# Patient Record
Sex: FEMALE | Race: WHITE | NOT HISPANIC OR LATINO | Employment: UNEMPLOYED | URBAN - NONMETROPOLITAN AREA
[De-identification: names, ages, dates, MRNs, and addresses within clinical notes are randomized per-mention and may not be internally consistent; named-entity substitution may affect disease eponyms.]

---

## 2017-04-13 ENCOUNTER — TRANSCRIBE ORDERS (OUTPATIENT)
Dept: ADMINISTRATIVE | Facility: HOSPITAL | Age: 72
End: 2017-04-13

## 2017-04-13 ENCOUNTER — LAB (OUTPATIENT)
Dept: LAB | Facility: HOSPITAL | Age: 72
End: 2017-04-13
Attending: FAMILY MEDICINE

## 2017-04-13 DIAGNOSIS — E87.5 HYPERKALEMIA: Primary | ICD-10-CM

## 2017-04-13 DIAGNOSIS — E87.5 HYPERKALEMIA: ICD-10-CM

## 2017-04-13 LAB
ANION GAP SERPL CALCULATED.3IONS-SCNC: 5.5 MMOL/L (ref 3.6–11.2)
BUN BLD-MCNC: 22 MG/DL (ref 7–21)
BUN/CREAT SERPL: 18.2 (ref 7–25)
CALCIUM SPEC-SCNC: 9.3 MG/DL (ref 7.7–10)
CHLORIDE SERPL-SCNC: 107 MMOL/L (ref 99–112)
CO2 SERPL-SCNC: 25.5 MMOL/L (ref 24.3–31.9)
CREAT BLD-MCNC: 1.21 MG/DL (ref 0.43–1.29)
GFR SERPL CREATININE-BSD FRML MDRD: 44 ML/MIN/1.73
GLUCOSE BLD-MCNC: 129 MG/DL (ref 70–110)
OSMOLALITY SERPL CALC.SUM OF ELEC: 280.7 MOSM/KG (ref 273–305)
POTASSIUM BLD-SCNC: 4.8 MMOL/L (ref 3.5–5.3)
SODIUM BLD-SCNC: 138 MMOL/L (ref 135–153)

## 2017-04-13 PROCEDURE — 80048 BASIC METABOLIC PNL TOTAL CA: CPT

## 2017-04-13 PROCEDURE — 36415 COLL VENOUS BLD VENIPUNCTURE: CPT

## 2017-07-05 ENCOUNTER — TRANSCRIBE ORDERS (OUTPATIENT)
Dept: INFUSION THERAPY | Facility: HOSPITAL | Age: 72
End: 2017-07-05

## 2017-07-05 DIAGNOSIS — D50.9 IRON DEFICIENCY ANEMIA, UNSPECIFIED IRON DEFICIENCY ANEMIA TYPE: Primary | ICD-10-CM

## 2017-07-05 DIAGNOSIS — N18.3 CHRONIC KIDNEY DISEASE (CKD), STAGE 3 (MODERATE): ICD-10-CM

## 2017-07-07 ENCOUNTER — HOSPITAL ENCOUNTER (OUTPATIENT)
Dept: INFUSION THERAPY | Facility: HOSPITAL | Age: 72
Setting detail: INFUSION SERIES
Discharge: HOME | End: 2017-07-07
Attending: INTERNAL MEDICINE

## 2017-07-07 VITALS
TEMPERATURE: 99.1 F | HEART RATE: 65 BPM | HEIGHT: 63 IN | DIASTOLIC BLOOD PRESSURE: 54 MMHG | RESPIRATION RATE: 18 BRPM | SYSTOLIC BLOOD PRESSURE: 140 MMHG | BODY MASS INDEX: 29.23 KG/M2 | WEIGHT: 165 LBS

## 2017-07-07 DIAGNOSIS — D50.9 IRON DEFICIENCY ANEMIA, UNSPECIFIED IRON DEFICIENCY ANEMIA TYPE: ICD-10-CM

## 2017-07-07 PROCEDURE — 25010000002 IRON SUCROSE PER 1 MG: Performed by: INTERNAL MEDICINE

## 2017-07-07 PROCEDURE — 96366 THER/PROPH/DIAG IV INF ADDON: CPT

## 2017-07-07 PROCEDURE — 96365 THER/PROPH/DIAG IV INF INIT: CPT

## 2017-07-07 RX ORDER — CHLORAL HYDRATE 500 MG
1000 CAPSULE ORAL
COMMUNITY
End: 2018-06-04

## 2017-07-07 RX ORDER — LISINOPRIL 40 MG/1
40 TABLET ORAL DAILY
Status: ON HOLD | COMMUNITY
End: 2019-02-14

## 2017-07-07 RX ORDER — LANOLIN ALCOHOL/MO/W.PET/CERES
1000 CREAM (GRAM) TOPICAL
Status: ON HOLD | COMMUNITY
End: 2018-02-03

## 2017-07-07 RX ORDER — FERROUS SULFATE 325(65) MG
325 TABLET ORAL EVERY EVENING
Status: ON HOLD | COMMUNITY
End: 2020-04-24 | Stop reason: SDUPTHER

## 2017-07-07 RX ORDER — DILTIAZEM HYDROCHLORIDE 300 MG/1
360 CAPSULE, COATED, EXTENDED RELEASE ORAL DAILY
Status: ON HOLD | COMMUNITY
End: 2018-02-03

## 2017-07-07 RX ORDER — RANITIDINE 150 MG/1
150 TABLET ORAL 2 TIMES DAILY
COMMUNITY
End: 2020-04-21

## 2017-07-07 RX ORDER — PHENOL 1.4 %
600 AEROSOL, SPRAY (ML) MUCOUS MEMBRANE DAILY
COMMUNITY

## 2017-07-07 RX ORDER — CHLORTHALIDONE 25 MG/1
25 TABLET ORAL EVERY OTHER DAY
Status: ON HOLD | COMMUNITY
End: 2019-02-14

## 2017-07-07 RX ADMIN — IRON SUCROSE 300 MG: 20 INJECTION, SOLUTION INTRAVENOUS at 15:42

## 2017-07-07 NOTE — PATIENT INSTRUCTIONS
Iron Deficiency Anemia, Adult  Anemia is when you have a low number of healthy red blood cells. It is often caused by too little iron. This is called iron deficiency anemia. It may make you tired and short of breath.  HOME CARE   · Take iron as told by your doctor.  · Take vitamins as told by your doctor.  · Eat foods that have iron in them. This includes liver, lean beef, whole-grain bread, eggs, dried fruit, and dark green leafy vegetables.  GET HELP RIGHT AWAY IF:  · You pass out (faint).  · You have chest pain.  · You feel sick to your stomach (nauseous) or throw up (vomit).  · You get very short of breath with activity.  · You are weak.  · You have a fast heartbeat.  · You start to sweat for no reason.  · You become light-headed when getting up from a chair or bed.  MAKE SURE YOU:  · Understand these instructions.  · Will watch your condition.  · Will get help right away if you are not doing well or get worse.     This information is not intended to replace advice given to you by your health care provider. Make sure you discuss any questions you have with your health care provider.     Document Released: 01/20/2012 Document Revised: 01/08/2016 Document Reviewed: 08/25/2014  [employer] Interactive Patient Education ©2017 [employer].  Iron Sucrose injection  What is this medicine?  IRON SUCROSE (EDGARDOSB carlin BROOKS juans) is an iron complex. Iron is used to make healthy red blood cells, which carry oxygen and nutrients throughout the body. This medicine is used to treat iron deficiency anemia in people with chronic kidney disease.  This medicine may be used for other purposes; ask your health care provider or pharmacist if you have questions.  COMMON BRAND NAME(S): Venofer  What should I tell my health care provider before I take this medicine?  They need to know if you have any of these conditions:  -anemia not caused by low iron levels  -heart disease  -high levels of iron in the blood  -kidney disease  -liver  disease  -an unusual or allergic reaction to iron, other medicines, foods, dyes, or preservatives  -pregnant or trying to get pregnant  -breast-feeding  How should I use this medicine?  This medicine is for infusion into a vein. It is given by a health care professional in a hospital or clinic setting.  Talk to your pediatrician regarding the use of this medicine in children. While this drug may be prescribed for children as young as 2 years for selected conditions, precautions do apply.  Overdosage: If you think you have taken too much of this medicine contact a poison control center or emergency room at once.  NOTE: This medicine is only for you. Do not share this medicine with others.  What if I miss a dose?  It is important not to miss your dose. Call your doctor or health care professional if you are unable to keep an appointment.  What may interact with this medicine?  Do not take this medicine with any of the following medications:  -deferoxamine  -dimercaprol  -other iron products  This medicine may also interact with the following medications:  -chloramphenicol  -deferasirox  This list may not describe all possible interactions. Give your health care provider a list of all the medicines, herbs, non-prescription drugs, or dietary supplements you use. Also tell them if you smoke, drink alcohol, or use illegal drugs. Some items may interact with your medicine.  What should I watch for while using this medicine?  Visit your doctor or healthcare professional regularly. Tell your doctor or healthcare professional if your symptoms do not start to get better or if they get worse. You may need blood work done while you are taking this medicine.  You may need to follow a special diet. Talk to your doctor. Foods that contain iron include: whole grains/cereals, dried fruits, beans, or peas, leafy green vegetables, and organ meats (liver, kidney).  What side effects may I notice from receiving this medicine?  Side  effects that you should report to your doctor or health care professional as soon as possible:  -allergic reactions like skin rash, itching or hives, swelling of the face, lips, or tongue  -breathing problems  -changes in blood pressure  -cough  -fast, irregular heartbeat  -feeling faint or lightheaded, falls  -fever or chills  -flushing, sweating, or hot feelings  -joint or muscle aches/pains  -seizures  -swelling of the ankles or feet  -unusually weak or tired  Side effects that usually do not require medical attention (report to your doctor or health care professional if they continue or are bothersome):  -diarrhea  -feeling achy  -headache  -irritation at site where injected  -nausea, vomiting  -stomach upset  -tiredness  This list may not describe all possible side effects. Call your doctor for medical advice about side effects. You may report side effects to FDA at 9-758-FDA-0938.  Where should I keep my medicine?  This drug is given in a hospital or clinic and will not be stored at home.  NOTE: This sheet is a summary. It may not cover all possible information. If you have questions about this medicine, talk to your doctor, pharmacist, or health care provider.     © 2017, Elsevier/[employer]. (2012-09-27 17:14:35)

## 2017-07-14 ENCOUNTER — HOSPITAL ENCOUNTER (OUTPATIENT)
Dept: INFUSION THERAPY | Facility: HOSPITAL | Age: 72
Setting detail: INFUSION SERIES
Discharge: HOME | End: 2017-07-14
Attending: INTERNAL MEDICINE

## 2017-07-14 VITALS
SYSTOLIC BLOOD PRESSURE: 142 MMHG | RESPIRATION RATE: 16 BRPM | HEART RATE: 66 BPM | DIASTOLIC BLOOD PRESSURE: 65 MMHG | TEMPERATURE: 98.8 F

## 2017-07-14 DIAGNOSIS — D50.9 IRON DEFICIENCY ANEMIA, UNSPECIFIED IRON DEFICIENCY ANEMIA TYPE: ICD-10-CM

## 2017-07-14 PROCEDURE — 25010000002 IRON SUCROSE PER 1 MG: Performed by: INTERNAL MEDICINE

## 2017-07-14 PROCEDURE — 96365 THER/PROPH/DIAG IV INF INIT: CPT

## 2017-07-14 PROCEDURE — 96366 THER/PROPH/DIAG IV INF ADDON: CPT

## 2017-07-14 RX ADMIN — IRON SUCROSE 300 MG: 20 INJECTION, SOLUTION INTRAVENOUS at 12:45

## 2017-07-14 NOTE — PATIENT INSTRUCTIONS
Iron Sucrose injection  What is this medicine?  IRON SUCROSE (PEDRO LUIS booth) is an iron complex. Iron is used to make healthy red blood cells, which carry oxygen and nutrients throughout the body. This medicine is used to treat iron deficiency anemia in people with chronic kidney disease.  This medicine may be used for other purposes; ask your health care provider or pharmacist if you have questions.  COMMON BRAND NAME(S): Venofer  What should I tell my health care provider before I take this medicine?  They need to know if you have any of these conditions:  -anemia not caused by low iron levels  -heart disease  -high levels of iron in the blood  -kidney disease  -liver disease  -an unusual or allergic reaction to iron, other medicines, foods, dyes, or preservatives  -pregnant or trying to get pregnant  -breast-feeding  How should I use this medicine?  This medicine is for infusion into a vein. It is given by a health care professional in a hospital or clinic setting.  Talk to your pediatrician regarding the use of this medicine in children. While this drug may be prescribed for children as young as 2 years for selected conditions, precautions do apply.  Overdosage: If you think you have taken too much of this medicine contact a poison control center or emergency room at once.  NOTE: This medicine is only for you. Do not share this medicine with others.  What if I miss a dose?  It is important not to miss your dose. Call your doctor or health care professional if you are unable to keep an appointment.  What may interact with this medicine?  Do not take this medicine with any of the following medications:  -deferoxamine  -dimercaprol  -other iron products  This medicine may also interact with the following medications:  -chloramphenicol  -deferasirox  This list may not describe all possible interactions. Give your health care provider a list of all the medicines, herbs, non-prescription drugs, or dietary  supplements you use. Also tell them if you smoke, drink alcohol, or use illegal drugs. Some items may interact with your medicine.  What should I watch for while using this medicine?  Visit your doctor or healthcare professional regularly. Tell your doctor or healthcare professional if your symptoms do not start to get better or if they get worse. You may need blood work done while you are taking this medicine.  You may need to follow a special diet. Talk to your doctor. Foods that contain iron include: whole grains/cereals, dried fruits, beans, or peas, leafy green vegetables, and organ meats (liver, kidney).  What side effects may I notice from receiving this medicine?  Side effects that you should report to your doctor or health care professional as soon as possible:  -allergic reactions like skin rash, itching or hives, swelling of the face, lips, or tongue  -breathing problems  -changes in blood pressure  -cough  -fast, irregular heartbeat  -feeling faint or lightheaded, falls  -fever or chills  -flushing, sweating, or hot feelings  -joint or muscle aches/pains  -seizures  -swelling of the ankles or feet  -unusually weak or tired  Side effects that usually do not require medical attention (report to your doctor or health care professional if they continue or are bothersome):  -diarrhea  -feeling achy  -headache  -irritation at site where injected  -nausea, vomiting  -stomach upset  -tiredness  This list may not describe all possible side effects. Call your doctor for medical advice about side effects. You may report side effects to FDA at 3-400-FDA-2103.  Where should I keep my medicine?  This drug is given in a hospital or clinic and will not be stored at home.  NOTE: This sheet is a summary. It may not cover all possible information. If you have questions about this medicine, talk to your doctor, pharmacist, or health care provider.     © 2017, Elsevier/[employer] Standard. (2012-09-27 17:14:35)

## 2017-07-17 ENCOUNTER — TRANSCRIBE ORDERS (OUTPATIENT)
Dept: ADMINISTRATIVE | Facility: HOSPITAL | Age: 72
End: 2017-07-17

## 2017-07-17 ENCOUNTER — LAB (OUTPATIENT)
Dept: LAB | Facility: HOSPITAL | Age: 72
End: 2017-07-17
Attending: INTERNAL MEDICINE

## 2017-07-17 DIAGNOSIS — D50.9 IRON DEFICIENCY ANEMIA, UNSPECIFIED: ICD-10-CM

## 2017-07-17 DIAGNOSIS — D50.9 IRON DEFICIENCY ANEMIA, UNSPECIFIED: Primary | ICD-10-CM

## 2017-07-17 LAB
COLLECT DATE SP2 STL: NORMAL
COLLECT DATE SP3 STL: NORMAL
COLLECT DATE STL: NORMAL
HEMOCCULT STL QL: NEGATIVE

## 2017-07-17 PROCEDURE — 82272 OCCULT BLD FECES 1-3 TESTS: CPT | Performed by: INTERNAL MEDICINE

## 2017-07-21 ENCOUNTER — HOSPITAL ENCOUNTER (OUTPATIENT)
Dept: INFUSION THERAPY | Facility: HOSPITAL | Age: 72
Setting detail: INFUSION SERIES
Discharge: HOME | End: 2017-07-21
Attending: INTERNAL MEDICINE

## 2017-07-21 VITALS
SYSTOLIC BLOOD PRESSURE: 164 MMHG | RESPIRATION RATE: 17 BRPM | DIASTOLIC BLOOD PRESSURE: 56 MMHG | TEMPERATURE: 97.8 F | BODY MASS INDEX: 29.23 KG/M2 | WEIGHT: 165 LBS | HEIGHT: 63 IN | HEART RATE: 77 BPM | OXYGEN SATURATION: 95 %

## 2017-07-21 DIAGNOSIS — D50.9 IRON DEFICIENCY ANEMIA, UNSPECIFIED IRON DEFICIENCY ANEMIA TYPE: ICD-10-CM

## 2017-07-21 PROCEDURE — 96365 THER/PROPH/DIAG IV INF INIT: CPT

## 2017-07-21 PROCEDURE — 96366 THER/PROPH/DIAG IV INF ADDON: CPT

## 2017-07-21 PROCEDURE — 25010000002 IRON SUCROSE PER 1 MG: Performed by: INTERNAL MEDICINE

## 2017-07-21 RX ADMIN — IRON SUCROSE 300 MG: 20 INJECTION, SOLUTION INTRAVENOUS at 08:47

## 2017-07-21 NOTE — PATIENT INSTRUCTIONS
Iron Sucrose injection  What is this medicine?  IRON SUCROSE (PEDRO LUIS booth) is an iron complex. Iron is used to make healthy red blood cells, which carry oxygen and nutrients throughout the body. This medicine is used to treat iron deficiency anemia in people with chronic kidney disease.  This medicine may be used for other purposes; ask your health care provider or pharmacist if you have questions.  COMMON BRAND NAME(S): Venofer  What should I tell my health care provider before I take this medicine?  They need to know if you have any of these conditions:  -anemia not caused by low iron levels  -heart disease  -high levels of iron in the blood  -kidney disease  -liver disease  -an unusual or allergic reaction to iron, other medicines, foods, dyes, or preservatives  -pregnant or trying to get pregnant  -breast-feeding  How should I use this medicine?  This medicine is for infusion into a vein. It is given by a health care professional in a hospital or clinic setting.  Talk to your pediatrician regarding the use of this medicine in children. While this drug may be prescribed for children as young as 2 years for selected conditions, precautions do apply.  Overdosage: If you think you have taken too much of this medicine contact a poison control center or emergency room at once.  NOTE: This medicine is only for you. Do not share this medicine with others.  What if I miss a dose?  It is important not to miss your dose. Call your doctor or health care professional if you are unable to keep an appointment.  What may interact with this medicine?  Do not take this medicine with any of the following medications:  -deferoxamine  -dimercaprol  -other iron products  This medicine may also interact with the following medications:  -chloramphenicol  -deferasirox  This list may not describe all possible interactions. Give your health care provider a list of all the medicines, herbs, non-prescription drugs, or dietary  supplements you use. Also tell them if you smoke, drink alcohol, or use illegal drugs. Some items may interact with your medicine.  What should I watch for while using this medicine?  Visit your doctor or healthcare professional regularly. Tell your doctor or healthcare professional if your symptoms do not start to get better or if they get worse. You may need blood work done while you are taking this medicine.  You may need to follow a special diet. Talk to your doctor. Foods that contain iron include: whole grains/cereals, dried fruits, beans, or peas, leafy green vegetables, and organ meats (liver, kidney).  What side effects may I notice from receiving this medicine?  Side effects that you should report to your doctor or health care professional as soon as possible:  -allergic reactions like skin rash, itching or hives, swelling of the face, lips, or tongue  -breathing problems  -changes in blood pressure  -cough  -fast, irregular heartbeat  -feeling faint or lightheaded, falls  -fever or chills  -flushing, sweating, or hot feelings  -joint or muscle aches/pains  -seizures  -swelling of the ankles or feet  -unusually weak or tired  Side effects that usually do not require medical attention (report to your doctor or health care professional if they continue or are bothersome):  -diarrhea  -feeling achy  -headache  -irritation at site where injected  -nausea, vomiting  -stomach upset  -tiredness  This list may not describe all possible side effects. Call your doctor for medical advice about side effects. You may report side effects to FDA at 5-484-FDA-9621.  Where should I keep my medicine?  This drug is given in a hospital or clinic and will not be stored at home.  NOTE: This sheet is a summary. It may not cover all possible information. If you have questions about this medicine, talk to your doctor, pharmacist, or health care provider.     © 2017, Elsevier/[employer] Standard. (2012-09-27 17:14:35)    Peripheral  Intravenous Catheter Placement, Adult, Care After   Refer to this sheet in the next few weeks. These instructions provide you with information about caring for yourself after your procedure. Your health care provider may also give you more specific instructions. Your treatment has been planned according to current medical practices, but problems sometimes occur. Call your health care provider if you have any problems or questions after your procedure.  WHAT TO EXPECT AFTER THE PROCEDURE  After your procedure, it is common to have:  · Soreness at the catheter site.  · Bruising at the catheter site.  HOME CARE INSTRUCTIONS  General Instructions  · Take over-the-counter and prescription medicines only as told by your health care provider.  · There are many different ways to close and cover the puncture site, including stitches, skin glue, and adhesive strips. Follow your health care provider's instructions about:    Puncture site care.    Bandage (dressing) changes and removal.    Puncture site closure removal.  · Check the puncture site every day for signs of infection. Watch for:    Redness, swelling, or pain.    Fluid, blood, or pus.  If You Go Home with Your Catheter In:  · Follow instructions from your health care provider about bathing.  · Do not get the catheter wet.  · Do not pull on the catheter or tubing. This can make the catheter come out from the vein.  · Keep all follow-up visits as told by your health care provider. This is important.  SEEK MEDICAL CARE IF:  · You develop a bruise around the puncture site.  · You have redness, swelling, or pain at the puncture site.  · You have fluid, blood, or pus coming from the puncture site.  · You have a fever.  · You went home with the catheter in and:    You have pain, redness, swelling, or leakage where the catheter is located.    The dressing or tape is loose.    The catheter falls out.    The catheter or tubing was pulled.    The catheter will not flush.    The  catheter stops working.  SEEK IMMEDIATE MEDICAL CARE IF:  · You have very bad pain around the puncture site.  · You have red streaks above or below the puncture site.  · You lose feeling or develop weakness in the area of the puncture site.     This information is not intended to replace advice given to you by your health care provider. Make sure you discuss any questions you have with your health care provider.     Document Released: 01/13/2017 Document Reviewed: 01/13/2017  Elsevier Interactive Patient Education ©2017 Elsevier Inc.

## 2018-02-03 ENCOUNTER — HOSPITAL ENCOUNTER (INPATIENT)
Facility: HOSPITAL | Age: 73
LOS: 4 days | Discharge: HOME | End: 2018-02-07
Attending: EMERGENCY MEDICINE | Admitting: FAMILY MEDICINE

## 2018-02-03 ENCOUNTER — APPOINTMENT (OUTPATIENT)
Dept: GENERAL RADIOLOGY | Facility: HOSPITAL | Age: 73
End: 2018-02-03

## 2018-02-03 DIAGNOSIS — N28.9 ACUTE RENAL INSUFFICIENCY: ICD-10-CM

## 2018-02-03 DIAGNOSIS — J11.1 INFLUENZA: ICD-10-CM

## 2018-02-03 DIAGNOSIS — J44.1 ACUTE EXACERBATION OF CHRONIC OBSTRUCTIVE PULMONARY DISEASE (COPD) (HCC): Primary | ICD-10-CM

## 2018-02-03 DIAGNOSIS — J96.01 ACUTE RESPIRATORY FAILURE WITH HYPOXIA (HCC): ICD-10-CM

## 2018-02-03 LAB
A-A DO2: 80.8 MMHG (ref 0–300)
ALBUMIN SERPL-MCNC: 4.3 G/DL (ref 3.4–4.8)
ALBUMIN/GLOB SERPL: 1.5 G/DL (ref 1.5–2.5)
ALP SERPL-CCNC: 39 U/L (ref 35–104)
ALT SERPL W P-5'-P-CCNC: 17 U/L (ref 10–36)
ANION GAP SERPL CALCULATED.3IONS-SCNC: 8.1 MMOL/L (ref 3.6–11.2)
ARTERIAL PATENCY WRIST A: POSITIVE
AST SERPL-CCNC: 22 U/L (ref 10–30)
ATMOSPHERIC PRESS: 732 MMHG
BASE EXCESS BLDA CALC-SCNC: -5.7 MMOL/L
BASOPHILS # BLD AUTO: 0.02 10*3/MM3 (ref 0–0.3)
BASOPHILS NFR BLD AUTO: 0.2 % (ref 0–2)
BDY SITE: ABNORMAL
BILIRUB SERPL-MCNC: 0.2 MG/DL (ref 0.2–1.8)
BODY TEMPERATURE: 98.6 C
BUN BLD-MCNC: 30 MG/DL (ref 7–21)
BUN/CREAT SERPL: 18.6 (ref 7–25)
CALCIUM SPEC-SCNC: 9.3 MG/DL (ref 7.7–10)
CHLORIDE SERPL-SCNC: 102 MMOL/L (ref 99–112)
CO2 SERPL-SCNC: 23.9 MMOL/L (ref 24.3–31.9)
COHGB MFR BLD: 1.1 % (ref 0–5)
CREAT BLD-MCNC: 1.61 MG/DL (ref 0.43–1.29)
D-LACTATE SERPL-SCNC: 1.1 MMOL/L (ref 0.5–2)
DEPRECATED RDW RBC AUTO: 52.7 FL (ref 37–54)
EOSINOPHIL # BLD AUTO: 0.02 10*3/MM3 (ref 0–0.7)
EOSINOPHIL NFR BLD AUTO: 0.2 % (ref 0–7)
ERYTHROCYTE [DISTWIDTH] IN BLOOD BY AUTOMATED COUNT: 16.7 % (ref 11.5–14.5)
FLUAV AG NPH QL: NEGATIVE
FLUBV AG NPH QL IA: POSITIVE
GFR SERPL CREATININE-BSD FRML MDRD: 31 ML/MIN/1.73
GLOBULIN UR ELPH-MCNC: 2.8 GM/DL
GLUCOSE BLD-MCNC: 192 MG/DL (ref 70–110)
GLUCOSE BLDC GLUCOMTR-MCNC: 264 MG/DL (ref 70–130)
GLUCOSE BLDC GLUCOMTR-MCNC: 282 MG/DL (ref 70–130)
GLUCOSE BLDC GLUCOMTR-MCNC: 396 MG/DL (ref 70–130)
HCO3 BLDA-SCNC: 20.1 MMOL/L (ref 22–26)
HCT VFR BLD AUTO: 34.6 % (ref 37–47)
HCT VFR BLD CALC: 33 % (ref 37–47)
HGB BLD-MCNC: 10.8 G/DL (ref 12–16)
HGB BLDA-MCNC: 11.3 G/DL (ref 12–16)
HOROWITZ INDEX BLD+IHG-RTO: 28 %
IMM GRANULOCYTES # BLD: 0.03 10*3/MM3 (ref 0–0.03)
IMM GRANULOCYTES NFR BLD: 0.3 % (ref 0–0.5)
LYMPHOCYTES # BLD AUTO: 1.99 10*3/MM3 (ref 1–3)
LYMPHOCYTES NFR BLD AUTO: 21.1 % (ref 16–46)
MCH RBC QN AUTO: 26.9 PG (ref 27–33)
MCHC RBC AUTO-ENTMCNC: 31.2 G/DL (ref 33–37)
MCV RBC AUTO: 86.1 FL (ref 80–94)
METHGB BLD QL: 0.4 % (ref 0–3)
MODALITY: ABNORMAL
MONOCYTES # BLD AUTO: 0.6 10*3/MM3 (ref 0.1–0.9)
MONOCYTES NFR BLD AUTO: 6.4 % (ref 0–12)
NEUTROPHILS # BLD AUTO: 6.75 10*3/MM3 (ref 1.4–6.5)
NEUTROPHILS NFR BLD AUTO: 71.8 % (ref 40–75)
OSMOLALITY SERPL CALC.SUM OF ELEC: 279.6 MOSM/KG (ref 273–305)
OXYHGB MFR BLDV: 88.6 % (ref 85–100)
PCO2 BLDA: 40.6 MM HG (ref 35–45)
PH BLDA: 7.31 PH UNITS (ref 7.35–7.45)
PLATELET # BLD AUTO: 373 10*3/MM3 (ref 130–400)
PMV BLD AUTO: 10.9 FL (ref 6–10)
PO2 BLDA: 63.1 MM HG (ref 80–100)
POTASSIUM BLD-SCNC: 4.3 MMOL/L (ref 3.5–5.3)
PROT SERPL-MCNC: 7.1 G/DL (ref 6–8)
RBC # BLD AUTO: 4.02 10*6/MM3 (ref 4.2–5.4)
SAO2 % BLDCOA: 89.9 % (ref 90–100)
SODIUM BLD-SCNC: 134 MMOL/L (ref 135–153)
WBC NRBC COR # BLD: 9.41 10*3/MM3 (ref 4.5–12.5)

## 2018-02-03 PROCEDURE — 25010000002 METHYLPREDNISOLONE PER 125 MG: Performed by: FAMILY MEDICINE

## 2018-02-03 PROCEDURE — 25010000002 AZITHROMYCIN: Performed by: EMERGENCY MEDICINE

## 2018-02-03 PROCEDURE — 71045 X-RAY EXAM CHEST 1 VIEW: CPT

## 2018-02-03 PROCEDURE — 82962 GLUCOSE BLOOD TEST: CPT

## 2018-02-03 PROCEDURE — 87804 INFLUENZA ASSAY W/OPTIC: CPT | Performed by: EMERGENCY MEDICINE

## 2018-02-03 PROCEDURE — 82805 BLOOD GASES W/O2 SATURATION: CPT | Performed by: EMERGENCY MEDICINE

## 2018-02-03 PROCEDURE — 25010000002 CEFTRIAXONE: Performed by: EMERGENCY MEDICINE

## 2018-02-03 PROCEDURE — 99284 EMERGENCY DEPT VISIT MOD MDM: CPT

## 2018-02-03 PROCEDURE — 94799 UNLISTED PULMONARY SVC/PX: CPT

## 2018-02-03 PROCEDURE — 63710000001 INSULIN ASPART PER 5 UNITS: Performed by: FAMILY MEDICINE

## 2018-02-03 PROCEDURE — 25010000002 METHYLPREDNISOLONE PER 125 MG: Performed by: EMERGENCY MEDICINE

## 2018-02-03 PROCEDURE — 25010000002 ENOXAPARIN PER 10 MG: Performed by: FAMILY MEDICINE

## 2018-02-03 PROCEDURE — 63710000001 INSULIN DETEMIR PER 5 UNITS: Performed by: FAMILY MEDICINE

## 2018-02-03 PROCEDURE — 36415 COLL VENOUS BLD VENIPUNCTURE: CPT

## 2018-02-03 PROCEDURE — 85025 COMPLETE CBC W/AUTO DIFF WBC: CPT | Performed by: EMERGENCY MEDICINE

## 2018-02-03 PROCEDURE — 71045 X-RAY EXAM CHEST 1 VIEW: CPT | Performed by: RADIOLOGY

## 2018-02-03 PROCEDURE — 87040 BLOOD CULTURE FOR BACTERIA: CPT | Performed by: EMERGENCY MEDICINE

## 2018-02-03 PROCEDURE — 80053 COMPREHEN METABOLIC PANEL: CPT | Performed by: EMERGENCY MEDICINE

## 2018-02-03 PROCEDURE — 36600 WITHDRAWAL OF ARTERIAL BLOOD: CPT | Performed by: EMERGENCY MEDICINE

## 2018-02-03 PROCEDURE — 93005 ELECTROCARDIOGRAM TRACING: CPT | Performed by: EMERGENCY MEDICINE

## 2018-02-03 PROCEDURE — 83605 ASSAY OF LACTIC ACID: CPT | Performed by: EMERGENCY MEDICINE

## 2018-02-03 PROCEDURE — 25810000003 SODIUM CHLORIDE 0.9 % WITH KCL 20 MEQ 20-0.9 MEQ/L-% SOLUTION: Performed by: FAMILY MEDICINE

## 2018-02-03 PROCEDURE — 83050 HGB METHEMOGLOBIN QUAN: CPT | Performed by: EMERGENCY MEDICINE

## 2018-02-03 PROCEDURE — 94640 AIRWAY INHALATION TREATMENT: CPT

## 2018-02-03 PROCEDURE — 82375 ASSAY CARBOXYHB QUANT: CPT | Performed by: EMERGENCY MEDICINE

## 2018-02-03 RX ORDER — NICOTINE POLACRILEX 4 MG
15 LOZENGE BUCCAL
Status: DISCONTINUED | OUTPATIENT
Start: 2018-02-03 | End: 2018-02-07 | Stop reason: HOSPADM

## 2018-02-03 RX ORDER — CALCIUM CARBONATE 500(1250)
500 TABLET ORAL DAILY
Status: DISCONTINUED | OUTPATIENT
Start: 2018-02-04 | End: 2018-02-07 | Stop reason: HOSPADM

## 2018-02-03 RX ORDER — CETIRIZINE HYDROCHLORIDE 10 MG/1
5 TABLET ORAL DAILY
Status: DISCONTINUED | OUTPATIENT
Start: 2018-02-04 | End: 2018-02-07 | Stop reason: HOSPADM

## 2018-02-03 RX ORDER — FERROUS SULFATE 325(65) MG
325 TABLET ORAL 2 TIMES DAILY
Status: DISCONTINUED | OUTPATIENT
Start: 2018-02-04 | End: 2018-02-07 | Stop reason: HOSPADM

## 2018-02-03 RX ORDER — CHLORTHALIDONE 50 MG/1
25 TABLET ORAL DAILY
Status: DISCONTINUED | OUTPATIENT
Start: 2018-02-04 | End: 2018-02-07 | Stop reason: HOSPADM

## 2018-02-03 RX ORDER — IPRATROPIUM BROMIDE AND ALBUTEROL SULFATE 2.5; .5 MG/3ML; MG/3ML
3 SOLUTION RESPIRATORY (INHALATION) ONCE
Status: COMPLETED | OUTPATIENT
Start: 2018-02-03 | End: 2018-02-03

## 2018-02-03 RX ORDER — DILTIAZEM HYDROCHLORIDE 180 MG/1
360 CAPSULE, COATED, EXTENDED RELEASE ORAL
Status: DISCONTINUED | OUTPATIENT
Start: 2018-02-04 | End: 2018-02-07 | Stop reason: HOSPADM

## 2018-02-03 RX ORDER — OSELTAMIVIR PHOSPHATE 6 MG/ML
30 FOR SUSPENSION ORAL EVERY 12 HOURS SCHEDULED
Status: DISCONTINUED | OUTPATIENT
Start: 2018-02-04 | End: 2018-02-07

## 2018-02-03 RX ORDER — LISINOPRIL 10 MG/1
40 TABLET ORAL DAILY
Status: DISCONTINUED | OUTPATIENT
Start: 2018-02-04 | End: 2018-02-07 | Stop reason: HOSPADM

## 2018-02-03 RX ORDER — OSELTAMIVIR PHOSPHATE 6 MG/ML
30 FOR SUSPENSION ORAL EVERY 12 HOURS SCHEDULED
Status: DISCONTINUED | OUTPATIENT
Start: 2018-02-03 | End: 2018-02-03

## 2018-02-03 RX ORDER — FENOFIBRATE 160 MG/1
160 TABLET ORAL DAILY
Status: ON HOLD | COMMUNITY
End: 2021-11-13

## 2018-02-03 RX ORDER — OSELTAMIVIR PHOSPHATE 75 MG/1
75 CAPSULE ORAL EVERY 12 HOURS SCHEDULED
Status: DISCONTINUED | OUTPATIENT
Start: 2018-02-03 | End: 2018-02-03 | Stop reason: SDUPTHER

## 2018-02-03 RX ORDER — FENOFIBRATE 145 MG/1
145 TABLET, COATED ORAL DAILY
Status: ON HOLD | COMMUNITY
End: 2018-02-03

## 2018-02-03 RX ORDER — METHYLPREDNISOLONE SODIUM SUCCINATE 125 MG/2ML
80 INJECTION, POWDER, LYOPHILIZED, FOR SOLUTION INTRAMUSCULAR; INTRAVENOUS ONCE
Status: COMPLETED | OUTPATIENT
Start: 2018-02-03 | End: 2018-02-03

## 2018-02-03 RX ORDER — CETIRIZINE HYDROCHLORIDE 10 MG/1
10 TABLET ORAL DAILY
Status: ON HOLD | COMMUNITY
End: 2019-12-17 | Stop reason: SDUPTHER

## 2018-02-03 RX ORDER — METHYLPREDNISOLONE SODIUM SUCCINATE 125 MG/2ML
60 INJECTION, POWDER, LYOPHILIZED, FOR SOLUTION INTRAMUSCULAR; INTRAVENOUS EVERY 6 HOURS
Status: DISCONTINUED | OUTPATIENT
Start: 2018-02-03 | End: 2018-02-07

## 2018-02-03 RX ORDER — IPRATROPIUM BROMIDE AND ALBUTEROL SULFATE 2.5; .5 MG/3ML; MG/3ML
3 SOLUTION RESPIRATORY (INHALATION) EVERY 6 HOURS PRN
Status: DISCONTINUED | OUTPATIENT
Start: 2018-02-03 | End: 2018-02-04

## 2018-02-03 RX ORDER — CYANOCOBALAMIN 1000 UG/ML
1000 INJECTION, SOLUTION INTRAMUSCULAR; SUBCUTANEOUS
Status: CANCELLED | OUTPATIENT
Start: 2018-02-03

## 2018-02-03 RX ORDER — DILTIAZEM HYDROCHLORIDE 360 MG/1
360 CAPSULE, EXTENDED RELEASE ORAL DAILY
COMMUNITY
End: 2019-03-16 | Stop reason: HOSPADM

## 2018-02-03 RX ORDER — NICOTINE POLACRILEX 4 MG
15 LOZENGE BUCCAL
Status: DISCONTINUED | OUTPATIENT
Start: 2018-02-03 | End: 2018-02-04 | Stop reason: SDUPTHER

## 2018-02-03 RX ORDER — CYANOCOBALAMIN 1000 UG/ML
1000 INJECTION, SOLUTION INTRAMUSCULAR; SUBCUTANEOUS
COMMUNITY
End: 2019-03-16 | Stop reason: HOSPADM

## 2018-02-03 RX ORDER — DEXTROSE MONOHYDRATE 25 G/50ML
25 INJECTION, SOLUTION INTRAVENOUS
Status: DISCONTINUED | OUTPATIENT
Start: 2018-02-03 | End: 2018-02-04 | Stop reason: SDUPTHER

## 2018-02-03 RX ORDER — SODIUM CHLORIDE AND POTASSIUM CHLORIDE 150; 900 MG/100ML; MG/100ML
100 INJECTION, SOLUTION INTRAVENOUS CONTINUOUS
Status: DISCONTINUED | OUTPATIENT
Start: 2018-02-03 | End: 2018-02-07 | Stop reason: HOSPADM

## 2018-02-03 RX ORDER — OSELTAMIVIR PHOSPHATE 6 MG/ML
30 FOR SUSPENSION ORAL ONCE
Status: COMPLETED | OUTPATIENT
Start: 2018-02-03 | End: 2018-02-03

## 2018-02-03 RX ORDER — CYANOCOBALAMIN 1000 UG/ML
1000 INJECTION, SOLUTION INTRAMUSCULAR; SUBCUTANEOUS
Status: DISCONTINUED | OUTPATIENT
Start: 2018-02-28 | End: 2018-02-07 | Stop reason: HOSPADM

## 2018-02-03 RX ORDER — SODIUM CHLORIDE 0.9 % (FLUSH) 0.9 %
10 SYRINGE (ML) INJECTION AS NEEDED
Status: DISCONTINUED | OUTPATIENT
Start: 2018-02-03 | End: 2018-02-07 | Stop reason: HOSPADM

## 2018-02-03 RX ORDER — DEXTROSE MONOHYDRATE 25 G/50ML
25 INJECTION, SOLUTION INTRAVENOUS
Status: DISCONTINUED | OUTPATIENT
Start: 2018-02-03 | End: 2018-02-07 | Stop reason: HOSPADM

## 2018-02-03 RX ORDER — FAMOTIDINE 20 MG/1
20 TABLET, FILM COATED ORAL 2 TIMES DAILY
Status: DISCONTINUED | OUTPATIENT
Start: 2018-02-03 | End: 2018-02-07 | Stop reason: HOSPADM

## 2018-02-03 RX ORDER — ALBUTEROL SULFATE 2.5 MG/3ML
2.5 SOLUTION RESPIRATORY (INHALATION) ONCE
Status: COMPLETED | OUTPATIENT
Start: 2018-02-03 | End: 2018-02-03

## 2018-02-03 RX ADMIN — POTASSIUM CHLORIDE AND SODIUM CHLORIDE 100 ML/HR: 900; 150 INJECTION, SOLUTION INTRAVENOUS at 17:33

## 2018-02-03 RX ADMIN — CEFTRIAXONE 1 G: 1 INJECTION, POWDER, FOR SOLUTION INTRAMUSCULAR; INTRAVENOUS at 13:04

## 2018-02-03 RX ADMIN — ENOXAPARIN SODIUM 30 MG: 30 INJECTION SUBCUTANEOUS at 17:34

## 2018-02-03 RX ADMIN — INSULIN ASPART 6 UNITS: 100 INJECTION, SOLUTION INTRAVENOUS; SUBCUTANEOUS at 17:34

## 2018-02-03 RX ADMIN — METHYLPREDNISOLONE SODIUM SUCCINATE 60 MG: 125 INJECTION, POWDER, FOR SOLUTION INTRAMUSCULAR; INTRAVENOUS at 17:33

## 2018-02-03 RX ADMIN — IPRATROPIUM BROMIDE AND ALBUTEROL SULFATE 3 ML: .5; 3 SOLUTION RESPIRATORY (INHALATION) at 10:43

## 2018-02-03 RX ADMIN — OSELTAMIVIR PHOSPHATE 30 MG: 6 POWDER, FOR SUSPENSION ORAL at 15:28

## 2018-02-03 RX ADMIN — IPRATROPIUM BROMIDE AND ALBUTEROL SULFATE 3 ML: .5; 3 SOLUTION RESPIRATORY (INHALATION) at 11:05

## 2018-02-03 RX ADMIN — METHYLPREDNISOLONE SODIUM SUCCINATE 80 MG: 125 INJECTION, POWDER, FOR SOLUTION INTRAMUSCULAR; INTRAVENOUS at 11:13

## 2018-02-03 RX ADMIN — INSULIN DETEMIR 40 UNITS: 100 INJECTION, SOLUTION SUBCUTANEOUS at 23:10

## 2018-02-03 RX ADMIN — METHYLPREDNISOLONE SODIUM SUCCINATE 60 MG: 125 INJECTION, POWDER, FOR SOLUTION INTRAMUSCULAR; INTRAVENOUS at 23:08

## 2018-02-03 RX ADMIN — ALBUTEROL SULFATE 2.5 MG: 2.5 SOLUTION RESPIRATORY (INHALATION) at 13:06

## 2018-02-03 RX ADMIN — INSULIN ASPART 8 UNITS: 100 INJECTION, SOLUTION INTRAVENOUS; SUBCUTANEOUS at 20:01

## 2018-02-03 RX ADMIN — AZITHROMYCIN 500 MG: 500 INJECTION, POWDER, LYOPHILIZED, FOR SOLUTION INTRAVENOUS at 11:13

## 2018-02-03 NOTE — ED NOTES
EKG Performed by [facility] at 1014 and shown to Dr King.      Melissa Lopez  02/03/18 1017

## 2018-02-03 NOTE — PLAN OF CARE
Problem: Respiratory Insufficiency (Adult)  Goal: Identify Related Risk Factors and Signs and Symptoms  Outcome: Ongoing (interventions implemented as appropriate)    Goal: Acid/Base Balance  Outcome: Ongoing (interventions implemented as appropriate)    Goal: Effective Ventilation  Outcome: Ongoing (interventions implemented as appropriate)      Problem: Pain, Acute (Adult)  Goal: Identify Related Risk Factors and Signs and Symptoms  Outcome: Ongoing (interventions implemented as appropriate)    Goal: Acceptable Pain Control/Comfort Level  Outcome: Ongoing (interventions implemented as appropriate)      Problem: Skin Integrity Impairment, Risk/Actual (Adult)  Goal: Identify Related Risk Factors and Signs and Symptoms  Outcome: Ongoing (interventions implemented as appropriate)    Goal: Skin Integrity/Wound Healing  Outcome: Ongoing (interventions implemented as appropriate)      Problem: Fall Risk (Adult)  Goal: Identify Related Risk Factors and Signs and Symptoms  Outcome: Ongoing (interventions implemented as appropriate)    Goal: Absence of Falls  Outcome: Ongoing (interventions implemented as appropriate)

## 2018-02-03 NOTE — ED PROVIDER NOTES
Subjective   History of Present Illness  72-year-old white female complaining of shortness of breath.  Patient has a history of COPD and has had frequent exacerbations through the winter.  She has been through 3 rounds of antibiotics in the last 3-4 months.  She complains of several day history of worsening shortness of breath, cough, dyspnea on exertion.  She has mild nausea, but denies any chest pain, vomiting, diarrhea, fever, chills or other complaints.  Review of Systems   All other systems reviewed and are negative.      Past Medical History:   Diagnosis Date   • Anemia    • Chronic kidney disease    • COPD (chronic obstructive pulmonary disease)    • Diabetes mellitus    • Osteoporosis        No Known Allergies    Past Surgical History:   Procedure Laterality Date   •  SECTION     • HIP BIPOLAR REPLACEMENT     • HYSTERECTOMY         History reviewed. No pertinent family history.    Social History     Social History   • Marital status:      Spouse name: N/A   • Number of children: N/A   • Years of education: N/A     Social History Main Topics   • Smoking status: Former Smoker     Years: 15.00   • Smokeless tobacco: None   • Alcohol use No   • Drug use: No   • Sexual activity: Defer     Other Topics Concern   • None     Social History Narrative           Objective   Physical Exam   Constitutional: She is oriented to person, place, and time. She appears well-developed and well-nourished.   HENT:   Head: Normocephalic and atraumatic.   Cardiovascular: Normal rate, regular rhythm and normal heart sounds.  Exam reveals no gallop and no friction rub.    No murmur heard.  Pulmonary/Chest: Effort normal. No respiratory distress. She has wheezes (Throughout.). She has no rales. She exhibits no tenderness.   Abdominal: Soft. Bowel sounds are normal. She exhibits no distension. There is no tenderness. There is no rebound and no guarding.   Musculoskeletal: Normal range of motion. She exhibits no edema.    Neurological: She is alert and oriented to person, place, and time.   Skin: Skin is warm and dry.   Psychiatric: She has a normal mood and affect.   Nursing note and vitals reviewed.      Procedures  Results for orders placed or performed during the hospital encounter of 02/03/18   Influenza Antigen, Rapid - Swab, Nasopharynx   Result Value Ref Range    Influenza A Ag, EIA Negative Negative    Influenza B Ag, EIA Positive (A) Negative   Blood Gas, Arterial   Result Value Ref Range    Site Arterial: left radial     Sky's Test Positive     pH, Arterial 7.312 (L) 7.350 - 7.450 pH units    pCO2, Arterial 40.6 35.0 - 45.0 mm Hg    pO2, Arterial 63.1 (L) 80.0 - 100.0 mm Hg    HCO3, Arterial 20.1 (L) 22.0 - 26.0 mmol/L    Base Excess, Arterial -5.7 mmol/L    O2 Saturation, Arterial 89.9 (L) 90.0 - 100.0 %    Hemoglobin, Blood Gas 11.3 (L) 12 - 16 g/dL    Hematocrit, Blood Gas 33.0 (L) 37.0 - 47.0 %    Oxyhemoglobin 88.6 85 - 100 %    Methemoglobin 0.40 0.00 - 3.00 %    Carboxyhemoglobin 1.1 0 - 5 %    A-a Gradiant 80.8 0.0 - 300.0 mmHg    Temperature 98.6 C    Barometric Pressure for Blood Gas 732 mmHg    Modality Nasal Cannula     FIO2 28 %   Comprehensive Metabolic Panel   Result Value Ref Range    Glucose 192 (H) 70 - 110 mg/dL    BUN 30 (H) 7 - 21 mg/dL    Creatinine 1.61 (H) 0.43 - 1.29 mg/dL    Sodium 134 (L) 135 - 153 mmol/L    Potassium 4.3 3.5 - 5.3 mmol/L    Chloride 102 99 - 112 mmol/L    CO2 23.9 (L) 24.3 - 31.9 mmol/L    Calcium 9.3 7.7 - 10.0 mg/dL    Total Protein 7.1 6.0 - 8.0 g/dL    Albumin 4.30 3.40 - 4.80 g/dL    ALT (SGPT) 17 10 - 36 U/L    AST (SGOT) 22 10 - 30 U/L    Alkaline Phosphatase 39 35 - 104 U/L    Total Bilirubin 0.2 0.2 - 1.8 mg/dL    eGFR Non African Amer 31 (L) >60 mL/min/1.73    Globulin 2.8 gm/dL    A/G Ratio 1.5 1.5 - 2.5 g/dL    BUN/Creatinine Ratio 18.6 7.0 - 25.0    Anion Gap 8.1 3.6 - 11.2 mmol/L   Lactic Acid, Plasma   Result Value Ref Range    Lactate 1.1 0.5 - 2.0 mmol/L    CBC Auto Differential   Result Value Ref Range    WBC 9.41 4.50 - 12.50 10*3/mm3    RBC 4.02 (L) 4.20 - 5.40 10*6/mm3    Hemoglobin 10.8 (L) 12.0 - 16.0 g/dL    Hematocrit 34.6 (L) 37.0 - 47.0 %    MCV 86.1 80.0 - 94.0 fL    MCH 26.9 (L) 27.0 - 33.0 pg    MCHC 31.2 (L) 33.0 - 37.0 g/dL    RDW 16.7 (H) 11.5 - 14.5 %    RDW-SD 52.7 37.0 - 54.0 fl    MPV 10.9 (H) 6.0 - 10.0 fL    Platelets 373 130 - 400 10*3/mm3    Neutrophil % 71.8 40.0 - 75.0 %    Lymphocyte % 21.1 16.0 - 46.0 %    Monocyte % 6.4 0.0 - 12.0 %    Eosinophil % 0.2 0.0 - 7.0 %    Basophil % 0.2 0.0 - 2.0 %    Immature Grans % 0.3 0.0 - 0.5 %    Neutrophils, Absolute 6.75 (H) 1.40 - 6.50 10*3/mm3    Lymphocytes, Absolute 1.99 1.00 - 3.00 10*3/mm3    Monocytes, Absolute 0.60 0.10 - 0.90 10*3/mm3    Eosinophils, Absolute 0.02 0.00 - 0.70 10*3/mm3    Basophils, Absolute 0.02 0.00 - 0.30 10*3/mm3    Immature Grans, Absolute 0.03 0.00 - 0.03 10*3/mm3   Osmolality, Calculated   Result Value Ref Range    Osmolality Calc 279.6 273.0 - 305.0 mOsm/kg            ED Course  ED Course   Value Comment By Time   ECG 12 Lead Sinus tachycardia with occasional PACs.  Rate 102.  Nonspecific ST-T wave changes.  No ST elevations or depressions noted. Roldan King MD 02/03 4006    Discussed with Dr. NAEYLI Hernandez.  Patient admitted, see orders Roldan King MD 02/03 [id number]                  MDM  Number of Diagnoses or Management Options  Acute exacerbation of chronic obstructive pulmonary disease (COPD):   Acute renal insufficiency:   Acute respiratory failure with hypoxia:   Influenza:      Amount and/or Complexity of Data Reviewed  Clinical lab tests: reviewed  Tests in the radiology section of CPT®: reviewed  Tests in the medicine section of CPT®: reviewed  Decide to obtain previous medical records or to obtain history from someone other than the patient: yes  Independent visualization of images, tracings, or specimens: yes    Risk of Complications, Morbidity, and/or  Mortality  Presenting problems: high  Diagnostic procedures: high  Management options: high        Final diagnoses:   Acute exacerbation of chronic obstructive pulmonary disease (COPD)   Acute respiratory failure with hypoxia   Influenza   Acute renal insufficiency            Roldan King MD  02/03/18 0557

## 2018-02-04 LAB
ANION GAP SERPL CALCULATED.3IONS-SCNC: 8.4 MMOL/L (ref 3.6–11.2)
BASOPHILS # BLD AUTO: 0.01 10*3/MM3 (ref 0–0.3)
BASOPHILS NFR BLD AUTO: 0.1 % (ref 0–2)
BUN BLD-MCNC: 34 MG/DL (ref 7–21)
BUN/CREAT SERPL: 22.8 (ref 7–25)
CALCIUM SPEC-SCNC: 8.5 MG/DL (ref 7.7–10)
CHLORIDE SERPL-SCNC: 104 MMOL/L (ref 99–112)
CO2 SERPL-SCNC: 20.6 MMOL/L (ref 24.3–31.9)
CREAT BLD-MCNC: 1.49 MG/DL (ref 0.43–1.29)
DEPRECATED RDW RBC AUTO: 52.4 FL (ref 37–54)
EOSINOPHIL # BLD AUTO: 0.02 10*3/MM3 (ref 0–0.7)
EOSINOPHIL NFR BLD AUTO: 0.2 % (ref 0–7)
ERYTHROCYTE [DISTWIDTH] IN BLOOD BY AUTOMATED COUNT: 16.8 % (ref 11.5–14.5)
GFR SERPL CREATININE-BSD FRML MDRD: 34 ML/MIN/1.73
GLUCOSE BLD-MCNC: 258 MG/DL (ref 70–110)
GLUCOSE BLDC GLUCOMTR-MCNC: 258 MG/DL (ref 70–130)
GLUCOSE BLDC GLUCOMTR-MCNC: 289 MG/DL (ref 70–130)
GLUCOSE BLDC GLUCOMTR-MCNC: 307 MG/DL (ref 70–130)
GLUCOSE BLDC GLUCOMTR-MCNC: 313 MG/DL (ref 70–130)
HCT VFR BLD AUTO: 31.8 % (ref 37–47)
HGB BLD-MCNC: 9.9 G/DL (ref 12–16)
IMM GRANULOCYTES # BLD: 0.02 10*3/MM3 (ref 0–0.03)
IMM GRANULOCYTES NFR BLD: 0.2 % (ref 0–0.5)
LYMPHOCYTES # BLD AUTO: 1.65 10*3/MM3 (ref 1–3)
LYMPHOCYTES NFR BLD AUTO: 16.3 % (ref 16–46)
MCH RBC QN AUTO: 27.2 PG (ref 27–33)
MCHC RBC AUTO-ENTMCNC: 31.1 G/DL (ref 33–37)
MCV RBC AUTO: 87.4 FL (ref 80–94)
MONOCYTES # BLD AUTO: 0.09 10*3/MM3 (ref 0.1–0.9)
MONOCYTES NFR BLD AUTO: 0.9 % (ref 0–12)
NEUTROPHILS # BLD AUTO: 8.34 10*3/MM3 (ref 1.4–6.5)
NEUTROPHILS NFR BLD AUTO: 82.3 % (ref 40–75)
OSMOLALITY SERPL CALC.SUM OF ELEC: 282.9 MOSM/KG (ref 273–305)
PLATELET # BLD AUTO: 350 10*3/MM3 (ref 130–400)
PMV BLD AUTO: 11.1 FL (ref 6–10)
POTASSIUM BLD-SCNC: 5 MMOL/L (ref 3.5–5.3)
RBC # BLD AUTO: 3.64 10*6/MM3 (ref 4.2–5.4)
SODIUM BLD-SCNC: 133 MMOL/L (ref 135–153)
WBC NRBC COR # BLD: 10.13 10*3/MM3 (ref 4.5–12.5)

## 2018-02-04 PROCEDURE — 82962 GLUCOSE BLOOD TEST: CPT

## 2018-02-04 PROCEDURE — 94799 UNLISTED PULMONARY SVC/PX: CPT

## 2018-02-04 PROCEDURE — 25810000003 SODIUM CHLORIDE 0.9 % WITH KCL 20 MEQ 20-0.9 MEQ/L-% SOLUTION: Performed by: FAMILY MEDICINE

## 2018-02-04 PROCEDURE — 25010000002 METHYLPREDNISOLONE PER 125 MG: Performed by: FAMILY MEDICINE

## 2018-02-04 PROCEDURE — 63710000001 INSULIN ASPART PER 5 UNITS: Performed by: FAMILY MEDICINE

## 2018-02-04 PROCEDURE — 25010000002 CEFTRIAXONE: Performed by: FAMILY MEDICINE

## 2018-02-04 PROCEDURE — 80048 BASIC METABOLIC PNL TOTAL CA: CPT | Performed by: FAMILY MEDICINE

## 2018-02-04 PROCEDURE — 25010000002 AZITHROMYCIN: Performed by: FAMILY MEDICINE

## 2018-02-04 PROCEDURE — 63710000001 INSULIN DETEMIR PER 5 UNITS: Performed by: FAMILY MEDICINE

## 2018-02-04 PROCEDURE — 85025 COMPLETE CBC W/AUTO DIFF WBC: CPT | Performed by: FAMILY MEDICINE

## 2018-02-04 PROCEDURE — 25010000002 ENOXAPARIN PER 10 MG: Performed by: FAMILY MEDICINE

## 2018-02-04 RX ORDER — SODIUM CHLORIDE 0.9 % (FLUSH) 0.9 %
1-10 SYRINGE (ML) INJECTION AS NEEDED
Status: DISCONTINUED | OUTPATIENT
Start: 2018-02-04 | End: 2018-02-07 | Stop reason: HOSPADM

## 2018-02-04 RX ORDER — ONDANSETRON 2 MG/ML
4 INJECTION INTRAMUSCULAR; INTRAVENOUS EVERY 6 HOURS PRN
Status: DISCONTINUED | OUTPATIENT
Start: 2018-02-04 | End: 2018-02-07 | Stop reason: HOSPADM

## 2018-02-04 RX ORDER — IPRATROPIUM BROMIDE AND ALBUTEROL SULFATE 2.5; .5 MG/3ML; MG/3ML
3 SOLUTION RESPIRATORY (INHALATION)
Status: DISCONTINUED | OUTPATIENT
Start: 2018-02-04 | End: 2018-02-07 | Stop reason: HOSPADM

## 2018-02-04 RX ADMIN — DILTIAZEM HYDROCHLORIDE 360 MG: 180 CAPSULE, EXTENDED RELEASE ORAL at 09:03

## 2018-02-04 RX ADMIN — FAMOTIDINE 20 MG: 20 TABLET, FILM COATED ORAL at 09:03

## 2018-02-04 RX ADMIN — FERROUS SULFATE TAB 325 MG (65 MG ELEMENTAL FE) 325 MG: 325 (65 FE) TAB at 09:03

## 2018-02-04 RX ADMIN — POTASSIUM CHLORIDE AND SODIUM CHLORIDE 100 ML/HR: 900; 150 INJECTION, SOLUTION INTRAVENOUS at 18:48

## 2018-02-04 RX ADMIN — LISINOPRIL 40 MG: 10 TABLET ORAL at 09:03

## 2018-02-04 RX ADMIN — IPRATROPIUM BROMIDE AND ALBUTEROL SULFATE 3 ML: .5; 3 SOLUTION RESPIRATORY (INHALATION) at 07:20

## 2018-02-04 RX ADMIN — INSULIN ASPART 7 UNITS: 100 INJECTION, SOLUTION INTRAVENOUS; SUBCUTANEOUS at 17:40

## 2018-02-04 RX ADMIN — METHYLPREDNISOLONE SODIUM SUCCINATE 60 MG: 125 INJECTION, POWDER, FOR SOLUTION INTRAMUSCULAR; INTRAVENOUS at 17:41

## 2018-02-04 RX ADMIN — CEFTRIAXONE 1 G: 1 INJECTION, POWDER, FOR SOLUTION INTRAMUSCULAR; INTRAVENOUS at 13:24

## 2018-02-04 RX ADMIN — FERROUS SULFATE TAB 325 MG (65 MG ELEMENTAL FE) 325 MG: 325 (65 FE) TAB at 20:50

## 2018-02-04 RX ADMIN — FAMOTIDINE 20 MG: 20 TABLET, FILM COATED ORAL at 20:50

## 2018-02-04 RX ADMIN — OSELTAMIVIR PHOSPHATE 30 MG: 6 POWDER, FOR SUSPENSION ORAL at 09:03

## 2018-02-04 RX ADMIN — INSULIN DETEMIR 40 UNITS: 100 INJECTION, SOLUTION SUBCUTANEOUS at 20:56

## 2018-02-04 RX ADMIN — METHYLPREDNISOLONE SODIUM SUCCINATE 60 MG: 125 INJECTION, POWDER, FOR SOLUTION INTRAMUSCULAR; INTRAVENOUS at 11:44

## 2018-02-04 RX ADMIN — METHYLPREDNISOLONE SODIUM SUCCINATE 60 MG: 125 INJECTION, POWDER, FOR SOLUTION INTRAMUSCULAR; INTRAVENOUS at 05:22

## 2018-02-04 RX ADMIN — AZITHROMYCIN 500 MG: 500 INJECTION, POWDER, LYOPHILIZED, FOR SOLUTION INTRAVENOUS at 11:44

## 2018-02-04 RX ADMIN — ENOXAPARIN SODIUM 30 MG: 30 INJECTION SUBCUTANEOUS at 17:41

## 2018-02-04 RX ADMIN — INSULIN ASPART 6 UNITS: 100 INJECTION, SOLUTION INTRAVENOUS; SUBCUTANEOUS at 20:54

## 2018-02-04 RX ADMIN — IPRATROPIUM BROMIDE AND ALBUTEROL SULFATE 3 ML: .5; 3 SOLUTION RESPIRATORY (INHALATION) at 12:02

## 2018-02-04 RX ADMIN — INSULIN ASPART 6 UNITS: 100 INJECTION, SOLUTION INTRAVENOUS; SUBCUTANEOUS at 07:42

## 2018-02-04 RX ADMIN — Medication 500 MG: at 09:03

## 2018-02-04 RX ADMIN — IPRATROPIUM BROMIDE AND ALBUTEROL SULFATE 3 ML: .5; 3 SOLUTION RESPIRATORY (INHALATION) at 19:00

## 2018-02-04 RX ADMIN — INSULIN DETEMIR 100 UNITS: 100 INJECTION, SOLUTION SUBCUTANEOUS at 09:02

## 2018-02-04 RX ADMIN — OSELTAMIVIR PHOSPHATE 30 MG: 6 POWDER, FOR SUSPENSION ORAL at 20:50

## 2018-02-04 RX ADMIN — CHLORTHALIDONE 25 MG: 50 TABLET ORAL at 09:03

## 2018-02-04 RX ADMIN — INSULIN ASPART 7 UNITS: 100 INJECTION, SOLUTION INTRAVENOUS; SUBCUTANEOUS at 12:05

## 2018-02-04 RX ADMIN — CETIRIZINE HYDROCHLORIDE 5 MG: 10 TABLET ORAL at 09:03

## 2018-02-04 NOTE — PLAN OF CARE
Problem: Patient Care Overview (Adult)  Goal: Plan of Care Review   02/04/18 0243 02/04/18 0800   Coping/Psychosocial Response Interventions   Plan Of Care Reviewed With --  patient;family   Patient Care Overview   Progress improving --        Problem: Respiratory Insufficiency (Adult)  Goal: Identify Related Risk Factors and Signs and Symptoms   02/03/18 1641   Respiratory Insufficiency   Related Risk Factors (Respiratory Insufficiency) activity intolerance   Signs and Symptoms (Respiratory Insufficiency) abnormal breath sounds     Goal: Acid/Base Balance   02/03/18 1641   Respiratory Insufficiency (Adult)   Acid/Base Balance making progress toward outcome     Goal: Effective Ventilation   02/03/18 1641   Respiratory Insufficiency (Adult)   Effective Ventilation making progress toward outcome       Problem: Pain, Acute (Adult)  Goal: Identify Related Risk Factors and Signs and Symptoms   02/03/18 1641   Pain, Acute   Related Risk Factors (Acute Pain) positioning   Signs and Symptoms (Acute Pain) fatigue/weakness     Goal: Acceptable Pain Control/Comfort Level   02/04/18 0243   Pain, Acute (Adult)   Acceptable Pain Control/Comfort Level making progress toward outcome       Problem: Skin Integrity Impairment, Risk/Actual (Adult)  Goal: Skin Integrity/Wound Healing   02/03/18 1641   Skin Integrity Impairment, Risk/Actual (Adult)   Skin Integrity/Wound Healing making progress toward outcome       Problem: Fall Risk (Adult)  Goal: Identify Related Risk Factors and Signs and Symptoms   02/03/18 1641   Fall Risk   Fall Risk: Related Risk Factors fatigue/slow reaction     Goal: Absence of Falls   02/04/18 0243   Fall Risk (Adult)   Absence of Falls making progress toward outcome

## 2018-02-04 NOTE — PLAN OF CARE
Problem: Patient Care Overview (Adult)  Goal: Plan of Care Review  Outcome: Ongoing (interventions implemented as appropriate)   02/04/18 0243   Coping/Psychosocial Response Interventions   Plan Of Care Reviewed With patient   Patient Care Overview   Progress improving     Goal: Adult Individualization and Mutuality  Outcome: Ongoing (interventions implemented as appropriate)      Problem: Pain, Acute (Adult)  Goal: Identify Related Risk Factors and Signs and Symptoms  Outcome: Ongoing (interventions implemented as appropriate)   02/03/18 1641   Pain, Acute   Related Risk Factors (Acute Pain) positioning   Signs and Symptoms (Acute Pain) fatigue/weakness     Goal: Acceptable Pain Control/Comfort Level  Outcome: Ongoing (interventions implemented as appropriate)      Problem: Skin Integrity Impairment, Risk/Actual (Adult)  Goal: Identify Related Risk Factors and Signs and Symptoms  Outcome: Ongoing (interventions implemented as appropriate)    Goal: Skin Integrity/Wound Healing  Outcome: Ongoing (interventions implemented as appropriate)      Problem: Fall Risk (Adult)  Goal: Identify Related Risk Factors and Signs and Symptoms   02/03/18 1641   Fall Risk   Fall Risk: Related Risk Factors fatigue/slow reaction     Goal: Absence of Falls  Outcome: Ongoing (interventions implemented as appropriate)   02/04/18 0243   Fall Risk (Adult)   Absence of Falls making progress toward outcome

## 2018-02-04 NOTE — H&P
106      [facility]    Patient Care Team:  Jim Hernandez MD as PCP - General  Jim Hernandez MD as PCP - Family Medicine  Rosales Quinn MD as PCP - Claims Attributed    Chief complaint cough and shortness of breath    Subjective     Patient is a 72 y.o. female presents with 2-3 day history of increasing cough and wheezing and shortness of breath.  Patient has had frequent episodes of COPD exacerbation/upper respiratory infection and has been treated with multiple antibiotics in the past 2 months.  She improves and then worsens again.  Patient denied any fevers or chills.  She denies any nausea vomiting or diarrhea.  She denies any chest pain.  The patient was evaluated in the [facility] room where she was noted to have influenza type B as well as a right lower lobe pneumonia and hypoxia and was felt to need admission for further treatment.  She denied any melena or bright red blood per rectum.  She on any hematuria frequency or dysuria.  She denies any hemoptysis or hematemesis.  Review of Systems   Pertinent items are noted in HPI, all other systems reviewed and negative    History  Past Medical History:   Diagnosis Date   • Anemia    • Chronic kidney disease    • COPD (chronic obstructive pulmonary disease)    • Diabetes mellitus    • Osteoporosis      Past Surgical History:   Procedure Laterality Date   •  SECTION     • HIP BIPOLAR REPLACEMENT     • HYSTERECTOMY       History reviewed. No pertinent family history.  Social History   Substance Use Topics   • Smoking status: Former Smoker     Years: 15.00   • Smokeless tobacco: None   • Alcohol use No     Prescriptions Prior to Admission   Medication Sig Dispense Refill Last Dose   • calcium carbonate (OS-TIANNA) 600 MG tablet Take 600 mg by mouth Daily.   2/3/2018 at AM   • cetirizine (zyrTEC) 10 MG tablet Take 10 mg by mouth Daily.   2/3/2018 at AM   • chlorthalidone (HYGROTON) 25 MG tablet Take 25 mg by mouth Daily.   2018 at Unknown time   •  cyanocobalamin 1000 MCG/ML injection Inject 1,000 mcg into the shoulder, thigh, or buttocks Every 28 (Twenty-Eight) Days.   Past Month at 01/28/18   • diltiaZEM (TIAZAC) 360 MG 24 hr capsule Take 360 mg by mouth Daily.   2/3/2018 at AM   • fenofibrate 160 MG tablet Take 160 mg by mouth Daily.   2/3/2018 at AM   • ferrous sulfate 325 (65 FE) MG tablet Take 325 mg by mouth 2 (Two) Times a Day.   2/3/2018 at AM   • insulin detemir (LEVEMIR) 100 UNIT/ML injection Inject 100 Units under the skin Daily. 100 units QAM.   40 units QPM   2/3/2018 at AM   • insulin detemir (LEVEMIR) 100 UNIT/ML injection Inject 40 Units under the skin Every Night.   2/2/2018 at Unknown time   • lisinopril (PRINIVIL,ZESTRIL) 40 MG tablet Take 40 mg by mouth Daily.   2/3/2018 at AM   • metFORMIN (GLUCOPHAGE) 1000 MG tablet Take 1,000 mg by mouth 2 (Two) Times a Day With Meals.   2/3/2018 at AM   • Omega-3 Fatty Acids (FISH OIL) 1000 MG capsule capsule Take 1,000 mg by mouth Daily With Breakfast.   2/3/2018 at AM   • raNITIdine (ZANTAC) 150 MG tablet Take 150 mg by mouth 2 (Two) Times a Day.   2/3/2018 at AM     Allergies:  Review of patient's allergies indicates no known allergies.    Objective     Vital Signs  Temp:  [98 °F (36.7 °C)-98.4 °F (36.9 °C)] 98.4 °F (36.9 °C)  Heart Rate:  [72-88] 75  Resp:  [17-24] 20  BP: (130-157)/(50-69) 148/62    Physical Exam:      General Appearance:    Alert, cooperative, in no acute distress   Head:    Normocephalic, without obvious abnormality, atraumatic   Eyes:            Lids and lashes normal, conjunctivae and sclerae normal, no   icterus, no pallor, corneas clear, PERRLA   Ears:    Ears appear intact with no abnormalities noted   Throat:   No oral lesions, no thrush, oral mucosa moist   Neck:   No adenopathy, supple, trachea midline, no thyromegaly, no     carotid bruit, no JVD   Back:     No kyphosis present, no scoliosis present, no skin lesions,       erythema or scars, no tenderness to percussion  or                   palpation,   range of motion normal   Lungs:    Bilateral rhonchi with decreased breath sounds in the bases, right lower lobe with possible rales.  Bilateral expiratory wheezes and fair air movement     Heart:    Regular rhythm and normal rate, normal S1 and S2, no            murmur, no gallop, no rub, no click   Breast Exam:    Deferred   Abdomen:     Normal bowel sounds, no masses, no organomegaly, soft        non-tender, non-distended, no guarding, no rebound                 tenderness   Genitalia:    Deferred   Extremities:   Moves all extremities well, no edema, no cyanosis, no              redness   Pulses:   Pulses palpable and equal bilaterally   Skin:   No bleeding, bruising or rash   Lymph nodes:   No palpable adenopathy   Neurologic:   Cranial nerves 2 - 12 grossly intact, sensation intact, DTR        present and equal bilaterally     Lab Results (last 24 hours)     Procedure Component Value Units Date/Time    Blood Gas, Arterial [[id number]]  (Abnormal) Collected:  02/03/18 1020    Specimen:  Arterial Blood Updated:  02/03/18 1024     Site Arterial: left radial     Sky's Test Positive     pH, Arterial 7.312 (L) pH units      pCO2, Arterial 40.6 mm Hg      pO2, Arterial 63.1 (L) mm Hg      HCO3, Arterial 20.1 (L) mmol/L      Base Excess, Arterial -5.7 mmol/L      O2 Saturation, Arterial 89.9 (L) %      Hemoglobin, Blood Gas 11.3 (L) g/dL      Hematocrit, Blood Gas 33.0 (L) %      Oxyhemoglobin 88.6 %      Methemoglobin 0.40 %      Carboxyhemoglobin 1.1 %      A-a Gradiant 80.8 mmHg      Temperature 98.6 C      Barometric Pressure for Blood Gas 732 mmHg      Modality Nasal Cannula     FIO2 28 %     CBC & Differential [[id number]] Collected:  02/03/18 1024    Specimen:  Blood Updated:  02/03/18 1054    Narrative:       The following orders were created for panel order CBC & Differential.  Procedure                               Abnormality         Status                     ---------                                -----------         ------                     CBC Auto Differential[[id number]]        Abnormal            Final result                 Please view results for these tests on the individual orders.    CBC Auto Differential [[id number]]  (Abnormal) Collected:  02/03/18 1024    Specimen:  Blood Updated:  02/03/18 1054     WBC 9.41 10*3/mm3      RBC 4.02 (L) 10*6/mm3      Hemoglobin 10.8 (L) g/dL      Hematocrit 34.6 (L) %      MCV 86.1 fL      MCH 26.9 (L) pg      MCHC 31.2 (L) g/dL      RDW 16.7 (H) %      RDW-SD 52.7 fl      MPV 10.9 (H) fL      Platelets 373 10*3/mm3      Neutrophil % 71.8 %      Lymphocyte % 21.1 %      Monocyte % 6.4 %      Eosinophil % 0.2 %      Basophil % 0.2 %      Immature Grans % 0.3 %      Neutrophils, Absolute 6.75 (H) 10*3/mm3      Lymphocytes, Absolute 1.99 10*3/mm3      Monocytes, Absolute 0.60 10*3/mm3      Eosinophils, Absolute 0.02 10*3/mm3      Basophils, Absolute 0.02 10*3/mm3      Immature Grans, Absolute 0.03 10*3/mm3     Lactic Acid, Plasma [[id number]]  (Normal) Collected:  02/03/18 1024    Specimen:  Blood Updated:  02/03/18 1055     Lactate 1.1 mmol/L     Comprehensive Metabolic Panel [[id number]]  (Abnormal) Collected:  02/03/18 1024    Specimen:  Blood Updated:  02/03/18 1102     Glucose 192 (H) mg/dL      BUN 30 (H) mg/dL      Creatinine 1.61 (H) mg/dL      Sodium 134 (L) mmol/L      Potassium 4.3 mmol/L      Chloride 102 mmol/L      CO2 23.9 (L) mmol/L      Calcium 9.3 mg/dL      Total Protein 7.1 g/dL      Albumin 4.30 g/dL      ALT (SGPT) 17 U/L      AST (SGOT) 22 U/L      Alkaline Phosphatase 39 U/L       Note New Reference Ranges        Total Bilirubin 0.2 mg/dL      eGFR Non African Amer 31 (L) mL/min/1.73      Globulin 2.8 gm/dL      A/G Ratio 1.5 g/dL      BUN/Creatinine Ratio 18.6     Anion Gap 8.1 mmol/L     Narrative:       The MDRD GFR formula is only valid for adults with stable renal function between ages 18 and 70.    Osmolality,  Calculated [[id number]]  (Normal) Collected:  02/03/18 1024    Specimen:  Blood Updated:  02/03/18 1102     Osmolality Calc 279.6 mOsm/kg     Influenza Antigen, Rapid - Swab, Nasopharynx [[id number]]  (Abnormal) Collected:  02/03/18 1130    Specimen:  Swab from Nasopharynx Updated:  02/03/18 1148     Influenza A Ag, EIA Negative     Influenza B Ag, EIA Positive (A)    POC Glucose Once [[id number]]  (Abnormal) Collected:  02/03/18 1732    Specimen:  Blood Updated:  02/03/18 1739     Glucose 282 (H) mg/dL     Narrative:       Meter: [id number] : [id number] Tushar Burnett    POC Glucose Once [[id number]]  (Abnormal) Collected:  02/03/18 1951    Specimen:  Blood Updated:  02/03/18 1957     Glucose 396 (H) mg/dL     Narrative:       Meter: [id number] : [id number] cosme griselda    POC Glucose Once [[id number]]  (Abnormal) Collected:  02/03/18 2308    Specimen:  Blood Updated:  02/03/18 2320     Glucose 264 (H) mg/dL     Narrative:       Meter: [id number] : [id number] [employer]    Blood Culture - Blood, [[id number]]  (Normal) Collected:  02/03/18 1127    Specimen:  Blood from Arm, Left Updated:  02/03/18 2346     Blood Culture No growth at less than 24 hours    Blood Culture - Blood, [[id number]]  (Normal) Collected:  02/03/18 1109    Specimen:  Blood from Arm, Right Updated:  02/04/18 0016     Blood Culture No growth at less than 24 hours    CBC & Differential [[id number]] Collected:  02/04/18 0049    Specimen:  Blood Updated:  02/04/18 0227    Narrative:       The following orders were created for panel order CBC & Differential.  Procedure                               Abnormality         Status                     ---------                               -----------         ------                     CBC Auto Differential[[id number]]        Abnormal            Final result                 Please view results for these tests on the individual orders.    CBC Auto Differential [[id number]]  (Abnormal) Collected:  02/04/18  0049    Specimen:  Blood Updated:  02/04/18 0227     WBC 10.13 10*3/mm3      RBC 3.64 (L) 10*6/mm3      Hemoglobin 9.9 (L) g/dL      Hematocrit 31.8 (L) %      MCV 87.4 fL      MCH 27.2 pg      MCHC 31.1 (L) g/dL      RDW 16.8 (H) %      RDW-SD 52.4 fl      MPV 11.1 (H) fL      Platelets 350 10*3/mm3      Neutrophil % 82.3 (H) %      Lymphocyte % 16.3 %      Monocyte % 0.9 %      Eosinophil % 0.2 %      Basophil % 0.1 %      Immature Grans % 0.2 %      Neutrophils, Absolute 8.34 (H) 10*3/mm3      Lymphocytes, Absolute 1.65 10*3/mm3      Monocytes, Absolute 0.09 (L) 10*3/mm3      Eosinophils, Absolute 0.02 10*3/mm3      Basophils, Absolute 0.01 10*3/mm3      Immature Grans, Absolute 0.02 10*3/mm3     Basic Metabolic Panel [[id number]]  (Abnormal) Collected:  02/04/18 0049    Specimen:  Blood Updated:  02/04/18 0245     Glucose 258 (H) mg/dL      BUN 34 (H) mg/dL      Creatinine 1.49 (H) mg/dL      Sodium 133 (L) mmol/L      Potassium 5.0 mmol/L      Chloride 104 mmol/L      CO2 20.6 (L) mmol/L      Calcium 8.5 mg/dL      eGFR Non African Amer 34 (L) mL/min/1.73      BUN/Creatinine Ratio 22.8     Anion Gap 8.4 mmol/L     Narrative:       The MDRD GFR formula is only valid for adults with stable renal function between ages 18 and 70.    Osmolality, Calculated [[id number]]  (Normal) Collected:  02/04/18 0049    Specimen:  Blood Updated:  02/04/18 0245     Osmolality Calc 282.9 mOsm/kg     POC Glucose Once [[id number]]  (Abnormal) Collected:  02/04/18 0635    Specimen:  Blood Updated:  02/04/18 0701     Glucose 258 (H) mg/dL     Narrative:       Meter: [id number] : [id number] alfonso        Imaging Results (last 24 hours)     Procedure Component Value Units Date/Time    XR Chest 1 View [[id number]] Collected:  02/03/18 2005     Updated:  02/03/18 2007    Narrative:       EXAMINATION: XR CHEST 1 VW-      CLINICAL INDICATION:     sob     TECHNIQUE:  XR CHEST 1 VW-      COMPARISON: 09/09/2015      FINDINGS:   Right  lower lobe airspace disease. Large nodule left midlung may  represent calcified granuloma.   Heart and mediastinal contours are unremarkable.   No pneumothorax.   No pleural effusion.   No acute osseous findings.            Impression:       1. Right lower lobe pneumonia.  2. Stable calcified nodule left mid lung likely granuloma.     This report was finalized on 2/3/2018 8:05 PM by Dr. eCasar Woodard MD.           Results Review:    I reviewed the patient's new clinical results.    Assessment/Plan   1.  Influenza type B   Tamiflu 75 g twice a day  2.  Right lower lobe pneumonia   Rocephin 1 g every 24 hours and Zithromax 500 IV Q 24 hours   Continue duo nebs  3.  COPD exacerbation   Sodium Medrol 60 mg IV every 6 hours   Aggressive pulmonary treatments  4.  Chronic kidney disease stable  5.  Hypertension  6.  Diabetes Notes type II   Continue Accu-Cheks and sliding scale as needed  7.  Hypoxia   Continue supplemental O2    Active Problems:    Acute exacerbation of chronic obstructive pulmonary disease (COPD)            Jim Hernandez MD  02/04/18  7:18 AM

## 2018-02-04 NOTE — PROGRESS NOTES
Discharge Planning Assessment   Phil     Patient Name: Albina Finley  MRN: [id number]  Today's Date: 2/4/2018    Admit Date: 2/3/2018          Discharge Needs Assessment       02/04/18 0900    Living Environment    Lives With child(lester), adult    Transportation Available car;family or friend will provide    Living Environment    Quality Of Family Relationships supportive;helpful;involved    Able to Return to Prior Living Arrangements yes    Living Arrangement Comments Pt lives with her daughter Angelique and plans to return home with her at discharge.  Her family will provide transportation home and to f/u appts.    Discharge Needs Assessment    Concerns To Be Addressed denies needs/concerns at this time    Readmission Within The Last 30 Days no previous admission in last 30 days    Community Agency Name(S) She does not currently have HH services and denies need at this time.  She had unknown HH  provider in the past for PT after ortho surgery.    Equipment Currently Used at Home nebulizer   She has a nebulizer from unknown provider that she uses PRN.  She has Rx for nebulizer solution.  She also has a WC, RW, cane and BSC that she used after previous ortho surgery but is not currently using it.    Equipment Needed After Discharge --   If pt qualifies for oxygen at d/c this will be arranged by  with MD order at d/c.    Discharge Disposition still a patient            Discharge Plan       02/04/18 0900    Case Management/Social Work Plan    Plan She lives with her daughter, Angelique, and plans to return home with her at discharge.  She has nebulizer that she uses PRN and also has WC/RW/BSC and cane that she does not use.  Her O2 sat was low on admit and if she qualifies for oxygen at d/c this will be arranged by SW with MD order.  She does not have HH and denies any need at this time.  Her family will provide transportation.    Patient/Family In Agreement With Plan yes    Additional Comments She is in  isolation for Flu B and is getting Tamiflu.  CM educated pt and her daughter about the importance of maintaining isolation during hospital stay.  She is on O2 with sats in low to upper 90's.  She says she still has productive cough but does feel better today.  She is on IV abx and Solu Medrol IV.  Her glucoses have been elevated and consult was placed for diabetic educator to see pt.  CM will follow and assist as needed.        Demographic Summary       02/04/18 0900    Primary Care Physician Information    Name She sees Dr Jim Hernandez as her PCP.  She also sees Dr Quinn for CKD.            Functional Status       02/04/18 0900    Employment/Financial    Employment/Finance Comments She has Medicare and [employer] supplement.  [facility] and denies any issues with Rx co-pays at this time.            Legal       02/04/18 0900    Legal    Legal Comments Pt says her daughter, Antonina Noriega, is her POA.  Pt's daughter, Angelique, is present in room and will have her sister bring document to hospital for pt records.             Brittney Camarena RN

## 2018-02-05 ENCOUNTER — APPOINTMENT (OUTPATIENT)
Dept: GENERAL RADIOLOGY | Facility: HOSPITAL | Age: 73
End: 2018-02-05

## 2018-02-05 LAB
A-A DO2: 81.2 MMHG (ref 0–300)
ANION GAP SERPL CALCULATED.3IONS-SCNC: 8.8 MMOL/L (ref 3.6–11.2)
ANISOCYTOSIS BLD QL: ABNORMAL
ARTERIAL PATENCY WRIST A: POSITIVE
ATMOSPHERIC PRESS: 720 MMHG
BASE EXCESS BLDA CALC-SCNC: -2.9 MMOL/L
BDY SITE: ABNORMAL
BODY TEMPERATURE: 98.6 C
BUN BLD-MCNC: 28 MG/DL (ref 7–21)
BUN/CREAT SERPL: 25.7 (ref 7–25)
CALCIUM SPEC-SCNC: 8.8 MG/DL (ref 7.7–10)
CHLORIDE SERPL-SCNC: 110 MMOL/L (ref 99–112)
CO2 SERPL-SCNC: 21.2 MMOL/L (ref 24.3–31.9)
COHGB MFR BLD: 1 % (ref 0–5)
CREAT BLD-MCNC: 1.09 MG/DL (ref 0.43–1.29)
CRP SERPL-MCNC: 0.87 MG/DL (ref 0–0.99)
DEPRECATED RDW RBC AUTO: 53.5 FL (ref 37–54)
ERYTHROCYTE [DISTWIDTH] IN BLOOD BY AUTOMATED COUNT: 16.9 % (ref 11.5–14.5)
GFR SERPL CREATININE-BSD FRML MDRD: 49 ML/MIN/1.73
GLUCOSE BLD-MCNC: 240 MG/DL (ref 70–110)
GLUCOSE BLDC GLUCOMTR-MCNC: 137 MG/DL (ref 70–130)
GLUCOSE BLDC GLUCOMTR-MCNC: 230 MG/DL (ref 70–130)
GLUCOSE BLDC GLUCOMTR-MCNC: 239 MG/DL (ref 70–130)
GLUCOSE BLDC GLUCOMTR-MCNC: 257 MG/DL (ref 70–130)
HCO3 BLDA-SCNC: 22.1 MMOL/L (ref 22–26)
HCT VFR BLD AUTO: 32.5 % (ref 37–47)
HCT VFR BLD CALC: 32 % (ref 37–47)
HGB BLD-MCNC: 9.7 G/DL (ref 12–16)
HGB BLDA-MCNC: 10.8 G/DL (ref 12–16)
HOROWITZ INDEX BLD+IHG-RTO: 28 %
HYPOCHROMIA BLD QL: ABNORMAL
LYMPHOCYTES # BLD MANUAL: 2.81 10*3/MM3 (ref 1–3)
LYMPHOCYTES NFR BLD MANUAL: 18 % (ref 16–46)
LYMPHOCYTES NFR BLD MANUAL: 2 % (ref 0–12)
MCH RBC QN AUTO: 26.3 PG (ref 27–33)
MCHC RBC AUTO-ENTMCNC: 29.8 G/DL (ref 33–37)
MCV RBC AUTO: 88.1 FL (ref 80–94)
METHGB BLD QL: 0.3 % (ref 0–3)
MODALITY: ABNORMAL
MONOCYTES # BLD AUTO: 0.31 10*3/MM3 (ref 0.1–0.9)
NEUTROPHILS # BLD AUTO: 12.48 10*3/MM3 (ref 1.4–6.5)
NEUTROPHILS NFR BLD MANUAL: 79 % (ref 40–75)
NEUTS BAND NFR BLD MANUAL: 1 % (ref 0–8)
OSMOLALITY SERPL CALC.SUM OF ELEC: 292.7 MOSM/KG (ref 273–305)
OXYHGB MFR BLDV: 89.1 % (ref 85–100)
PCO2 BLDA: 39.4 MM HG (ref 35–45)
PH BLDA: 7.37 PH UNITS (ref 7.35–7.45)
PLAT MORPH BLD: NORMAL
PLATELET # BLD AUTO: 353 10*3/MM3 (ref 130–400)
PMV BLD AUTO: 10.9 FL (ref 6–10)
PO2 BLDA: 60.8 MM HG (ref 80–100)
POTASSIUM BLD-SCNC: 4.8 MMOL/L (ref 3.5–5.3)
RBC # BLD AUTO: 3.69 10*6/MM3 (ref 4.2–5.4)
SAO2 % BLDCOA: 90.3 % (ref 90–100)
SODIUM BLD-SCNC: 140 MMOL/L (ref 135–153)
WBC NRBC COR # BLD: 15.6 10*3/MM3 (ref 4.5–12.5)

## 2018-02-05 PROCEDURE — 71045 X-RAY EXAM CHEST 1 VIEW: CPT

## 2018-02-05 PROCEDURE — 25010000002 CEFTRIAXONE: Performed by: FAMILY MEDICINE

## 2018-02-05 PROCEDURE — 63710000001 INSULIN ASPART PER 5 UNITS: Performed by: FAMILY MEDICINE

## 2018-02-05 PROCEDURE — 82375 ASSAY CARBOXYHB QUANT: CPT | Performed by: FAMILY MEDICINE

## 2018-02-05 PROCEDURE — 86140 C-REACTIVE PROTEIN: CPT | Performed by: FAMILY MEDICINE

## 2018-02-05 PROCEDURE — 71045 X-RAY EXAM CHEST 1 VIEW: CPT | Performed by: RADIOLOGY

## 2018-02-05 PROCEDURE — 80048 BASIC METABOLIC PNL TOTAL CA: CPT | Performed by: FAMILY MEDICINE

## 2018-02-05 PROCEDURE — 25010000002 AZITHROMYCIN: Performed by: FAMILY MEDICINE

## 2018-02-05 PROCEDURE — 85007 BL SMEAR W/DIFF WBC COUNT: CPT | Performed by: FAMILY MEDICINE

## 2018-02-05 PROCEDURE — 25010000002 ENOXAPARIN PER 10 MG: Performed by: FAMILY MEDICINE

## 2018-02-05 PROCEDURE — 25010000002 METHYLPREDNISOLONE PER 125 MG: Performed by: FAMILY MEDICINE

## 2018-02-05 PROCEDURE — 85025 COMPLETE CBC W/AUTO DIFF WBC: CPT | Performed by: FAMILY MEDICINE

## 2018-02-05 PROCEDURE — 82805 BLOOD GASES W/O2 SATURATION: CPT | Performed by: FAMILY MEDICINE

## 2018-02-05 PROCEDURE — 25810000003 SODIUM CHLORIDE 0.9 % WITH KCL 20 MEQ 20-0.9 MEQ/L-% SOLUTION: Performed by: FAMILY MEDICINE

## 2018-02-05 PROCEDURE — 36600 WITHDRAWAL OF ARTERIAL BLOOD: CPT | Performed by: FAMILY MEDICINE

## 2018-02-05 PROCEDURE — 94799 UNLISTED PULMONARY SVC/PX: CPT

## 2018-02-05 PROCEDURE — 63710000001 INSULIN DETEMIR PER 5 UNITS: Performed by: FAMILY MEDICINE

## 2018-02-05 PROCEDURE — 82962 GLUCOSE BLOOD TEST: CPT

## 2018-02-05 PROCEDURE — 83050 HGB METHEMOGLOBIN QUAN: CPT | Performed by: FAMILY MEDICINE

## 2018-02-05 RX ORDER — L.ACID,PARA/B.BIFIDUM/S.THERM 8B CELL
1 CAPSULE ORAL DAILY
Status: DISCONTINUED | OUTPATIENT
Start: 2018-02-05 | End: 2018-02-07 | Stop reason: HOSPADM

## 2018-02-05 RX ADMIN — FERROUS SULFATE TAB 325 MG (65 MG ELEMENTAL FE) 325 MG: 325 (65 FE) TAB at 08:56

## 2018-02-05 RX ADMIN — INSULIN ASPART 6 UNITS: 100 INJECTION, SOLUTION INTRAVENOUS; SUBCUTANEOUS at 11:20

## 2018-02-05 RX ADMIN — INSULIN ASPART 4 UNITS: 100 INJECTION, SOLUTION INTRAVENOUS; SUBCUTANEOUS at 17:29

## 2018-02-05 RX ADMIN — CETIRIZINE HYDROCHLORIDE 5 MG: 10 TABLET ORAL at 08:57

## 2018-02-05 RX ADMIN — DILTIAZEM HYDROCHLORIDE 360 MG: 180 CAPSULE, EXTENDED RELEASE ORAL at 08:56

## 2018-02-05 RX ADMIN — FAMOTIDINE 20 MG: 20 TABLET, FILM COATED ORAL at 08:56

## 2018-02-05 RX ADMIN — IPRATROPIUM BROMIDE AND ALBUTEROL SULFATE 3 ML: .5; 3 SOLUTION RESPIRATORY (INHALATION) at 19:06

## 2018-02-05 RX ADMIN — INSULIN DETEMIR 40 UNITS: 100 INJECTION, SOLUTION SUBCUTANEOUS at 22:36

## 2018-02-05 RX ADMIN — FERROUS SULFATE TAB 325 MG (65 MG ELEMENTAL FE) 325 MG: 325 (65 FE) TAB at 22:35

## 2018-02-05 RX ADMIN — INSULIN DETEMIR 100 UNITS: 100 INJECTION, SOLUTION SUBCUTANEOUS at 08:57

## 2018-02-05 RX ADMIN — CEFTRIAXONE 1 G: 1 INJECTION, POWDER, FOR SOLUTION INTRAMUSCULAR; INTRAVENOUS at 14:21

## 2018-02-05 RX ADMIN — Medication 500 MG: at 08:56

## 2018-02-05 RX ADMIN — POTASSIUM CHLORIDE AND SODIUM CHLORIDE 100 ML/HR: 900; 150 INJECTION, SOLUTION INTRAVENOUS at 17:31

## 2018-02-05 RX ADMIN — ENOXAPARIN SODIUM 30 MG: 30 INJECTION SUBCUTANEOUS at 17:29

## 2018-02-05 RX ADMIN — OSELTAMIVIR PHOSPHATE 30 MG: 6 POWDER, FOR SUSPENSION ORAL at 08:56

## 2018-02-05 RX ADMIN — LISINOPRIL 40 MG: 10 TABLET ORAL at 08:56

## 2018-02-05 RX ADMIN — METHYLPREDNISOLONE SODIUM SUCCINATE 60 MG: 125 INJECTION, POWDER, FOR SOLUTION INTRAMUSCULAR; INTRAVENOUS at 05:05

## 2018-02-05 RX ADMIN — AZITHROMYCIN 500 MG: 500 INJECTION, POWDER, LYOPHILIZED, FOR SOLUTION INTRAVENOUS at 11:20

## 2018-02-05 RX ADMIN — Medication 1 CAPSULE: at 17:28

## 2018-02-05 RX ADMIN — INSULIN ASPART 5 UNITS: 100 INJECTION, SOLUTION INTRAVENOUS; SUBCUTANEOUS at 22:35

## 2018-02-05 RX ADMIN — METHYLPREDNISOLONE SODIUM SUCCINATE 60 MG: 125 INJECTION, POWDER, FOR SOLUTION INTRAMUSCULAR; INTRAVENOUS at 00:21

## 2018-02-05 RX ADMIN — FAMOTIDINE 20 MG: 20 TABLET, FILM COATED ORAL at 22:35

## 2018-02-05 RX ADMIN — CHLORTHALIDONE 25 MG: 50 TABLET ORAL at 08:57

## 2018-02-05 RX ADMIN — OSELTAMIVIR PHOSPHATE 30 MG: 6 POWDER, FOR SUSPENSION ORAL at 22:35

## 2018-02-05 RX ADMIN — IPRATROPIUM BROMIDE AND ALBUTEROL SULFATE 3 ML: .5; 3 SOLUTION RESPIRATORY (INHALATION) at 12:59

## 2018-02-05 RX ADMIN — METHYLPREDNISOLONE SODIUM SUCCINATE 60 MG: 125 INJECTION, POWDER, FOR SOLUTION INTRAMUSCULAR; INTRAVENOUS at 17:29

## 2018-02-05 RX ADMIN — METHYLPREDNISOLONE SODIUM SUCCINATE 60 MG: 125 INJECTION, POWDER, FOR SOLUTION INTRAMUSCULAR; INTRAVENOUS at 11:21

## 2018-02-05 RX ADMIN — IPRATROPIUM BROMIDE AND ALBUTEROL SULFATE 3 ML: .5; 3 SOLUTION RESPIRATORY (INHALATION) at 06:20

## 2018-02-05 NOTE — PROGRESS NOTES
Progress Note   02/05/18      Subjective     Interval History:     Complaints: Resting issues per nursing. Pt still with cough and mild dyspnea tolerating oxy.        Objective     Intake & Output (last day)       02/04 0701 - 02/05 0700    P.O. 1560    Total Intake(mL/kg) 1560 (21.7)    Net +1560         Unmeasured Urine Occurrence 8 x    Unmeasured Stool Occurrence 0 x          Vital Signs  Temp:  [97.1 °F (36.2 °C)-98.9 °F (37.2 °C)] 97.5 °F (36.4 °C)  Heart Rate:  [69-97] 69  Resp:  [20-22] 22  BP: (120-140)/(50-78) 134/68    Physical Exam:   General Appearance alert, appears stated age and cooperative   Lungs dec bases   Heart regular rhythm & normal rate   Abdomen +BS   Extremities no c/c/e  Labs:  Lab Results (last 72 hours)     Procedure Component Value Units Date/Time    Blood Gas, Arterial [[id number]]  (Abnormal) Collected:  02/03/18 1020    Specimen:  Arterial Blood Updated:  02/03/18 1024     Site Arterial: left radial     Sky's Test Positive     pH, Arterial 7.312 (L) pH units      pCO2, Arterial 40.6 mm Hg      pO2, Arterial 63.1 (L) mm Hg      HCO3, Arterial 20.1 (L) mmol/L      Base Excess, Arterial -5.7 mmol/L      O2 Saturation, Arterial 89.9 (L) %      Hemoglobin, Blood Gas 11.3 (L) g/dL      Hematocrit, Blood Gas 33.0 (L) %      Oxyhemoglobin 88.6 %      Methemoglobin 0.40 %      Carboxyhemoglobin 1.1 %      A-a Gradiant 80.8 mmHg      Temperature 98.6 C      Barometric Pressure for Blood Gas 732 mmHg      Modality Nasal Cannula     FIO2 28 %     CBC & Differential [[id number]] Collected:  02/03/18 1024    Specimen:  Blood Updated:  02/03/18 1054    Narrative:       The following orders were created for panel order CBC & Differential.  Procedure                               Abnormality         Status                     ---------                               -----------         ------                     CBC Auto Differential[[id number]]        Abnormal            Final result                  Please view results for these tests on the individual orders.    CBC Auto Differential [[id number]]  (Abnormal) Collected:  02/03/18 1024    Specimen:  Blood Updated:  02/03/18 1054     WBC 9.41 10*3/mm3      RBC 4.02 (L) 10*6/mm3      Hemoglobin 10.8 (L) g/dL      Hematocrit 34.6 (L) %      MCV 86.1 fL      MCH 26.9 (L) pg      MCHC 31.2 (L) g/dL      RDW 16.7 (H) %      RDW-SD 52.7 fl      MPV 10.9 (H) fL      Platelets 373 10*3/mm3      Neutrophil % 71.8 %      Lymphocyte % 21.1 %      Monocyte % 6.4 %      Eosinophil % 0.2 %      Basophil % 0.2 %      Immature Grans % 0.3 %      Neutrophils, Absolute 6.75 (H) 10*3/mm3      Lymphocytes, Absolute 1.99 10*3/mm3      Monocytes, Absolute 0.60 10*3/mm3      Eosinophils, Absolute 0.02 10*3/mm3      Basophils, Absolute 0.02 10*3/mm3      Immature Grans, Absolute 0.03 10*3/mm3     Lactic Acid, Plasma [[id number]]  (Normal) Collected:  02/03/18 1024    Specimen:  Blood Updated:  02/03/18 1055     Lactate 1.1 mmol/L     Comprehensive Metabolic Panel [[id number]]  (Abnormal) Collected:  02/03/18 1024    Specimen:  Blood Updated:  02/03/18 1102     Glucose 192 (H) mg/dL      BUN 30 (H) mg/dL      Creatinine 1.61 (H) mg/dL      Sodium 134 (L) mmol/L      Potassium 4.3 mmol/L      Chloride 102 mmol/L      CO2 23.9 (L) mmol/L      Calcium 9.3 mg/dL      Total Protein 7.1 g/dL      Albumin 4.30 g/dL      ALT (SGPT) 17 U/L      AST (SGOT) 22 U/L      Alkaline Phosphatase 39 U/L       Note New Reference Ranges        Total Bilirubin 0.2 mg/dL      eGFR Non African Amer 31 (L) mL/min/1.73      Globulin 2.8 gm/dL      A/G Ratio 1.5 g/dL      BUN/Creatinine Ratio 18.6     Anion Gap 8.1 mmol/L     Narrative:       The MDRD GFR formula is only valid for adults with stable renal function between ages 18 and 70.    Osmolality, Calculated [[id number]]  (Normal) Collected:  02/03/18 1024    Specimen:  Blood Updated:  02/03/18 1102     Osmolality Calc 279.6 mOsm/kg     Influenza Antigen,  Rapid - Swab, Nasopharynx [[id number]]  (Abnormal) Collected:  02/03/18 1130    Specimen:  Swab from Nasopharynx Updated:  02/03/18 1148     Influenza A Ag, EIA Negative     Influenza B Ag, EIA Positive (A)    POC Glucose Once [[id number]]  (Abnormal) Collected:  02/03/18 1732    Specimen:  Blood Updated:  02/03/18 1739     Glucose 282 (H) mg/dL     Narrative:       Meter: [id number] : [id number] Finley Hortencia    POC Glucose Once [[id number]]  (Abnormal) Collected:  02/03/18 1951    Specimen:  Blood Updated:  02/03/18 1957     Glucose 396 (H) mg/dL     Narrative:       Meter: [id number] : [id number] carmelina villalobos    POC Glucose Once [[id number]]  (Abnormal) Collected:  02/03/18 2308    Specimen:  Blood Updated:  02/03/18 2320     Glucose 264 (H) mg/dL     Narrative:       Meter: [id number] : [id number] carmelina villalobos    CBC & Differential [[id number]] Collected:  02/04/18 0049    Specimen:  Blood Updated:  02/04/18 0227    Narrative:       The following orders were created for panel order CBC & Differential.  Procedure                               Abnormality         Status                     ---------                               -----------         ------                     CBC Auto Differential[[id number]]        Abnormal            Final result                 Please view results for these tests on the individual orders.    CBC Auto Differential [[id number]]  (Abnormal) Collected:  02/04/18 0049    Specimen:  Blood Updated:  02/04/18 0227     WBC 10.13 10*3/mm3      RBC 3.64 (L) 10*6/mm3      Hemoglobin 9.9 (L) g/dL      Hematocrit 31.8 (L) %      MCV 87.4 fL      MCH 27.2 pg      MCHC 31.1 (L) g/dL      RDW 16.8 (H) %      RDW-SD 52.4 fl      MPV 11.1 (H) fL      Platelets 350 10*3/mm3      Neutrophil % 82.3 (H) %      Lymphocyte % 16.3 %      Monocyte % 0.9 %      Eosinophil % 0.2 %      Basophil % 0.1 %      Immature Grans % 0.2 %      Neutrophils, Absolute 8.34 (H) 10*3/mm3      Lymphocytes, Absolute  1.65 10*3/mm3      Monocytes, Absolute 0.09 (L) 10*3/mm3      Eosinophils, Absolute 0.02 10*3/mm3      Basophils, Absolute 0.01 10*3/mm3      Immature Grans, Absolute 0.02 10*3/mm3     Basic Metabolic Panel [[id number]]  (Abnormal) Collected:  02/04/18 0049    Specimen:  Blood Updated:  02/04/18 0245     Glucose 258 (H) mg/dL      BUN 34 (H) mg/dL      Creatinine 1.49 (H) mg/dL      Sodium 133 (L) mmol/L      Potassium 5.0 mmol/L      Chloride 104 mmol/L      CO2 20.6 (L) mmol/L      Calcium 8.5 mg/dL      eGFR Non African Amer 34 (L) mL/min/1.73      BUN/Creatinine Ratio 22.8     Anion Gap 8.4 mmol/L     Narrative:       The MDRD GFR formula is only valid for adults with stable renal function between ages 18 and 70.    Osmolality, Calculated [[id number]]  (Normal) Collected:  02/04/18 0049    Specimen:  Blood Updated:  02/04/18 0245     Osmolality Calc 282.9 mOsm/kg     POC Glucose Once [[id number]]  (Abnormal) Collected:  02/04/18 0635    Specimen:  Blood Updated:  02/04/18 0701     Glucose 258 (H) mg/dL     Narrative:       Meter: [id number] : [id number] david alfonso    Blood Culture - Blood, [[id number]]  (Normal) Collected:  02/03/18 1127    Specimen:  Blood from Arm, Left Updated:  02/04/18 1146     Blood Culture No growth at 24 hours    POC Glucose Once [[id number]]  (Abnormal) Collected:  02/04/18 1142    Specimen:  Blood Updated:  02/04/18 1203     Glucose 313 (H) mg/dL     Narrative:       Meter: [id number] : [id number] Tushar DELANEY    Blood Culture - Blood, [[id number]]  (Normal) Collected:  02/03/18 1109    Specimen:  Blood from Arm, Right Updated:  02/04/18 1216     Blood Culture No growth at 24 hours    POC Glucose Once [[id number]]  (Abnormal) Collected:  02/04/18 1659    Specimen:  Blood Updated:  02/04/18 1706     Glucose 307 (H) mg/dL     Narrative:       Meter: [id number] : [id number] Tushar DELANEY    POC Glucose Once [[id number]]  (Abnormal) Collected:  02/04/18 1938    Specimen:  Blood  Updated:  02/04/18 1954     Glucose 289 (H) mg/dL     Narrative:       Meter: [id number] : [id number] vicente jensen    Basic Metabolic Panel [[id number]]  (Abnormal) Collected:  02/05/18 0056    Specimen:  Blood Updated:  02/05/18 0203     Glucose 240 (H) mg/dL      BUN 28 (H) mg/dL      Creatinine 1.09 mg/dL      Sodium 140 mmol/L      Potassium 4.8 mmol/L      Chloride 110 mmol/L      CO2 21.2 (L) mmol/L      Calcium 8.8 mg/dL      eGFR Non African Amer 49 (L) mL/min/1.73      BUN/Creatinine Ratio 25.7 (H)     Anion Gap 8.8 mmol/L     Narrative:       The MDRD GFR formula is only valid for adults with stable renal function between ages 18 and 70.    C-reactive Protein [[id number]]  (Normal) Collected:  02/05/18 0056    Specimen:  Blood Updated:  02/05/18 0203     C-Reactive Protein 0.87 mg/dL     Osmolality, Calculated [[id number]]  (Normal) Collected:  02/05/18 0056    Specimen:  Blood Updated:  02/05/18 0203     Osmolality Calc 292.7 mOsm/kg     CBC Auto Differential [[id number]]  (Abnormal) Collected:  02/05/18 0056    Specimen:  Blood Updated:  02/05/18 0245     WBC 15.60 (H) 10*3/mm3      RBC 3.69 (L) 10*6/mm3      Hemoglobin 9.7 (L) g/dL      Hematocrit 32.5 (L) %      MCV 88.1 fL      MCH 26.3 (L) pg      MCHC 29.8 (L) g/dL      RDW 16.9 (H) %      RDW-SD 53.5 fl      MPV 10.9 (H) fL      Platelets 353 10*3/mm3     Manual Differential [[id number]]  (Abnormal) Collected:  02/05/18 0056    Specimen:  Blood Updated:  02/05/18 0245     Neutrophil % 79.0 (H) %      Lymphocyte % 18.0 %      Monocyte % 2.0 %      Bands %  1.0 %      Neutrophils Absolute 12.48 (H) 10*3/mm3      Lymphocytes Absolute 2.81 10*3/mm3      Monocytes Absolute 0.31 10*3/mm3      Anisocytosis Slight/1+     Hypochromia Slight/1+     Platelet Morphology Normal    Blood Gas, Arterial [[id number]]  (Abnormal) Collected:  02/05/18 0510    Specimen:  Arterial Blood Updated:  02/05/18 0522     Site Arterial: right radial     Sky's Test Positive      pH, Arterial 7.367 pH units      pCO2, Arterial 39.4 mm Hg      pO2, Arterial 60.8 (L) mm Hg      HCO3, Arterial 22.1 mmol/L      Base Excess, Arterial -2.9 mmol/L      O2 Saturation, Arterial 90.3 %      Hemoglobin, Blood Gas 10.8 (L) g/dL      Hematocrit, Blood Gas 32.0 (L) %      Oxyhemoglobin 89.1 %      Methemoglobin 0.30 %      Carboxyhemoglobin 1.0 %      A-a Gradiant 81.2 mmHg      Temperature 98.6 C      Barometric Pressure for Blood Gas 720 mmHg      Modality Cannula     FIO2 28 %           Xray:  Imaging Results (last 24 hours)     ** No results found for the last 24 hours. **             Results Review:     I reviewed the patient's new clinical results.    Medication Review:   Hospital Medications (active)       Dose Frequency Start End    AZITHROMYCIN 500 MG/250 ML 0.9% NS IVPB (MBP) 500 mg Every 24 Hours 2/4/2018 2/11/2018    Sig - Route: Infuse 250 mL into a venous catheter Daily. - Intravenous    calcium carbonate tablet 500 mg 500 mg Daily 2/4/2018     Sig - Route: Take 1 tablet by mouth Daily. - Oral    cefTRIAXone (ROCEPHIN) 1 g/100 mL 0.9% NS (MBP) 1 g Every 24 Hours 2/4/2018 2/11/2018    Sig - Route: Infuse 100 mL into a venous catheter Daily. - Intravenous    cetirizine (zyrTEC) tablet 5 mg 5 mg Daily 2/4/2018     Sig - Route: Take 0.5 tablets by mouth Daily. - Oral    chlorthalidone (HYGROTEN) tablet 25 mg 25 mg Daily 2/4/2018     Sig - Route: Take 0.5 tablets by mouth Daily. - Oral    cyanocobalamin injection 1,000 mcg 1,000 mcg Every 28 Days 2/28/2018     Sig - Route: Inject 1 mL into the shoulder, thigh, or buttocks Every 28 (Twenty-Eight) Days. - Intramuscular    dextrose (D50W) solution 25 g 25 g Every 15 Minutes PRN 2/3/2018     Sig - Route: Infuse 50 mL into a venous catheter Every 15 (Fifteen) Minutes As Needed for Low Blood Sugar (Blood Sugar Less Than 70, Patient Has IV Access - Unresponsive, NPO or Unable To Safely Swallow). - Intravenous    dextrose (GLUTOSE) oral gel 15 g 15 g  Every 15 Minutes PRN 2/3/2018     Sig - Route: Take 15 g by mouth Every 15 (Fifteen) Minutes As Needed for Low Blood Sugar (Blood Sugar Less Than 70, Patient Alert, Is Not NPO & Can Safely Swallow). - Oral    diltiaZEM CD (CARDIZEM CD) 24 hr capsule 360 mg 360 mg Every 24 Hours Scheduled 2/4/2018     Sig - Route: Take 2 capsules by mouth Daily. - Oral    enoxaparin (LOVENOX) syringe 30 mg 30 mg Every 24 Hours 2/3/2018     Sig - Route: Inject 0.3 mL under the skin Daily. - Subcutaneous    famotidine (PEPCID) tablet 20 mg 20 mg 2 Times Daily 2/3/2018     Sig - Route: Take 1 tablet by mouth 2 (Two) Times a Day. - Oral    ferrous sulfate tablet 325 mg 325 mg 2 Times Daily 2/4/2018     Sig - Route: Take 1 tablet by mouth 2 (Two) Times a Day. - Oral    glucagon (human recombinant) (GLUCAGEN DIAGNOSTIC) injection 1 mg 1 mg As Needed 2/3/2018     Sig - Route: Inject 1 mg under the skin As Needed (Blood Glucose Less Than 70 - Patient Without IV Access - Unresponsive, NPO or Unable To Safely Swallow). - Subcutaneous    insulin aspart (novoLOG) injection 0-9 Units 0-9 Units 4 Times Daily Before Meals & Nightly 2/3/2018     Sig - Route: Inject 0-9 Units under the skin 4 (Four) Times a Day Before Meals & at Bedtime. - Subcutaneous    insulin detemir (LEVEMIR) injection 100 Units 100 Units Daily 2/4/2018     Sig - Route: Inject 100 Units under the skin Daily. - Subcutaneous    insulin detemir (LEVEMIR) injection 40 Units 40 Units Nightly 2/3/2018     Sig - Route: Inject 40 Units under the skin Every Night. - Subcutaneous    ipratropium-albuterol (DUO-NEB) nebulizer solution 3 mL 3 mL Every 6 Hours - RT 2/4/2018     Sig - Route: Take 3 mL by nebulization Every 6 (Six) Hours. - Nebulization    lisinopril (PRINIVIL,ZESTRIL) tablet 40 mg 40 mg Daily 2/4/2018     Sig - Route: Take 4 tablets by mouth Daily. - Oral    methylPREDNISolone sodium succinate (SOLU-Medrol) injection 60 mg 60 mg Every 6 Hours 2/3/2018     Sig - Route: Infuse  "0.96 mL into a venous catheter Every 6 (Six) Hours. - Intravenous    ondansetron (ZOFRAN) injection 4 mg 4 mg Every 6 Hours PRN 2/4/2018     Sig - Route: Infuse 2 mL into a venous catheter Every 6 (Six) Hours As Needed for Nausea or Vomiting. - Intravenous    oseltamivir (TAMIFLU) 6 MG/ML suspension 30 mg 30 mg Every 12 Hours Scheduled 2/4/2018 2/9/2018    Sig - Route: Take 5 mL by mouth Every 12 (Twelve) Hours. - Oral    sodium chloride 0.9 % flush 1-10 mL 1-10 mL As Needed 2/4/2018     Sig - Route: Infuse 1-10 mL into a venous catheter As Needed for Line Care. - Intravenous    sodium chloride 0.9 % flush 10 mL 10 mL As Needed 2/3/2018     Sig - Route: Infuse 10 mL into a venous catheter As Needed for Line Care. - Intravenous    Cosign for Ordering: Accepted by Roldan King MD on 2/3/2018 10:57 AM    Linked Group 1:  \"And\" Linked Group Details        sodium chloride 0.9 % with KCl 20 mEq/L infusion 100 mL/hr Continuous 2/3/2018     Sig - Route: Infuse 100 mL/hr into a venous catheter Continuous. - Intravenous    dextrose (D50W) solution 25 g (Discontinued) 25 g Every 15 Minutes PRN 2/3/2018 2/4/2018    Sig - Route: Infuse 50 mL into a venous catheter Every 15 (Fifteen) Minutes As Needed for Low Blood Sugar (Blood Sugar Less Than 70, Patient Has IV Access - Unresponsive, NPO or Unable To Safely Swallow). - Intravenous    Reason for Discontinue: Duplicate order    dextrose (GLUTOSE) oral gel 15 g (Discontinued) 15 g Every 15 Minutes PRN 2/3/2018 2/4/2018    Sig - Route: Take 15 g by mouth Every 15 (Fifteen) Minutes As Needed for Low Blood Sugar (Blood Sugar Less Than 70, Patient Alert, Is Not NPO & Can Safely Swallow). - Oral    Reason for Discontinue: Duplicate order    glucagon (human recombinant) (GLUCAGEN DIAGNOSTIC) injection 1 mg (Discontinued) 1 mg As Needed 2/3/2018 2/4/2018    Sig - Route: Inject 1 mg under the skin As Needed (Blood Glucose Less Than 70 - Patient Without IV Access - Unresponsive, NPO or " Unable To Safely Swallow). - Subcutaneous    Reason for Discontinue: Duplicate order    insulin aspart (novoLOG) injection 0-9 Units (Discontinued) 0-9 Units 4 Times Daily Before Meals & Nightly 2/3/2018 2/4/2018    Sig - Route: Inject 0-9 Units under the skin 4 (Four) Times a Day Before Meals & at Bedtime. - Subcutaneous    Reason for Discontinue: Duplicate order    ipratropium-albuterol (DUO-NEB) nebulizer solution 3 mL (Discontinued) 3 mL Every 6 Hours PRN 2/3/2018 2/4/2018    Sig - Route: Take 3 mL by nebulization Every 6 (Six) Hours As Needed for Shortness of Air. - Nebulization          Assessment/Plan    1.Influenza B with RLL pneumonia: admitted with tamiflu rocephin and zithromax.  Continue pulm support.  2.COPD Continue tx  3.CKD  4.HTN  5.DM    Jarek Hernandez MD  02/05/18  5:43 AM

## 2018-02-05 NOTE — PLAN OF CARE
Problem: Patient Care Overview (Adult)  Goal: Plan of Care Review  Outcome: Ongoing (interventions implemented as appropriate)   02/05/18 1438   Coping/Psychosocial Response Interventions   Plan Of Care Reviewed With patient   Patient Care Overview   Progress improving       Problem: Respiratory Insufficiency (Adult)  Goal: Identify Related Risk Factors and Signs and Symptoms  Outcome: Ongoing (interventions implemented as appropriate)   02/03/18 1641   Respiratory Insufficiency   Related Risk Factors (Respiratory Insufficiency) activity intolerance       Problem: Pain, Acute (Adult)  Goal: Identify Related Risk Factors and Signs and Symptoms  Outcome: Ongoing (interventions implemented as appropriate)   02/03/18 1641   Pain, Acute   Related Risk Factors (Acute Pain) positioning   Signs and Symptoms (Acute Pain) fatigue/weakness       Problem: Skin Integrity Impairment, Risk/Actual (Adult)  Goal: Identify Related Risk Factors and Signs and Symptoms  Outcome: Ongoing (interventions implemented as appropriate)   02/05/18 1438   Skin Integrity Impairment, Risk/Actual   Skin Integrity Impairment, Risk/Actual: Related Risk Factors tissue perfusion impaired       Problem: Fall Risk (Adult)  Goal: Identify Related Risk Factors and Signs and Symptoms  Outcome: Ongoing (interventions implemented as appropriate)   02/03/18 1641   Fall Risk   Fall Risk: Related Risk Factors fatigue/slow reaction

## 2018-02-05 NOTE — PLAN OF CARE
Problem: Patient Care Overview (Adult)  Goal: Plan of Care Review  Outcome: Ongoing (interventions implemented as appropriate)    Goal: Adult Individualization and Mutuality  Outcome: Ongoing (interventions implemented as appropriate)      Problem: Pain, Acute (Adult)  Goal: Identify Related Risk Factors and Signs and Symptoms  Outcome: Ongoing (interventions implemented as appropriate)    Goal: Acceptable Pain Control/Comfort Level   02/04/18 2641   Pain, Acute (Adult)   Acceptable Pain Control/Comfort Level making progress toward outcome       Problem: Skin Integrity Impairment, Risk/Actual (Adult)  Goal: Identify Related Risk Factors and Signs and Symptoms  Outcome: Ongoing (interventions implemented as appropriate)      Problem: Fall Risk (Adult)  Goal: Identify Related Risk Factors and Signs and Symptoms  Outcome: Ongoing (interventions implemented as appropriate)

## 2018-02-06 LAB
ANION GAP SERPL CALCULATED.3IONS-SCNC: 11.5 MMOL/L (ref 3.6–11.2)
ANISOCYTOSIS BLD QL: ABNORMAL
BUN BLD-MCNC: 18 MG/DL (ref 7–21)
BUN/CREAT SERPL: 20.9 (ref 7–25)
CALCIUM SPEC-SCNC: 9 MG/DL (ref 7.7–10)
CHLORIDE SERPL-SCNC: 108 MMOL/L (ref 99–112)
CO2 SERPL-SCNC: 21.5 MMOL/L (ref 24.3–31.9)
CREAT BLD-MCNC: 0.86 MG/DL (ref 0.43–1.29)
DEPRECATED RDW RBC AUTO: 52.5 FL (ref 37–54)
ERYTHROCYTE [DISTWIDTH] IN BLOOD BY AUTOMATED COUNT: 17.1 % (ref 11.5–14.5)
GFR SERPL CREATININE-BSD FRML MDRD: 65 ML/MIN/1.73
GLUCOSE BLD-MCNC: 110 MG/DL (ref 70–110)
GLUCOSE BLDC GLUCOMTR-MCNC: 203 MG/DL (ref 70–130)
GLUCOSE BLDC GLUCOMTR-MCNC: 226 MG/DL (ref 70–130)
GLUCOSE BLDC GLUCOMTR-MCNC: 234 MG/DL (ref 70–130)
GLUCOSE BLDC GLUCOMTR-MCNC: 301 MG/DL (ref 70–130)
GLUCOSE BLDC GLUCOMTR-MCNC: 99 MG/DL (ref 70–130)
HCT VFR BLD AUTO: 32.6 % (ref 37–47)
HGB BLD-MCNC: 10 G/DL (ref 12–16)
HYPOCHROMIA BLD QL: ABNORMAL
LYMPHOCYTES # BLD MANUAL: 2.35 10*3/MM3 (ref 1–3)
LYMPHOCYTES NFR BLD MANUAL: 15 % (ref 16–46)
MCH RBC QN AUTO: 26.7 PG (ref 27–33)
MCHC RBC AUTO-ENTMCNC: 30.7 G/DL (ref 33–37)
MCV RBC AUTO: 87.2 FL (ref 80–94)
NEUTROPHILS # BLD AUTO: 13.33 10*3/MM3 (ref 1.4–6.5)
NEUTROPHILS NFR BLD MANUAL: 84 % (ref 40–75)
NEUTS BAND NFR BLD MANUAL: 1 % (ref 0–8)
OSMOLALITY SERPL CALC.SUM OF ELEC: 283.8 MOSM/KG (ref 273–305)
PLAT MORPH BLD: NORMAL
PLATELET # BLD AUTO: 365 10*3/MM3 (ref 130–400)
PMV BLD AUTO: 10.9 FL (ref 6–10)
POTASSIUM BLD-SCNC: 3.4 MMOL/L (ref 3.5–5.3)
RBC # BLD AUTO: 3.74 10*6/MM3 (ref 4.2–5.4)
SCAN SLIDE: NORMAL
SODIUM BLD-SCNC: 141 MMOL/L (ref 135–153)
WBC NRBC COR # BLD: 15.68 10*3/MM3 (ref 4.5–12.5)

## 2018-02-06 PROCEDURE — 25010000002 CEFTRIAXONE: Performed by: FAMILY MEDICINE

## 2018-02-06 PROCEDURE — 85025 COMPLETE CBC W/AUTO DIFF WBC: CPT | Performed by: FAMILY MEDICINE

## 2018-02-06 PROCEDURE — 82962 GLUCOSE BLOOD TEST: CPT

## 2018-02-06 PROCEDURE — 63710000001 INSULIN DETEMIR PER 5 UNITS: Performed by: FAMILY MEDICINE

## 2018-02-06 PROCEDURE — 94799 UNLISTED PULMONARY SVC/PX: CPT

## 2018-02-06 PROCEDURE — 25010000002 METHYLPREDNISOLONE PER 125 MG: Performed by: FAMILY MEDICINE

## 2018-02-06 PROCEDURE — 25010000002 AZITHROMYCIN: Performed by: FAMILY MEDICINE

## 2018-02-06 PROCEDURE — 80048 BASIC METABOLIC PNL TOTAL CA: CPT | Performed by: FAMILY MEDICINE

## 2018-02-06 PROCEDURE — 85007 BL SMEAR W/DIFF WBC COUNT: CPT | Performed by: FAMILY MEDICINE

## 2018-02-06 PROCEDURE — 25010000002 ENOXAPARIN PER 10 MG: Performed by: FAMILY MEDICINE

## 2018-02-06 PROCEDURE — 63710000001 INSULIN ASPART PER 5 UNITS: Performed by: FAMILY MEDICINE

## 2018-02-06 RX ORDER — POTASSIUM CHLORIDE 20 MEQ/1
40 TABLET, EXTENDED RELEASE ORAL ONCE
Status: COMPLETED | OUTPATIENT
Start: 2018-02-06 | End: 2018-02-06

## 2018-02-06 RX ADMIN — CETIRIZINE HYDROCHLORIDE 5 MG: 10 TABLET ORAL at 09:02

## 2018-02-06 RX ADMIN — POTASSIUM CHLORIDE 40 MEQ: 1500 TABLET, EXTENDED RELEASE ORAL at 09:01

## 2018-02-06 RX ADMIN — AZITHROMYCIN 500 MG: 500 INJECTION, POWDER, LYOPHILIZED, FOR SOLUTION INTRAVENOUS at 11:07

## 2018-02-06 RX ADMIN — INSULIN ASPART 4 UNITS: 100 INJECTION, SOLUTION INTRAVENOUS; SUBCUTANEOUS at 11:07

## 2018-02-06 RX ADMIN — INSULIN ASPART 4 UNITS: 100 INJECTION, SOLUTION INTRAVENOUS; SUBCUTANEOUS at 21:19

## 2018-02-06 RX ADMIN — FAMOTIDINE 20 MG: 20 TABLET, FILM COATED ORAL at 09:01

## 2018-02-06 RX ADMIN — METHYLPREDNISOLONE SODIUM SUCCINATE 60 MG: 125 INJECTION, POWDER, FOR SOLUTION INTRAMUSCULAR; INTRAVENOUS at 17:37

## 2018-02-06 RX ADMIN — LISINOPRIL 40 MG: 10 TABLET ORAL at 09:02

## 2018-02-06 RX ADMIN — INSULIN DETEMIR 40 UNITS: 100 INJECTION, SOLUTION SUBCUTANEOUS at 21:19

## 2018-02-06 RX ADMIN — INSULIN ASPART 4 UNITS: 100 INJECTION, SOLUTION INTRAVENOUS; SUBCUTANEOUS at 17:37

## 2018-02-06 RX ADMIN — ENOXAPARIN SODIUM 30 MG: 30 INJECTION SUBCUTANEOUS at 17:38

## 2018-02-06 RX ADMIN — CEFTRIAXONE 1 G: 1 INJECTION, POWDER, FOR SOLUTION INTRAMUSCULAR; INTRAVENOUS at 13:41

## 2018-02-06 RX ADMIN — IPRATROPIUM BROMIDE AND ALBUTEROL SULFATE 3 ML: .5; 3 SOLUTION RESPIRATORY (INHALATION) at 20:07

## 2018-02-06 RX ADMIN — FERROUS SULFATE TAB 325 MG (65 MG ELEMENTAL FE) 325 MG: 325 (65 FE) TAB at 09:02

## 2018-02-06 RX ADMIN — OSELTAMIVIR PHOSPHATE 30 MG: 6 POWDER, FOR SUSPENSION ORAL at 09:01

## 2018-02-06 RX ADMIN — METHYLPREDNISOLONE SODIUM SUCCINATE 60 MG: 125 INJECTION, POWDER, FOR SOLUTION INTRAMUSCULAR; INTRAVENOUS at 05:55

## 2018-02-06 RX ADMIN — OSELTAMIVIR PHOSPHATE 30 MG: 6 POWDER, FOR SUSPENSION ORAL at 21:19

## 2018-02-06 RX ADMIN — Medication 1 CAPSULE: at 09:02

## 2018-02-06 RX ADMIN — IPRATROPIUM BROMIDE AND ALBUTEROL SULFATE 3 ML: .5; 3 SOLUTION RESPIRATORY (INHALATION) at 06:45

## 2018-02-06 RX ADMIN — FAMOTIDINE 20 MG: 20 TABLET, FILM COATED ORAL at 21:19

## 2018-02-06 RX ADMIN — FERROUS SULFATE TAB 325 MG (65 MG ELEMENTAL FE) 325 MG: 325 (65 FE) TAB at 21:19

## 2018-02-06 RX ADMIN — CHLORTHALIDONE 25 MG: 50 TABLET ORAL at 09:02

## 2018-02-06 RX ADMIN — INSULIN DETEMIR 100 UNITS: 100 INJECTION, SOLUTION SUBCUTANEOUS at 09:03

## 2018-02-06 RX ADMIN — METHYLPREDNISOLONE SODIUM SUCCINATE 60 MG: 125 INJECTION, POWDER, FOR SOLUTION INTRAMUSCULAR; INTRAVENOUS at 11:07

## 2018-02-06 RX ADMIN — METHYLPREDNISOLONE SODIUM SUCCINATE 60 MG: 125 INJECTION, POWDER, FOR SOLUTION INTRAMUSCULAR; INTRAVENOUS at 00:22

## 2018-02-06 RX ADMIN — DILTIAZEM HYDROCHLORIDE 360 MG: 180 CAPSULE, EXTENDED RELEASE ORAL at 09:01

## 2018-02-06 RX ADMIN — IPRATROPIUM BROMIDE AND ALBUTEROL SULFATE 3 ML: .5; 3 SOLUTION RESPIRATORY (INHALATION) at 00:53

## 2018-02-06 RX ADMIN — IPRATROPIUM BROMIDE AND ALBUTEROL SULFATE 3 ML: .5; 3 SOLUTION RESPIRATORY (INHALATION) at 12:12

## 2018-02-06 RX ADMIN — Medication 500 MG: at 09:01

## 2018-02-06 NOTE — PLAN OF CARE
Problem: Patient Care Overview (Adult)  Goal: Plan of Care Review  Outcome: Ongoing (interventions implemented as appropriate)   02/06/18 0232 02/06/18 0850   Coping/Psychosocial Response Interventions   Plan Of Care Reviewed With --  patient;family   Patient Care Overview   Progress improving --        Problem: Respiratory Insufficiency (Adult)  Goal: Identify Related Risk Factors and Signs and Symptoms  Outcome: Ongoing (interventions implemented as appropriate)   02/06/18 0232   Respiratory Insufficiency   Related Risk Factors (Respiratory Insufficiency) activity intolerance   Signs and Symptoms (Respiratory Insufficiency) abnormal breath sounds       Problem: Pain, Acute (Adult)  Goal: Identify Related Risk Factors and Signs and Symptoms  Outcome: Ongoing (interventions implemented as appropriate)   02/06/18 0232   Pain, Acute   Related Risk Factors (Acute Pain) patient perception;positioning   Signs and Symptoms (Acute Pain) verbalization of pain descriptors       Problem: Skin Integrity Impairment, Risk/Actual (Adult)  Goal: Identify Related Risk Factors and Signs and Symptoms  Outcome: Ongoing (interventions implemented as appropriate)   02/03/18 1641 02/06/18 0232   Skin Integrity Impairment, Risk/Actual   Skin Integrity Impairment, Risk/Actual: Related Risk Factors --  edema   Signs and Symptoms (Skin Integrity Impairment) edema --        Problem: Fall Risk (Adult)  Goal: Identify Related Risk Factors and Signs and Symptoms  Outcome: Ongoing (interventions implemented as appropriate)   02/06/18 0232   Fall Risk   Fall Risk: Related Risk Factors environment unfamiliar;slipper/uneven surfaces;fatigue/slow reaction   Fall Risk: Signs and Symptoms presence of risk factors

## 2018-02-06 NOTE — PROGRESS NOTES
Progress Note   02/06/18      Subjective     Interval History:     Complaints: Resting this am in a chair, reports ambulating in room without distress.  Minimal cough and congestion.  Breathing improved, still on oxygen therapy.  No chest pains, no fevers.         Objective     Intake & Output (last day)       02/05 0701 - 02/06 0700    P.O. 1080    Total Intake(mL/kg) 1080 (15)    Net +1080         Unmeasured Urine Occurrence 5 x    Unmeasured Stool Occurrence 1 x          Vital Signs  Temp:  [97.9 °F (36.6 °C)-98.1 °F (36.7 °C)] 98 °F (36.7 °C)  Heart Rate:  [62-85] 85  Resp:  [20] 20  BP: ()/(54-81) 128/66    Physical Exam:   General Appearance alert, appears stated age and cooperative   Lungs bilateral crackles with improved airflow   Heart regular rhythm & normal rate   Abdomen normal bowel sounds, no masses, no hepatomegaly, soft non-tender   Extremities moves extremities well, no edema, no cyanosis and no redness    Labs:  Lab Results (last 72 hours)     Procedure Component Value Units Date/Time    Blood Gas, Arterial [[id number]]  (Abnormal) Collected:  02/03/18 1020    Specimen:  Arterial Blood Updated:  02/03/18 1024     Site Arterial: left radial     Sky's Test Positive     pH, Arterial 7.312 (L) pH units      pCO2, Arterial 40.6 mm Hg      pO2, Arterial 63.1 (L) mm Hg      HCO3, Arterial 20.1 (L) mmol/L      Base Excess, Arterial -5.7 mmol/L      O2 Saturation, Arterial 89.9 (L) %      Hemoglobin, Blood Gas 11.3 (L) g/dL      Hematocrit, Blood Gas 33.0 (L) %      Oxyhemoglobin 88.6 %      Methemoglobin 0.40 %      Carboxyhemoglobin 1.1 %      A-a Gradiant 80.8 mmHg      Temperature 98.6 C      Barometric Pressure for Blood Gas 732 mmHg      Modality Nasal Cannula     FIO2 28 %     CBC & Differential [[id number]] Collected:  02/03/18 1024    Specimen:  Blood Updated:  02/03/18 1054    Narrative:       The following orders were created for panel order CBC & Differential.  Procedure                                Abnormality         Status                     ---------                               -----------         ------                     CBC Auto Differential[[id number]]        Abnormal            Final result                 Please view results for these tests on the individual orders.    CBC Auto Differential [[id number]]  (Abnormal) Collected:  02/03/18 1024    Specimen:  Blood Updated:  02/03/18 1054     WBC 9.41 10*3/mm3      RBC 4.02 (L) 10*6/mm3      Hemoglobin 10.8 (L) g/dL      Hematocrit 34.6 (L) %      MCV 86.1 fL      MCH 26.9 (L) pg      MCHC 31.2 (L) g/dL      RDW 16.7 (H) %      RDW-SD 52.7 fl      MPV 10.9 (H) fL      Platelets 373 10*3/mm3      Neutrophil % 71.8 %      Lymphocyte % 21.1 %      Monocyte % 6.4 %      Eosinophil % 0.2 %      Basophil % 0.2 %      Immature Grans % 0.3 %      Neutrophils, Absolute 6.75 (H) 10*3/mm3      Lymphocytes, Absolute 1.99 10*3/mm3      Monocytes, Absolute 0.60 10*3/mm3      Eosinophils, Absolute 0.02 10*3/mm3      Basophils, Absolute 0.02 10*3/mm3      Immature Grans, Absolute 0.03 10*3/mm3     Lactic Acid, Plasma [[id number]]  (Normal) Collected:  02/03/18 1024    Specimen:  Blood Updated:  02/03/18 1055     Lactate 1.1 mmol/L     Comprehensive Metabolic Panel [[id number]]  (Abnormal) Collected:  02/03/18 1024    Specimen:  Blood Updated:  02/03/18 1102     Glucose 192 (H) mg/dL      BUN 30 (H) mg/dL      Creatinine 1.61 (H) mg/dL      Sodium 134 (L) mmol/L      Potassium 4.3 mmol/L      Chloride 102 mmol/L      CO2 23.9 (L) mmol/L      Calcium 9.3 mg/dL      Total Protein 7.1 g/dL      Albumin 4.30 g/dL      ALT (SGPT) 17 U/L      AST (SGOT) 22 U/L      Alkaline Phosphatase 39 U/L       Note New Reference Ranges        Total Bilirubin 0.2 mg/dL      eGFR Non African Amer 31 (L) mL/min/1.73      Globulin 2.8 gm/dL      A/G Ratio 1.5 g/dL      BUN/Creatinine Ratio 18.6     Anion Gap 8.1 mmol/L     Narrative:       The MDRD GFR formula is only valid for  adults with stable renal function between ages 18 and 70.    Osmolality, Calculated [[id number]]  (Normal) Collected:  02/03/18 1024    Specimen:  Blood Updated:  02/03/18 1102     Osmolality Calc 279.6 mOsm/kg     Influenza Antigen, Rapid - Swab, Nasopharynx [[id number]]  (Abnormal) Collected:  02/03/18 1130    Specimen:  Swab from Nasopharynx Updated:  02/03/18 1148     Influenza A Ag, EIA Negative     Influenza B Ag, EIA Positive (A)    POC Glucose Once [[id number]]  (Abnormal) Collected:  02/03/18 1732    Specimen:  Blood Updated:  02/03/18 1739     Glucose 282 (H) mg/dL     Narrative:       Meter: [id number] : [id number] Tushar Burnett    POC Glucose Once [[id number]]  (Abnormal) Collected:  02/03/18 1951    Specimen:  Blood Updated:  02/03/18 1957     Glucose 396 (H) mg/dL     Narrative:       Meter: [id number] : [id number] carmelina villalobos    POC Glucose Once [[id number]]  (Abnormal) Collected:  02/03/18 2308    Specimen:  Blood Updated:  02/03/18 2320     Glucose 264 (H) mg/dL     Narrative:       Meter: [id number] : [id number] carmelina villalobos    CBC & Differential [[id number]] Collected:  02/04/18 0049    Specimen:  Blood Updated:  02/04/18 0227    Narrative:       The following orders were created for panel order CBC & Differential.  Procedure                               Abnormality         Status                     ---------                               -----------         ------                     CBC Auto Differential[[id number]]        Abnormal            Final result                 Please view results for these tests on the individual orders.    CBC Auto Differential [[id number]]  (Abnormal) Collected:  02/04/18 0049    Specimen:  Blood Updated:  02/04/18 0227     WBC 10.13 10*3/mm3      RBC 3.64 (L) 10*6/mm3      Hemoglobin 9.9 (L) g/dL      Hematocrit 31.8 (L) %      MCV 87.4 fL      MCH 27.2 pg      MCHC 31.1 (L) g/dL      RDW 16.8 (H) %      RDW-SD 52.4 fl      MPV 11.1 (H) fL      Platelets 350  10*3/mm3      Neutrophil % 82.3 (H) %      Lymphocyte % 16.3 %      Monocyte % 0.9 %      Eosinophil % 0.2 %      Basophil % 0.1 %      Immature Grans % 0.2 %      Neutrophils, Absolute 8.34 (H) 10*3/mm3      Lymphocytes, Absolute 1.65 10*3/mm3      Monocytes, Absolute 0.09 (L) 10*3/mm3      Eosinophils, Absolute 0.02 10*3/mm3      Basophils, Absolute 0.01 10*3/mm3      Immature Grans, Absolute 0.02 10*3/mm3     Basic Metabolic Panel [[id number]]  (Abnormal) Collected:  02/04/18 0049    Specimen:  Blood Updated:  02/04/18 0245     Glucose 258 (H) mg/dL      BUN 34 (H) mg/dL      Creatinine 1.49 (H) mg/dL      Sodium 133 (L) mmol/L      Potassium 5.0 mmol/L      Chloride 104 mmol/L      CO2 20.6 (L) mmol/L      Calcium 8.5 mg/dL      eGFR Non African Amer 34 (L) mL/min/1.73      BUN/Creatinine Ratio 22.8     Anion Gap 8.4 mmol/L     Narrative:       The MDRD GFR formula is only valid for adults with stable renal function between ages 18 and 70.    Osmolality, Calculated [[id number]]  (Normal) Collected:  02/04/18 0049    Specimen:  Blood Updated:  02/04/18 0245     Osmolality Calc 282.9 mOsm/kg     POC Glucose Once [[id number]]  (Abnormal) Collected:  02/04/18 0635    Specimen:  Blood Updated:  02/04/18 0701     Glucose 258 (H) mg/dL     Narrative:       Meter: [id number] : [id number] david alfonso    POC Glucose Once [[id number]]  (Abnormal) Collected:  02/04/18 1142    Specimen:  Blood Updated:  02/04/18 1203     Glucose 313 (H) mg/dL     Narrative:       Meter: [id number] : [id number] Tushar DELANEY    POC Glucose Once [[id number]]  (Abnormal) Collected:  02/04/18 1659    Specimen:  Blood Updated:  02/04/18 1706     Glucose 307 (H) mg/dL     Narrative:       Meter: [id number] : [id number] Tushar DELANEY    POC Glucose Once [[id number]]  (Abnormal) Collected:  02/04/18 1938    Specimen:  Blood Updated:  02/04/18 1954     Glucose 289 (H) mg/dL     Narrative:       Meter: [id number] : [id number] vicente  camila    Basic Metabolic Panel [[id number]]  (Abnormal) Collected:  02/05/18 0056    Specimen:  Blood Updated:  02/05/18 0203     Glucose 240 (H) mg/dL      BUN 28 (H) mg/dL      Creatinine 1.09 mg/dL      Sodium 140 mmol/L      Potassium 4.8 mmol/L      Chloride 110 mmol/L      CO2 21.2 (L) mmol/L      Calcium 8.8 mg/dL      eGFR Non African Amer 49 (L) mL/min/1.73      BUN/Creatinine Ratio 25.7 (H)     Anion Gap 8.8 mmol/L     Narrative:       The MDRD GFR formula is only valid for adults with stable renal function between ages 18 and 70.    C-reactive Protein [[id number]]  (Normal) Collected:  02/05/18 0056    Specimen:  Blood Updated:  02/05/18 0203     C-Reactive Protein 0.87 mg/dL     Osmolality, Calculated [[id number]]  (Normal) Collected:  02/05/18 0056    Specimen:  Blood Updated:  02/05/18 0203     Osmolality Calc 292.7 mOsm/kg     CBC Auto Differential [[id number]]  (Abnormal) Collected:  02/05/18 0056    Specimen:  Blood Updated:  02/05/18 0245     WBC 15.60 (H) 10*3/mm3      RBC 3.69 (L) 10*6/mm3      Hemoglobin 9.7 (L) g/dL      Hematocrit 32.5 (L) %      MCV 88.1 fL      MCH 26.3 (L) pg      MCHC 29.8 (L) g/dL      RDW 16.9 (H) %      RDW-SD 53.5 fl      MPV 10.9 (H) fL      Platelets 353 10*3/mm3     Manual Differential [[id number]]  (Abnormal) Collected:  02/05/18 0056    Specimen:  Blood Updated:  02/05/18 0245     Neutrophil % 79.0 (H) %      Lymphocyte % 18.0 %      Monocyte % 2.0 %      Bands %  1.0 %      Neutrophils Absolute 12.48 (H) 10*3/mm3      Lymphocytes Absolute 2.81 10*3/mm3      Monocytes Absolute 0.31 10*3/mm3      Anisocytosis Slight/1+     Hypochromia Slight/1+     Platelet Morphology Normal    Blood Gas, Arterial [[id number]]  (Abnormal) Collected:  02/05/18 0510    Specimen:  Arterial Blood Updated:  02/05/18 0522     Site Arterial: right radial     Sky's Test Positive     pH, Arterial 7.367 pH units      pCO2, Arterial 39.4 mm Hg      pO2, Arterial 60.8 (L) mm Hg      HCO3,  Arterial 22.1 mmol/L      Base Excess, Arterial -2.9 mmol/L      O2 Saturation, Arterial 90.3 %      Hemoglobin, Blood Gas 10.8 (L) g/dL      Hematocrit, Blood Gas 32.0 (L) %      Oxyhemoglobin 89.1 %      Methemoglobin 0.30 %      Carboxyhemoglobin 1.0 %      A-a Gradiant 81.2 mmHg      Temperature 98.6 C      Barometric Pressure for Blood Gas 720 mmHg      Modality Cannula     FIO2 28 %     POC Glucose Once [[id number]]  (Abnormal) Collected:  02/05/18 0646    Specimen:  Blood Updated:  02/05/18 0652     Glucose 137 (H) mg/dL     Narrative:       Meter: [id number] : [id number] rob hernandez    POC Glucose Once [[id number]]  (Abnormal) Collected:  02/05/18 1101    Specimen:  Blood Updated:  02/05/18 1109     Glucose 257 (H) mg/dL     Narrative:       Meter: [id number] : [id number] ID Quantique    Blood Culture - Blood, [[id number]]  (Normal) Collected:  02/03/18 1127    Specimen:  Blood from Arm, Left Updated:  02/05/18 1146     Blood Culture No growth at 2 days    Blood Culture - Blood, [[id number]]  (Normal) Collected:  02/03/18 1109    Specimen:  Blood from Arm, Right Updated:  02/05/18 1216     Blood Culture No growth at 2 days    POC Glucose Once [[id number]]  (Abnormal) Collected:  02/05/18 1634    Specimen:  Blood Updated:  02/05/18 1640     Glucose 239 (H) mg/dL     Narrative:       Meter: [id number] : [id number] ID Quantique    POC Glucose Once [[id number]]  (Abnormal) Collected:  02/05/18 2223    Specimen:  Blood Updated:  02/05/18 2230     Glucose 230 (H) mg/dL     Narrative:       Meter: [id number] : [id number] chuyita berg    Basic Metabolic Panel [[id number]]  (Abnormal) Collected:  02/06/18 0130    Specimen:  Blood Updated:  02/06/18 0253     Glucose 110 mg/dL      BUN 18 mg/dL      Creatinine 0.86 mg/dL      Sodium 141 mmol/L      Potassium 3.4 (L) mmol/L      Chloride 108 mmol/L      CO2 21.5 (L) mmol/L      Calcium 9.0 mg/dL      eGFR Non African Amer 65 mL/min/1.73      BUN/Creatinine  Ratio 20.9     Anion Gap 11.5 (H) mmol/L     Narrative:       The MDRD GFR formula is only valid for adults with stable renal function between ages 18 and 70.    Osmolality, Calculated [[id number]]  (Normal) Collected:  02/06/18 0130    Specimen:  Blood Updated:  02/06/18 0253     Osmolality Calc 283.8 mOsm/kg     CBC & Differential [[id number]] Collected:  02/06/18 0130    Specimen:  Blood Updated:  02/06/18 0253    Narrative:       The following orders were created for panel order CBC & Differential.  Procedure                               Abnormality         Status                     ---------                               -----------         ------                     Manual Differential[[id number]]          Abnormal            Final result               Scan Slide[[id number]]                                       Final result               CBC Auto Differential[[id number]]        Abnormal            Final result                 Please view results for these tests on the individual orders.    CBC Auto Differential [[id number]]  (Abnormal) Collected:  02/06/18 0130    Specimen:  Blood Updated:  02/06/18 0253     WBC 15.68 (H) 10*3/mm3      RBC 3.74 (L) 10*6/mm3      Hemoglobin 10.0 (L) g/dL      Hematocrit 32.6 (L) %      MCV 87.2 fL      MCH 26.7 (L) pg      MCHC 30.7 (L) g/dL      RDW 17.1 (H) %      RDW-SD 52.5 fl      MPV 10.9 (H) fL      Platelets 365 10*3/mm3     Scan Slide [[id number]] Collected:  02/06/18 0130    Specimen:  Blood Updated:  02/06/18 0253     Scan Slide --      See Manual Differential Results       Manual Differential [[id number]]  (Abnormal) Collected:  02/06/18 0130    Specimen:  Blood Updated:  02/06/18 0253     Neutrophil % 84.0 (H) %      Lymphocyte % 15.0 (L) %      Bands %  1.0 %      Neutrophils Absolute 13.33 (H) 10*3/mm3      Lymphocytes Absolute 2.35 10*3/mm3      Anisocytosis Slight/1+     Hypochromia Slight/1+     Platelet Morphology Normal          Xray:  Imaging Results (last 24  hours)     Procedure Component Value Units Date/Time    XR Chest 1 View [[id number]] Collected:  02/05/18 0706     Updated:  02/05/18 0709    Narrative:       EXAMINATION:  XR CHEST 1 VW-      CLINICAL INDICATION:     pneumonia; J44.1-Chronic obstructive pulmonary  disease with (acute) exacerbation; J96.01-Acute respiratory failure with  hypoxia; J11.1-Influenza due to unidentified influenza virus with other  respiratory manifestations; N28.9-Disorder of kidney and ureter,  unspecified     TECHNIQUE:  XR CHEST 1 VW-       COMPARISON: 2/3/2018      FINDINGS:   Coarsened interstitial markings   Heart size is stable.   No pneumothorax.   No pleural effusion.   Bony and soft tissue structures are unremarkable.            Impression:       Stable radiographic appearance of the chest.     This report was finalized on 2/5/2018 7:07 AM by Dr. Noah Tierney MD.                Results Review:     I reviewed the patient's new clinical results.    Medication Review:   Hospital Medications (active)       Dose Frequency Start End    AZITHROMYCIN 500 MG/250 ML 0.9% NS IVPB (MBP) 500 mg Every 24 Hours 2/4/2018 2/11/2018    Sig - Route: Infuse 250 mL into a venous catheter Daily. - Intravenous    calcium carbonate tablet 500 mg 500 mg Daily 2/4/2018     Sig - Route: Take 1 tablet by mouth Daily. - Oral    cefTRIAXone (ROCEPHIN) 1 g/100 mL 0.9% NS (MBP) 1 g Every 24 Hours 2/4/2018 2/11/2018    Sig - Route: Infuse 100 mL into a venous catheter Daily. - Intravenous    cetirizine (zyrTEC) tablet 5 mg 5 mg Daily 2/4/2018     Sig - Route: Take 0.5 tablets by mouth Daily. - Oral    chlorthalidone (HYGROTEN) tablet 25 mg 25 mg Daily 2/4/2018     Sig - Route: Take 0.5 tablets by mouth Daily. - Oral    cyanocobalamin injection 1,000 mcg 1,000 mcg Every 28 Days 2/28/2018     Sig - Route: Inject 1 mL into the shoulder, thigh, or buttocks Every 28 (Twenty-Eight) Days. - Intramuscular    dextrose (D50W) solution 25 g 25 g Every 15 Minutes PRN  2/3/2018     Sig - Route: Infuse 50 mL into a venous catheter Every 15 (Fifteen) Minutes As Needed for Low Blood Sugar (Blood Sugar Less Than 70, Patient Has IV Access - Unresponsive, NPO or Unable To Safely Swallow). - Intravenous    dextrose (GLUTOSE) oral gel 15 g 15 g Every 15 Minutes PRN 2/3/2018     Sig - Route: Take 15 g by mouth Every 15 (Fifteen) Minutes As Needed for Low Blood Sugar (Blood Sugar Less Than 70, Patient Alert, Is Not NPO & Can Safely Swallow). - Oral    diltiaZEM CD (CARDIZEM CD) 24 hr capsule 360 mg 360 mg Every 24 Hours Scheduled 2/4/2018     Sig - Route: Take 2 capsules by mouth Daily. - Oral    enoxaparin (LOVENOX) syringe 30 mg 30 mg Every 24 Hours 2/3/2018     Sig - Route: Inject 0.3 mL under the skin Daily. - Subcutaneous    famotidine (PEPCID) tablet 20 mg 20 mg 2 Times Daily 2/3/2018     Sig - Route: Take 1 tablet by mouth 2 (Two) Times a Day. - Oral    ferrous sulfate tablet 325 mg 325 mg 2 Times Daily 2/4/2018     Sig - Route: Take 1 tablet by mouth 2 (Two) Times a Day. - Oral    glucagon (human recombinant) (GLUCAGEN DIAGNOSTIC) injection 1 mg 1 mg As Needed 2/3/2018     Sig - Route: Inject 1 mg under the skin As Needed (Blood Glucose Less Than 70 - Patient Without IV Access - Unresponsive, NPO or Unable To Safely Swallow). - Subcutaneous    insulin aspart (novoLOG) injection 0-9 Units 0-9 Units 4 Times Daily Before Meals & Nightly 2/3/2018     Sig - Route: Inject 0-9 Units under the skin 4 (Four) Times a Day Before Meals & at Bedtime. - Subcutaneous    insulin detemir (LEVEMIR) injection 100 Units 100 Units Daily 2/4/2018     Sig - Route: Inject 100 Units under the skin Daily. - Subcutaneous    insulin detemir (LEVEMIR) injection 40 Units 40 Units Nightly 2/3/2018     Sig - Route: Inject 40 Units under the skin Every Night. - Subcutaneous    ipratropium-albuterol (DUO-NEB) nebulizer solution 3 mL 3 mL Every 6 Hours - RT 2/4/2018     Sig - Route: Take 3 mL by nebulization Every 6  "(Six) Hours. - Nebulization    lactobacillus acidophilus (RISAQUAD) capsule 1 capsule 1 capsule Daily 2/5/2018     Sig - Route: Take 1 capsule by mouth Daily. - Oral    lisinopril (PRINIVIL,ZESTRIL) tablet 40 mg 40 mg Daily 2/4/2018     Sig - Route: Take 4 tablets by mouth Daily. - Oral    methylPREDNISolone sodium succinate (SOLU-Medrol) injection 60 mg 60 mg Every 6 Hours 2/3/2018     Sig - Route: Infuse 0.96 mL into a venous catheter Every 6 (Six) Hours. - Intravenous    ondansetron (ZOFRAN) injection 4 mg 4 mg Every 6 Hours PRN 2/4/2018     Sig - Route: Infuse 2 mL into a venous catheter Every 6 (Six) Hours As Needed for Nausea or Vomiting. - Intravenous    oseltamivir (TAMIFLU) 6 MG/ML suspension 30 mg 30 mg Every 12 Hours Scheduled 2/4/2018 2/9/2018    Sig - Route: Take 5 mL by mouth Every 12 (Twelve) Hours. - Oral    sodium chloride 0.9 % flush 1-10 mL 1-10 mL As Needed 2/4/2018     Sig - Route: Infuse 1-10 mL into a venous catheter As Needed for Line Care. - Intravenous    sodium chloride 0.9 % flush 10 mL 10 mL As Needed 2/3/2018     Sig - Route: Infuse 10 mL into a venous catheter As Needed for Line Care. - Intravenous    Cosign for Ordering: Accepted by Roldan King MD on 2/3/2018 10:57 AM    Linked Group 1:  \"And\" Linked Group Details        sodium chloride 0.9 % with KCl 20 mEq/L infusion 100 mL/hr Continuous 2/3/2018     Sig - Route: Infuse 100 mL/hr into a venous catheter Continuous. - Intravenous          Assessment/Plan    Influenza B with RLL pneumonia: clinically improving daily with abx and tamiflu. Pulm support.  Ambulating in room.  Not oxy dependent at home will wean off oxy if possible today plan to d/c home in am  Hypokalemia Replace PO    Active Problems:    Acute exacerbation of chronic obstructive pulmonary disease (COPD)      Jarek Hernandez MD  02/06/18  6:07 AM      "

## 2018-02-06 NOTE — PLAN OF CARE
Problem: Patient Care Overview (Adult)  Goal: Plan of Care Review  Outcome: Ongoing (interventions implemented as appropriate)      Problem: Respiratory Insufficiency (Adult)  Goal: Identify Related Risk Factors and Signs and Symptoms  Outcome: Ongoing (interventions implemented as appropriate)    Goal: Acid/Base Balance  Outcome: Ongoing (interventions implemented as appropriate)    Goal: Effective Ventilation  Outcome: Ongoing (interventions implemented as appropriate)      Problem: Pain, Acute (Adult)  Goal: Identify Related Risk Factors and Signs and Symptoms  Outcome: Ongoing (interventions implemented as appropriate)    Goal: Acceptable Pain Control/Comfort Level  Outcome: Ongoing (interventions implemented as appropriate)      Problem: Skin Integrity Impairment, Risk/Actual (Adult)  Goal: Identify Related Risk Factors and Signs and Symptoms  Outcome: Ongoing (interventions implemented as appropriate)    Goal: Skin Integrity/Wound Healing  Outcome: Ongoing (interventions implemented as appropriate)      Problem: Fall Risk (Adult)  Goal: Identify Related Risk Factors and Signs and Symptoms  Outcome: Ongoing (interventions implemented as appropriate)    Goal: Absence of Falls  Outcome: Ongoing (interventions implemented as appropriate)

## 2018-02-07 VITALS
RESPIRATION RATE: 18 BRPM | HEIGHT: 63 IN | SYSTOLIC BLOOD PRESSURE: 160 MMHG | OXYGEN SATURATION: 93 % | WEIGHT: 158.4 LBS | HEART RATE: 81 BPM | DIASTOLIC BLOOD PRESSURE: 80 MMHG | BODY MASS INDEX: 28.07 KG/M2 | TEMPERATURE: 97.4 F

## 2018-02-07 LAB
ANION GAP SERPL CALCULATED.3IONS-SCNC: 15.1 MMOL/L (ref 3.6–11.2)
BASOPHILS # BLD AUTO: 0.01 10*3/MM3 (ref 0–0.3)
BASOPHILS NFR BLD AUTO: 0.1 % (ref 0–2)
BUN BLD-MCNC: 15 MG/DL (ref 7–21)
BUN/CREAT SERPL: 17.6 (ref 7–25)
CALCIUM SPEC-SCNC: 8.9 MG/DL (ref 7.7–10)
CHLORIDE SERPL-SCNC: 101 MMOL/L (ref 99–112)
CO2 SERPL-SCNC: 25.9 MMOL/L (ref 24.3–31.9)
CREAT BLD-MCNC: 0.85 MG/DL (ref 0.43–1.29)
DEPRECATED RDW RBC AUTO: 51.7 FL (ref 37–54)
EOSINOPHIL # BLD AUTO: 0 10*3/MM3 (ref 0–0.7)
EOSINOPHIL NFR BLD AUTO: 0 % (ref 0–7)
ERYTHROCYTE [DISTWIDTH] IN BLOOD BY AUTOMATED COUNT: 16.7 % (ref 11.5–14.5)
GFR SERPL CREATININE-BSD FRML MDRD: 66 ML/MIN/1.73
GLUCOSE BLD-MCNC: 105 MG/DL (ref 70–110)
GLUCOSE BLDC GLUCOMTR-MCNC: 258 MG/DL (ref 70–130)
GLUCOSE BLDC GLUCOMTR-MCNC: 83 MG/DL (ref 70–130)
HCT VFR BLD AUTO: 34.9 % (ref 37–47)
HGB BLD-MCNC: 10.8 G/DL (ref 12–16)
IMM GRANULOCYTES # BLD: 0.1 10*3/MM3 (ref 0–0.03)
IMM GRANULOCYTES NFR BLD: 0.8 % (ref 0–0.5)
LYMPHOCYTES # BLD AUTO: 2.19 10*3/MM3 (ref 1–3)
LYMPHOCYTES NFR BLD AUTO: 18.2 % (ref 16–46)
MCH RBC QN AUTO: 26.6 PG (ref 27–33)
MCHC RBC AUTO-ENTMCNC: 30.9 G/DL (ref 33–37)
MCV RBC AUTO: 86 FL (ref 80–94)
MONOCYTES # BLD AUTO: 0.53 10*3/MM3 (ref 0.1–0.9)
MONOCYTES NFR BLD AUTO: 4.4 % (ref 0–12)
NEUTROPHILS # BLD AUTO: 9.22 10*3/MM3 (ref 1.4–6.5)
NEUTROPHILS NFR BLD AUTO: 76.5 % (ref 40–75)
NRBC BLD MANUAL-RTO: 0 /100 WBC (ref 0–0)
OSMOLALITY SERPL CALC.SUM OF ELEC: 284.3 MOSM/KG (ref 273–305)
PLATELET # BLD AUTO: 404 10*3/MM3 (ref 130–400)
PMV BLD AUTO: 11 FL (ref 6–10)
POTASSIUM BLD-SCNC: 3.6 MMOL/L (ref 3.5–5.3)
RBC # BLD AUTO: 4.06 10*6/MM3 (ref 4.2–5.4)
SODIUM BLD-SCNC: 142 MMOL/L (ref 135–153)
WBC NRBC COR # BLD: 12.05 10*3/MM3 (ref 4.5–12.5)

## 2018-02-07 PROCEDURE — 25810000003 SODIUM CHLORIDE 0.9 % WITH KCL 20 MEQ 20-0.9 MEQ/L-% SOLUTION: Performed by: FAMILY MEDICINE

## 2018-02-07 PROCEDURE — 25010000002 METHYLPREDNISOLONE PER 125 MG: Performed by: FAMILY MEDICINE

## 2018-02-07 PROCEDURE — 94799 UNLISTED PULMONARY SVC/PX: CPT

## 2018-02-07 PROCEDURE — 82962 GLUCOSE BLOOD TEST: CPT

## 2018-02-07 PROCEDURE — 80048 BASIC METABOLIC PNL TOTAL CA: CPT | Performed by: FAMILY MEDICINE

## 2018-02-07 PROCEDURE — 63710000001 INSULIN DETEMIR PER 5 UNITS: Performed by: FAMILY MEDICINE

## 2018-02-07 PROCEDURE — 85025 COMPLETE CBC W/AUTO DIFF WBC: CPT | Performed by: FAMILY MEDICINE

## 2018-02-07 RX ORDER — OSELTAMIVIR PHOSPHATE 6 MG/ML
30 FOR SUSPENSION ORAL EVERY 12 HOURS SCHEDULED
Qty: 20 ML | Refills: 0 | Status: SHIPPED | OUTPATIENT
Start: 2018-02-07 | End: 2018-02-09

## 2018-02-07 RX ORDER — CEFDINIR 300 MG/1
300 CAPSULE ORAL EVERY 12 HOURS SCHEDULED
Status: DISCONTINUED | OUTPATIENT
Start: 2018-02-07 | End: 2018-02-07 | Stop reason: HOSPADM

## 2018-02-07 RX ORDER — CEFDINIR 300 MG/1
300 CAPSULE ORAL EVERY 12 HOURS SCHEDULED
Qty: 10 CAPSULE | Refills: 0 | Status: SHIPPED | OUTPATIENT
Start: 2018-02-07 | End: 2018-02-12

## 2018-02-07 RX ORDER — OSELTAMIVIR PHOSPHATE 6 MG/ML
30 FOR SUSPENSION ORAL EVERY 12 HOURS SCHEDULED
Status: DISCONTINUED | OUTPATIENT
Start: 2018-02-07 | End: 2018-02-07 | Stop reason: HOSPADM

## 2018-02-07 RX ADMIN — CEFDINIR 300 MG: 300 CAPSULE ORAL at 08:25

## 2018-02-07 RX ADMIN — METHYLPREDNISOLONE SODIUM SUCCINATE 60 MG: 125 INJECTION, POWDER, FOR SOLUTION INTRAMUSCULAR; INTRAVENOUS at 05:36

## 2018-02-07 RX ADMIN — CHLORTHALIDONE 25 MG: 50 TABLET ORAL at 08:24

## 2018-02-07 RX ADMIN — OSELTAMIVIR PHOSPHATE 30 MG: 6 POWDER, FOR SUSPENSION ORAL at 08:25

## 2018-02-07 RX ADMIN — FAMOTIDINE 20 MG: 20 TABLET, FILM COATED ORAL at 08:24

## 2018-02-07 RX ADMIN — POTASSIUM CHLORIDE AND SODIUM CHLORIDE 100 ML/HR: 900; 150 INJECTION, SOLUTION INTRAVENOUS at 03:15

## 2018-02-07 RX ADMIN — IPRATROPIUM BROMIDE AND ALBUTEROL SULFATE 3 ML: .5; 3 SOLUTION RESPIRATORY (INHALATION) at 01:03

## 2018-02-07 RX ADMIN — LISINOPRIL 40 MG: 10 TABLET ORAL at 08:24

## 2018-02-07 RX ADMIN — Medication 1 CAPSULE: at 08:24

## 2018-02-07 RX ADMIN — METHYLPREDNISOLONE SODIUM SUCCINATE 60 MG: 125 INJECTION, POWDER, FOR SOLUTION INTRAMUSCULAR; INTRAVENOUS at 01:29

## 2018-02-07 RX ADMIN — CETIRIZINE HYDROCHLORIDE 5 MG: 10 TABLET ORAL at 08:24

## 2018-02-07 RX ADMIN — INSULIN DETEMIR 100 UNITS: 100 INJECTION, SOLUTION SUBCUTANEOUS at 08:25

## 2018-02-07 RX ADMIN — DILTIAZEM HYDROCHLORIDE 360 MG: 180 CAPSULE, EXTENDED RELEASE ORAL at 08:24

## 2018-02-07 RX ADMIN — IPRATROPIUM BROMIDE AND ALBUTEROL SULFATE 3 ML: .5; 3 SOLUTION RESPIRATORY (INHALATION) at 07:02

## 2018-02-07 RX ADMIN — Medication 500 MG: at 08:24

## 2018-02-07 RX ADMIN — FERROUS SULFATE TAB 325 MG (65 MG ELEMENTAL FE) 325 MG: 325 (65 FE) TAB at 08:24

## 2018-02-07 NOTE — PROGRESS NOTES
Discharge Planning Assessment   [place]     Patient Name: Albina Finley  MRN: [id number]  Today's Date: 2/7/2018    Admit Date: 2/3/2018       Discharge Plan       02/07/18 0925    Final Note    Final Note Pt is being discharged home today. SS received consult arrange home oxygen. SS spoke to lead nurse, Airam CASTRO who states pt's O2 SAT on RA is 88% and pt request home oxygen to be arranged via [facility]. SS contacted [facility] per Celeste and pt has utilized [facility] in the past. SS faxed information including order to [facility]. [facility] to deliver portal oxygen tank to hospital and oxygen concentrator to pt's home. Pt has a nebulizer machine and prescription for nebulizer medicine per  nurseBrittney. No other needs identified.              Expected Discharge Date and Time     Expected Discharge Date Expected Discharge Time    Feb 7, 2018         Nickie Schumacher

## 2018-02-07 NOTE — DISCHARGE SUMMARY
Date of Discharge:  2/7/2018    Discharge Diagnosis:   Influenza B  RLL pneumonia  COPD Exacerbation    Presenting Problem/History of Present Illness  Acute exacerbation of chronic obstructive pulmonary disease (COPD) [J44.1]       Hospital Course  Patient is a 72 y.o. female presented with respiratory distress due to influenza B and RLL pneumonia.  Started on IV abx tamiflu and respiratory care.  Pt continued to improved daily without any issues.  Breathing improved, no chest pains, ambulating in room tolerating PO without fevers.  Pt with mild cough however improved.  Pt ready to go home with daughter. Will d/c home with abx and tamiflu to finish 5 day course.  Pt to f/u with Dr.Truman Hernandez this Friday..      Procedures Performed         Consults:   Consults     Date and Time Order Name Status Description    2/3/2018 14[facility] (NAYELI Hernandez/COLE Hernandez/Eder)            Vital Signs  Temp:  [97.6 °F (36.4 °C)-97.7 °F (36.5 °C)] 97.6 °F (36.4 °C)  Heart Rate:  [60-88] 81  Resp:  [18-20] 20  BP: (120-160)/(60-84) 160/84    Physical Exam:   General Appearance alert, appears stated age and cooperative   Lungs scattered crackles good airflow   Heart regular rhythm & normal rate, normal S1, S2 and no murmur   Abdomen normal bowel sounds, no masses, soft non-tender   Extremities moves extremities well, no edema and no cyanosis    Labs:  Lab Results (last 72 hours)     Procedure Component Value Units Date/Time    POC Glucose Once [[id number]]  (Abnormal) Collected:  02/04/18 0635    Specimen:  Blood Updated:  02/04/18 0701     Glucose 258 (H) mg/dL     Narrative:       Meter: [id number] : [id number] david alfonso    POC Glucose Once [[id number]]  (Abnormal) Collected:  02/04/18 1142    Specimen:  Blood Updated:  02/04/18 1203     Glucose 313 (H) mg/dL     Narrative:       Meter: [id number] : [id number] Tushar DELANEY    POC Glucose Once [[id number]]  (Abnormal) Collected:  02/04/18 1659    Specimen:  Blood  Updated:  02/04/18 1706     Glucose 307 (H) mg/dL     Narrative:       Meter: [id number] : [id number] Tushar DELANEY    POC Glucose Once [[id number]]  (Abnormal) Collected:  02/04/18 1938    Specimen:  Blood Updated:  02/04/18 1954     Glucose 289 (H) mg/dL     Narrative:       Meter: [id number] : [id number] vicente jensen    Basic Metabolic Panel [[id number]]  (Abnormal) Collected:  02/05/18 0056    Specimen:  Blood Updated:  02/05/18 0203     Glucose 240 (H) mg/dL      BUN 28 (H) mg/dL      Creatinine 1.09 mg/dL      Sodium 140 mmol/L      Potassium 4.8 mmol/L      Chloride 110 mmol/L      CO2 21.2 (L) mmol/L      Calcium 8.8 mg/dL      eGFR Non African Amer 49 (L) mL/min/1.73      BUN/Creatinine Ratio 25.7 (H)     Anion Gap 8.8 mmol/L     Narrative:       The MDRD GFR formula is only valid for adults with stable renal function between ages 18 and 70.    C-reactive Protein [[id number]]  (Normal) Collected:  02/05/18 0056    Specimen:  Blood Updated:  02/05/18 0203     C-Reactive Protein 0.87 mg/dL     Osmolality, Calculated [[id number]]  (Normal) Collected:  02/05/18 0056    Specimen:  Blood Updated:  02/05/18 0203     Osmolality Calc 292.7 mOsm/kg     CBC Auto Differential [[id number]]  (Abnormal) Collected:  02/05/18 0056    Specimen:  Blood Updated:  02/05/18 0245     WBC 15.60 (H) 10*3/mm3      RBC 3.69 (L) 10*6/mm3      Hemoglobin 9.7 (L) g/dL      Hematocrit 32.5 (L) %      MCV 88.1 fL      MCH 26.3 (L) pg      MCHC 29.8 (L) g/dL      RDW 16.9 (H) %      RDW-SD 53.5 fl      MPV 10.9 (H) fL      Platelets 353 10*3/mm3     Manual Differential [[id number]]  (Abnormal) Collected:  02/05/18 0056    Specimen:  Blood Updated:  02/05/18 0245     Neutrophil % 79.0 (H) %      Lymphocyte % 18.0 %      Monocyte % 2.0 %      Bands %  1.0 %      Neutrophils Absolute 12.48 (H) 10*3/mm3      Lymphocytes Absolute 2.81 10*3/mm3      Monocytes Absolute 0.31 10*3/mm3      Anisocytosis Slight/1+     Hypochromia Slight/1+      Platelet Morphology Normal    Blood Gas, Arterial [[id number]]  (Abnormal) Collected:  02/05/18 0510    Specimen:  Arterial Blood Updated:  02/05/18 0522     Site Arterial: right radial     Sky's Test Positive     pH, Arterial 7.367 pH units      pCO2, Arterial 39.4 mm Hg      pO2, Arterial 60.8 (L) mm Hg      HCO3, Arterial 22.1 mmol/L      Base Excess, Arterial -2.9 mmol/L      O2 Saturation, Arterial 90.3 %      Hemoglobin, Blood Gas 10.8 (L) g/dL      Hematocrit, Blood Gas 32.0 (L) %      Oxyhemoglobin 89.1 %      Methemoglobin 0.30 %      Carboxyhemoglobin 1.0 %      A-a Gradiant 81.2 mmHg      Temperature 98.6 C      Barometric Pressure for Blood Gas 720 mmHg      Modality Cannula     FIO2 28 %     POC Glucose Once [[id number]]  (Abnormal) Collected:  02/05/18 0646    Specimen:  Blood Updated:  02/05/18 0652     Glucose 137 (H) mg/dL     Narrative:       Meter: [id number] : [id number] rob hernandez    POC Glucose Once [[id number]]  (Abnormal) Collected:  02/05/18 1101    Specimen:  Blood Updated:  02/05/18 1109     Glucose 257 (H) mg/dL     Narrative:       Meter: [id number] : [id number] [employer]    POC Glucose Once [[id number]]  (Abnormal) Collected:  02/05/18 1634    Specimen:  Blood Updated:  02/05/18 1640     Glucose 239 (H) mg/dL     Narrative:       Meter: [id number] : [id number] [employer]    POC Glucose Once [[id number]]  (Abnormal) Collected:  02/05/18 2223    Specimen:  Blood Updated:  02/05/18 2230     Glucose 230 (H) mg/dL     Narrative:       Meter: [id number] : [id number] chuyita berg    Basic Metabolic Panel [[id number]]  (Abnormal) Collected:  02/06/18 0130    Specimen:  Blood Updated:  02/06/18 0253     Glucose 110 mg/dL      BUN 18 mg/dL      Creatinine 0.86 mg/dL      Sodium 141 mmol/L      Potassium 3.4 (L) mmol/L      Chloride 108 mmol/L      CO2 21.5 (L) mmol/L      Calcium 9.0 mg/dL      eGFR Non African Amer 65 mL/min/1.73      BUN/Creatinine Ratio 20.9     Anion Gap  11.5 (H) mmol/L     Narrative:       The MDRD GFR formula is only valid for adults with stable renal function between ages 18 and 70.    Osmolality, Calculated [[id number]]  (Normal) Collected:  02/06/18 0130    Specimen:  Blood Updated:  02/06/18 0253     Osmolality Calc 283.8 mOsm/kg     CBC & Differential [[id number]] Collected:  02/06/18 0130    Specimen:  Blood Updated:  02/06/18 0253    Narrative:       The following orders were created for panel order CBC & Differential.  Procedure                               Abnormality         Status                     ---------                               -----------         ------                     Manual Differential[[id number]]          Abnormal            Final result               Scan Slide[[id number]]                                       Final result               CBC Auto Differential[[id number]]        Abnormal            Final result                 Please view results for these tests on the individual orders.    CBC Auto Differential [[id number]]  (Abnormal) Collected:  02/06/18 0130    Specimen:  Blood Updated:  02/06/18 0253     WBC 15.68 (H) 10*3/mm3      RBC 3.74 (L) 10*6/mm3      Hemoglobin 10.0 (L) g/dL      Hematocrit 32.6 (L) %      MCV 87.2 fL      MCH 26.7 (L) pg      MCHC 30.7 (L) g/dL      RDW 17.1 (H) %      RDW-SD 52.5 fl      MPV 10.9 (H) fL      Platelets 365 10*3/mm3     Scan Slide [[id number]] Collected:  02/06/18 0130    Specimen:  Blood Updated:  02/06/18 0253     Scan Slide --      See Manual Differential Results       Manual Differential [[id number]]  (Abnormal) Collected:  02/06/18 0130    Specimen:  Blood Updated:  02/06/18 0253     Neutrophil % 84.0 (H) %      Lymphocyte % 15.0 (L) %      Bands %  1.0 %      Neutrophils Absolute 13.33 (H) 10*3/mm3      Lymphocytes Absolute 2.35 10*3/mm3      Anisocytosis Slight/1+     Hypochromia Slight/1+     Platelet Morphology Normal    POC Glucose Once [[id number]]  (Normal) Collected:  02/06/18 0707     Specimen:  Blood Updated:  02/06/18 0721     Glucose 99 mg/dL     Narrative:       Meter: [id number] : [id number] Naveed Woo    POC Glucose Once [[id number]]  (Abnormal) Collected:  02/06/18 0858    Specimen:  Blood Updated:  02/06/18 0904     Glucose 301 (H) mg/dL     Narrative:       Meter: [id number] : [id number] Chumley Teresita    POC Glucose Once [[id number]]  (Abnormal) Collected:  02/06/18 1054    Specimen:  Blood Updated:  02/06/18 1100     Glucose 226 (H) mg/dL     Narrative:       Meter: [id number] : [id number] Chumley Teresita    Blood Culture - Blood, [[id number]]  (Normal) Collected:  02/03/18 1127    Specimen:  Blood from Arm, Left Updated:  02/06/18 1146     Blood Culture No growth at 3 days    Blood Culture - Blood, [[id number]]  (Normal) Collected:  02/03/18 1109    Specimen:  Blood from Arm, Right Updated:  02/06/18 1216     Blood Culture No growth at 3 days    POC Glucose Once [[id number]]  (Abnormal) Collected:  02/06/18 1707    Specimen:  Blood Updated:  02/06/18 1713     Glucose 203 (H) mg/dL     Narrative:       Meter: [id number] : [id number] Naren Coelho L    POC Glucose Once [[id number]]  (Abnormal) Collected:  02/06/18 2041    Specimen:  Blood Updated:  02/06/18 2048     Glucose 234 (H) mg/dL     Narrative:       Meter: [id number] : [id number] chuyita berg    CBC Auto Differential [[id number]]  (Abnormal) Collected:  02/07/18 0135    Specimen:  Blood Updated:  02/07/18 0217     WBC 12.05 10*3/mm3      RBC 4.06 (L) 10*6/mm3      Hemoglobin 10.8 (L) g/dL      Hematocrit 34.9 (L) %      MCV 86.0 fL      MCH 26.6 (L) pg      MCHC 30.9 (L) g/dL      RDW 16.7 (H) %      RDW-SD 51.7 fl      MPV 11.0 (H) fL      Platelets 404 (H) 10*3/mm3      Neutrophil % 76.5 (H) %      Lymphocyte % 18.2 %      Monocyte % 4.4 %      Eosinophil % 0.0 %      Basophil % 0.1 %      Immature Grans % 0.8 (H) %      Neutrophils, Absolute 9.22 (H) 10*3/mm3      Lymphocytes, Absolute 2.19 10*3/mm3       Monocytes, Absolute 0.53 10*3/mm3      Eosinophils, Absolute 0.00 10*3/mm3      Basophils, Absolute 0.01 10*3/mm3      Immature Grans, Absolute 0.10 (H) 10*3/mm3      nRBC 0.0 /100 WBC     CBC & Differential [[id number]] Collected:  02/07/18 0135    Specimen:  Blood Updated:  02/07/18 0217    Narrative:       The following orders were created for panel order CBC & Differential.  Procedure                               Abnormality         Status                     ---------                               -----------         ------                     Scan Slide[[id number]]                                                                  CBC Auto Differential[[id number]]        Abnormal            Final result                 Please view results for these tests on the individual orders.    Basic Metabolic Panel [[id number]]  (Abnormal) Collected:  02/07/18 0135    Specimen:  Blood Updated:  02/07/18 0231     Glucose 105 mg/dL      BUN 15 mg/dL      Creatinine 0.85 mg/dL      Sodium 142 mmol/L      Potassium 3.6 mmol/L      Chloride 101 mmol/L      CO2 25.9 mmol/L      Calcium 8.9 mg/dL      eGFR Non African Amer 66 mL/min/1.73      BUN/Creatinine Ratio 17.6     Anion Gap 15.1 (H) mmol/L     Narrative:       The MDRD GFR formula is only valid for adults with stable renal function between ages 18 and 70.    Osmolality, Calculated [[id number]]  (Normal) Collected:  02/07/18 0135    Specimen:  Blood Updated:  02/07/18 0231     Osmolality Calc 284.3 mOsm/kg           Xray:  Imaging Results (last 24 hours)     ** No results found for the last 24 hours. **          Discharge Disposition  home    Discharge Medications   Albina Finley   Home Medication Instructions INDER:[id number]    Printed on:02/07/18 0612   Medication Information                      calcium carbonate (OS-TIANNA) 600 MG tablet  Take 600 mg by mouth Daily.             cefdinir (OMNICEF) 300 MG capsule  Take 1 capsule by mouth Every 12 (Twelve) Hours for 5 days.  Indications: Pneumonia             cetirizine (zyrTEC) 10 MG tablet  Take 10 mg by mouth Daily.             chlorthalidone (HYGROTON) 25 MG tablet  Take 25 mg by mouth Daily.             cyanocobalamin 1000 MCG/ML injection  Inject 1,000 mcg into the shoulder, thigh, or buttocks Every 28 (Twenty-Eight) Days.             diltiaZEM (TIAZAC) 360 MG 24 hr capsule  Take 360 mg by mouth Daily.             fenofibrate 160 MG tablet  Take 160 mg by mouth Daily.             ferrous sulfate 325 (65 FE) MG tablet  Take 325 mg by mouth 2 (Two) Times a Day.             insulin detemir (LEVEMIR) 100 UNIT/ML injection  Inject 100 Units under the skin Daily. 100 units QAM.   40 units QPM             insulin detemir (LEVEMIR) 100 UNIT/ML injection  Inject 40 Units under the skin Every Night.             lisinopril (PRINIVIL,ZESTRIL) 40 MG tablet  Take 40 mg by mouth Daily.             metFORMIN (GLUCOPHAGE) 1000 MG tablet  Take 1,000 mg by mouth 2 (Two) Times a Day With Meals.             Omega-3 Fatty Acids (FISH OIL) 1000 MG capsule capsule  Take 1,000 mg by mouth Daily With Breakfast.             oseltamivir (TAMIFLU) 6 MG/ML suspension  Take 5 mL by mouth Every 12 (Twelve) Hours for 4 doses. Indications: Influenza B             raNITIdine (ZANTAC) 150 MG tablet  Take 150 mg by mouth 2 (Two) Times a Day.                 Discharge Diet:   reg  Condition on Discharge:  Stable but guarded    Activity at Discharge: ad yudith    Follow-up Appointments  Dr.Truman Hernandez this Friday at 9:30      Jarek Hernandez MD  02/07/18  6:12 AM

## 2018-02-07 NOTE — DISCHARGE INSTR - APPOINTMENTS
Follow-up appointment has been scheduled with Dr NAYELI Hernandez on 2/9/18 at 9:30 am.  You can reach the office at [phone].

## 2018-02-07 NOTE — DISCHARGE INSTRUCTIONS
Home oxygen has been arranged with [facility] for 2 liter per nasal cannula.  You can reach the office at .

## 2018-02-08 LAB
BACTERIA SPEC AEROBE CULT: NORMAL
BACTERIA SPEC AEROBE CULT: NORMAL

## 2018-02-08 NOTE — PROGRESS NOTES
Case Management/Social Work    Patient Name:  Albina Finley  YOB: 1945  MRN: [id number]  Admit Date:  2/3/2018        SS contacted pt on this date for follow-up and the pt states she is doing well and does not have any needs at this time.  No other needs.     Electronically signed by:  Eloise Nolan  02/08/18 4:23 PM

## 2018-06-04 ENCOUNTER — OFFICE VISIT (OUTPATIENT)
Dept: SURGERY | Facility: CLINIC | Age: 73
End: 2018-06-04

## 2018-06-04 VITALS
DIASTOLIC BLOOD PRESSURE: 57 MMHG | WEIGHT: 158 LBS | BODY MASS INDEX: 28 KG/M2 | HEIGHT: 63 IN | SYSTOLIC BLOOD PRESSURE: 136 MMHG | HEART RATE: 64 BPM

## 2018-06-04 DIAGNOSIS — K62.0 ANAL POLYP: Primary | ICD-10-CM

## 2018-06-04 PROCEDURE — 99203 OFFICE O/P NEW LOW 30 MIN: CPT | Performed by: SURGERY

## 2018-06-04 NOTE — PROGRESS NOTES
Subjective   Albina Finley is a 73 y.o. female new patient abscess on behind    History of Present Illness    Ms. Finley was seen in the office today for evaluation of an anal mass.  According to the patient this started earlier this year was quite small and her primary doctor was going to excise it in the office.  The patient returned last month for a reexcision the mass was significantly larger and felt not to be amenable to an office excision.  The patient has noticed some bleeding.  She last had a colonoscopy in .  There is no family history of colon cancer.  No Known Allergies  Current Outpatient Prescriptions   Medication Sig Dispense Refill   • calcium carbonate (OS-TIANNA) 600 MG tablet Take 600 mg by mouth Daily.     • cetirizine (zyrTEC) 10 MG tablet Take 10 mg by mouth Daily.     • chlorthalidone (HYGROTON) 25 MG tablet Take 25 mg by mouth Daily.     • cyanocobalamin 1000 MCG/ML injection Inject 1,000 mcg into the shoulder, thigh, or buttocks Every 28 (Twenty-Eight) Days.     • diltiaZEM (TIAZAC) 360 MG 24 hr capsule Take 360 mg by mouth Daily.     • fenofibrate 160 MG tablet Take 160 mg by mouth Daily.     • ferrous sulfate 325 (65 FE) MG tablet Take 325 mg by mouth 2 (Two) Times a Day.     • insulin detemir (LEVEMIR) 100 UNIT/ML injection Inject 100 Units under the skin Daily. 100 units QAM.   40 units QPM     • lisinopril (PRINIVIL,ZESTRIL) 40 MG tablet Take 40 mg by mouth Daily.     • metFORMIN (GLUCOPHAGE) 1000 MG tablet Take 1,000 mg by mouth 2 (Two) Times a Day With Meals.     • raNITIdine (ZANTAC) 150 MG tablet Take 150 mg by mouth 2 (Two) Times a Day.       No current facility-administered medications for this visit.      Past Medical History:   Diagnosis Date   • Anemia    • Chronic kidney disease    • COPD (chronic obstructive pulmonary disease)    • Diabetes mellitus    • Osteoporosis      Past Surgical History:   Procedure Laterality Date   •  SECTION     • HIP BIPOLAR REPLACEMENT    "  • HYSTERECTOMY       Review of Systems  General: fever  Integumentary: negative  Eyes: red eyes, eyes itch  ENT: negative  Respiratory: pneumonia  Gastrointestinal: loss of appetite  Cardiovascular: negative  Neurological: dizziness  Psychiatric: negative  Hematologic/Lymphatic: negative  Genitourinary: negative  Musculoskeletal: back pain  Endocrine: negative  Breasts: negative    The following portions of the patient's history were reviewed and updated as appropriate: allergies, current medications, past family history, past medical history, past social history and past surgical history.    Objective   /57 (BP Location: Left arm, Patient Position: Sitting)   Pulse 64   Ht 160 cm (62.99\")   Wt 71.7 kg (158 lb)   Breastfeeding? No   BMI 28.00 kg/m²   Physical Exam  General:  This is a WD WN white female in no acute distress  HEENT exam:  WNL. Sclera are anicteric.  EOMI  Neck:  supple, FROM, without thyromegaly, cervical or supraclavicular adenopathy  Lungs:  Respiratory effort normal. Auscultation: Clear, without wheezes, rhonchi, rales  Heart:  Regular rate and rhythm, without murmur, gallop, rub.  No pedal edema  Abdomen: Nontender, nondistended, without palpable mass.  Anus: On examination of the anus there is at least a 2-3 cm polypoid mass coming from the anal opening.  Digital examination does not demonstrate an obvious mass past the anal verge.  Musculoskeletal:  muscle strength/tone is normal.  Gait and station: normal. No digital cyanosis  Psyc:  alert, oriented x 3.  Mood and affect are appropriate  skin:  Warm with good turgor.  Without rash or lesion  extremities:  Examination of the extremities revealed no cyanosis, clubbing or edema.  Results/Data    Procedures       Assessment/Plan   Anal polyp    Plan: Excision under anesthesia with diagnostic anoscopy       Discussion/Summary    Patient's Body mass index is 28 kg/m². BMI is within normal parameters. No follow-up required.       Errors " in dictation may reflect use of voice recognition software and not all errors in transcription may have been detected prior to signing.    No future appointments.

## 2018-06-06 NOTE — H&P
Subjective   Albina Finley is a 73 y.o. female new patient abscess on behind    History of Present Illness    Ms. Finley was seen in the office today for evaluation of an anal mass.  According to the patient this started earlier this year was quite small and her primary doctor was going to excise it in the office.  The patient returned last month for a reexcision the mass was significantly larger and felt not to be amenable to an office excision.  The patient has noticed some bleeding.  She last had a colonoscopy in .  There is no family history of colon cancer.  No Known Allergies  Current Outpatient Prescriptions   Medication Sig Dispense Refill   • calcium carbonate (OS-TIANNA) 600 MG tablet Take 600 mg by mouth Daily.     • cetirizine (zyrTEC) 10 MG tablet Take 10 mg by mouth Daily.     • chlorthalidone (HYGROTON) 25 MG tablet Take 25 mg by mouth Daily.     • cyanocobalamin 1000 MCG/ML injection Inject 1,000 mcg into the shoulder, thigh, or buttocks Every 28 (Twenty-Eight) Days.     • diltiaZEM (TIAZAC) 360 MG 24 hr capsule Take 360 mg by mouth Daily.     • fenofibrate 160 MG tablet Take 160 mg by mouth Daily.     • ferrous sulfate 325 (65 FE) MG tablet Take 325 mg by mouth 2 (Two) Times a Day.     • insulin detemir (LEVEMIR) 100 UNIT/ML injection Inject 100 Units under the skin Daily. 100 units QAM.   40 units QPM     • lisinopril (PRINIVIL,ZESTRIL) 40 MG tablet Take 40 mg by mouth Daily.     • metFORMIN (GLUCOPHAGE) 1000 MG tablet Take 1,000 mg by mouth 2 (Two) Times a Day With Meals.     • raNITIdine (ZANTAC) 150 MG tablet Take 150 mg by mouth 2 (Two) Times a Day.       No current facility-administered medications for this visit.      Past Medical History:   Diagnosis Date   • Anemia    • Chronic kidney disease    • COPD (chronic obstructive pulmonary disease)    • Diabetes mellitus    • Osteoporosis      Past Surgical History:   Procedure Laterality Date   •  SECTION     • HIP BIPOLAR REPLACEMENT    "  • HYSTERECTOMY       Review of Systems  General: fever  Integumentary: negative  Eyes: red eyes, eyes itch  ENT: negative  Respiratory: pneumonia  Gastrointestinal: loss of appetite  Cardiovascular: negative  Neurological: dizziness  Psychiatric: negative  Hematologic/Lymphatic: negative  Genitourinary: negative  Musculoskeletal: back pain  Endocrine: negative  Breasts: negative    The following portions of the patient's history were reviewed and updated as appropriate: allergies, current medications, past family history, past medical history, past social history and past surgical history.    Objective   /57 (BP Location: Left arm, Patient Position: Sitting)   Pulse 64   Ht 160 cm (62.99\")   Wt 71.7 kg (158 lb)   Breastfeeding? No   BMI 28.00 kg/m²    Physical Exam  General:  This is a WD WN white female in no acute distress  HEENT exam:  WNL. Sclera are anicteric.  EOMI  Neck:  supple, FROM, without thyromegaly, cervical or supraclavicular adenopathy  Lungs:  Respiratory effort normal. Auscultation: Clear, without wheezes, rhonchi, rales  Heart:  Regular rate and rhythm, without murmur, gallop, rub.  No pedal edema  Abdomen: Nontender, nondistended, without palpable mass.  Anus: On examination of the anus there is at least a 2-3 cm polypoid mass coming from the anal opening.  Digital examination does not demonstrate an obvious mass past the anal verge.  Musculoskeletal:  muscle strength/tone is normal.  Gait and station: normal. No digital cyanosis  Psyc:  alert, oriented x 3.  Mood and affect are appropriate  skin:  Warm with good turgor.  Without rash or lesion  extremities:  Examination of the extremities revealed no cyanosis, clubbing or edema.  Results/Data    Procedures       Assessment/Plan   Anal polyp    Plan: Excision under anesthesia with diagnostic anoscopy       Discussion/Summary    Patient's Body mass index is 28 kg/m². BMI is within normal parameters. No follow-up required.       Errors " in dictation may reflect use of voice recognition software and not all errors in transcription may have been detected prior to signing.    No future appointments.  This document has been electronically signed by Marlyn DUNAWAY MD on June 6, 2018 4:59 PM

## 2018-06-07 ENCOUNTER — TELEPHONE (OUTPATIENT)
Dept: SURGERY | Facility: CLINIC | Age: 73
End: 2018-06-07

## 2018-06-07 PROBLEM — K62.0 ANAL POLYP: Status: ACTIVE | Noted: 2018-06-07

## 2018-06-08 ENCOUNTER — TELEPHONE (OUTPATIENT)
Dept: SURGERY | Facility: CLINIC | Age: 73
End: 2018-06-08

## 2018-06-08 ENCOUNTER — APPOINTMENT (OUTPATIENT)
Dept: PREADMISSION TESTING | Facility: HOSPITAL | Age: 73
End: 2018-06-08

## 2018-06-08 VITALS — WEIGHT: 163 LBS | BODY MASS INDEX: 28.88 KG/M2

## 2018-06-08 DIAGNOSIS — K62.0 ANAL POLYP: ICD-10-CM

## 2018-06-08 LAB
ANION GAP SERPL CALCULATED.3IONS-SCNC: 4.7 MMOL/L (ref 3.6–11.2)
BUN BLD-MCNC: 25 MG/DL (ref 7–21)
BUN/CREAT SERPL: 20.3 (ref 7–25)
CALCIUM SPEC-SCNC: 9.4 MG/DL (ref 7.7–10)
CHLORIDE SERPL-SCNC: 107 MMOL/L (ref 99–112)
CO2 SERPL-SCNC: 25.3 MMOL/L (ref 24.3–31.9)
CREAT BLD-MCNC: 1.23 MG/DL (ref 0.43–1.29)
DEPRECATED RDW RBC AUTO: 45.9 FL (ref 37–54)
ERYTHROCYTE [DISTWIDTH] IN BLOOD BY AUTOMATED COUNT: 14.8 % (ref 11.5–14.5)
GFR SERPL CREATININE-BSD FRML MDRD: 43 ML/MIN/1.73
GLUCOSE BLD-MCNC: 147 MG/DL (ref 70–110)
HCT VFR BLD AUTO: 34 % (ref 37–47)
HGB BLD-MCNC: 10.9 G/DL (ref 12–16)
MCH RBC QN AUTO: 28 PG (ref 27–33)
MCHC RBC AUTO-ENTMCNC: 32.1 G/DL (ref 33–37)
MCV RBC AUTO: 87.4 FL (ref 80–94)
OSMOLALITY SERPL CALC.SUM OF ELEC: 280.9 MOSM/KG (ref 273–305)
PLATELET # BLD AUTO: 370 10*3/MM3 (ref 130–400)
PMV BLD AUTO: 11 FL (ref 6–10)
POTASSIUM BLD-SCNC: 4.9 MMOL/L (ref 3.5–5.3)
RBC # BLD AUTO: 3.89 10*6/MM3 (ref 4.2–5.4)
SODIUM BLD-SCNC: 137 MMOL/L (ref 135–153)
WBC NRBC COR # BLD: 8.99 10*3/MM3 (ref 4.5–12.5)

## 2018-06-08 PROCEDURE — 85027 COMPLETE CBC AUTOMATED: CPT | Performed by: SURGERY

## 2018-06-08 PROCEDURE — 36415 COLL VENOUS BLD VENIPUNCTURE: CPT

## 2018-06-08 PROCEDURE — 80048 BASIC METABOLIC PNL TOTAL CA: CPT | Performed by: SURGERY

## 2018-06-08 NOTE — DISCHARGE INSTRUCTIONS
TAKE the following medications the morning of surgery:  All heart or blood pressure medications    HOLD all diabetic medications the morning of surgery as ordered by physician.    Please discontinue all blood thinners and anticoagulants (except aspirin) prior to surgery as per your surgeon and cardiologist instructions.  Aspirin may be continued up to the day prior to surgery.         General Instructions:  · Do not eat or drink after midnight: includes water, mints, or gum. You may brush your teeth.  Dental appliances that are removable must be taken out day of surgery.  · Do not smoke, chew tobacco, or drink alcohol.  · Bring medications in original bottles, any inhalers and if applicable your C-PAP/BI-PAP machine.  · Bring any papers given to you in the doctor's office.  · Wear clean comfortable clothes and socks.  · Do not wear contact lenses or make-up. Bring a case for your glasses if applicable.  · Bring crutches or walker if applicable.  · Leave all other valuables and jewelry at home.    If you were given a blood bank ID arm band remember to bring it with you the day of surgery.    Preventing a Surgical Site Infection:  Shower the night before surgery (unless instructed other wise) using a fresh bar of anti-bacterial soap (such as Dial) and clean washcloth. Dry with a clean towel and dress in clean clothing.  For 2 to 3 days before surgery, avoid shaving with a razor near where you will have surgery because the razor can irritate skin and make it easier to develop an infection. Ask your surgeon if you will be receiving antibiotics prior to surgery.  Make sure you, your family, and all healthcare providers clear their hands with soap and water or an alcohol-based hand  before caring for you or your wound.  If at all possible, quit smoking as many days before surgery as you can.    Day of surgery:  Upon arrival, a Pre-op nurse and Anesthesiologist will review your health history, obtain vital signs,  and answer questions you may have. The only belongings needed at this time will be your home medications and if applicable your C-PAP/BI-PAP machine. If you are staying overnight your family can leave the rest of your belongings in the car and bring them to your room later. A Pre-op nurse will start an IV and you may receive medication in preparation for surgery, including something to help you relax. Your family will be able to see you in the Pre-op area. While you are in surgery your family should notify the waiting room  if they leave the waiting room area and provide a contact phone number.    Please be aware that surgery does come with discomfort. We want to make every effort to control your discomfort so please discuss any uncontrolled symptoms with your nurse. Your doctor will most likely have prescribed pain medications.    If you are going home after surgery you will receive individualized written care instructions before being discharged. A responsible adult must drive you to and from the hospital on the day of surgery and stay with you for 24 hours.    If you are staying overnight following surgery, you will be transported to your hospital room following the recovery period.  [facility]    If you have any questions please call Pre-Admission Testing at [phone].  Deductibles and co-payments are collected on the day of service. Please be prepared to pay the required co-pay, deductible or deposit on the day of service as defined by your plan.

## 2018-06-11 ENCOUNTER — ANESTHESIA EVENT (OUTPATIENT)
Dept: PERIOP | Facility: HOSPITAL | Age: 73
End: 2018-06-11

## 2018-06-11 ENCOUNTER — HOSPITAL ENCOUNTER (OUTPATIENT)
Facility: HOSPITAL | Age: 73
Setting detail: HOSPITAL OUTPATIENT SURGERY
Discharge: HOME | End: 2018-06-11
Attending: SURGERY | Admitting: SURGERY

## 2018-06-11 ENCOUNTER — ANESTHESIA (OUTPATIENT)
Dept: PERIOP | Facility: HOSPITAL | Age: 73
End: 2018-06-11

## 2018-06-11 VITALS
HEIGHT: 63 IN | BODY MASS INDEX: 28.88 KG/M2 | SYSTOLIC BLOOD PRESSURE: 128 MMHG | WEIGHT: 163 LBS | RESPIRATION RATE: 16 BRPM | DIASTOLIC BLOOD PRESSURE: 58 MMHG | TEMPERATURE: 98 F | OXYGEN SATURATION: 97 % | HEART RATE: 60 BPM

## 2018-06-11 DIAGNOSIS — K62.0 ANAL POLYP: ICD-10-CM

## 2018-06-11 LAB — GLUCOSE BLDC GLUCOMTR-MCNC: 90 MG/DL (ref 70–130)

## 2018-06-11 PROCEDURE — 82962 GLUCOSE BLOOD TEST: CPT

## 2018-06-11 PROCEDURE — 25010000002 FENTANYL CITRATE (PF) 100 MCG/2ML SOLUTION: Performed by: NURSE ANESTHETIST, CERTIFIED REGISTERED

## 2018-06-11 PROCEDURE — 46922 EXCISION OF ANAL LESION(S): CPT | Performed by: SURGERY

## 2018-06-11 PROCEDURE — 25010000002 ONDANSETRON PER 1 MG: Performed by: NURSE ANESTHETIST, CERTIFIED REGISTERED

## 2018-06-11 PROCEDURE — 25010000002 MIDAZOLAM PER 1 MG: Performed by: ANESTHESIOLOGY

## 2018-06-11 PROCEDURE — 88305 TISSUE EXAM BY PATHOLOGIST: CPT | Performed by: SURGERY

## 2018-06-11 PROCEDURE — 25010000002 CEFOXITIN: Performed by: SURGERY

## 2018-06-11 PROCEDURE — S0260 H&P FOR SURGERY: HCPCS | Performed by: SURGERY

## 2018-06-11 RX ORDER — HYDROCODONE BITARTRATE AND ACETAMINOPHEN 7.5; 325 MG/1; MG/1
1 TABLET ORAL 4 TIMES DAILY PRN
Qty: 15 TABLET | Refills: 0 | Status: ON HOLD | OUTPATIENT
Start: 2018-06-11 | End: 2019-02-14

## 2018-06-11 RX ORDER — MEPERIDINE HYDROCHLORIDE 50 MG/ML
12.5 INJECTION INTRAMUSCULAR; INTRAVENOUS; SUBCUTANEOUS
Status: DISCONTINUED | OUTPATIENT
Start: 2018-06-11 | End: 2018-06-11 | Stop reason: HOSPADM

## 2018-06-11 RX ORDER — MIDAZOLAM HYDROCHLORIDE 1 MG/ML
1 INJECTION INTRAMUSCULAR; INTRAVENOUS
Status: COMPLETED | OUTPATIENT
Start: 2018-06-11 | End: 2018-06-11

## 2018-06-11 RX ORDER — BUPIVACAINE HYDROCHLORIDE AND EPINEPHRINE 5; 5 MG/ML; UG/ML
INJECTION, SOLUTION EPIDURAL; INTRACAUDAL; PERINEURAL AS NEEDED
Status: DISCONTINUED | OUTPATIENT
Start: 2018-06-11 | End: 2018-06-11 | Stop reason: HOSPADM

## 2018-06-11 RX ORDER — SODIUM CHLORIDE 0.9 % (FLUSH) 0.9 %
1-10 SYRINGE (ML) INJECTION AS NEEDED
Status: DISCONTINUED | OUTPATIENT
Start: 2018-06-11 | End: 2018-06-11 | Stop reason: HOSPADM

## 2018-06-11 RX ORDER — IPRATROPIUM BROMIDE AND ALBUTEROL SULFATE 2.5; .5 MG/3ML; MG/3ML
3 SOLUTION RESPIRATORY (INHALATION) ONCE AS NEEDED
Status: DISCONTINUED | OUTPATIENT
Start: 2018-06-11 | End: 2018-06-11 | Stop reason: HOSPADM

## 2018-06-11 RX ORDER — MAGNESIUM HYDROXIDE 1200 MG/15ML
LIQUID ORAL AS NEEDED
Status: DISCONTINUED | OUTPATIENT
Start: 2018-06-11 | End: 2018-06-11 | Stop reason: HOSPADM

## 2018-06-11 RX ORDER — MIDAZOLAM HYDROCHLORIDE 1 MG/ML
2 INJECTION INTRAMUSCULAR; INTRAVENOUS
Status: COMPLETED | OUTPATIENT
Start: 2018-06-11 | End: 2018-06-11

## 2018-06-11 RX ORDER — SODIUM CHLORIDE, SODIUM LACTATE, POTASSIUM CHLORIDE, CALCIUM CHLORIDE 600; 310; 30; 20 MG/100ML; MG/100ML; MG/100ML; MG/100ML
125 INJECTION, SOLUTION INTRAVENOUS CONTINUOUS
Status: DISCONTINUED | OUTPATIENT
Start: 2018-06-11 | End: 2018-06-11 | Stop reason: HOSPADM

## 2018-06-11 RX ORDER — ONDANSETRON 2 MG/ML
INJECTION INTRAMUSCULAR; INTRAVENOUS AS NEEDED
Status: DISCONTINUED | OUTPATIENT
Start: 2018-06-11 | End: 2018-06-11 | Stop reason: SURG

## 2018-06-11 RX ORDER — FENTANYL CITRATE 50 UG/ML
INJECTION, SOLUTION INTRAMUSCULAR; INTRAVENOUS AS NEEDED
Status: DISCONTINUED | OUTPATIENT
Start: 2018-06-11 | End: 2018-06-11 | Stop reason: SURG

## 2018-06-11 RX ORDER — ONDANSETRON 2 MG/ML
4 INJECTION INTRAMUSCULAR; INTRAVENOUS ONCE AS NEEDED
Status: DISCONTINUED | OUTPATIENT
Start: 2018-06-11 | End: 2018-06-11 | Stop reason: HOSPADM

## 2018-06-11 RX ORDER — FENTANYL CITRATE 50 UG/ML
50 INJECTION, SOLUTION INTRAMUSCULAR; INTRAVENOUS
Status: DISCONTINUED | OUTPATIENT
Start: 2018-06-11 | End: 2018-06-11 | Stop reason: HOSPADM

## 2018-06-11 RX ORDER — OXYCODONE HYDROCHLORIDE AND ACETAMINOPHEN 5; 325 MG/1; MG/1
1 TABLET ORAL ONCE AS NEEDED
Status: DISCONTINUED | OUTPATIENT
Start: 2018-06-11 | End: 2018-06-11 | Stop reason: HOSPADM

## 2018-06-11 RX ADMIN — CEFOXITIN 2 G: 2 INJECTION, POWDER, FOR SOLUTION INTRAVENOUS at 08:41

## 2018-06-11 RX ADMIN — FENTANYL CITRATE 50 MCG: 50 INJECTION INTRAMUSCULAR; INTRAVENOUS at 08:41

## 2018-06-11 RX ADMIN — SODIUM CHLORIDE, POTASSIUM CHLORIDE, SODIUM LACTATE AND CALCIUM CHLORIDE 125 ML/HR: 600; 310; 30; 20 INJECTION, SOLUTION INTRAVENOUS at 08:02

## 2018-06-11 RX ADMIN — FENTANYL CITRATE 50 MCG: 50 INJECTION INTRAMUSCULAR; INTRAVENOUS at 08:48

## 2018-06-11 RX ADMIN — ONDANSETRON 4 MG: 2 INJECTION, SOLUTION INTRAMUSCULAR; INTRAVENOUS at 08:41

## 2018-06-11 RX ADMIN — MIDAZOLAM HYDROCHLORIDE 1 MG: 1 INJECTION, SOLUTION INTRAMUSCULAR; INTRAVENOUS at 08:54

## 2018-06-11 RX ADMIN — MIDAZOLAM HYDROCHLORIDE 1 MG: 1 INJECTION, SOLUTION INTRAMUSCULAR; INTRAVENOUS at 08:41

## 2018-06-11 NOTE — ANESTHESIA POSTPROCEDURE EVALUATION
Patient: Albina Finley    Procedure Summary     Date:  06/11/18 Room / Location:  [facility] OR 02 /  [facility] OR    Anesthesia Start:  0841 Anesthesia Stop:  0929    Procedure:  Diagnostic anoscopy with anal polyp excision (N/A Anus) Diagnosis:       Anal polyp      (Anal polyp [K62.0])    Surgeon:  Marlyn Gutierrez MD Provider:  Paco Bingham MD    Anesthesia Type:  general ASA Status:  2          Anesthesia Type: general  Last vitals  BP   128/58 (06/11/18 1026)   Temp   98 °F (36.7 °C) (06/11/18 1008)   Pulse   60 (06/11/18 1026)   Resp   16 (06/11/18 1026)     SpO2   97 % (06/11/18 1026)     Post Anesthesia Care and Evaluation    Patient location during evaluation: PHASE II  Patient participation: complete - patient participated  Level of consciousness: awake and alert  Pain score: 1  Pain management: adequate  Airway patency: patent  Anesthetic complications: No anesthetic complications  PONV Status: controlled  Cardiovascular status: acceptable  Respiratory status: acceptable  Hydration status: acceptable

## 2018-06-11 NOTE — ANESTHESIA PROCEDURE NOTES
Airway  Urgency: elective    Date/Time: 6/11/2018 8:41 AM    General Information and Staff    Patient location during procedure: OR  Anesthesiologist: KACIE HENRY  CRNA: MARIA VICTORIA FREY    Indications and Patient Condition    Preoxygenated: yes  MILS not maintained throughout  Mask difficulty assessment: 0 - not attempted    Final Airway Details  Final airway type: supraglottic airway      Successful airway: classic  Size 4    Number of attempts at approach: 1    Additional Comments  Atraumatic LMA placement, dentition unchanged.

## 2018-06-11 NOTE — ANESTHESIA PREPROCEDURE EVALUATION
Anesthesia Evaluation     no history of anesthetic complications:  NPO Solid Status: > 8 hours  NPO Liquid Status: > 8 hours           Airway   Mallampati: II  TM distance: <3 FB  Neck ROM: full  No difficulty expected  Dental - normal exam   (+) edentulous    Pulmonary - normal exam   (+) COPD,   Cardiovascular - normal exam    (+) hyperlipidemia,       Neuro/Psych  GI/Hepatic/Renal/Endo    (+)   diabetes mellitus,     Musculoskeletal     Abdominal  - normal exam   Substance History      OB/GYN          Other   (+) arthritis                     Anesthesia Plan    ASA 2     general     intravenous induction   Anesthetic plan and risks discussed with patient.

## 2018-06-11 NOTE — OP NOTE
Diagnostic anoscopy with excision of anal polyp    Pre-op:  Anal polyp    Post-op:  Same    Surgeon:  Marlyn Gutierrez M.D., F.A.C.S.    Assistant:  Delmar    Anesthesia:  general      Indications: anal polyp       Procedure Details   After obtaining informed consent and receiving preoperative antibiotics and with venous compression boots in place, the patient was taken to the operating room and placed under anesthesia.  The patient was placed in candycane stirrups and the perineum was prepped and draped in a sterile fashion.  Anoscopy was performed which demonstrated a on based in the right posterior position.  This was amputated with grossly clear margins and the incision was closed with a double row of running 3-0 Monocryl suture.  Half percent Marcaine with epinephrine was injected for analgesia.  Patient tolerated the procedure well and was taken to the recovery room in stable condition    Findings:  Anal polyp    Estimated Blood Loss:  Minimal    Blood Administered: none           Drains: none           Specimens:   ID Type Source Tests Collected by Time   A : Anal Polyp Tissue Large Intestine, Rectum TISSUE PATHOLOGY EXAM Marlyn Gutierrez MD 6/11/2018 0922        Grafts and Implants: No       Complications:  none           Disposition: PACU - hemodynamically stable.           Condition: stable

## 2018-06-14 LAB
LAB AP CASE REPORT: NORMAL
Lab: NORMAL
PATH REPORT.FINAL DX SPEC: NORMAL

## 2018-06-18 ENCOUNTER — OFFICE VISIT (OUTPATIENT)
Dept: SURGERY | Facility: CLINIC | Age: 73
End: 2018-06-18

## 2018-06-18 VITALS
HEART RATE: 66 BPM | HEIGHT: 63 IN | BODY MASS INDEX: 28.07 KG/M2 | DIASTOLIC BLOOD PRESSURE: 50 MMHG | WEIGHT: 158.4 LBS | SYSTOLIC BLOOD PRESSURE: 128 MMHG

## 2018-06-18 DIAGNOSIS — Z09 POSTOP CHECK: ICD-10-CM

## 2018-06-18 DIAGNOSIS — K62.0 ANAL POLYP: Primary | ICD-10-CM

## 2018-06-18 PROCEDURE — 99024 POSTOP FOLLOW-UP VISIT: CPT | Performed by: SURGERY

## 2018-06-18 NOTE — OP NOTE
Diagnostic anoscopy with excision of anal polyp     Pre-op:  Anal polyp     Post-op:  Same     Surgeon:  Marlyn Gutierrez M.D., F.A.C.S.     Assistant:  Delmar     Anesthesia:  general        Indications: anal polyp       Procedure Details   After obtaining informed consent and receiving preoperative antibiotics and with venous compression boots in place, the patient was taken to the operating room and placed under anesthesia.  The patient was placed in candycane stirrups and the perineum was prepped and draped in a sterile fashion.  Anoscopy was performed which demonstrated a large polpy in the right posterior position.  This was surgically excised with scissors with grossly clear margins and the incision was closed with a double row of running 3-0 Monocryl suture.  Length of the incision was 3 - 4 cm. The polyp extended from the dentate line internally to the outer anus in the 5 oclock position. Half percent Marcaine with epinephrine was injected for analgesia.  Patient tolerated the procedure well and was taken to the recovery room in stable condition     Findings:  Anal polyp     Estimated Blood Loss:  Minimal     Blood Administered: none           Drains: none           Specimens:   ID Type Source Tests Collected by Time   A : Anal Polyp Tissue Large Intestine, Rectum TISSUE PATHOLOGY EXAM Marlyn Gutierrez MD 6/11/2018 0922          Grafts and Implants: No       Complications:  none           Disposition: PACU - hemodynamically stable.           Condition: stable

## 2018-06-18 NOTE — PROGRESS NOTES
"Subjective   Albina Finley is a 73 y.o. female here today for post op and pathology report.    History of Present Illness  Ms. Finley was seen in the office today along with her daughter for her first post op visit following excision of an ulcerated anal polyp.  The patient voices minimal discomfort. She is concerned about the likelihood of recurrence.  No Known Allergies      Current Outpatient Prescriptions   Medication Sig Dispense Refill   • calcium carbonate (OS-TIANNA) 600 MG tablet Take 600 mg by mouth Daily.     • cetirizine (zyrTEC) 10 MG tablet Take 10 mg by mouth Daily.     • chlorthalidone (HYGROTON) 25 MG tablet Take 25 mg by mouth Every Other Day.     • cyanocobalamin 1000 MCG/ML injection Inject 1,000 mcg into the shoulder, thigh, or buttocks Every 28 (Twenty-Eight) Days.     • diltiaZEM (TIAZAC) 360 MG 24 hr capsule Take 360 mg by mouth Daily.     • fenofibrate 160 MG tablet Take 160 mg by mouth Daily.     • ferrous sulfate 325 (65 FE) MG tablet Take 325 mg by mouth 2 (Two) Times a Day.     • HYDROcodone-acetaminophen (NORCO) 7.5-325 MG per tablet Take 1 tablet by mouth 4 (Four) Times a Day As Needed for Moderate Pain . 15 tablet 0   • insulin detemir (LEVEMIR) 100 UNIT/ML injection Inject 100 Units under the skin Daily. 70 units QAM.   40 units QPM     • lisinopril (PRINIVIL,ZESTRIL) 40 MG tablet Take 40 mg by mouth Daily.     • metFORMIN (GLUCOPHAGE) 1000 MG tablet Take 1,000 mg by mouth 2 (Two) Times a Day With Meals.     • raNITIdine (ZANTAC) 150 MG tablet Take 150 mg by mouth 2 (Two) Times a Day.       No current facility-administered medications for this visit.      Objective   /50 (BP Location: Left arm, Patient Position: Sitting)   Pulse 66   Ht 160 cm (63\")   Wt 71.8 kg (158 lb 6.4 oz)   BMI 28.06 kg/m²    Physical Exam  On examination this is a well developed well-nourished wf in no acute distress  HEENT examination: Within normal limits.  Conjunctiva pink.  Nose and ears appear " normal.  Neck: Supple, full range of motion.  No JVD.  Musculoskeletal: Full range of motion all extremities without focal weakness. Normal gait. No digital cyanosis.  Psych: Patient is alert, oriented x3. Mood and affect are appropriate.  Anus: surigical incision line healing well without drainage  Results/Data  Pathology report was reviewed and discussed with the patient and daughter    Procedures     Assessment/Plan   Status post excision of benign anal polyp    Follow-up as needed       Discussion/Summary: Discussed with the patient that expectation of recurrence is low    Errors in dictation may reflect use of voice recognition software and not all errors in transcription may have been detected prior to signing.    Future Appointments  Date Time Provider Department Center   6/18/2018 4:20 PM Marlyn Gutierrez MD [facility] None

## 2019-02-14 ENCOUNTER — HOSPITAL ENCOUNTER (INPATIENT)
Facility: HOSPITAL | Age: 74
LOS: 2 days | Discharge: HOME | End: 2019-02-16
Attending: EMERGENCY MEDICINE | Admitting: INTERNAL MEDICINE

## 2019-02-14 ENCOUNTER — APPOINTMENT (OUTPATIENT)
Dept: GENERAL RADIOLOGY | Facility: HOSPITAL | Age: 74
End: 2019-02-14

## 2019-02-14 DIAGNOSIS — J18.9 PNEUMONIA OF RIGHT MIDDLE LOBE DUE TO INFECTIOUS ORGANISM: Primary | ICD-10-CM

## 2019-02-14 DIAGNOSIS — N17.9 ACUTE KIDNEY INJURY (HCC): ICD-10-CM

## 2019-02-14 LAB
A-A DO2: 32.3 MMHG (ref 0–300)
ALBUMIN SERPL-MCNC: 4.3 G/DL (ref 3.4–4.8)
ALBUMIN/GLOB SERPL: 1.5 G/DL (ref 1.5–2.5)
ALP SERPL-CCNC: 37 U/L (ref 35–104)
ALT SERPL W P-5'-P-CCNC: 14 U/L (ref 10–36)
ANION GAP SERPL CALCULATED.3IONS-SCNC: 4 MMOL/L (ref 3.6–11.2)
ARTERIAL PATENCY WRIST A: ABNORMAL
AST SERPL-CCNC: 10 U/L (ref 10–30)
ATMOSPHERIC PRESS: 728 MMHG
BACTERIA UR QL AUTO: ABNORMAL /HPF
BASE EXCESS BLDA CALC-SCNC: -1.9 MMOL/L
BASOPHILS # BLD AUTO: 0.02 10*3/MM3 (ref 0–0.3)
BASOPHILS NFR BLD AUTO: 0.2 % (ref 0–2)
BDY SITE: ABNORMAL
BILIRUB SERPL-MCNC: 0.1 MG/DL (ref 0.2–1.8)
BILIRUB UR QL STRIP: NEGATIVE
BNP SERPL-MCNC: 141 PG/ML (ref 0–100)
BODY TEMPERATURE: 98.6 C
BUN BLD-MCNC: 35 MG/DL (ref 7–21)
BUN/CREAT SERPL: 21.2 (ref 7–25)
CALCIUM SPEC-SCNC: 9.4 MG/DL (ref 7.7–10)
CHLORIDE SERPL-SCNC: 107 MMOL/L (ref 99–112)
CK MB SERPL-CCNC: 0.33 NG/ML (ref 0–5)
CK MB SERPL-RTO: 1.9 % (ref 0–3)
CK SERPL-CCNC: 17 U/L (ref 24–173)
CLARITY UR: CLEAR
CO2 SERPL-SCNC: 28 MMOL/L (ref 24.3–31.9)
COHGB MFR BLD: 1.3 % (ref 0–5)
COLOR UR: YELLOW
CREAT BLD-MCNC: 1.65 MG/DL (ref 0.43–1.29)
CRP SERPL-MCNC: 1.1 MG/DL (ref 0–0.99)
D-LACTATE SERPL-SCNC: 0.8 MMOL/L (ref 0.5–2)
DEPRECATED RDW RBC AUTO: 46.9 FL (ref 37–54)
EOSINOPHIL # BLD AUTO: 0.22 10*3/MM3 (ref 0–0.7)
EOSINOPHIL NFR BLD AUTO: 2.5 % (ref 0–7)
ERYTHROCYTE [DISTWIDTH] IN BLOOD BY AUTOMATED COUNT: 14.7 % (ref 11.5–14.5)
FLUAV AG NPH QL: NEGATIVE
FLUBV AG NPH QL IA: NEGATIVE
GFR SERPL CREATININE-BSD FRML MDRD: 30 ML/MIN/1.73
GLOBULIN UR ELPH-MCNC: 2.9 GM/DL
GLUCOSE BLD-MCNC: 117 MG/DL (ref 70–110)
GLUCOSE BLDC GLUCOMTR-MCNC: 86 MG/DL (ref 70–130)
GLUCOSE UR STRIP-MCNC: NEGATIVE MG/DL
HBA1C MFR BLD: 7.5 % (ref 4.5–5.7)
HCO3 BLDA-SCNC: 22.6 MMOL/L (ref 22–26)
HCT VFR BLD AUTO: 33.3 % (ref 37–47)
HCT VFR BLD CALC: 31 % (ref 37–47)
HGB BLD-MCNC: 10.2 G/DL (ref 12–16)
HGB BLDA-MCNC: 10.7 G/DL (ref 12–16)
HGB UR QL STRIP.AUTO: NEGATIVE
HOLD SPECIMEN: NORMAL
HOLD SPECIMEN: NORMAL
HOROWITZ INDEX BLD+IHG-RTO: 21 %
HYALINE CASTS UR QL AUTO: ABNORMAL /LPF
IMM GRANULOCYTES # BLD AUTO: 0.01 10*3/MM3 (ref 0–0.03)
IMM GRANULOCYTES NFR BLD AUTO: 0.1 % (ref 0–0.5)
KETONES UR QL STRIP: NEGATIVE
L PNEUMO1 AG UR QL IA: NEGATIVE
LEUKOCYTE ESTERASE UR QL STRIP.AUTO: ABNORMAL
LIPASE SERPL-CCNC: 36 U/L (ref 13–60)
LYMPHOCYTES # BLD AUTO: 3.46 10*3/MM3 (ref 1–3)
LYMPHOCYTES NFR BLD AUTO: 38.9 % (ref 16–46)
M PNEUMO IGM SER QL: NEGATIVE
MCH RBC QN AUTO: 27.2 PG (ref 27–33)
MCHC RBC AUTO-ENTMCNC: 30.6 G/DL (ref 33–37)
MCV RBC AUTO: 88.8 FL (ref 80–94)
METHGB BLD QL: 0.2 % (ref 0–3)
MODALITY: ABNORMAL
MONOCYTES # BLD AUTO: 0.77 10*3/MM3 (ref 0.1–0.9)
MONOCYTES NFR BLD AUTO: 8.7 % (ref 0–12)
MYOGLOBIN SERPL-MCNC: 31 NG/ML (ref 0–109)
NEUTROPHILS # BLD AUTO: 4.42 10*3/MM3 (ref 1.4–6.5)
NEUTROPHILS NFR BLD AUTO: 49.6 % (ref 40–75)
NITRITE UR QL STRIP: POSITIVE
OSMOLALITY SERPL CALC.SUM OF ELEC: 286.5 MOSM/KG (ref 273–305)
OXYHGB MFR BLDV: 91.3 % (ref 85–100)
PCO2 BLDA: 37.1 MM HG (ref 35–45)
PH BLDA: 7.4 PH UNITS (ref 7.35–7.45)
PH UR STRIP.AUTO: <=5 [PH] (ref 5–8)
PLATELET # BLD AUTO: 528 10*3/MM3 (ref 130–400)
PMV BLD AUTO: 10.8 FL (ref 6–10)
PO2 BLDA: 66.3 MM HG (ref 80–100)
POTASSIUM BLD-SCNC: 5 MMOL/L (ref 3.5–5.3)
PROT SERPL-MCNC: 7.2 G/DL (ref 6–8)
PROT UR QL STRIP: NEGATIVE
RBC # BLD AUTO: 3.75 10*6/MM3 (ref 4.2–5.4)
RBC # UR: ABNORMAL /HPF
REF LAB TEST METHOD: ABNORMAL
SAO2 % BLDCOA: 92.7 % (ref 90–100)
SODIUM BLD-SCNC: 139 MMOL/L (ref 135–153)
SP GR UR STRIP: 1.02 (ref 1–1.03)
SQUAMOUS #/AREA URNS HPF: ABNORMAL /HPF
TROPONIN I SERPL-MCNC: <0.006 NG/ML
TROPONIN I SERPL-MCNC: <0.006 NG/ML
TSH SERPL DL<=0.05 MIU/L-ACNC: 2.2 MIU/ML (ref 0.55–4.78)
UROBILINOGEN UR QL STRIP: ABNORMAL
WBC NRBC COR # BLD: 8.9 10*3/MM3 (ref 4.5–12.5)
WBC UR QL AUTO: ABNORMAL /HPF
WHOLE BLOOD HOLD SPECIMEN: NORMAL
WHOLE BLOOD HOLD SPECIMEN: NORMAL

## 2019-02-14 PROCEDURE — 25010000002 HEPARIN (PORCINE) PER 1000 UNITS: Performed by: PHYSICIAN ASSISTANT

## 2019-02-14 PROCEDURE — 84484 ASSAY OF TROPONIN QUANT: CPT | Performed by: EMERGENCY MEDICINE

## 2019-02-14 PROCEDURE — 87186 SC STD MICRODIL/AGAR DIL: CPT | Performed by: EMERGENCY MEDICINE

## 2019-02-14 PROCEDURE — 93005 ELECTROCARDIOGRAM TRACING: CPT | Performed by: EMERGENCY MEDICINE

## 2019-02-14 PROCEDURE — 36415 COLL VENOUS BLD VENIPUNCTURE: CPT

## 2019-02-14 PROCEDURE — 80053 COMPREHEN METABOLIC PANEL: CPT | Performed by: EMERGENCY MEDICINE

## 2019-02-14 PROCEDURE — 87899 AGENT NOS ASSAY W/OPTIC: CPT | Performed by: PHYSICIAN ASSISTANT

## 2019-02-14 PROCEDURE — 99284 EMERGENCY DEPT VISIT MOD MDM: CPT

## 2019-02-14 PROCEDURE — 87077 CULTURE AEROBIC IDENTIFY: CPT | Performed by: EMERGENCY MEDICINE

## 2019-02-14 PROCEDURE — 83036 HEMOGLOBIN GLYCOSYLATED A1C: CPT | Performed by: PHYSICIAN ASSISTANT

## 2019-02-14 PROCEDURE — 83605 ASSAY OF LACTIC ACID: CPT | Performed by: EMERGENCY MEDICINE

## 2019-02-14 PROCEDURE — 94640 AIRWAY INHALATION TREATMENT: CPT

## 2019-02-14 PROCEDURE — 86738 MYCOPLASMA ANTIBODY: CPT | Performed by: PHYSICIAN ASSISTANT

## 2019-02-14 PROCEDURE — 84484 ASSAY OF TROPONIN QUANT: CPT | Performed by: PHYSICIAN ASSISTANT

## 2019-02-14 PROCEDURE — 83690 ASSAY OF LIPASE: CPT | Performed by: EMERGENCY MEDICINE

## 2019-02-14 PROCEDURE — 84443 ASSAY THYROID STIM HORMONE: CPT | Performed by: EMERGENCY MEDICINE

## 2019-02-14 PROCEDURE — 83874 ASSAY OF MYOGLOBIN: CPT | Performed by: PHYSICIAN ASSISTANT

## 2019-02-14 PROCEDURE — 36600 WITHDRAWAL OF ARTERIAL BLOOD: CPT | Performed by: EMERGENCY MEDICINE

## 2019-02-14 PROCEDURE — 82553 CREATINE MB FRACTION: CPT | Performed by: PHYSICIAN ASSISTANT

## 2019-02-14 PROCEDURE — 99223 1ST HOSP IP/OBS HIGH 75: CPT | Performed by: INTERNAL MEDICINE

## 2019-02-14 PROCEDURE — 87086 URINE CULTURE/COLONY COUNT: CPT | Performed by: EMERGENCY MEDICINE

## 2019-02-14 PROCEDURE — 93010 ELECTROCARDIOGRAM REPORT: CPT | Performed by: INTERNAL MEDICINE

## 2019-02-14 PROCEDURE — 87804 INFLUENZA ASSAY W/OPTIC: CPT | Performed by: EMERGENCY MEDICINE

## 2019-02-14 PROCEDURE — 82550 ASSAY OF CK (CPK): CPT | Performed by: PHYSICIAN ASSISTANT

## 2019-02-14 PROCEDURE — 81001 URINALYSIS AUTO W/SCOPE: CPT | Performed by: EMERGENCY MEDICINE

## 2019-02-14 PROCEDURE — 86140 C-REACTIVE PROTEIN: CPT | Performed by: PHYSICIAN ASSISTANT

## 2019-02-14 PROCEDURE — 83880 ASSAY OF NATRIURETIC PEPTIDE: CPT | Performed by: EMERGENCY MEDICINE

## 2019-02-14 PROCEDURE — 71045 X-RAY EXAM CHEST 1 VIEW: CPT | Performed by: RADIOLOGY

## 2019-02-14 PROCEDURE — 25010000002 CEFTRIAXONE: Performed by: EMERGENCY MEDICINE

## 2019-02-14 PROCEDURE — 94799 UNLISTED PULMONARY SVC/PX: CPT

## 2019-02-14 PROCEDURE — 82805 BLOOD GASES W/O2 SATURATION: CPT | Performed by: EMERGENCY MEDICINE

## 2019-02-14 PROCEDURE — 71045 X-RAY EXAM CHEST 1 VIEW: CPT

## 2019-02-14 PROCEDURE — 83050 HGB METHEMOGLOBIN QUAN: CPT | Performed by: EMERGENCY MEDICINE

## 2019-02-14 PROCEDURE — 82962 GLUCOSE BLOOD TEST: CPT

## 2019-02-14 PROCEDURE — 82375 ASSAY CARBOXYHB QUANT: CPT | Performed by: EMERGENCY MEDICINE

## 2019-02-14 PROCEDURE — 87040 BLOOD CULTURE FOR BACTERIA: CPT | Performed by: EMERGENCY MEDICINE

## 2019-02-14 PROCEDURE — 85025 COMPLETE CBC W/AUTO DIFF WBC: CPT | Performed by: EMERGENCY MEDICINE

## 2019-02-14 RX ORDER — LISINOPRIL 10 MG/1
30 TABLET ORAL 2 TIMES DAILY
Status: CANCELLED | OUTPATIENT
Start: 2019-02-14

## 2019-02-14 RX ORDER — HYDRALAZINE HYDROCHLORIDE 50 MG/1
50 TABLET, FILM COATED ORAL 3 TIMES DAILY
Status: CANCELLED | OUTPATIENT
Start: 2019-02-14

## 2019-02-14 RX ORDER — CARVEDILOL 6.25 MG/1
12.5 TABLET ORAL 2 TIMES DAILY WITH MEALS
Status: CANCELLED | OUTPATIENT
Start: 2019-02-14

## 2019-02-14 RX ORDER — SODIUM CHLORIDE 0.9 % (FLUSH) 0.9 %
3 SYRINGE (ML) INJECTION EVERY 12 HOURS SCHEDULED
Status: DISCONTINUED | OUTPATIENT
Start: 2019-02-14 | End: 2019-02-16 | Stop reason: HOSPADM

## 2019-02-14 RX ORDER — HEPARIN SODIUM 5000 [USP'U]/ML
5000 INJECTION, SOLUTION INTRAVENOUS; SUBCUTANEOUS EVERY 12 HOURS SCHEDULED
Status: DISCONTINUED | OUTPATIENT
Start: 2019-02-14 | End: 2019-02-16 | Stop reason: HOSPADM

## 2019-02-14 RX ORDER — CYANOCOBALAMIN 1000 UG/ML
1000 INJECTION, SOLUTION INTRAMUSCULAR; SUBCUTANEOUS
Status: CANCELLED | OUTPATIENT
Start: 2019-02-14

## 2019-02-14 RX ORDER — FAMOTIDINE 20 MG/1
20 TABLET, FILM COATED ORAL 2 TIMES DAILY
Status: DISCONTINUED | OUTPATIENT
Start: 2019-02-14 | End: 2019-02-16 | Stop reason: HOSPADM

## 2019-02-14 RX ORDER — NICOTINE POLACRILEX 4 MG
15 LOZENGE BUCCAL
Status: DISCONTINUED | OUTPATIENT
Start: 2019-02-14 | End: 2019-02-16 | Stop reason: HOSPADM

## 2019-02-14 RX ORDER — BUDESONIDE AND FORMOTEROL FUMARATE DIHYDRATE 160; 4.5 UG/1; UG/1
2 AEROSOL RESPIRATORY (INHALATION)
Status: CANCELLED | OUTPATIENT
Start: 2019-02-14

## 2019-02-14 RX ORDER — FERROUS SULFATE 325(65) MG
325 TABLET ORAL 2 TIMES DAILY
Status: CANCELLED | OUTPATIENT
Start: 2019-02-14

## 2019-02-14 RX ORDER — LOPERAMIDE HYDROCHLORIDE 2 MG/1
2 CAPSULE ORAL DAILY
COMMUNITY
End: 2019-02-16 | Stop reason: HOSPADM

## 2019-02-14 RX ORDER — CETIRIZINE HYDROCHLORIDE 10 MG/1
10 TABLET ORAL DAILY
Status: DISCONTINUED | OUTPATIENT
Start: 2019-02-15 | End: 2019-02-16 | Stop reason: HOSPADM

## 2019-02-14 RX ORDER — DEXTROSE MONOHYDRATE 25 G/50ML
25 INJECTION, SOLUTION INTRAVENOUS
Status: DISCONTINUED | OUTPATIENT
Start: 2019-02-14 | End: 2019-02-16 | Stop reason: HOSPADM

## 2019-02-14 RX ORDER — BUMETANIDE 1 MG/1
1 TABLET ORAL DAILY
Status: CANCELLED | OUTPATIENT
Start: 2019-02-14

## 2019-02-14 RX ORDER — LISINOPRIL 10 MG/1
30 TABLET ORAL 2 TIMES DAILY
COMMUNITY
End: 2019-02-16 | Stop reason: HOSPADM

## 2019-02-14 RX ORDER — ASPIRIN 81 MG/1
324 TABLET, CHEWABLE ORAL ONCE
Status: COMPLETED | OUTPATIENT
Start: 2019-02-14 | End: 2019-02-14

## 2019-02-14 RX ORDER — CARVEDILOL 6.25 MG/1
12.5 TABLET ORAL 2 TIMES DAILY WITH MEALS
Status: DISCONTINUED | OUTPATIENT
Start: 2019-02-14 | End: 2019-02-16 | Stop reason: HOSPADM

## 2019-02-14 RX ORDER — FAMOTIDINE 20 MG/1
20 TABLET, FILM COATED ORAL 2 TIMES DAILY
Status: CANCELLED | OUTPATIENT
Start: 2019-02-14

## 2019-02-14 RX ORDER — CARVEDILOL 12.5 MG/1
12.5 TABLET ORAL 2 TIMES DAILY WITH MEALS
Status: ON HOLD | COMMUNITY
End: 2019-12-17 | Stop reason: DRUGHIGH

## 2019-02-14 RX ORDER — BUMETANIDE 1 MG/1
1 TABLET ORAL DAILY
COMMUNITY
End: 2019-02-16 | Stop reason: HOSPADM

## 2019-02-14 RX ORDER — HYDRALAZINE HYDROCHLORIDE 50 MG/1
50 TABLET, FILM COATED ORAL 3 TIMES DAILY
COMMUNITY
End: 2019-03-15

## 2019-02-14 RX ORDER — BUDESONIDE AND FORMOTEROL FUMARATE DIHYDRATE 160; 4.5 UG/1; UG/1
2 AEROSOL RESPIRATORY (INHALATION)
Status: DISCONTINUED | OUTPATIENT
Start: 2019-02-14 | End: 2019-02-16 | Stop reason: HOSPADM

## 2019-02-14 RX ORDER — HYDRALAZINE HYDROCHLORIDE 50 MG/1
50 TABLET, FILM COATED ORAL EVERY 8 HOURS SCHEDULED
Status: DISCONTINUED | OUTPATIENT
Start: 2019-02-14 | End: 2019-02-16 | Stop reason: HOSPADM

## 2019-02-14 RX ORDER — LOPERAMIDE HYDROCHLORIDE 2 MG/1
2 CAPSULE ORAL DAILY
Status: CANCELLED | OUTPATIENT
Start: 2019-02-14

## 2019-02-14 RX ORDER — SODIUM CHLORIDE 0.9 % (FLUSH) 0.9 %
1-10 SYRINGE (ML) INJECTION AS NEEDED
Status: DISCONTINUED | OUTPATIENT
Start: 2019-02-14 | End: 2019-02-16 | Stop reason: HOSPADM

## 2019-02-14 RX ORDER — DILTIAZEM HYDROCHLORIDE 180 MG/1
360 CAPSULE, COATED, EXTENDED RELEASE ORAL
Status: DISCONTINUED | OUTPATIENT
Start: 2019-02-15 | End: 2019-02-16 | Stop reason: HOSPADM

## 2019-02-14 RX ORDER — DILTIAZEM HYDROCHLORIDE 180 MG/1
360 CAPSULE, COATED, EXTENDED RELEASE ORAL
Status: CANCELLED | OUTPATIENT
Start: 2019-02-14

## 2019-02-14 RX ORDER — NITROGLYCERIN 0.4 MG/1
0.4 TABLET SUBLINGUAL
Status: DISCONTINUED | OUTPATIENT
Start: 2019-02-14 | End: 2019-02-16 | Stop reason: HOSPADM

## 2019-02-14 RX ORDER — SODIUM CHLORIDE 0.9 % (FLUSH) 0.9 %
10 SYRINGE (ML) INJECTION AS NEEDED
Status: DISCONTINUED | OUTPATIENT
Start: 2019-02-14 | End: 2019-02-16 | Stop reason: HOSPADM

## 2019-02-14 RX ORDER — IPRATROPIUM BROMIDE AND ALBUTEROL SULFATE 2.5; .5 MG/3ML; MG/3ML
3 SOLUTION RESPIRATORY (INHALATION)
Status: DISCONTINUED | OUTPATIENT
Start: 2019-02-14 | End: 2019-02-16 | Stop reason: HOSPADM

## 2019-02-14 RX ORDER — CETIRIZINE HYDROCHLORIDE 10 MG/1
10 TABLET ORAL DAILY
Status: CANCELLED | OUTPATIENT
Start: 2019-02-14

## 2019-02-14 RX ADMIN — CEFTRIAXONE 1 G: 1 INJECTION, POWDER, FOR SOLUTION INTRAMUSCULAR; INTRAVENOUS at 17:50

## 2019-02-14 RX ADMIN — CARVEDILOL 12.5 MG: 6.25 TABLET, FILM COATED ORAL at 21:32

## 2019-02-14 RX ADMIN — DOXYCYCLINE 100 MG: 100 INJECTION, POWDER, LYOPHILIZED, FOR SOLUTION INTRAVENOUS at 17:49

## 2019-02-14 RX ADMIN — FAMOTIDINE 20 MG: 20 TABLET, FILM COATED ORAL at 21:32

## 2019-02-14 RX ADMIN — BUDESONIDE AND FORMOTEROL FUMARATE DIHYDRATE 2 PUFF: 160; 4.5 AEROSOL RESPIRATORY (INHALATION) at 23:54

## 2019-02-14 RX ADMIN — HEPARIN SODIUM 5000 UNITS: 5000 INJECTION INTRAVENOUS; SUBCUTANEOUS at 21:31

## 2019-02-14 RX ADMIN — HYDRALAZINE HYDROCHLORIDE 50 MG: 50 TABLET ORAL at 21:31

## 2019-02-14 RX ADMIN — SODIUM CHLORIDE, PRESERVATIVE FREE 3 ML: 5 INJECTION INTRAVENOUS at 20:07

## 2019-02-14 RX ADMIN — ASPIRIN 324 MG: 81 TABLET, CHEWABLE ORAL at 21:31

## 2019-02-14 RX ADMIN — IPRATROPIUM BROMIDE AND ALBUTEROL SULFATE 3 ML: .5; 3 SOLUTION RESPIRATORY (INHALATION) at 23:54

## 2019-02-14 NOTE — H&P
[facility] HOSPITALIST HISTORY AND PHYSICAL    Patient Identification:  Name:  Albina Finley  Age:  73 y.o.  Sex:  female  :  1945  MRN:  [id number]   Visit Number:  [id number]  Primary Care Physician:  Jim Hernandez MD     I have seen the patient in conjunction with Adela Lawler PA-C, and I agree with the following statements:    Subjective     Chief complaint:   Chief Complaint   Patient presents with   • Shortness of Breath     History of presenting illness:   Ms. Finley is a 73 y.o. female with past medical history significant for COPD, CKD, type II DM, and chronic anemia. She presented to the emergency department of [facility] on 2019 with complaints of shortness of breath. She reports that her home chlorthalidone was recently changed to Bumex by her nephrologist (Kai) about 1 month ago due to increased pedal edema and SOA. She had some improvement in her pedal edema, but continued to have dyspnea. She also feels as it her abdomen is distended. Since then, her daughter has been sending Dr. Quinn faxes including her BP, HR, and weights. She was told to hold the bumex for about 1 week and that she would go back to the chlorthalidone, however, she was then told to continue the Bumex instead. She then saw her PCP who gave her a dose of lasix in the outpatient office without much improvement. Due to continued shortness of breath, she presented to the ED of [facility] for further evaluation and therapy. She denies any recent cough, sinus congestion, fever, or chills. She denies any choking or foods or liquids. No dysuria or hematuria, but at times she has a feeling on incomplete emptying. She has some baseline dementia, but is able to care for herself. She lives at home by herself, but next door to her daughter.     Upon arrival to the ED, vitals were /51, RR 22, HR 63, SpO2 92%, afebrile. CXR showed evidence of acute RLL pneumonia. BNP is elevated at 141. UA showed evidence  of acute UTI. Patient has been admitted to the medical/surgical unit of [facility] for further evaluation and therapy.   ---------------------------------------------------------------------------------------------------------------------  Review of Systems   Constitutional: Negative for chills, diaphoresis, fever and unexpected weight change.   HENT: Negative for congestion, postnasal drip, rhinorrhea, sinus pressure and sinus pain.    Respiratory: Positive for shortness of breath. Negative for cough, choking and chest tightness.    Cardiovascular: Positive for leg swelling (Improved. ). Negative for chest pain and palpitations.   Gastrointestinal: Positive for abdominal distention. Negative for abdominal pain, anal bleeding, blood in stool, constipation, diarrhea, nausea and vomiting.   Genitourinary: Negative for decreased urine volume, difficulty urinating, dysuria, flank pain, frequency, hematuria and urgency.        Intermittent feeling of incomplete emptying.    Musculoskeletal: Negative for arthralgias, back pain and gait problem.   Skin: Negative for color change, pallor, rash and wound.   Neurological: Negative for dizziness, syncope and weakness.   Psychiatric/Behavioral: Positive for confusion (Chronic, mild, intermittent. ). Negative for agitation and behavioral problems.   ---------------------------------------------------------------------------------------------------------------------  Past Medical History:   Diagnosis Date   • Anemia    • Arthritis    • Chronic kidney disease    • COPD (chronic obstructive pulmonary disease) (CMS/[facility])    • Diabetes mellitus (CMS/[facility])    • Elevated cholesterol    • History of transfusion    • Osteoporosis      Past Surgical History:   Procedure Laterality Date   • ANUS SURGERY N/A 2018    Procedure: Diagnostic anoscopy with anal polyp excision;  Surgeon: Marlyn Gutierrez MD;  Location: [facility];  Service: General   •  SECTION     • HIP  BIPOLAR REPLACEMENT      left  Page    • HYSTERECTOMY       Family History   Problem Relation Age of Onset   • Heart disease Mother    • COPD Mother    • Arthritis Mother    • Diabetes Father      Socioeconomic History   • Marital status:    Tobacco Use   • Smoking status: Former Smoker     Years: 15.00     Types: Cigarettes   • Smokeless tobacco: Never Used   • Tobacco comment: quit 15 years ago    Substance and Sexual Activity   • Alcohol use: No   • Drug use: No   • Sexual activity: Defer     Birth control/protection: Post-menopausal   ---------------------------------------------------------------------------------------------------------------------  Allergies:  Patient has no known allergies.  --------------------------------------------------------------------------------------------------------------------   Prior to Admission Medications     Prescriptions Last Dose Informant Patient Reported? Taking?    calcium carbonate (OS-TIANNA) 600 MG tablet  Child Yes No    Take 600 mg by mouth Daily.    cetirizine (zyrTEC) 10 MG tablet  Child Yes No    Take 10 mg by mouth Daily.    chlorthalidone (HYGROTON) 25 MG tablet  Self Yes No    Take 25 mg by mouth Every Other Day.    cyanocobalamin 1000 MCG/ML injection  Child Yes No    Inject 1,000 mcg into the shoulder, thigh, or buttocks Every 28 (Twenty-Eight) Days.    diltiaZEM (TIAZAC) 360 MG 24 hr capsule  Child Yes No    Take 360 mg by mouth Daily.    fenofibrate 160 MG tablet  Child Yes No    Take 160 mg by mouth Daily.    ferrous sulfate 325 (65 FE) MG tablet  Child Yes No    Take 325 mg by mouth 2 (Two) Times a Day.    HYDROcodone-acetaminophen (NORCO) 7.5-325 MG per tablet   No No    Take 1 tablet by mouth 4 (Four) Times a Day As Needed for Moderate Pain .    insulin detemir (LEVEMIR) 100 UNIT/ML injection  Child Yes No    Inject 100 Units under the skin Daily. 70 units QAM.   40 units QPM    lisinopril (PRINIVIL,ZESTRIL) 40 MG tablet  Child Yes No    Take  40 mg by mouth Daily.    metFORMIN (GLUCOPHAGE) 1000 MG tablet  Child Yes No    Take 1,000 mg by mouth 2 (Two) Times a Day With Meals.    raNITIdine (ZANTAC) 150 MG tablet  Child Yes No    Take 150 mg by mouth 2 (Two) Times a Day.        ---------------------------------------------------------------------------------------------------------------------  Objective     [facility]  Temp:  [98.1 °F (36.7 °C)] 98.1 °F (36.7 °C)  Heart Rate:  [54-63] 57  Resp:  [22] 22  BP: (158-176)/(51-79) 170/74  Mean Arterial Pressure (Non-Invasive) for the past 24 hrs (Last 3 readings):   Noninvasive MAP (mmHg)   02/14/19 1847 111   02/14/19 1832 119   02/14/19 1817 86     SpO2 Percentage    02/14/19 1817 02/14/19 1832 02/14/19 1847   SpO2: 99% 99% 100%     SpO2:  [92 %-100 %] 100 %  Device (Oxygen Therapy): room air    Body mass index is 28.87 kg/m².  Wt Readings from Last 3 Encounters:   02/14/19 73.9 kg (163 lb)   06/18/18 71.8 kg (158 lb 6.4 oz)   06/11/18 73.9 kg (163 lb)     ---------------------------------------------------------------------------------------------------------------------  Physical Exam:  Constitutional:  Well-developed and well-nourished.  No respiratory distress.      HENT:  Head: Normocephalic and atraumatic.  Mouth:  Moist mucous membranes.    Eyes:  Conjunctivae and EOM are normal. No scleral icterus. No erythema or drainage.   Neck:  Neck supple.  No JVD present. No carotid bruits noted.   Cardiovascular:  Normal rate, regular rhythm and normal heart sounds with no murmur.  Pulmonary/Chest:  No respiratory distress, no wheezes. She has bibasilar rales, R>L. Rales noted right mid-lung field as well.   Abdominal:  Soft.  Bowel sounds are present x4.  No distension and no tenderness.   Musculoskeletal: 1+ edema bilateral lower extremities with wrinkling of the overlying skin noted, no tenderness, and no deformity.  No red or swollen joints anywhere.    Neurological:  Alert and oriented to  person, place, and time.  No cranial nerve deficit.  No tongue deviation.  No facial droop.  No slurred speech.   Skin:  Skin is warm and dry.  No rash noted.  No pallor.   Psychiatric:  Normal mood and affect.  Behavior is normal.  Judgment and thought content normal.   Peripheral vascular: 1+ edema bilateral lower extremities and 2+ pedal pulses bilaterally.   Genitourinary: No martin catheter in place.   ---------------------------------------------------------------------------------------------------------------------  EKG:  Pending cardiology interpretation. Per my read, reveals SR 60 bpm with possible old septal MI, poor R wave progression, nonspecific ST/T abnormalities. Qtc 432ms.     Telemetry: Not yet on telemetry monitoring.     I have personally reviewed the EKG/Telemetry strip.  ---------------------------------------------------------------------------------------------------------------------   Results from last 7 days   Lab Units 02/14/19  1630   TROPONIN I ng/mL <0.006         Results from last 7 days   Lab Units 02/14/19  1616   PH, ARTERIAL pH units 7.402   PO2 ART mm Hg 66.3*   PCO2, ARTERIAL mm Hg 37.1   HCO3 ART mmol/L 22.6     Results from last 7 days   Lab Units 02/14/19  1630   LACTATE mmol/L 0.8   WBC 10*3/mm3 8.90   HEMOGLOBIN g/dL 10.2*   HEMATOCRIT % 33.3*   MCV fL 88.8   MCHC g/dL 30.6*   PLATELETS 10*3/mm3 528*     Results from last 7 days   Lab Units 02/14/19  1630   SODIUM mmol/L 139   POTASSIUM mmol/L 5.0   CHLORIDE mmol/L 107   CO2 mmol/L 28.0   BUN mg/dL 35*   CREATININE mg/dL 1.65*   EGFR IF NONAFRICN AM mL/min/1.73 30*   CALCIUM mg/dL 9.4   GLUCOSE mg/dL 117*   ALBUMIN g/dL 4.30   BILIRUBIN mg/dL 0.1*   ALK PHOS U/L 37   AST (SGOT) U/L 10   ALT (SGPT) U/L 14   Estimated Creatinine Clearance: 28.8 mL/min (A) (by C-G formula based on SCr of 1.65 mg/dL (H)).    Lab Results   Component Value Date    TSH 2.202 02/14/2019        Pain Management Panel     Pain Management Panel Latest  Ref Rng & Units 10/27/2016 10/27/2016    CREATININE UR mg/dL 155.4 155.4        I have personally reviewed the above laboratory results.   ---------------------------------------------------------------------------------------------------------------------  Imaging Results (last 7 days)     Procedure Component Value Units Date/Time    XR Chest 1 View [[id number]]:  Dr. Smith personally looked at the CXR and she sees a right sided pneumonia that appears to be RML as it obscures the right side of the heart.  Resulted:  02/14/19 1702 Updated:  02/14/19 1702   ---------------------------------------------------------------------------------------------------------------------  Assessment & Plan      -Acute right lower lobe pneumonia: Continue supplemental O2 via nasal cannula to maintain oxygen saturations >90%. Check urinary legionella and mycoplasma studies. Check respiratory cultures. Duonebs every 6 hours. Check CRP. Continue treatment with IV Rocephin and Doxycycline. Blood cultures were drawn in the ED. Repeat labs in AM.     -Acute UTI: Urine culture obtained in the ED. Continue Rocephin for now and follow up on culture results.     -Possible recent acute CHF: BNP only mildly elevated at 141. Diuretics will be held for now given OPHELIA. Check transthoracic echocardiogram. Follow up on serial cardiac enzymes.     -Acute kidney injury on stage II-III CKD: Baseline creatinine 0.8, admission creatinine 1.65. Hold home diuretics, lisinopril, and metformin. Encouraged oral water intake.     -DM, Type 2, insulin requiring: Metformin held. Will place on low dose sliding scale insulin with finger stick blood glucose readings AC and HS. Place on hypoglycemia protocol. Check hemoglobin A1c level. Will review home insulin regimen and resume as appropriate. Check TSH.     -Chronic anemia: Stable. Normocytic. Repeat CBC in AM.     -Thrombocytosis: Likely reactive secondary to pneumonia and UTI. Repeat CBC in AM.     -Essential  hypertension, uncontrolled: Home diuretics and lisinopril will be held. Add hydralazine 10mg IV every 6 hours PRN for SBP >160 for now.     -COPD, with now mild acute exacerbation: Treat underlying pneumonia as noted above since being admitted.     -DVT Prophylaxis: subcutaneous heparin.     The patient is considered to be a high risk patient due to: Pneumonia, UTI, CHF, OPHELIA.     Code Status: FULL CODE.     I have discussed the patient's assessment and plan with the patient and admitting physician, Dr. Lora.     ONESIMO Henriquez  02/14/19  7:19 PM  ---------------------------------------------------------------------------------------------------------------------     Brief Attending Note     I have seen and examined the patient, performing an independent face-to-face diagnostic evaluation at 9 in the PM on 2/14/2019.  The plan of care reviewed and developed with the advanced practice clinician (APC):  Adela Lawler PA-C.    Brief Summary Statement/HPI:  74 yo that presented to the ED with shortness of air despite changes and additions to the diuretics.  She currently feels a little better than when she was in the ED.  She is coughing some but not producing any sputum. She denies fevers, denies nausea, denies diarrhea, denies abdominal pain.  She still has the shortness of air and is now wheezing some.    Attending Physical Exam:  Constitutional:  Well-developed and well-nourished.  Mild to moderate respiratory distress.  Wearing nasal cannula oxygen.       HENT:  Head: Normocephalic and atraumatic.  Mouth:  Moist mucous membranes.    Eyes:  Conjunctivae and EOM are normal.  Pupils are equal, round, and reactive to light.  No scleral icterus.    Neck:  Neck supple.  No JVD present.    Cardiovascular:  Normal rate, regular rhythm and normal heart sounds with no murmur.  Pulmonary/Chest:  Mild to moderate respiratory distress, no wheezes, right sided crackles, with normal breath sounds and poor air  movement.  Abdominal:  Soft.  Bowel sounds are normal.  No distension and no tenderness.   Musculoskeletal:  Minimal edema, no tenderness, and no deformity.  No red or swollen joints anywhere.    Neurological:  Alert and oriented to person, place, and time.  No cranial nerve deficit.  No tongue deviation.  No facial droop.  No slurred speech.   Skin: Skin is warm and dry. No rash noted. No pallor.   Peripheral vascular:  strong pulses on all 4 extremities with no clubbing, no cyanosis, and minimal edema.  Genitourinary:  No Bird catheter in place.    Brief Assessment/Plan:    See above for further detained assessment and plan developed with APC which I have reviewed and/or edited.    Sputum culture added.  Blood cultures and urine culture obtained in the ED.  Started on rocephin and doxycycline for now, which will cover both community acquired pneumonia and uncomplicated UTI.  Will add IV hydralazine for blood pressure control and will hold the home lisinopril and metformin.  Will continue the home hydralazine.  Will give less than the home levemir as the glucose levels are not elevated at this time; will perform bedside glucose monitoring four times a day and give a Novolog sliding scale.  Will repeat the labs in the morning.  Will obtain a hemoglobin a1c.  Will give scheduled breathing treatments for the acute COPD exacerbation; will hold off on steroids due to the diabetes.      Manfred Smith MD  [facility] Medicine Team  02/15/19  4:26 PM

## 2019-02-14 NOTE — ED PROVIDER NOTES
Subjective   Patient is a 73-year-old female who presents today in the company of her daughter with a chief complaint of shortness of breath, gradually worsening over the past month.  This is associated with swelling in her legs, which has improved after her kidney doctor stopped her chlorthalidone and changed her to Bumex 1 mg daily.  The shortness of breath has not improved however.  She reports that she saw her family doctor yesterday, had an injection of Lasix that was not very effective at increasing her urine output.  She denies fever, chills, chest pain, cough, abdominal pain, nausea, vomiting, other symptoms or other complaints.            Review of Systems   Constitutional: Negative for chills, diaphoresis and fever.   HENT: Negative for ear pain, sore throat and trouble swallowing.    Eyes: Negative for photophobia and pain.   Respiratory: Positive for shortness of breath. Negative for cough.    Cardiovascular: Positive for leg swelling. Negative for chest pain and palpitations.   Gastrointestinal: Negative for abdominal distention, abdominal pain, blood in stool, diarrhea, nausea and vomiting.   Endocrine: Negative for polydipsia and polyphagia.   Genitourinary: Negative for difficulty urinating and flank pain.   Musculoskeletal: Negative for back pain, neck pain and neck stiffness.   Skin: Negative for color change and pallor.   Neurological: Negative for seizures, syncope and speech difficulty.   Psychiatric/Behavioral: Negative for confusion.   All other systems reviewed and are negative.      Past Medical History:   Diagnosis Date   • Anemia    • Arthritis    • Chronic kidney disease    • COPD (chronic obstructive pulmonary disease) (CMS/[facility])    • Diabetes mellitus (CMS/[facility])    • Elevated cholesterol    • History of transfusion    • Osteoporosis        No Known Allergies    Past Surgical History:   Procedure Laterality Date   • ANUS SURGERY N/A 6/11/2018    Procedure: Diagnostic anoscopy with anal  polyp excision;  Surgeon: Marlyn Gutierrez MD;  Location: [facility];  Service: General   •  SECTION     • HIP BIPOLAR REPLACEMENT      left  Page    • HYSTERECTOMY         Family History   Problem Relation Age of Onset   • Heart disease Mother    • COPD Mother    • Arthritis Mother    • Diabetes Father        Social History     Socioeconomic History   • Marital status:      Spouse name: Not on file   • Number of children: Not on file   • Years of education: Not on file   • Highest education level: Not on file   Tobacco Use   • Smoking status: Former Smoker     Years: 15.00     Types: Cigarettes   • Smokeless tobacco: Never Used   • Tobacco comment: quit 15 years ago    Substance and Sexual Activity   • Alcohol use: No   • Drug use: No   • Sexual activity: Defer     Birth control/protection: Post-menopausal           Objective   Physical Exam   Constitutional: She is oriented to person, place, and time. She appears well-developed and well-nourished.   Patient appears moderately dyspneic.   HENT:   Head: Normocephalic and atraumatic.   Eyes: EOM are normal. Pupils are equal, round, and reactive to light. No scleral icterus.   Neck: Normal range of motion. Neck supple. No neck rigidity. No tracheal deviation present.   Cardiovascular: Normal rate, regular rhythm and intact distal pulses.   Pulmonary/Chest: She has rales in the right lower field and the left lower field. She exhibits no tenderness.   Abdominal: Soft. Bowel sounds are normal. There is no tenderness. There is no rebound and no guarding.   Musculoskeletal: Normal range of motion. She exhibits no tenderness.        Right lower leg: She exhibits no tenderness.        Left lower leg: She exhibits no tenderness.   There is 2+ pedal and pretibial edema bilaterally.   Neurological: She is alert and oriented to person, place, and time. She has normal strength. No sensory deficit. She exhibits normal muscle tone. Coordination normal. GCS eye  subscore is 4. GCS verbal subscore is 5. GCS motor subscore is 6.   Skin: Skin is warm and dry. Capillary refill takes less than 2 seconds. No cyanosis. No pallor.   Psychiatric: She has a normal mood and affect. Her behavior is normal.   Nursing note and vitals reviewed.      Procedures  EKG shows sinus rhythm with rate of 60.  Nonspecific ST abnormality.  No apparent acute ischemia.  XR Chest 1 View   ED Interpretation   Right middle lobe infiltrate pending radiologist review.  There is possibly mild congestive changes as well.        Results for orders placed or performed during the hospital encounter of 02/14/19   Influenza Antigen, Rapid - Swab, Nasopharynx   Result Value Ref Range    Influenza A Ag, EIA Negative Negative    Influenza B Ag, EIA Negative Negative   Blood Gas, Arterial   Result Value Ref Range    Site Arterial: right brachial     Sky's Test N/A     pH, Arterial 7.402 7.350 - 7.450 pH units    pCO2, Arterial 37.1 35.0 - 45.0 mm Hg    pO2, Arterial 66.3 (L) 80.0 - 100.0 mm Hg    HCO3, Arterial 22.6 22.0 - 26.0 mmol/L    Base Excess, Arterial -1.9 mmol/L    O2 Saturation, Arterial 92.7 90.0 - 100.0 %    Hemoglobin, Blood Gas 10.7 (L) 12 - 16 g/dL    Hematocrit, Blood Gas 31.0 (L) 37.0 - 47.0 %    Oxyhemoglobin 91.3 85 - 100 %    Methemoglobin 0.20 0.00 - 3.00 %    Carboxyhemoglobin 1.3 0 - 5 %    A-a Gradiant 32.3 0.0 - 300.0 mmHg    Temperature 98.6 C    Barometric Pressure for Blood Gas 728 mmHg    Modality Room Air     FIO2 21 %   Lactic Acid, Plasma   Result Value Ref Range    Lactate 0.8 0.5 - 2.0 mmol/L   Comprehensive Metabolic Panel   Result Value Ref Range    Glucose 117 (H) 70 - 110 mg/dL    BUN 35 (H) 7 - 21 mg/dL    Creatinine 1.65 (H) 0.43 - 1.29 mg/dL    Sodium 139 135 - 153 mmol/L    Potassium 5.0 3.5 - 5.3 mmol/L    Chloride 107 99 - 112 mmol/L    CO2 28.0 24.3 - 31.9 mmol/L    Calcium 9.4 7.7 - 10.0 mg/dL    Total Protein 7.2 6.0 - 8.0 g/dL    Albumin 4.30 3.40 - 4.80 g/dL    ALT  (SGPT) 14 10 - 36 U/L    AST (SGOT) 10 10 - 30 U/L    Alkaline Phosphatase 37 35 - 104 U/L    Total Bilirubin 0.1 (L) 0.2 - 1.8 mg/dL    eGFR Non African Amer 30 (L) >60 mL/min/1.73    Globulin 2.9 gm/dL    A/G Ratio 1.5 1.5 - 2.5 g/dL    BUN/Creatinine Ratio 21.2 7.0 - 25.0    Anion Gap 4.0 3.6 - 11.2 mmol/L   Lipase   Result Value Ref Range    Lipase 36 13 - 60 U/L   Urinalysis With Microscopic If Indicated (No Culture) - Urine, Clean Catch   Result Value Ref Range    Color, UA Yellow Yellow, Straw    Appearance, UA Clear Clear    pH, UA <=5.0 5.0 - 8.0    Specific Gravity, UA 1.022 1.005 - 1.030    Glucose, UA Negative Negative    Ketones, UA Negative Negative    Bilirubin, UA Negative Negative    Blood, UA Negative Negative    Protein, UA Negative Negative    Leuk Esterase, UA Small (1+) (A) Negative    Nitrite, UA Positive (A) Negative    Urobilinogen, UA 0.2 E.U./dL 0.2 - 1.0 E.U./dL   BNP   Result Value Ref Range    .0 (H) 0.0 - 100.0 pg/mL   Troponin   Result Value Ref Range    Troponin I <0.006 <=0.040 ng/mL   CBC Auto Differential   Result Value Ref Range    WBC 8.90 4.50 - 12.50 10*3/mm3    RBC 3.75 (L) 4.20 - 5.40 10*6/mm3    Hemoglobin 10.2 (L) 12.0 - 16.0 g/dL    Hematocrit 33.3 (L) 37.0 - 47.0 %    MCV 88.8 80.0 - 94.0 fL    MCH 27.2 27.0 - 33.0 pg    MCHC 30.6 (L) 33.0 - 37.0 g/dL    RDW 14.7 (H) 11.5 - 14.5 %    RDW-SD 46.9 37.0 - 54.0 fl    MPV 10.8 (H) 6.0 - 10.0 fL    Platelets 528 (H) 130 - 400 10*3/mm3    Neutrophil % 49.6 40.0 - 75.0 %    Lymphocyte % 38.9 16.0 - 46.0 %    Monocyte % 8.7 0.0 - 12.0 %    Eosinophil % 2.5 0.0 - 7.0 %    Basophil % 0.2 0.0 - 2.0 %    Immature Grans % 0.1 0.0 - 0.5 %    Neutrophils, Absolute 4.42 1.40 - 6.50 10*3/mm3    Lymphocytes, Absolute 3.46 (H) 1.00 - 3.00 10*3/mm3    Monocytes, Absolute 0.77 0.10 - 0.90 10*3/mm3    Eosinophils, Absolute 0.22 0.00 - 0.70 10*3/mm3    Basophils, Absolute 0.02 0.00 - 0.30 10*3/mm3    Immature Grans, Absolute 0.01 0.00 -  0.03 10*3/mm3   Urinalysis, Microscopic Only - Urine, Clean Catch   Result Value Ref Range    RBC, UA 0-2 None Seen, 0-2 /HPF    WBC, UA 13-20 (A) None Seen, 0-2 /HPF    Bacteria, UA 4+ (A) None Seen /HPF    Squamous Epithelial Cells, UA 0-2 None Seen, 0-2 /HPF    Hyaline Casts, UA None Seen None Seen /LPF    Methodology Automated Microscopy    Osmolality, Calculated   Result Value Ref Range    Osmolality Calc 286.5 273.0 - 305.0 mOsm/kg   Light Blue Top   Result Value Ref Range    Extra Tube hold for add-on    Green Top (Gel)   Result Value Ref Range    Extra Tube Hold for add-ons.    Lavender Top   Result Value Ref Range    Extra Tube hold for add-on    Gold Top - SST   Result Value Ref Range    Extra Tube Hold for add-ons.                 ED Course  ED Course as of Feb 14 1831   Thu Feb 14, 2019   1744 Patient's emergency department stay has not been difficult.  She is doing well.  Hospitalist paged.  [CM]   1800 Case discussed with Dr. Smith.  She is admitting patient to the medical telemetry unit under her service for further care.  [CM]      ED Course User Index  [CM] Narciso Milan MD                  [facility]      Final diagnoses:   Pneumonia of right middle lobe due to infectious organism (CMS/HCC)   Acute kidney injury (CMS/HCC)             Please note that portions of this note were completed with a voice recognition program. Efforts were made to edit the dictations, but occasionally words are mistranscribed.     Narciso Milan MD  02/14/19 1831

## 2019-02-15 ENCOUNTER — APPOINTMENT (OUTPATIENT)
Dept: CARDIOLOGY | Facility: HOSPITAL | Age: 74
End: 2019-02-15

## 2019-02-15 PROBLEM — J44.1 ACUTE EXACERBATION OF CHRONIC OBSTRUCTIVE PULMONARY DISEASE (COPD) (HCC): Status: RESOLVED | Noted: 2018-02-03 | Resolved: 2019-02-15

## 2019-02-15 LAB
ANION GAP SERPL CALCULATED.3IONS-SCNC: 4.6 MMOL/L (ref 3.6–11.2)
BASOPHILS # BLD AUTO: 0.02 10*3/MM3 (ref 0–0.3)
BASOPHILS NFR BLD AUTO: 0.2 % (ref 0–2)
BH CV ECHO MEAS - ACS: 1.6 CM
BH CV ECHO MEAS - AO MAX PG: 14 MMHG
BH CV ECHO MEAS - AO MEAN PG: 8.3 MMHG
BH CV ECHO MEAS - AO ROOT AREA (BSA CORRECTED): 1.6
BH CV ECHO MEAS - AO ROOT AREA: 6.6 CM^2
BH CV ECHO MEAS - AO ROOT DIAM: 2.9 CM
BH CV ECHO MEAS - AO V2 MAX: 187.1 CM/SEC
BH CV ECHO MEAS - AO V2 MEAN: 136.4 CM/SEC
BH CV ECHO MEAS - AO V2 VTI: 46.6 CM
BH CV ECHO MEAS - BSA(HAYCOCK): 1.8 M^2
BH CV ECHO MEAS - BSA: 1.8 M^2
BH CV ECHO MEAS - BZI_BMI: 28.9 KILOGRAMS/M^2
BH CV ECHO MEAS - BZI_METRIC_HEIGHT: 160 CM
BH CV ECHO MEAS - BZI_METRIC_WEIGHT: 73.9 KG
BH CV ECHO MEAS - EDV(CUBED): 124.3 ML
BH CV ECHO MEAS - EDV(MOD-SP4): 57 ML
BH CV ECHO MEAS - EDV(TEICH): 117.7 ML
BH CV ECHO MEAS - EF(CUBED): 85.2 %
BH CV ECHO MEAS - EF(MOD-SP4): 63.2 %
BH CV ECHO MEAS - EF(TEICH): 78.3 %
BH CV ECHO MEAS - ESV(CUBED): 18.4 ML
BH CV ECHO MEAS - ESV(MOD-SP4): 21 ML
BH CV ECHO MEAS - ESV(TEICH): 25.6 ML
BH CV ECHO MEAS - FS: 47.1 %
BH CV ECHO MEAS - IVS/LVPW: 0.87
BH CV ECHO MEAS - IVSD: 0.8 CM
BH CV ECHO MEAS - LA DIMENSION: 2.9 CM
BH CV ECHO MEAS - LA/AO: 1
BH CV ECHO MEAS - LV DIASTOLIC VOL/BSA (35-75): 32.2 ML/M^2
BH CV ECHO MEAS - LV MASS(C)D: 148.5 GRAMS
BH CV ECHO MEAS - LV MASS(C)DI: 83.8 GRAMS/M^2
BH CV ECHO MEAS - LV SYSTOLIC VOL/BSA (12-30): 11.8 ML/M^2
BH CV ECHO MEAS - LVIDD: 5 CM
BH CV ECHO MEAS - LVIDS: 2.6 CM
BH CV ECHO MEAS - LVLD AP4: 7 CM
BH CV ECHO MEAS - LVLS AP4: 5.7 CM
BH CV ECHO MEAS - LVOT AREA (M): 3.1 CM^2
BH CV ECHO MEAS - LVOT AREA: 3.1 CM^2
BH CV ECHO MEAS - LVOT DIAM: 2 CM
BH CV ECHO MEAS - LVPWD: 0.92 CM
BH CV ECHO MEAS - MV A MAX VEL: 126.5 CM/SEC
BH CV ECHO MEAS - MV E MAX VEL: 149.3 CM/SEC
BH CV ECHO MEAS - MV E/A: 1.2
BH CV ECHO MEAS - PA ACC SLOPE: 912.4 CM/SEC^2
BH CV ECHO MEAS - PA ACC TIME: 0.11 SEC
BH CV ECHO MEAS - PA PR(ACCEL): 31.5 MMHG
BH CV ECHO MEAS - RAP SYSTOLE: 10 MMHG
BH CV ECHO MEAS - RVSP: 49 MMHG
BH CV ECHO MEAS - SI(AO): 172.9 ML/M^2
BH CV ECHO MEAS - SI(CUBED): 59.7 ML/M^2
BH CV ECHO MEAS - SI(MOD-SP4): 20.3 ML/M^2
BH CV ECHO MEAS - SI(TEICH): 52 ML/M^2
BH CV ECHO MEAS - SV(AO): 306.6 ML
BH CV ECHO MEAS - SV(CUBED): 105.8 ML
BH CV ECHO MEAS - SV(MOD-SP4): 36 ML
BH CV ECHO MEAS - SV(TEICH): 92.1 ML
BH CV ECHO MEAS - TR MAX VEL: 312.4 CM/SEC
BUN BLD-MCNC: 30 MG/DL (ref 7–21)
BUN/CREAT SERPL: 20.3 (ref 7–25)
CALCIUM SPEC-SCNC: 8.6 MG/DL (ref 7.7–10)
CHLORIDE SERPL-SCNC: 109 MMOL/L (ref 99–112)
CK MB SERPL-CCNC: 0.32 NG/ML (ref 0–5)
CK MB SERPL-RTO: 1.8 % (ref 0–3)
CK SERPL-CCNC: 18 U/L (ref 24–173)
CO2 SERPL-SCNC: 24.4 MMOL/L (ref 24.3–31.9)
CREAT BLD-MCNC: 1.48 MG/DL (ref 0.43–1.29)
DEPRECATED RDW RBC AUTO: 47.2 FL (ref 37–54)
EOSINOPHIL # BLD AUTO: 0.25 10*3/MM3 (ref 0–0.7)
EOSINOPHIL NFR BLD AUTO: 3.1 % (ref 0–7)
ERYTHROCYTE [DISTWIDTH] IN BLOOD BY AUTOMATED COUNT: 14.8 % (ref 11.5–14.5)
GFR SERPL CREATININE-BSD FRML MDRD: 34 ML/MIN/1.73
GLUCOSE BLD-MCNC: 119 MG/DL (ref 70–110)
GLUCOSE BLDC GLUCOMTR-MCNC: 128 MG/DL (ref 70–130)
GLUCOSE BLDC GLUCOMTR-MCNC: 140 MG/DL (ref 70–130)
GLUCOSE BLDC GLUCOMTR-MCNC: 140 MG/DL (ref 70–130)
GLUCOSE BLDC GLUCOMTR-MCNC: 184 MG/DL (ref 70–130)
HCT VFR BLD AUTO: 29.8 % (ref 37–47)
HGB BLD-MCNC: 9 G/DL (ref 12–16)
IMM GRANULOCYTES # BLD AUTO: 0.02 10*3/MM3 (ref 0–0.03)
IMM GRANULOCYTES NFR BLD AUTO: 0.2 % (ref 0–0.5)
LYMPHOCYTES # BLD AUTO: 3.44 10*3/MM3 (ref 1–3)
LYMPHOCYTES NFR BLD AUTO: 42.5 % (ref 16–46)
MAXIMAL PREDICTED HEART RATE: 146 BPM
MCH RBC QN AUTO: 27.3 PG (ref 27–33)
MCHC RBC AUTO-ENTMCNC: 30.2 G/DL (ref 33–37)
MCV RBC AUTO: 90.3 FL (ref 80–94)
MONOCYTES # BLD AUTO: 0.68 10*3/MM3 (ref 0.1–0.9)
MONOCYTES NFR BLD AUTO: 8.4 % (ref 0–12)
MYOGLOBIN SERPL-MCNC: 57 NG/ML (ref 0–109)
NEUTROPHILS # BLD AUTO: 3.69 10*3/MM3 (ref 1.4–6.5)
NEUTROPHILS NFR BLD AUTO: 45.6 % (ref 40–75)
OSMOLALITY SERPL CALC.SUM OF ELEC: 283 MOSM/KG (ref 273–305)
PLATELET # BLD AUTO: 456 10*3/MM3 (ref 130–400)
PMV BLD AUTO: 10.5 FL (ref 6–10)
POTASSIUM BLD-SCNC: 4.5 MMOL/L (ref 3.5–5.3)
RBC # BLD AUTO: 3.3 10*6/MM3 (ref 4.2–5.4)
SODIUM BLD-SCNC: 138 MMOL/L (ref 135–153)
STRESS TARGET HR: 124 BPM
TROPONIN I SERPL-MCNC: <0.006 NG/ML
WBC NRBC COR # BLD: 8.1 10*3/MM3 (ref 4.5–12.5)

## 2019-02-15 PROCEDURE — 97162 PT EVAL MOD COMPLEX 30 MIN: CPT

## 2019-02-15 PROCEDURE — 97116 GAIT TRAINING THERAPY: CPT

## 2019-02-15 PROCEDURE — 97530 THERAPEUTIC ACTIVITIES: CPT

## 2019-02-15 PROCEDURE — 82550 ASSAY OF CK (CPK): CPT | Performed by: PHYSICIAN ASSISTANT

## 2019-02-15 PROCEDURE — 99232 SBSQ HOSP IP/OBS MODERATE 35: CPT | Performed by: INTERNAL MEDICINE

## 2019-02-15 PROCEDURE — 85025 COMPLETE CBC W/AUTO DIFF WBC: CPT | Performed by: PHYSICIAN ASSISTANT

## 2019-02-15 PROCEDURE — 94799 UNLISTED PULMONARY SVC/PX: CPT

## 2019-02-15 PROCEDURE — 80048 BASIC METABOLIC PNL TOTAL CA: CPT | Performed by: PHYSICIAN ASSISTANT

## 2019-02-15 PROCEDURE — 25010000002 HEPARIN (PORCINE) PER 1000 UNITS: Performed by: PHYSICIAN ASSISTANT

## 2019-02-15 PROCEDURE — 82553 CREATINE MB FRACTION: CPT | Performed by: PHYSICIAN ASSISTANT

## 2019-02-15 PROCEDURE — 93306 TTE W/DOPPLER COMPLETE: CPT | Performed by: INTERNAL MEDICINE

## 2019-02-15 PROCEDURE — 25010000002 CEFTRIAXONE: Performed by: PHYSICIAN ASSISTANT

## 2019-02-15 PROCEDURE — 83874 ASSAY OF MYOGLOBIN: CPT | Performed by: PHYSICIAN ASSISTANT

## 2019-02-15 PROCEDURE — 93306 TTE W/DOPPLER COMPLETE: CPT

## 2019-02-15 PROCEDURE — 92610 EVALUATE SWALLOWING FUNCTION: CPT

## 2019-02-15 PROCEDURE — 84484 ASSAY OF TROPONIN QUANT: CPT | Performed by: PHYSICIAN ASSISTANT

## 2019-02-15 PROCEDURE — 63710000001 INSULIN DETEMIR PER 5 UNITS: Performed by: INTERNAL MEDICINE

## 2019-02-15 PROCEDURE — 82962 GLUCOSE BLOOD TEST: CPT

## 2019-02-15 RX ADMIN — DOXYCYCLINE 100 MG: 100 INJECTION, POWDER, LYOPHILIZED, FOR SOLUTION INTRAVENOUS at 06:05

## 2019-02-15 RX ADMIN — SODIUM CHLORIDE, PRESERVATIVE FREE 3 ML: 5 INJECTION INTRAVENOUS at 20:27

## 2019-02-15 RX ADMIN — DOXYCYCLINE 100 MG: 100 INJECTION, POWDER, LYOPHILIZED, FOR SOLUTION INTRAVENOUS at 17:11

## 2019-02-15 RX ADMIN — CARVEDILOL 12.5 MG: 6.25 TABLET, FILM COATED ORAL at 17:11

## 2019-02-15 RX ADMIN — FAMOTIDINE 20 MG: 20 TABLET, FILM COATED ORAL at 08:51

## 2019-02-15 RX ADMIN — INSULIN DETEMIR 10 UNITS: 100 INJECTION, SOLUTION SUBCUTANEOUS at 08:51

## 2019-02-15 RX ADMIN — IPRATROPIUM BROMIDE AND ALBUTEROL SULFATE 3 ML: .5; 3 SOLUTION RESPIRATORY (INHALATION) at 14:34

## 2019-02-15 RX ADMIN — HEPARIN SODIUM 5000 UNITS: 5000 INJECTION INTRAVENOUS; SUBCUTANEOUS at 20:27

## 2019-02-15 RX ADMIN — CETIRIZINE HCL 10 MG: 10 TABLET ORAL at 08:51

## 2019-02-15 RX ADMIN — HYDRALAZINE HYDROCHLORIDE 50 MG: 50 TABLET ORAL at 14:57

## 2019-02-15 RX ADMIN — IPRATROPIUM BROMIDE AND ALBUTEROL SULFATE 3 ML: .5; 3 SOLUTION RESPIRATORY (INHALATION) at 07:18

## 2019-02-15 RX ADMIN — HEPARIN SODIUM 5000 UNITS: 5000 INJECTION INTRAVENOUS; SUBCUTANEOUS at 08:51

## 2019-02-15 RX ADMIN — BUDESONIDE AND FORMOTEROL FUMARATE DIHYDRATE 2 PUFF: 160; 4.5 AEROSOL RESPIRATORY (INHALATION) at 07:58

## 2019-02-15 RX ADMIN — SODIUM CHLORIDE, PRESERVATIVE FREE 3 ML: 5 INJECTION INTRAVENOUS at 08:56

## 2019-02-15 RX ADMIN — HYDRALAZINE HYDROCHLORIDE 50 MG: 50 TABLET ORAL at 06:05

## 2019-02-15 RX ADMIN — FAMOTIDINE 20 MG: 20 TABLET, FILM COATED ORAL at 20:26

## 2019-02-15 RX ADMIN — DILTIAZEM HYDROCHLORIDE 360 MG: 180 CAPSULE, EXTENDED RELEASE ORAL at 08:51

## 2019-02-15 RX ADMIN — INSULIN DETEMIR 10 UNITS: 100 INJECTION, SOLUTION SUBCUTANEOUS at 20:34

## 2019-02-15 RX ADMIN — CEFTRIAXONE 2 G: 2 INJECTION, POWDER, FOR SOLUTION INTRAMUSCULAR; INTRAVENOUS at 12:10

## 2019-02-15 RX ADMIN — BUDESONIDE AND FORMOTEROL FUMARATE DIHYDRATE 2 PUFF: 160; 4.5 AEROSOL RESPIRATORY (INHALATION) at 19:46

## 2019-02-15 RX ADMIN — IPRATROPIUM BROMIDE AND ALBUTEROL SULFATE 3 ML: .5; 3 SOLUTION RESPIRATORY (INHALATION) at 19:46

## 2019-02-15 RX ADMIN — CARVEDILOL 12.5 MG: 6.25 TABLET, FILM COATED ORAL at 08:51

## 2019-02-15 NOTE — PLAN OF CARE
Problem: Patient Care Overview  Goal: Plan of Care Review  Outcome: Ongoing (interventions implemented as appropriate)      Problem: Skin Injury Risk (Adult)  Goal: Identify Related Risk Factors and Signs and Symptoms  Outcome: Ongoing (interventions implemented as appropriate)    Goal: Skin Health and Integrity  Outcome: Ongoing (interventions implemented as appropriate)      Problem: Pneumonia (Adult)  Goal: Signs and Symptoms of Listed Potential Problems Will be Absent, Minimized or Managed (Pneumonia)  Outcome: Ongoing (interventions implemented as appropriate)      Problem: Diabetes, Type 2 (Adult)  Goal: Signs and Symptoms of Listed Potential Problems Will be Absent, Minimized or Managed (Diabetes, Type 2)  Outcome: Ongoing (interventions implemented as appropriate)

## 2019-02-15 NOTE — PLAN OF CARE
Problem: Patient Care Overview  Goal: Plan of Care Review  Outcome: Outcome(s) achieved Date Met: 02/15/19   02/15/19 1054   Plan of Care Review   Progress improving   Coping/Psychosocial   Plan of Care Reviewed With patient;family   OTHER   Outcome Summary Clinical dysphagia assessment w/o overt s/s aspiration. No silent aspiration suspected as no significant changes in secretions, respiratiosn, or vocal quality. Recommend least restrictive po diet, thin liquids. No furhter formal SLP f/u recommended. Thank you

## 2019-02-15 NOTE — PLAN OF CARE
Problem: Pneumonia (Adult)  Goal: Signs and Symptoms of Listed Potential Problems Will be Absent, Minimized or Managed (Pneumonia)  Outcome: Ongoing (interventions implemented as appropriate)   02/14/19 2496   Goal/Outcome Evaluation   Problems Assessed (Pneumonia) respiratory compromise   Problems Present (Pneumonia) respiratory compromise

## 2019-02-15 NOTE — THERAPY EVALUATION
Acute Care - Speech Language Pathology   Swallow Initial Evaluation  Phil   CLINICAL DYSPHAGIA ASSESSMENT     Patient Name: Albina Finley  : 1945  MRN: [id number]  Today's Date: 2/15/2019  Onset of Illness/Injury or Date of Surgery: 19     Referring Physician: Luis      Admit Date: 2019     Albina Finley  is seen at bedside this am on 3N to assess safety/efficacy of swallowing fnx, determine safest/least restrictive diet tolerance. Her family is present at bedside for this assessment.     Ms. Finley was admitted 19 from her private residence  to fever, SOB w/ dx of PNA upon admission. She is referred to r/o aspiration component.     Social History     Socioeconomic History   • Marital status:      Spouse name: Not on file   • Number of children: Not on file   • Years of education: Not on file   • Highest education level: Not on file   Social Needs   • Financial resource strain: Not on file   • Food insecurity - worry: Not on file   • Food insecurity - inability: Not on file   • Transportation needs - medical: Not on file   • Transportation needs - non-medical: Not on file   Occupational History   • Not on file   Tobacco Use   • Smoking status: Former Smoker     Years: 15.00     Types: Cigarettes   • Smokeless tobacco: Never Used   • Tobacco comment: quit 15 years ago    Substance and Sexual Activity   • Alcohol use: No   • Drug use: No   • Sexual activity: Defer     Birth control/protection: Post-menopausal   Other Topics Concern   • Not on file   Social History Narrative   • Not on file      Chest xray 19 R LL PNA w/ mild CHF.     Diet Orders (active) (From admission, onward)    Start     Ordered    19  NPO Diet  Diet Effective Now      19        She is observed on NC 2L.     Ms. Finley is positioned upright and centered in bed to accept multiple po presentations of ice chips, solid cracker, puree and thin liquids via spoon, cup and straw.  She is  able to self feed.     Facial/oral structures are symmetrical upon observation. Lingual protrusion reveals no deviation. Oral mucosa are moist, pink, and clean. Secretions are clear, thin, and well controlled. OROM/TONY is wfl to imitate oral postures. Gag is not assessed. Volitional cough is intact w/ adequate  intensity, clear in quality, non-productive. Voice is adequate in intensity, clear in quality w/ intelligible speech.    Upon po presentations, adequate bolus anticipation and acceptance w/ good labial seal for bolus clearance via spoon bowl, cup rim stability and suction via straw. Bolus formation, manipulation and control are wfl w/ rotary mastication pattern. A-p transit is timely w/o oral residue. No overt s/s aspiration before the swallow.     Pharyngeal swallow is timely w/ adequate hyolaryngeal elevation per palpation. No overt s/s aspiration evidenced across this evaluation. No silent aspiration suspected as pt is w/o changes in vocal quality, respirations or secretions post po presentations. She denies odynophagia.    Visit Dx:     ICD-10-CM ICD-9-CM   1. Pneumonia of right middle lobe due to infectious organism (CMS/HCC) J18.1 486   2. Acute kidney injury (CMS/HCC) N17.9 584.9     Patient Active Problem List   Diagnosis   • Iron deficiency anemia   • Acute exacerbation of chronic obstructive pulmonary disease (COPD) (CMS/HCC)   • Anal polyp   • Pneumonia of right middle lobe due to infectious organism (CMS/HCC)     Past Medical History:   Diagnosis Date   • Anemia    • Arthritis    • Chronic kidney disease    • COPD (chronic obstructive pulmonary disease) (CMS/HCC)    • Diabetes mellitus (CMS/HCC)    • Elevated cholesterol    • History of transfusion    • Osteoporosis      Past Surgical History:   Procedure Laterality Date   • ANUS SURGERY N/A 2018    Procedure: Diagnostic anoscopy with anal polyp excision;  Surgeon: Marlyn Gutierrez MD;  Location: [facility] OR;  Service: General   •   SECTION     • HIP BIPOLAR REPLACEMENT      left Dr. Cornejo    • HYSTERECTOMY       EDUCATION  The patient has been educated in the following areas:   Dysphagia (Swallowing Impairment) Oral Care/Hydration.    Impression: Ms. Finley presents w/ wfl oropharyngeal swallow w/o s/s aspiration.No s/s indicative of silent aspiration.  No odynophagia reported.      She is felt to most benefit from least restrictive po diet regular consistency, thin liquids.     SLP Recommendation and Plan    1. Regular consistency, thin liquids.    2. Medications whole in puree/thins.   3. Upright and centered for all po intake  4. RON precautions.  5. Oral care protocol.  No further formal SLP f/u warranted at this time.    D/w pt and present family results and recommendations w/ verbal agreement.    Thank you for allowing me to participate in the care of your patient-  Yancy Price M.S., CCC/SLP                                       Plan of Care Reviewed With: patient, family  Plan of Care Review  Plan of Care Reviewed With: patient, family  Progress: improving  Outcome Summary: Clinical dysphagia assessment w/o overt s/s aspiration. No silent aspiration suspected as no significant changes in secretions, respiratiosn, or vocal quality. Recommend least restrictive po diet, thin liquids. No furhter formal SLP f/u recommended. Thank you      Time Calculation:     Therapy Charges for Today     Code Description Service Date Service Provider Modifiers Qty    [id number]  ST EVAL ORAL PHARYNG SWALLOW 3 2/15/2019 Yancy Price, MS CCC-SLP GN 1             Yancy Price MS CCC-SLP  2/15/2019

## 2019-02-15 NOTE — PLAN OF CARE
Problem: Patient Care Overview  Goal: Plan of Care Review  Outcome: Ongoing (interventions implemented as appropriate)   02/14/19 2000 02/14/19 2338   Plan of Care Review   Progress --  no change   Coping/Psychosocial   Plan of Care Reviewed With patient;daughter --      Goal: Discharge Needs Assessment  Outcome: Ongoing (interventions implemented as appropriate)   02/14/19 1933 02/14/19 1936   Disability   Equipment Currently Used at Home walker, standard --    Discharge Needs Assessment   Patient/Family Anticipates Transition to --  home with family   Patient/Family Anticipated Services at Transition --  none   Transportation Anticipated --  family or friend will provide     Goal: Interprofessional Rounds/Family Conf  Outcome: Ongoing (interventions implemented as appropriate)   02/14/19 2338   Interdisciplinary Rounds/Family Conf   Participants family;patient;physician       Problem: Skin Injury Risk (Adult)  Goal: Identify Related Risk Factors and Signs and Symptoms  Outcome: Ongoing (interventions implemented as appropriate)   02/14/19 2338   Skin Injury Risk (Adult)   Related Risk Factors (Skin Injury Risk) infection;mobility impaired     Goal: Skin Health and Integrity  Outcome: Ongoing (interventions implemented as appropriate)   02/14/19 2338   Skin Injury Risk (Adult)   Skin Health and Integrity making progress toward outcome

## 2019-02-15 NOTE — THERAPY DISCHARGE NOTE
Acute Care - Physical Therapy Initial Eval/Discharge   [place]     Patient Name: Albina Finley  : 1945  MRN: [id number]  Today's Date: 2/15/2019   Onset of Illness/Injury or Date of Surgery: 19  Date of Referral to PT: 19  Referring Physician: Luis      Admit Date: 2019    Visit Dx:    ICD-10-CM ICD-9-CM   1. Pneumonia of right middle lobe due to infectious organism (CMS/HCC) J18.1 486   2. Acute kidney injury (CMS/HCC) N17.9 584.9     Patient Active Problem List   Diagnosis   • Iron deficiency anemia   • Acute exacerbation of chronic obstructive pulmonary disease (COPD) (CMS/HCC)   • Anal polyp   • Pneumonia of right middle lobe due to infectious organism (CMS/HCC)     Past Medical History:   Diagnosis Date   • Anemia    • Arthritis    • Chronic kidney disease    • COPD (chronic obstructive pulmonary disease) (CMS/HCC)    • Diabetes mellitus (CMS/HCC)    • Elevated cholesterol    • History of transfusion    • Osteoporosis      Past Surgical History:   Procedure Laterality Date   • ANUS SURGERY N/A 2018    Procedure: Diagnostic anoscopy with anal polyp excision;  Surgeon: Marlyn Gutierrez MD;  Location: [facility];  Service: General   •  SECTION     • HIP BIPOLAR REPLACEMENT      left  Page    • HYSTERECTOMY            PT ASSESSMENT (last 12 hours)      Physical Therapy Evaluation     Row Name 02/15/19 1000          PT Evaluation Time/Intention    Subjective Information  no complaints  -BC     Document Type  evaluation  -BC     Mode of Treatment  physical therapy;individual therapy  -BC     Patient Effort  good  -BC     Row Name 02/15/19 1000          General Information    Patient Profile Reviewed?  yes  -BC     Onset of Illness/Injury or Date of Surgery  19  -BC     Referring Physician  Luis  -BC     Patient Observations  alert;cooperative;agree to therapy  -BC     General Observations of Patient  Pt supine in bed, no distress noted.   -BC     Prior Level of  Function  independent:;all household mobility  -BC     Equipment Currently Used at Home  none  -BC     Risks Reviewed  patient:;LOB;nausea/vomiting;dizziness;increased discomfort;change in vital signs;increased drainage;lines disloged  -BC     Benefits Reviewed  patient:;improve function;increase independence;increase strength;increase balance;decrease pain;decrease risk of DVT;improve skin integrity;increase knowledge  -BC     Row Name 02/15/19 1000          Relationship/Environment    Primary Source of Support/Comfort  child(lester)  -BC     Lives With  alone  -BC     Primary Roles/Responsibilities  homemaker  -BC     Row Name 02/15/19 1000          Resource/Environmental Concerns    Current Living Arrangements  home/apartment/condo  -BC     Resource/Environmental Concerns  none  -BC     Row Name 02/15/19 1000          Cognitive Assessment/Intervention- PT/OT    Orientation Status (Cognition)  oriented x 3  -BC     Follows Commands (Cognition)  follows one step commands  -BC     Row Name 02/15/19 1000          Mobility Assessment/Treatment    Extremity Weight-bearing Status  left lower extremity;right lower extremity  -BC     Left Lower Extremity (Weight-bearing Status)  full weight-bearing (FWB)  -BC     Right Lower Extremity (Weight-bearing Status)  full weight-bearing (FWB)  -BC     Row Name 02/15/19 1000          Bed Mobility Assessment/Treatment    Bed Mobility Assessment/Treatment  bed mobility (all) activities  -BC     [place] Level (Bed Mobility)  minimum assist (75% patient effort)  -BC     Assistive Device (Bed Mobility)  bed rails  -BC     Row Name 02/15/19 1000          Transfer Assessment/Treatment    Transfer Assessment/Treatment  sit-stand transfer;stand-sit transfer  -BC     Maintains Weight-bearing Status (Transfers)  able to maintain  -BC     Sit-Stand [place] (Transfers)  contact guard  -BC     Stand-Sit [place] (Transfers)  contact guard  -BC     Row Name 02/15/19 1000           Sit-Stand Transfer    Assistive Device (Sit-Stand Transfers)  walker, front-wheeled  -BC     Row Name 02/15/19 1000          Stand-Sit Transfer    Assistive Device (Stand-Sit Transfers)  walker, front-wheeled  -BC     Row Name 02/15/19 1000          Gait/Stairs Assessment/Training    Gait/Stairs Assessment/Training  gait/ambulation independence  -BC     [place] Level (Gait)  contact guard  -BC     Assistive Device (Gait)  walker, front-wheeled  -BC     Distance in Feet (Gait)  100  -BC     Row Name 02/15/19 1000          General ROM    GENERAL ROM COMMENTS  WFL  -BC     Row Name 02/15/19 1000          MMT (Manual Muscle Testing)    General MMT Comments  WFL  -BC     Row Name 02/15/19 1000          Physical Therapy Clinical Impression    Date of Referral to PT  02/14/19  -BC     Functional Level at Time of Evaluation (PT Clinical Impression)  good  -BC     Criteria for Skilled Interventions Met (PT Clinical Impression)  no;no problems identified which require skilled intervention  -BC     Row Name 02/15/19 1000          Positioning and Restraints    Pre-Treatment Position  in bed  -BC     Post Treatment Position  bed  -BC     In Bed  notified nsg;supine;call light within reach;encouraged to call for assist;with family/caregiver;side rails up x3  -BC       User Key  (r) = Recorded By, (t) = Taken By, (c) = Cosigned By    Initials Name Provider Type    BC Ingris Moran PT Physical Therapist          Physical Therapy Education     Title: PT OT SLP Therapies (Done)     Topic: Physical Therapy (Done)     Point: Mobility training (Done)     Learning Progress Summary           Patient Acceptance, E, VU by BC at 2/15/2019 10:33 AM                   Point: Home exercise program (Done)     Learning Progress Summary           Patient Acceptance, E, VU by BC at 2/15/2019 10:33 AM                   Point: Body mechanics (Done)     Learning Progress Summary           Patient Acceptance, E, VU by BC at 2/15/2019 10:33 AM                    Point: Precautions (Done)     Learning Progress Summary           Patient Acceptance, MIKE, VU by BC at 2/15/2019 10:33 AM                               User Key     Initials Effective Dates Name Provider Type Discipline    BC 03/14/16 -  Ingris Moran PT Physical Therapist PT                PT Recommendation and Plan          Outcome Measures     Row Name 02/15/19 1000             How much help from another person do you currently need...    Turning from your back to your side while in flat bed without using bedrails?  3  -BC      Moving from lying on back to sitting on the side of a flat bed without bedrails?  4  -BC      Moving to and from a bed to a chair (including a wheelchair)?  3  -BC      Standing up from a chair using your arms (e.g., wheelchair, bedside chair)?  4  -BC      Climbing 3-5 steps with a railing?  3  -BC      To walk in hospital room?  3  -BC      AM-PAC 6 Clicks Score  20  -BC         Functional Assessment    Outcome Measure Options  AM-PAC 6 Clicks Basic Mobility (PT)  -BC        User Key  (r) = Recorded By, (t) = Taken By, (c) = Cosigned By    Initials Name Provider Type    BC Ingris Moran, PT Physical Therapist           Time Calculation:   PT Charges     Row Name 02/15/19 1034             Time Calculation    PT Received On  02/15/19  -BC         Time Calculation- PT    Total Timed Code Minutes- PT  60 minute(s)  -BC         Timed Charges    85766 - Gait Training Minutes   15  -BC      70810 - PT Therapeutic Activity Minutes  15  -BC        User Key  (r) = Recorded By, (t) = Taken By, (c) = Cosigned By    Initials Name Provider Type    BC Ingris Moran, PT Physical Therapist        Therapy Suggested Charges     Code   Minutes Charges    73008 (CPT®) Hc Pt Neuromusc Re Education Ea 15 Min      34109 (CPT®) Hc Pt Ther Proc Ea 15 Min      65706 (CPT®) Hc Gait Training Ea 15 Min 15 1    03401 (CPT®) Hc Pt Therapeutic Act Ea 15 Min 15 1    32598 (CPT®) Hc Pt Manual  Therapy Ea 15 Min      83232 (CPT®) Hc Pt Iontophoresis Ea 15 Min      69943 (CPT®) Hc Pt Elec Stim Ea-Per 15 Min      22649 (CPT®) Hc Pt Ultrasound Ea 15 Min      96076 (CPT®) Hc Pt Self Care/Mgmt/Train Ea 15 Min      11527 (CPT®) Hc Pt Prosthetic (S) Train Initial Encounter, Each 15 Min      01542 (CPT®) Hc Pt Orthotic(S)/Prosthetic(S) Encounter, Each 15 Min      63396 (CPT®) Hc Orthotic(S) Mgmt/Train Initial Encounter, Each 15min      Total  30 2        Therapy Charges for Today     Code Description Service Date Service Provider Modifiers Qty    68015989312 HC GAIT TRAINING EA 15 MIN 2/15/2019 Ingris Moran, PT GP 1    26784742327 HC PT THERAPEUTIC ACT EA 15 MIN 2/15/2019 Ingris Moran, PT GP 1    64923930098 HC PT THER SUPP EA 15 MIN 2/15/2019 Ingris Moran, PT GP 4    25153248822 HC PT EVAL MOD COMPLEXITY 2 2/15/2019 Ingris Moran, PT GP 1          PT G-Codes  Outcome Measure Options: AM-PAC 6 Clicks Basic Mobility (PT)  AM-PAC 6 Clicks Score: 20         Ingris Moran PT  2/15/2019

## 2019-02-15 NOTE — PROGRESS NOTES
Pharmacy to dose Rocephin + Doxycycline for CAP/UTI.  CrCl = 28.9.  Dosed as follows:    Rocephin 2gm iv q24h x 7 days  Doxycycline 100mg iv x 7 days.    No renal adjustments necessary with the above regimens.     Lambert Lucas RP  9:35 PM  02/14/19

## 2019-02-15 NOTE — PROGRESS NOTES
Discharge Planning Assessment  [facility]     Patient Name: Albina Finley  MRN: [id number]  Today's Date: 2/15/2019    Admit Date: 2/14/2019    Discharge Needs Assessment     Row Name 02/15/19 1716       Living Environment    Lives With  alone    Primary Care Provided by  self    Provides Primary Care For  no one    Family Caregiver if Needed  child(lester), adult    Quality of Family Relationships  helpful;involved;supportive    Able to Return to Prior Arrangements  yes       Transition Planning    Patient/Family Anticipates Transition to  home    Transportation Anticipated  family or friend will provide       Discharge Needs Assessment    Equipment Currently Used at Home  none    Outpatient/Agency/Support Group Needs  -- Pt does not utilize home health services.         Discharge Plan     Row Name 02/15/19 1717       Plan    Plan Pt lives at home alone and plans to return home at discharge. Pt does not have DME or utilize home health services. Pt does not have a living will. POA is daughter, Antonina Noriega. PCP is Dr. Jim Hernandez and pt also see's Dr. Quinn. Pt utilizes [facility] and does not have issues with copays. SS to follow and assist as needed with discharge planning.     Patient/Family in Agreement with Plan  yes          Demographic Summary     Row Name 02/15/19 1716       General Information    Referral Source  nursing    Reason for Consult  -- SS received consult d/c planning; DME. SS spoke to pt.      Nickie Schumacher

## 2019-02-16 VITALS
HEIGHT: 63 IN | BODY MASS INDEX: 28.96 KG/M2 | HEART RATE: 54 BPM | DIASTOLIC BLOOD PRESSURE: 54 MMHG | RESPIRATION RATE: 18 BRPM | TEMPERATURE: 97.7 F | SYSTOLIC BLOOD PRESSURE: 142 MMHG | WEIGHT: 163.44 LBS | OXYGEN SATURATION: 88 %

## 2019-02-16 LAB
ALBUMIN SERPL-MCNC: 3.7 G/DL (ref 3.4–4.8)
ALBUMIN/GLOB SERPL: 1.3 G/DL (ref 1.5–2.5)
ALP SERPL-CCNC: 30 U/L (ref 35–104)
ALT SERPL W P-5'-P-CCNC: 8 U/L (ref 10–36)
ANION GAP SERPL CALCULATED.3IONS-SCNC: 5.6 MMOL/L (ref 3.6–11.2)
AST SERPL-CCNC: 10 U/L (ref 10–30)
BACTERIA SPEC AEROBE CULT: ABNORMAL
BASOPHILS # BLD AUTO: 0.02 10*3/MM3 (ref 0–0.3)
BASOPHILS NFR BLD AUTO: 0.3 % (ref 0–2)
BILIRUB SERPL-MCNC: 0.1 MG/DL (ref 0.2–1.8)
BUN BLD-MCNC: 24 MG/DL (ref 7–21)
BUN/CREAT SERPL: 19.5 (ref 7–25)
CALCIUM SPEC-SCNC: 9 MG/DL (ref 7.7–10)
CHLORIDE SERPL-SCNC: 108 MMOL/L (ref 99–112)
CO2 SERPL-SCNC: 25.4 MMOL/L (ref 24.3–31.9)
CREAT BLD-MCNC: 1.23 MG/DL (ref 0.43–1.29)
DEPRECATED RDW RBC AUTO: 46.4 FL (ref 37–54)
EOSINOPHIL # BLD AUTO: 0.2 10*3/MM3 (ref 0–0.7)
EOSINOPHIL NFR BLD AUTO: 2.6 % (ref 0–7)
ERYTHROCYTE [DISTWIDTH] IN BLOOD BY AUTOMATED COUNT: 14.6 % (ref 11.5–14.5)
GFR SERPL CREATININE-BSD FRML MDRD: 43 ML/MIN/1.73
GLOBULIN UR ELPH-MCNC: 2.8 GM/DL
GLUCOSE BLD-MCNC: 103 MG/DL (ref 70–110)
GLUCOSE BLDC GLUCOMTR-MCNC: 112 MG/DL (ref 70–130)
GLUCOSE BLDC GLUCOMTR-MCNC: 143 MG/DL (ref 70–130)
HCT VFR BLD AUTO: 30.6 % (ref 37–47)
HGB BLD-MCNC: 9.4 G/DL (ref 12–16)
IMM GRANULOCYTES # BLD AUTO: 0.01 10*3/MM3 (ref 0–0.03)
IMM GRANULOCYTES NFR BLD AUTO: 0.1 % (ref 0–0.5)
LYMPHOCYTES # BLD AUTO: 3.21 10*3/MM3 (ref 1–3)
LYMPHOCYTES NFR BLD AUTO: 42 % (ref 16–46)
MAGNESIUM SERPL-MCNC: 1.7 MG/DL (ref 1.7–2.6)
MCH RBC QN AUTO: 27.4 PG (ref 27–33)
MCHC RBC AUTO-ENTMCNC: 30.7 G/DL (ref 33–37)
MCV RBC AUTO: 89.2 FL (ref 80–94)
MONOCYTES # BLD AUTO: 0.71 10*3/MM3 (ref 0.1–0.9)
MONOCYTES NFR BLD AUTO: 9.3 % (ref 0–12)
NEUTROPHILS # BLD AUTO: 3.49 10*3/MM3 (ref 1.4–6.5)
NEUTROPHILS NFR BLD AUTO: 45.7 % (ref 40–75)
OSMOLALITY SERPL CALC.SUM OF ELEC: 281.8 MOSM/KG (ref 273–305)
PHOSPHATE SERPL-MCNC: 4.2 MG/DL (ref 2.7–4.5)
PLATELET # BLD AUTO: 476 10*3/MM3 (ref 130–400)
PMV BLD AUTO: 10.6 FL (ref 6–10)
POTASSIUM BLD-SCNC: 4.4 MMOL/L (ref 3.5–5.3)
PROT SERPL-MCNC: 6.5 G/DL (ref 6–8)
RBC # BLD AUTO: 3.43 10*6/MM3 (ref 4.2–5.4)
SODIUM BLD-SCNC: 139 MMOL/L (ref 135–153)
WBC NRBC COR # BLD: 7.64 10*3/MM3 (ref 4.5–12.5)

## 2019-02-16 PROCEDURE — 94799 UNLISTED PULMONARY SVC/PX: CPT

## 2019-02-16 PROCEDURE — 63710000001 INSULIN DETEMIR PER 5 UNITS: Performed by: INTERNAL MEDICINE

## 2019-02-16 PROCEDURE — 83735 ASSAY OF MAGNESIUM: CPT | Performed by: INTERNAL MEDICINE

## 2019-02-16 PROCEDURE — 82962 GLUCOSE BLOOD TEST: CPT

## 2019-02-16 PROCEDURE — 84100 ASSAY OF PHOSPHORUS: CPT | Performed by: INTERNAL MEDICINE

## 2019-02-16 PROCEDURE — 99239 HOSP IP/OBS DSCHRG MGMT >30: CPT | Performed by: INTERNAL MEDICINE

## 2019-02-16 PROCEDURE — 85025 COMPLETE CBC W/AUTO DIFF WBC: CPT | Performed by: INTERNAL MEDICINE

## 2019-02-16 PROCEDURE — 80053 COMPREHEN METABOLIC PANEL: CPT | Performed by: INTERNAL MEDICINE

## 2019-02-16 PROCEDURE — 25010000002 HEPARIN (PORCINE) PER 1000 UNITS: Performed by: PHYSICIAN ASSISTANT

## 2019-02-16 RX ORDER — CEFDINIR 300 MG/1
300 CAPSULE ORAL 2 TIMES DAILY
Qty: 12 CAPSULE | Refills: 0 | Status: SHIPPED | OUTPATIENT
Start: 2019-02-16 | End: 2019-02-22

## 2019-02-16 RX ORDER — IPRATROPIUM BROMIDE AND ALBUTEROL SULFATE 2.5; .5 MG/3ML; MG/3ML
3 SOLUTION RESPIRATORY (INHALATION)
Qty: 360 ML | Refills: 0 | Status: SHIPPED | OUTPATIENT
Start: 2019-02-16 | End: 2019-03-15

## 2019-02-16 RX ORDER — DOXYCYCLINE 50 MG/1
100 CAPSULE ORAL 2 TIMES DAILY
Qty: 24 CAPSULE | Refills: 0 | Status: SHIPPED | OUTPATIENT
Start: 2019-02-16 | End: 2019-02-16 | Stop reason: SDUPTHER

## 2019-02-16 RX ORDER — DOXYCYCLINE 50 MG/1
100 CAPSULE ORAL 2 TIMES DAILY
Qty: 24 CAPSULE | Refills: 0 | Status: SHIPPED | OUTPATIENT
Start: 2019-02-16 | End: 2019-02-22

## 2019-02-16 RX ADMIN — HEPARIN SODIUM 5000 UNITS: 5000 INJECTION INTRAVENOUS; SUBCUTANEOUS at 08:44

## 2019-02-16 RX ADMIN — DILTIAZEM HYDROCHLORIDE 360 MG: 180 CAPSULE, EXTENDED RELEASE ORAL at 08:44

## 2019-02-16 RX ADMIN — BUDESONIDE AND FORMOTEROL FUMARATE DIHYDRATE 2 PUFF: 160; 4.5 AEROSOL RESPIRATORY (INHALATION) at 07:17

## 2019-02-16 RX ADMIN — SODIUM CHLORIDE, PRESERVATIVE FREE 3 ML: 5 INJECTION INTRAVENOUS at 08:45

## 2019-02-16 RX ADMIN — FAMOTIDINE 20 MG: 20 TABLET, FILM COATED ORAL at 08:44

## 2019-02-16 RX ADMIN — IPRATROPIUM BROMIDE AND ALBUTEROL SULFATE 3 ML: .5; 3 SOLUTION RESPIRATORY (INHALATION) at 00:54

## 2019-02-16 RX ADMIN — INSULIN DETEMIR 10 UNITS: 100 INJECTION, SOLUTION SUBCUTANEOUS at 08:45

## 2019-02-16 RX ADMIN — HYDRALAZINE HYDROCHLORIDE 50 MG: 50 TABLET ORAL at 05:16

## 2019-02-16 RX ADMIN — CETIRIZINE HCL 10 MG: 10 TABLET ORAL at 08:44

## 2019-02-16 RX ADMIN — CARVEDILOL 12.5 MG: 6.25 TABLET, FILM COATED ORAL at 08:43

## 2019-02-16 RX ADMIN — DOXYCYCLINE 100 MG: 100 INJECTION, POWDER, LYOPHILIZED, FOR SOLUTION INTRAVENOUS at 05:16

## 2019-02-16 RX ADMIN — IPRATROPIUM BROMIDE AND ALBUTEROL SULFATE 3 ML: .5; 3 SOLUTION RESPIRATORY (INHALATION) at 07:17

## 2019-02-16 NOTE — NURSING NOTE
Called [facility] home care for Home 02 and nebulizer. Spoke with Rosio. He will deliver to residence. 02 is at sleep.    Pt already has a nebulizer at home.

## 2019-02-16 NOTE — DISCHARGE SUMMARY
[facility] HOSPITALISTS DISCHARGE SUMMARY    Patient Identification:  Name:  Albian Finley  Age:  73 y.o.  Sex:  female  :  1945  MRN:  [id number]  Visit Number:  [id number]    Date of Admission: 2019  Date of Discharge:  2019     PCP: Jim Hernandez MD    DISCHARGE DIAGNOSIS  -Acute right lower lobe pneumonia  -Acute UTI due to a Escherichia coli  -Possible recent acute CHF, with diastolic dysfunction seen on the ECHO, with no acute exacerbation during this admission  -Acute kidney injury on stage II-III CKD with baseline Cr 1.2  -DM, Type 2, insulin requiring  -Chronic normocytic anemia  -Thrombocytosis  -Essential hypertension  -COPD, with now mild acute exacerbation    CONSULTS   None    PROCEDURES PERFORMED  None    HOSPITAL COURSE  Patient is a 73 y.o. female presented to [facility] complaining of shortness of air; she had been receiving diuretics in the outpatient setting for an acute CHF exacerbation.  In the ED, the UA was suspicious for an acute UTI and CXR showed a right sided pneumonia.  She was admitted to the medical surgical floor for further evaluation and treatment.  Please see the admitting history and physical for further details.  Cultures were obtained and she was started empirically on rocephin and doxycycline to cover both infections.  The urine culture grew sensitive E coli but the blood cultures never grew anything; sputum culture was not able to be obtained.  Mycoplasma, Legionella, and influenza testing were all negative.  She did well with this treatment and did not need oxygen by the day of discharge.  Please note that she did not need steroids for the acute COPD exacerbation; the antibiotics and duonebs were enough to stop the wheezing.    On the day of discharge, the patient was requesting to go home.  She was able to perform all of her activities of daily living.  She was ambulating on her own.  She was tolerating a normal diet.  She was able  to perform all of her activities of daily living.       VITAL SIGNS:  Temp:  [97.4 °F (36.3 °C)-98.7 °F (37.1 °C)] 97.7 °F (36.5 °C)  Heart Rate:  [54-77] 54  Resp:  [16-18] 18  BP: (124-157)/(51-69) 142/54  SpO2:  [88 %-99 %] 88 %  on  Flow (L/min):  [2] 2;   Device (Oxygen Therapy): room air    Body mass index is 28.95 kg/m².  Wt Readings from Last 3 Encounters:   02/14/19 74.1 kg (163 lb 7 oz)   06/18/18 71.8 kg (158 lb 6.4 oz)   06/11/18 73.9 kg (163 lb)       PHYSICAL EXAM:  Constitutional:  Well-developed and well-nourished.  No respiratory distress.  Not wearing nasal cannula oxygen.       HENT:  Head: Normocephalic and atraumatic.  Mouth:  Moist mucous membranes.    Eyes:  Conjunctivae and EOM are normal.  Pupils are equal, round, and reactive to light.  No scleral icterus.    Neck:  Neck supple.  No JVD present.    Cardiovascular:  Normal rate, regular rhythm and normal heart sounds with no murmur.  Pulmonary/Chest:  No respiratory distress, no wheezes, right sided crackles that have improved, with normal breath sounds and good air movement.   Abdominal:  Soft.  Bowel sounds are normal.  No distension and no tenderness.   Musculoskeletal:  No edema, no tenderness, and no deformity.  No red or swollen joints anywhere.    Neurological:  Alert and oriented to person, place, and time.  No cranial nerve deficit.  No tongue deviation.  No facial droop.  No slurred speech.   Skin: Skin is warm and dry. No rash noted. No pallor.   Peripheral vascular:  strong pulses on all 4 extremities with no clubbing, no cyanosis, and no edema.  Genitourinary:  No Bird catheter in place.      DISCHARGE DISPOSITION   Stable    DISCHARGE MEDICATIONS:    New Medications      Instructions Start Date   cefdinir 300 MG capsule  Commonly known as:  OMNICEF   300 mg, Oral, 2 Times Daily      doxycycline 50 MG capsule  Commonly known as:  MONODOX   100 mg, Oral, 2 Times Daily      ipratropium-albuterol 0.5-2.5 mg/3 ml  nebulizer  Commonly known as:  DUO-NEB   3 mL, Nebulization, 4 Times Daily - RT         Changes to Medications      Instructions Start Date   insulin detemir 100 UNIT/ML injection  Commonly known as:  LEVEMIR  What changed:    · how much to take  · when to take this   20 Units, Subcutaneous, Daily, 80 units QAM.  40 units QPM         Continue These Medications      Instructions Start Date   calcium carbonate 600 MG tablet  Commonly known as:  OS-TIANNA   600 mg, Oral, Daily      cetirizine 10 MG tablet  Commonly known as:  zyrTEC   10 mg, Oral, Daily      COREG 12.5 MG tablet  Generic drug:  carvedilol   12.5 mg, Oral, 2 Times Daily With Meals      cyanocobalamin 1000 MCG/ML injection   1,000 mcg, Intramuscular, Every 28 Days      diltiaZEM 360 MG 24 hr capsule  Commonly known as:  TIAZAC   360 mg, Oral, Daily      fenofibrate 160 MG tablet   160 mg, Oral, Daily      ferrous sulfate 325 (65 FE) MG tablet   325 mg, Oral, 2 Times Daily      hydrALAZINE 50 MG tablet  Commonly known as:  APRESOLINE   50 mg, Oral, 3 Times Daily      mometasone-formoterol 200-5 MCG/ACT inhaler  Commonly known as:  DULERA 200   2 puffs, Inhalation, Daily PRN      raNITIdine 150 MG tablet  Commonly known as:  ZANTAC   150 mg, Oral, 2 Times Daily         Stop These Medications    bumetanide 1 MG tablet  Commonly known as:  BUMEX     lisinopril 10 MG tablet  Commonly known as:  PRINIVIL,ZESTRIL     loperamide 2 MG capsule  Commonly known as:  IMODIUM     metFORMIN 1000 MG tablet  Commonly known as:  GLUCOPHAGE         Diet Instructions     Diet: Consistent Carbohydrate, Cardiac; Thin      Discharge Diet:   Consistent Carbohydrate, Cardiac       Fluid Consistency:  Thin        Activity Instructions     Activity as Tolerated          Additional Instructions for the Follow-ups that You Need to Schedule     Discharge Follow-up with PCP   As directed     Currently Documented PCP:    Jim Hernandez MD    PCP Phone Number:    [phone]     Follow Up  Details:  2-7 days             Discharge Follow-up with Specified Provider: Dr. Palumbo; low oxygen at night and needs an outpatient sleep study; 1 Month   As directed      To:  Dr. Palumbo; low oxygen at night and needs an outpatient sleep study    Follow Up:  1 Month               TEST  RESULTS PENDING AT DISCHARGE:   Order Current Status    Blood Culture - Blood, Arm, Left Preliminary result    Blood Culture - Blood, Arm, Right Preliminary result    Urine Culture - Urine, Urine, Clean Catch Preliminary result           CODE STATUS  Code Status and Medical Interventions:   Ordered at: 02/14/19 1843     Code Status:    CPR     Medical Interventions (Level of Support Prior to Arrest):    Full       Manfred Smith MD  02/16/19  9:48 AM    Please note that this discharge summary required more than 30 minutes to complete.    Please send a copy of this dictation to the following providers:  Jim Hernandez MD

## 2019-02-16 NOTE — PLAN OF CARE
Problem: Patient Care Overview  Goal: Plan of Care Review  Outcome: Ongoing (interventions implemented as appropriate)   02/15/19 1431 02/15/19 2030   Plan of Care Review   Progress improving --    Coping/Psychosocial   Plan of Care Reviewed With --  patient;daughter       Problem: Skin Injury Risk (Adult)  Goal: Identify Related Risk Factors and Signs and Symptoms  Outcome: Ongoing (interventions implemented as appropriate)   02/15/19 1431   Skin Injury Risk (Adult)   Related Risk Factors (Skin Injury Risk) advanced age;infection;mobility impaired     Goal: Skin Health and Integrity  Outcome: Ongoing (interventions implemented as appropriate)   02/15/19 2341   Skin Injury Risk (Adult)   Skin Health and Integrity making progress toward outcome       Problem: Pneumonia (Adult)  Goal: Signs and Symptoms of Listed Potential Problems Will be Absent, Minimized or Managed (Pneumonia)  Outcome: Ongoing (interventions implemented as appropriate)   02/15/19 1431   Goal/Outcome Evaluation   Problems Assessed (Pneumonia) all   Problems Present (Pneumonia) respiratory compromise;infection progression       Problem: Diabetes, Type 2 (Adult)  Goal: Signs and Symptoms of Listed Potential Problems Will be Absent, Minimized or Managed (Diabetes, Type 2)  Outcome: Ongoing (interventions implemented as appropriate)   02/15/19 1431   Goal/Outcome Evaluation   Problems Assessed (Type 2 Diabetes) all   Problems Present (Type 2 Diabetes) hyperglycemia

## 2019-02-16 NOTE — PROGRESS NOTES
[facility] HOSPITALIST PROGRESS NOTE     Patient Identification:  Name:  Albina Finley  Age:  73 y.o.  Sex:  female  :  1945  MRN:  [id number]  Visit Number:  [id number]  ROOM: [address] [place] [address]     Primary Care Provider:  Jim Hernandez MD    Length of stay:  1    Subjective     Chief Complaint   Patient presents with   • Shortness of Breath       History of presenting illness:  73-year-old admitted on 2019 with shortness of air her that developed despite changes in her diuretics.  Patient was found to be in acute renal failure and have right-sided pneumonia and acute COPD exacerbation.  She was admitted to the medical surgical floor with telemetry.  She is feeling much better with less trouble breathing and less coughing. She denies chest pain and denies nausea, denies emesis, denies diarrhea. She is eating with no issues.  Daughter is at bedside and states that the patient does look better.    Objective     Current Hospital Meds:  budesonide-formoterol 2 puff Inhalation BID - RT   carvedilol 12.5 mg Oral BID With Meals   ceftriaxone 2 g Intravenous Q24H   cetirizine 10 mg Oral Daily   diltiaZEM  mg Oral Q24H   doxycycline 100 mg Intravenous Q12H   famotidine 20 mg Oral BID   heparin (porcine) 5,000 Units Subcutaneous Q12H   hydrALAZINE 50 mg Oral Q8H   insulin aspart 0-7 Units Subcutaneous 4x Daily AC & at Bedtime   insulin detemir 10 Units Subcutaneous BID   ipratropium-albuterol 3 mL Nebulization 4x Daily - RT   sodium chloride 3 mL Intravenous Q12H   ----------------------------------------------------------------------------------------------------------------------  Vital Signs:  Temp:  [97.4 °F (36.3 °C)-98.7 °F (37.1 °C)] 98.7 °F (37.1 °C)  Heart Rate:  [63-77] 72  Resp:  [18-20] 18  BP: (112-157)/(50-68) 148/52  SpO2:  [93 %-99 %] 93 %  on  Flow (L/min):  [2] 2;   Device (Oxygen Therapy): room air  Body mass index is 28.95 kg/m².    Wt Readings from Last 3 Encounters:   19  74.1 kg (163 lb 7 oz)   06/18/18 71.8 kg (158 lb 6.4 oz)   06/11/18 73.9 kg (163 lb)         Diet Regular; Consistent Carbohydrate, Renal  ----------------------------------------------------------------------------------------------------------------------  Physical exam:  Constitutional:  Well-developed and well-nourished.  Mild respiratory distress.  Not wearing nasal cannula oxygen.       HENT:  Head: Normocephalic and atraumatic.  Mouth:  Moist mucous membranes.    Eyes:  Conjunctivae and EOM are normal.  Pupils are equal, round, and reactive to light.  No scleral icterus.    Neck:  Neck supple.  No JVD present.    Cardiovascular:  Normal rate, regular rhythm and normal heart sounds with no murmur.  Pulmonary/Chest:  Mild respiratory distress, no wheezes, right sided crackles that have improved, with normal breath sounds and good air movement.  Abdominal:  Soft.  Bowel sounds are normal.  No distension and no tenderness.   Musculoskeletal:  No edema, no tenderness, and no deformity.  No red or swollen joints anywhere.    Neurological:  Alert and oriented to person, place, and time.  No cranial nerve deficit.  No tongue deviation.  No facial droop.  No slurred speech.   Skin: Skin is warm and dry. No rash noted. No pallor.   Peripheral vascular:  strong pulses on all 4 extremities with no clubbing, no cyanosis, and no edema.  Genitourinary:  No Bird catheter in place.  ----------------------------------------------------------------------------------------------------------------------  Tele:  NS in the 60-80's; I have personally reviewed/looked at the telemetry strips.  ----------------------------------------------------------------------------------------------------------------------  Results from last 7 days   Lab Units 02/15/19  0114 02/14/19  1630   CRP mg/dL  --  1.10*   LACTATE mmol/L  --  0.8   WBC 10*3/mm3 8.10 8.90   HEMOGLOBIN g/dL 9.0* 10.2*   HEMATOCRIT % 29.8* 33.3*   MCV fL 90.3 88.8   MCHC  g/dL 30.2* 30.6*   PLATELETS 10*3/mm3 456* 528*     Results from last 7 days   Lab Units 02/15/19  0114 02/14/19  1630   SODIUM mmol/L 138 139   POTASSIUM mmol/L 4.5 5.0   CHLORIDE mmol/L 109 107   CO2 mmol/L 24.4 28.0   BUN mg/dL 30* 35*   CREATININE mg/dL 1.48* 1.65*   EGFR IF NONAFRICN AM mL/min/1.73 34* 30*   CALCIUM mg/dL 8.6 9.4   GLUCOSE mg/dL 119* 117*   ALBUMIN g/dL  --  4.30   BILIRUBIN mg/dL  --  0.1*   ALK PHOS U/L  --  37   AST (SGOT) U/L  --  10   ALT (SGPT) U/L  --  14   Estimated Creatinine Clearance: 32.2 mL/min (A) (by C-G formula based on SCr of 1.48 mg/dL (H)).  Results from last 7 days   Lab Units 02/15/19  0114 02/14/19  2007 02/14/19  1630   CK TOTAL U/L 18* 17*  --    CKMB ng/mL 0.32 0.33  --    CK MB INDEX % 1.8 1.9  --    TROPONIN I ng/mL <0.006 <0.006 <0.006   MYOGLOBIN ng/mL 57.0 31.0  --      Hemoglobin A1C   Date/Time Value Ref Range Status   02/14/2019 1630 7.50 (H) 4.50 - 5.70 % Final     Glucose   Date/Time Value Ref Range Status   02/15/2019 1623 128 70 - 130 mg/dL Final   02/15/2019 1118 140 (H) 70 - 130 mg/dL Final   02/15/2019 0715 140 (H) 70 - 130 mg/dL Final   02/14/2019 1954 86 70 - 130 mg/dL Final     Blood Culture   Date Value Ref Range Status   02/14/2019 No growth at 24 hours  Preliminary   02/14/2019 No growth at 24 hours  Preliminary      Urine Culture   Date Value Ref Range Status   02/14/2019 (A)  Preliminary    >100,000 CFU/mL Gram Negative Bacilli, Morphology consistent with Escherichia / Citrobacter        I have personally looked at the labs and they are summarized above.  ----------------------------------------------------------------------------------------------------------------------  Imaging Results (last 24 hours)     Procedure Component Value Units Date/Time    XR Chest 1 View [[id number]] Collected:  02/14/19 2309     Updated:  02/14/19 2313    Narrative:       EXAMINATION: XR CHEST 1 VW-      CLINICAL INDICATION:     soa     TECHNIQUE:  XR CHEST 1 VW-       COMPARISON: 02/05/2018      FINDINGS:   Left midlung pulmonary nodule is noted. Right basilar airspace disease  likely pneumonia. Cardiomegaly with pulmonary vascular congestion and  interstitial edema. No effusion or pneumothorax. No acute osseous  changes.       Impression:       1. Right lower lobe pneumonia.  2. Left midlung pulmonary nodule. Although previously described on CT,  follow-up CT recommended to exclude size increase.  3. Mild CHF changes.     This report was finalized on 2/14/2019 11:11 PM by Dr. Ceasar Woodard MD.           I have personally reviewed the radiology images and read the final radiology report.    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  Will repeat labs in the morning.  Cr has improved; will continue to monitor.  The glucose levels are at normal levels.  The blood pressures are normal and stable.  Possible discharge in the morning.    Manfred Smith MD  Hospital Medicine Team  02/15/19  7:59 PM

## 2019-02-17 ENCOUNTER — READMISSION MANAGEMENT (OUTPATIENT)
Dept: CALL CENTER | Facility: HOSPITAL | Age: 74
End: 2019-02-17

## 2019-02-17 NOTE — OUTREACH NOTE
Prep Survey      Responses   Facility patient discharged from?  [facility]   Is patient eligible?  Yes   Discharge diagnosis  Pneumonia of RML due to infectious organism, Acute UTI, OPHELIA on CKD III, IDDM II, chronic anemia, thrombocytosis, essential HTN , COPD mild AE   Does the patient have one of the following disease processes/diagnoses(primary or secondary)?  COPD/Pneumonia   Does the patient have Home health ordered?  No   Is there a DME ordered?  Yes   What DME was ordered?  Home nebulizer, home O2 no agency named in AVS   Comments regarding appointments  Pt. to schedule follow up appointment.    Prep survey completed?  Yes          Amy Mann RN

## 2019-02-18 ENCOUNTER — TELEPHONE (OUTPATIENT)
Dept: MEDSURG UNIT | Facility: HOSPITAL | Age: 74
End: 2019-02-18

## 2019-02-18 ENCOUNTER — READMISSION MANAGEMENT (OUTPATIENT)
Dept: CALL CENTER | Facility: HOSPITAL | Age: 74
End: 2019-02-18

## 2019-02-18 NOTE — OUTREACH NOTE
COPD/PN Week 1 Survey      Responses   Facility patient discharged from?  [facility]   Does the patient have one of the following disease processes/diagnoses(primary or secondary)?  COPD/Pneumonia   Is there a successful TCM telephone encounter documented?  No   Was the primary reason for admission:  Pneumonia   Week 1 attempt successful?  Yes   Call start time  1507   Call end time  1510   Discharge diagnosis  Pneumonia of RML due to infectious organism, Acute UTI, OPHELIA on CKD III, IDDM II, chronic anemia, thrombocytosis, essential HTN , COPD mild AE   Meds reviewed with patient/caregiver?  Yes   Is the patient having any side effects they believe may be caused by any medication additions or changes?  No   Does the patient have all medications ordered at discharge?  Yes   Is the patient taking all medications as directed (includes completed medication regime)?  Yes   Does the patient have a primary care provider?   Yes   Does the patient have an appointment with their PCP or pulmonologist within 7 days of discharge?  Greater than 7 days   What is preventing the patient from scheduling follow up appointments within 7 days of discharge?  Earlier appointment not available   Nursing Interventions  Verified appointment date/time/provider   Has home health visited the patient within 72 hours of discharge?  N/A   Psychosocial issues?  No   Did the patient receive a copy of their discharge instructions?  Yes   Nursing interventions  Reviewed instructions with patient   What is the patient's perception of their health status since discharge?  Improving   Nursing Interventions  Nurse provided patient education   Is the patient/caregiver able to teach back the hierarchy of who to call/visit for symptoms/problems? PCP, Specialist, Home health nurse, Urgent Care, ED, 911  Yes   Is the patient/caregiver able to teach back signs and symptoms of worsening condition:  Fever/chills, Shortness of breath, Chest pain   Is the  patient/caregiver able to teach back importance of completing antibiotic course of treatment?  Yes   Week 1 call completed?  Yes          Faiza Kerr RN

## 2019-02-19 LAB
BACTERIA SPEC AEROBE CULT: NORMAL
BACTERIA SPEC AEROBE CULT: NORMAL

## 2019-02-26 ENCOUNTER — READMISSION MANAGEMENT (OUTPATIENT)
Dept: CALL CENTER | Facility: HOSPITAL | Age: 74
End: 2019-02-26

## 2019-02-26 NOTE — OUTREACH NOTE
COPD/PN Week 2 Survey      Responses   Facility patient discharged from?  [facility]   Does the patient have one of the following disease processes/diagnoses(primary or secondary)?  COPD/Pneumonia   Was the primary reason for admission:  Pneumonia   Week 2 attempt successful?  Yes   Call start time  1747   Call end time  1756   Discharge diagnosis  Pneumonia of RML due to infectious organism, Acute UTI, OPHELIA on CKD III, IDDM II, chronic anemia, thrombocytosis, essential HTN , COPD mild AE   Is patient permission given to speak with other caregiver?  No   Meds reviewed with patient/caregiver?  Yes   Is the patient having any side effects they believe may be caused by any medication additions or changes?  No   Does the patient have all medications ordered at discharge?  Yes   Is the patient taking all medications as directed (includes completed medication regime)?  Yes   Does the patient have a primary care provider?   Yes   Does the patient have an appointment with their PCP or pulmonologist within 7 days of discharge?  Greater than 7 days   Comments regarding PCP  Jim Hernandez MD. Patient states that she has been to PCP office and saw Dr Fernandez's assistant.    Nursing Interventions  Verified appointment date/time/provider   Has the patient kept scheduled appointments due by today?  Yes   Has home health visited the patient within 72 hours of discharge?  N/A   What DME was ordered?  Patient has home O2 and nebulizer.    Has all DME been delivered?  Yes   DME comments  Patient states using home nebulizer, but not wearing home O2.    Psychosocial issues?  No   Did the patient receive a copy of their discharge instructions?  Yes   Nursing interventions  Reviewed instructions with patient   What is the patient's perception of their health status since discharge?  Improving   Nursing Interventions  Nurse provided patient education   Is the patient/caregiver able to teach back the hierarchy of who to call/visit for  symptoms/problems? PCP, Specialist, Home health nurse, Urgent Care, ED, 911  Yes   Is the patient able to teach back COPD zones?  No   Nursing interventions  Education provided on various zones   Patient reports what zone on this call?  Green Zone   Green Zone  Reports doing well, Breathing without shortness of breath, Usual activity and exercise level, Usual amount of phlegm/mucus without difficulty coughing up, Appetite is good   Green Zone interventions:  Take daily medications, Continue regular exercise/diet plan, Use oxygen as prescribed [Patient has not been wearing O2. She states that she will if short of breath. ]   Is the patient/caregiver able to teach back signs and symptoms of worsening condition:  Fever/chills, Shortness of breath, Chest pain   Is the patient/caregiver able to teach back importance of completing antibiotic course of treatment?  Yes   Week 2 call completed?  Yes          Amy Rosenthal RN

## 2019-03-12 ENCOUNTER — LAB (OUTPATIENT)
Dept: LAB | Facility: HOSPITAL | Age: 74
End: 2019-03-12

## 2019-03-12 ENCOUNTER — HOSPITAL ENCOUNTER (OUTPATIENT)
Dept: CT IMAGING | Facility: HOSPITAL | Age: 74
Discharge: HOME | End: 2019-03-12
Admitting: PHYSICIAN ASSISTANT

## 2019-03-12 ENCOUNTER — TRANSCRIBE ORDERS (OUTPATIENT)
Dept: ADMINISTRATIVE | Facility: HOSPITAL | Age: 74
End: 2019-03-12

## 2019-03-12 DIAGNOSIS — E78.5 HYPERLIPIDEMIA, UNSPECIFIED HYPERLIPIDEMIA TYPE: ICD-10-CM

## 2019-03-12 DIAGNOSIS — D50.9 IRON DEFICIENCY ANEMIA, UNSPECIFIED IRON DEFICIENCY ANEMIA TYPE: ICD-10-CM

## 2019-03-12 DIAGNOSIS — N18.30 CHRONIC KIDNEY DISEASE, STAGE III (MODERATE) (HCC): ICD-10-CM

## 2019-03-12 DIAGNOSIS — R91.1 PULMONARY NODULE: Primary | ICD-10-CM

## 2019-03-12 DIAGNOSIS — R91.1 PULMONARY NODULE: ICD-10-CM

## 2019-03-12 DIAGNOSIS — N18.30 CHRONIC KIDNEY DISEASE, STAGE III (MODERATE) (HCC): Primary | ICD-10-CM

## 2019-03-12 LAB
ALBUMIN SERPL-MCNC: 3.8 G/DL (ref 3.5–5.2)
ALBUMIN/GLOB SERPL: 1.3 G/DL
ALP SERPL-CCNC: 30 U/L (ref 39–117)
ALT SERPL W P-5'-P-CCNC: 10 U/L (ref 1–33)
ANION GAP SERPL CALCULATED.3IONS-SCNC: 16.4 MMOL/L
AST SERPL-CCNC: 9 U/L (ref 1–32)
BASOPHILS # BLD AUTO: 0.03 10*3/MM3 (ref 0–0.2)
BASOPHILS NFR BLD AUTO: 0.2 % (ref 0–1.5)
BILIRUB SERPL-MCNC: <0.2 MG/DL (ref 0.1–1.2)
BUN BLD-MCNC: 29 MG/DL (ref 8–23)
BUN/CREAT SERPL: 20.7 (ref 7–25)
CALCIUM SPEC-SCNC: 9.6 MG/DL (ref 8.6–10.5)
CHLORIDE SERPL-SCNC: 105 MMOL/L (ref 98–107)
CO2 SERPL-SCNC: 20.6 MMOL/L (ref 22–29)
CREAT BLD-MCNC: 1.4 MG/DL (ref 0.57–1)
CREAT UR-MCNC: 90.7 MG/DL
DEPRECATED RDW RBC AUTO: 50.8 FL (ref 37–54)
EOSINOPHIL # BLD AUTO: 0.24 10*3/MM3 (ref 0–0.4)
EOSINOPHIL NFR BLD AUTO: 1.9 % (ref 0.3–6.2)
ERYTHROCYTE [DISTWIDTH] IN BLOOD BY AUTOMATED COUNT: 14.8 % (ref 12.3–15.4)
FERRITIN SERPL-MCNC: 134 NG/ML (ref 13–150)
GFR SERPL CREATININE-BSD FRML MDRD: 37 ML/MIN/1.73
GLOBULIN UR ELPH-MCNC: 3 GM/DL
GLUCOSE BLD-MCNC: 120 MG/DL (ref 65–99)
HCT VFR BLD AUTO: 33 % (ref 34–46.6)
HGB BLD-MCNC: 9.9 G/DL (ref 12–15.9)
IMM GRANULOCYTES # BLD AUTO: 0.06 10*3/MM3 (ref 0–0.05)
IMM GRANULOCYTES NFR BLD AUTO: 0.5 % (ref 0–0.5)
IRON 24H UR-MRATE: 20 MCG/DL (ref 37–145)
IRON SATN MFR SERPL: 13 % (ref 20–50)
LYMPHOCYTES # BLD AUTO: 2.71 10*3/MM3 (ref 0.7–3.1)
LYMPHOCYTES NFR BLD AUTO: 21.8 % (ref 19.6–45.3)
MCH RBC QN AUTO: 28 PG (ref 26.6–33)
MCHC RBC AUTO-ENTMCNC: 30 G/DL (ref 31.5–35.7)
MCV RBC AUTO: 93.2 FL (ref 79–97)
MONOCYTES # BLD AUTO: 0.92 10*3/MM3 (ref 0.1–0.9)
MONOCYTES NFR BLD AUTO: 7.4 % (ref 5–12)
NEUTROPHILS # BLD AUTO: 8.46 10*3/MM3 (ref 1.4–7)
NEUTROPHILS NFR BLD AUTO: 68.2 % (ref 42.7–76)
NRBC BLD AUTO-RTO: 0 /100 WBC (ref 0–0)
PLATELET # BLD AUTO: 365 10*3/MM3 (ref 140–450)
PMV BLD AUTO: 12.3 FL (ref 6–12)
POTASSIUM BLD-SCNC: 4.8 MMOL/L (ref 3.5–5.2)
PROT SERPL-MCNC: 6.8 G/DL (ref 6–8.5)
PROT UR-MCNC: 90 MG/DL
PROT/CREAT UR: 992.3 MG/G CREA (ref 0–200)
RBC # BLD AUTO: 3.54 10*6/MM3 (ref 3.77–5.28)
SODIUM BLD-SCNC: 142 MMOL/L (ref 136–145)
TIBC SERPL-MCNC: 156 MCG/DL (ref 298–536)
TRANSFERRIN SERPL-MCNC: 105 MG/DL (ref 200–360)
WBC NRBC COR # BLD: 12.42 10*3/MM3 (ref 3.4–10.8)

## 2019-03-12 PROCEDURE — 85025 COMPLETE CBC W/AUTO DIFF WBC: CPT

## 2019-03-12 PROCEDURE — 25010000002 IOPAMIDOL 61 % SOLUTION: Performed by: PHYSICIAN ASSISTANT

## 2019-03-12 PROCEDURE — 84156 ASSAY OF PROTEIN URINE: CPT

## 2019-03-12 PROCEDURE — 84466 ASSAY OF TRANSFERRIN: CPT

## 2019-03-12 PROCEDURE — 82728 ASSAY OF FERRITIN: CPT

## 2019-03-12 PROCEDURE — 71260 CT THORAX DX C+: CPT | Performed by: RADIOLOGY

## 2019-03-12 PROCEDURE — 71260 CT THORAX DX C+: CPT

## 2019-03-12 PROCEDURE — 83540 ASSAY OF IRON: CPT

## 2019-03-12 PROCEDURE — 36415 COLL VENOUS BLD VENIPUNCTURE: CPT

## 2019-03-12 PROCEDURE — 82570 ASSAY OF URINE CREATININE: CPT

## 2019-03-12 PROCEDURE — 80053 COMPREHEN METABOLIC PANEL: CPT

## 2019-03-12 RX ADMIN — IOPAMIDOL 50 ML: 612 INJECTION, SOLUTION INTRAVENOUS at 10:25

## 2019-03-14 ENCOUNTER — HOSPITAL ENCOUNTER (INPATIENT)
Facility: HOSPITAL | Age: 74
LOS: 1 days | Discharge: HOME HEALTH | End: 2019-03-16
Attending: EMERGENCY MEDICINE | Admitting: INTERNAL MEDICINE

## 2019-03-14 ENCOUNTER — TRANSCRIBE ORDERS (OUTPATIENT)
Dept: ADMINISTRATIVE | Facility: HOSPITAL | Age: 74
End: 2019-03-14

## 2019-03-14 ENCOUNTER — APPOINTMENT (OUTPATIENT)
Dept: GENERAL RADIOLOGY | Facility: HOSPITAL | Age: 74
End: 2019-03-14

## 2019-03-14 DIAGNOSIS — J96.01 ACUTE RESPIRATORY FAILURE WITH HYPOXIA (HCC): Primary | ICD-10-CM

## 2019-03-14 DIAGNOSIS — R91.1 PULMONARY NODULE: Primary | ICD-10-CM

## 2019-03-14 DIAGNOSIS — N28.9 ACUTE ON CHRONIC RENAL INSUFFICIENCY: ICD-10-CM

## 2019-03-14 DIAGNOSIS — E87.79 OTHER HYPERVOLEMIA: ICD-10-CM

## 2019-03-14 DIAGNOSIS — N18.9 ACUTE ON CHRONIC RENAL INSUFFICIENCY: ICD-10-CM

## 2019-03-14 LAB
A-A DO2: 21.9 MMHG (ref 0–300)
ALBUMIN SERPL-MCNC: 3.67 G/DL (ref 3.5–5.2)
ALBUMIN/GLOB SERPL: 1.1 G/DL
ALP SERPL-CCNC: 33 U/L (ref 39–117)
ALT SERPL W P-5'-P-CCNC: 8 U/L (ref 1–33)
ANION GAP SERPL CALCULATED.3IONS-SCNC: 13.7 MMOL/L
ARTERIAL PATENCY WRIST A: ABNORMAL
AST SERPL-CCNC: 10 U/L (ref 1–32)
ATMOSPHERIC PRESS: 727 MMHG
BACTERIA UR QL AUTO: ABNORMAL /HPF
BASE EXCESS BLDA CALC-SCNC: -3.5 MMOL/L
BASOPHILS # BLD AUTO: 0.02 10*3/MM3 (ref 0–0.2)
BASOPHILS NFR BLD AUTO: 0.2 % (ref 0–1.5)
BDY SITE: ABNORMAL
BILIRUB SERPL-MCNC: <0.2 MG/DL (ref 0.1–1.2)
BILIRUB UR QL STRIP: NEGATIVE
BODY TEMPERATURE: 98.9 C
BUN BLD-MCNC: 39 MG/DL (ref 8–23)
BUN/CREAT SERPL: 17 (ref 7–25)
CALCIUM SPEC-SCNC: 8.8 MG/DL (ref 8.6–10.5)
CHLORIDE SERPL-SCNC: 102 MMOL/L (ref 98–107)
CLARITY UR: ABNORMAL
CO2 SERPL-SCNC: 20.3 MMOL/L (ref 22–29)
COHGB MFR BLD: 0.2 % (ref 0–5)
COLOR UR: YELLOW
CREAT BLD-MCNC: 2.29 MG/DL (ref 0.57–1)
DEPRECATED RDW RBC AUTO: 48.3 FL (ref 37–54)
EOSINOPHIL # BLD AUTO: 0.12 10*3/MM3 (ref 0–0.4)
EOSINOPHIL NFR BLD AUTO: 1.2 % (ref 0.3–6.2)
ERYTHROCYTE [DISTWIDTH] IN BLOOD BY AUTOMATED COUNT: 15.3 % (ref 12.3–15.4)
GFR SERPL CREATININE-BSD FRML MDRD: 21 ML/MIN/1.73
GLOBULIN UR ELPH-MCNC: 3.2 GM/DL
GLUCOSE BLD-MCNC: 144 MG/DL (ref 65–99)
GLUCOSE UR STRIP-MCNC: NEGATIVE MG/DL
HCO3 BLDA-SCNC: 21.3 MMOL/L (ref 22–26)
HCT VFR BLD AUTO: 30.1 % (ref 34–46.6)
HCT VFR BLD CALC: 30 % (ref 37–47)
HGB BLD-MCNC: 9.2 G/DL (ref 12–15.9)
HGB BLDA-MCNC: 10.1 G/DL (ref 12–16)
HGB UR QL STRIP.AUTO: NEGATIVE
HOROWITZ INDEX BLD+IHG-RTO: 21 %
HYALINE CASTS UR QL AUTO: ABNORMAL /LPF
IMM GRANULOCYTES # BLD AUTO: 0.02 10*3/MM3 (ref 0–0.05)
IMM GRANULOCYTES NFR BLD AUTO: 0.2 % (ref 0–0.5)
INR PPP: 1.01 (ref 0.9–1.1)
KETONES UR QL STRIP: NEGATIVE
LEUKOCYTE ESTERASE UR QL STRIP.AUTO: ABNORMAL
LYMPHOCYTES # BLD AUTO: 2.83 10*3/MM3 (ref 0.7–3.1)
LYMPHOCYTES NFR BLD AUTO: 27.2 % (ref 19.6–45.3)
MCH RBC QN AUTO: 27 PG (ref 26.6–33)
MCHC RBC AUTO-ENTMCNC: 30.6 G/DL (ref 31.5–35.7)
MCV RBC AUTO: 88.3 FL (ref 79–97)
METHGB BLD QL: 0.4 % (ref 0–3)
MODALITY: ABNORMAL
MONOCYTES # BLD AUTO: 0.85 10*3/MM3 (ref 0.1–0.9)
MONOCYTES NFR BLD AUTO: 8.2 % (ref 5–12)
NEUTROPHILS # BLD AUTO: 6.56 10*3/MM3 (ref 1.4–7)
NEUTROPHILS NFR BLD AUTO: 63 % (ref 42.7–76)
NITRITE UR QL STRIP: NEGATIVE
NT-PROBNP SERPL-MCNC: 1279 PG/ML (ref 5–900)
OXYHGB MFR BLDV: 93.3 % (ref 85–100)
PCO2 BLDA: 37.5 MM HG (ref 35–45)
PH BLDA: 7.37 PH UNITS (ref 7.35–7.45)
PH UR STRIP.AUTO: <=5 [PH] (ref 5–8)
PLATELET # BLD AUTO: 347 10*3/MM3 (ref 140–450)
PMV BLD AUTO: 10.8 FL (ref 6–12)
PO2 BLDA: 74.5 MM HG (ref 80–100)
POTASSIUM BLD-SCNC: 5.5 MMOL/L (ref 3.5–5.2)
PROT SERPL-MCNC: 6.9 G/DL (ref 6–8.5)
PROT UR QL STRIP: NEGATIVE
PROTHROMBIN TIME: 13.5 SECONDS (ref 11–15.4)
RBC # BLD AUTO: 3.41 10*6/MM3 (ref 3.77–5.28)
RBC # UR: ABNORMAL /HPF
REF LAB TEST METHOD: ABNORMAL
SAO2 % BLDCOA: 93.9 % (ref 90–100)
SODIUM BLD-SCNC: 136 MMOL/L (ref 136–145)
SP GR UR STRIP: 1.02 (ref 1–1.03)
SQUAMOUS #/AREA URNS HPF: ABNORMAL /HPF
TROPONIN T SERPL-MCNC: <0.01 NG/ML (ref 0–0.03)
UROBILINOGEN UR QL STRIP: ABNORMAL
WBC NRBC COR # BLD: 10.4 10*3/MM3 (ref 3.4–10.8)
WBC UR QL AUTO: ABNORMAL /HPF

## 2019-03-14 PROCEDURE — 80053 COMPREHEN METABOLIC PANEL: CPT | Performed by: EMERGENCY MEDICINE

## 2019-03-14 PROCEDURE — 85610 PROTHROMBIN TIME: CPT | Performed by: EMERGENCY MEDICINE

## 2019-03-14 PROCEDURE — 36415 COLL VENOUS BLD VENIPUNCTURE: CPT

## 2019-03-14 PROCEDURE — 36600 WITHDRAWAL OF ARTERIAL BLOOD: CPT | Performed by: EMERGENCY MEDICINE

## 2019-03-14 PROCEDURE — 82805 BLOOD GASES W/O2 SATURATION: CPT | Performed by: EMERGENCY MEDICINE

## 2019-03-14 PROCEDURE — 71045 X-RAY EXAM CHEST 1 VIEW: CPT

## 2019-03-14 PROCEDURE — 93005 ELECTROCARDIOGRAM TRACING: CPT | Performed by: EMERGENCY MEDICINE

## 2019-03-14 PROCEDURE — 83880 ASSAY OF NATRIURETIC PEPTIDE: CPT | Performed by: EMERGENCY MEDICINE

## 2019-03-14 PROCEDURE — 99285 EMERGENCY DEPT VISIT HI MDM: CPT

## 2019-03-14 PROCEDURE — 81001 URINALYSIS AUTO W/SCOPE: CPT | Performed by: EMERGENCY MEDICINE

## 2019-03-14 PROCEDURE — 71045 X-RAY EXAM CHEST 1 VIEW: CPT | Performed by: RADIOLOGY

## 2019-03-14 PROCEDURE — 82375 ASSAY CARBOXYHB QUANT: CPT | Performed by: EMERGENCY MEDICINE

## 2019-03-14 PROCEDURE — 87086 URINE CULTURE/COLONY COUNT: CPT | Performed by: INTERNAL MEDICINE

## 2019-03-14 PROCEDURE — 85025 COMPLETE CBC W/AUTO DIFF WBC: CPT | Performed by: EMERGENCY MEDICINE

## 2019-03-14 PROCEDURE — 94618 PULMONARY STRESS TESTING: CPT

## 2019-03-14 PROCEDURE — 83050 HGB METHEMOGLOBIN QUAN: CPT | Performed by: EMERGENCY MEDICINE

## 2019-03-14 PROCEDURE — 93010 ELECTROCARDIOGRAM REPORT: CPT | Performed by: INTERNAL MEDICINE

## 2019-03-14 PROCEDURE — 84484 ASSAY OF TROPONIN QUANT: CPT | Performed by: EMERGENCY MEDICINE

## 2019-03-14 RX ORDER — SODIUM CHLORIDE 0.9 % (FLUSH) 0.9 %
10 SYRINGE (ML) INJECTION AS NEEDED
Status: DISCONTINUED | OUTPATIENT
Start: 2019-03-14 | End: 2019-03-16 | Stop reason: HOSPADM

## 2019-03-14 RX ORDER — BUMETANIDE 0.25 MG/ML
1 INJECTION INTRAMUSCULAR; INTRAVENOUS ONCE
Status: COMPLETED | OUTPATIENT
Start: 2019-03-14 | End: 2019-03-14

## 2019-03-14 RX ADMIN — BUMETANIDE 1 MG: 0.25 INJECTION INTRAMUSCULAR; INTRAVENOUS at 22:39

## 2019-03-15 ENCOUNTER — APPOINTMENT (OUTPATIENT)
Dept: GENERAL RADIOLOGY | Facility: HOSPITAL | Age: 74
End: 2019-03-15

## 2019-03-15 PROBLEM — J18.9 PNEUMONIA OF RIGHT MIDDLE LOBE DUE TO INFECTIOUS ORGANISM: Status: RESOLVED | Noted: 2019-02-14 | Resolved: 2019-03-15

## 2019-03-15 PROBLEM — J96.01 ACUTE RESPIRATORY FAILURE WITH HYPOXIA: Status: ACTIVE | Noted: 2019-03-15

## 2019-03-15 LAB
GLUCOSE BLDC GLUCOMTR-MCNC: 112 MG/DL (ref 70–130)
GLUCOSE BLDC GLUCOMTR-MCNC: 173 MG/DL (ref 70–130)
GLUCOSE BLDC GLUCOMTR-MCNC: 184 MG/DL (ref 70–130)
GLUCOSE BLDC GLUCOMTR-MCNC: 85 MG/DL (ref 70–130)

## 2019-03-15 PROCEDURE — 74018 RADEX ABDOMEN 1 VIEW: CPT | Performed by: RADIOLOGY

## 2019-03-15 PROCEDURE — 63710000001 INSULIN ASPART PER 5 UNITS: Performed by: INTERNAL MEDICINE

## 2019-03-15 PROCEDURE — 25010000002 HEPARIN (PORCINE) PER 1000 UNITS: Performed by: INTERNAL MEDICINE

## 2019-03-15 PROCEDURE — 82962 GLUCOSE BLOOD TEST: CPT

## 2019-03-15 PROCEDURE — 25010000002 CEFTRIAXONE: Performed by: INTERNAL MEDICINE

## 2019-03-15 PROCEDURE — G0108 DIAB MANAGE TRN  PER INDIV: HCPCS

## 2019-03-15 PROCEDURE — 99223 1ST HOSP IP/OBS HIGH 75: CPT | Performed by: INTERNAL MEDICINE

## 2019-03-15 PROCEDURE — 74018 RADEX ABDOMEN 1 VIEW: CPT

## 2019-03-15 PROCEDURE — 25010000002 IRON SUCROSE PER 1 MG: Performed by: INTERNAL MEDICINE

## 2019-03-15 RX ORDER — BUMETANIDE 1 MG/1
1 TABLET ORAL DAILY
Status: ON HOLD | COMMUNITY
End: 2021-11-13

## 2019-03-15 RX ORDER — CETIRIZINE HYDROCHLORIDE 10 MG/1
5 TABLET ORAL DAILY
Status: DISCONTINUED | OUTPATIENT
Start: 2019-03-15 | End: 2019-03-16 | Stop reason: HOSPADM

## 2019-03-15 RX ORDER — NITROGLYCERIN 0.4 MG/1
0.4 TABLET SUBLINGUAL
Status: DISCONTINUED | OUTPATIENT
Start: 2019-03-15 | End: 2019-03-16 | Stop reason: HOSPADM

## 2019-03-15 RX ORDER — DEXTROSE MONOHYDRATE 25 G/50ML
25 INJECTION, SOLUTION INTRAVENOUS
Status: DISCONTINUED | OUTPATIENT
Start: 2019-03-15 | End: 2019-03-16 | Stop reason: HOSPADM

## 2019-03-15 RX ORDER — HYDRALAZINE HYDROCHLORIDE 50 MG/1
50 TABLET, FILM COATED ORAL 3 TIMES DAILY
Status: DISCONTINUED | OUTPATIENT
Start: 2019-03-15 | End: 2019-03-16 | Stop reason: HOSPADM

## 2019-03-15 RX ORDER — HEPARIN SODIUM 5000 [USP'U]/ML
5000 INJECTION, SOLUTION INTRAVENOUS; SUBCUTANEOUS EVERY 12 HOURS SCHEDULED
Status: DISCONTINUED | OUTPATIENT
Start: 2019-03-15 | End: 2019-03-16 | Stop reason: HOSPADM

## 2019-03-15 RX ORDER — LOPERAMIDE HYDROCHLORIDE 2 MG/1
2 CAPSULE ORAL DAILY
Status: CANCELLED | OUTPATIENT
Start: 2019-03-15

## 2019-03-15 RX ORDER — LISINOPRIL 30 MG/1
30 TABLET ORAL 2 TIMES DAILY
COMMUNITY
End: 2019-03-16 | Stop reason: HOSPADM

## 2019-03-15 RX ORDER — BUMETANIDE 1 MG/1
1 TABLET ORAL DAILY
Status: CANCELLED | OUTPATIENT
Start: 2019-03-15

## 2019-03-15 RX ORDER — FAMOTIDINE 20 MG/1
20 TABLET, FILM COATED ORAL 2 TIMES DAILY
Status: DISCONTINUED | OUTPATIENT
Start: 2019-03-15 | End: 2019-03-15

## 2019-03-15 RX ORDER — FAMOTIDINE 20 MG/1
20 TABLET, FILM COATED ORAL DAILY
Status: DISCONTINUED | OUTPATIENT
Start: 2019-03-17 | End: 2019-03-16 | Stop reason: HOSPADM

## 2019-03-15 RX ORDER — NICOTINE POLACRILEX 4 MG
15 LOZENGE BUCCAL
Status: DISCONTINUED | OUTPATIENT
Start: 2019-03-15 | End: 2019-03-16 | Stop reason: HOSPADM

## 2019-03-15 RX ORDER — SULFAMETHOXAZOLE AND TRIMETHOPRIM 800; 160 MG/1; MG/1
1 TABLET ORAL 2 TIMES DAILY
COMMUNITY
End: 2019-03-16 | Stop reason: HOSPADM

## 2019-03-15 RX ORDER — LISINOPRIL 10 MG/1
30 TABLET ORAL 2 TIMES DAILY
Status: CANCELLED | OUTPATIENT
Start: 2019-03-15

## 2019-03-15 RX ORDER — LOPERAMIDE HYDROCHLORIDE 2 MG/1
2 CAPSULE ORAL DAILY
COMMUNITY
End: 2019-03-16 | Stop reason: HOSPADM

## 2019-03-15 RX ORDER — HYDRALAZINE HYDROCHLORIDE 100 MG/1
100 TABLET, FILM COATED ORAL 3 TIMES DAILY
COMMUNITY
End: 2019-03-16 | Stop reason: HOSPADM

## 2019-03-15 RX ORDER — CYANOCOBALAMIN 1000 UG/ML
1000 INJECTION, SOLUTION INTRAMUSCULAR; SUBCUTANEOUS
Status: CANCELLED | OUTPATIENT
Start: 2019-04-01

## 2019-03-15 RX ORDER — SODIUM CHLORIDE 0.9 % (FLUSH) 0.9 %
3 SYRINGE (ML) INJECTION EVERY 12 HOURS SCHEDULED
Status: DISCONTINUED | OUTPATIENT
Start: 2019-03-15 | End: 2019-03-16 | Stop reason: HOSPADM

## 2019-03-15 RX ORDER — BUMETANIDE 0.25 MG/ML
1 INJECTION INTRAMUSCULAR; INTRAVENOUS ONCE
Status: COMPLETED | OUTPATIENT
Start: 2019-03-15 | End: 2019-03-15

## 2019-03-15 RX ORDER — FERROUS SULFATE 325(65) MG
325 TABLET ORAL 2 TIMES DAILY
Status: DISCONTINUED | OUTPATIENT
Start: 2019-03-15 | End: 2019-03-16 | Stop reason: HOSPADM

## 2019-03-15 RX ORDER — SULFAMETHOXAZOLE AND TRIMETHOPRIM 800; 160 MG/1; MG/1
1 TABLET ORAL 2 TIMES DAILY
Status: CANCELLED | OUTPATIENT
Start: 2019-03-15 | End: 2019-03-21

## 2019-03-15 RX ORDER — CALCIUM CARBONATE 500(1250)
500 TABLET ORAL DAILY
Status: DISCONTINUED | OUTPATIENT
Start: 2019-03-15 | End: 2019-03-16 | Stop reason: HOSPADM

## 2019-03-15 RX ORDER — CARVEDILOL 6.25 MG/1
12.5 TABLET ORAL 2 TIMES DAILY WITH MEALS
Status: CANCELLED | OUTPATIENT
Start: 2019-03-15

## 2019-03-15 RX ORDER — SODIUM CHLORIDE 0.9 % (FLUSH) 0.9 %
3-10 SYRINGE (ML) INJECTION AS NEEDED
Status: DISCONTINUED | OUTPATIENT
Start: 2019-03-15 | End: 2019-03-16 | Stop reason: HOSPADM

## 2019-03-15 RX ORDER — L.ACID,PARA/B.BIFIDUM/S.THERM 8B CELL
1 CAPSULE ORAL DAILY
Status: DISCONTINUED | OUTPATIENT
Start: 2019-03-15 | End: 2019-03-16 | Stop reason: HOSPADM

## 2019-03-15 RX ORDER — DILTIAZEM HYDROCHLORIDE 180 MG/1
360 CAPSULE, COATED, EXTENDED RELEASE ORAL
Status: CANCELLED | OUTPATIENT
Start: 2019-03-15

## 2019-03-15 RX ADMIN — INSULIN ASPART 2 UNITS: 100 INJECTION, SOLUTION INTRAVENOUS; SUBCUTANEOUS at 21:22

## 2019-03-15 RX ADMIN — IRON SUCROSE 300 MG: 20 INJECTION, SOLUTION INTRAVENOUS at 23:30

## 2019-03-15 RX ADMIN — CETIRIZINE HCL 5 MG: 10 TABLET ORAL at 08:31

## 2019-03-15 RX ADMIN — HEPARIN SODIUM 5000 UNITS: 5000 INJECTION INTRAVENOUS; SUBCUTANEOUS at 08:30

## 2019-03-15 RX ADMIN — INSULIN ASPART 2 UNITS: 100 INJECTION, SOLUTION INTRAVENOUS; SUBCUTANEOUS at 12:20

## 2019-03-15 RX ADMIN — FERROUS SULFATE TAB 325 MG (65 MG ELEMENTAL FE) 325 MG: 325 (65 FE) TAB at 08:31

## 2019-03-15 RX ADMIN — HYDRALAZINE HYDROCHLORIDE 50 MG: 50 TABLET ORAL at 21:21

## 2019-03-15 RX ADMIN — HYDRALAZINE HYDROCHLORIDE 50 MG: 50 TABLET ORAL at 15:40

## 2019-03-15 RX ADMIN — SODIUM CHLORIDE, PRESERVATIVE FREE 3 ML: 5 INJECTION INTRAVENOUS at 08:31

## 2019-03-15 RX ADMIN — BUMETANIDE 1 MG: 0.25 INJECTION INTRAMUSCULAR; INTRAVENOUS at 15:40

## 2019-03-15 RX ADMIN — FAMOTIDINE 20 MG: 20 TABLET, FILM COATED ORAL at 08:31

## 2019-03-15 RX ADMIN — FAMOTIDINE 20 MG: 20 TABLET, FILM COATED ORAL at 21:21

## 2019-03-15 RX ADMIN — Medication 1 CAPSULE: at 17:52

## 2019-03-15 RX ADMIN — HEPARIN SODIUM 5000 UNITS: 5000 INJECTION INTRAVENOUS; SUBCUTANEOUS at 21:21

## 2019-03-15 RX ADMIN — SODIUM CHLORIDE, PRESERVATIVE FREE 3 ML: 5 INJECTION INTRAVENOUS at 21:21

## 2019-03-15 RX ADMIN — Medication 500 MG: at 08:31

## 2019-03-15 RX ADMIN — CEFTRIAXONE 2 G: 2 INJECTION, POWDER, FOR SOLUTION INTRAMUSCULAR; INTRAVENOUS at 06:08

## 2019-03-15 RX ADMIN — FERROUS SULFATE TAB 325 MG (65 MG ELEMENTAL FE) 325 MG: 325 (65 FE) TAB at 21:21

## 2019-03-15 RX ADMIN — HYDRALAZINE HYDROCHLORIDE 50 MG: 50 TABLET ORAL at 08:30

## 2019-03-16 ENCOUNTER — APPOINTMENT (OUTPATIENT)
Dept: GENERAL RADIOLOGY | Facility: HOSPITAL | Age: 74
End: 2019-03-16

## 2019-03-16 VITALS
HEART RATE: 91 BPM | OXYGEN SATURATION: 92 % | RESPIRATION RATE: 18 BRPM | TEMPERATURE: 98 F | DIASTOLIC BLOOD PRESSURE: 76 MMHG | HEIGHT: 63 IN | BODY MASS INDEX: 28.19 KG/M2 | SYSTOLIC BLOOD PRESSURE: 152 MMHG | WEIGHT: 159.1 LBS

## 2019-03-16 LAB
ANION GAP SERPL CALCULATED.3IONS-SCNC: 11.4 MMOL/L
BACTERIA SPEC AEROBE CULT: NORMAL
BUN BLD-MCNC: 29 MG/DL (ref 8–23)
BUN/CREAT SERPL: 17 (ref 7–25)
CALCIUM SPEC-SCNC: 9.4 MG/DL (ref 8.6–10.5)
CHLORIDE SERPL-SCNC: 102 MMOL/L (ref 98–107)
CO2 SERPL-SCNC: 25.6 MMOL/L (ref 22–29)
CREAT BLD-MCNC: 1.71 MG/DL (ref 0.57–1)
GFR SERPL CREATININE-BSD FRML MDRD: 29 ML/MIN/1.73
GLUCOSE BLD-MCNC: 149 MG/DL (ref 65–99)
GLUCOSE BLDC GLUCOMTR-MCNC: 132 MG/DL (ref 70–130)
GLUCOSE BLDC GLUCOMTR-MCNC: 174 MG/DL (ref 70–130)
POTASSIUM BLD-SCNC: 4.6 MMOL/L (ref 3.5–5.2)
SODIUM BLD-SCNC: 139 MMOL/L (ref 136–145)

## 2019-03-16 PROCEDURE — 25010000002 CEFTRIAXONE: Performed by: INTERNAL MEDICINE

## 2019-03-16 PROCEDURE — 71045 X-RAY EXAM CHEST 1 VIEW: CPT

## 2019-03-16 PROCEDURE — 25010000002 IRON SUCROSE PER 1 MG: Performed by: INTERNAL MEDICINE

## 2019-03-16 PROCEDURE — 63710000001 INSULIN ASPART PER 5 UNITS: Performed by: INTERNAL MEDICINE

## 2019-03-16 PROCEDURE — 71045 X-RAY EXAM CHEST 1 VIEW: CPT | Performed by: RADIOLOGY

## 2019-03-16 PROCEDURE — 80048 BASIC METABOLIC PNL TOTAL CA: CPT | Performed by: INTERNAL MEDICINE

## 2019-03-16 PROCEDURE — 25010000002 HEPARIN (PORCINE) PER 1000 UNITS: Performed by: INTERNAL MEDICINE

## 2019-03-16 PROCEDURE — 82962 GLUCOSE BLOOD TEST: CPT

## 2019-03-16 PROCEDURE — 99239 HOSP IP/OBS DSCHRG MGMT >30: CPT | Performed by: INTERNAL MEDICINE

## 2019-03-16 RX ORDER — BUMETANIDE 0.25 MG/ML
1 INJECTION INTRAMUSCULAR; INTRAVENOUS ONCE
Status: COMPLETED | OUTPATIENT
Start: 2019-03-16 | End: 2019-03-16

## 2019-03-16 RX ADMIN — FERROUS SULFATE TAB 325 MG (65 MG ELEMENTAL FE) 325 MG: 325 (65 FE) TAB at 08:32

## 2019-03-16 RX ADMIN — HEPARIN SODIUM 5000 UNITS: 5000 INJECTION INTRAVENOUS; SUBCUTANEOUS at 08:31

## 2019-03-16 RX ADMIN — SODIUM CHLORIDE, PRESERVATIVE FREE 3 ML: 5 INJECTION INTRAVENOUS at 08:33

## 2019-03-16 RX ADMIN — INSULIN ASPART 2 UNITS: 100 INJECTION, SOLUTION INTRAVENOUS; SUBCUTANEOUS at 12:15

## 2019-03-16 RX ADMIN — Medication 500 MG: at 08:32

## 2019-03-16 RX ADMIN — Medication 1 CAPSULE: at 08:32

## 2019-03-16 RX ADMIN — BUMETANIDE 1 MG: 0.25 INJECTION INTRAMUSCULAR; INTRAVENOUS at 12:15

## 2019-03-16 RX ADMIN — HYDRALAZINE HYDROCHLORIDE 50 MG: 50 TABLET ORAL at 08:32

## 2019-03-16 RX ADMIN — IRON SUCROSE 300 MG: 20 INJECTION, SOLUTION INTRAVENOUS at 13:32

## 2019-03-16 RX ADMIN — CETIRIZINE HCL 5 MG: 10 TABLET ORAL at 08:32

## 2019-03-16 RX ADMIN — CEFTRIAXONE 2 G: 2 INJECTION, POWDER, FOR SOLUTION INTRAMUSCULAR; INTRAVENOUS at 06:47

## 2019-03-17 ENCOUNTER — READMISSION MANAGEMENT (OUTPATIENT)
Dept: CALL CENTER | Facility: HOSPITAL | Age: 74
End: 2019-03-17

## 2019-03-17 NOTE — OUTREACH NOTE
Prep Survey      Responses   Facility patient discharged from?  [facility]   Is patient eligible?  Yes   Discharge diagnosis  Acute kidney injury on top of chronic kidney disease stage,Diastolic congestive heart failure with an acute exacerbation   Does the patient have one of the following disease processes/diagnoses(primary or secondary)?  CHF   Does the patient have Home health ordered?  No   Is there a DME ordered?  No   Prep survey completed?  Yes          Lexis Garza RN

## 2019-03-18 ENCOUNTER — EPISODE CHANGES (OUTPATIENT)
Dept: CASE MANAGEMENT | Facility: OTHER | Age: 74
End: 2019-03-18

## 2019-03-18 ENCOUNTER — TELEPHONE (OUTPATIENT)
Dept: MEDSURG UNIT | Facility: HOSPITAL | Age: 74
End: 2019-03-18

## 2019-03-18 ENCOUNTER — TRANSCRIBE ORDERS (OUTPATIENT)
Dept: INFUSION THERAPY | Facility: HOSPITAL | Age: 74
End: 2019-03-18

## 2019-03-18 ENCOUNTER — READMISSION MANAGEMENT (OUTPATIENT)
Dept: CALL CENTER | Facility: HOSPITAL | Age: 74
End: 2019-03-18

## 2019-03-18 DIAGNOSIS — D50.9 IRON DEFICIENCY ANEMIA, UNSPECIFIED IRON DEFICIENCY ANEMIA TYPE: Primary | ICD-10-CM

## 2019-03-18 DIAGNOSIS — N18.30 CHRONIC KIDNEY DISEASE, STAGE III (MODERATE) (HCC): ICD-10-CM

## 2019-03-18 NOTE — OUTREACH NOTE
CHF Week 1 Survey      Responses   Facility patient discharged from?  [facility]   Does the patient have one of the following disease processes/diagnoses(primary or secondary)?  CHF   Is there a successful TCM telephone encounter documented?  No   CHF Week 1 attempt successful?  No   Unsuccessful attempts  Attempt 1          Yuliana Vasques RN

## 2019-03-19 ENCOUNTER — READMISSION MANAGEMENT (OUTPATIENT)
Dept: CALL CENTER | Facility: HOSPITAL | Age: 74
End: 2019-03-19

## 2019-03-19 NOTE — OUTREACH NOTE
CHF Week 1 Survey      Responses   Facility patient discharged from?  [facility]   Does the patient have one of the following disease processes/diagnoses(primary or secondary)?  CHF   Is there a successful TCM telephone encounter documented?  No   CHF Week 1 attempt successful?  Yes   Call start time  0923   Call end time  0929   Discharge diagnosis  Acute kidney injury on top of chronic kidney disease stage,Diastolic congestive heart failure with an acute exacerbation   Is patient permission given to speak with other caregiver?  No   Medication alerts for this patient  Family manages her meds and all d/c'd meds were removed from pill box   Meds reviewed with patient/caregiver?  Yes   Is the patient having any side effects they believe may be caused by any medication additions or changes?  No   Does the patient have all medications ordered at discharge?  N/A   Is the patient taking all medications as directed (includes completed medication regime)?  Yes   Does the patient have a primary care provider?   Yes   Does the patient have an appointment with their PCP within 7 days of discharge?  Yes   Has the patient kept scheduled appointments due by today?  Yes   What is the Home health agency?   Refused HH   Has home health visited the patient within 72 hours of discharge?  N/A   Psychosocial issues?  No   Did the patient receive a copy of their discharge instructions?  Yes   Nursing interventions  Reviewed instructions with patient   What is the patient's perception of their health status since discharge?  Improving   Is the patient weighing daily?  Yes   Does the patient have scales?  Yes   Is the patient able to teach back Heart Failure diet management?  Yes   Is the patient able to teach back Heart Failure Zones?  Yes   Is the patient able to teach back signs and symptoms of worsening condition? (i.e. weight gain, shortness of air, etc.)  Yes   Is the patient/caregiver able to teach back the hierarchy of who to  call/visit for symptoms/problems? PCP, Specialist, Home health nurse, Urgent Care, ED, 911  Yes    CHF Week 1 call completed?  Yes          Amy Astudillo RN

## 2019-03-22 ENCOUNTER — TRANSCRIBE ORDERS (OUTPATIENT)
Dept: GENERAL RADIOLOGY | Facility: HOSPITAL | Age: 74
End: 2019-03-22

## 2019-03-22 ENCOUNTER — LAB (OUTPATIENT)
Dept: LAB | Facility: HOSPITAL | Age: 74
End: 2019-03-22

## 2019-03-22 DIAGNOSIS — I50.32 CHRONIC DIASTOLIC CONGESTIVE HEART FAILURE (HCC): Primary | ICD-10-CM

## 2019-03-22 DIAGNOSIS — I50.32 CHRONIC DIASTOLIC CONGESTIVE HEART FAILURE (HCC): ICD-10-CM

## 2019-03-22 LAB
ANION GAP SERPL CALCULATED.3IONS-SCNC: 13.6 MMOL/L
BUN BLD-MCNC: 30 MG/DL (ref 8–23)
BUN/CREAT SERPL: 24.6 (ref 7–25)
CALCIUM SPEC-SCNC: 9.4 MG/DL (ref 8.6–10.5)
CHLORIDE SERPL-SCNC: 98 MMOL/L (ref 98–107)
CO2 SERPL-SCNC: 26.4 MMOL/L (ref 22–29)
CREAT BLD-MCNC: 1.22 MG/DL (ref 0.57–1)
GFR SERPL CREATININE-BSD FRML MDRD: 43 ML/MIN/1.73
GLUCOSE BLD-MCNC: 192 MG/DL (ref 65–99)
POTASSIUM BLD-SCNC: 4.5 MMOL/L (ref 3.5–5.2)
SODIUM BLD-SCNC: 138 MMOL/L (ref 136–145)

## 2019-03-22 PROCEDURE — 80048 BASIC METABOLIC PNL TOTAL CA: CPT

## 2019-03-22 PROCEDURE — 36415 COLL VENOUS BLD VENIPUNCTURE: CPT

## 2019-03-26 ENCOUNTER — READMISSION MANAGEMENT (OUTPATIENT)
Dept: CALL CENTER | Facility: HOSPITAL | Age: 74
End: 2019-03-26

## 2019-03-26 NOTE — OUTREACH NOTE
CHF Week 2 Survey      Responses   Facility patient discharged from?  [facility]   Does the patient have one of the following disease processes/diagnoses(primary or secondary)?  CHF   Week 2 attempt successful?  Yes   Call start time  1313   Call end time  1319   Discharge diagnosis  Acute kidney injury on top of chronic kidney disease stage,Diastolic congestive heart failure with an acute exacerbation   Person spoke with today (if not patient) and relationship  Antonina MARRERO on patient's home phone   Medication alerts for this patient  Family manages her meds and all d/c'd meds were removed from pill box   Meds reviewed with patient/caregiver?  Yes   Is the patient having any side effects they believe may be caused by any medication additions or changes?  No   Does the patient have all medications ordered at discharge?  Yes   Is the patient taking all medications as directed (includes completed medication regime)?  Yes   Medication comments  restarted all meds other than diltiazem and hydralazine   Does the patient have a primary care provider?   Yes   Does the patient have an appointment with their PCP within 7 days of discharge?  Yes   Comments regarding PCP  Jim Hernandez MD   Has the patient kept scheduled appointments due by today?  Yes   Has home health visited the patient within 72 hours of discharge?  N/A   What DME was ordered?  Patient has home O2 and nebulizer.    Has all DME been delivered?  Yes   DME comments  only wears her O2 when she feels like she needs it   Psychosocial issues?  No   Did the patient receive a copy of their discharge instructions?  Yes   Nursing interventions  Reviewed instructions with patient   What is the patient's perception of their health status since discharge?  Improving   Nursing interventions  Nurse provided patient education   Is the patient weighing daily?  Yes   Does the patient have scales?  Yes   Daily weight interventions  Education provided on importance of daily  weight   Is the patient able to teach back Heart Failure diet management?  Yes   Is the patient able to teach back Heart Failure Zones?  Yes   Is the patient able to teach back signs and symptoms of worsening condition? (i.e. weight gain, shortness of air, etc.)  Yes   Is the patient/caregiver able to teach back the hierarchy of who to call/visit for symptoms/problems? PCP, Specialist, Home health nurse, Urgent Care, ED, 911  Yes   Additional teach back comments  States green zone today. Denies difficulty breathing or swelling.    CHF Week 2 call completed?  Yes          Pilar Rodriguez, RN

## 2019-04-03 ENCOUNTER — READMISSION MANAGEMENT (OUTPATIENT)
Dept: CALL CENTER | Facility: HOSPITAL | Age: 74
End: 2019-04-03

## 2019-04-03 NOTE — OUTREACH NOTE
CHF Week 3 Survey      Responses   Facility patient discharged from?  [facility]   Does the patient have one of the following disease processes/diagnoses(primary or secondary)?  CHF   Week 3 attempt successful?  Yes   Call start time  0933   Call end time  0934   Discharge diagnosis  Acute kidney injury on top of chronic kidney disease stage,Diastolic congestive heart failure with an acute exacerbation   Is patient permission given to speak with other caregiver?  No   Meds reviewed with patient/caregiver?  Yes   Is the patient having any side effects they believe may be caused by any medication additions or changes?  No   Does the patient have all medications ordered at discharge?  Yes   Is the patient taking all medications as directed (includes completed medication regime)?  Yes   Does the patient have a primary care provider?   Yes   Does the patient have an appointment with their PCP within 7 days of discharge?  Yes   Has the patient kept scheduled appointments due by today?  Yes   Psychosocial issues?  No   Did the patient receive a copy of their discharge instructions?  Yes   Nursing interventions  Reviewed instructions with patient   What is the patient's perception of their health status since discharge?  Improving   Nursing interventions  Nurse provided patient education   Is the patient weighing daily?  Yes   Does the patient have scales?  Yes   Is the patient able to teach back Heart Failure diet management?  Yes   Is the patient able to teach back Heart Failure Zones?  Yes   Is the patient able to teach back signs and symptoms of worsening condition? (i.e. weight gain, shortness of air, etc.)  Yes   Is the patient/caregiver able to teach back the hierarchy of who to call/visit for symptoms/problems? PCP, Specialist, Home health nurse, Urgent Care, ED, 911  Yes   CHF Week 3 call completed?  Yes          Maria Teresa Rodriguez RN

## 2019-04-10 ENCOUNTER — READMISSION MANAGEMENT (OUTPATIENT)
Dept: CALL CENTER | Facility: HOSPITAL | Age: 74
End: 2019-04-10

## 2019-04-10 NOTE — OUTREACH NOTE
CHF Week 4 Survey      Responses   Facility patient discharged from?  [facility]   Does the patient have one of the following disease processes/diagnoses(primary or secondary)?  CHF   Week 4 attempt successful?  Yes   Call start time  1808   Call end time  1811   Meds reviewed with patient/caregiver?  Yes   Is the patient taking all medications as directed (includes completed medication regime)?  Yes   Has the patient kept scheduled appointments due by today?  Yes   Comments  saw her  renal doctor,    Is the patient still receiving Home Health Services?  N/A   What is the patient's perception of their health status since discharge?  Improving   Is the patient weighing daily?  Yes [weighing  daily 157.4, no weight gain]   Is the patient able to teach back signs and symptoms of worsening condition? (i.e. weight gain, shortness of air, etc.)  Yes   Week 4 Call Completed?  Yes   Would the patient like one additional call?  No   Graduated  Yes   Was the number of calls appropriate?  Yes   Wrap up additional comments  doing well          Crystal Shirley RN

## 2019-04-19 ENCOUNTER — LAB (OUTPATIENT)
Dept: LAB | Facility: HOSPITAL | Age: 74
End: 2019-04-19

## 2019-04-19 ENCOUNTER — TRANSCRIBE ORDERS (OUTPATIENT)
Dept: LAB | Facility: HOSPITAL | Age: 74
End: 2019-04-19

## 2019-04-19 DIAGNOSIS — N18.30 CHRONIC KIDNEY DISEASE, STAGE III (MODERATE) (HCC): ICD-10-CM

## 2019-04-19 DIAGNOSIS — D50.9 IRON DEFICIENCY ANEMIA, UNSPECIFIED IRON DEFICIENCY ANEMIA TYPE: Primary | ICD-10-CM

## 2019-04-19 DIAGNOSIS — D50.9 IRON DEFICIENCY ANEMIA, UNSPECIFIED IRON DEFICIENCY ANEMIA TYPE: ICD-10-CM

## 2019-04-19 LAB
ALBUMIN SERPL-MCNC: 4 G/DL (ref 3.5–5.2)
ALBUMIN/GLOB SERPL: 1.4 G/DL
ALP SERPL-CCNC: 40 U/L (ref 39–117)
ALT SERPL W P-5'-P-CCNC: 9 U/L (ref 1–33)
ANION GAP SERPL CALCULATED.3IONS-SCNC: 13.7 MMOL/L
AST SERPL-CCNC: 10 U/L (ref 1–32)
BASOPHILS # BLD AUTO: 0.03 10*3/MM3 (ref 0–0.2)
BASOPHILS NFR BLD AUTO: 0.3 % (ref 0–1.5)
BILIRUB SERPL-MCNC: <0.2 MG/DL (ref 0.2–1.2)
BUN BLD-MCNC: 23 MG/DL (ref 8–23)
BUN/CREAT SERPL: 18.3 (ref 7–25)
CALCIUM SPEC-SCNC: 9.4 MG/DL (ref 8.6–10.5)
CHLORIDE SERPL-SCNC: 102 MMOL/L (ref 98–107)
CO2 SERPL-SCNC: 27.3 MMOL/L (ref 22–29)
CREAT BLD-MCNC: 1.26 MG/DL (ref 0.57–1)
CREAT UR-MCNC: 29.2 MG/DL
DEPRECATED RDW RBC AUTO: 49 FL (ref 37–54)
EOSINOPHIL # BLD AUTO: 0.35 10*3/MM3 (ref 0–0.4)
EOSINOPHIL NFR BLD AUTO: 3.7 % (ref 0.3–6.2)
ERYTHROCYTE [DISTWIDTH] IN BLOOD BY AUTOMATED COUNT: 15.2 % (ref 12.3–15.4)
FERRITIN SERPL-MCNC: 274 NG/ML (ref 13–150)
GFR SERPL CREATININE-BSD FRML MDRD: 41 ML/MIN/1.73
GLOBULIN UR ELPH-MCNC: 2.9 GM/DL
GLUCOSE BLD-MCNC: 166 MG/DL (ref 65–99)
HCT VFR BLD AUTO: 35.2 % (ref 34–46.6)
HGB BLD-MCNC: 10.8 G/DL (ref 12–15.9)
IMM GRANULOCYTES # BLD AUTO: 0.03 10*3/MM3 (ref 0–0.05)
IMM GRANULOCYTES NFR BLD AUTO: 0.3 % (ref 0–0.5)
IRON 24H UR-MRATE: 31 MCG/DL (ref 37–145)
IRON SATN MFR SERPL: 21 % (ref 20–50)
LYMPHOCYTES # BLD AUTO: 3.41 10*3/MM3 (ref 0.7–3.1)
LYMPHOCYTES NFR BLD AUTO: 36.4 % (ref 19.6–45.3)
MCH RBC QN AUTO: 27.8 PG (ref 26.6–33)
MCHC RBC AUTO-ENTMCNC: 30.7 G/DL (ref 31.5–35.7)
MCV RBC AUTO: 90.5 FL (ref 79–97)
MONOCYTES # BLD AUTO: 0.76 10*3/MM3 (ref 0.1–0.9)
MONOCYTES NFR BLD AUTO: 8.1 % (ref 5–12)
NEUTROPHILS # BLD AUTO: 4.79 10*3/MM3 (ref 1.7–7)
NEUTROPHILS NFR BLD AUTO: 51.2 % (ref 42.7–76)
NRBC BLD AUTO-RTO: 0 /100 WBC (ref 0–0.2)
PLATELET # BLD AUTO: 362 10*3/MM3 (ref 140–450)
PMV BLD AUTO: 11.6 FL (ref 6–12)
POTASSIUM BLD-SCNC: 4.6 MMOL/L (ref 3.5–5.2)
PROT SERPL-MCNC: 6.9 G/DL (ref 6–8.5)
PROT UR-MCNC: 13 MG/DL
PROT/CREAT UR: 445.2 MG/G CREA (ref 0–200)
RBC # BLD AUTO: 3.89 10*6/MM3 (ref 3.77–5.28)
SODIUM BLD-SCNC: 143 MMOL/L (ref 136–145)
TIBC SERPL-MCNC: 148 MCG/DL (ref 298–536)
TRANSFERRIN SERPL-MCNC: 99 MG/DL (ref 200–360)
WBC NRBC COR # BLD: 9.37 10*3/MM3 (ref 3.4–10.8)

## 2019-04-19 PROCEDURE — 84466 ASSAY OF TRANSFERRIN: CPT

## 2019-04-19 PROCEDURE — 84156 ASSAY OF PROTEIN URINE: CPT

## 2019-04-19 PROCEDURE — 82570 ASSAY OF URINE CREATININE: CPT

## 2019-04-19 PROCEDURE — 80053 COMPREHEN METABOLIC PANEL: CPT

## 2019-04-19 PROCEDURE — 85025 COMPLETE CBC W/AUTO DIFF WBC: CPT

## 2019-04-19 PROCEDURE — 82728 ASSAY OF FERRITIN: CPT

## 2019-04-19 PROCEDURE — 36415 COLL VENOUS BLD VENIPUNCTURE: CPT

## 2019-04-19 PROCEDURE — 83540 ASSAY OF IRON: CPT

## 2019-05-08 ENCOUNTER — TRANSCRIBE ORDERS (OUTPATIENT)
Dept: ADMINISTRATIVE | Facility: HOSPITAL | Age: 74
End: 2019-05-08

## 2019-05-08 ENCOUNTER — LAB (OUTPATIENT)
Dept: LAB | Facility: HOSPITAL | Age: 74
End: 2019-05-08

## 2019-05-08 DIAGNOSIS — N18.30 CHRONIC KIDNEY DISEASE, STAGE III (MODERATE) (HCC): Primary | ICD-10-CM

## 2019-05-08 DIAGNOSIS — N18.30 CHRONIC KIDNEY DISEASE, STAGE III (MODERATE) (HCC): ICD-10-CM

## 2019-05-08 LAB
ANION GAP SERPL CALCULATED.3IONS-SCNC: 15.7 MMOL/L
BUN BLD-MCNC: 31 MG/DL (ref 8–23)
BUN/CREAT SERPL: 25.8 (ref 7–25)
CALCIUM SPEC-SCNC: 9.9 MG/DL (ref 8.6–10.5)
CHLORIDE SERPL-SCNC: 100 MMOL/L (ref 98–107)
CO2 SERPL-SCNC: 23.3 MMOL/L (ref 22–29)
CREAT BLD-MCNC: 1.2 MG/DL (ref 0.57–1)
GFR SERPL CREATININE-BSD FRML MDRD: 44 ML/MIN/1.73
GLUCOSE BLD-MCNC: 156 MG/DL (ref 65–99)
POTASSIUM BLD-SCNC: 4.9 MMOL/L (ref 3.5–5.2)
SODIUM BLD-SCNC: 139 MMOL/L (ref 136–145)

## 2019-05-08 PROCEDURE — 80048 BASIC METABOLIC PNL TOTAL CA: CPT

## 2019-05-08 PROCEDURE — 36415 COLL VENOUS BLD VENIPUNCTURE: CPT

## 2019-06-18 ENCOUNTER — LAB (OUTPATIENT)
Dept: LAB | Facility: HOSPITAL | Age: 74
End: 2019-06-18

## 2019-06-18 ENCOUNTER — TRANSCRIBE ORDERS (OUTPATIENT)
Dept: GENERAL RADIOLOGY | Facility: HOSPITAL | Age: 74
End: 2019-06-18

## 2019-06-18 DIAGNOSIS — N18.30 CHRONIC KIDNEY DISEASE, STAGE III (MODERATE) (HCC): Primary | ICD-10-CM

## 2019-06-18 DIAGNOSIS — N18.30 CHRONIC KIDNEY DISEASE, STAGE III (MODERATE) (HCC): ICD-10-CM

## 2019-06-18 LAB
ANION GAP SERPL CALCULATED.3IONS-SCNC: 12.6 MMOL/L
BUN BLD-MCNC: 30 MG/DL (ref 8–23)
BUN/CREAT SERPL: 25 (ref 7–25)
CALCIUM SPEC-SCNC: 9.9 MG/DL (ref 8.6–10.5)
CHLORIDE SERPL-SCNC: 103 MMOL/L (ref 98–107)
CO2 SERPL-SCNC: 26.4 MMOL/L (ref 22–29)
CREAT BLD-MCNC: 1.2 MG/DL (ref 0.57–1)
GFR SERPL CREATININE-BSD FRML MDRD: 44 ML/MIN/1.73
GLUCOSE BLD-MCNC: 68 MG/DL (ref 65–99)
POTASSIUM BLD-SCNC: 4.7 MMOL/L (ref 3.5–5.2)
SODIUM BLD-SCNC: 142 MMOL/L (ref 136–145)

## 2019-06-18 PROCEDURE — 36415 COLL VENOUS BLD VENIPUNCTURE: CPT

## 2019-06-18 PROCEDURE — 80048 BASIC METABOLIC PNL TOTAL CA: CPT

## 2019-06-18 PROCEDURE — 82570 ASSAY OF URINE CREATININE: CPT

## 2019-06-18 PROCEDURE — 84156 ASSAY OF PROTEIN URINE: CPT

## 2019-06-19 LAB
CREAT UR-MCNC: 31.9 MG/DL
PROT UR-MCNC: 15 MG/DL
PROT/CREAT UR: 470.2 MG/G CREA (ref 0–200)

## 2019-07-31 ENCOUNTER — TRANSCRIBE ORDERS (OUTPATIENT)
Dept: GENERAL RADIOLOGY | Facility: HOSPITAL | Age: 74
End: 2019-07-31

## 2019-07-31 ENCOUNTER — LAB (OUTPATIENT)
Dept: LAB | Facility: HOSPITAL | Age: 74
End: 2019-07-31

## 2019-07-31 DIAGNOSIS — E87.5 HYPERKALEMIA: Primary | ICD-10-CM

## 2019-07-31 DIAGNOSIS — E87.5 HYPERKALEMIA: ICD-10-CM

## 2019-07-31 LAB
ANION GAP SERPL CALCULATED.3IONS-SCNC: 12.4 MMOL/L (ref 5–15)
BUN BLD-MCNC: 29 MG/DL (ref 8–23)
BUN/CREAT SERPL: 19.7 (ref 7–25)
CALCIUM SPEC-SCNC: 9.8 MG/DL (ref 8.6–10.5)
CHLORIDE SERPL-SCNC: 98 MMOL/L (ref 98–107)
CO2 SERPL-SCNC: 27.6 MMOL/L (ref 22–29)
CREAT BLD-MCNC: 1.47 MG/DL (ref 0.57–1)
GFR SERPL CREATININE-BSD FRML MDRD: 35 ML/MIN/1.73
GLUCOSE BLD-MCNC: 115 MG/DL (ref 65–99)
POTASSIUM BLD-SCNC: 4.6 MMOL/L (ref 3.5–5.2)
SODIUM BLD-SCNC: 138 MMOL/L (ref 136–145)

## 2019-07-31 PROCEDURE — 80048 BASIC METABOLIC PNL TOTAL CA: CPT

## 2019-07-31 PROCEDURE — 36415 COLL VENOUS BLD VENIPUNCTURE: CPT

## 2019-09-17 ENCOUNTER — TRANSCRIBE ORDERS (OUTPATIENT)
Dept: ADMINISTRATIVE | Facility: HOSPITAL | Age: 74
End: 2019-09-17

## 2019-09-17 ENCOUNTER — LAB (OUTPATIENT)
Dept: LAB | Facility: HOSPITAL | Age: 74
End: 2019-09-17

## 2019-09-17 DIAGNOSIS — E87.5 HYPERKALEMIA: ICD-10-CM

## 2019-09-17 DIAGNOSIS — N18.30 CHRONIC KIDNEY DISEASE, STAGE III (MODERATE) (HCC): ICD-10-CM

## 2019-09-17 DIAGNOSIS — N18.30 CHRONIC KIDNEY DISEASE, STAGE III (MODERATE) (HCC): Primary | ICD-10-CM

## 2019-09-17 LAB
ALBUMIN SERPL-MCNC: 4.5 G/DL (ref 3.5–5.2)
ANION GAP SERPL CALCULATED.3IONS-SCNC: 12.7 MMOL/L (ref 5–15)
BUN BLD-MCNC: 30 MG/DL (ref 8–23)
BUN/CREAT SERPL: 23.6 (ref 7–25)
CALCIUM SPEC-SCNC: 9.9 MG/DL (ref 8.6–10.5)
CHLORIDE SERPL-SCNC: 97 MMOL/L (ref 98–107)
CO2 SERPL-SCNC: 28.3 MMOL/L (ref 22–29)
CREAT BLD-MCNC: 1.27 MG/DL (ref 0.57–1)
CREAT UR-MCNC: 20.8 MG/DL
GFR SERPL CREATININE-BSD FRML MDRD: 41 ML/MIN/1.73
GLUCOSE BLD-MCNC: 135 MG/DL (ref 65–99)
PHOSPHATE SERPL-MCNC: 3.7 MG/DL (ref 2.5–4.5)
POTASSIUM BLD-SCNC: 4.6 MMOL/L (ref 3.5–5.2)
PROT UR-MCNC: 8 MG/DL
PROT/CREAT UR: 384.6 MG/G CREA (ref 0–200)
SODIUM BLD-SCNC: 138 MMOL/L (ref 136–145)

## 2019-09-17 PROCEDURE — 84156 ASSAY OF PROTEIN URINE: CPT

## 2019-09-17 PROCEDURE — 82570 ASSAY OF URINE CREATININE: CPT

## 2019-09-17 PROCEDURE — 80069 RENAL FUNCTION PANEL: CPT

## 2019-09-17 PROCEDURE — 36415 COLL VENOUS BLD VENIPUNCTURE: CPT

## 2019-10-15 ENCOUNTER — TRANSCRIBE ORDERS (OUTPATIENT)
Dept: ADMINISTRATIVE | Facility: HOSPITAL | Age: 74
End: 2019-10-15

## 2019-10-15 ENCOUNTER — APPOINTMENT (OUTPATIENT)
Dept: LAB | Facility: HOSPITAL | Age: 74
End: 2019-10-15

## 2019-10-15 DIAGNOSIS — I50.32 CHRONIC DIASTOLIC CONGESTIVE HEART FAILURE (HCC): Primary | ICD-10-CM

## 2019-10-15 LAB
ANION GAP SERPL CALCULATED.3IONS-SCNC: 14.8 MMOL/L (ref 5–15)
BUN BLD-MCNC: 34 MG/DL (ref 8–23)
BUN/CREAT SERPL: 21.8 (ref 7–25)
CALCIUM SPEC-SCNC: 10 MG/DL (ref 8.6–10.5)
CHLORIDE SERPL-SCNC: 94 MMOL/L (ref 98–107)
CO2 SERPL-SCNC: 27.2 MMOL/L (ref 22–29)
CREAT BLD-MCNC: 1.56 MG/DL (ref 0.57–1)
GFR SERPL CREATININE-BSD FRML MDRD: 32 ML/MIN/1.73
GLUCOSE BLD-MCNC: 77 MG/DL (ref 65–99)
POTASSIUM BLD-SCNC: 4.4 MMOL/L (ref 3.5–5.2)
SODIUM BLD-SCNC: 136 MMOL/L (ref 136–145)

## 2019-10-15 PROCEDURE — 36415 COLL VENOUS BLD VENIPUNCTURE: CPT | Performed by: INTERNAL MEDICINE

## 2019-10-15 PROCEDURE — 80048 BASIC METABOLIC PNL TOTAL CA: CPT | Performed by: INTERNAL MEDICINE

## 2019-10-28 ENCOUNTER — LAB (OUTPATIENT)
Dept: LAB | Facility: HOSPITAL | Age: 74
End: 2019-10-28

## 2019-10-28 ENCOUNTER — TRANSCRIBE ORDERS (OUTPATIENT)
Dept: ADMINISTRATIVE | Facility: HOSPITAL | Age: 74
End: 2019-10-28

## 2019-10-28 DIAGNOSIS — N18.30 CHRONIC KIDNEY DISEASE, STAGE III (MODERATE) (HCC): ICD-10-CM

## 2019-10-28 DIAGNOSIS — N18.30 CHRONIC KIDNEY DISEASE, STAGE III (MODERATE) (HCC): Primary | ICD-10-CM

## 2019-10-28 LAB
ANION GAP SERPL CALCULATED.3IONS-SCNC: 12.6 MMOL/L (ref 5–15)
BUN BLD-MCNC: 43 MG/DL (ref 8–23)
BUN/CREAT SERPL: 19.3 (ref 7–25)
CALCIUM SPEC-SCNC: 9.9 MG/DL (ref 8.6–10.5)
CHLORIDE SERPL-SCNC: 94 MMOL/L (ref 98–107)
CO2 SERPL-SCNC: 27.4 MMOL/L (ref 22–29)
CREAT BLD-MCNC: 2.23 MG/DL (ref 0.57–1)
GFR SERPL CREATININE-BSD FRML MDRD: 21 ML/MIN/1.73
GLUCOSE BLD-MCNC: 91 MG/DL (ref 65–99)
POTASSIUM BLD-SCNC: 4.4 MMOL/L (ref 3.5–5.2)
SODIUM BLD-SCNC: 134 MMOL/L (ref 136–145)

## 2019-10-28 PROCEDURE — 80048 BASIC METABOLIC PNL TOTAL CA: CPT

## 2019-10-28 PROCEDURE — 36415 COLL VENOUS BLD VENIPUNCTURE: CPT

## 2019-11-06 ENCOUNTER — TRANSCRIBE ORDERS (OUTPATIENT)
Dept: ADMINISTRATIVE | Facility: HOSPITAL | Age: 74
End: 2019-11-06

## 2019-11-06 ENCOUNTER — LAB (OUTPATIENT)
Dept: LAB | Facility: HOSPITAL | Age: 74
End: 2019-11-06

## 2019-11-06 DIAGNOSIS — N18.30 CHRONIC KIDNEY DISEASE, STAGE III (MODERATE) (HCC): ICD-10-CM

## 2019-11-06 DIAGNOSIS — N18.30 CHRONIC KIDNEY DISEASE, STAGE III (MODERATE) (HCC): Primary | ICD-10-CM

## 2019-11-06 LAB
ANION GAP SERPL CALCULATED.3IONS-SCNC: 11.8 MMOL/L (ref 5–15)
BUN BLD-MCNC: 37 MG/DL (ref 8–23)
BUN/CREAT SERPL: 24.5 (ref 7–25)
CALCIUM SPEC-SCNC: 9.4 MG/DL (ref 8.6–10.5)
CHLORIDE SERPL-SCNC: 100 MMOL/L (ref 98–107)
CO2 SERPL-SCNC: 29.2 MMOL/L (ref 22–29)
CREAT BLD-MCNC: 1.51 MG/DL (ref 0.57–1)
GFR SERPL CREATININE-BSD FRML MDRD: 34 ML/MIN/1.73
GLUCOSE BLD-MCNC: 125 MG/DL (ref 65–99)
POTASSIUM BLD-SCNC: 4.8 MMOL/L (ref 3.5–5.2)
SODIUM BLD-SCNC: 141 MMOL/L (ref 136–145)

## 2019-11-06 PROCEDURE — 36415 COLL VENOUS BLD VENIPUNCTURE: CPT

## 2019-11-06 PROCEDURE — 80048 BASIC METABOLIC PNL TOTAL CA: CPT

## 2019-12-13 ENCOUNTER — TRANSCRIBE ORDERS (OUTPATIENT)
Dept: ADMINISTRATIVE | Facility: HOSPITAL | Age: 74
End: 2019-12-13

## 2019-12-13 DIAGNOSIS — I50.22 CHRONIC SYSTOLIC HEART FAILURE (HCC): Primary | ICD-10-CM

## 2019-12-16 ENCOUNTER — APPOINTMENT (OUTPATIENT)
Dept: GENERAL RADIOLOGY | Facility: HOSPITAL | Age: 74
End: 2019-12-16

## 2019-12-16 ENCOUNTER — HOSPITAL ENCOUNTER (INPATIENT)
Facility: HOSPITAL | Age: 74
LOS: 3 days | Discharge: HOME | End: 2019-12-20
Attending: FAMILY MEDICINE | Admitting: INTERNAL MEDICINE

## 2019-12-16 DIAGNOSIS — J44.9 CHRONIC OBSTRUCTIVE PULMONARY DISEASE, UNSPECIFIED COPD TYPE (HCC): Primary | ICD-10-CM

## 2019-12-16 DIAGNOSIS — I50.9 ACUTE ON CHRONIC CONGESTIVE HEART FAILURE, UNSPECIFIED HEART FAILURE TYPE (HCC): ICD-10-CM

## 2019-12-16 DIAGNOSIS — R06.00 DYSPNEA, UNSPECIFIED TYPE: ICD-10-CM

## 2019-12-16 DIAGNOSIS — N18.30 CHRONIC KIDNEY DISEASE, STAGE III (MODERATE) (HCC): Chronic | ICD-10-CM

## 2019-12-16 LAB
A-A DO2: 29.7 MMHG (ref 0–300)
A-A DO2: 34.4 MMHG (ref 0–300)
ALBUMIN SERPL-MCNC: 3.98 G/DL (ref 3.5–5.2)
ALBUMIN/GLOB SERPL: 1.1 G/DL
ALP SERPL-CCNC: 35 U/L (ref 39–117)
ALT SERPL W P-5'-P-CCNC: 9 U/L (ref 1–33)
ANION GAP SERPL CALCULATED.3IONS-SCNC: 13 MMOL/L (ref 5–15)
ARTERIAL PATENCY WRIST A: POSITIVE
ARTERIAL PATENCY WRIST A: POSITIVE
AST SERPL-CCNC: 10 U/L (ref 1–32)
ATMOSPHERIC PRESS: 722 MMHG
ATMOSPHERIC PRESS: 724 MMHG
BACTERIA UR QL AUTO: ABNORMAL /HPF
BASE EXCESS BLDA CALC-SCNC: 2.2 MMOL/L (ref 0–2)
BASE EXCESS BLDA CALC-SCNC: 3.6 MMOL/L (ref 0–2)
BASOPHILS # BLD AUTO: 0.03 10*3/MM3 (ref 0–0.2)
BASOPHILS NFR BLD AUTO: 0.4 % (ref 0–1.5)
BDY SITE: ABNORMAL
BDY SITE: ABNORMAL
BILIRUB SERPL-MCNC: <0.2 MG/DL (ref 0.2–1.2)
BILIRUB UR QL STRIP: NEGATIVE
BODY TEMPERATURE: 0 C
BODY TEMPERATURE: 0 C
BUN BLD-MCNC: 34 MG/DL (ref 8–23)
BUN/CREAT SERPL: 21.7 (ref 7–25)
CALCIUM SPEC-SCNC: 9.5 MG/DL (ref 8.6–10.5)
CHLORIDE SERPL-SCNC: 100 MMOL/L (ref 98–107)
CK SERPL-CCNC: 27 U/L (ref 20–180)
CLARITY UR: ABNORMAL
CO2 BLDA-SCNC: 29 MMOL/L (ref 22–33)
CO2 BLDA-SCNC: 30.4 MMOL/L (ref 22–33)
CO2 SERPL-SCNC: 26 MMOL/L (ref 22–29)
COHGB MFR BLD: 1.1 % (ref 0–5)
COHGB MFR BLD: 1.2 % (ref 0–5)
COLOR UR: YELLOW
CREAT BLD-MCNC: 1.57 MG/DL (ref 0.57–1)
CRP SERPL-MCNC: 1.82 MG/DL (ref 0–0.5)
D-LACTATE SERPL-SCNC: 1.2 MMOL/L (ref 0.5–2)
DEPRECATED RDW RBC AUTO: 48.2 FL (ref 37–54)
EOSINOPHIL # BLD AUTO: 0.2 10*3/MM3 (ref 0–0.4)
EOSINOPHIL NFR BLD AUTO: 2.4 % (ref 0.3–6.2)
ERYTHROCYTE [DISTWIDTH] IN BLOOD BY AUTOMATED COUNT: 15 % (ref 12.3–15.4)
GFR SERPL CREATININE-BSD FRML MDRD: 32 ML/MIN/1.73
GLOBULIN UR ELPH-MCNC: 3.7 GM/DL
GLUCOSE BLD-MCNC: 221 MG/DL (ref 65–99)
GLUCOSE UR STRIP-MCNC: ABNORMAL MG/DL
HCO3 BLDA-SCNC: 27.6 MMOL/L (ref 20–26)
HCO3 BLDA-SCNC: 29 MMOL/L (ref 20–26)
HCT VFR BLD AUTO: 34.3 % (ref 34–46.6)
HCT VFR BLD CALC: 32.5 % (ref 38–51)
HCT VFR BLD CALC: 33.5 % (ref 38–51)
HGB BLD-MCNC: 10.4 G/DL (ref 12–15.9)
HGB BLDA-MCNC: 10.6 G/DL (ref 13.5–17.5)
HGB BLDA-MCNC: 10.9 G/DL (ref 13.5–17.5)
HGB UR QL STRIP.AUTO: NEGATIVE
HOROWITZ INDEX BLD+IHG-RTO: 21 %
HOROWITZ INDEX BLD+IHG-RTO: 21 %
HYALINE CASTS UR QL AUTO: ABNORMAL /LPF
IMM GRANULOCYTES # BLD AUTO: 0.04 10*3/MM3 (ref 0–0.05)
IMM GRANULOCYTES NFR BLD AUTO: 0.5 % (ref 0–0.5)
KETONES UR QL STRIP: NEGATIVE
LEUKOCYTE ESTERASE UR QL STRIP.AUTO: ABNORMAL
LYMPHOCYTES # BLD AUTO: 2.35 10*3/MM3 (ref 0.7–3.1)
LYMPHOCYTES NFR BLD AUTO: 27.8 % (ref 19.6–45.3)
Lab: ABNORMAL
MAGNESIUM SERPL-MCNC: 2 MG/DL (ref 1.6–2.4)
MCH RBC QN AUTO: 26.9 PG (ref 26.6–33)
MCHC RBC AUTO-ENTMCNC: 30.3 G/DL (ref 31.5–35.7)
MCV RBC AUTO: 88.9 FL (ref 79–97)
METHGB BLD QL: 0.5 % (ref 0–3)
METHGB BLD QL: 0.6 % (ref 0–3)
MODALITY: ABNORMAL
MODALITY: ABNORMAL
MONOCYTES # BLD AUTO: 0.7 10*3/MM3 (ref 0.1–0.9)
MONOCYTES NFR BLD AUTO: 8.3 % (ref 5–12)
NEUTROPHILS # BLD AUTO: 5.14 10*3/MM3 (ref 1.7–7)
NEUTROPHILS NFR BLD AUTO: 60.6 % (ref 42.7–76)
NITRITE UR QL STRIP: NEGATIVE
NOTE: ABNORMAL
NOTE: ABNORMAL
NOTIFIED BY: ABNORMAL
NOTIFIED WHO: ABNORMAL
NRBC BLD AUTO-RTO: 0 /100 WBC (ref 0–0.2)
NT-PROBNP SERPL-MCNC: 1078 PG/ML (ref 5–1800)
OXYHGB MFR BLDV: 86.6 % (ref 94–99)
OXYHGB MFR BLDV: 88.8 % (ref 94–99)
PCO2 BLDA: 46 MM HG (ref 35–45)
PCO2 BLDA: 46.4 MM HG (ref 35–45)
PCO2 TEMP ADJ BLD: ABNORMAL MM[HG]
PCO2 TEMP ADJ BLD: ABNORMAL MM[HG]
PH BLDA: 7.39 PH UNITS (ref 7.35–7.45)
PH BLDA: 7.4 PH UNITS (ref 7.35–7.45)
PH UR STRIP.AUTO: 6 [PH] (ref 5–8)
PH, TEMP CORRECTED: ABNORMAL
PH, TEMP CORRECTED: ABNORMAL
PLATELET # BLD AUTO: 373 10*3/MM3 (ref 140–450)
PMV BLD AUTO: 11.2 FL (ref 6–12)
PO2 BLDA: 54.9 MM HG (ref 83–108)
PO2 BLDA: 60.5 MM HG (ref 83–108)
PO2 TEMP ADJ BLD: ABNORMAL MM[HG]
PO2 TEMP ADJ BLD: ABNORMAL MM[HG]
POTASSIUM BLD-SCNC: 5 MMOL/L (ref 3.5–5.2)
PROT SERPL-MCNC: 7.7 G/DL (ref 6–8.5)
PROT UR QL STRIP: ABNORMAL
RBC # BLD AUTO: 3.86 10*6/MM3 (ref 3.77–5.28)
RBC # UR: ABNORMAL /HPF
REF LAB TEST METHOD: ABNORMAL
SAO2 % BLDCOA: 88 % (ref 94–99)
SAO2 % BLDCOA: 90.4 % (ref 94–99)
SODIUM BLD-SCNC: 139 MMOL/L (ref 136–145)
SP GR UR STRIP: 1.01 (ref 1–1.03)
SQUAMOUS #/AREA URNS HPF: ABNORMAL /HPF
TROPONIN T SERPL-MCNC: <0.01 NG/ML (ref 0–0.03)
TSH SERPL DL<=0.05 MIU/L-ACNC: 1.75 UIU/ML (ref 0.27–4.2)
UROBILINOGEN UR QL STRIP: ABNORMAL
VENTILATOR MODE: ABNORMAL
VENTILATOR MODE: ABNORMAL
WBC NRBC COR # BLD: 8.46 10*3/MM3 (ref 3.4–10.8)
WBC UR QL AUTO: ABNORMAL /HPF

## 2019-12-16 PROCEDURE — 71045 X-RAY EXAM CHEST 1 VIEW: CPT | Performed by: RADIOLOGY

## 2019-12-16 PROCEDURE — 94640 AIRWAY INHALATION TREATMENT: CPT

## 2019-12-16 PROCEDURE — 87086 URINE CULTURE/COLONY COUNT: CPT | Performed by: FAMILY MEDICINE

## 2019-12-16 PROCEDURE — 87077 CULTURE AEROBIC IDENTIFY: CPT | Performed by: FAMILY MEDICINE

## 2019-12-16 PROCEDURE — 83880 ASSAY OF NATRIURETIC PEPTIDE: CPT | Performed by: FAMILY MEDICINE

## 2019-12-16 PROCEDURE — 94799 UNLISTED PULMONARY SVC/PX: CPT

## 2019-12-16 PROCEDURE — 83735 ASSAY OF MAGNESIUM: CPT | Performed by: FAMILY MEDICINE

## 2019-12-16 PROCEDURE — 82375 ASSAY CARBOXYHB QUANT: CPT

## 2019-12-16 PROCEDURE — 83050 HGB METHEMOGLOBIN QUAN: CPT

## 2019-12-16 PROCEDURE — 36415 COLL VENOUS BLD VENIPUNCTURE: CPT

## 2019-12-16 PROCEDURE — 25010000002 CEFTRIAXONE: Performed by: FAMILY MEDICINE

## 2019-12-16 PROCEDURE — 87186 SC STD MICRODIL/AGAR DIL: CPT | Performed by: FAMILY MEDICINE

## 2019-12-16 PROCEDURE — 93005 ELECTROCARDIOGRAM TRACING: CPT | Performed by: FAMILY MEDICINE

## 2019-12-16 PROCEDURE — 80053 COMPREHEN METABOLIC PANEL: CPT | Performed by: FAMILY MEDICINE

## 2019-12-16 PROCEDURE — 84484 ASSAY OF TROPONIN QUANT: CPT | Performed by: FAMILY MEDICINE

## 2019-12-16 PROCEDURE — 83605 ASSAY OF LACTIC ACID: CPT | Performed by: FAMILY MEDICINE

## 2019-12-16 PROCEDURE — G0378 HOSPITAL OBSERVATION PER HR: HCPCS

## 2019-12-16 PROCEDURE — 86140 C-REACTIVE PROTEIN: CPT | Performed by: FAMILY MEDICINE

## 2019-12-16 PROCEDURE — 93010 ELECTROCARDIOGRAM REPORT: CPT | Performed by: INTERNAL MEDICINE

## 2019-12-16 PROCEDURE — 87040 BLOOD CULTURE FOR BACTERIA: CPT | Performed by: FAMILY MEDICINE

## 2019-12-16 PROCEDURE — 99285 EMERGENCY DEPT VISIT HI MDM: CPT

## 2019-12-16 PROCEDURE — 36600 WITHDRAWAL OF ARTERIAL BLOOD: CPT

## 2019-12-16 PROCEDURE — 87899 AGENT NOS ASSAY W/OPTIC: CPT | Performed by: PHYSICIAN ASSISTANT

## 2019-12-16 PROCEDURE — 71045 X-RAY EXAM CHEST 1 VIEW: CPT

## 2019-12-16 PROCEDURE — 85025 COMPLETE CBC W/AUTO DIFF WBC: CPT | Performed by: FAMILY MEDICINE

## 2019-12-16 PROCEDURE — 81001 URINALYSIS AUTO W/SCOPE: CPT | Performed by: FAMILY MEDICINE

## 2019-12-16 PROCEDURE — 84443 ASSAY THYROID STIM HORMONE: CPT | Performed by: FAMILY MEDICINE

## 2019-12-16 PROCEDURE — 82805 BLOOD GASES W/O2 SATURATION: CPT

## 2019-12-16 PROCEDURE — 82550 ASSAY OF CK (CPK): CPT | Performed by: FAMILY MEDICINE

## 2019-12-16 PROCEDURE — 25010000002 METHYLPREDNISOLONE PER 125 MG: Performed by: FAMILY MEDICINE

## 2019-12-16 RX ORDER — SODIUM CHLORIDE 0.9 % (FLUSH) 0.9 %
10 SYRINGE (ML) INJECTION EVERY 12 HOURS SCHEDULED
Status: DISCONTINUED | OUTPATIENT
Start: 2019-12-17 | End: 2019-12-20 | Stop reason: HOSPADM

## 2019-12-16 RX ORDER — IPRATROPIUM BROMIDE AND ALBUTEROL SULFATE 2.5; .5 MG/3ML; MG/3ML
3 SOLUTION RESPIRATORY (INHALATION) ONCE
Status: COMPLETED | OUTPATIENT
Start: 2019-12-16 | End: 2019-12-16

## 2019-12-16 RX ORDER — SODIUM CHLORIDE 0.9 % (FLUSH) 0.9 %
10 SYRINGE (ML) INJECTION AS NEEDED
Status: DISCONTINUED | OUTPATIENT
Start: 2019-12-16 | End: 2019-12-20 | Stop reason: HOSPADM

## 2019-12-16 RX ORDER — METHYLPREDNISOLONE SODIUM SUCCINATE 125 MG/2ML
125 INJECTION, POWDER, LYOPHILIZED, FOR SOLUTION INTRAMUSCULAR; INTRAVENOUS ONCE
Status: COMPLETED | OUTPATIENT
Start: 2019-12-16 | End: 2019-12-16

## 2019-12-16 RX ORDER — CARVEDILOL 6.25 MG/1
12.5 TABLET ORAL 2 TIMES DAILY WITH MEALS
Status: DISCONTINUED | OUTPATIENT
Start: 2019-12-17 | End: 2019-12-17 | Stop reason: SDUPTHER

## 2019-12-16 RX ORDER — FAMOTIDINE 20 MG/1
20 TABLET, FILM COATED ORAL
Status: DISCONTINUED | OUTPATIENT
Start: 2019-12-17 | End: 2019-12-20 | Stop reason: HOSPADM

## 2019-12-16 RX ORDER — NITROGLYCERIN 0.4 MG/1
0.4 TABLET SUBLINGUAL
Status: DISCONTINUED | OUTPATIENT
Start: 2019-12-16 | End: 2019-12-20 | Stop reason: HOSPADM

## 2019-12-16 RX ORDER — METHYLPREDNISOLONE SODIUM SUCCINATE 40 MG/ML
40 INJECTION, POWDER, LYOPHILIZED, FOR SOLUTION INTRAMUSCULAR; INTRAVENOUS EVERY 12 HOURS
Status: DISCONTINUED | OUTPATIENT
Start: 2019-12-17 | End: 2019-12-18

## 2019-12-16 RX ORDER — IPRATROPIUM BROMIDE AND ALBUTEROL SULFATE 2.5; .5 MG/3ML; MG/3ML
3 SOLUTION RESPIRATORY (INHALATION)
Status: DISCONTINUED | OUTPATIENT
Start: 2019-12-17 | End: 2019-12-20 | Stop reason: HOSPADM

## 2019-12-16 RX ORDER — BUMETANIDE 0.25 MG/ML
2 INJECTION INTRAMUSCULAR; INTRAVENOUS ONCE
Status: COMPLETED | OUTPATIENT
Start: 2019-12-16 | End: 2019-12-16

## 2019-12-16 RX ORDER — HEPARIN SODIUM 5000 [USP'U]/ML
5000 INJECTION, SOLUTION INTRAVENOUS; SUBCUTANEOUS EVERY 12 HOURS SCHEDULED
Status: DISCONTINUED | OUTPATIENT
Start: 2019-12-17 | End: 2019-12-19

## 2019-12-16 RX ORDER — BUMETANIDE 0.25 MG/ML
2 INJECTION INTRAMUSCULAR; INTRAVENOUS ONCE
Status: DISCONTINUED | OUTPATIENT
Start: 2019-12-16 | End: 2019-12-16

## 2019-12-16 RX ADMIN — SODIUM CHLORIDE, PRESERVATIVE FREE 10 ML: 5 INJECTION INTRAVENOUS at 23:50

## 2019-12-16 RX ADMIN — IPRATROPIUM BROMIDE AND ALBUTEROL SULFATE 3 ML: .5; 3 SOLUTION RESPIRATORY (INHALATION) at 20:36

## 2019-12-16 RX ADMIN — HEPARIN SODIUM 5000 UNITS: 5000 INJECTION INTRAVENOUS; SUBCUTANEOUS at 23:50

## 2019-12-16 RX ADMIN — CEFTRIAXONE 1 G: 1 INJECTION, POWDER, FOR SOLUTION INTRAMUSCULAR; INTRAVENOUS at 21:35

## 2019-12-16 RX ADMIN — CARVEDILOL 12.5 MG: 6.25 TABLET, FILM COATED ORAL at 23:50

## 2019-12-16 RX ADMIN — METHYLPREDNISOLONE SODIUM SUCCINATE 125 MG: 125 INJECTION, POWDER, FOR SOLUTION INTRAMUSCULAR; INTRAVENOUS at 20:30

## 2019-12-16 RX ADMIN — BUMETANIDE 2 MG: 0.25 INJECTION INTRAMUSCULAR; INTRAVENOUS at 20:33

## 2019-12-17 ENCOUNTER — APPOINTMENT (OUTPATIENT)
Dept: CARDIOLOGY | Facility: HOSPITAL | Age: 74
End: 2019-12-17

## 2019-12-17 ENCOUNTER — APPOINTMENT (OUTPATIENT)
Dept: ULTRASOUND IMAGING | Facility: HOSPITAL | Age: 74
End: 2019-12-17

## 2019-12-17 PROBLEM — N18.30 CHRONIC KIDNEY DISEASE, STAGE III (MODERATE) (HCC): Chronic | Status: ACTIVE | Noted: 2019-12-17

## 2019-12-17 PROBLEM — K62.0 ANAL POLYP: Status: RESOLVED | Noted: 2018-06-07 | Resolved: 2019-12-17

## 2019-12-17 PROBLEM — J44.1 COPD WITH ACUTE EXACERBATION: Chronic | Status: ACTIVE | Noted: 2019-12-16

## 2019-12-17 PROBLEM — E87.4 METABOLIC ALKALOSIS WITH RESPIRATORY ACIDOSIS: Status: ACTIVE | Noted: 2019-12-17

## 2019-12-17 PROBLEM — D50.9 IRON DEFICIENCY ANEMIA: Chronic | Status: ACTIVE | Noted: 2017-07-07

## 2019-12-17 PROBLEM — N39.0 ACUTE UTI (URINARY TRACT INFECTION): Status: ACTIVE | Noted: 2019-12-17

## 2019-12-17 PROBLEM — J96.91 RESPIRATORY FAILURE WITH HYPOXIA: Status: ACTIVE | Noted: 2019-12-17

## 2019-12-17 PROBLEM — E11.9 TYPE II DIABETES MELLITUS (HCC): Chronic | Status: ACTIVE | Noted: 2019-12-17

## 2019-12-17 PROBLEM — I10 ESSENTIAL HYPERTENSION: Chronic | Status: ACTIVE | Noted: 2019-12-17

## 2019-12-17 PROBLEM — M19.90 ARTHRITIS: Chronic | Status: ACTIVE | Noted: 2019-12-17

## 2019-12-17 PROBLEM — J44.1 COPD WITH ACUTE EXACERBATION: Status: ACTIVE | Noted: 2019-12-16

## 2019-12-17 PROBLEM — M81.0 OSTEOPOROSIS: Chronic | Status: ACTIVE | Noted: 2019-12-17

## 2019-12-17 PROBLEM — I50.33 ACUTE ON CHRONIC DIASTOLIC CHF (CONGESTIVE HEART FAILURE) (HCC): Chronic | Status: ACTIVE | Noted: 2019-12-17

## 2019-12-17 PROBLEM — J96.01 ACUTE RESPIRATORY FAILURE WITH HYPOXIA (HCC): Status: RESOLVED | Noted: 2019-03-15 | Resolved: 2019-12-17

## 2019-12-17 PROBLEM — E78.5 HYPERLIPIDEMIA: Chronic | Status: ACTIVE | Noted: 2019-12-17

## 2019-12-17 LAB
ALBUMIN SERPL-MCNC: 4.13 G/DL (ref 3.5–5.2)
ALBUMIN/GLOB SERPL: 1.2 G/DL
ALP SERPL-CCNC: 35 U/L (ref 39–117)
ALT SERPL W P-5'-P-CCNC: 8 U/L (ref 1–33)
ANION GAP SERPL CALCULATED.3IONS-SCNC: 14.6 MMOL/L (ref 5–15)
AST SERPL-CCNC: 11 U/L (ref 1–32)
B PERT DNA SPEC QL NAA+PROBE: NOT DETECTED
BASOPHILS # BLD AUTO: 0.02 10*3/MM3 (ref 0–0.2)
BASOPHILS NFR BLD AUTO: 0.3 % (ref 0–1.5)
BH CV ECHO MEAS - % IVS THICK: 2 %
BH CV ECHO MEAS - % LVPW THICK: 10.7 %
BH CV ECHO MEAS - ACS: 1.7 CM
BH CV ECHO MEAS - AO MAX PG: 18.6 MMHG
BH CV ECHO MEAS - AO MEAN PG: 8.5 MMHG
BH CV ECHO MEAS - AO ROOT AREA (BSA CORRECTED): 1.7
BH CV ECHO MEAS - AO ROOT AREA: 7 CM^2
BH CV ECHO MEAS - AO ROOT DIAM: 3 CM
BH CV ECHO MEAS - AO V2 MAX: 215.8 CM/SEC
BH CV ECHO MEAS - AO V2 MEAN: 134.2 CM/SEC
BH CV ECHO MEAS - AO V2 VTI: 45.8 CM
BH CV ECHO MEAS - BSA(HAYCOCK): 1.9 M^2
BH CV ECHO MEAS - BSA: 1.8 M^2
BH CV ECHO MEAS - BZI_BMI: 28.3 KILOGRAMS/M^2
BH CV ECHO MEAS - BZI_METRIC_HEIGHT: 162.6 CM
BH CV ECHO MEAS - BZI_METRIC_WEIGHT: 74.8 KG
BH CV ECHO MEAS - EDV(CUBED): 109.9 ML
BH CV ECHO MEAS - EDV(MOD-SP4): 51 ML
BH CV ECHO MEAS - EDV(TEICH): 107 ML
BH CV ECHO MEAS - EF(CUBED): 69.2 %
BH CV ECHO MEAS - EF(MOD-SP4): 54.9 %
BH CV ECHO MEAS - EF(TEICH): 60.7 %
BH CV ECHO MEAS - ESV(CUBED): 33.9 ML
BH CV ECHO MEAS - ESV(MOD-SP4): 23 ML
BH CV ECHO MEAS - ESV(TEICH): 42.1 ML
BH CV ECHO MEAS - FS: 32.5 %
BH CV ECHO MEAS - IVS/LVPW: 0.9
BH CV ECHO MEAS - IVSD: 1 CM
BH CV ECHO MEAS - IVSS: 1 CM
BH CV ECHO MEAS - LA DIMENSION: 2.9 CM
BH CV ECHO MEAS - LA/AO: 0.98
BH CV ECHO MEAS - LV DIASTOLIC VOL/BSA (35-75): 28.3 ML/M^2
BH CV ECHO MEAS - LV MASS(C)D: 188.3 GRAMS
BH CV ECHO MEAS - LV MASS(C)DI: 104.5 GRAMS/M^2
BH CV ECHO MEAS - LV MASS(C)S: 113.6 GRAMS
BH CV ECHO MEAS - LV MASS(C)SI: 63 GRAMS/M^2
BH CV ECHO MEAS - LV SYSTOLIC VOL/BSA (12-30): 12.8 ML/M^2
BH CV ECHO MEAS - LVIDD: 4.8 CM
BH CV ECHO MEAS - LVIDS: 3.2 CM
BH CV ECHO MEAS - LVLD AP4: 6.9 CM
BH CV ECHO MEAS - LVLS AP4: 6 CM
BH CV ECHO MEAS - LVOT AREA (M): 3.1 CM^2
BH CV ECHO MEAS - LVOT AREA: 3.3 CM^2
BH CV ECHO MEAS - LVOT DIAM: 2 CM
BH CV ECHO MEAS - LVPWD: 1.1 CM
BH CV ECHO MEAS - LVPWS: 1.3 CM
BH CV ECHO MEAS - MV A MAX VEL: 100 CM/SEC
BH CV ECHO MEAS - MV E MAX VEL: 130.8 CM/SEC
BH CV ECHO MEAS - MV E/A: 1.3
BH CV ECHO MEAS - PA ACC SLOPE: 885.6 CM/SEC^2
BH CV ECHO MEAS - PA ACC TIME: 0.13 SEC
BH CV ECHO MEAS - PA PR(ACCEL): 20.4 MMHG
BH CV ECHO MEAS - RAP SYSTOLE: 10 MMHG
BH CV ECHO MEAS - RVSP: 42.3 MMHG
BH CV ECHO MEAS - SI(AO): 177.7 ML/M^2
BH CV ECHO MEAS - SI(CUBED): 42.2 ML/M^2
BH CV ECHO MEAS - SI(MOD-SP4): 15.5 ML/M^2
BH CV ECHO MEAS - SI(TEICH): 36 ML/M^2
BH CV ECHO MEAS - SV(AO): 320.2 ML
BH CV ECHO MEAS - SV(CUBED): 76 ML
BH CV ECHO MEAS - SV(MOD-SP4): 28 ML
BH CV ECHO MEAS - SV(TEICH): 64.9 ML
BH CV ECHO MEAS - TR MAX VEL: 284 CM/SEC
BILIRUB SERPL-MCNC: 0.2 MG/DL (ref 0.2–1.2)
BUN BLD-MCNC: 27 MG/DL (ref 8–23)
BUN/CREAT SERPL: 19.7 (ref 7–25)
C PNEUM DNA NPH QL NAA+NON-PROBE: NOT DETECTED
CALCIUM SPEC-SCNC: 9.7 MG/DL (ref 8.6–10.5)
CHLORIDE SERPL-SCNC: 100 MMOL/L (ref 98–107)
CO2 SERPL-SCNC: 26.4 MMOL/L (ref 22–29)
CREAT BLD-MCNC: 1.37 MG/DL (ref 0.57–1)
CRP SERPL-MCNC: 1.56 MG/DL (ref 0–0.5)
D-LACTATE SERPL-SCNC: 1.2 MMOL/L (ref 0.5–2)
DEPRECATED RDW RBC AUTO: 47.3 FL (ref 37–54)
EOSINOPHIL # BLD AUTO: 0.01 10*3/MM3 (ref 0–0.4)
EOSINOPHIL NFR BLD AUTO: 0.1 % (ref 0.3–6.2)
ERYTHROCYTE [DISTWIDTH] IN BLOOD BY AUTOMATED COUNT: 14.7 % (ref 12.3–15.4)
FERRITIN SERPL-MCNC: 213.6 NG/ML (ref 13–150)
FLUAV H1 2009 PAND RNA NPH QL NAA+PROBE: NOT DETECTED
FLUAV H1 HA GENE NPH QL NAA+PROBE: NOT DETECTED
FLUAV H3 RNA NPH QL NAA+PROBE: NOT DETECTED
FLUAV SUBTYP SPEC NAA+PROBE: NOT DETECTED
FLUBV RNA ISLT QL NAA+PROBE: NOT DETECTED
FOLATE SERPL-MCNC: 17.6 NG/ML (ref 4.78–24.2)
GFR SERPL CREATININE-BSD FRML MDRD: 38 ML/MIN/1.73
GLOBULIN UR ELPH-MCNC: 3.6 GM/DL
GLUCOSE BLD-MCNC: 190 MG/DL (ref 65–99)
GLUCOSE BLDC GLUCOMTR-MCNC: 177 MG/DL (ref 70–130)
GLUCOSE BLDC GLUCOMTR-MCNC: 225 MG/DL (ref 70–130)
GLUCOSE BLDC GLUCOMTR-MCNC: 352 MG/DL (ref 70–130)
GLUCOSE BLDC GLUCOMTR-MCNC: 354 MG/DL (ref 70–130)
GLUCOSE BLDC GLUCOMTR-MCNC: 378 MG/DL (ref 70–130)
HADV DNA SPEC NAA+PROBE: NOT DETECTED
HBA1C MFR BLD: 8.3 % (ref 4.8–5.6)
HCOV 229E RNA SPEC QL NAA+PROBE: NOT DETECTED
HCOV HKU1 RNA SPEC QL NAA+PROBE: NOT DETECTED
HCOV NL63 RNA SPEC QL NAA+PROBE: NOT DETECTED
HCOV OC43 RNA SPEC QL NAA+PROBE: NOT DETECTED
HCT VFR BLD AUTO: 34 % (ref 34–46.6)
HGB BLD-MCNC: 10.5 G/DL (ref 12–15.9)
HMPV RNA NPH QL NAA+NON-PROBE: NOT DETECTED
HPIV1 RNA SPEC QL NAA+PROBE: NOT DETECTED
HPIV2 RNA SPEC QL NAA+PROBE: NOT DETECTED
HPIV3 RNA NPH QL NAA+PROBE: NOT DETECTED
HPIV4 P GENE NPH QL NAA+PROBE: NOT DETECTED
IMM GRANULOCYTES # BLD AUTO: 0.02 10*3/MM3 (ref 0–0.05)
IMM GRANULOCYTES NFR BLD AUTO: 0.3 % (ref 0–0.5)
IRON 24H UR-MRATE: 35 MCG/DL (ref 37–145)
IRON SATN MFR SERPL: 21 % (ref 20–50)
L PNEUMO1 AG UR QL IA: NEGATIVE
LYMPHOCYTES # BLD AUTO: 1.2 10*3/MM3 (ref 0.7–3.1)
LYMPHOCYTES NFR BLD AUTO: 16.6 % (ref 19.6–45.3)
M PNEUMO IGG SER IA-ACNC: NOT DETECTED
MAXIMAL PREDICTED HEART RATE: 145 BPM
MCH RBC QN AUTO: 27.2 PG (ref 26.6–33)
MCHC RBC AUTO-ENTMCNC: 30.9 G/DL (ref 31.5–35.7)
MCV RBC AUTO: 88.1 FL (ref 79–97)
MONOCYTES # BLD AUTO: 0.06 10*3/MM3 (ref 0.1–0.9)
MONOCYTES NFR BLD AUTO: 0.8 % (ref 5–12)
NEUTROPHILS # BLD AUTO: 5.91 10*3/MM3 (ref 1.7–7)
NEUTROPHILS NFR BLD AUTO: 81.9 % (ref 42.7–76)
NRBC BLD AUTO-RTO: 0 /100 WBC (ref 0–0.2)
PLATELET # BLD AUTO: 364 10*3/MM3 (ref 140–450)
PMV BLD AUTO: 11.5 FL (ref 6–12)
POTASSIUM BLD-SCNC: 4.4 MMOL/L (ref 3.5–5.2)
PROT SERPL-MCNC: 7.7 G/DL (ref 6–8.5)
RBC # BLD AUTO: 3.86 10*6/MM3 (ref 3.77–5.28)
RHINOVIRUS RNA SPEC NAA+PROBE: NOT DETECTED
RSV RNA NPH QL NAA+NON-PROBE: NOT DETECTED
SODIUM BLD-SCNC: 141 MMOL/L (ref 136–145)
STRESS TARGET HR: 123 BPM
TIBC SERPL-MCNC: 167 MCG/DL (ref 298–536)
TRANSFERRIN SERPL-MCNC: 112 MG/DL (ref 200–360)
WBC NRBC COR # BLD: 7.22 10*3/MM3 (ref 3.4–10.8)

## 2019-12-17 PROCEDURE — 99223 1ST HOSP IP/OBS HIGH 75: CPT | Performed by: PHYSICIAN ASSISTANT

## 2019-12-17 PROCEDURE — 84466 ASSAY OF TRANSFERRIN: CPT | Performed by: PHYSICIAN ASSISTANT

## 2019-12-17 PROCEDURE — 63710000001 INSULIN ASPART PER 5 UNITS: Performed by: PHYSICIAN ASSISTANT

## 2019-12-17 PROCEDURE — 83605 ASSAY OF LACTIC ACID: CPT | Performed by: PHYSICIAN ASSISTANT

## 2019-12-17 PROCEDURE — 94799 UNLISTED PULMONARY SVC/PX: CPT

## 2019-12-17 PROCEDURE — 93306 TTE W/DOPPLER COMPLETE: CPT | Performed by: INTERNAL MEDICINE

## 2019-12-17 PROCEDURE — 82728 ASSAY OF FERRITIN: CPT | Performed by: PHYSICIAN ASSISTANT

## 2019-12-17 PROCEDURE — 83036 HEMOGLOBIN GLYCOSYLATED A1C: CPT | Performed by: PHYSICIAN ASSISTANT

## 2019-12-17 PROCEDURE — 85025 COMPLETE CBC W/AUTO DIFF WBC: CPT | Performed by: PHYSICIAN ASSISTANT

## 2019-12-17 PROCEDURE — 25010000002 CEFTRIAXONE: Performed by: PHYSICIAN ASSISTANT

## 2019-12-17 PROCEDURE — 82962 GLUCOSE BLOOD TEST: CPT

## 2019-12-17 PROCEDURE — 63710000001 INSULIN DETEMIR PER 5 UNITS: Performed by: PHYSICIAN ASSISTANT

## 2019-12-17 PROCEDURE — 93306 TTE W/DOPPLER COMPLETE: CPT

## 2019-12-17 PROCEDURE — 86140 C-REACTIVE PROTEIN: CPT | Performed by: PHYSICIAN ASSISTANT

## 2019-12-17 PROCEDURE — 93970 EXTREMITY STUDY: CPT | Performed by: RADIOLOGY

## 2019-12-17 PROCEDURE — 82746 ASSAY OF FOLIC ACID SERUM: CPT | Performed by: PHYSICIAN ASSISTANT

## 2019-12-17 PROCEDURE — 93970 EXTREMITY STUDY: CPT

## 2019-12-17 PROCEDURE — 80053 COMPREHEN METABOLIC PANEL: CPT | Performed by: PHYSICIAN ASSISTANT

## 2019-12-17 PROCEDURE — 0099U HC BIOFIRE FILMARRAY RESP PANEL 1: CPT | Performed by: PHYSICIAN ASSISTANT

## 2019-12-17 PROCEDURE — 83540 ASSAY OF IRON: CPT | Performed by: PHYSICIAN ASSISTANT

## 2019-12-17 PROCEDURE — 25010000002 METHYLPREDNISOLONE PER 40 MG: Performed by: INTERNAL MEDICINE

## 2019-12-17 PROCEDURE — 25010000002 HEPARIN (PORCINE) PER 1000 UNITS: Performed by: INTERNAL MEDICINE

## 2019-12-17 RX ORDER — CALCIUM CARBONATE 500(1250)
500 TABLET ORAL DAILY
Status: DISCONTINUED | OUTPATIENT
Start: 2019-12-17 | End: 2019-12-20 | Stop reason: HOSPADM

## 2019-12-17 RX ORDER — BUDESONIDE 0.25 MG/2ML
0.25 INHALANT ORAL
Status: DISCONTINUED | OUTPATIENT
Start: 2019-12-17 | End: 2019-12-20 | Stop reason: HOSPADM

## 2019-12-17 RX ORDER — BUMETANIDE 1 MG/1
1 TABLET ORAL DAILY
Status: CANCELLED | OUTPATIENT
Start: 2019-12-17

## 2019-12-17 RX ORDER — SPIRONOLACTONE 25 MG/1
25 TABLET ORAL DAILY
Status: CANCELLED | OUTPATIENT
Start: 2019-12-17

## 2019-12-17 RX ORDER — FERROUS SULFATE 325(65) MG
325 TABLET ORAL 2 TIMES DAILY
Status: DISCONTINUED | OUTPATIENT
Start: 2019-12-17 | End: 2019-12-20 | Stop reason: HOSPADM

## 2019-12-17 RX ORDER — HYDRALAZINE HYDROCHLORIDE 20 MG/ML
10 INJECTION INTRAMUSCULAR; INTRAVENOUS EVERY 6 HOURS PRN
Status: DISCONTINUED | OUTPATIENT
Start: 2019-12-17 | End: 2019-12-20 | Stop reason: HOSPADM

## 2019-12-17 RX ORDER — CARVEDILOL 25 MG/1
25 TABLET ORAL 2 TIMES DAILY WITH MEALS
Status: DISCONTINUED | OUTPATIENT
Start: 2019-12-17 | End: 2019-12-20 | Stop reason: HOSPADM

## 2019-12-17 RX ORDER — CETIRIZINE HYDROCHLORIDE 10 MG/1
10 TABLET ORAL DAILY
COMMUNITY
End: 2021-12-10 | Stop reason: HOSPADM

## 2019-12-17 RX ORDER — CYANOCOBALAMIN 1000 UG/ML
1000 INJECTION, SOLUTION INTRAMUSCULAR; SUBCUTANEOUS
Status: CANCELLED | OUTPATIENT
Start: 2019-12-17

## 2019-12-17 RX ORDER — LOPERAMIDE HYDROCHLORIDE 2 MG/1
2 CAPSULE ORAL DAILY
COMMUNITY
End: 2021-12-10 | Stop reason: HOSPADM

## 2019-12-17 RX ORDER — NICOTINE POLACRILEX 4 MG
15 LOZENGE BUCCAL
Status: DISCONTINUED | OUTPATIENT
Start: 2019-12-17 | End: 2019-12-20 | Stop reason: HOSPADM

## 2019-12-17 RX ORDER — ARFORMOTEROL TARTRATE 15 UG/2ML
15 SOLUTION RESPIRATORY (INHALATION)
Status: DISCONTINUED | OUTPATIENT
Start: 2019-12-17 | End: 2019-12-20 | Stop reason: HOSPADM

## 2019-12-17 RX ORDER — LOPERAMIDE HYDROCHLORIDE 2 MG/1
2 CAPSULE ORAL DAILY
Status: DISCONTINUED | OUTPATIENT
Start: 2019-12-17 | End: 2019-12-18

## 2019-12-17 RX ORDER — ONDANSETRON 2 MG/ML
4 INJECTION INTRAMUSCULAR; INTRAVENOUS EVERY 6 HOURS PRN
Status: DISCONTINUED | OUTPATIENT
Start: 2019-12-17 | End: 2019-12-20 | Stop reason: HOSPADM

## 2019-12-17 RX ORDER — SPIRONOLACTONE 25 MG/1
25 TABLET ORAL DAILY
COMMUNITY
End: 2021-12-10 | Stop reason: HOSPADM

## 2019-12-17 RX ORDER — CETIRIZINE HYDROCHLORIDE 10 MG/1
5 TABLET ORAL DAILY
Status: DISCONTINUED | OUTPATIENT
Start: 2019-12-17 | End: 2019-12-20 | Stop reason: HOSPADM

## 2019-12-17 RX ORDER — AMLODIPINE BESYLATE 5 MG/1
5 TABLET ORAL DAILY
COMMUNITY
End: 2019-12-20 | Stop reason: HOSPADM

## 2019-12-17 RX ORDER — DEXTROSE MONOHYDRATE 25 G/50ML
25 INJECTION, SOLUTION INTRAVENOUS
Status: DISCONTINUED | OUTPATIENT
Start: 2019-12-17 | End: 2019-12-20 | Stop reason: HOSPADM

## 2019-12-17 RX ORDER — HYDRALAZINE HYDROCHLORIDE 25 MG/1
25 TABLET, FILM COATED ORAL 2 TIMES DAILY
Status: DISCONTINUED | OUTPATIENT
Start: 2019-12-17 | End: 2019-12-18

## 2019-12-17 RX ORDER — CARVEDILOL 25 MG/1
25 TABLET ORAL 2 TIMES DAILY WITH MEALS
COMMUNITY
End: 2021-12-10 | Stop reason: HOSPADM

## 2019-12-17 RX ORDER — ACETAMINOPHEN 325 MG/1
650 TABLET ORAL EVERY 6 HOURS PRN
Status: DISCONTINUED | OUTPATIENT
Start: 2019-12-17 | End: 2019-12-20 | Stop reason: HOSPADM

## 2019-12-17 RX ORDER — CETIRIZINE HYDROCHLORIDE 10 MG/1
10 TABLET ORAL DAILY
Status: CANCELLED | OUTPATIENT
Start: 2019-12-17

## 2019-12-17 RX ORDER — AMLODIPINE BESYLATE 5 MG/1
5 TABLET ORAL DAILY
Status: DISCONTINUED | OUTPATIENT
Start: 2019-12-17 | End: 2019-12-18

## 2019-12-17 RX ORDER — HYDRALAZINE HYDROCHLORIDE 25 MG/1
25 TABLET, FILM COATED ORAL 2 TIMES DAILY
Status: ON HOLD | COMMUNITY
End: 2019-12-20 | Stop reason: SDUPTHER

## 2019-12-17 RX ORDER — CYANOCOBALAMIN 1000 UG/ML
1000 INJECTION, SOLUTION INTRAMUSCULAR; SUBCUTANEOUS
COMMUNITY

## 2019-12-17 RX ADMIN — INSULIN ASPART 6 UNITS: 100 INJECTION, SOLUTION INTRAVENOUS; SUBCUTANEOUS at 21:04

## 2019-12-17 RX ADMIN — METHYLPREDNISOLONE SODIUM SUCCINATE 40 MG: 40 INJECTION, POWDER, FOR SOLUTION INTRAMUSCULAR; INTRAVENOUS at 10:00

## 2019-12-17 RX ADMIN — IPRATROPIUM BROMIDE AND ALBUTEROL SULFATE 3 ML: 2.5; .5 SOLUTION RESPIRATORY (INHALATION) at 07:23

## 2019-12-17 RX ADMIN — INSULIN ASPART 6 UNITS: 100 INJECTION, SOLUTION INTRAVENOUS; SUBCUTANEOUS at 11:41

## 2019-12-17 RX ADMIN — ACETAMINOPHEN, GUAIFENESIN AND PHENYLEPHRINE HCL 20 MG: 325; 200; 5 TABLET, FILM COATED ORAL at 16:52

## 2019-12-17 RX ADMIN — IPRATROPIUM BROMIDE AND ALBUTEROL SULFATE 3 ML: 2.5; .5 SOLUTION RESPIRATORY (INHALATION) at 14:20

## 2019-12-17 RX ADMIN — CETIRIZINE HYDROCHLORIDE 5 MG: 10 TABLET, FILM COATED ORAL at 09:59

## 2019-12-17 RX ADMIN — HEPARIN SODIUM 5000 UNITS: 5000 INJECTION INTRAVENOUS; SUBCUTANEOUS at 10:04

## 2019-12-17 RX ADMIN — FERROUS SULFATE TAB 325 MG (65 MG ELEMENTAL FE) 325 MG: 325 (65 FE) TAB at 09:59

## 2019-12-17 RX ADMIN — SODIUM CHLORIDE, PRESERVATIVE FREE 10 ML: 5 INJECTION INTRAVENOUS at 21:12

## 2019-12-17 RX ADMIN — INSULIN ASPART 2 UNITS: 100 INJECTION, SOLUTION INTRAVENOUS; SUBCUTANEOUS at 01:45

## 2019-12-17 RX ADMIN — SODIUM CHLORIDE, PRESERVATIVE FREE 10 ML: 5 INJECTION INTRAVENOUS at 10:00

## 2019-12-17 RX ADMIN — HYDRALAZINE HYDROCHLORIDE 25 MG: 25 TABLET ORAL at 09:59

## 2019-12-17 RX ADMIN — IPRATROPIUM BROMIDE AND ALBUTEROL SULFATE 3 ML: 2.5; .5 SOLUTION RESPIRATORY (INHALATION) at 19:03

## 2019-12-17 RX ADMIN — ARFORMOTEROL TARTRATE 15 MCG: 15 SOLUTION RESPIRATORY (INHALATION) at 21:37

## 2019-12-17 RX ADMIN — HYDRALAZINE HYDROCHLORIDE 25 MG: 25 TABLET ORAL at 21:04

## 2019-12-17 RX ADMIN — INSULIN ASPART 3 UNITS: 100 INJECTION, SOLUTION INTRAVENOUS; SUBCUTANEOUS at 10:00

## 2019-12-17 RX ADMIN — CARVEDILOL 25 MG: 25 TABLET, FILM COATED ORAL at 09:59

## 2019-12-17 RX ADMIN — HEPARIN SODIUM 5000 UNITS: 5000 INJECTION INTRAVENOUS; SUBCUTANEOUS at 21:04

## 2019-12-17 RX ADMIN — FERROUS SULFATE TAB 325 MG (65 MG ELEMENTAL FE) 325 MG: 325 (65 FE) TAB at 21:04

## 2019-12-17 RX ADMIN — DOXYCYCLINE 100 MG: 100 INJECTION, POWDER, LYOPHILIZED, FOR SOLUTION INTRAVENOUS at 15:16

## 2019-12-17 RX ADMIN — AMLODIPINE BESYLATE 5 MG: 5 TABLET ORAL at 09:59

## 2019-12-17 RX ADMIN — BUDESONIDE 0.25 MG: 0.25 SUSPENSION RESPIRATORY (INHALATION) at 10:37

## 2019-12-17 RX ADMIN — CARVEDILOL 25 MG: 25 TABLET, FILM COATED ORAL at 16:53

## 2019-12-17 RX ADMIN — CEFTRIAXONE 2 G: 2 INJECTION, POWDER, FOR SOLUTION INTRAMUSCULAR; INTRAVENOUS at 15:16

## 2019-12-17 RX ADMIN — INSULIN DETEMIR 40 UNITS: 100 INJECTION, SOLUTION SUBCUTANEOUS at 21:12

## 2019-12-17 RX ADMIN — LOPERAMIDE HYDROCHLORIDE 2 MG: 2 CAPSULE ORAL at 09:59

## 2019-12-17 RX ADMIN — DOXYCYCLINE 100 MG: 100 INJECTION, POWDER, LYOPHILIZED, FOR SOLUTION INTRAVENOUS at 02:34

## 2019-12-17 RX ADMIN — ARFORMOTEROL TARTRATE 15 MCG: 15 SOLUTION RESPIRATORY (INHALATION) at 10:36

## 2019-12-17 RX ADMIN — Medication 500 MG: at 09:59

## 2019-12-17 RX ADMIN — INSULIN ASPART 6 UNITS: 100 INJECTION, SOLUTION INTRAVENOUS; SUBCUTANEOUS at 16:53

## 2019-12-17 RX ADMIN — IPRATROPIUM BROMIDE AND ALBUTEROL SULFATE 3 ML: 2.5; .5 SOLUTION RESPIRATORY (INHALATION) at 01:04

## 2019-12-17 RX ADMIN — ACETAMINOPHEN, GUAIFENESIN AND PHENYLEPHRINE HCL 20 MG: 325; 200; 5 TABLET, FILM COATED ORAL at 09:59

## 2019-12-17 RX ADMIN — METHYLPREDNISOLONE SODIUM SUCCINATE 40 MG: 40 INJECTION, POWDER, FOR SOLUTION INTRAMUSCULAR; INTRAVENOUS at 21:04

## 2019-12-17 NOTE — PROGRESS NOTES
[facility] Medicine Services  BRIEF PROGRESS NOTE  Subjective:  Patient admitted after midnight for acute COPD exacerbation with acute hypoxic respiratory failure. Patient also with mild acute CHF exacerbation with volume overload and acute UTI. Patient sitting up in bed drinking coffee per my exam this morning, no acute distress. States she feels much better than time of presentation. She is still on NC. She states her breathing is still not back to baseline. Cough present, non productive. No fevers or chills. No chest pain or pressure. She does report LE edema, left > right. She states edema has been worsening for the past couple of months, family at bedside state she does dangle her legs frequently. No pain in extremities. No change in strength or sensation.     PE:  Pleasant, elderly female sitting up in bed, no acute distress. NC in place. Lungs with rhonchi prominent in left lung fields, mild expiratory wheezing noted bilaterally, fair air movement. No rales. Heart with regular rate and rhythm, no MRG. Abdomen soft, normoactive bowel sounds, non tender. Bilateral lower extremity edema noted, left > right with 1-2+ pitting edema and bilateral pedal edema. No calf tenderness, negative Homans. Alert and oriented, follows commands, answers questions appropriately. Moves all extremities equally. Strength and sensation intact all extremities.     A/P:  Cont patient on IV antibiotics (rocephin/doxycycline), scheduled nebs, supplemental O2 to titrate SPO2 > 90%, and IV solu-medrol (plan to wean to PO as tolerated). Bumex given overnight, creatinine improved. Cont with daily weights and strict Is and Os, monitor volume status closely. Monitor renal function closely. Follow blood and urine cultures for final results, transition to PO abx as appropriate depending on culture and sensitivity reports once available. Continuous pulse oximetry, incentive spirometry. Follow up on ECHO results. Venous  doppler to rule out DVT. Repeat CBC, BMP, mag, and phos levels in AM. Patient initially in observation status, meets inpatient criteria, therefore inpatient orders placed. Further care pending clinical course.     INPATIENT status due to the need for care which can only be reasonably provided in an hospital setting such as aggressive/expedited ancillary services and/or consultation services, the necessity for IV medications, close physician monitoring and/or the possible need for procedures.  In such, I feel patient’s risk for adverse outcomes and need for care warrant INPATIENT evaluation and predict the patient’s care encounter to likely last beyond 2 midnights.      Asya Dupree PA-C  Hospitalist Service -- [facility]   Pager: [phone]    12/17/19  9:11 AM    Attending Physician: Alexandra Lora,

## 2019-12-17 NOTE — ED PROVIDER NOTES
Subjective   Patient is a 74-year-old female who presents the emergency department complaining of shortness of breath that is been getting worse over the past 5 days.  The patient has a history of COPD, chronic kidney disease and possible CHF.  The patient has been wheezing, becoming progressively more short of breath each day.  She went to her PCP a couple days ago for the symptoms and was told to double up on her Bumex, but this did not help her symptoms.  She has not had any fever or chills, but has had some coughing productive of white sputum.  She also notes increased swelling in her lower extremities bilaterally as well as distention in her abdomen which is unusual for her.  The patient is accompanied by her daughter who provides some supplemental history.  She is not had any chest pain.  She has had wheezing which is worse at night.  The patient states that anytime she lays down her symptoms seem to get worse.          Review of Systems   Constitutional: Positive for chills and fatigue. Negative for activity change, appetite change, diaphoresis and fever.   HENT: Negative for congestion, nosebleeds, postnasal drip, rhinorrhea, sinus pressure, sinus pain and sneezing.    Eyes: Negative for photophobia, pain, discharge, redness and itching.   Respiratory: Positive for cough, shortness of breath and wheezing. Negative for apnea, choking, chest tightness and stridor.    Cardiovascular: Positive for leg swelling. Negative for chest pain and palpitations.   Gastrointestinal: Negative for abdominal distention, abdominal pain, constipation, diarrhea, nausea and vomiting.   Genitourinary: Negative for difficulty urinating and flank pain.   Musculoskeletal: Negative for arthralgias, back pain, gait problem and joint swelling.   Neurological: Negative for dizziness, facial asymmetry, light-headedness and headaches.   Psychiatric/Behavioral: Negative for agitation, behavioral problems, confusion and decreased  concentration.       Past Medical History:   Diagnosis Date   • Anal polyp 2018    Added automatically from request for surgery [id number]   • Arthritis    • Chronic kidney disease    • COPD (chronic obstructive pulmonary disease) (CMS/HCC)    • Diabetes mellitus (CMS/HCC)    • Elevated cholesterol    • History of transfusion    • Iron deficiency anemia 2017   • Osteoporosis    • Pneumonia of right middle lobe due to infectious organism (CMS/HCC) 2019       No Known Allergies    Past Surgical History:   Procedure Laterality Date   • ANUS SURGERY N/A 2018    Procedure: Diagnostic anoscopy with anal polyp excision;  Surgeon: Marlyn Gutierrez MD;  Location: [facility];  Service: General   •  SECTION     • HIP BIPOLAR REPLACEMENT      left Dr. Cornejo    • HYSTERECTOMY         Family History   Problem Relation Age of Onset   • Heart disease Mother    • COPD Mother    • Arthritis Mother    • Diabetes Father        Social History     Socioeconomic History   • Marital status:      Spouse name: Not on file   • Number of children: Not on file   • Years of education: Not on file   • Highest education level: Not on file   Tobacco Use   • Smoking status: Former Smoker     Years: 15.00     Types: Cigarettes   • Smokeless tobacco: Never Used   • Tobacco comment: quit 15 years ago    Substance and Sexual Activity   • Alcohol use: No   • Drug use: No   • Sexual activity: Defer     Birth control/protection: Post-menopausal           Objective   Physical Exam   Constitutional: She is oriented to person, place, and time. She appears well-developed and well-nourished.  Non-toxic appearance. She does not appear ill. No distress. She is not intubated.   Patient appears elderly and frail   HENT:   Head: Normocephalic and atraumatic.   Mouth/Throat: Oropharynx is clear and moist. No oropharyngeal exudate or posterior oropharyngeal edema.   Eyes: Pupils are equal, round, and reactive to light. EOM are normal.    Neck: Normal range of motion. Neck supple. No hepatojugular reflux and no JVD present. No tracheal deviation present. No thyromegaly present.   Cardiovascular: Normal rate, regular rhythm, normal heart sounds and intact distal pulses.  No extrasystoles are present. Exam reveals no gallop, no friction rub and no decreased pulses.   No murmur heard.  Pulmonary/Chest: Effort normal. No accessory muscle usage or stridor. No apnea, no tachypnea and no bradypnea. She is not intubated. No respiratory distress. She has no decreased breath sounds. She has wheezes in the right upper field, the right middle field, the left upper field and the left middle field. She has no rhonchi. She has no rales. She exhibits no mass, no tenderness, no laceration, no crepitus, no edema and no retraction.   Abdominal: Soft. Bowel sounds are normal. She exhibits distension. She exhibits no ascites and no mass. There is no tenderness. There is no rebound and no guarding.   Musculoskeletal: Normal range of motion.        Right lower leg: Normal. She exhibits edema. She exhibits no tenderness.        Left lower leg: Normal. She exhibits edema. She exhibits no tenderness.   Lymphadenopathy:     She has no cervical adenopathy.   Neurological: She is alert and oriented to person, place, and time. She is not disoriented. No cranial nerve deficit.   Skin: Skin is warm and dry. Capillary refill takes less than 2 seconds. No rash noted. She is not diaphoretic. No erythema. No pallor.   Psychiatric: She has a normal mood and affect. Her behavior is normal. Her mood appears not anxious. She is not agitated.   Nursing note and vitals reviewed.      Procedures           ED Course                      No data recorded                        MDM  Number of Diagnoses or Management Options  Acute on chronic congestive heart failure, unspecified heart failure type (CMS/HCC): new and requires workup  Chronic obstructive pulmonary disease, unspecified COPD type  (CMS/HCC): new and requires workup  Dyspnea, unspecified type: new and requires workup     Amount and/or Complexity of Data Reviewed  Clinical lab tests: ordered and reviewed  Tests in the radiology section of CPT®: ordered and reviewed  Tests in the medicine section of CPT®: ordered and reviewed  Obtain history from someone other than the patient: yes  Review and summarize past medical records: yes  Discuss the patient with other providers: yes  Independent visualization of images, tracings, or specimens: yes    Risk of Complications, Morbidity, and/or Mortality  Presenting problems: high  Diagnostic procedures: high  Management options: high    Critical Care  Total time providing critical care: 30-74 minutes (45 mins actively managing patient care, discussing case with physicians and looking through records.)    Patient Progress  Patient progress: improved      Final diagnoses:   Chronic obstructive pulmonary disease, unspecified COPD type (CMS/HCC)   Acute on chronic congestive heart failure, unspecified heart failure type (CMS/HCC)   Dyspnea, unspecified type              Debra Major DO  12/16/19 1457

## 2019-12-17 NOTE — H&P
"     [facility] Medicine Services  HISTORY & PHYSICAL    Patient Identification:  Name:  Albina Finley  Age:  74 y.o.  Sex:  female  :  1945  MRN:  [id number]   Visit Number:  [id number]  Primary Care Physician:  Jim Hernandez MD     Subjective     Chief complaint:   Chief Complaint   Patient presents with   • Shortness of Breath     History of presenting illness:   Patient is a 74 y.o. female with past medical history significant for COPD, diastolic congestive heart failure, chronic kidney disease stage III, essential hypertension, type 2 diabetes mellitus (insulin-dependent), hyperlipidemia, arthritis, osteoporosis, that presented to the [facility] emergency department for evaluation of shortness of breath.  Upon evaluation at bedside, patient resting comfortably in bed, asleep.  Daughter was present at bedside and provided history of presenting illness.  Daughter states that the patient has not rested well the past few days and wishes to allow her mother to sleep.  The daughter states that the patient has been retaining fluid over the past few months which is typically managed by Dr. Quinn, nephrology.  She states that due to the patient's increasing shortness of breath and fluid retention in ankles/feet, she tried to obtain an appointment with Dr. Quinn, but was unable to as he is traveling out of the country at this time.  As a result, they scheduled an appointment with the patient's primary care provider who increased the patient's Bumex dose for a few days, which the daughter states helped with the patient's lower extremity edema.  Daughter at bedside states that the patient typically experiences chronic shortness of breath but has noticed it progressively worsening over the past week.  The daughter states that when she arrived home from work today she went to her mother's house to check on her and found her \"gasping.\"  The daughter admits to the patient having a decrease " in normal activities, increased fatigue, nonproductive cough, shortness of breath with lying and with ambulation, wheezing, orthopnea, bilateral ankles/feet edema, chronic diarrhea, dizziness, lightheadedness, and mild confusion.  The daughter denies change in appetite, fever, chills, congestion, sore throat, postnasal drip, choking on foods, chest pain, abdominal pain, nausea, vomiting, constipation, dysuria, frequency, difficulty with ambulation, wounds.  The daughter states that the patient does have home oxygen available as needed but does not require it daily.  The patient has a history of smoking tobacco abuse in which the daughter states the patient smoked 1 to 2 packs a day for 30 years.  The daughter states that the patient quit smoking around 20 years ago.  Currently, the patient is requiring 2 L of oxygen via nasal cannula.    Upon arrival to the ED, vitals were temperature 97.6 °F, pulse 60, respirations 20, /50, SPO2 89% room air.  ABG with pH 7.387, PCO2 46.0, PO2 60.5, HCO3 27.6.  Troponin T negative x1.  proBNP 1078.0.  CMP with glucose 221, creatinine 1.57, BUN 34, GFR 32, alkaline phosphatase 35, total bilirubin less than 0.2.  TSH 1.750.  C-reactive protein 1.82.  CBC with hemoglobin 10.4, MCV HC 30.3.  Urinalysis cloudy appearance, trace glucose, 2+ leukocytes, 1+ protein, 21-30 WBC, 4+ bacteria.  Blood cultures pending x2.  Urine culture pending.  Chest x-ray shows mild CHF, parenchymal nodule in left lung similar to previous exam.  Patient received IV Bumex 2 mg, p.o. Coreg 12.5 mg, IV Rocephin 1 g, DuoNeb x1, IV Solu-Medrol 125 mg, in emergency department.    Patient has been admitted to the observation unit for further evaluation and treatment.  ---------------------------------------------------------------------------------------------------------------------   Review of Systems   Constitutional: Positive for activity change (Unable to perform activities of daily living due to  shortness of breath). Negative for appetite change, chills and fever.   HENT: Negative for congestion, postnasal drip and sore throat.    Eyes: Negative for discharge and itching.   Respiratory: Positive for cough (Nonproductive), shortness of breath (With ambulation and at rest) and wheezing. Negative for choking and chest tightness.         Orthopnea   Cardiovascular: Positive for leg swelling. Negative for chest pain and palpitations.   Gastrointestinal: Positive for diarrhea (Chronic). Negative for abdominal distention, constipation, nausea and vomiting.   Endocrine: Negative for cold intolerance and heat intolerance.   Genitourinary: Negative for dysuria and frequency.   Musculoskeletal: Negative for arthralgias and gait problem.   Skin: Negative for wound.   Allergic/Immunologic: Negative for food allergies.   Neurological: Positive for dizziness and light-headedness.   Hematological: Does not bruise/bleed easily.   Psychiatric/Behavioral: Positive for confusion (Mild confusion since becoming ill).      ---------------------------------------------------------------------------------------------------------------------   Past Medical History:   Diagnosis Date   • Acute on chronic diastolic CHF (congestive heart failure) (CMS/[facility]) 2019   • Anal polyp 2018    Added automatically from request for surgery [id number]   • Arthritis    • Chronic kidney disease    • COPD (chronic obstructive pulmonary disease) (CMS/[facility])    • Diabetes mellitus (CMS/[facility])    • Elevated cholesterol    • Essential hypertension 2019   • History of transfusion    • Iron deficiency anemia 2017   • Osteoporosis    • Pneumonia of right middle lobe due to infectious organism (CMS/[facility]) 2019     Past Surgical History:   Procedure Laterality Date   • ANUS SURGERY N/A 2018    Procedure: Diagnostic anoscopy with anal polyp excision;  Surgeon: Marlyn Gutierrez MD;  [facility];  Service: General   •  SECTION      • HIP BIPOLAR REPLACEMENT      left  Page    • HYSTERECTOMY       Family History   Problem Relation Age of Onset   • Heart disease Mother    • COPD Mother    • Arthritis Mother    • Diabetes Father      Social History     Socioeconomic History   • Marital status:      Spouse name: Not on file   • Number of children: Not on file   • Years of education: Not on file   • Highest education level: Not on file   Tobacco Use   • Smoking status: Former Smoker     Years: 15.00     Types: Cigarettes   • Smokeless tobacco: Never Used   • Tobacco comment: quit 15 years ago    Substance and Sexual Activity   • Alcohol use: No   • Drug use: No   • Sexual activity: Defer     Birth control/protection: Post-menopausal     ---------------------------------------------------------------------------------------------------------------------   Allergies:  Patient has no known allergies.  ---------------------------------------------------------------------------------------------------------------------   Medications below are reported home medications pulling from within the system; at this time, these medications have not been reconciled unless otherwise specified and are in the verification process for further verifcation as current home medications.    Prior to Admission Medications     Prescriptions Last Dose Informant Patient Reported? Taking?    amLODIPine (NORVASC) 5 MG tablet 12/16/2019  Yes Yes    Take 5 mg by mouth Daily.    bumetanide (BUMEX) 1 MG tablet 12/16/2019 Child Yes Yes    Take 1 mg by mouth Daily. PTA: Pt takes 2 tablets if her weight is above 152lb    calcium carbonate (OS-TIANNA) 600 MG tablet 12/16/2019 Child Yes Yes    Take 600 mg by mouth Daily.    carvedilol (COREG) 25 MG tablet 12/16/2019  Yes Yes    Take 25 mg by mouth 2 (Two) Times a Day With Meals.    cetirizine (zyrTEC) 10 MG tablet 12/16/2019 Child Yes Yes    Take 10 mg by mouth Daily.    cetirizine (zyrTEC) 10 MG tablet 12/16/2019  Yes Yes     Take 10 mg by mouth Daily.    cyanocobalamin 1000 MCG/ML injection 12/16/2019  Yes Yes    Inject 1,000 mcg into the appropriate muscle as directed by prescriber Every 28 (Twenty-Eight) Days.    fenofibrate 160 MG tablet 12/16/2019 Child Yes Yes    Take 160 mg by mouth Daily.    ferrous sulfate 325 (65 FE) MG tablet 12/16/2019 Child Yes Yes    Take 325 mg by mouth 2 (Two) Times a Day.    hydrALAZINE (APRESOLINE) 25 MG tablet 12/16/2019  Yes Yes    Take 25 mg by mouth 2 (Two) Times a Day.    insulin detemir (LEVEMIR) 100 UNIT/ML injection 12/16/2019  Yes Yes    Inject 80 Units under the skin into the appropriate area as directed Daily.    insulin detemir (LEVEMIR) 100 UNIT/ML injection 12/15/2019  Yes Yes    Inject 40 Units under the skin into the appropriate area as directed Every Night.    loperamide (IMODIUM) 2 MG capsule 12/16/2019  Yes Yes    Take 2 mg by mouth Daily.    metFORMIN (GLUCOPHAGE) 500 MG tablet 12/16/2019  Yes Yes    Take 500 mg by mouth 2 (Two) Times a Day With Meals.    raNITIdine (ZANTAC) 150 MG tablet 12/16/2019 Child Yes Yes    Take 150 mg by mouth 2 (Two) Times a Day.    spironolactone (ALDACTONE) 25 MG tablet 12/16/2019  Yes Yes    Take 25 mg by mouth Daily.        ---------------------------------------------------------------------------------------------------------------------    Objective     Hospital Scheduled Meds:    carvedilol 12.5 mg Oral BID With Meals   cefTRIAXone 2 g Intravenous Q24H   doxycycline 100 mg Intravenous Q12H   famotidine 20 mg Oral BID AC   heparin (porcine) 5,000 Units Subcutaneous Q12H   insulin aspart 0-7 Units Subcutaneous 4x Daily AC & at Bedtime   ipratropium-albuterol 3 mL Nebulization Q4H - RT   methylPREDNISolone sodium succinate 40 mg Intravenous Q12H   sodium chloride 10 mL Intravenous Q12H          Current listed hospital scheduled medications may not yet reflect those currently placed in orders that are signed and held, awaiting patient's arrival to  floor/unit.    ---------------------------------------------------------------------------------------------------------------------   Vital Signs:  Temp:  [97.6 °F (36.4 °C)-97.9 °F (36.6 °C)] 97.8 °F (36.6 °C)  Heart Rate:  [56-76] 71  Resp:  [20-32] 20  BP: (128-176)/(50-89) 173/71  Mean Arterial Pressure (Non-Invasive) for the past 24 hrs (Last 3 readings):   Noninvasive MAP (mmHg)   12/16/19 1902 95     SpO2 Percentage    12/16/19 2305 12/17/19 0104 12/17/19 0115   SpO2: 91% 97% 97%     SpO2:  [89 %-97 %] 97 %  on  Flow (L/min):  [1-2] 2;   Device (Oxygen Therapy): nasal cannula    Body mass index is 28.36 kg/m².  Wt Readings from Last 3 Encounters:   12/16/19 74.9 kg (165 lb 3.2 oz)   03/15/19 72.2 kg (159 lb 1.6 oz)   02/14/19 74.1 kg (163 lb 7 oz)     ---------------------------------------------------------------------------------------------------------------------   Physical Exam:  Physical Exam   Constitutional: She is oriented to person, place, and time. She appears well-developed and well-nourished. She is sleeping. Nasal cannula in place.   Upon evaluation, patient resting comfortably in bed asleep   HENT:   Head: Normocephalic and atraumatic.   Nose: Nose normal. No nasal deformity.   Eyes: Lids are normal. Right eye exhibits no discharge. Left eye exhibits no discharge.   Neck: No JVD present. Carotid bruit is not present.   Cardiovascular: Normal rate, regular rhythm, normal heart sounds, intact distal pulses and normal pulses. Exam reveals no decreased pulses.   Pulses:       Dorsalis pedis pulses are 2+ on the right side, and 2+ on the left side.        Posterior tibial pulses are 2+ on the right side, and 2+ on the left side.   Pulmonary/Chest: Effort normal. No accessory muscle usage. No tachypnea. She has decreased breath sounds in the right upper field, the right middle field, the right lower field, the left upper field, the left middle field and the left lower field. She has no wheezes. She  has no rhonchi. She has no rales.   Abdominal: Soft. Normal appearance and bowel sounds are normal. She exhibits no distension and no mass. There is no tenderness.   Musculoskeletal:        Right ankle: She exhibits swelling (Mild, 1+).        Left ankle: She exhibits swelling (Mild, 1+).        Right lower leg: She exhibits no edema.        Left lower leg: She exhibits no edema.     Vascular Status -  Her right foot exhibits abnormal foot edema (Mild, 1+). Her right foot exhibits normal foot vasculature . Her left foot exhibits abnormal foot edema (Mild, 1+). Her left foot exhibits normal foot vasculature .  Skin Integrity  -  Her right foot skin is intact.Her left foot skin is intact..  Neurological: She is oriented to person, place, and time. She has normal strength. She is not disoriented. No sensory deficit.   Patient awoke momentarily for evaluation.  She is alert and oriented to herself, time, place.  Sensation intact bilaterally in upper and lower extremities.  Strength intact bilaterally in upper and lower extremities.   Skin: No abrasion and no rash noted. No erythema.   Psychiatric: She has a normal mood and affect. Her behavior is normal. Her mood appears not anxious. She does not exhibit a depressed mood.     ---------------------------------------------------------------------------------------------------------------------  EKG:    Pending cardiology read, per my evaluation, normal sinus rhythm, low voltage QRS, heart rate 63 bpm, QTc 435 MS.    Telemetry:    Normal sinus rhythm, heart rate 62 bpm, SPO2 93% nasal cannula.    I have personally reviewed the EKG/Telemetry strip  ---------------------------------------------------------------------------------------------------------------------   Results from last 7 days   Lab Units 12/16/19 1921   CK TOTAL U/L 27   TROPONIN T ng/mL <0.010     Results from last 7 days   Lab Units 12/16/19 1921   PROBNP pg/mL 1,078.0       Results from last 7 days   Lab  Units 12/16/19  2201   PH, ARTERIAL pH units 7.404   PO2 ART mm Hg 54.9*   PCO2, ARTERIAL mm Hg 46.4*   HCO3 ART mmol/L 29.0*     Results from last 7 days   Lab Units 12/17/19  0215 12/16/19  1921   CRP mg/dL  --  1.82*   LACTATE mmol/L  --  1.2   WBC 10*3/mm3 7.22 8.46   HEMOGLOBIN g/dL 10.5* 10.4*   HEMATOCRIT % 34.0 34.3   MCV fL 88.1 88.9   MCHC g/dL 30.9* 30.3*   PLATELETS 10*3/mm3 364 373     Results from last 7 days   Lab Units 12/16/19  1921   SODIUM mmol/L 139   POTASSIUM mmol/L 5.0   MAGNESIUM mg/dL 2.0   CHLORIDE mmol/L 100   CO2 mmol/L 26.0   BUN mg/dL 34*   CREATININE mg/dL 1.57*   EGFR IF NONAFRICN AM mL/min/1.73 32*   CALCIUM mg/dL 9.5   GLUCOSE mg/dL 221*   ALBUMIN g/dL 3.98   BILIRUBIN mg/dL <0.2*   ALK PHOS U/L 35*   AST (SGOT) U/L 10   ALT (SGPT) U/L 9   Estimated Creatinine Clearance: 30.7 mL/min (A) (by C-G formula based on SCr of 1.57 mg/dL (H)).  No results found for: AMMONIA    Glucose   Date/Time Value Ref Range Status   12/17/2019 0145 177 (H) 70 - 130 mg/dL Final     Lab Results   Component Value Date    HGBA1C 7.50 (H) 02/14/2019     Lab Results   Component Value Date    TSH 1.750 12/16/2019       No results found for: BLOODCX  No results found for: URINECX  No results found for: WOUNDCX  No results found for: STOOLCX  No results found for: RESPCX  No results found for: MRSACX  Pain Management Panel     Pain Management Panel Latest Ref Rng & Units 9/17/2019 6/18/2019    CREATININE UR mg/dL 20.8 31.9        I have personally reviewed the above laboratory results.   ---------------------------------------------------------------------------------------------------------------------  Imaging Results (Last 7 Days)     Procedure Component Value Units Date/Time    XR Chest 1 View [[id number]] Collected:  12/16/19 1943     Updated:  12/16/19 1948    Narrative:       XR CHEST 1 VW-     CLINICAL INDICATION: soa, edema        COMPARISON: 03/16/2019      TECHNIQUE: Single frontal view of the chest.      FINDINGS:     Diffuse interstitial lung disease.  Parenchymal nodule in the left lung.  Mild CHF  There is no evidence of an acute osseous abnormality.   There are no suspicious-appearing parenchymal soft tissue nodules.          Impression:       Mild CHF.  Parenchymal nodule in the left lung similar to the previous exam     This report was finalized on 12/16/2019 7:44 PM by Dr. Jonah Mcbride MD.           I have personally reviewed the above radiology results.     Last Echocardiogram:  Results for orders placed during the hospital encounter of 02/14/19   Adult Transthoracic Echo Complete W/ Cont if Necessary Per Protocol    Addendum · Left ventricular diastolic dysfunction (grade II) consistent with  pseudonormalization. · Left atrial cavity size is borderline dilated. · Mild tricuspid valve regurgitation is present. · Mild mitral valve regurgitation is present · Estimated EF appears to be in the range of 56 - 60%. Normal left  ventricular cavity size and wall thickness noted. All left ventricular  wall segments contract normally.        Fernando Dave MD 2/15/2019  2:38 PM          Narrative · Left ventricular diastolic dysfunction (grade II) consistent with   pseudonormalization.  · Left atrial cavity size is borderline dilated.  · Mild tricuspid valve regurgitation is present.  · Mild-to-moderate mitral valve regurgitation is present  · Estimated EF appears to be in the range of 56 - 60%. Normal left   ventricular cavity size and wall thickness noted. All left ventricular   wall segments contract normally.        ---------------------------------------------------------------------------------------------------------------------    Assessment & Plan      Active Hospital Problems    Diagnosis POA   • Acute on chronic diastolic CHF (congestive heart failure) (CMS/HCC) [I50.33] Yes   • Chronic kidney disease, stage III (moderate) (CMS/HCC) [N18.3] Yes   • Essential hypertension [I10] Yes   • Hyperlipidemia  [E78.5] Yes   • Type II diabetes mellitus (CMS/HCC) [E11.9] Yes   • Arthritis [M19.90] Yes   • Osteoporosis [M81.0] Yes   • Metabolic alkalosis with respiratory acidosis [E87.4] Yes   • Respiratory failure with hypoxia (CMS/[facility]) [J96.91] Yes   • Acute UTI (urinary tract infection) [N39.0] Yes   • COPD with acute exacerbation (CMS/[facility]) [J44.1] Yes     · Acute hypoxic respiratory failure secondary to acute COPD exacerbation: Start patient on IV Rocephin 2 g, and IV doxycycline.  Obtain respiratory culture, panel, and urine legionella.  DuoNebs every 4 hours.  Incentive spirometry given.  Continue patient on IV Solu-Medrol 40 mg every 12 hours.  Repeat a.m. CBC.  WBC within normal limits, C-reactive protein 1.82, lactate 1.2.  Repeat a.m. CRP and lactate.  Currently afebrile, continue to monitor temperature curve.  Titrate SPO2 to maintain O2 greater than or equal to 90%.  Blood cultures pending x2.  Continue to monitor closely.    · Respiratory acidosis with metabolic alkalosis: Continue therapy as outlined above.  Monitor closely.    · Acute urinary tract infection: Start patient on IV Rocephin 2 g.  Monitor I's and O's.  Urine culture pending.    · Acute exacerbation of diastolic congestive heart failure: Appears mildly volume overloaded., proBNP 1078.0.  Bilateral ankles and feet revealed 1+ edema.  Chest x-ray shows mild congestive heart failure, parenchymal nodule in left lung similar to previous x-ray on 3/16/2019.  Echo on 2/15/2019 showed diastolic CHF with EF 56 to 60%.  Obtain daily weights.  Strict I's and O's.  Repeat echo.  Continue to monitor for signs of volume overload.    · Hyperglycemia present upon admission with known history of type 2 diabetes mellitus (insulin-dependent): Likely secondary to IV steroids received in emergency department.  Hemoglobin A1c ordered. Hold home oral hypoglycemics to prevent hypoglycemia. Will add SSI for now.  Continue Levemir once reconciled per pharmacy.  Accu-cheks  qAC and qhs. Titrate insulin therapy as necessary.     · Normocytic anemia: H&H stable, repeat a.m. CBC, monitor H&H closely.  Obtain iron panel, ferritin, folate.  Will transfuse if hemoglobin less than 7.  Monitor closely.    · Chronic kidney disease stage III: On admission creatinine 1.57, baseline creatinine 1.51-2.23.  Repeat a.m. CMP.  Avoid nephrotoxic agents as much as possible.  Monitor closely.    · Essential hypertension, appears uncontrolled: Continue home blood pressure control medications once reconciled per pharmacy.  Will make IV hydralazine as needed for SBP greater than 160.    · Hyperlipidemia: Obtain AM lipid panel.  Patient not currently taking home statin.    · Arthritis: Supportive care.    · Osteoporosis: Continue home calcium supplement.  Up with assistance, fall precautions in place.    · F/E/N: No IV fluids.  Replace electrolytes as necessary per protocol, a.m. magnesium and phosphorus ordered.  Magnesium 2.0.  Regular diet.    ---------------------------------------------------  DVT Prophylaxis: Subcutaneous heparin  GI Prophylaxis: P.o. Pepcid  Activity: Up with assistance, fall precautions in place.    The patient is considered to be a high risk patient due to: Acute hypoxic respiratory failure secondary to acute COPD exacerbation, respiratory acidosis with metabolic alkalosis, acute UTI, acute diastolic congestive heart failure exacerbation.    INPATIENT status due to the need for care which can only be reasonably provided in an hospital setting such as aggressive/expedited ancillary services and/or consultation services, the necessity for IV medications, close physician monitoring and/or the possible need for procedures.  In such, I feel patient’s risk for adverse outcomes and need for care warrant INPATIENT evaluation and predict the patient’s care encounter to likely last beyond 2 midnights.      Code Status: FULL CODE     I have discussed the patient's assessment and plan with the  patient, daughter at bedside, and attending physician.      Tressa Echevarria PA-C  Hospitalist Service -- [facility]       12/17/19  3:21 AM    Attending Physician: Manfred Smith MD

## 2019-12-17 NOTE — PLAN OF CARE
Patient arrived to unit with daughter at bedside. Vital signs are WNL. Patient is alert and oriented and able to answer all questions appropriately. Patients bed is in lowest position with all personal items within reach.

## 2019-12-17 NOTE — PROGRESS NOTES
Discharge Planning Assessment   [place]     Patient Name: Albina Finley  MRN: [id number]  Today's Date: 12/17/2019    Admit Date: 12/16/2019    Discharge Needs Assessment     Row Name 12/17/19 1558       Living Environment    Lives With  alone    Current Living Arrangements  home/apartment/condo    Primary Care Provided by  self    Provides Primary Care For  no one    Family Caregiver if Needed  child(lester), adult    Quality of Family Relationships  involved;helpful;supportive    Able to Return to Prior Arrangements  yes       Resource/Environmental Concerns    Transportation Concerns  car, none       Transition Planning    Patient/Family Anticipates Transition to  home with family    Transportation Anticipated  family or friend will provide       Discharge Needs Assessment    Equipment Currently Used at Home  oxygen    Equipment Needed After Discharge  none        Discharge Plan     Row Name 12/17/19 8559       Plan    Plan  SS spoke with pt and her daughter on this day. Pt lives alone. Pt does not receive any HH services. Pt has O2 @ 2L via [employer] used as needed. Pt's POA is her daughter Kelsey. Pt uses [facility], and ehr PCP is Jim Hernandez. Pt plans to return home at discharge, with transportation provided by family. SS will follow and assist as needed.     Patient/Family in Agreement with Plan  yes               Demographic Summary     Row Name 12/17/19 1558       General Information    Admission Type  inpatient    Referral Source  nursing    Reason for Consult  discharge planning    Preferred Language  English     Used During This Interaction  no          ARIANNA Juarez

## 2019-12-18 PROBLEM — I51.89 GRADE II DIASTOLIC DYSFUNCTION: Chronic | Status: ACTIVE | Noted: 2019-12-18

## 2019-12-18 LAB
ANION GAP SERPL CALCULATED.3IONS-SCNC: 13.4 MMOL/L (ref 5–15)
BASOPHILS # BLD AUTO: 0.02 10*3/MM3 (ref 0–0.2)
BASOPHILS NFR BLD AUTO: 0.1 % (ref 0–1.5)
BUN BLD-MCNC: 41 MG/DL (ref 8–23)
BUN/CREAT SERPL: 30.6 (ref 7–25)
CALCIUM SPEC-SCNC: 9.6 MG/DL (ref 8.6–10.5)
CHLORIDE SERPL-SCNC: 99 MMOL/L (ref 98–107)
CO2 SERPL-SCNC: 23.6 MMOL/L (ref 22–29)
CREAT BLD-MCNC: 1.34 MG/DL (ref 0.57–1)
DEPRECATED RDW RBC AUTO: 48.4 FL (ref 37–54)
EOSINOPHIL # BLD AUTO: 0 10*3/MM3 (ref 0–0.4)
EOSINOPHIL NFR BLD AUTO: 0 % (ref 0.3–6.2)
ERYTHROCYTE [DISTWIDTH] IN BLOOD BY AUTOMATED COUNT: 14.9 % (ref 12.3–15.4)
GFR SERPL CREATININE-BSD FRML MDRD: 39 ML/MIN/1.73
GLUCOSE BLD-MCNC: 284 MG/DL (ref 65–99)
GLUCOSE BLDC GLUCOMTR-MCNC: 260 MG/DL (ref 70–130)
GLUCOSE BLDC GLUCOMTR-MCNC: 284 MG/DL (ref 70–130)
GLUCOSE BLDC GLUCOMTR-MCNC: 309 MG/DL (ref 70–130)
GLUCOSE BLDC GLUCOMTR-MCNC: 324 MG/DL (ref 70–130)
HCT VFR BLD AUTO: 35 % (ref 34–46.6)
HGB BLD-MCNC: 10.6 G/DL (ref 12–15.9)
IMM GRANULOCYTES # BLD AUTO: 0.06 10*3/MM3 (ref 0–0.05)
IMM GRANULOCYTES NFR BLD AUTO: 0.4 % (ref 0–0.5)
LYMPHOCYTES # BLD AUTO: 1.81 10*3/MM3 (ref 0.7–3.1)
LYMPHOCYTES NFR BLD AUTO: 13.5 % (ref 19.6–45.3)
MAGNESIUM SERPL-MCNC: 2 MG/DL (ref 1.6–2.4)
MCH RBC QN AUTO: 27.4 PG (ref 26.6–33)
MCHC RBC AUTO-ENTMCNC: 30.3 G/DL (ref 31.5–35.7)
MCV RBC AUTO: 90.4 FL (ref 79–97)
MONOCYTES # BLD AUTO: 0.42 10*3/MM3 (ref 0.1–0.9)
MONOCYTES NFR BLD AUTO: 3.1 % (ref 5–12)
NEUTROPHILS # BLD AUTO: 11.05 10*3/MM3 (ref 1.7–7)
NEUTROPHILS NFR BLD AUTO: 82.9 % (ref 42.7–76)
NRBC BLD AUTO-RTO: 0 /100 WBC (ref 0–0.2)
NT-PROBNP SERPL-MCNC: 4986 PG/ML (ref 5–1800)
PHOSPHATE SERPL-MCNC: 3.2 MG/DL (ref 2.5–4.5)
PLATELET # BLD AUTO: 390 10*3/MM3 (ref 140–450)
PMV BLD AUTO: 11.5 FL (ref 6–12)
POTASSIUM BLD-SCNC: 4.9 MMOL/L (ref 3.5–5.2)
RBC # BLD AUTO: 3.87 10*6/MM3 (ref 3.77–5.28)
SODIUM BLD-SCNC: 136 MMOL/L (ref 136–145)
WBC NRBC COR # BLD: 13.36 10*3/MM3 (ref 3.4–10.8)

## 2019-12-18 PROCEDURE — 83880 ASSAY OF NATRIURETIC PEPTIDE: CPT | Performed by: PHYSICIAN ASSISTANT

## 2019-12-18 PROCEDURE — 63710000001 INSULIN ASPART PER 5 UNITS: Performed by: PHYSICIAN ASSISTANT

## 2019-12-18 PROCEDURE — 94799 UNLISTED PULMONARY SVC/PX: CPT

## 2019-12-18 PROCEDURE — 25010000002 HEPARIN (PORCINE) PER 1000 UNITS: Performed by: INTERNAL MEDICINE

## 2019-12-18 PROCEDURE — 93005 ELECTROCARDIOGRAM TRACING: CPT | Performed by: PHYSICIAN ASSISTANT

## 2019-12-18 PROCEDURE — 63710000001 INSULIN DETEMIR PER 5 UNITS: Performed by: PHYSICIAN ASSISTANT

## 2019-12-18 PROCEDURE — 80048 BASIC METABOLIC PNL TOTAL CA: CPT | Performed by: PHYSICIAN ASSISTANT

## 2019-12-18 PROCEDURE — 83735 ASSAY OF MAGNESIUM: CPT | Performed by: PHYSICIAN ASSISTANT

## 2019-12-18 PROCEDURE — 84100 ASSAY OF PHOSPHORUS: CPT | Performed by: PHYSICIAN ASSISTANT

## 2019-12-18 PROCEDURE — 63710000001 PREDNISONE PER 1 MG: Performed by: PHYSICIAN ASSISTANT

## 2019-12-18 PROCEDURE — 82962 GLUCOSE BLOOD TEST: CPT

## 2019-12-18 PROCEDURE — 85025 COMPLETE CBC W/AUTO DIFF WBC: CPT | Performed by: PHYSICIAN ASSISTANT

## 2019-12-18 PROCEDURE — 99233 SBSQ HOSP IP/OBS HIGH 50: CPT | Performed by: PHYSICIAN ASSISTANT

## 2019-12-18 PROCEDURE — 25010000002 METHYLPREDNISOLONE PER 40 MG: Performed by: INTERNAL MEDICINE

## 2019-12-18 PROCEDURE — 93010 ELECTROCARDIOGRAM REPORT: CPT | Performed by: INTERNAL MEDICINE

## 2019-12-18 RX ORDER — SPIRONOLACTONE 25 MG/1
25 TABLET ORAL DAILY
Status: DISCONTINUED | OUTPATIENT
Start: 2019-12-18 | End: 2019-12-20 | Stop reason: HOSPADM

## 2019-12-18 RX ORDER — FUROSEMIDE 10 MG/ML
40 INJECTION INTRAMUSCULAR; INTRAVENOUS ONCE
Status: DISCONTINUED | OUTPATIENT
Start: 2019-12-18 | End: 2019-12-18

## 2019-12-18 RX ORDER — PREDNISONE 20 MG/1
20 TABLET ORAL DAILY
Status: DISCONTINUED | OUTPATIENT
Start: 2019-12-18 | End: 2019-12-20 | Stop reason: HOSPADM

## 2019-12-18 RX ORDER — CEFDINIR 300 MG/1
300 CAPSULE ORAL EVERY 12 HOURS SCHEDULED
Status: DISCONTINUED | OUTPATIENT
Start: 2019-12-18 | End: 2019-12-20 | Stop reason: HOSPADM

## 2019-12-18 RX ORDER — DOXYCYCLINE 100 MG/1
100 CAPSULE ORAL EVERY 12 HOURS
Status: DISCONTINUED | OUTPATIENT
Start: 2019-12-18 | End: 2019-12-20 | Stop reason: HOSPADM

## 2019-12-18 RX ORDER — DOXYCYCLINE 100 MG/1
100 CAPSULE ORAL EVERY 12 HOURS
Status: DISCONTINUED | OUTPATIENT
Start: 2019-12-18 | End: 2019-12-18

## 2019-12-18 RX ORDER — BUMETANIDE 0.25 MG/ML
1 INJECTION INTRAMUSCULAR; INTRAVENOUS ONCE
Status: COMPLETED | OUTPATIENT
Start: 2019-12-18 | End: 2019-12-18

## 2019-12-18 RX ORDER — HYDRALAZINE HYDROCHLORIDE 25 MG/1
25 TABLET, FILM COATED ORAL 3 TIMES DAILY
Status: DISCONTINUED | OUTPATIENT
Start: 2019-12-18 | End: 2019-12-20

## 2019-12-18 RX ADMIN — HYDRALAZINE HYDROCHLORIDE 25 MG: 25 TABLET ORAL at 17:20

## 2019-12-18 RX ADMIN — INSULIN ASPART 4 UNITS: 100 INJECTION, SOLUTION INTRAVENOUS; SUBCUTANEOUS at 08:56

## 2019-12-18 RX ADMIN — CETIRIZINE HYDROCHLORIDE 5 MG: 10 TABLET, FILM COATED ORAL at 08:56

## 2019-12-18 RX ADMIN — DOXYCYCLINE 100 MG: 100 CAPSULE ORAL at 11:56

## 2019-12-18 RX ADMIN — CEFDINIR 300 MG: 300 CAPSULE ORAL at 11:56

## 2019-12-18 RX ADMIN — HYDRALAZINE HYDROCHLORIDE 25 MG: 25 TABLET ORAL at 08:57

## 2019-12-18 RX ADMIN — HEPARIN SODIUM 5000 UNITS: 5000 INJECTION INTRAVENOUS; SUBCUTANEOUS at 08:57

## 2019-12-18 RX ADMIN — IPRATROPIUM BROMIDE AND ALBUTEROL SULFATE 3 ML: 2.5; .5 SOLUTION RESPIRATORY (INHALATION) at 19:20

## 2019-12-18 RX ADMIN — FERROUS SULFATE TAB 325 MG (65 MG ELEMENTAL FE) 325 MG: 325 (65 FE) TAB at 08:57

## 2019-12-18 RX ADMIN — LOPERAMIDE HYDROCHLORIDE 2 MG: 2 CAPSULE ORAL at 08:56

## 2019-12-18 RX ADMIN — IPRATROPIUM BROMIDE AND ALBUTEROL SULFATE 3 ML: 2.5; .5 SOLUTION RESPIRATORY (INHALATION) at 06:50

## 2019-12-18 RX ADMIN — SPIRONOLACTONE 25 MG: 25 TABLET ORAL at 11:56

## 2019-12-18 RX ADMIN — FERROUS SULFATE TAB 325 MG (65 MG ELEMENTAL FE) 325 MG: 325 (65 FE) TAB at 21:27

## 2019-12-18 RX ADMIN — IPRATROPIUM BROMIDE AND ALBUTEROL SULFATE 3 ML: 2.5; .5 SOLUTION RESPIRATORY (INHALATION) at 02:45

## 2019-12-18 RX ADMIN — ARFORMOTEROL TARTRATE 15 MCG: 15 SOLUTION RESPIRATORY (INHALATION) at 22:32

## 2019-12-18 RX ADMIN — DOXYCYCLINE 100 MG: 100 CAPSULE ORAL at 22:54

## 2019-12-18 RX ADMIN — DOXYCYCLINE 100 MG: 100 INJECTION, POWDER, LYOPHILIZED, FOR SOLUTION INTRAVENOUS at 03:02

## 2019-12-18 RX ADMIN — ACETAMINOPHEN, GUAIFENESIN AND PHENYLEPHRINE HCL 20 MG: 325; 200; 5 TABLET, FILM COATED ORAL at 17:18

## 2019-12-18 RX ADMIN — ARFORMOTEROL TARTRATE 15 MCG: 15 SOLUTION RESPIRATORY (INHALATION) at 10:30

## 2019-12-18 RX ADMIN — IPRATROPIUM BROMIDE AND ALBUTEROL SULFATE 3 ML: 2.5; .5 SOLUTION RESPIRATORY (INHALATION) at 14:04

## 2019-12-18 RX ADMIN — CARVEDILOL 25 MG: 25 TABLET, FILM COATED ORAL at 17:18

## 2019-12-18 RX ADMIN — INSULIN ASPART 6 UNITS: 100 INJECTION, SOLUTION INTRAVENOUS; SUBCUTANEOUS at 12:00

## 2019-12-18 RX ADMIN — SODIUM CHLORIDE, PRESERVATIVE FREE 10 ML: 5 INJECTION INTRAVENOUS at 08:57

## 2019-12-18 RX ADMIN — Medication 500 MG: at 08:57

## 2019-12-18 RX ADMIN — BUMETANIDE 1 MG: 0.25 INJECTION INTRAMUSCULAR; INTRAVENOUS at 11:56

## 2019-12-18 RX ADMIN — HYDRALAZINE HYDROCHLORIDE 25 MG: 25 TABLET ORAL at 22:54

## 2019-12-18 RX ADMIN — INSULIN ASPART 7 UNITS: 100 INJECTION, SOLUTION INTRAVENOUS; SUBCUTANEOUS at 17:18

## 2019-12-18 RX ADMIN — CARVEDILOL 25 MG: 25 TABLET, FILM COATED ORAL at 08:57

## 2019-12-18 RX ADMIN — AMLODIPINE BESYLATE 5 MG: 5 TABLET ORAL at 08:57

## 2019-12-18 RX ADMIN — HEPARIN SODIUM 5000 UNITS: 5000 INJECTION INTRAVENOUS; SUBCUTANEOUS at 21:28

## 2019-12-18 RX ADMIN — INSULIN DETEMIR 40 UNITS: 100 INJECTION, SOLUTION SUBCUTANEOUS at 21:28

## 2019-12-18 RX ADMIN — INSULIN ASPART 7 UNITS: 100 INJECTION, SOLUTION INTRAVENOUS; SUBCUTANEOUS at 21:27

## 2019-12-18 RX ADMIN — CEFDINIR 300 MG: 300 CAPSULE ORAL at 21:27

## 2019-12-18 RX ADMIN — METHYLPREDNISOLONE SODIUM SUCCINATE 40 MG: 40 INJECTION, POWDER, FOR SOLUTION INTRAMUSCULAR; INTRAVENOUS at 08:57

## 2019-12-18 RX ADMIN — ACETAMINOPHEN, GUAIFENESIN AND PHENYLEPHRINE HCL 20 MG: 325; 200; 5 TABLET, FILM COATED ORAL at 08:57

## 2019-12-18 RX ADMIN — PREDNISONE 20 MG: 20 TABLET ORAL at 11:56

## 2019-12-18 RX ADMIN — BUDESONIDE 0.25 MG: 0.25 SUSPENSION RESPIRATORY (INHALATION) at 06:50

## 2019-12-18 NOTE — PROGRESS NOTES
Discharge Planning Assessment   Phil     Patient Name: Albina Finley  MRN: [id number]  Today's Date: 12/18/2019    Admit Date: 12/16/2019    Discharge Plan     Row Name 12/18/19 6580       Plan    Plan Pt lives alone and she plans to return home at discharge. Pt does not utilize home health services. Pt has O2 @ 2 liters PRN from [facility]. SS to follow.         JERMAIN Krishna

## 2019-12-18 NOTE — PROGRESS NOTES
[facility] Medicine Services  PROGRESS NOTE     Patient Identification:  Name:  Albina Finley  Age:  74 y.o.  Sex:  female  :  1945  MRN:  [id number]  Visit Number:  [id number]  Primary Care Provider:  Jim Hernandez MD    Length of stay:  1    ----------------------------------------------------------------------------------------------------------------------  Subjective     Chief Complaint:  Follow up for CHF exacerbation, COPD exacerbation, hypoxic respiratory failure     History of Presenting Illness/Hospital Course:  Patient is a 74-year-old female with past medical history significant for diastolic CHF, COPD, essential hypertension, hyperlipidemia, insulin-dependent type 2 diabetes mellitus, stage III CKD, iron deficiency anemia, and former tobacco abuse that was admitted on 2019 for acute COPD exacerbation, acute hypoxic respiratory failure, and acute diastolic CHF exacerbation.    Subjective:  Today, the patient was sitting up in bedside chair upon my evaluation this morning, daughter at bedside.  Patient continues on 2 L nasal cannula.  Patient states her breathing is much improved.  She states she has been walking around some in her room to and from the bathroom without any issues.  No cough.  She denies any fevers or chills.  Lower extremity edema significantly improved, daughter states that her edema is actually better than it has been in the past couple of months.  She denies any chest pain or pressure.  No palpitations.  She denies any abdominal pain, nausea, vomiting or diarrhea.  No change in bowel movements.  No urinary symptoms reported.  Upon review of medication, patient is on Norvasc which can worsen lower extremity edema.  I did discuss with patient and daughter, they do report that her lower extremity edema has been progressively worsening since being started on this medication a couple of months ago.  She denies any pain in her  extremities.  Was contacted per RN as telemetry was reporting patient had converted to atrial fibrillation, EKG was obtained revealing sinus rhythm with PACs.    Present during exam: DaughterPilar AM RN   ----------------------------------------------------------------------------------------------------------------------  Objective     Consults:  · None    Procedures:  · 12/17/2019: Transthoracic echocardiogram   · Left ventricular systolic function is normal. Estimated EF appears to be in the range of 61 - 65%.  · Left ventricular diastolic dysfunction (grade II) consistent with pseudonormalization.  · Mild aortic valve stenosis is present.  · Mild pulmonary hypertension is present.  · There is no evidence of pericardial effusion.  · No changes compared to previous study    Current Hospital Meds:    arformoterol 15 mcg Nebulization BID - RT   budesonide 0.25 mg Nebulization Daily - RT   bumetanide 1 mg Intravenous Once   calcium carbonate (oyster shell) 500 mg Oral Daily   carvedilol 25 mg Oral BID With Meals   cetirizine 5 mg Oral Daily   doxycycline 100 mg Oral Q12H   famotidine 20 mg Oral BID AC   ferrous sulfate 325 mg Oral BID   heparin (porcine) 5,000 Units Subcutaneous Q12H   hydrALAZINE 25 mg Oral TID   insulin aspart 0-9 Units Subcutaneous 4x Daily AC & at Bedtime   insulin detemir 40 Units Subcutaneous Nightly   ipratropium-albuterol 3 mL Nebulization Q4H - RT   loperamide 2 mg Oral Daily   predniSONE 20 mg Oral Daily   sodium chloride 10 mL Intravenous Q12H   spironolactone 25 mg Oral Daily        ----------------------------------------------------------------------------------------------------------------------  Vital Signs:  Temp:  [97.7 °F (36.5 °C)-98.3 °F (36.8 °C)] 97.7 °F (36.5 °C)  Heart Rate:  [69-89] 69  Resp:  [18-24] 18  BP: (135-178)/(56-81) 146/56    SpO2 Percentage    12/17/19 2155 12/18/19 0245 12/18/19 0259   SpO2: 96% 98% 98%     SpO2:  [92 %-99 %] 98 %  on  Flow (L/min):  [2]  2;   Device (Oxygen Therapy): nasal cannula    Body mass index is 28.43 kg/m².  Wt Readings from Last 3 Encounters:   12/18/19 75.1 kg (165 lb 9.6 oz)   03/15/19 72.2 kg (159 lb 1.6 oz)   02/14/19 74.1 kg (163 lb 7 oz)        Intake/Output Summary (Last 24 hours) at 12/18/2019 1007  Last data filed at 12/18/2019 0300  Gross per 24 hour   Intake 690 ml   Output --   Net 690 ml     Diet Regular; Consistent Carbohydrate  ----------------------------------------------------------------------------------------------------------------------  Physical exam:  Physical Exam   Constitutional: She is oriented to person, place, and time. She appears well-developed and well-nourished.  Non-toxic appearance. She appears ill (Chronically ). No distress. Nasal cannula in place.   Pleasant, elderly, sitting up in bedside chair in no acute distress.  Daughter at bedside.   HENT:   Head: Normocephalic and atraumatic.   Right Ear: External ear normal.   Left Ear: External ear normal.   Nose: Nose normal.   Mouth/Throat: Mucous membranes are normal.   Eyes: Pupils are equal, round, and reactive to light. Conjunctivae are normal. No scleral icterus.   Neck: Neck supple.   Cardiovascular: Normal rate, regular rhythm, normal heart sounds and intact distal pulses. Exam reveals no gallop and no friction rub.   No murmur heard.  Pulses:       Dorsalis pedis pulses are 2+ on the right side, and 2+ on the left side.        Posterior tibial pulses are 2+ on the right side, and 2+ on the left side.   Pulmonary/Chest: Effort normal. No accessory muscle usage. No tachypnea. No respiratory distress. She has no decreased breath sounds. She has no wheezes. She has rhonchi (left > right, improved ). She has rales in the right lower field and the left lower field. She exhibits no tenderness.   Abdominal: Soft. Bowel sounds are normal. She exhibits no distension. There is no tenderness. There is no rebound and no guarding.   Genitourinary:    Genitourinary Comments: No martin catheter in place, making urine.    Musculoskeletal: She exhibits no tenderness or deformity.        Right lower leg: She exhibits edema (1-2+, significantly improved).        Left lower leg: She exhibits edema (1-2+, significantly improved ).     Vascular Status -  Her right foot exhibits normal foot vasculature . Her left foot exhibits normal foot vasculature .  Neurological: She is alert and oriented to person, place, and time. She has normal strength. No cranial nerve deficit or sensory deficit. GCS eye subscore is 4. GCS verbal subscore is 5. GCS motor subscore is 6.   Awake and alert.  Follows commands.  Answers questions appropriately.  No focal neurological deficits on exam.  Moves all extremities equally.  Ambulates well.   Skin: Skin is warm and dry. Capillary refill takes less than 2 seconds. No rash noted. No erythema. No pallor.   Psychiatric: She has a normal mood and affect. Her speech is normal and behavior is normal. Judgment and thought content normal.   Nursing note and vitals reviewed.     ----------------------------------------------------------------------------------------------------------------------  Tele:    Sinus 60s, with PACs     I have personally reviewed the EKG/Telemetry strips   ----------------------------------------------------------------------------------------------------------------------  Results from last 7 days   Lab Units 12/16/19  1921   CK TOTAL U/L 27   TROPONIN T ng/mL <0.010     Results from last 7 days   Lab Units 12/18/19  0448 12/16/19  1921   PROBNP pg/mL 4,986.0* 1,078.0       Results from last 7 days   Lab Units 12/16/19  2201   PH, ARTERIAL pH units 7.404   PO2 ART mm Hg 54.9*   PCO2, ARTERIAL mm Hg 46.4*   HCO3 ART mmol/L 29.0*     Results from last 7 days   Lab Units 12/18/19  0448 12/17/19 0215 12/16/19 1921   CRP mg/dL  --  1.56* 1.82*   LACTATE mmol/L  --  1.2 1.2   WBC 10*3/mm3 13.36* 7.22 8.46   HEMOGLOBIN g/dL  10.6* 10.5* 10.4*   HEMATOCRIT % 35.0 34.0 34.3   MCV fL 90.4 88.1 88.9   MCHC g/dL 30.3* 30.9* 30.3*   PLATELETS 10*3/mm3 390 364 373     Results from last 7 days   Lab Units 12/18/19  0448 12/17/19  0215 12/16/19  1921   SODIUM mmol/L 136 141 139   POTASSIUM mmol/L 4.9 4.4 5.0   MAGNESIUM mg/dL 2.0  --  2.0   CHLORIDE mmol/L 99 100 100   CO2 mmol/L 23.6 26.4 26.0   BUN mg/dL 41* 27* 34*   CREATININE mg/dL 1.34* 1.37* 1.57*   EGFR IF NONAFRICN AM mL/min/1.73 39* 38* 32*   CALCIUM mg/dL 9.6 9.7 9.5   GLUCOSE mg/dL 284* 190* 221*   ALBUMIN g/dL  --  4.13 3.98   BILIRUBIN mg/dL  --  0.2 <0.2*   ALK PHOS U/L  --  35* 35*   AST (SGOT) U/L  --  11 10   ALT (SGPT) U/L  --  8 9   Estimated Creatinine Clearance: 36 mL/min (A) (by C-G formula based on SCr of 1.34 mg/dL (H)).    Hemoglobin A1C   Date/Time Value Ref Range Status   12/17/2019 0215 8.30 (H) 4.80 - 5.60 % Final     Glucose   Date/Time Value Ref Range Status   12/18/2019 0753 284 (H) 70 - 130 mg/dL Final   12/17/2019 2057 378 (H) 70 - 130 mg/dL Final   12/17/2019 1652 352 (H) 70 - 130 mg/dL Final   12/17/2019 1140 354 (H) 70 - 130 mg/dL Final   12/17/2019 0750 225 (H) 70 - 130 mg/dL Final   12/17/2019 0145 177 (H) 70 - 130 mg/dL Final     Lab Results   Component Value Date    TSH 1.750 12/16/2019     Microbiology Results (last 10 days)     Procedure Component Value - Date/Time    Respiratory Panel, PCR - Swab, Nasopharynx [[id number]]  (Normal) Collected:  12/17/19 0147    Lab Status:  Final result Specimen:  Swab from Nasopharynx Updated:  12/17/19 0304     ADENOVIRUS, PCR Not Detected     Coronavirus 229E Not Detected     Coronavirus HKU1 Not Detected     Coronavirus NL63 Not Detected     Coronavirus OC43 Not Detected     Human Metapneumovirus Not Detected     Human Rhinovirus/Enterovirus Not Detected     Influenza B PCR Not Detected     Parainfluenza Virus 1 Not Detected     Parainfluenza Virus 2 Not Detected     Parainfluenza Virus 3 Not Detected      Parainfluenza Virus 4 Not Detected     Bordetella pertussis pcr Not Detected     Influenza A H1 2009 PCR Not Detected     Chlamydophila pneumoniae PCR Not Detected     Mycoplasma pneumo by PCR Not Detected     Influenza A PCR Not Detected     Influenza A H3 Not Detected     Influenza A H1 Not Detected     RSV, PCR Not Detected    Urine Culture - Urine, Urine, Clean Catch [[id number]]  (Abnormal)  (Susceptibility) Collected:  12/16/19 2048    Lab Status:  Preliminary result Specimen:  Urine, Clean Catch Updated:  12/18/19 0504     Urine Culture >100,000 CFU/mL Klebsiella pneumoniae ssp pneumoniae    Susceptibility      Klebsiella pneumoniae ssp pneumoniae     SOLA     Ampicillin Resistant     Ampicillin + Sulbactam Susceptible     Cefazolin Susceptible     Cefepime Susceptible     Ceftazidime Susceptible     Ceftriaxone Susceptible     Gentamicin Susceptible     Levofloxacin Susceptible     Nitrofurantoin Intermediate     Piperacillin + Tazobactam Susceptible     Tetracycline Susceptible     Trimethoprim + Sulfamethoxazole Susceptible                    Legionella Antigen, Urine - Urine, Urine, Clean Catch [[id number]]  (Normal) Collected:  12/16/19 2048    Lab Status:  Final result Specimen:  Urine, Clean Catch Updated:  12/17/19 0225     LEGIONELLA ANTIGEN, URINE Negative    Narrative:       Presumptive negative for L. pneumophilia serogroup 1 antigen, suggesting no recent or current infection.    Blood Culture - Blood, Arm, Right [[id number]] Collected:  12/16/19 1933    Lab Status:  Preliminary result Specimen:  Blood from Arm, Right Updated:  12/17/19 1946     Blood Culture No growth at 24 hours    Blood Culture - Blood, Arm, Left [[id number]] Collected:  12/16/19 1928    Lab Status:  Preliminary result Specimen:  Blood from Arm, Left Updated:  12/17/19 1946     Blood Culture No growth at 24 hours            I have personally reviewed the above laboratory results.    ----------------------------------------------------------------------------------------------------------------------  Imaging Results (Last 24 Hours)     Procedure Component Value Units Date/Time    US Venous Doppler Lower Extremity Bilateral (duplex) [[id number]] Collected:  12/17/19 1242     Updated:  12/17/19 1245    Narrative:       US VENOUS DOPPLER LOWER EXTREMITY BILATERAL (DUPLEX)-     REASON FOR EXAM:  LE edema; J44.9-Chronic obstructive pulmonary disease,  unspecified; I50.9-Heart failure, unspecified; R06.00-Dyspnea,  unspecified     Multiple real-time images were obtained. The deep veins were well  demonstrated sonographically. There is good color doppler signal seen  filling the deep veins. They were completely compressed by the  ultrasound transducer. There was good spontaneous venous flow and  augmentation. There are no echoes seen along the deep veins to suggest  clot.          Impression:       No sonographic findings of DVT in the lower extremities.     This report was finalized on 12/17/2019 12:43 PM by Dr. Brian Kohli II, MD.           I have personally reviewed the above radiology results.   ----------------------------------------------------------------------------------------------------------------------  Assessment/Plan     Active Hospital Problems    Diagnosis POA   • **COPD with acute exacerbation (CMS/[facility]) [J44.1] Yes   • Grade II diastolic dysfunction [I51.9] Yes   • Acute on chronic diastolic CHF (congestive heart failure) (CMS/[facility]) [I50.33] Yes   • Chronic kidney disease, stage III (moderate) (CMS/[facility]) [N18.3] Yes   • Essential hypertension [I10] Yes   • Hyperlipidemia [E78.5] Yes   • Type II diabetes mellitus (CMS/[facility]) [E11.9] Yes   • Arthritis [M19.90] Yes   • Osteoporosis [M81.0] Yes   • Metabolic alkalosis with respiratory acidosis [E87.4] Yes   • Respiratory failure with hypoxia (CMS/[facility]) [J96.91] Yes   • Acute UTI (urinary tract infection) [N39.0] Yes   • Iron deficiency  anemia [D50.9] Yes     · Acute on chronic diastolic CHF exacerbation, grade II diastolic dysfunction: Patient's dyspnea has improved, she still has rales on exam and lower extremity edema.  Lower extremity edema is significantly improved, discontinue Norvasc.  proBNP significantly elevated this morning.  We will give a dose of IV Bumex now.  Resume home spironolactone. Elevate extremities when sitting. ECHO reviewed with preserved EF and grade II diastolic dysfunction. EKG this AM with PACs and more pronounced ST and T wave non specific changes, these changes were seen previously but are now more pronounced. She has no chest pain, will monitor on tele and repeat EKG in AM. Repeat BNP in AM.   · Acute COPD exacerbation: Improved. Change IV antibiotics to PO Omnicef and Doxycycline to complete 7 day course. Cont supplemental O2 as necessary, wean as tolerated, only wears as needed at home. Ambulate patient as tolerated. Continue scheduled inhalants and inhaled corticosteroid. IV methylprednisolone transitioned to PO prednisone to complete 5 day course. Cont pulse oximetry. Incentive spirometry encouraged.   · Acute on chronic hypoxic respiratory failure: Likely combination of CHF/volume overload and COPD exacerbation. Cont treatment as outlined above. Monitor closely. Supplemental O2 as necessary to titrate SpO2 > 90%, wears 2 LPM NC as needed at home, daughter reports she rarely requires it.   · Acute, bacterial urinary tract infection: Preliminary urine culture with growth of Klebsiella pneumonia.  Patient previously on IV Rocephin, transition to Omnicef.  Also on doxycycline for COPD exacerbation.  Patient does have mild leukocytosis, however this is felt to be steroid-induced.  Patient is afebrile.  No further urinary symptoms.  Closely monitor, await culture finalization.  · Mild pulmonary hypertension and mild aortic stenosis: Noted on ECHO. Avoid hypotension. Monitor closely, follow with cardiology outpatient.    · Essential hypertension: BP above goal.  Discontinue Norvasc due to lower extremity edema.  Will increase p.o. hydralazine from twice daily to 3 times daily.  Continue Coreg and then home spironolactone.  Holding parameters on BP medications at prevent tension and/or bradycardia.  Closely monitor blood pressure possible protocol and titrate medications as necessary.  IV diuresis also on board.  · Hyperlipidemia: On fenofibrate at home, not on hospital formulary, continue at discharge.   · Insulin dependent type II diabetes mellitus: HgbA1C 8.30, above goal. BG remains above goal, likely due to corticosteroid administration. Increase SSI dosage today to 0-9. Cont Levemir. Closely monitor glucose levels with accuchecks QAC and QHS, titrate insulin tx as necessary.   · Stage III CKD:  Creatinine appears stable.  Closely monitor in setting of diuresis.  Avoid any further nephrotoxins as much as possible.  Monitor urine output.  Repeat chemistry panel in a.m.  · Iron deficiency anemia, chronic: Hemoglobin appears stable. Iron profile reviewed, ferritin elevated. Likely anemia of chronic disease in setting of CKD and other comorbidities. Continue to monitor H&H closely, transfuse if necessary. Repeat CBC in AM.   · Former smoker     --------------------------------------------------  DVT Prophylaxis: SQ heparin   GI Prophylaxis: Pepcid   FEN: No IV fluids. Replace electrolytes per protocol as necessary. Consistent carb diet.   Activity: Up with assistance as tolerated, ambulate per shift.  Home O2: 2 LPM NC PRN   Disposition: Home 24-48 hours, depending on how she tolerates change in BP medications and diuresis   --------------------------------------------------    The patient is high risk due to the following diagnoses/reasons:  CHF exacerbation, COPD exacerbation, respiratory failure, HTN, DM, CKD     I have discussed the patient's assessment and plan with the patient, patient's daughter at bedside, and Pilar ESPITIA  JOYCE. Case also discussed with attending Dr. Alexandra Lora DO.       Asya Dupree PA-C  [facility] -- [facility]   Pager: [phone]    12/18/19  10:07 AM    Attending Physician: Alexandra Lora DO    ----------------------------------------------------------------------------------------------------------------------

## 2019-12-18 NOTE — PLAN OF CARE
Problem: Patient Care Overview  Goal: Plan of Care Review  Outcome: Ongoing (interventions implemented as appropriate)  Flowsheets (Taken 12/18/2019 8017)  Progress: no change  Plan of Care Reviewed With: patient  Outcome Summary: pt states her breathing has improved. no changes overnight.

## 2019-12-18 NOTE — PLAN OF CARE
Problem: Patient Care Overview  Goal: Plan of Care Review  Outcome: Ongoing (interventions implemented as appropriate)   Patient doing better. Patients IV antibiotics and steroids have been changed to by mouth today.

## 2019-12-19 LAB
ANION GAP SERPL CALCULATED.3IONS-SCNC: 11.7 MMOL/L (ref 5–15)
APTT PPP: 26.1 SECONDS (ref 23.8–36.1)
APTT PPP: 42.9 SECONDS (ref 23.8–36.1)
BACTERIA SPEC AEROBE CULT: ABNORMAL
BACTERIA SPEC AEROBE CULT: ABNORMAL
BASOPHILS # BLD AUTO: 0.01 10*3/MM3 (ref 0–0.2)
BASOPHILS # BLD AUTO: 0.01 10*3/MM3 (ref 0–0.2)
BASOPHILS NFR BLD AUTO: 0.1 % (ref 0–1.5)
BASOPHILS NFR BLD AUTO: 0.1 % (ref 0–1.5)
BUN BLD-MCNC: 45 MG/DL (ref 8–23)
BUN/CREAT SERPL: 37.2 (ref 7–25)
CALCIUM SPEC-SCNC: 9.6 MG/DL (ref 8.6–10.5)
CHLORIDE SERPL-SCNC: 99 MMOL/L (ref 98–107)
CO2 SERPL-SCNC: 25.3 MMOL/L (ref 22–29)
CREAT BLD-MCNC: 1.21 MG/DL (ref 0.57–1)
DEPRECATED RDW RBC AUTO: 47.7 FL (ref 37–54)
DEPRECATED RDW RBC AUTO: 47.9 FL (ref 37–54)
EOSINOPHIL # BLD AUTO: 0 10*3/MM3 (ref 0–0.4)
EOSINOPHIL # BLD AUTO: 0.01 10*3/MM3 (ref 0–0.4)
EOSINOPHIL NFR BLD AUTO: 0 % (ref 0.3–6.2)
EOSINOPHIL NFR BLD AUTO: 0.1 % (ref 0.3–6.2)
ERYTHROCYTE [DISTWIDTH] IN BLOOD BY AUTOMATED COUNT: 14.9 % (ref 12.3–15.4)
ERYTHROCYTE [DISTWIDTH] IN BLOOD BY AUTOMATED COUNT: 15 % (ref 12.3–15.4)
GFR SERPL CREATININE-BSD FRML MDRD: 43 ML/MIN/1.73
GLUCOSE BLD-MCNC: 149 MG/DL (ref 65–99)
GLUCOSE BLDC GLUCOMTR-MCNC: 123 MG/DL (ref 70–130)
GLUCOSE BLDC GLUCOMTR-MCNC: 222 MG/DL (ref 70–130)
GLUCOSE BLDC GLUCOMTR-MCNC: 250 MG/DL (ref 70–130)
GLUCOSE BLDC GLUCOMTR-MCNC: 314 MG/DL (ref 70–130)
HCT VFR BLD AUTO: 32.7 % (ref 34–46.6)
HCT VFR BLD AUTO: 38.3 % (ref 34–46.6)
HGB BLD-MCNC: 10.2 G/DL (ref 12–15.9)
HGB BLD-MCNC: 12.1 G/DL (ref 12–15.9)
IMM GRANULOCYTES # BLD AUTO: 0.07 10*3/MM3 (ref 0–0.05)
IMM GRANULOCYTES # BLD AUTO: 0.08 10*3/MM3 (ref 0–0.05)
IMM GRANULOCYTES NFR BLD AUTO: 0.6 % (ref 0–0.5)
IMM GRANULOCYTES NFR BLD AUTO: 0.7 % (ref 0–0.5)
INR PPP: 0.93 (ref 0.9–1.1)
LYMPHOCYTES # BLD AUTO: 1.68 10*3/MM3 (ref 0.7–3.1)
LYMPHOCYTES # BLD AUTO: 2.4 10*3/MM3 (ref 0.7–3.1)
LYMPHOCYTES NFR BLD AUTO: 15.1 % (ref 19.6–45.3)
LYMPHOCYTES NFR BLD AUTO: 19.8 % (ref 19.6–45.3)
MAGNESIUM SERPL-MCNC: 2 MG/DL (ref 1.6–2.4)
MCH RBC QN AUTO: 27.3 PG (ref 26.6–33)
MCH RBC QN AUTO: 27.4 PG (ref 26.6–33)
MCHC RBC AUTO-ENTMCNC: 31.2 G/DL (ref 31.5–35.7)
MCHC RBC AUTO-ENTMCNC: 31.6 G/DL (ref 31.5–35.7)
MCV RBC AUTO: 86.8 FL (ref 79–97)
MCV RBC AUTO: 87.4 FL (ref 79–97)
MONOCYTES # BLD AUTO: 0.46 10*3/MM3 (ref 0.1–0.9)
MONOCYTES # BLD AUTO: 0.79 10*3/MM3 (ref 0.1–0.9)
MONOCYTES NFR BLD AUTO: 4.1 % (ref 5–12)
MONOCYTES NFR BLD AUTO: 6.5 % (ref 5–12)
NEUTROPHILS # BLD AUTO: 8.87 10*3/MM3 (ref 1.7–7)
NEUTROPHILS # BLD AUTO: 8.87 10*3/MM3 (ref 1.7–7)
NEUTROPHILS NFR BLD AUTO: 73 % (ref 42.7–76)
NEUTROPHILS NFR BLD AUTO: 79.9 % (ref 42.7–76)
NRBC BLD AUTO-RTO: 0 /100 WBC (ref 0–0.2)
NRBC BLD AUTO-RTO: 0 /100 WBC (ref 0–0.2)
NT-PROBNP SERPL-MCNC: 3867 PG/ML (ref 5–1800)
PLATELET # BLD AUTO: 349 10*3/MM3 (ref 140–450)
PLATELET # BLD AUTO: 432 10*3/MM3 (ref 140–450)
PMV BLD AUTO: 11.2 FL (ref 6–12)
PMV BLD AUTO: 11.6 FL (ref 6–12)
POTASSIUM BLD-SCNC: 4.6 MMOL/L (ref 3.5–5.2)
PROTHROMBIN TIME: 12.9 SECONDS (ref 11–15.4)
RBC # BLD AUTO: 3.74 10*6/MM3 (ref 3.77–5.28)
RBC # BLD AUTO: 4.41 10*6/MM3 (ref 3.77–5.28)
SODIUM BLD-SCNC: 136 MMOL/L (ref 136–145)
TROPONIN T SERPL-MCNC: 0.19 NG/ML (ref 0–0.03)
TROPONIN T SERPL-MCNC: 0.23 NG/ML (ref 0–0.03)
TROPONIN T SERPL-MCNC: 0.24 NG/ML (ref 0–0.03)
TROPONIN T SERPL-MCNC: 0.24 NG/ML (ref 0–0.03)
WBC NRBC COR # BLD: 11.11 10*3/MM3 (ref 3.4–10.8)
WBC NRBC COR # BLD: 12.14 10*3/MM3 (ref 3.4–10.8)

## 2019-12-19 PROCEDURE — 85610 PROTHROMBIN TIME: CPT | Performed by: NURSE PRACTITIONER

## 2019-12-19 PROCEDURE — 63710000001 PREDNISONE PER 1 MG: Performed by: PHYSICIAN ASSISTANT

## 2019-12-19 PROCEDURE — 84484 ASSAY OF TROPONIN QUANT: CPT | Performed by: INTERNAL MEDICINE

## 2019-12-19 PROCEDURE — 85025 COMPLETE CBC W/AUTO DIFF WBC: CPT | Performed by: NURSE PRACTITIONER

## 2019-12-19 PROCEDURE — 94799 UNLISTED PULMONARY SVC/PX: CPT

## 2019-12-19 PROCEDURE — 83880 ASSAY OF NATRIURETIC PEPTIDE: CPT | Performed by: PHYSICIAN ASSISTANT

## 2019-12-19 PROCEDURE — 80048 BASIC METABOLIC PNL TOTAL CA: CPT | Performed by: PHYSICIAN ASSISTANT

## 2019-12-19 PROCEDURE — 63710000001 INSULIN ASPART PER 5 UNITS: Performed by: PHYSICIAN ASSISTANT

## 2019-12-19 PROCEDURE — 84484 ASSAY OF TROPONIN QUANT: CPT | Performed by: NURSE PRACTITIONER

## 2019-12-19 PROCEDURE — 93010 ELECTROCARDIOGRAM REPORT: CPT | Performed by: INTERNAL MEDICINE

## 2019-12-19 PROCEDURE — 84484 ASSAY OF TROPONIN QUANT: CPT | Performed by: PHYSICIAN ASSISTANT

## 2019-12-19 PROCEDURE — 82962 GLUCOSE BLOOD TEST: CPT

## 2019-12-19 PROCEDURE — 83735 ASSAY OF MAGNESIUM: CPT | Performed by: PHYSICIAN ASSISTANT

## 2019-12-19 PROCEDURE — 99233 SBSQ HOSP IP/OBS HIGH 50: CPT | Performed by: PHYSICIAN ASSISTANT

## 2019-12-19 PROCEDURE — 85025 COMPLETE CBC W/AUTO DIFF WBC: CPT | Performed by: PHYSICIAN ASSISTANT

## 2019-12-19 PROCEDURE — 25010000002 HEPARIN (PORCINE) PER 1000 UNITS: Performed by: INTERNAL MEDICINE

## 2019-12-19 PROCEDURE — 85730 THROMBOPLASTIN TIME PARTIAL: CPT | Performed by: NURSE PRACTITIONER

## 2019-12-19 PROCEDURE — 93005 ELECTROCARDIOGRAM TRACING: CPT | Performed by: PHYSICIAN ASSISTANT

## 2019-12-19 PROCEDURE — 25010000002 HEPARIN (PORCINE) PER 1000 UNITS: Performed by: NURSE PRACTITIONER

## 2019-12-19 PROCEDURE — 85730 THROMBOPLASTIN TIME PARTIAL: CPT | Performed by: INTERNAL MEDICINE

## 2019-12-19 PROCEDURE — 99222 1ST HOSP IP/OBS MODERATE 55: CPT | Performed by: NURSE PRACTITIONER

## 2019-12-19 PROCEDURE — 63710000001 INSULIN DETEMIR PER 5 UNITS: Performed by: PHYSICIAN ASSISTANT

## 2019-12-19 RX ORDER — HEPARIN SODIUM 5000 [USP'U]/ML
30 INJECTION, SOLUTION INTRAVENOUS; SUBCUTANEOUS AS NEEDED
Status: DISCONTINUED | OUTPATIENT
Start: 2019-12-19 | End: 2019-12-20 | Stop reason: HOSPADM

## 2019-12-19 RX ORDER — HEPARIN SODIUM 5000 [USP'U]/ML
60 INJECTION, SOLUTION INTRAVENOUS; SUBCUTANEOUS AS NEEDED
Status: DISCONTINUED | OUTPATIENT
Start: 2019-12-19 | End: 2019-12-20 | Stop reason: HOSPADM

## 2019-12-19 RX ORDER — ASPIRIN 81 MG/1
81 TABLET ORAL DAILY
Status: DISCONTINUED | OUTPATIENT
Start: 2019-12-19 | End: 2019-12-20 | Stop reason: HOSPADM

## 2019-12-19 RX ORDER — ASPIRIN 81 MG/1
324 TABLET, CHEWABLE ORAL DAILY
Status: DISCONTINUED | OUTPATIENT
Start: 2019-12-19 | End: 2019-12-19

## 2019-12-19 RX ORDER — SODIUM CHLORIDE 9 MG/ML
75 INJECTION, SOLUTION INTRAVENOUS CONTINUOUS
Status: DISCONTINUED | OUTPATIENT
Start: 2019-12-19 | End: 2019-12-19

## 2019-12-19 RX ORDER — ATORVASTATIN CALCIUM 40 MG/1
40 TABLET, FILM COATED ORAL NIGHTLY
Status: DISCONTINUED | OUTPATIENT
Start: 2019-12-19 | End: 2019-12-20 | Stop reason: HOSPADM

## 2019-12-19 RX ORDER — HEPARIN SODIUM 10000 [USP'U]/100ML
12 INJECTION, SOLUTION INTRAVENOUS
Status: DISCONTINUED | OUTPATIENT
Start: 2019-12-19 | End: 2019-12-20 | Stop reason: HOSPADM

## 2019-12-19 RX ORDER — HEPARIN SODIUM 5000 [USP'U]/ML
60 INJECTION, SOLUTION INTRAVENOUS; SUBCUTANEOUS ONCE
Status: COMPLETED | OUTPATIENT
Start: 2019-12-19 | End: 2019-12-19

## 2019-12-19 RX ADMIN — INSULIN ASPART 4 UNITS: 100 INJECTION, SOLUTION INTRAVENOUS; SUBCUTANEOUS at 11:44

## 2019-12-19 RX ADMIN — PREDNISONE 20 MG: 20 TABLET ORAL at 09:05

## 2019-12-19 RX ADMIN — IPRATROPIUM BROMIDE AND ALBUTEROL SULFATE 3 ML: 2.5; .5 SOLUTION RESPIRATORY (INHALATION) at 19:33

## 2019-12-19 RX ADMIN — HYDRALAZINE HYDROCHLORIDE 25 MG: 25 TABLET ORAL at 22:05

## 2019-12-19 RX ADMIN — ARFORMOTEROL TARTRATE 15 MCG: 15 SOLUTION RESPIRATORY (INHALATION) at 11:19

## 2019-12-19 RX ADMIN — CEFDINIR 300 MG: 300 CAPSULE ORAL at 22:05

## 2019-12-19 RX ADMIN — SPIRONOLACTONE 25 MG: 25 TABLET ORAL at 09:05

## 2019-12-19 RX ADMIN — IPRATROPIUM BROMIDE AND ALBUTEROL SULFATE 3 ML: 2.5; .5 SOLUTION RESPIRATORY (INHALATION) at 22:59

## 2019-12-19 RX ADMIN — HYDRALAZINE HYDROCHLORIDE 25 MG: 25 TABLET ORAL at 09:06

## 2019-12-19 RX ADMIN — INSULIN ASPART 7 UNITS: 100 INJECTION, SOLUTION INTRAVENOUS; SUBCUTANEOUS at 17:18

## 2019-12-19 RX ADMIN — SODIUM CHLORIDE, PRESERVATIVE FREE 10 ML: 5 INJECTION INTRAVENOUS at 22:14

## 2019-12-19 RX ADMIN — DOXYCYCLINE 100 MG: 100 CAPSULE ORAL at 11:44

## 2019-12-19 RX ADMIN — ATORVASTATIN CALCIUM 40 MG: 40 TABLET, FILM COATED ORAL at 22:05

## 2019-12-19 RX ADMIN — BUDESONIDE 0.25 MG: 0.25 SUSPENSION RESPIRATORY (INHALATION) at 11:19

## 2019-12-19 RX ADMIN — IPRATROPIUM BROMIDE AND ALBUTEROL SULFATE 3 ML: 2.5; .5 SOLUTION RESPIRATORY (INHALATION) at 13:47

## 2019-12-19 RX ADMIN — HEPARIN SODIUM 2200 UNITS: 5000 INJECTION INTRAVENOUS; SUBCUTANEOUS at 23:54

## 2019-12-19 RX ADMIN — CETIRIZINE HYDROCHLORIDE 5 MG: 10 TABLET, FILM COATED ORAL at 09:06

## 2019-12-19 RX ADMIN — INSULIN ASPART 6 UNITS: 100 INJECTION, SOLUTION INTRAVENOUS; SUBCUTANEOUS at 22:14

## 2019-12-19 RX ADMIN — DOXYCYCLINE 100 MG: 100 CAPSULE ORAL at 22:05

## 2019-12-19 RX ADMIN — HEPARIN SODIUM 5000 UNITS: 5000 INJECTION INTRAVENOUS; SUBCUTANEOUS at 09:06

## 2019-12-19 RX ADMIN — CARVEDILOL 25 MG: 25 TABLET, FILM COATED ORAL at 09:06

## 2019-12-19 RX ADMIN — SODIUM CHLORIDE, PRESERVATIVE FREE 10 ML: 5 INJECTION INTRAVENOUS at 09:06

## 2019-12-19 RX ADMIN — CEFDINIR 300 MG: 300 CAPSULE ORAL at 09:05

## 2019-12-19 RX ADMIN — INSULIN DETEMIR 40 UNITS: 100 INJECTION, SOLUTION SUBCUTANEOUS at 22:15

## 2019-12-19 RX ADMIN — IPRATROPIUM BROMIDE AND ALBUTEROL SULFATE 3 ML: 2.5; .5 SOLUTION RESPIRATORY (INHALATION) at 02:25

## 2019-12-19 RX ADMIN — ACETAMINOPHEN, GUAIFENESIN AND PHENYLEPHRINE HCL 20 MG: 325; 200; 5 TABLET, FILM COATED ORAL at 09:06

## 2019-12-19 RX ADMIN — IPRATROPIUM BROMIDE AND ALBUTEROL SULFATE 3 ML: 2.5; .5 SOLUTION RESPIRATORY (INHALATION) at 08:17

## 2019-12-19 RX ADMIN — FERROUS SULFATE TAB 325 MG (65 MG ELEMENTAL FE) 325 MG: 325 (65 FE) TAB at 09:06

## 2019-12-19 RX ADMIN — HEPARIN SODIUM 4500 UNITS: 5000 INJECTION INTRAVENOUS; SUBCUTANEOUS at 16:58

## 2019-12-19 RX ADMIN — ASPIRIN 324 MG: 81 TABLET, CHEWABLE ORAL at 09:06

## 2019-12-19 RX ADMIN — Medication 500 MG: at 09:06

## 2019-12-19 RX ADMIN — HYDRALAZINE HYDROCHLORIDE 25 MG: 25 TABLET ORAL at 17:18

## 2019-12-19 RX ADMIN — CARVEDILOL 25 MG: 25 TABLET, FILM COATED ORAL at 17:18

## 2019-12-19 RX ADMIN — HEPARIN SODIUM 12 UNITS/KG/HR: 10000 INJECTION, SOLUTION INTRAVENOUS at 16:58

## 2019-12-19 RX ADMIN — FERROUS SULFATE TAB 325 MG (65 MG ELEMENTAL FE) 325 MG: 325 (65 FE) TAB at 22:05

## 2019-12-19 NOTE — PLAN OF CARE
Problem: Patient Care Overview  Goal: Plan of Care Review  12/19/2019 0225 by Ligia Meyer RN  Outcome: Ongoing (interventions implemented as appropriate)  Flowsheets  Taken 12/19/2019 0105 by Ligia Meyer RN  Progress: no change  Taken 12/18/2019 2100 by Ligia Meyer RN  Plan of Care Reviewed With: patient  Taken 12/18/2019 0444 by Caroline Bullard RN  Outcome Summary: pt states her breathing has improved. no changes overnight.  12/19/2019 0105 by Ligia Meyer RN  Outcome: Ongoing (interventions implemented as appropriate)  Flowsheets  Taken 12/19/2019 0105  Progress: no change  Taken 12/18/2019 2100  Plan of Care Reviewed With: patient  Goal: Individualization and Mutuality  Outcome: Ongoing (interventions implemented as appropriate)     Problem: Fall Risk (Adult)  Goal: Identify Related Risk Factors and Signs and Symptoms  Outcome: Ongoing (interventions implemented as appropriate)  Flowsheets  Taken 12/16/2019 2330 by Ge North RN  Related Risk Factors (Fall Risk): age-related changes;environment unfamiliar;gait/mobility problems  Taken 12/18/2019 0444 by Caroline Bullard RN  Signs and Symptoms (Fall Risk): presence of risk factors  Goal: Absence of Fall  Outcome: Ongoing (interventions implemented as appropriate)  Flowsheets (Taken 12/18/2019 0444 by Caroline Bullard RN)  Absence of Fall: making progress toward outcome     Problem: Skin Injury Risk (Adult)  Goal: Identify Related Risk Factors and Signs and Symptoms  Outcome: Ongoing (interventions implemented as appropriate)  Flowsheets (Taken 12/16/2019 2330 by Ge North RN)  Related Risk Factors (Skin Injury Risk): advanced age;body weight extremes;mobility impaired     Problem: Chronic Obstructive Pulmonary Disease (Adult)  Goal: Signs and Symptoms of Listed Potential Problems Will be Absent, Minimized or Managed (Chronic Obstructive Pulmonary Disease)  Outcome: Ongoing (interventions  implemented as appropriate)  Flowsheets (Taken 12/18/2019 0444 by Caroline Bullard RN)  Problems Assessed (Chronic Obstructive Pulmonary Disease (COPD)): respiratory compromise  Problems Present (COPD, Bronch/Emphy): respiratory compromise

## 2019-12-19 NOTE — PROGRESS NOTES
[facility] Medicine Services  PROGRESS NOTE     Patient Identification:  Name:  Albina Finley  Age:  74 y.o.  Sex:  female  :  1945  MRN:  [id number]  Visit Number:  [id number]  Primary Care Provider:  Jim Hernandez MD    Length of stay:  2    ----------------------------------------------------------------------------------------------------------------------  Subjective     Chief Complaint:  Follow up for CHF exacerbation, COPD exacerbation, hypoxic respiratory failure     History of Presenting Illness/Hospital Course:  Patient is a 74-year-old female with past medical history significant for diastolic CHF, COPD, essential hypertension, hyperlipidemia, insulin-dependent type 2 diabetes mellitus, stage III CKD, iron deficiency anemia, and former tobacco abuse that was admitted on 2019 for acute COPD exacerbation, acute hypoxic respiratory failure, and acute diastolic CHF exacerbation.    Subjective:  Today, the patient was sitting up on edge of bed per my evaluation this morning. Patient reports feeling much better, very eager to go home. This AM troponin T elevated at 0.190 and repeat at 0.237. EKG changes noted yesterday have improved. She is without chest pain. Has been weaned off supplemental O2. Lower extremity edema improved, Bumex given yesterday and BP medications adjusted with the discontinuation of home Norvasc. She denies any complaints at this time other than wanting to go home, no family present at bedside this AM. She denies any palpitations or chest pressure. No fevers or chills, no cough. Denies any abdominal pain, nausea, vomiting, or diarrhea. No urinary symptoms reported.     Present during exam: N/A   ----------------------------------------------------------------------------------------------------------------------  Objective     Consults:  · Cardiology     Procedures:  · 2019: Transthoracic echocardiogram   · Left ventricular  systolic function is normal. Estimated EF appears to be in the range of 61 - 65%.  · Left ventricular diastolic dysfunction (grade II) consistent with pseudonormalization.  · Mild aortic valve stenosis is present.  · Mild pulmonary hypertension is present.  · There is no evidence of pericardial effusion.  · No changes compared to previous study    Current Hospital Meds:    arformoterol 15 mcg Nebulization BID - RT   aspirin 324 mg Oral Daily   budesonide 0.25 mg Nebulization Daily - RT   calcium carbonate (oyster shell) 500 mg Oral Daily   carvedilol 25 mg Oral BID With Meals   cefdinir 300 mg Oral Q12H   cetirizine 5 mg Oral Daily   doxycycline 100 mg Oral Q12H   famotidine 20 mg Oral BID AC   ferrous sulfate 325 mg Oral BID   heparin (porcine) 60 Units/kg Intravenous Once   hydrALAZINE 25 mg Oral TID   insulin aspart 0-9 Units Subcutaneous 4x Daily AC & at Bedtime   insulin detemir 40 Units Subcutaneous Nightly   ipratropium-albuterol 3 mL Nebulization Q4H - RT   predniSONE 20 mg Oral Daily   sodium chloride 10 mL Intravenous Q12H   spironolactone 25 mg Oral Daily       heparin (porcine) 12 Units/kg/hr   sodium chloride 75 mL/hr     ----------------------------------------------------------------------------------------------------------------------  Vital Signs:  Temp:  [97.6 °F (36.4 °C)-98 °F (36.7 °C)] 97.6 °F (36.4 °C)  Heart Rate:  [] 100  Resp:  [18-20] 20  BP: (117-172)/(52-70) 169/63    SpO2 Percentage    12/19/19 1103 12/19/19 1119 12/19/19 1347   SpO2: 92% 93% 98%     SpO2:  [92 %-99 %] 98 %  on  Flow (L/min):  [2] 2;   Device (Oxygen Therapy): room air    Body mass index is 28.32 kg/m².  Wt Readings from Last 3 Encounters:   12/19/19 74.8 kg (165 lb)   03/15/19 72.2 kg (159 lb 1.6 oz)   02/14/19 74.1 kg (163 lb 7 oz)        Intake/Output Summary (Last 24 hours) at 12/19/2019 1533  Last data filed at 12/19/2019 1200  Gross per 24 hour   Intake 1320 ml   Output --   Net 1320 ml     NPO Diet  Diet  Regular; Consistent Carbohydrate, Cardiac  ----------------------------------------------------------------------------------------------------------------------  Physical exam:  Physical Exam   Constitutional: She is oriented to person, place, and time. She appears well-developed and well-nourished.  Non-toxic appearance. She appears ill (Chronically ). No distress. Nasal cannula in place.   Pleasant, elderly, sitting up in bedside chair in no acute distress.     HENT:   Head: Normocephalic and atraumatic.   Mouth/Throat: Mucous membranes are normal.   Eyes: Pupils are equal, round, and reactive to light. Conjunctivae are normal. No scleral icterus.   Cardiovascular: Normal rate, regular rhythm, normal heart sounds and intact distal pulses. Exam reveals no gallop and no friction rub.   No murmur heard.  Pulses:       Dorsalis pedis pulses are 2+ on the right side, and 2+ on the left side.        Posterior tibial pulses are 2+ on the right side, and 2+ on the left side.   Pulmonary/Chest: Effort normal. No accessory muscle usage. No tachypnea. No respiratory distress. She has no decreased breath sounds. She has no wheezes. She has no rhonchi. She has rales (Significantly improved ) in the right lower field and the left lower field. She exhibits no tenderness.   Abdominal: Soft. Bowel sounds are normal. She exhibits no distension. There is no tenderness. There is no rebound and no guarding.   Genitourinary:   Genitourinary Comments: No martin catheter in place, making urine.    Musculoskeletal: She exhibits no tenderness or deformity.        Right lower leg: She exhibits edema (1+).        Left lower leg: She exhibits edema (1+).     Vascular Status -  Her right foot exhibits abnormal foot edema. Her right foot exhibits normal foot vasculature . Her left foot exhibits abnormal foot edema. Her left foot exhibits normal foot vasculature .  Neurological: She is alert and oriented to person, place, and time. She has  normal strength. No cranial nerve deficit or sensory deficit. GCS eye subscore is 4. GCS verbal subscore is 5. GCS motor subscore is 6.   Awake and alert.  Follows commands.  Answers questions appropriately.  No focal neurological deficits on exam.  Moves all extremities equally.  Ambulates well.   Skin: Skin is warm and dry. Capillary refill takes less than 2 seconds. No rash noted. No erythema. No pallor.   Psychiatric: She has a normal mood and affect. Her speech is normal and behavior is normal. Judgment and thought content normal. Cognition and memory are normal.   Nursing note and vitals reviewed.     ----------------------------------------------------------------------------------------------------------------------  Tele:    Sinus 60s, with PACs     I have personally reviewed the EKG/Telemetry strips   ----------------------------------------------------------------------------------------------------------------------  Results from last 7 days   Lab Units 12/19/19  1031 12/19/19  0349 12/16/19  1921   CK TOTAL U/L  --   --  27   TROPONIN T ng/mL 0.237* 0.190* <0.010     Results from last 7 days   Lab Units 12/19/19  0349 12/18/19  0448 12/16/19  1921   PROBNP pg/mL 3,867.0* 4,986.0* 1,078.0       Results from last 7 days   Lab Units 12/16/19  2201   PH, ARTERIAL pH units 7.404   PO2 ART mm Hg 54.9*   PCO2, ARTERIAL mm Hg 46.4*   HCO3 ART mmol/L 29.0*     Results from last 7 days   Lab Units 12/19/19 0349 12/18/19 0448 12/17/19 0215 12/16/19 1921   CRP mg/dL  --   --  1.56* 1.82*   LACTATE mmol/L  --   --  1.2 1.2   WBC 10*3/mm3 12.14* 13.36* 7.22 8.46   HEMOGLOBIN g/dL 10.2* 10.6* 10.5* 10.4*   HEMATOCRIT % 32.7* 35.0 34.0 34.3   MCV fL 87.4 90.4 88.1 88.9   MCHC g/dL 31.2* 30.3* 30.9* 30.3*   PLATELETS 10*3/mm3 349 390 364 373     Results from last 7 days   Lab Units 12/19/19 0349 12/18/19 0448 12/17/19 0215 12/16/19 1921   SODIUM mmol/L 136 136 141 139   POTASSIUM mmol/L 4.6 4.9 4.4 5.0    MAGNESIUM mg/dL 2.0 2.0  --  2.0   CHLORIDE mmol/L 99 99 100 100   CO2 mmol/L 25.3 23.6 26.4 26.0   BUN mg/dL 45* 41* 27* 34*   CREATININE mg/dL 1.21* 1.34* 1.37* 1.57*   EGFR IF NONAFRICN AM mL/min/1.73 43* 39* 38* 32*   CALCIUM mg/dL 9.6 9.6 9.7 9.5   GLUCOSE mg/dL 149* 284* 190* 221*   ALBUMIN g/dL  --   --  4.13 3.98   BILIRUBIN mg/dL  --   --  0.2 <0.2*   ALK PHOS U/L  --   --  35* 35*   AST (SGOT) U/L  --   --  11 10   ALT (SGPT) U/L  --   --  8 9   Estimated Creatinine Clearance: 39.8 mL/min (A) (by C-G formula based on SCr of 1.21 mg/dL (H)).    Hemoglobin A1C   Date/Time Value Ref Range Status   12/17/2019 0215 8.30 (H) 4.80 - 5.60 % Final     Glucose   Date/Time Value Ref Range Status   12/19/2019 1056 222 (H) 70 - 130 mg/dL Final   12/19/2019 0700 123 70 - 130 mg/dL Final   12/18/2019 2121 324 (H) 70 - 130 mg/dL Final   12/18/2019 1618 309 (H) 70 - 130 mg/dL Final   12/18/2019 1118 260 (H) 70 - 130 mg/dL Final   12/18/2019 0753 284 (H) 70 - 130 mg/dL Final   12/17/2019 2057 378 (H) 70 - 130 mg/dL Final   12/17/2019 1652 352 (H) 70 - 130 mg/dL Final     Lab Results   Component Value Date    TSH 1.750 12/16/2019     Microbiology Results (last 10 days)     Procedure Component Value - Date/Time    Respiratory Panel, PCR - Swab, Nasopharynx [[id number]]  (Normal) Collected:  12/17/19 0147    Lab Status:  Final result Specimen:  Swab from Nasopharynx Updated:  12/17/19 0304     ADENOVIRUS, PCR Not Detected     Coronavirus 229E Not Detected     Coronavirus HKU1 Not Detected     Coronavirus NL63 Not Detected     Coronavirus OC43 Not Detected     Human Metapneumovirus Not Detected     Human Rhinovirus/Enterovirus Not Detected     Influenza B PCR Not Detected     Parainfluenza Virus 1 Not Detected     Parainfluenza Virus 2 Not Detected     Parainfluenza Virus 3 Not Detected     Parainfluenza Virus 4 Not Detected     Bordetella pertussis pcr Not Detected     Influenza A H1 2009 PCR Not Detected     Chlamydophila  pneumoniae PCR Not Detected     Mycoplasma pneumo by PCR Not Detected     Influenza A PCR Not Detected     Influenza A H3 Not Detected     Influenza A H1 Not Detected     RSV, PCR Not Detected    Urine Culture - Urine, Urine, Clean Catch [[id number]]  (Abnormal)  (Susceptibility) Collected:  12/16/19 2048    Lab Status:  Final result Specimen:  Urine, Clean Catch Updated:  12/19/19 1053     Urine Culture >100,000 CFU/mL Klebsiella pneumoniae ssp pneumoniae      >100,000 CFU/mL Escherichia coli     Comment: Appended report. These results have been appended to a previously final verified report.       Susceptibility      Klebsiella pneumoniae ssp pneumoniae     SOLA     Ampicillin Resistant     Ampicillin + Sulbactam Susceptible     Cefazolin Susceptible     Cefepime Susceptible     Ceftazidime Susceptible     Ceftriaxone Susceptible     Gentamicin Susceptible     Levofloxacin Susceptible     Nitrofurantoin Intermediate     Piperacillin + Tazobactam Susceptible     Tetracycline Susceptible     Trimethoprim + Sulfamethoxazole Susceptible                Susceptibility      Escherichia coli     SOLA     Ampicillin Resistant     Ampicillin + Sulbactam Resistant     Cefazolin Susceptible     Cefepime Susceptible     Ceftazidime Susceptible     Ceftriaxone Susceptible     Gentamicin Susceptible     Levofloxacin Susceptible     Nitrofurantoin Susceptible     Piperacillin + Tazobactam Susceptible     Tetracycline Susceptible     Trimethoprim + Sulfamethoxazole Resistant                    Legionella Antigen, Urine - Urine, Urine, Clean Catch [[id number]]  (Normal) Collected:  12/16/19 2048    Lab Status:  Final result Specimen:  Urine, Clean Catch Updated:  12/17/19 0225     LEGIONELLA ANTIGEN, URINE Negative    Narrative:       Presumptive negative for L. pneumophilia serogroup 1 antigen, suggesting no recent or current infection.    Blood Culture - Blood, Arm, Right [[id number]] Collected:  12/16/19 1933    Lab Status:   Preliminary result Specimen:  Blood from Arm, Right Updated:  12/18/19 1945     Blood Culture No growth at 2 days    Blood Culture - Blood, Arm, Left [[id number]] Collected:  12/16/19 1928    Lab Status:  Preliminary result Specimen:  Blood from Arm, Left Updated:  12/18/19 1945     Blood Culture No growth at 2 days            I have personally reviewed the above laboratory results.   ----------------------------------------------------------------------------------------------------------------------  Imaging Results (Last 24 Hours)     ** No results found for the last 24 hours. **        I have personally reviewed the above radiology results.   ----------------------------------------------------------------------------------------------------------------------  Assessment/Plan     Active Hospital Problems    Diagnosis POA   • **COPD with acute exacerbation (CMS/[facility]) [J44.1] Yes   • Grade II diastolic dysfunction [I51.9] Yes   • Acute on chronic diastolic CHF (congestive heart failure) (CMS/[facility]) [I50.33] Yes   • Chronic kidney disease, stage III (moderate) (CMS/[facility]) [N18.3] Yes   • Essential hypertension [I10] Yes   • Hyperlipidemia [E78.5] Yes   • Type II diabetes mellitus (CMS/[facility]) [E11.9] Yes   • Arthritis [M19.90] Yes   • Osteoporosis [M81.0] Yes   • Metabolic alkalosis with respiratory acidosis [E87.4] Yes   • Respiratory failure with hypoxia (CMS/HCC) [J96.91] Yes   • Acute UTI (urinary tract infection) [N39.0] Yes   • Iron deficiency anemia [D50.9] Yes     · Acute on chronic diastolic CHF exacerbation, grade II diastolic dysfunction: Bumex given yesterday, ProBNP improved. Dyspnea resolved, edema improved. No further diuresis today. ECHO with preserved EF and grade II diastolic dysfunction. Continue strict Is and Os, daily weights. Repeat BNP in AM, monitor renal function closely.   · NSTEMI: Patient with some more prominent EKG changes noted yesterday, repeat this AM is actually improved. Troponin T  previously normal with elevated troponin this AM and slightly climbing. Due to risk factors (Age, DM, female, COPD, hx of tobacco abuse, HLD, HTN), cardiology consult placed. Patient is chest pain free. Cont tele monitoring, serial EKG and troponin. Per cardiology, patient to be started on heparin gtt and NPO after MN for heart cath in AM, if patient agreeable. She is going to discuss with daughter.   · Acute COPD exacerbation: Improved. Continue course of PO Omnicef and Doxycycline to complete 7 day course. Cont supplemental O2 as necessary, only wears home O2 rarely at home. Titrated off this AM. No hypoxia on RA or with ambulation. Cont course of PO prednisone to complete 5 day course. Cont pulse oximetry. Incentive spirometry encouraged.   · Acute on chronic hypoxic respiratory failure: Likely combination of CHF/volume overload and COPD exacerbation. Cont treatment as outlined above. Monitor closely. Supplemental O2 as necessary to titrate SpO2 > 90%, wears 2 LPM NC as needed at home, daughter reports she rarely requires it.   · Acute, bacterial urinary tract infection: Final urine culture with growth of Klebsiella pneumonia and Escherichia coli.  Continue course of PO Omnicef to complete 7 day course. Leukocytosis resolved, afebrile. No further urinary symptoms reported.   · Mild pulmonary hypertension and mild aortic stenosis: Noted on ECHO. Avoid hypotension. Monitor closely, follow with cardiology outpatient.   · Essential hypertension: BP remains elevated. Norvasc previously discontinued d/t worsening lower extremity edema. Continue hydralazine PO TID, increased from home dose BID. Continue Coreg and Spironolactone. Holding parameters on BP medications at prevent tension and/or bradycardia.  Closely monitor blood pressure possible protocol and titrate medications as necessary.    · Hyperlipidemia: On fenofibrate at home, not on hospital formulary, continue at discharge.   · Insulin dependent type II  diabetes mellitus: HgbA1C 8.30, above goal. Glucose with improved control. Continue SSI and Levemir. Closely monitor glucose levels with accuchecks QAC and QHS, titrate insulin tx as necessary.   · Stage III CKD:  Creatinine appears stable and actually improved. Avoid any further nephrotoxins as much as possible.  Monitor urine output.  Repeat chemistry panel in a.m.  · Iron deficiency anemia, chronic: Hemoglobin appears stable. Iron profile reviewed, ferritin elevated. Likely anemia of chronic disease in setting of CKD and other comorbidities. Continue to monitor H&H closely, transfuse if necessary. Repeat CBC in AM.   · Former smoker     --------------------------------------------------  DVT Prophylaxis: SQ heparin   GI Prophylaxis: Pepcid   FEN: Replace electrolytes per protocol as necessary. Consistent carb diet/Cardiac. NPO after MN.  Activity: Up with assistance as tolerated, ambulate per shift.  Home O2: 2 LPM NC PRN   Disposition: Remains hospitalized for heart cath in AM.   --------------------------------------------------    The patient is high risk due to the following diagnoses/reasons:  CHF exacerbation, COPD exacerbation, respiratory failure, HTN, DM, CKD     I have discussed the patient's assessment and plan with the patient, patient's daughter at bedside, and Doris ESPITIA RN. Case also discussed with attending Dr. Alexandra Lora DO.       Asya Dupree PA-C  Hospitalist Service -- [facility]   Pager: [phone]    12/19/19  3:33 PM    Attending Physician: Alexandra Lora DO    ----------------------------------------------------------------------------------------------------------------------

## 2019-12-19 NOTE — CONSULTS
Inpatient Cardiology Consult  Consult performed by: Tremaine Stuart MD  Consult ordered by: Asya Dupree PA        Date of Admit: 12/16/2019  Date of Consult: 12/19/19  No ref. provider found  Albina Finley  1945    Consulting Physician: Tremaine Stuart MD    Cardiology consultation    Reason for consultation:  Elevated troponin    Assessment:  1. NSTEMI  2. Acute on chronic diastolic heart failure  3. Chronic kidney failure, stage III  4. Essential hypertension  5. Hyperlipidemia  6. Insulin requiring type II diabetes       Recommendations:  1. Rising troponin in the setting of multiple risk factors for CAD including diabetes, hypertension, and dyslipidemia with no clear alternative reason for troponin elevation. But pt absolutely denies chest pain now or before and her breathing has improved with rx.  Since troponin is now had slight downward trend and no clinical signs of ACS will plan on nuclear stress in am before proceeding with invasive evaluatoin.   2. Start heparin drip which may be discontinued if next troponin has decreased.    3. Pt is on ASA, BB.  Statin has been ordered.        History of Present Illness    Subjective     Chief Complaint   Patient presents with   • Shortness of Breath       Albina Finley is a 74 y.o. female with past medical history significant for diastolic heart failure, essential hypertension, dyslipidemia, chronic kidney disease, stage III, insulin requiring type 2 diabetes, arthritis, and osteoporosis.  She presented to the ED on 12/17/2019 with report of shortness of breath.  She was admitted with acute hypoxic respiratory secondary to acute COPD exacerbation and acute CHF.  At admission troponin was negative, however, repeat troponin yesterday evening was positive and troponins have been rising.  Cardiology was consulted.      Patient was seen and examined.  Her daughter was at bedside.  Pt denies CP and reports improvement in shortness of breath.  She  denies past medical history of CAD and does not think she ever had catheterization.        Cardiac risk factors:diabetes mellitus, hypercholesterolemia, hypertension, Sedentary life style and Obesity    Last Echo: 2019    Interpretation Summary     · Left ventricular systolic function is normal. Estimated EF appears to be in the range of 61 - 65%.  · Left ventricular diastolic dysfunction (grade II) consistent with pseudonormalization.  · Mild aortic valve stenosis is present.  · Mild pulmonary hypertension is present.  · There is no evidence of pericardial effusion.  · No changes compared to previous study         Last Stress:     Last Cath:      Past Medical History:   Diagnosis Date   • Acute on chronic diastolic CHF (congestive heart failure) (CMS/HCC) 2019   • Anal polyp 2018    Added automatically from request for surgery [id number]   • Arthritis    • Chronic kidney disease    • COPD (chronic obstructive pulmonary disease) (CMS/[facility])    • Diabetes mellitus (CMS/[facility])    • Elevated cholesterol    • Essential hypertension 2019   • History of transfusion    • Incidental lung nodule, greater than or equal to 8mm     Left lower lobe, 15 mm based on  and 2019 chest CT scans with no growth in the size   • Iron deficiency anemia 2017   • Osteoporosis    • Pneumonia of right middle lobe due to infectious organism (CMS/HCC) 2019     Past Surgical History:   Procedure Laterality Date   • ANUS SURGERY N/A 2018    Procedure: Diagnostic anoscopy with anal polyp excision;  Surgeon: Marlyn Gutierrez MD;  Location: [facility];  Service: General   •  SECTION     • HIP BIPOLAR REPLACEMENT      left Dr. Cornejo    • HYSTERECTOMY       Family History   Problem Relation Age of Onset   • Heart disease Mother    • COPD Mother    • Arthritis Mother    • Diabetes Father      Social History     Tobacco Use   • Smoking status: Former Smoker     Years: 15.00     Types: Cigarettes   • Smokeless  tobacco: Never Used   • Tobacco comment: quit 15 years ago    Substance Use Topics   • Alcohol use: No   • Drug use: No     Medications Prior to Admission   Medication Sig Dispense Refill Last Dose   • amLODIPine (NORVASC) 5 MG tablet Take 5 mg by mouth Daily.   12/16/2019 at am   • bumetanide (BUMEX) 1 MG tablet Take 1 mg by mouth Daily. PTA: Pt takes 2 tablets if her weight is above 152lb   12/16/2019 at am   • calcium carbonate (OS-TIANNA) 600 MG tablet Take 600 mg by mouth Daily.   12/16/2019 at am   • carvedilol (COREG) 25 MG tablet Take 25 mg by mouth 2 (Two) Times a Day With Meals.   12/16/2019 at am   • cetirizine (zyrTEC) 10 MG tablet Take 10 mg by mouth Daily.   12/16/2019 at am   • cyanocobalamin 1000 MCG/ML injection Inject 1,000 mcg into the appropriate muscle as directed by prescriber Every 28 (Twenty-Eight) Days.   12/16/2019 at am   • fenofibrate 160 MG tablet Take 160 mg by mouth Daily.   12/16/2019 at am   • ferrous sulfate 325 (65 FE) MG tablet Take 325 mg by mouth 2 (Two) Times a Day.   12/16/2019 at am   • hydrALAZINE (APRESOLINE) 25 MG tablet Take 25 mg by mouth 2 (Two) Times a Day.   12/16/2019 at am   • insulin detemir (LEVEMIR) 100 UNIT/ML injection Inject 80 Units under the skin into the appropriate area as directed Daily.   12/16/2019 at am   • insulin detemir (LEVEMIR) 100 UNIT/ML injection Inject 40 Units under the skin into the appropriate area as directed Every Night.   12/15/2019 at pm   • loperamide (IMODIUM) 2 MG capsule Take 2 mg by mouth Daily.   12/16/2019 at am   • metFORMIN (GLUCOPHAGE) 500 MG tablet Take 500 mg by mouth 2 (Two) Times a Day With Meals.   12/16/2019 at am   • raNITIdine (ZANTAC) 150 MG tablet Take 150 mg by mouth 2 (Two) Times a Day.   12/16/2019 at am   • spironolactone (ALDACTONE) 25 MG tablet Take 25 mg by mouth Daily.   12/16/2019 at am     Allergies:  Patient has no known allergies.    Review of Systems   Constitutional: Negative for fatigue.   Respiratory:  Positive for shortness of breath.    Cardiovascular: Negative for chest pain, palpitations and leg swelling.   Gastrointestinal: Negative for blood in stool.   Neurological: Negative for dizziness, syncope, weakness and light-headedness.   Hematological: Does not bruise/bleed easily.   All other systems reviewed and are negative.        Objective      Vital Signs  Temp:  [97.6 °F (36.4 °C)-98 °F (36.7 °C)] 97.6 °F (36.4 °C)  Heart Rate:  [] 100  Resp:  [18-20] 20  BP: (117-172)/(52-70) 169/63  Body mass index is 28.32 kg/m².    Intake/Output Summary (Last 24 hours) at 12/19/2019 1512  Last data filed at 12/19/2019 1200  Gross per 24 hour   Intake 1320 ml   Output --   Net 1320 ml       Physical Exam   Constitutional: She is oriented to person, place, and time. She appears well-developed and well-nourished.   HENT:   Head: Normocephalic and atraumatic.   Eyes: Pupils are equal, round, and reactive to light.   Neck: No JVD present.   Cardiovascular: Normal rate, regular rhythm and intact distal pulses. Exam reveals no gallop and no friction rub.   No murmur heard.  Pulmonary/Chest: Effort normal and breath sounds normal. No respiratory distress. She has no wheezes. She has no rales.   Abdominal: Soft. She exhibits no mass. There is no tenderness. No hernia.   Neurological: She is alert and oriented to person, place, and time.   Skin: Skin is warm and dry.   Psychiatric: She has a normal mood and affect.   Vitals reviewed.      Telemetry:  Sinus 60-70s    Results Review:   I reviewed the patient's new clinical results.  Results from last 7 days   Lab Units 12/19/19  1031 12/19/19  0349 12/16/19  1921   CK TOTAL U/L  --   --  27   TROPONIN T ng/mL 0.237* 0.190* <0.010     Results from last 7 days   Lab Units 12/19/19  0349 12/18/19  0448 12/17/19  0215 12/16/19  1921   WBC 10*3/mm3 12.14* 13.36* 7.22 8.46   HEMOGLOBIN g/dL 10.2* 10.6* 10.5* 10.4*   PLATELETS 10*3/mm3 349 390 364 373     Results from last 7 days    Lab Units 12/19/19  0349 12/18/19  0448 12/17/19  0215 12/16/19  1921   SODIUM mmol/L 136 136 141 139   POTASSIUM mmol/L 4.6 4.9 4.4 5.0   CHLORIDE mmol/L 99 99 100 100   CO2 mmol/L 25.3 23.6 26.4 26.0   BUN mg/dL 45* 41* 27* 34*   CREATININE mg/dL 1.21* 1.34* 1.37* 1.57*   CALCIUM mg/dL 9.6 9.6 9.7 9.5   GLUCOSE mg/dL 149* 284* 190* 221*   ALT (SGPT) U/L  --   --  8 9   AST (SGOT) U/L  --   --  11 10     Lab Results   Component Value Date    INR 1.01 03/14/2019    INR <0.90 04/22/2016     Lab Results   Component Value Date    MG 2.0 12/19/2019    MG 2.0 12/18/2019    MG 2.0 12/16/2019     Lab Results   Component Value Date    TSH 1.750 12/16/2019      Lab Results   Component Value Date    .0 (H) 02/14/2019        EKG:         Imaging Results (Last 72 Hours)     Procedure Component Value Units Date/Time    US Venous Doppler Lower Extremity Bilateral (duplex) [[id number]] Collected:  12/17/19 1242     Updated:  12/17/19 1245    Narrative:       US VENOUS DOPPLER LOWER EXTREMITY BILATERAL (DUPLEX)-     REASON FOR EXAM:  LE edema; J44.9-Chronic obstructive pulmonary disease,  unspecified; I50.9-Heart failure, unspecified; R06.00-Dyspnea,  unspecified     Multiple real-time images were obtained. The deep veins were well  demonstrated sonographically. There is good color doppler signal seen  filling the deep veins. They were completely compressed by the  ultrasound transducer. There was good spontaneous venous flow and  augmentation. There are no echoes seen along the deep veins to suggest  clot.          Impression:       No sonographic findings of DVT in the lower extremities.     This report was finalized on 12/17/2019 12:43 PM by Dr. Brian Kohli II, MD.       XR Chest 1 View [[id number]] Collected:  12/16/19 1943     Updated:  12/16/19 1948    Narrative:       XR CHEST 1 VW-     CLINICAL INDICATION: soa, edema        COMPARISON: 03/16/2019      TECHNIQUE: Single frontal view of the chest.     FINDINGS:      Diffuse interstitial lung disease.  Parenchymal nodule in the left lung.  Mild CHF  There is no evidence of an acute osseous abnormality.   There are no suspicious-appearing parenchymal soft tissue nodules.          Impression:       Mild CHF.  Parenchymal nodule in the left lung similar to the previous exam     This report was finalized on 12/16/2019 7:44 PM by Dr. Jonah Mcbride MD.                Thank you very much for asking us to be involved in this patient's care.  We will follow along with you.    Leigh Javier, KARON   12/19/19  3:12 PM      Tremaine Stuart MD, [facility]  Interventional Cardiology

## 2019-12-19 NOTE — PLAN OF CARE
Problem: Patient Care Overview  Goal: Plan of Care Review  Outcome: Ongoing (interventions implemented as appropriate)  Flowsheets  Taken 12/19/2019 5637  Progress: no change  Outcome Summary: Patient says she feels a lot better today but troponins keep rising. Cardiology consulted  Taken 12/19/2019 0906  Plan of Care Reviewed With: patient

## 2019-12-20 ENCOUNTER — APPOINTMENT (OUTPATIENT)
Dept: NUCLEAR MEDICINE | Facility: HOSPITAL | Age: 74
End: 2019-12-20

## 2019-12-20 ENCOUNTER — APPOINTMENT (OUTPATIENT)
Dept: CARDIOLOGY | Facility: HOSPITAL | Age: 74
End: 2019-12-20

## 2019-12-20 VITALS
OXYGEN SATURATION: 98 % | HEIGHT: 64 IN | RESPIRATION RATE: 18 BRPM | TEMPERATURE: 97.5 F | BODY MASS INDEX: 28.34 KG/M2 | DIASTOLIC BLOOD PRESSURE: 69 MMHG | WEIGHT: 166 LBS | HEART RATE: 69 BPM | SYSTOLIC BLOOD PRESSURE: 173 MMHG

## 2019-12-20 LAB
ANION GAP SERPL CALCULATED.3IONS-SCNC: 9.2 MMOL/L (ref 5–15)
APTT PPP: 75.5 SECONDS (ref 23.8–36.1)
BASOPHILS # BLD AUTO: 0.02 10*3/MM3 (ref 0–0.2)
BASOPHILS NFR BLD AUTO: 0.2 % (ref 0–1.5)
BH CV NUCLEAR PRIOR STUDY: 3
BH CV STRESS BP STAGE 1: NORMAL
BH CV STRESS BP STAGE 2: NORMAL
BH CV STRESS COMMENTS STAGE 1: NORMAL
BH CV STRESS COMMENTS STAGE 2: NORMAL
BH CV STRESS DOSE REGADENOSON STAGE 1: 0.4
BH CV STRESS DURATION MIN STAGE 1: 0
BH CV STRESS DURATION MIN STAGE 2: 4
BH CV STRESS DURATION SEC STAGE 1: 10
BH CV STRESS DURATION SEC STAGE 2: 0
BH CV STRESS HR STAGE 1: 88
BH CV STRESS HR STAGE 2: 91
BH CV STRESS PROTOCOL 1: NORMAL
BH CV STRESS RECOVERY BP: NORMAL MMHG
BH CV STRESS RECOVERY HR: 73 BPM
BH CV STRESS STAGE 1: 1
BH CV STRESS STAGE 2: 2
BUN BLD-MCNC: 47 MG/DL (ref 8–23)
BUN/CREAT SERPL: 37.9 (ref 7–25)
CALCIUM SPEC-SCNC: 9.2 MG/DL (ref 8.6–10.5)
CHLORIDE SERPL-SCNC: 100 MMOL/L (ref 98–107)
CHOLEST SERPL-MCNC: 143 MG/DL (ref 0–200)
CO2 SERPL-SCNC: 26.8 MMOL/L (ref 22–29)
CREAT BLD-MCNC: 1.24 MG/DL (ref 0.57–1)
DEPRECATED RDW RBC AUTO: 48.1 FL (ref 37–54)
EOSINOPHIL # BLD AUTO: 0.13 10*3/MM3 (ref 0–0.4)
EOSINOPHIL NFR BLD AUTO: 1.2 % (ref 0.3–6.2)
ERYTHROCYTE [DISTWIDTH] IN BLOOD BY AUTOMATED COUNT: 15 % (ref 12.3–15.4)
GFR SERPL CREATININE-BSD FRML MDRD: 42 ML/MIN/1.73
GLUCOSE BLD-MCNC: 182 MG/DL (ref 65–99)
GLUCOSE BLDC GLUCOMTR-MCNC: 162 MG/DL (ref 70–130)
GLUCOSE BLDC GLUCOMTR-MCNC: 166 MG/DL (ref 70–130)
GLUCOSE BLDC GLUCOMTR-MCNC: 194 MG/DL (ref 70–130)
HCT VFR BLD AUTO: 33.5 % (ref 34–46.6)
HDLC SERPL-MCNC: 32 MG/DL (ref 40–60)
HGB BLD-MCNC: 10.5 G/DL (ref 12–15.9)
IMM GRANULOCYTES # BLD AUTO: 0.06 10*3/MM3 (ref 0–0.05)
IMM GRANULOCYTES NFR BLD AUTO: 0.5 % (ref 0–0.5)
LDLC SERPL CALC-MCNC: 63 MG/DL (ref 0–100)
LDLC/HDLC SERPL: 1.96 {RATIO}
LV EF NUC BP: 66 %
LYMPHOCYTES # BLD AUTO: 4.12 10*3/MM3 (ref 0.7–3.1)
LYMPHOCYTES NFR BLD AUTO: 37 % (ref 19.6–45.3)
MAGNESIUM SERPL-MCNC: 2 MG/DL (ref 1.6–2.4)
MAXIMAL PREDICTED HEART RATE: 145 BPM
MCH RBC QN AUTO: 27.5 PG (ref 26.6–33)
MCHC RBC AUTO-ENTMCNC: 31.3 G/DL (ref 31.5–35.7)
MCV RBC AUTO: 87.7 FL (ref 79–97)
MONOCYTES # BLD AUTO: 1.14 10*3/MM3 (ref 0.1–0.9)
MONOCYTES NFR BLD AUTO: 10.2 % (ref 5–12)
NEUTROPHILS # BLD AUTO: 5.66 10*3/MM3 (ref 1.7–7)
NEUTROPHILS NFR BLD AUTO: 50.9 % (ref 42.7–76)
NRBC BLD AUTO-RTO: 0 /100 WBC (ref 0–0.2)
NT-PROBNP SERPL-MCNC: 2136 PG/ML (ref 5–1800)
PERCENT MAX PREDICTED HR: 62.76 %
PLATELET # BLD AUTO: 362 10*3/MM3 (ref 140–450)
PMV BLD AUTO: 11.2 FL (ref 6–12)
POTASSIUM BLD-SCNC: 4.5 MMOL/L (ref 3.5–5.2)
RBC # BLD AUTO: 3.82 10*6/MM3 (ref 3.77–5.28)
SODIUM BLD-SCNC: 136 MMOL/L (ref 136–145)
STRESS BASELINE BP: NORMAL MMHG
STRESS BASELINE HR: 73 BPM
STRESS PERCENT HR: 74 %
STRESS POST PEAK BP: NORMAL MMHG
STRESS POST PEAK HR: 91 BPM
STRESS TARGET HR: 123 BPM
TRIGL SERPL-MCNC: 242 MG/DL (ref 0–150)
VLDLC SERPL-MCNC: 48.4 MG/DL
WBC NRBC COR # BLD: 11.13 10*3/MM3 (ref 3.4–10.8)

## 2019-12-20 PROCEDURE — A9500 TC99M SESTAMIBI: HCPCS | Performed by: INTERNAL MEDICINE

## 2019-12-20 PROCEDURE — 83880 ASSAY OF NATRIURETIC PEPTIDE: CPT | Performed by: PHYSICIAN ASSISTANT

## 2019-12-20 PROCEDURE — 80061 LIPID PANEL: CPT | Performed by: NURSE PRACTITIONER

## 2019-12-20 PROCEDURE — 94799 UNLISTED PULMONARY SVC/PX: CPT

## 2019-12-20 PROCEDURE — 82962 GLUCOSE BLOOD TEST: CPT

## 2019-12-20 PROCEDURE — 93017 CV STRESS TEST TRACING ONLY: CPT

## 2019-12-20 PROCEDURE — 85025 COMPLETE CBC W/AUTO DIFF WBC: CPT | Performed by: PHYSICIAN ASSISTANT

## 2019-12-20 PROCEDURE — 25010000002 REGADENOSON 0.4 MG/5ML SOLUTION: Performed by: NURSE PRACTITIONER

## 2019-12-20 PROCEDURE — 0 TECHNETIUM SESTAMIBI: Performed by: INTERNAL MEDICINE

## 2019-12-20 PROCEDURE — 78452 HT MUSCLE IMAGE SPECT MULT: CPT

## 2019-12-20 PROCEDURE — 63710000001 INSULIN ASPART PER 5 UNITS: Performed by: PHYSICIAN ASSISTANT

## 2019-12-20 PROCEDURE — 99239 HOSP IP/OBS DSCHRG MGMT >30: CPT | Performed by: PHYSICIAN ASSISTANT

## 2019-12-20 PROCEDURE — 78452 HT MUSCLE IMAGE SPECT MULT: CPT | Performed by: INTERNAL MEDICINE

## 2019-12-20 PROCEDURE — 85730 THROMBOPLASTIN TIME PARTIAL: CPT | Performed by: INTERNAL MEDICINE

## 2019-12-20 PROCEDURE — 93018 CV STRESS TEST I&R ONLY: CPT | Performed by: INTERNAL MEDICINE

## 2019-12-20 PROCEDURE — 99232 SBSQ HOSP IP/OBS MODERATE 35: CPT | Performed by: INTERNAL MEDICINE

## 2019-12-20 PROCEDURE — 80048 BASIC METABOLIC PNL TOTAL CA: CPT | Performed by: PHYSICIAN ASSISTANT

## 2019-12-20 PROCEDURE — 83735 ASSAY OF MAGNESIUM: CPT | Performed by: PHYSICIAN ASSISTANT

## 2019-12-20 RX ORDER — ISOSORBIDE MONONITRATE 30 MG/1
30 TABLET, EXTENDED RELEASE ORAL
Qty: 30 TABLET | Refills: 0 | Status: SHIPPED | OUTPATIENT
Start: 2019-12-20 | End: 2020-04-21

## 2019-12-20 RX ORDER — HYDRALAZINE HYDROCHLORIDE 25 MG/1
50 TABLET, FILM COATED ORAL 3 TIMES DAILY
Qty: 90 TABLET | Refills: 3 | Status: SHIPPED | OUTPATIENT
Start: 2019-12-20 | End: 2019-12-21 | Stop reason: HOSPADM

## 2019-12-20 RX ORDER — HYDRALAZINE HYDROCHLORIDE 25 MG/1
25 TABLET, FILM COATED ORAL 3 TIMES DAILY
Qty: 90 TABLET | Refills: 0 | Status: CANCELLED | OUTPATIENT
Start: 2019-12-20

## 2019-12-20 RX ORDER — HYDRALAZINE HYDROCHLORIDE 50 MG/1
50 TABLET, FILM COATED ORAL 3 TIMES DAILY
Status: DISCONTINUED | OUTPATIENT
Start: 2019-12-20 | End: 2019-12-20

## 2019-12-20 RX ORDER — ROSUVASTATIN CALCIUM 5 MG/1
5 TABLET, COATED ORAL DAILY
Qty: 30 TABLET | Refills: 3 | Status: SHIPPED | OUTPATIENT
Start: 2019-12-20 | End: 2019-12-21 | Stop reason: SDUPTHER

## 2019-12-20 RX ORDER — ASPIRIN 81 MG/1
81 TABLET ORAL DAILY
Qty: 30 TABLET | Refills: 0 | Status: SHIPPED | OUTPATIENT
Start: 2019-12-21 | End: 2019-12-21 | Stop reason: HOSPADM

## 2019-12-20 RX ORDER — ICOSAPENT ETHYL 1000 MG/1
2 CAPSULE ORAL 2 TIMES DAILY WITH MEALS
Qty: 120 CAPSULE | Refills: 3 | Status: SHIPPED | OUTPATIENT
Start: 2019-12-20 | End: 2019-12-21 | Stop reason: SDUPTHER

## 2019-12-20 RX ORDER — ATORVASTATIN CALCIUM 40 MG/1
40 TABLET, FILM COATED ORAL NIGHTLY
Qty: 30 TABLET | Refills: 0 | Status: SHIPPED | OUTPATIENT
Start: 2019-12-20 | End: 2019-12-20 | Stop reason: HOSPADM

## 2019-12-20 RX ORDER — DOXYCYCLINE 100 MG/1
100 CAPSULE ORAL EVERY 12 HOURS
Qty: 6 CAPSULE | Refills: 0 | Status: SHIPPED | OUTPATIENT
Start: 2019-12-20 | End: 2019-12-21 | Stop reason: HOSPADM

## 2019-12-20 RX ORDER — PREDNISONE 20 MG/1
20 TABLET ORAL DAILY
Qty: 1 TABLET | Refills: 0 | Status: SHIPPED | OUTPATIENT
Start: 2019-12-21 | End: 2019-12-21 | Stop reason: HOSPADM

## 2019-12-20 RX ORDER — CEFDINIR 300 MG/1
300 CAPSULE ORAL EVERY 12 HOURS SCHEDULED
Qty: 5 CAPSULE | Refills: 0 | Status: SHIPPED | OUTPATIENT
Start: 2019-12-20 | End: 2019-12-21

## 2019-12-20 RX ORDER — ISOSORBIDE MONONITRATE 30 MG/1
30 TABLET, EXTENDED RELEASE ORAL
Status: DISCONTINUED | OUTPATIENT
Start: 2019-12-20 | End: 2019-12-20 | Stop reason: HOSPADM

## 2019-12-20 RX ORDER — HYDRALAZINE HYDROCHLORIDE 25 MG/1
25 TABLET, FILM COATED ORAL 3 TIMES DAILY
Status: DISCONTINUED | OUTPATIENT
Start: 2019-12-20 | End: 2019-12-20 | Stop reason: HOSPADM

## 2019-12-20 RX ADMIN — IPRATROPIUM BROMIDE AND ALBUTEROL SULFATE 3 ML: 2.5; .5 SOLUTION RESPIRATORY (INHALATION) at 14:28

## 2019-12-20 RX ADMIN — TECHNETIUM TC 99M SESTAMIBI 1 DOSE: 1 INJECTION INTRAVENOUS at 10:42

## 2019-12-20 RX ADMIN — TECHNETIUM TC 99M SESTAMIBI 1 DOSE: 1 INJECTION INTRAVENOUS at 08:30

## 2019-12-20 RX ADMIN — INSULIN ASPART 2 UNITS: 100 INJECTION, SOLUTION INTRAVENOUS; SUBCUTANEOUS at 17:43

## 2019-12-20 RX ADMIN — IPRATROPIUM BROMIDE AND ALBUTEROL SULFATE 3 ML: 2.5; .5 SOLUTION RESPIRATORY (INHALATION) at 02:51

## 2019-12-20 RX ADMIN — HYDRALAZINE HYDROCHLORIDE 25 MG: 25 TABLET ORAL at 15:01

## 2019-12-20 RX ADMIN — CEFDINIR 300 MG: 300 CAPSULE ORAL at 08:24

## 2019-12-20 RX ADMIN — DOXYCYCLINE 100 MG: 100 CAPSULE ORAL at 15:01

## 2019-12-20 RX ADMIN — CARVEDILOL 25 MG: 25 TABLET, FILM COATED ORAL at 17:43

## 2019-12-20 RX ADMIN — ACETAMINOPHEN, GUAIFENESIN AND PHENYLEPHRINE HCL 20 MG: 325; 200; 5 TABLET, FILM COATED ORAL at 17:43

## 2019-12-20 RX ADMIN — REGADENOSON 0.4 MG: 0.08 INJECTION, SOLUTION INTRAVENOUS at 10:42

## 2019-12-20 RX ADMIN — IPRATROPIUM BROMIDE AND ALBUTEROL SULFATE 3 ML: 2.5; .5 SOLUTION RESPIRATORY (INHALATION) at 06:28

## 2019-12-20 NOTE — PROGRESS NOTES
Discharge Planning Assessment   Phil     Patient Name: Albina Finley  MRN: [id number]  Today's Date: 12/20/2019    Admit Date: 12/16/2019      Discharge Plan     Row Name 12/20/19 1535       Plan    Plan Pt lives alone and she plans to return home at discharge. Pt does not utilize home health services. Pt has O2 @ 2 liters PRN from [facility]. SS to follow.         JERMAIN Krishna

## 2019-12-20 NOTE — DISCHARGE SUMMARY
[facility] Medicine Services  DISCHARGE SUMMARY    Patient Identification:  Name:  Albina Finley  Age:  74 y.o.  Sex:  female  :  1945  MRN:  [id number]  Visit Number:  [id number]    Date of Admission: 2019  Date of Discharge:  2019    PCP: Jim Hernandez MD    Discharging Provider: Asya Dupree PA-C / Alexandra Lora DO     Admission/Discharge Diagnoses     Discharge Diagnoses:  · Non-ST elevation myocardial infarction, medically managed   · Acute on chronic diastolic (grade II dysfunction) CHF exacerbation  · Acute COPD exacerbation   · Acute on chronic hypoxic respiratory failure   · Acute bacterial UTI, Klebsiella pneumoniae and Escherichia coli   · Uncontrolled essential hypertension, improved control at time of discharge after medication adjustments   · Hyperlipidemia with severe hypertriglyceridemia   · Stage III CKD, creatinine stable   · Insulin dependent type II diabetes mellitus, HgbA1C 8.30  · Mild pulmonary hypertension and mild aortic valve stenosis   · Chronic iron deficiency anemia   · Former smoker     Consults/Procedures     Consults:   · Cardiology: Dr. Tremaine Arciniega     Procedures Performed:  · 2019: Transthoracic echocardiogram   · Left ventricular systolic function is normal. Estimated EF appears to be in the range of 61 - 65%.  · Left ventricular diastolic dysfunction (grade II) consistent with pseudonormalization.  · Mild aortic valve stenosis is present.  · Mild pulmonary hypertension is present.  · There is no evidence of pericardial effusion.  · No changes compared to previous study  · 2019: Stress test with myocardial perfusion imaging   · Myocardial perfusion imaging indicates a small-sized, mildly severe area of ischemia in apical and lateral wall.  · Left ventricular ejection fraction is normal (Calculated EF = 66%).  · EKG indeterminate due to baseline changes. No arrhythmias.  · Impressions are consistent with an intermediate  risk study.  · Breast attenuation artifact is present.    History of Presenting Illness     Albina Finley is a 74 y.o. female with past medical history significant for diastolic CHF, COPD, chronic hypoxic respiratory failure, essential hypertension, hyperlipidemia, stage III CKD, insulin dependent type II diabetes mellitus, and chronic iron deficiency anemia that presented to the [facility] emergency department for evaluation of shortness of breath.  Please see the admitting history and physical for further details. Patient was found to have acute COPD exacerbation, acute diastolic CHF exacerbation, acute on chronic hypoxic respiratory failure and acute urinary tract infection. Patient was initially placed in the observation status but was later admitted inpatient to the medical surgical floor for further treatment and evaluation.     Hospital Course     Blood cultures were obtained in the ED prior to antibiotic administration. Patient was empirically treated with IV Rocephin and doxycycline to cover UTI/COPD exacerbation. Respiratory panel PCR negative, Legionella and Influenza negative. Urine culture was also sent to the lab for culture and sensitivity. Patient was placed on supplemental O2, she had prior RX for home O2 for which she worse only PRN at home. Patient was also scheduled on inhalants and IV corticosteroids. Patient was noted to have acute diastolic CHF exacerbation at time of admission, likely secondary to COPD exacerbation/UTI. She was administered IV lasix in ED and was intermittently diuresed while hospitalized. Transthoracic echocardiogram was obtained, revealing preserved EF and grade II diastolic dysfunction with mild aortic valve stenosis and mild pulmonary hypertension. She responded well to diuresis. Her renal function remained stable. She had been on daily Norvasc, this was discontinued due to ongoing lower extremity edema. Her home hydralazine dosage increased and home Aldactone  was continued. She did have changes on tele on 12/18/2019, EKG was obtained which revealed sinus rhythm with PACs. Also noted on EKG on 12/18/2019 was increased non specific ST-T wave changes. As such, troponin T was obtained that resulted at 0.190 where it has been previously negative this admission. Patient did not have acute chest pain, but due to her co-morbidities and presenting complaint of dyspnea it was concerning for anginal equivalent. Troponin was trended, which remained with flat trend. Cardiology was consulted and evaluated the patient. Heart catheterization was initially discussed with the patient, however patient subsequently underwent stress test with myocardial perfusion imaging per cardiology recommendations. Stress test resulted as intermediate risk study with breast attenuation artifact. There was a small sized, mildly severe area of ischemia in the apical and lateral wall. Per Dr. Mtz, patient was to be continued on medical management with daily aspirin, rosuvastatin and Coreg. She was also found to have significant hypertriglyceridemia, previously on fenofibrate. Per Dr. Mtz, patient was started on Vascepa 2 g BID. Per Dr. Mtz, her home hydralazine was then discontinued and replaced with Imdur. She is to continue Bumex and Aldactone at discharge for CHF. Her urine culture resulted with growth of Klebsiella pneumoniae and E. Coli, IV rocephin was discontinued and transition to PO Omnicef. Her IV doxycycline for COPD exacerbation was discontinued and de-escalated to PO doxycycline. IV solumedrol was transitioned to oral course of PO Prednisone.     On day of discharge, it was felt that she had reached maximal inpatient benefit and was stable for discharge. Patient was eager for discharge, patient and daughter at bedside were agreeable to discharge plan. Medication adjustments were discussed in detail with both patient and daughter who administers the patient's medications, both verbalized  understanding. Patient to continue all other home medications as previously prescribed. Patient to continue checking BP at home, keep log and take to PCP and cardiology follow up appointments. Monitor weight daily, keep a log to take to follow up appointments. Continue cardiac/consistent carb diet with 1500 cc fluid restriction at home. Patient will be scheduled to follow up with PCP in 1 week post discharge, recommend repeat BMP to monitor renal function closely. Patient also to follow up with Cardiology in 2 weeks for post NSTEMI follow up. Patient will continue as needed supplemental O2 at home upon discharge.     Discharge Vitals/Physical Examination     Vital Signs:  SpO2 Percentage    12/20/19 1428 12/20/19 1434 12/20/19 1522   SpO2: 98% 99% 98%     Body mass index is 28.49 kg/m².  Wt Readings from Last 3 Encounters:   12/20/19 75.3 kg (166 lb)   03/15/19 72.2 kg (159 lb 1.6 oz)   02/14/19 74.1 kg (163 lb 7 oz)       Physical Exam:  Physical Exam   Constitutional: She is oriented to person, place, and time. She appears well-developed and well-nourished.  Non-toxic appearance. She appears ill (chronically ). No distress. Nasal cannula in place.   Sitting up on edge of bed in no acute distress. Daughter present at bedside.    HENT:   Head: Normocephalic and atraumatic.   Mouth/Throat: Mucous membranes are normal.   Eyes: Pupils are equal, round, and reactive to light. Conjunctivae are normal. No scleral icterus.   Neck: Neck supple.   Cardiovascular: Normal rate, regular rhythm, normal heart sounds and intact distal pulses. Exam reveals no gallop and no friction rub.   No murmur heard.  Pulses:       Dorsalis pedis pulses are 2+ on the right side, and 2+ on the left side.        Posterior tibial pulses are 2+ on the right side, and 2+ on the left side.   Pulmonary/Chest: Effort normal. No accessory muscle usage. No tachypnea. No respiratory distress. She has decreased breath sounds in the right lower field and  the left lower field. She has no wheezes. She has no rhonchi. She has no rales. She exhibits no tenderness.   Abdominal: Soft. Bowel sounds are normal. She exhibits no distension. There is no tenderness. There is no rebound and no guarding.   Genitourinary:   Genitourinary Comments: No martin in place    Musculoskeletal: She exhibits no tenderness or deformity.        Right lower leg: She exhibits edema (1+).        Left lower leg: She exhibits edema (1+).     Vascular Status -  Her right foot exhibits abnormal foot vasculature . Her right foot exhibits no edema. Her left foot exhibits abnormal foot vasculature . Her left foot exhibits no edema.  Skin Integrity  -  Her right foot skin is intact.Her left foot skin is intact..  Neurological: She is alert and oriented to person, place, and time. She has normal strength. No cranial nerve deficit or sensory deficit. GCS eye subscore is 4. GCS verbal subscore is 5. GCS motor subscore is 6.   Follows commands. Answers questions appropriately. Moves all extremities equally. No focal neuro deficits on exam.    Skin: Skin is warm and dry. Capillary refill takes less than 2 seconds. No rash noted. No erythema. No pallor.   Psychiatric: She has a normal mood and affect. Her speech is normal and behavior is normal. Judgment and thought content normal. Cognition and memory are normal.   Nursing note and vitals reviewed.    Pertinent Laboratory/Radiology Results     Pertinent Laboratory Results:  Results from last 7 days   Lab Units 12/19/19  1703 12/19/19  1520 12/19/19  1031  12/16/19  1921   CK TOTAL U/L  --   --   --   --  27   TROPONIN T ng/mL 0.229* 0.236* 0.237*   < > <0.010    < > = values in this interval not displayed.     Results from last 7 days   Lab Units 12/20/19  0543 12/19/19  0349 12/18/19  0448 12/16/19  1921   PROBNP pg/mL 2,136.0* 3,867.0* 4,986.0* 1,078.0     Results from last 7 days   Lab Units 12/20/19  0543   CHOLESTEROL mg/dL 143   TRIGLYCERIDES mg/dL 242*    HDL CHOL mg/dL 32*   LDL CHOL mg/dL 63     Results from last 7 days   Lab Units 12/16/19  2201   PH, ARTERIAL pH units 7.404   PO2 ART mm Hg 54.9*   PCO2, ARTERIAL mm Hg 46.4*   HCO3 ART mmol/L 29.0*     Results from last 7 days   Lab Units 12/20/19  0543 12/19/19  1520 12/19/19  0349  12/17/19  0215 12/16/19  1921   CRP mg/dL  --   --   --   --  1.56* 1.82*   LACTATE mmol/L  --   --   --   --  1.2 1.2   WBC 10*3/mm3 11.13* 11.11* 12.14*   < > 7.22 8.46   HEMOGLOBIN g/dL 10.5* 12.1 10.2*   < > 10.5* 10.4*   HEMATOCRIT % 33.5* 38.3 32.7*   < > 34.0 34.3   MCV fL 87.7 86.8 87.4   < > 88.1 88.9   MCHC g/dL 31.3* 31.6 31.2*   < > 30.9* 30.3*   PLATELETS 10*3/mm3 362 432 349   < > 364 373   INR   --  0.93  --   --   --   --     < > = values in this interval not displayed.     Results from last 7 days   Lab Units 12/20/19  0543 12/19/19  0349 12/18/19  0448 12/17/19  0215 12/16/19  1921   SODIUM mmol/L 136 136 136 141 139   POTASSIUM mmol/L 4.5 4.6 4.9 4.4 5.0   MAGNESIUM mg/dL 2.0 2.0 2.0  --  2.0   CHLORIDE mmol/L 100 99 99 100 100   CO2 mmol/L 26.8 25.3 23.6 26.4 26.0   BUN mg/dL 47* 45* 41* 27* 34*   CREATININE mg/dL 1.24* 1.21* 1.34* 1.37* 1.57*   EGFR IF NONAFRICN AM mL/min/1.73 42* 43* 39* 38* 32*   CALCIUM mg/dL 9.2 9.6 9.6 9.7 9.5   GLUCOSE mg/dL 182* 149* 284* 190* 221*   ALBUMIN g/dL  --   --   --  4.13 3.98   BILIRUBIN mg/dL  --   --   --  0.2 <0.2*   ALK PHOS U/L  --   --   --  35* 35*   AST (SGOT) U/L  --   --   --  11 10   ALT (SGPT) U/L  --   --   --  8 9   Estimated Creatinine Clearance: 38.9 mL/min (A) (by C-G formula based on SCr of 1.24 mg/dL (H)).    Glucose   Date/Time Value Ref Range Status   12/20/2019 1700 194 (H) 70 - 130 mg/dL Final   12/20/2019 1243 166 (H) 70 - 130 mg/dL Final   12/20/2019 0715 162 (H) 70 - 130 mg/dL Final   12/19/2019 2211 250 (H) 70 - 130 mg/dL Final   12/19/2019 1637 314 (H) 70 - 130 mg/dL Final     Lab Results   Component Value Date    HGBA1C 8.30 (H) 12/17/2019     Lab  Results   Component Value Date    TSH 1.750 12/16/2019     Microbiology Results (last 10 days)     Procedure Component Value - Date/Time    Respiratory Panel, PCR - Swab, Nasopharynx [[id number]]  (Normal) Collected:  12/17/19 0147    Lab Status:  Final result Specimen:  Swab from Nasopharynx Updated:  12/17/19 0304     ADENOVIRUS, PCR Not Detected     Coronavirus 229E Not Detected     Coronavirus HKU1 Not Detected     Coronavirus NL63 Not Detected     Coronavirus OC43 Not Detected     Human Metapneumovirus Not Detected     Human Rhinovirus/Enterovirus Not Detected     Influenza B PCR Not Detected     Parainfluenza Virus 1 Not Detected     Parainfluenza Virus 2 Not Detected     Parainfluenza Virus 3 Not Detected     Parainfluenza Virus 4 Not Detected     Bordetella pertussis pcr Not Detected     Influenza A H1 2009 PCR Not Detected     Chlamydophila pneumoniae PCR Not Detected     Mycoplasma pneumo by PCR Not Detected     Influenza A PCR Not Detected     Influenza A H3 Not Detected     Influenza A H1 Not Detected     RSV, PCR Not Detected    Urine Culture - Urine, Urine, Clean Catch [[id number]]  (Abnormal)  (Susceptibility) Collected:  12/16/19 2048    Lab Status:  Final result Specimen:  Urine, Clean Catch Updated:  12/19/19 1053     Urine Culture >100,000 CFU/mL Klebsiella pneumoniae ssp pneumoniae      >100,000 CFU/mL Escherichia coli     Comment: Appended report. These results have been appended to a previously final verified report.       Susceptibility      Klebsiella pneumoniae ssp pneumoniae     SOLA     Ampicillin Resistant     Ampicillin + Sulbactam Susceptible     Cefazolin Susceptible     Cefepime Susceptible     Ceftazidime Susceptible     Ceftriaxone Susceptible     Gentamicin Susceptible     Levofloxacin Susceptible     Nitrofurantoin Intermediate     Piperacillin + Tazobactam Susceptible     Tetracycline Susceptible     Trimethoprim + Sulfamethoxazole Susceptible                Susceptibility       Escherichia coli     SOLA     Ampicillin Resistant     Ampicillin + Sulbactam Resistant     Cefazolin Susceptible     Cefepime Susceptible     Ceftazidime Susceptible     Ceftriaxone Susceptible     Gentamicin Susceptible     Levofloxacin Susceptible     Nitrofurantoin Susceptible     Piperacillin + Tazobactam Susceptible     Tetracycline Susceptible     Trimethoprim + Sulfamethoxazole Resistant                    Legionella Antigen, Urine - Urine, Urine, Clean Catch [[id number]]  (Normal) Collected:  12/16/19 2048    Lab Status:  Final result Specimen:  Urine, Clean Catch Updated:  12/17/19 0225     LEGIONELLA ANTIGEN, URINE Negative    Narrative:       Presumptive negative for L. pneumophilia serogroup 1 antigen, suggesting no recent or current infection.    Blood Culture - Blood, Arm, Right [[id number]] Collected:  12/16/19 1933    Lab Status:  Final result Specimen:  Blood from Arm, Right Updated:  12/21/19 1945     Blood Culture No growth at 5 days    Blood Culture - Blood, Arm, Left [[id number]] Collected:  12/16/19 1928    Lab Status:  Final result Specimen:  Blood from Arm, Left Updated:  12/21/19 1945     Blood Culture No growth at 5 days        Pertinent Radiology Results:  Imaging Results (All)     Procedure Component Value Units Date/Time    US Venous Doppler Lower Extremity Bilateral (duplex) [[id number]] Collected:  12/17/19 1242     Updated:  12/17/19 1245    Narrative:       Multiple real-time images were obtained. The deep veins were well  demonstrated sonographically. There is good color doppler signal seen  filling the deep veins. They were completely compressed by the  ultrasound transducer. There was good spontaneous venous flow and  augmentation. There are no echoes seen along the deep veins to suggest  clot.    Impression:       No sonographic findings of DVT in the lower extremities.     This report was finalized on 12/17/2019 12:43 PM by Dr. Brian Kohli II, MD.    XR Chest 1 View [[id number]]  Collected:  12/16/19 1943     Updated:  12/16/19 1948    Narrative:       FINDINGS:  Diffuse interstitial lung disease.  Parenchymal nodule in the left lung.  Mild CHF  There is no evidence of an acute osseous abnormality.   There are no suspicious-appearing parenchymal soft tissue nodules.    Impression:       Mild CHF.  Parenchymal nodule in the left lung similar to the previous exam     This report was finalized on 12/16/2019 7:44 PM by Dr. Jonah Mcbride MD.     Test Results Pending at Discharge:  None     Discharge Disposition/Discharge Medications/Discharge Appointments     Discharge Disposition:   Home or Self Care    Condition at Discharge:  Stable     DME Prescribed at Discharge:  None     Discharge Diet:  Diet Instructions     Diet: Consistent Carbohydrate, Cardiac; Thin      Discharge Diet:   Consistent Carbohydrate  Cardiac       Fluid Consistency:  Thin    Fluid Restriction per day:  1500 mL Fluid        Discharge Activity:  Activity Instructions     Activity as Tolerated      Measure Blood Pressure      Measure blood pressure morning and evening. Keep a log to take to PCP and cardiology follow up appointments. Hold blood pressure medications for BP < 110    Measure Weight      Daily, keep weight log to take to PCP and cardiology follow up appointments        Code Status While Inpatient:  Code Status and Medical Interventions:   Ordered at: 12/16/19 2208     Level Of Support Discussed With:    Patient     Code Status:    CPR     Medical Interventions (Level of Support Prior to Arrest):    Full     Discharge Medications:     Discharge Medications      New Medications      Instructions Start Date   cefdinir 300 MG capsule  Commonly known as:  OMNICEF   300 mg, Oral, Every 12 Hours Scheduled      doxycycline 100 MG capsule  Commonly known as:  MONODOX   100 mg, Oral, 2 Times Daily      icosapent ethyl 1 g capsule capsule  Commonly known as:  VASCEPA   2 g, Oral, 2 Times Daily With Meals      isosorbide  mononitrate 30 MG 24 hr tablet  Commonly known as:  IMDUR   30 mg, Oral, Every 24 Hours Scheduled, Hold for BP <100/60      predniSONE 20 MG tablet  Commonly known as:  DELTASONE   20 mg, Oral, Daily      rosuvastatin 5 MG tablet  Commonly known as:  CRESTOR   5 mg, Oral, Daily         Continue These Medications      Instructions Start Date   bumetanide 1 MG tablet  Commonly known as:  BUMEX   1 mg, Oral, Daily, PTA: Pt takes 2 tablets if her weight is above 152lb      calcium carbonate 600 MG tablet  Commonly known as:  OS-TIANNA   600 mg, Oral, Daily      carvedilol 25 MG tablet  Commonly known as:  COREG   25 mg, Oral, 2 Times Daily With Meals      cetirizine 10 MG tablet  Commonly known as:  zyrTEC   10 mg, Oral, Daily      cyanocobalamin 1000 MCG/ML injection   1,000 mcg, Intramuscular, Every 28 Days      fenofibrate 160 MG tablet   160 mg, Oral, Daily      ferrous sulfate 325 (65 FE) MG tablet   325 mg, Oral, 2 Times Daily      insulin detemir 100 UNIT/ML injection  Commonly known as:  LEVEMIR   80 Units, Subcutaneous, Daily      insulin detemir 100 UNIT/ML injection  Commonly known as:  LEVEMIR   40 Units, Subcutaneous, Nightly      loperamide 2 MG capsule  Commonly known as:  IMODIUM   2 mg, Oral, Daily      metFORMIN 500 MG tablet  Commonly known as:  GLUCOPHAGE   500 mg, Oral, 2 Times Daily With Meals      raNITIdine 150 MG tablet  Commonly known as:  ZANTAC   150 mg, Oral, 2 Times Daily      spironolactone 25 MG tablet  Commonly known as:  ALDACTONE   25 mg, Oral, Daily         Stop These Medications    amLODIPine 5 MG tablet  Commonly known as:  NORVASC     hydrALAZINE 25 MG tablet  Commonly known as:  APRESOLINE          Discharge Appointments:  Additional Instructions for the Follow-ups that You Need to Schedule     Call MD With Problems / Concerns   As directed      Call PCP or return to the ED with recurrent or worsening symptoms.    Order Comments:  Call PCP or return to the ED with recurrent or  worsening symptoms.          Discharge Follow-up with PCP   As directed       Currently Documented PCP:    Jim Hernandez MD    PCP Phone Number:    [phone]     Follow Up Details:  1 week, BMP         Discharge Follow-up with Specialty: Cardiology - Dr. Mtz; 2 Weeks   As directed      Specialty:  Cardiology - Dr. Mtz    Follow Up:  2 Weeks    Follow Up Details:  Follow up NSTEMI         Basic Metabolic Panel    Dec 25, 2019 (Approximate)      Results to be followed by PCP, Dr. Jim Hernandez MD    Order Comments:  Results to be followed by PCP, Dr. Jim Hernandez MD                Attestation: I personally discussed the patient's hospital course, disposition, discharge planning, and discharge medications with attending physician, Dr. Alexandra Lora DO, prior to time of discharge.       Asya Dupree PA-C  Hospitalist Service -- [facility]       12/22/19  11:00 AM    Discharge Time: Greater than 30 minutes     Please send a copy of this dictation to the following providers:  Jim Hernandez MD

## 2019-12-20 NOTE — PROGRESS NOTES
LOS: 3 days     Name: Albina Finley  Age/Sex: 74 y.o. female  :  1945        PCP: Jim Hernandez MD    Principal Problem:    COPD with acute exacerbation (CMS/[facility])  Active Problems:    Iron deficiency anemia    Acute on chronic diastolic CHF (congestive heart failure) (CMS/HCC)    Chronic kidney disease, stage III (moderate) (CMS/[facility])    Essential hypertension    Hyperlipidemia    Type II diabetes mellitus (CMS/[facility])    Arthritis    Osteoporosis    Metabolic alkalosis with respiratory acidosis    Respiratory failure with hypoxia (CMS/[facility])    Acute UTI (urinary tract infection)    Grade II diastolic dysfunction      Admission Information: Albina Finley is a 74 y.o. female with a past medical history significant for diastolic heart failure, essential hypertension, dyslipidemia, chronic kidney disease, stage III, insulin requiring type 2 diabetes, arthritis, and osteoporosis.  She presented to the ED on 2019 with report of shortness of breath.  She was admitted with acute hypoxic respiratory secondary to acute COPD exacerbation and acute CHF.  At admission troponin was negative, however, repeat troponin yesterday evening was positive and troponins have been rising.  Cardiology was consulted.      Chief Complaint: follow up abnormal troponin    Interval history: Pt seen and examined.  Denies chest pain.  Results of stress test and treatment plan discussed with the patient and her daughter at bedside.      Subjective         Vital Signs  Vital Signs (last 72 hrs)        0700  -   0659  07  -   0659  0700  -   0659  07  -   1700   Most Recent    Temp (°F) 97.7 -  98.3    97.6 -  98    97.6 -  97.9      97.5     97.5 (36.4)    Heart Rate 69 -  89    60 -  75    61 -  100    66 -  85     69    Resp 18 -  24      18    18 -  20      18     18    /60 -  178/72    117/52 -  172/70    154/63 -  197/85    157/98 -  173/69     173/69    SpO2 (%) 92 -  99    94 -  99     92 -  99    98 -  99     98        Temp:  [97.5 °F (36.4 °C)-97.9 °F (36.6 °C)] 97.5 °F (36.4 °C)  Heart Rate:  [61-85] 69  Resp:  [18] 18  BP: (154-173)/(60-98) 173/69  Body mass index is 28.49 kg/m².      Intake/Output Summary (Last 24 hours) at 12/20/2019 1700  Last data filed at 12/20/2019 1522  Gross per 24 hour   Intake 540 ml   Output --   Net 540 ml       Physical Exam   Constitutional: She is oriented to person, place, and time. She appears well-developed and well-nourished.   Neck: No JVD present. Carotid bruit is not present.   Cardiovascular: Normal rate and regular rhythm.   No lower extremity edema   Pulmonary/Chest: Effort normal and breath sounds normal. No respiratory distress. She has no wheezes. She has no rales.   Abdominal: Bowel sounds are normal.   Neurological: She is alert and oriented to person, place, and time.   Skin: Skin is warm and dry.   Psychiatric: She has a normal mood and affect. Cognition and memory are normal.   Vitals reviewed.      Results Review:     Results from last 7 days   Lab Units 12/20/19  0543 12/19/19  1520 12/19/19  0349 12/18/19  0448 12/17/19  0215 12/16/19  1921   WBC 10*3/mm3 11.13* 11.11* 12.14* 13.36* 7.22 8.46   HEMOGLOBIN g/dL 10.5* 12.1 10.2* 10.6* 10.5* 10.4*   PLATELETS 10*3/mm3 362 432 349 390 364 373     Results from last 7 days   Lab Units 12/20/19  0543 12/19/19  0349 12/18/19  0448 12/17/19  0215 12/16/19  1921   SODIUM mmol/L 136 136 136 141 139   POTASSIUM mmol/L 4.5 4.6 4.9 4.4 5.0   CHLORIDE mmol/L 100 99 99 100 100   CO2 mmol/L 26.8 25.3 23.6 26.4 26.0   BUN mg/dL 47* 45* 41* 27* 34*   CREATININE mg/dL 1.24* 1.21* 1.34* 1.37* 1.57*   CALCIUM mg/dL 9.2 9.6 9.6 9.7 9.5   GLUCOSE mg/dL 182* 149* 284* 190* 221*     Results from last 7 days   Lab Units 12/19/19  1703 12/19/19  1520 12/19/19  1031 12/19/19  0349 12/16/19  1921   CK TOTAL U/L  --   --   --   --  27   TROPONIN T ng/mL 0.229* 0.236* 0.237* 0.190* <0.010       Results from last 7 days    Lab Units 12/19/19  1520   INR  0.93       I reviewed the patient's new clinical results.  I reviewed the patient's new imaging results and agree with the interpretation.  I personally viewed and interpreted the patient's EKG/Telemetry data      Medication Review:     arformoterol 15 mcg Nebulization BID - RT   aspirin 81 mg Oral Daily   atorvastatin 40 mg Oral Nightly   budesonide 0.25 mg Nebulization Daily - RT   calcium carbonate (oyster shell) 500 mg Oral Daily   carvedilol 25 mg Oral BID With Meals   cefdinir 300 mg Oral Q12H   cetirizine 5 mg Oral Daily   doxycycline 100 mg Oral Q12H   famotidine 20 mg Oral BID AC   ferrous sulfate 325 mg Oral BID   hydrALAZINE 25 mg Oral TID   insulin aspart 0-9 Units Subcutaneous 4x Daily AC & at Bedtime   insulin detemir 40 Units Subcutaneous Nightly   ipratropium-albuterol 3 mL Nebulization Q4H - RT   predniSONE 20 mg Oral Daily   sodium chloride 10 mL Intravenous Q12H   spironolactone 25 mg Oral Daily       heparin (porcine) 12 Units/kg/hr Last Rate: 14 Units/kg/hr (12/19/19 2364)       Assessment:  1. NSTEMI  2. Acute on chronic diastolic CHF  3. Acute COPD exacerbation  4. Dyslipidemia with severe hyper TGL with triglycerides 242 and LDL cholesterol 63      Recommendations:  1. Nuclear stress showed small sized mildly severe area of ischemia in the apical and lateral wall, since pt not having anginal symptoms, preserved LV systolic function, will manage medically.  She has preserved LV function of 66%.   Pt is on ASA, rosuvastatin, and carvedilol.    2. Would recommend Vascepa 2 gm twice daily at discharge for hypertriglyceridemia.  Discontinue fenofibrate. She has hx of statins intolerence with Liptior and one other that she doesn't remember.   3. Reg her CHF, she is currently on Aldactone, Hydralazine, would resume home dose of Bumex , Cr seems stable.   4. Cont with Asa  5. Pt should follow up in our office in 2-3 weeks.      I discussed the patients findings and  my recommendations with patient and family  Will sign off and will f/u in clinic in 2-4 weeks.     Leigh Javier, APRN  12/20/19  5:00 PM        Tremaine Stuart MD, [facility]  Interventional Cardiology

## 2019-12-20 NOTE — PLAN OF CARE
Problem: Patient Care Overview  Goal: Plan of Care Review  Outcome: Ongoing (interventions implemented as appropriate)  Flowsheets (Taken 12/20/2019 0506)  Progress: improving  Plan of Care Reviewed With: patient; daughter  Outcome Summary: Troponin trending down. Pt NPO overnight.

## 2019-12-21 ENCOUNTER — READMISSION MANAGEMENT (OUTPATIENT)
Dept: CALL CENTER | Facility: HOSPITAL | Age: 74
End: 2019-12-21

## 2019-12-21 LAB
BACTERIA SPEC AEROBE CULT: NORMAL
BACTERIA SPEC AEROBE CULT: NORMAL

## 2019-12-21 RX ORDER — ASPIRIN 81 MG/1
81 TABLET ORAL DAILY
Qty: 30 TABLET | Refills: 0 | Status: CANCELLED | OUTPATIENT
Start: 2019-12-21

## 2019-12-21 RX ORDER — PREDNISONE 20 MG/1
20 TABLET ORAL DAILY
Qty: 1 TABLET | Refills: 0 | Status: SHIPPED | OUTPATIENT
Start: 2019-12-21 | End: 2019-12-22

## 2019-12-21 RX ORDER — ICOSAPENT ETHYL 1000 MG/1
2 CAPSULE ORAL 2 TIMES DAILY WITH MEALS
Qty: 120 CAPSULE | Refills: 3 | Status: SHIPPED | OUTPATIENT
Start: 2019-12-21 | End: 2020-04-21

## 2019-12-21 RX ORDER — PREDNISONE 20 MG/1
20 TABLET ORAL DAILY
Qty: 1 TABLET | Refills: 0 | Status: CANCELLED | OUTPATIENT
Start: 2019-12-21 | End: 2019-12-22

## 2019-12-21 RX ORDER — CEFDINIR 300 MG/1
300 CAPSULE ORAL EVERY 12 HOURS SCHEDULED
Qty: 5 CAPSULE | Refills: 0 | Status: CANCELLED | OUTPATIENT
Start: 2019-12-21 | End: 2019-12-24

## 2019-12-21 RX ORDER — CEFDINIR 300 MG/1
300 CAPSULE ORAL EVERY 12 HOURS SCHEDULED
Qty: 5 CAPSULE | Refills: 0 | Status: SHIPPED | OUTPATIENT
Start: 2019-12-21 | End: 2019-12-24

## 2019-12-21 RX ORDER — ROSUVASTATIN CALCIUM 5 MG/1
5 TABLET, COATED ORAL DAILY
Qty: 30 TABLET | Refills: 3 | Status: SHIPPED | OUTPATIENT
Start: 2019-12-21 | End: 2020-04-21

## 2019-12-21 RX ORDER — DOXYCYCLINE 100 MG/1
100 CAPSULE ORAL 2 TIMES DAILY
Qty: 6 CAPSULE | Refills: 0 | Status: SHIPPED | OUTPATIENT
Start: 2019-12-21 | End: 2019-12-24

## 2019-12-21 RX ORDER — DOXYCYCLINE 100 MG/1
100 CAPSULE ORAL EVERY 12 HOURS
Qty: 6 CAPSULE | Refills: 0 | Status: CANCELLED | OUTPATIENT
Start: 2019-12-21 | End: 2019-12-24

## 2019-12-21 NOTE — OUTREACH NOTE
Prep Survey      Responses   Facility patient discharged from?  [facility] [place]   Is patient eligible?  Yes   Discharge diagnosis  COPD with acute exaerbation   Does the patient have one of the following disease processes/diagnoses(primary or secondary)?  COPD/Pneumonia   Does the patient have Home health ordered?  No   Is there a DME ordered?  No   Prep survey completed?  Yes          Maria Teresa Rodriguez RN

## 2019-12-23 ENCOUNTER — TELEPHONE (OUTPATIENT)
Dept: MEDSURG UNIT | Facility: HOSPITAL | Age: 74
End: 2019-12-23

## 2019-12-23 ENCOUNTER — APPOINTMENT (OUTPATIENT)
Dept: CARDIOLOGY | Facility: HOSPITAL | Age: 74
End: 2019-12-23

## 2019-12-23 ENCOUNTER — READMISSION MANAGEMENT (OUTPATIENT)
Dept: CALL CENTER | Facility: HOSPITAL | Age: 74
End: 2019-12-23

## 2019-12-23 NOTE — OUTREACH NOTE
COPD/PN Week 1 Survey      Responses   Facility patient discharged from?  [facility]   Does the patient have one of the following disease processes/diagnoses(primary or secondary)?  COPD/Pneumonia   Is there a successful TCM telephone encounter documented?  No   Was the primary reason for admission:  COPD exacerbation   Week 1 attempt successful?  Yes   Call start time  1118   Call end time  1121   Discharge diagnosis  COPD with acute exaerbation   Does the patient have a primary care provider?   Yes   Does the patient have an appointment with their PCP or pulmonologist within 7 days of discharge?  Yes   Comments regarding PCP  Jim Hernandez MD   Has home health visited the patient within 72 hours of discharge?  N/A   Psychosocial issues?  No   Did the patient receive a copy of their discharge instructions?  Yes   Nursing interventions  Reviewed instructions with patient   What is the patient's perception of their health status since discharge?  Improving   Nursing Interventions  Nurse provided patient education   Is the patient/caregiver able to teach back the hierarchy of who to call/visit for symptoms/problems? PCP, Specialist, Home health nurse, Urgent Care, ED, 911  Yes   Is the patient able to teach back COPD zones?  Yes   Nursing interventions  Education provided on various zones   Patient reports what zone on this call?  Green Zone   Green Zone  Reports doing well, Breathing without shortness of breath, Usual activity and exercise level, Usual amount of phlegm/mucus without difficulty coughing up   Green Zone interventions:  Use oxygen as prescribed, Avoid indoor/outdoor triggers, Continue regular exercise/diet plan, Take daily medications   Week 1 call completed?  Yes          Eric Alvarado RN

## 2019-12-31 ENCOUNTER — TRANSCRIBE ORDERS (OUTPATIENT)
Dept: ADMINISTRATIVE | Facility: HOSPITAL | Age: 74
End: 2019-12-31

## 2019-12-31 ENCOUNTER — LAB (OUTPATIENT)
Dept: LAB | Facility: HOSPITAL | Age: 74
End: 2019-12-31

## 2019-12-31 DIAGNOSIS — N18.30 CHRONIC KIDNEY DISEASE, STAGE III (MODERATE) (HCC): ICD-10-CM

## 2019-12-31 DIAGNOSIS — D50.9 IRON DEFICIENCY ANEMIA, UNSPECIFIED IRON DEFICIENCY ANEMIA TYPE: ICD-10-CM

## 2019-12-31 DIAGNOSIS — N18.30 CHRONIC KIDNEY DISEASE, STAGE III (MODERATE) (HCC): Primary | ICD-10-CM

## 2019-12-31 PROCEDURE — 82570 ASSAY OF URINE CREATININE: CPT

## 2019-12-31 PROCEDURE — 85025 COMPLETE CBC W/AUTO DIFF WBC: CPT

## 2019-12-31 PROCEDURE — 82728 ASSAY OF FERRITIN: CPT

## 2019-12-31 PROCEDURE — 80053 COMPREHEN METABOLIC PANEL: CPT

## 2019-12-31 PROCEDURE — 83540 ASSAY OF IRON: CPT

## 2019-12-31 PROCEDURE — 84156 ASSAY OF PROTEIN URINE: CPT

## 2019-12-31 PROCEDURE — 84466 ASSAY OF TRANSFERRIN: CPT

## 2019-12-31 PROCEDURE — 36415 COLL VENOUS BLD VENIPUNCTURE: CPT

## 2020-01-01 LAB
ALBUMIN SERPL-MCNC: 3.9 G/DL (ref 3.5–5.2)
ALBUMIN/GLOB SERPL: 1.1 G/DL
ALP SERPL-CCNC: 34 U/L (ref 39–117)
ALT SERPL W P-5'-P-CCNC: 11 U/L (ref 1–33)
ANION GAP SERPL CALCULATED.3IONS-SCNC: 15.1 MMOL/L (ref 5–15)
AST SERPL-CCNC: 10 U/L (ref 1–32)
BASOPHILS # BLD AUTO: 0.04 10*3/MM3 (ref 0–0.2)
BASOPHILS NFR BLD AUTO: 0.3 % (ref 0–1.5)
BILIRUB SERPL-MCNC: <0.2 MG/DL (ref 0.2–1.2)
BUN BLD-MCNC: 40 MG/DL (ref 8–23)
BUN/CREAT SERPL: 25.2 (ref 7–25)
CALCIUM SPEC-SCNC: 9.8 MG/DL (ref 8.6–10.5)
CHLORIDE SERPL-SCNC: 99 MMOL/L (ref 98–107)
CO2 SERPL-SCNC: 27.9 MMOL/L (ref 22–29)
CREAT BLD-MCNC: 1.59 MG/DL (ref 0.57–1)
CREAT UR-MCNC: 58.1 MG/DL
DEPRECATED RDW RBC AUTO: 44.3 FL (ref 37–54)
EOSINOPHIL # BLD AUTO: 0.21 10*3/MM3 (ref 0–0.4)
EOSINOPHIL NFR BLD AUTO: 1.8 % (ref 0.3–6.2)
ERYTHROCYTE [DISTWIDTH] IN BLOOD BY AUTOMATED COUNT: 14.5 % (ref 12.3–15.4)
FERRITIN SERPL-MCNC: 334 NG/ML (ref 13–150)
GFR SERPL CREATININE-BSD FRML MDRD: 32 ML/MIN/1.73
GLOBULIN UR ELPH-MCNC: 3.6 GM/DL
GLUCOSE BLD-MCNC: 131 MG/DL (ref 65–99)
HCT VFR BLD AUTO: 30.5 % (ref 34–46.6)
HGB BLD-MCNC: 10.2 G/DL (ref 12–15.9)
IMM GRANULOCYTES # BLD AUTO: 0.08 10*3/MM3 (ref 0–0.05)
IMM GRANULOCYTES NFR BLD AUTO: 0.7 % (ref 0–0.5)
IRON 24H UR-MRATE: 18 MCG/DL (ref 37–145)
IRON SATN MFR SERPL: 14 % (ref 20–50)
LYMPHOCYTES # BLD AUTO: 2.99 10*3/MM3 (ref 0.7–3.1)
LYMPHOCYTES NFR BLD AUTO: 26 % (ref 19.6–45.3)
MCH RBC QN AUTO: 28.3 PG (ref 26.6–33)
MCHC RBC AUTO-ENTMCNC: 33.4 G/DL (ref 31.5–35.7)
MCV RBC AUTO: 84.7 FL (ref 79–97)
MONOCYTES # BLD AUTO: 0.87 10*3/MM3 (ref 0.1–0.9)
MONOCYTES NFR BLD AUTO: 7.6 % (ref 5–12)
NEUTROPHILS # BLD AUTO: 7.32 10*3/MM3 (ref 1.7–7)
NEUTROPHILS NFR BLD AUTO: 63.6 % (ref 42.7–76)
NRBC BLD AUTO-RTO: 0 /100 WBC (ref 0–0.2)
PLATELET # BLD AUTO: 428 10*3/MM3 (ref 140–450)
PMV BLD AUTO: 11.8 FL (ref 6–12)
POTASSIUM BLD-SCNC: 4.5 MMOL/L (ref 3.5–5.2)
PROT SERPL-MCNC: 7.5 G/DL (ref 6–8.5)
PROT UR-MCNC: 9 MG/DL
PROT/CREAT UR: 154.9 MG/G CREA (ref 0–200)
RBC # BLD AUTO: 3.6 10*6/MM3 (ref 3.77–5.28)
SODIUM BLD-SCNC: 142 MMOL/L (ref 136–145)
TIBC SERPL-MCNC: 125 MCG/DL (ref 298–536)
TRANSFERRIN SERPL-MCNC: 84 MG/DL (ref 200–360)
WBC NRBC COR # BLD: 11.51 10*3/MM3 (ref 3.4–10.8)

## 2020-01-02 ENCOUNTER — READMISSION MANAGEMENT (OUTPATIENT)
Dept: CALL CENTER | Facility: HOSPITAL | Age: 75
End: 2020-01-02

## 2020-01-02 NOTE — OUTREACH NOTE
COPD/PN Week 2 Survey      Responses   Facility patient discharged from?  [facility]   Does the patient have one of the following disease processes/diagnoses(primary or secondary)?  COPD/Pneumonia   Was the primary reason for admission:  COPD exacerbation   Week 2 attempt successful?  Yes   Call start time  1438   Call end time  1439   Discharge diagnosis  COPD with acute exaerbation   What is the patient's perception of their health status since discharge?  Improving   Nursing Interventions  Nurse provided patient education   Is the patient/caregiver able to teach back the hierarchy of who to call/visit for symptoms/problems? PCP, Specialist, Home health nurse, Urgent Care, ED, 911  Yes   Additional teach back comments  pt says she is doing well, no questions or concerns at this time.   Week 2 call completed?  Yes          Sue Rondon RN

## 2020-01-10 ENCOUNTER — READMISSION MANAGEMENT (OUTPATIENT)
Dept: CALL CENTER | Facility: HOSPITAL | Age: 75
End: 2020-01-10

## 2020-01-10 NOTE — OUTREACH NOTE
COPD/PN Week 3 Survey      Responses   Facility patient discharged from?  [facility]   Does the patient have one of the following disease processes/diagnoses(primary or secondary)?  COPD/Pneumonia   Was the primary reason for admission:  COPD exacerbation   Week 3 attempt successful?  Yes   Call start time  0845   Call end time  0848   Discharge diagnosis  COPD with acute exaerbation   Person spoke with today (if not patient) and relationship  Antonina-POA and daughter   Meds reviewed with patient/caregiver?  Yes   Is the patient having any side effects they believe may be caused by any medication additions or changes?  No   Does the patient have all medications ordered at discharge?  Yes   Is the patient taking all medications as directed (includes completed medication regime)?  Yes   Has the patient kept scheduled appointments due by today?  Yes   Psychosocial issues?  No   Comments  O2 used PRN   What is the patient's perception of their health status since discharge?  Improving   Are the patient's immunizations up to date?   Yes   If the patient is a current smoker, are they able to teach back resources for cessation?  -- [Nonsmoker]   Is the patient able to teach back COPD zones?  Yes   Nursing interventions  Education provided on various zones   Patient reports what zone on this call?  Green Zone   Green Zone  Reports doing well, Usual amount of phlegm/mucus without difficulty coughing up, Usual activity and exercise level, Breathing without shortness of breath, Sleeping well, Appetite is good   Green Zone interventions:  Take daily medications, Do not smoke, Use oxygen as prescribed   Week 3 call completed?  Yes          Tricia Hightower RN

## 2020-01-20 ENCOUNTER — READMISSION MANAGEMENT (OUTPATIENT)
Dept: CALL CENTER | Facility: HOSPITAL | Age: 75
End: 2020-01-20

## 2020-01-20 NOTE — OUTREACH NOTE
COPD/PN Week 4 Survey      Responses   Facility patient discharged from?  [facility]   Does the patient have one of the following disease processes/diagnoses(primary or secondary)?  COPD/Pneumonia   Was the primary reason for admission:  COPD exacerbation   Week 4 attempt successful?  Yes   Call start time  1525   Call end time  1528   Discharge diagnosis  COPD with acute exaerbation   Meds reviewed with patient/caregiver?  Yes   Is the patient taking all medications as directed (includes completed medication regime)?  Yes   Has the patient kept scheduled appointments due by today?  Yes   Is the patient still receiving Home Health Services?  N/A   Psychosocial issues?  No   Comments  O2 used PRN   What is the patient's perception of their health status since discharge?  Improving   Nursing Interventions  Nurse provided patient education   Is the patient able to teach back COPD zones?  Yes   Nursing interventions  Education provided on various zones   Patient reports what zone on this call?  Green Zone   Green Zone  Breathing without shortness of breath, Usual amount of phlegm/mucus without difficulty coughing up, Sleeping well, Appetite is good, Reports doing well   Green Zone interventions:  Do not smoke, Take daily medications, Use oxygen as prescribed   Week 4 call completed?  Yes   Would the patient like one additional call?  No   Graduated  Yes   Did the patient feel the follow up calls were helpful during their recovery period?  Yes   Was the number of calls appropriate?  Yes          Tricia Hightower RN

## 2020-04-01 ENCOUNTER — LAB (OUTPATIENT)
Dept: LAB | Facility: HOSPITAL | Age: 75
End: 2020-04-01

## 2020-04-01 ENCOUNTER — TRANSCRIBE ORDERS (OUTPATIENT)
Dept: ADMINISTRATIVE | Facility: HOSPITAL | Age: 75
End: 2020-04-01

## 2020-04-01 DIAGNOSIS — D50.9 IRON DEFICIENCY ANEMIA, UNSPECIFIED IRON DEFICIENCY ANEMIA TYPE: ICD-10-CM

## 2020-04-01 DIAGNOSIS — D50.9 IRON DEFICIENCY ANEMIA, UNSPECIFIED IRON DEFICIENCY ANEMIA TYPE: Primary | ICD-10-CM

## 2020-04-01 DIAGNOSIS — N18.30 CHRONIC KIDNEY DISEASE, STAGE III (MODERATE) (HCC): ICD-10-CM

## 2020-04-01 LAB
ALBUMIN UR-MCNC: 2.6 MG/DL
ANION GAP SERPL CALCULATED.3IONS-SCNC: 10 MMOL/L (ref 5–15)
BASOPHILS # BLD AUTO: 0.06 10*3/MM3 (ref 0–0.2)
BASOPHILS NFR BLD AUTO: 0.5 % (ref 0–1.5)
BUN BLD-MCNC: 35 MG/DL (ref 8–23)
BUN/CREAT SERPL: 21.3 (ref 7–25)
CALCIUM SPEC-SCNC: 9.6 MG/DL (ref 8.6–10.5)
CHLORIDE SERPL-SCNC: 95 MMOL/L (ref 98–107)
CO2 SERPL-SCNC: 30 MMOL/L (ref 22–29)
CREAT BLD-MCNC: 1.64 MG/DL (ref 0.57–1)
CREAT UR-MCNC: 20.5 MG/DL
CREAT UR-MCNC: 20.5 MG/DL
DEPRECATED RDW RBC AUTO: 46.2 FL (ref 37–54)
EOSINOPHIL # BLD AUTO: 0.61 10*3/MM3 (ref 0–0.4)
EOSINOPHIL NFR BLD AUTO: 5.3 % (ref 0.3–6.2)
ERYTHROCYTE [DISTWIDTH] IN BLOOD BY AUTOMATED COUNT: 15.2 % (ref 12.3–15.4)
FERRITIN SERPL-MCNC: 172 NG/ML (ref 13–150)
GFR SERPL CREATININE-BSD FRML MDRD: 31 ML/MIN/1.73
GLUCOSE BLD-MCNC: 64 MG/DL (ref 65–99)
HCT VFR BLD AUTO: 33.1 % (ref 34–46.6)
HGB BLD-MCNC: 10.8 G/DL (ref 12–15.9)
IMM GRANULOCYTES # BLD AUTO: 0.05 10*3/MM3 (ref 0–0.05)
IMM GRANULOCYTES NFR BLD AUTO: 0.4 % (ref 0–0.5)
IRON 24H UR-MRATE: 29 MCG/DL (ref 37–145)
IRON SATN MFR SERPL: 15 % (ref 20–50)
LYMPHOCYTES # BLD AUTO: 4.17 10*3/MM3 (ref 0.7–3.1)
LYMPHOCYTES NFR BLD AUTO: 36.4 % (ref 19.6–45.3)
MCH RBC QN AUTO: 27.6 PG (ref 26.6–33)
MCHC RBC AUTO-ENTMCNC: 32.6 G/DL (ref 31.5–35.7)
MCV RBC AUTO: 84.4 FL (ref 79–97)
MICROALBUMIN/CREAT UR: 126.8 MG/G
MONOCYTES # BLD AUTO: 0.87 10*3/MM3 (ref 0.1–0.9)
MONOCYTES NFR BLD AUTO: 7.6 % (ref 5–12)
NEUTROPHILS # BLD AUTO: 5.71 10*3/MM3 (ref 1.7–7)
NEUTROPHILS NFR BLD AUTO: 49.8 % (ref 42.7–76)
NRBC BLD AUTO-RTO: 0 /100 WBC (ref 0–0.2)
PLATELET # BLD AUTO: 500 10*3/MM3 (ref 140–450)
PMV BLD AUTO: 11.3 FL (ref 6–12)
POTASSIUM BLD-SCNC: 4.6 MMOL/L (ref 3.5–5.2)
PROT UR-MCNC: 11 MG/DL
PROT/CREAT UR: 536.6 MG/G CREA (ref 0–200)
RBC # BLD AUTO: 3.92 10*6/MM3 (ref 3.77–5.28)
SODIUM BLD-SCNC: 135 MMOL/L (ref 136–145)
TIBC SERPL-MCNC: 191 MCG/DL (ref 298–536)
TRANSFERRIN SERPL-MCNC: 128 MG/DL (ref 200–360)
WBC NRBC COR # BLD: 11.47 10*3/MM3 (ref 3.4–10.8)

## 2020-04-01 PROCEDURE — 82043 UR ALBUMIN QUANTITATIVE: CPT

## 2020-04-01 PROCEDURE — 80048 BASIC METABOLIC PNL TOTAL CA: CPT

## 2020-04-01 PROCEDURE — 84156 ASSAY OF PROTEIN URINE: CPT

## 2020-04-01 PROCEDURE — 85025 COMPLETE CBC W/AUTO DIFF WBC: CPT

## 2020-04-01 PROCEDURE — 82728 ASSAY OF FERRITIN: CPT

## 2020-04-01 PROCEDURE — 36415 COLL VENOUS BLD VENIPUNCTURE: CPT

## 2020-04-01 PROCEDURE — 82570 ASSAY OF URINE CREATININE: CPT

## 2020-04-01 PROCEDURE — 84466 ASSAY OF TRANSFERRIN: CPT

## 2020-04-01 PROCEDURE — 83540 ASSAY OF IRON: CPT

## 2020-04-21 ENCOUNTER — NURSE TRIAGE (OUTPATIENT)
Dept: CALL CENTER | Facility: HOSPITAL | Age: 75
End: 2020-04-21

## 2020-04-21 ENCOUNTER — APPOINTMENT (OUTPATIENT)
Dept: CT IMAGING | Facility: HOSPITAL | Age: 75
End: 2020-04-21

## 2020-04-21 ENCOUNTER — APPOINTMENT (OUTPATIENT)
Dept: GENERAL RADIOLOGY | Facility: HOSPITAL | Age: 75
End: 2020-04-21

## 2020-04-21 ENCOUNTER — HOSPITAL ENCOUNTER (INPATIENT)
Facility: HOSPITAL | Age: 75
LOS: 3 days | Discharge: HOME | End: 2020-04-24
Attending: FAMILY MEDICINE | Admitting: INTERNAL MEDICINE

## 2020-04-21 DIAGNOSIS — K26.9 DUODENAL ULCER: ICD-10-CM

## 2020-04-21 DIAGNOSIS — K92.2 GASTROINTESTINAL HEMORRHAGE, UNSPECIFIED GASTROINTESTINAL HEMORRHAGE TYPE: ICD-10-CM

## 2020-04-21 DIAGNOSIS — K29.80 DUODENITIS: Primary | ICD-10-CM

## 2020-04-21 DIAGNOSIS — K92.2 UGIB (UPPER GASTROINTESTINAL BLEED): ICD-10-CM

## 2020-04-21 LAB
A-A DO2: 25.3 MMHG (ref 0–300)
ALBUMIN SERPL-MCNC: 3.52 G/DL (ref 3.5–5.2)
ALBUMIN/GLOB SERPL: 1.1 G/DL
ALP SERPL-CCNC: 31 U/L (ref 39–117)
ALT SERPL W P-5'-P-CCNC: 6 U/L (ref 1–33)
ANION GAP SERPL CALCULATED.3IONS-SCNC: 15.6 MMOL/L (ref 5–15)
ANISOCYTOSIS BLD QL: ABNORMAL
ARTERIAL PATENCY WRIST A: POSITIVE
AST SERPL-CCNC: 10 U/L (ref 1–32)
ATMOSPHERIC PRESS: 725 MMHG
BACTERIA UR QL AUTO: ABNORMAL /HPF
BASE EXCESS BLDA CALC-SCNC: -5.6 MMOL/L (ref 0–2)
BDY SITE: ABNORMAL
BILIRUB SERPL-MCNC: <0.2 MG/DL (ref 0.2–1.2)
BILIRUB UR QL STRIP: NEGATIVE
BODY TEMPERATURE: 0 C
BUN BLD-MCNC: 116 MG/DL (ref 8–23)
BUN/CREAT SERPL: 57.4 (ref 7–25)
CA-I SERPL ISE-MCNC: 1.22 MMOL/L (ref 1.12–1.32)
CALCIUM SPEC-SCNC: 8.9 MG/DL (ref 8.6–10.5)
CHLORIDE SERPL-SCNC: 91 MMOL/L (ref 98–107)
CLARITY UR: ABNORMAL
CO2 BLDA-SCNC: 19.9 MMOL/L (ref 22–33)
CO2 SERPL-SCNC: 20.4 MMOL/L (ref 22–29)
COHGB MFR BLD: 1.1 % (ref 0–5)
COLOR UR: YELLOW
CREAT BLD-MCNC: 2.02 MG/DL (ref 0.57–1)
CRP SERPL-MCNC: 1.77 MG/DL (ref 0–0.5)
D-LACTATE SERPL-SCNC: 1.5 MMOL/L (ref 0.5–2)
DEPRECATED RDW RBC AUTO: 47.7 FL (ref 37–54)
ERYTHROCYTE [DISTWIDTH] IN BLOOD BY AUTOMATED COUNT: 15.8 % (ref 12.3–15.4)
FLUAV AG NPH QL: NEGATIVE
FLUBV AG NPH QL IA: NEGATIVE
GFR SERPL CREATININE-BSD FRML MDRD: 24 ML/MIN/1.73
GLOBULIN UR ELPH-MCNC: 3.3 GM/DL
GLUCOSE BLD-MCNC: 284 MG/DL (ref 65–99)
GLUCOSE UR STRIP-MCNC: NEGATIVE MG/DL
HCO3 BLDA-SCNC: 18.9 MMOL/L (ref 20–26)
HCT VFR BLD AUTO: 27 % (ref 34–46.6)
HCT VFR BLD CALC: 26.4 % (ref 38–51)
HGB BLD-MCNC: 8.6 G/DL (ref 12–15.9)
HGB BLDA-MCNC: 8.6 G/DL (ref 13.5–17.5)
HGB UR QL STRIP.AUTO: NEGATIVE
HOLD SPECIMEN: NORMAL
HOLD SPECIMEN: NORMAL
HOROWITZ INDEX BLD+IHG-RTO: 21 %
HYALINE CASTS UR QL AUTO: ABNORMAL /LPF
INR PPP: 1.21 (ref 0.9–1.1)
KETONES UR QL STRIP: NEGATIVE
LEUKOCYTE ESTERASE UR QL STRIP.AUTO: ABNORMAL
LYMPHOCYTES # BLD MANUAL: 4.98 10*3/MM3 (ref 0.7–3.1)
LYMPHOCYTES NFR BLD MANUAL: 25 % (ref 19.6–45.3)
LYMPHOCYTES NFR BLD MANUAL: 4 % (ref 5–12)
Lab: ABNORMAL
MAGNESIUM SERPL-MCNC: 1.9 MG/DL (ref 1.6–2.4)
MCH RBC QN AUTO: 26.5 PG (ref 26.6–33)
MCHC RBC AUTO-ENTMCNC: 31.9 G/DL (ref 31.5–35.7)
MCV RBC AUTO: 83.3 FL (ref 79–97)
METHGB BLD QL: 0.4 % (ref 0–3)
MODALITY: ABNORMAL
MONOCYTES # BLD AUTO: 0.8 10*3/MM3 (ref 0.1–0.9)
NEUTROPHILS # BLD AUTO: 14.14 10*3/MM3 (ref 1.7–7)
NEUTROPHILS NFR BLD MANUAL: 71 % (ref 42.7–76)
NITRITE UR QL STRIP: NEGATIVE
NOTE: ABNORMAL
OXYHGB MFR BLDV: 94.4 % (ref 94–99)
PCO2 BLDA: 32.2 MM HG (ref 35–45)
PCO2 TEMP ADJ BLD: ABNORMAL MM[HG]
PH BLDA: 7.38 PH UNITS (ref 7.35–7.45)
PH UR STRIP.AUTO: <=5 [PH] (ref 5–8)
PH, TEMP CORRECTED: ABNORMAL
PLATELET # BLD AUTO: 463 10*3/MM3 (ref 140–450)
PMV BLD AUTO: 11.3 FL (ref 6–12)
PO2 BLDA: 80.7 MM HG (ref 83–108)
PO2 TEMP ADJ BLD: ABNORMAL MM[HG]
POTASSIUM BLD-SCNC: 5.2 MMOL/L (ref 3.5–5.2)
PROT SERPL-MCNC: 6.8 G/DL (ref 6–8.5)
PROT UR QL STRIP: NEGATIVE
PROTHROMBIN TIME: 15.9 SECONDS (ref 11–15.4)
RBC # BLD AUTO: 3.24 10*6/MM3 (ref 3.77–5.28)
RBC # UR: ABNORMAL /HPF
REF LAB TEST METHOD: ABNORMAL
SAO2 % BLDCOA: 95.8 % (ref 94–99)
SCAN SLIDE: NORMAL
SMALL PLATELETS BLD QL SMEAR: ABNORMAL
SODIUM BLD-SCNC: 127 MMOL/L (ref 136–145)
SP GR UR STRIP: 1.01 (ref 1–1.03)
SQUAMOUS #/AREA URNS HPF: ABNORMAL /HPF
TROPONIN T SERPL-MCNC: 0.03 NG/ML (ref 0–0.03)
UROBILINOGEN UR QL STRIP: ABNORMAL
VENTILATOR MODE: ABNORMAL
WBC NRBC COR # BLD: 19.91 10*3/MM3 (ref 3.4–10.8)
WBC UR QL AUTO: ABNORMAL /HPF
WHOLE BLOOD HOLD SPECIMEN: NORMAL
WHOLE BLOOD HOLD SPECIMEN: NORMAL

## 2020-04-21 PROCEDURE — 87040 BLOOD CULTURE FOR BACTERIA: CPT | Performed by: FAMILY MEDICINE

## 2020-04-21 PROCEDURE — 71250 CT THORAX DX C-: CPT | Performed by: RADIOLOGY

## 2020-04-21 PROCEDURE — 99285 EMERGENCY DEPT VISIT HI MDM: CPT

## 2020-04-21 PROCEDURE — 74176 CT ABD & PELVIS W/O CONTRAST: CPT

## 2020-04-21 PROCEDURE — 93005 ELECTROCARDIOGRAM TRACING: CPT | Performed by: FAMILY MEDICINE

## 2020-04-21 PROCEDURE — 85007 BL SMEAR W/DIFF WBC COUNT: CPT | Performed by: FAMILY MEDICINE

## 2020-04-21 PROCEDURE — 71250 CT THORAX DX C-: CPT

## 2020-04-21 PROCEDURE — 81001 URINALYSIS AUTO W/SCOPE: CPT | Performed by: FAMILY MEDICINE

## 2020-04-21 PROCEDURE — 87804 INFLUENZA ASSAY W/OPTIC: CPT | Performed by: FAMILY MEDICINE

## 2020-04-21 PROCEDURE — 87086 URINE CULTURE/COLONY COUNT: CPT | Performed by: FAMILY MEDICINE

## 2020-04-21 PROCEDURE — 71045 X-RAY EXAM CHEST 1 VIEW: CPT | Performed by: RADIOLOGY

## 2020-04-21 PROCEDURE — 83735 ASSAY OF MAGNESIUM: CPT | Performed by: FAMILY MEDICINE

## 2020-04-21 PROCEDURE — 82375 ASSAY CARBOXYHB QUANT: CPT

## 2020-04-21 PROCEDURE — 83050 HGB METHEMOGLOBIN QUAN: CPT

## 2020-04-21 PROCEDURE — 85610 PROTHROMBIN TIME: CPT | Performed by: FAMILY MEDICINE

## 2020-04-21 PROCEDURE — 74176 CT ABD & PELVIS W/O CONTRAST: CPT | Performed by: RADIOLOGY

## 2020-04-21 PROCEDURE — 82330 ASSAY OF CALCIUM: CPT | Performed by: FAMILY MEDICINE

## 2020-04-21 PROCEDURE — 84145 PROCALCITONIN (PCT): CPT | Performed by: FAMILY MEDICINE

## 2020-04-21 PROCEDURE — 80053 COMPREHEN METABOLIC PANEL: CPT | Performed by: FAMILY MEDICINE

## 2020-04-21 PROCEDURE — 71045 X-RAY EXAM CHEST 1 VIEW: CPT

## 2020-04-21 PROCEDURE — 82805 BLOOD GASES W/O2 SATURATION: CPT

## 2020-04-21 PROCEDURE — 25010000002 CEFTRIAXONE: Performed by: FAMILY MEDICINE

## 2020-04-21 PROCEDURE — 93010 ELECTROCARDIOGRAM REPORT: CPT | Performed by: INTERNAL MEDICINE

## 2020-04-21 PROCEDURE — 85025 COMPLETE CBC W/AUTO DIFF WBC: CPT | Performed by: FAMILY MEDICINE

## 2020-04-21 PROCEDURE — 36600 WITHDRAWAL OF ARTERIAL BLOOD: CPT

## 2020-04-21 PROCEDURE — 84484 ASSAY OF TROPONIN QUANT: CPT | Performed by: FAMILY MEDICINE

## 2020-04-21 PROCEDURE — 86140 C-REACTIVE PROTEIN: CPT | Performed by: FAMILY MEDICINE

## 2020-04-21 PROCEDURE — 83605 ASSAY OF LACTIC ACID: CPT | Performed by: FAMILY MEDICINE

## 2020-04-21 RX ORDER — SODIUM CHLORIDE 9 MG/ML
75 INJECTION, SOLUTION INTRAVENOUS CONTINUOUS
Status: DISCONTINUED | OUTPATIENT
Start: 2020-04-22 | End: 2020-04-23

## 2020-04-21 RX ORDER — METOLAZONE 2.5 MG/1
2.5 TABLET ORAL EVERY OTHER DAY
Status: CANCELLED | OUTPATIENT
Start: 2020-04-22

## 2020-04-21 RX ORDER — HYDRALAZINE HYDROCHLORIDE 10 MG/1
10 TABLET, FILM COATED ORAL 3 TIMES DAILY
COMMUNITY
End: 2020-04-24 | Stop reason: HOSPADM

## 2020-04-21 RX ORDER — HYDRALAZINE HYDROCHLORIDE 10 MG/1
10 TABLET, FILM COATED ORAL 3 TIMES DAILY
Status: CANCELLED | OUTPATIENT
Start: 2020-04-22

## 2020-04-21 RX ORDER — ASPIRIN 81 MG/1
81 TABLET ORAL DAILY
COMMUNITY
End: 2020-04-24 | Stop reason: HOSPADM

## 2020-04-21 RX ORDER — FAMOTIDINE 20 MG/1
20 TABLET, FILM COATED ORAL EVERY MORNING
COMMUNITY
End: 2020-04-24 | Stop reason: HOSPADM

## 2020-04-21 RX ORDER — FAMOTIDINE 20 MG/1
20 TABLET, FILM COATED ORAL EVERY MORNING
Status: CANCELLED | OUTPATIENT
Start: 2020-04-22

## 2020-04-21 RX ORDER — ASPIRIN 81 MG/1
81 TABLET ORAL DAILY
Status: CANCELLED | OUTPATIENT
Start: 2020-04-22

## 2020-04-21 RX ORDER — DEXTROSE MONOHYDRATE 25 G/50ML
25 INJECTION, SOLUTION INTRAVENOUS
Status: DISCONTINUED | OUTPATIENT
Start: 2020-04-21 | End: 2020-04-24 | Stop reason: HOSPADM

## 2020-04-21 RX ORDER — METOLAZONE 2.5 MG/1
2.5 TABLET ORAL EVERY OTHER DAY
Status: ON HOLD | COMMUNITY
End: 2021-11-13

## 2020-04-21 RX ORDER — SPIRONOLACTONE 25 MG/1
25 TABLET ORAL DAILY
Status: CANCELLED | OUTPATIENT
Start: 2020-04-22

## 2020-04-21 RX ORDER — NITROGLYCERIN 0.4 MG/1
0.4 TABLET SUBLINGUAL
Status: DISCONTINUED | OUTPATIENT
Start: 2020-04-21 | End: 2020-04-24 | Stop reason: HOSPADM

## 2020-04-21 RX ORDER — CIPROFLOXACIN 500 MG/1
500 TABLET, FILM COATED ORAL 2 TIMES DAILY
COMMUNITY
End: 2020-04-24 | Stop reason: HOSPADM

## 2020-04-21 RX ORDER — SODIUM CHLORIDE 0.9 % (FLUSH) 0.9 %
10 SYRINGE (ML) INJECTION AS NEEDED
Status: DISCONTINUED | OUTPATIENT
Start: 2020-04-21 | End: 2020-04-24 | Stop reason: HOSPADM

## 2020-04-21 RX ORDER — NICOTINE POLACRILEX 4 MG
15 LOZENGE BUCCAL
Status: DISCONTINUED | OUTPATIENT
Start: 2020-04-21 | End: 2020-04-24 | Stop reason: HOSPADM

## 2020-04-21 RX ORDER — PANTOPRAZOLE SODIUM 40 MG/10ML
80 INJECTION, POWDER, LYOPHILIZED, FOR SOLUTION INTRAVENOUS ONCE
Status: COMPLETED | OUTPATIENT
Start: 2020-04-21 | End: 2020-04-21

## 2020-04-21 RX ORDER — BUMETANIDE 1 MG/1
1 TABLET ORAL DAILY
Status: CANCELLED | OUTPATIENT
Start: 2020-04-22

## 2020-04-21 RX ORDER — SODIUM CHLORIDE 0.9 % (FLUSH) 0.9 %
10 SYRINGE (ML) INJECTION EVERY 12 HOURS SCHEDULED
Status: DISCONTINUED | OUTPATIENT
Start: 2020-04-22 | End: 2020-04-24 | Stop reason: HOSPADM

## 2020-04-21 RX ORDER — CYANOCOBALAMIN 1000 UG/ML
1000 INJECTION, SOLUTION INTRAMUSCULAR; SUBCUTANEOUS
Status: CANCELLED | OUTPATIENT
Start: 2020-04-22

## 2020-04-21 RX ADMIN — PANTOPRAZOLE SODIUM 80 MG: 40 INJECTION, POWDER, FOR SOLUTION INTRAVENOUS at 22:47

## 2020-04-21 RX ADMIN — CEFTRIAXONE 1 G: 1 INJECTION, POWDER, FOR SOLUTION INTRAMUSCULAR; INTRAVENOUS at 20:29

## 2020-04-21 RX ADMIN — SODIUM CHLORIDE 1000 ML: 9 INJECTION, SOLUTION INTRAVENOUS at 20:28

## 2020-04-21 RX ADMIN — SODIUM CHLORIDE 8 MG/HR: 900 INJECTION INTRAVENOUS at 22:47

## 2020-04-21 NOTE — ED PROVIDER NOTES
Subjective   75-year-old female with a history of COPD diabetes diastolic heart failure presents to the emergency room with complaints of fever and weakness.  Patient was recently diagnosed with a UTI few days ago.  Patient has had development of weakness over the last few days.  Patient has had a subjective fever.  Patient has had increased weakness and has been laying in bed had difficulty with walking over the past few days due to weakness.  Patient reports today she was nauseated she is also had diarrhea.  She denies blood in her stool.  She denies abdominal pain at this time patient denies chest pain or shortness of breath.  She has had a cough is nonproductive.      Weakness - Generalized   Severity:  Moderate  Timing:  Constant  Progression:  Worsening  Chronicity:  New  Relieved by:  Nothing  Worsened by:  Nothing  Associated symptoms: cough, diarrhea, fever, myalgias and nausea    Associated symptoms: no abdominal pain, no chest pain, no dysuria, no numbness in extremities, no falls, no lethargy, no near-syncope and no vomiting        Review of Systems   Constitutional: Positive for fever.   Respiratory: Positive for cough.    Cardiovascular: Negative for chest pain and near-syncope.   Gastrointestinal: Positive for diarrhea and nausea. Negative for abdominal pain and vomiting.   Genitourinary: Negative for dysuria.   Musculoskeletal: Positive for myalgias. Negative for falls.   Neurological: Positive for weakness.   All other systems reviewed and are negative.      Past Medical History:   Diagnosis Date   • Acute on chronic diastolic CHF (congestive heart failure) (CMS/HCC) 12/17/2019   • Anal polyp 6/7/2018    Added automatically from request for surgery [id number]   • Arthritis    • Chronic kidney disease    • COPD (chronic obstructive pulmonary disease) (CMS/[facility])    • Diabetes mellitus (CMS/[facility])    • Elevated cholesterol    • Essential hypertension 12/17/2019   • History of transfusion    • Incidental lung  nodule, greater than or equal to 8mm     Left lower lobe, 15 mm based on  and 2019 chest CT scans with no growth in the size   • Iron deficiency anemia 2017   • Osteoporosis    • Pneumonia of right middle lobe due to infectious organism (CMS/HCC) 2019       No Known Allergies    Past Surgical History:   Procedure Laterality Date   • ANUS SURGERY N/A 2018    Procedure: Diagnostic anoscopy with anal polyp excision;  Surgeon: Marlyn Gutierrez MD;  Location: [facility];  Service: General   •  SECTION     • HIP BIPOLAR REPLACEMENT      left  Page    • HYSTERECTOMY         Family History   Problem Relation Age of Onset   • Heart disease Mother    • COPD Mother    • Arthritis Mother    • Diabetes Father        Social History     Socioeconomic History   • Marital status:      Spouse name: Not on file   • Number of children: Not on file   • Years of education: Not on file   • Highest education level: Not on file   Tobacco Use   • Smoking status: Former Smoker     Years: 15.00     Types: Cigarettes   • Smokeless tobacco: Never Used   • Tobacco comment: quit 15 years ago    Substance and Sexual Activity   • Alcohol use: No   • Drug use: No   • Sexual activity: Defer     Birth control/protection: Post-menopausal           Objective   Physical Exam   Constitutional: She is oriented to person, place, and time.   Chronically ill-appearing   HENT:   Head: Normocephalic and atraumatic.   Mouth/Throat: Oropharynx is clear and moist.   Eyes: Pupils are equal, round, and reactive to light.   Neck: Neck supple. No JVD present.   Cardiovascular: Normal rate, regular rhythm and normal heart sounds.   No murmur heard.  2+ radial pulses 2+ dorsalis pedis pulses no extrasystoles   Pulmonary/Chest: Effort normal and breath sounds normal. She has no wheezes. She has no rales.   No accessory muscle use   Abdominal: Soft. Bowel sounds are normal. There is no tenderness. There is no rebound and no  guarding.   No abdominal bruit   Musculoskeletal: She exhibits no edema.   Neurological: She is alert and oriented to person, place, and time. No sensory deficit.   Skin: Skin is warm and dry. Capillary refill takes less than 2 seconds. No rash noted. No erythema.   Psychiatric: She has a normal mood and affect.   Nursing note and vitals reviewed.      Procedures           ED Course  ED Course as of Apr 21 2318   Tue Apr 21, 2020 1949 EKG: Normal sinus rhythm.  Ventricular 73  QRS 66  normal axis    [BB]   2022 Patient's ABG unremarkable.  Patient's urinalysis shows too numerous to count white blood cells.    [BB]   2022 Patient elevated white blood cell count at 19,000.    [BB]   2111 Patient is noted to have significant drop in hemoglobin over the past 2 weeks.  Patient currently hemodynamically stable.  Have assessed patient rectal exam noted patient had melanotic stool.  Patient is Hemoccult positive.  CT scan pelvis shows findings of duodenitis.    [BB]   2114 Have contacted Dr. Dickens and awaiting call back. Patient placed on Protonix drip.    [BB]   2155 Spoke with Dr. dickens we will see patient in consultation.  Have contacted hospitalist awaiting call back    [BB]   2227 Have spoken to hospitalist who is agreeable to admit patient to hospital.    [BB]      ED Course User Index  [BB] Win Harden MD                                           [facility]    Final diagnoses:   Duodenitis   Gastrointestinal hemorrhage, unspecified gastrointestinal hemorrhage type            Win Harden MD  04/21/20 8924

## 2020-04-21 NOTE — TELEPHONE ENCOUNTER
"Tee states that her mother has had vomiting and diarrhea since Friday.  She is a diabetic and has kidney disease.  Her blood sugars are running in the 200's she is eating very little.  She was seen by her PCP yesterday and diagnosed with a UTI, started on Cipro.  She is drinking very little, no vomiting today but diarrhea is watery, dark, and tarry.      Reason for Disposition  • [1] SEVERE diarrhea (e.g., 7 or more times / day more than normal) AND [2] age > 60 years    Additional Information  • Negative: Shock suspected (e.g., cold/pale/clammy skin, too weak to stand, low BP, rapid pulse)  • Negative: Difficult to awaken or acting confused (e.g., disoriented, slurred speech)  • Negative: Sounds like a life-threatening emergency to the triager  • Negative: Vomiting also present and worse than the diarrhea  • Negative: [1] Blood in stool AND [2] without diarrhea  • Negative: Diarrhea in a cancer patient who is currently (or recently) receiving chemotherapy or radiation therapy, or cancer patient who has metastatic or end-stage cancer and is receiving palliative care  • Negative: [1] SEVERE abdominal pain (e.g., excruciating) AND [2] present > 1 hour  • Negative: [1] SEVERE abdominal pain AND [2] age > 60  • Negative: [1] Blood in the stool AND [2] moderate or large amount of blood  • Negative: Black or tarry bowel movements  (Exception: chronic-unchanged  black-grey bowel movements AND is taking iron pills or Pepto-bismol)  • Negative: [1] Drinking very little AND [2] dehydration suspected (e.g., no urine > 12 hours, very dry mouth, very lightheaded)  • Negative: Patient sounds very sick or weak to the triager    Answer Assessment - Initial Assessment Questions  1. DIARRHEA SEVERITY: \"How bad is the diarrhea?\" \"How many extra stools have you had in the past 24 hours than normal?\"     - NO DIARRHEA (SCALE 0)    - MILD (SCALE 1-3): Few loose or mushy BMs; increase of 1-3 stools over normal daily number of stools; " "mild increase in ostomy output.    -  MODERATE (SCALE 4-7): Increase of 4-6 stools daily over normal; moderate increase in ostomy output.  * SEVERE (SCALE 8-10; OR 'WORST POSSIBLE'): Increase of 7 or more stools daily over normal; moderate increase in ostomy output; incontinence.      moderate  2. ONSET: \"When did the diarrhea begin?\"       Friday  3. BM CONSISTENCY: \"How loose or watery is the diarrhea?\"       watery  4. VOMITING: \"Are you also vomiting?\" If so, ask: \"How many times in the past 24 hours?\"       No vomiting today but has been  5. ABDOMINAL PAIN: \"Are you having any abdominal pain?\" If yes: \"What does it feel like?\" (e.g., crampy, dull, intermittent, constant)       mild  6. ABDOMINAL PAIN SEVERITY: If present, ask: \"How bad is the pain?\"  (e.g., Scale 1-10; mild, moderate, or severe)    - MILD (1-3): doesn't interfere with normal activities, abdomen soft and not tender to touch     - MODERATE (4-7): interferes with normal activities or awakens from sleep, tender to touch     - SEVERE (8-10): excruciating pain, doubled over, unable to do any normal activities        mild  7. ORAL INTAKE: If vomiting, \"Have you been able to drink liquids?\" \"How much fluids have you had in the past 24 hours?\"      Drinking a little and keeping it down  8. HYDRATION: \"Any signs of dehydration?\" (e.g., dry mouth [not just dry lips], too weak to stand, dizziness, new weight loss) \"When did you last urinate?\"      In the last 12 hours, always weak, not dizzy  9. EXPOSURE: \"Have you traveled to a foreign country recently?\" \"Have you been exposed to anyone with diarrhea?\" \"Could you have eaten any food that was spoiled?\"      no  10. ANTIBIOTIC USE: \"Are you taking antibiotics now or have you taken antibiotics in the past 2 months?\"        Started cipro yesterday for a UTI  11. OTHER SYMPTOMS: \"Do you have any other symptoms?\" (e.g., fever, blood in stool)        Stool is dark and tarry  12. PREGNANCY: \"Is there any chance " "you are pregnant?\" \"When was your last menstrual period?\"        no    Protocols used: DIARRHEA-ADULT-AH      "

## 2020-04-22 PROBLEM — N39.0 ACUTE UTI (URINARY TRACT INFECTION): Status: RESOLVED | Noted: 2019-12-17 | Resolved: 2020-04-22

## 2020-04-22 PROBLEM — E87.4 METABOLIC ALKALOSIS WITH RESPIRATORY ACIDOSIS: Status: RESOLVED | Noted: 2019-12-17 | Resolved: 2020-04-22

## 2020-04-22 PROBLEM — K92.2 UGIB (UPPER GASTROINTESTINAL BLEED): Status: ACTIVE | Noted: 2020-04-22

## 2020-04-22 PROBLEM — J96.91 RESPIRATORY FAILURE WITH HYPOXIA (HCC): Status: RESOLVED | Noted: 2019-12-17 | Resolved: 2020-04-22

## 2020-04-22 LAB
ALBUMIN SERPL-MCNC: 2.84 G/DL (ref 3.5–5.2)
ALBUMIN/GLOB SERPL: 1 G/DL
ALP SERPL-CCNC: 24 U/L (ref 39–117)
ALT SERPL W P-5'-P-CCNC: 5 U/L (ref 1–33)
ANION GAP SERPL CALCULATED.3IONS-SCNC: 13.9 MMOL/L (ref 5–15)
ANISOCYTOSIS BLD QL: ABNORMAL
AST SERPL-CCNC: 8 U/L (ref 1–32)
BACTERIA SPEC AEROBE CULT: NO GROWTH
BILIRUB SERPL-MCNC: <0.2 MG/DL (ref 0.2–1.2)
BUN BLD-MCNC: 110 MG/DL (ref 8–23)
BUN/CREAT SERPL: 59.8 (ref 7–25)
CALCIUM SPEC-SCNC: 8.2 MG/DL (ref 8.6–10.5)
CHLORIDE SERPL-SCNC: 99 MMOL/L (ref 98–107)
CO2 SERPL-SCNC: 17.1 MMOL/L (ref 22–29)
CREAT BLD-MCNC: 1.84 MG/DL (ref 0.57–1)
DEPRECATED RDW RBC AUTO: 50.2 FL (ref 37–54)
ERYTHROCYTE [DISTWIDTH] IN BLOOD BY AUTOMATED COUNT: 15.8 % (ref 12.3–15.4)
GFR SERPL CREATININE-BSD FRML MDRD: 27 ML/MIN/1.73
GLOBULIN UR ELPH-MCNC: 2.8 GM/DL
GLUCOSE BLD-MCNC: 164 MG/DL (ref 65–99)
GLUCOSE BLDC GLUCOMTR-MCNC: 110 MG/DL (ref 70–130)
GLUCOSE BLDC GLUCOMTR-MCNC: 126 MG/DL (ref 70–130)
GLUCOSE BLDC GLUCOMTR-MCNC: 140 MG/DL (ref 70–130)
GLUCOSE BLDC GLUCOMTR-MCNC: 161 MG/DL (ref 70–130)
GLUCOSE BLDC GLUCOMTR-MCNC: 176 MG/DL (ref 70–130)
HCT VFR BLD AUTO: 22.1 % (ref 34–46.6)
HCT VFR BLD AUTO: 22.2 % (ref 34–46.6)
HCT VFR BLD AUTO: 24.2 % (ref 34–46.6)
HGB BLD-MCNC: 7 G/DL (ref 12–15.9)
HGB BLD-MCNC: 7.1 G/DL (ref 12–15.9)
HGB BLD-MCNC: 7.4 G/DL (ref 12–15.9)
HYPOCHROMIA BLD QL: ABNORMAL
LYMPHOCYTES # BLD MANUAL: 4.62 10*3/MM3 (ref 0.7–3.1)
LYMPHOCYTES NFR BLD MANUAL: 25 % (ref 19.6–45.3)
LYMPHOCYTES NFR BLD MANUAL: 5 % (ref 5–12)
MCH RBC QN AUTO: 26.2 PG (ref 26.6–33)
MCHC RBC AUTO-ENTMCNC: 30.6 G/DL (ref 31.5–35.7)
MCV RBC AUTO: 85.8 FL (ref 79–97)
MONOCYTES # BLD AUTO: 0.92 10*3/MM3 (ref 0.1–0.9)
NEUTROPHILS # BLD AUTO: 12.93 10*3/MM3 (ref 1.7–7)
NEUTROPHILS NFR BLD MANUAL: 70 % (ref 42.7–76)
PLAT MORPH BLD: NORMAL
PLATELET # BLD AUTO: 394 10*3/MM3 (ref 140–450)
PMV BLD AUTO: 11.1 FL (ref 6–12)
POTASSIUM BLD-SCNC: 4.6 MMOL/L (ref 3.5–5.2)
PROCALCITONIN SERPL-MCNC: 0.18 NG/ML (ref 0.1–0.25)
PROT SERPL-MCNC: 5.6 G/DL (ref 6–8.5)
RBC # BLD AUTO: 2.82 10*6/MM3 (ref 3.77–5.28)
SCAN SLIDE: NORMAL
SODIUM BLD-SCNC: 130 MMOL/L (ref 136–145)
WBC NRBC COR # BLD: 18.47 10*3/MM3 (ref 3.4–10.8)

## 2020-04-22 PROCEDURE — 99223 1ST HOSP IP/OBS HIGH 75: CPT | Performed by: INTERNAL MEDICINE

## 2020-04-22 PROCEDURE — 99221 1ST HOSP IP/OBS SF/LOW 40: CPT | Performed by: SURGERY

## 2020-04-22 PROCEDURE — 25010000003 MAGNESIUM SULFATE 4 GM/100ML SOLUTION: Performed by: INTERNAL MEDICINE

## 2020-04-22 PROCEDURE — 25010000002 CEFTRIAXONE PER 250 MG: Performed by: INTERNAL MEDICINE

## 2020-04-22 PROCEDURE — 85025 COMPLETE CBC W/AUTO DIFF WBC: CPT | Performed by: INTERNAL MEDICINE

## 2020-04-22 PROCEDURE — 82962 GLUCOSE BLOOD TEST: CPT

## 2020-04-22 PROCEDURE — 85018 HEMOGLOBIN: CPT | Performed by: INTERNAL MEDICINE

## 2020-04-22 PROCEDURE — 85014 HEMATOCRIT: CPT | Performed by: INTERNAL MEDICINE

## 2020-04-22 PROCEDURE — 85007 BL SMEAR W/DIFF WBC COUNT: CPT | Performed by: INTERNAL MEDICINE

## 2020-04-22 PROCEDURE — 80053 COMPREHEN METABOLIC PANEL: CPT | Performed by: INTERNAL MEDICINE

## 2020-04-22 RX ORDER — LOPERAMIDE HYDROCHLORIDE 2 MG/1
2 CAPSULE ORAL DAILY PRN
Status: CANCELLED | OUTPATIENT
Start: 2020-04-22

## 2020-04-22 RX ORDER — CARVEDILOL 25 MG/1
25 TABLET ORAL 2 TIMES DAILY WITH MEALS
Status: CANCELLED | OUTPATIENT
Start: 2020-04-22

## 2020-04-22 RX ORDER — CALCIUM CARBONATE 500(1250)
500 TABLET ORAL DAILY
Status: CANCELLED | OUTPATIENT
Start: 2020-04-22

## 2020-04-22 RX ORDER — CETIRIZINE HYDROCHLORIDE 10 MG/1
5 TABLET ORAL DAILY
Status: CANCELLED | OUTPATIENT
Start: 2020-04-22

## 2020-04-22 RX ORDER — MAGNESIUM SULFATE HEPTAHYDRATE 40 MG/ML
4 INJECTION, SOLUTION INTRAVENOUS ONCE
Status: COMPLETED | OUTPATIENT
Start: 2020-04-22 | End: 2020-04-22

## 2020-04-22 RX ORDER — LEVOFLOXACIN 500 MG/1
250 TABLET, FILM COATED ORAL
Status: CANCELLED | OUTPATIENT
Start: 2020-04-22 | End: 2020-04-27

## 2020-04-22 RX ORDER — MAGNESIUM SULFATE HEPTAHYDRATE 40 MG/ML
4 INJECTION, SOLUTION INTRAVENOUS AS NEEDED
Status: DISCONTINUED | OUTPATIENT
Start: 2020-04-22 | End: 2020-04-24 | Stop reason: HOSPADM

## 2020-04-22 RX ORDER — MAGNESIUM SULFATE HEPTAHYDRATE 40 MG/ML
2 INJECTION, SOLUTION INTRAVENOUS AS NEEDED
Status: DISCONTINUED | OUTPATIENT
Start: 2020-04-22 | End: 2020-04-24 | Stop reason: HOSPADM

## 2020-04-22 RX ORDER — FERROUS SULFATE 325(65) MG
325 TABLET ORAL EVERY EVENING
Status: CANCELLED | OUTPATIENT
Start: 2020-04-22

## 2020-04-22 RX ORDER — FAMOTIDINE 20 MG/1
20 TABLET, FILM COATED ORAL EVERY MORNING
Status: CANCELLED | OUTPATIENT
Start: 2020-04-22

## 2020-04-22 RX ADMIN — SODIUM CHLORIDE, PRESERVATIVE FREE 10 ML: 5 INJECTION INTRAVENOUS at 08:31

## 2020-04-22 RX ADMIN — SODIUM CHLORIDE 75 ML/HR: 9 INJECTION, SOLUTION INTRAVENOUS at 00:09

## 2020-04-22 RX ADMIN — SODIUM CHLORIDE 8 MG/HR: 900 INJECTION INTRAVENOUS at 08:31

## 2020-04-22 RX ADMIN — SODIUM CHLORIDE 8 MG/HR: 900 INJECTION INTRAVENOUS at 13:30

## 2020-04-22 RX ADMIN — SODIUM CHLORIDE, PRESERVATIVE FREE 10 ML: 5 INJECTION INTRAVENOUS at 00:09

## 2020-04-22 RX ADMIN — METRONIDAZOLE 500 MG: 500 INJECTION, SOLUTION INTRAVENOUS at 20:37

## 2020-04-22 RX ADMIN — CEFTRIAXONE 2 G: 2 INJECTION, POWDER, FOR SOLUTION INTRAMUSCULAR; INTRAVENOUS at 12:53

## 2020-04-22 RX ADMIN — METRONIDAZOLE 500 MG: 500 INJECTION, SOLUTION INTRAVENOUS at 05:53

## 2020-04-22 RX ADMIN — SODIUM CHLORIDE 75 ML/HR: 9 INJECTION, SOLUTION INTRAVENOUS at 12:53

## 2020-04-22 RX ADMIN — METRONIDAZOLE 500 MG: 500 INJECTION, SOLUTION INTRAVENOUS at 13:30

## 2020-04-22 RX ADMIN — SODIUM CHLORIDE 8 MG/HR: 900 INJECTION INTRAVENOUS at 03:23

## 2020-04-22 RX ADMIN — SODIUM CHLORIDE 8 MG/HR: 900 INJECTION INTRAVENOUS at 19:00

## 2020-04-22 RX ADMIN — MAGNESIUM SULFATE HEPTAHYDRATE 4 G: 40 INJECTION, SOLUTION INTRAVENOUS at 17:28

## 2020-04-22 RX ADMIN — SODIUM CHLORIDE, PRESERVATIVE FREE 10 ML: 5 INJECTION INTRAVENOUS at 20:37

## 2020-04-22 NOTE — ED NOTES
I called dr siegel for Dr Harden left a message on his vm for him to call us back.      Melissa Lopez P  04/21/20 0608

## 2020-04-22 NOTE — PLAN OF CARE
Patient awaiting EGD surgery later this afternoon. No complaints as of now. Hemoglobin stable at 7.1; no stools as of now since being admitted to hospital; still waiting on stool sample to rule out C-diff

## 2020-04-22 NOTE — PROGRESS NOTES
[facility] HOSPITALISTS CROSS COVER NOTE    Patient Identification:  Name:  Albina Finley  Age:  75 y.o.  Sex:  female  :  1945  MRN:  [id number]  Visit number:  [id number]  Primary Care Provider:  Jim Hernandez MD    Length of stay in inpatient status:  1    Brief Update     74 yo female admitted today by Dr. Lora for generalized weakness and vomiting; imaging showed thickening of the duodenum with adjacent fat stranding.  She was placed on IV Rocephin and IV Flagyl.  Currently, she is sitting up in a chair.  She does feel better.  She is not as nauseated.  She has not had emesis not diarrhea since admission.  She denies chest pain and denies diarrhea.  General surgery has seen the patient and the plan is EGD tomorrow.  Will allow her to have water and ice chips until then.  Repeat labs in the morning.  Will trend the hemoglobin as she may have some element of GI bleed based on the BUN/Cr ratio.  Will replace the magnesium per our replacement protocol.    Diagnoses:  -Acute duodenitis causing leukocytosis  -Anion gap metabolic acidosis likely due to acute kidney injury in the setting of CKD Stage III, baseline Cr 1.25-1.4  -Acute hypomagnesemia  -Parenchymal nodule in the left upper lung slightly larger than 3/2019 (1.5 cm to 1.71 cm)  -Acute hyponatremia and hypochloremia, likely hypovolemic  -Essential hypertension  -Type 2 diabetes mellitus  -COPD  -Acute anemia on top of chronic Iron deficiency anemia  -Hypoalbuminemia      Manfred Smith MD  [facility] Hospitalist  20  08:39

## 2020-04-22 NOTE — CONSULTS
Consultation note    Referring physician: Hospitalist service    Consulting Physician: Dr. Marlon Wray MD    Reason for consultation: GI bleed      HPI:   Patient is a 75-year-old white female came to emergency room yesterday for weakness and vomiting.  She has dark stool and is on chronic iron therapy.  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, diabetes, hypertension, COPD      Past Medical History:   Diagnosis Date   • Acute on chronic diastolic CHF (congestive heart failure) (CMS/HCC) 2019   • Anal polyp 2018    Added automatically from request for surgery [id number]   • Arthritis    • Chronic kidney disease    • COPD (chronic obstructive pulmonary disease) (CMS/HCC)    • Diabetes mellitus (CMS/HCC)    • Elevated cholesterol    • Essential hypertension 2019   • History of transfusion    • Incidental lung nodule, greater than or equal to 8mm     Left lower lobe, 15 mm based on  and 2019 chest CT scans with no growth in the size   • Iron deficiency anemia 2017   • Osteoporosis    • Pneumonia of right middle lobe due to infectious organism (CMS/HCC) 2019       Past Surgical History:   Procedure Laterality Date   • ANUS SURGERY N/A 2018    Procedure: Diagnostic anoscopy with anal polyp excision;  Surgeon: Marlyn Gutierrez MD;  Location: [facility];  Service: General   •  SECTION     • HIP BIPOLAR REPLACEMENT      left Dr. Cornejo    • HYSTERECTOMY           Current Facility-Administered Medications:   •  cefTRIAXone (ROCEPHIN) 2 g/100 mL 0.9% NS VTB (VENTURA), 2 g, Intravenous, Q24H, Alexandra Lora, DO  •  dextrose (D50W) 25 g/ 50mL Intravenous Solution 25 g, 25 g, Intravenous, Q15 Min PRN, Alexanrda Lora, DO  •  dextrose (GLUTOSE) oral gel 15 g, 15 g, Oral, Q15  Min PRN, Alexandra Lora DO  •  glucagon (human recombinant) (GLUCAGEN DIAGNOSTIC) injection 1 mg, 1 mg, Subcutaneous, Q15 Min PRN, Alexandra Lora DO  •  metroNIDAZOLE (FLAGYL) 500 mg/100mL IVPB, 500 mg, Intravenous, Q8H, Alexandra Lora DO, 500 mg at 04/22/20 0553  •  nitroglycerin (NITROSTAT) SL tablet 0.4 mg, 0.4 mg, Sublingual, Q5 Min PRN, Alexandra Lora DO  •  pantoprazole (PROTONIX) 40 mg/100 mL (0.4 mg/mL) in 0.9% NS IVPB, 8 mg/hr, Intravenous, Continuous, Alexandra Lora DO, Last Rate: 20 mL/hr at 04/22/20 0831, 8 mg/hr at 04/22/20 0831  •  sodium chloride 0.9 % flush 10 mL, 10 mL, Intravenous, PRN, Alexandra Lora DO  •  sodium chloride 0.9 % flush 10 mL, 10 mL, Intravenous, Q12H, Alexandra Lora DO, 10 mL at 04/22/20 0831  •  sodium chloride 0.9 % flush 10 mL, 10 mL, Intravenous, PRN, Alexandra Lora DO  •  sodium chloride 0.9 % infusion, 75 mL/hr, Intravenous, Continuous, Alexandra Lora DO, Last Rate: 75 mL/hr at 04/22/20 0009, 75 mL/hr at 04/22/20 0009    No Known Allergies    Social History     Socioeconomic History   • Marital status:      Spouse name: Not on file   • Number of children: Not on file   • Years of education: Not on file   • Highest education level: Not on file   Tobacco Use   • Smoking status: Former Smoker     Years: 15.00     Types: Cigarettes   • Smokeless tobacco: Never Used   • Tobacco comment: quit 15 years ago    Substance and Sexual Activity   • Alcohol use: No   • Drug use: No   • Sexual activity: Defer     Birth control/protection: Post-menopausal       Family History   Problem Relation Age of Onset   • Heart disease Mother    • COPD Mother    • Arthritis Mother    • Diabetes Father        ROS:   Constitutional: Denies any weight changes, fatigue or weakness.  Eyes: Denies blurred or double vision  Cardiovascular: Denies chest pain, palpitations, edemas.  Respiratory: Denies cough, sputum, SOB.  Gastrointestinal:  Denies N&V, abdominal pain, diarrhea, constipation.  Genitourinary: Denies dysuria, frequency.  Endocrine: Denies cold intolerance, lethargy and flushing.  Hem: Denies excessive bruising and postop bleeding.  Musculoskeletal: Denies weakness, joint swelling, pain or stiffness.  Neuro: Denies seizures, CVA, paresthesia, or peripheral neuropathy.   Skin: Denies change in nevi, rashes, masses.    Physical Exam:   Vitals:    04/22/20 1021   BP: 144/53   Pulse: 77   Resp: 18   Temp: 98.4 °F (36.9 °C)   SpO2: 98%     General :  patient is a 75 y.o. female in no acute distress.    HEENT:    normocephalic, pupils equally round and reactive to light, extraocular motions                    intact.                     Chest:       clear bilaterally.  Equal breath sounds.  No rales or rhonchi    Heart:        regular rate and rhythm.    Abdomen:  soft, nontender.  No distention    :            normal external genitalia    Rectal:       not performed    Extremities: no clubbing cyanosis or edema.  Good femoral, popliteal, dorsalis pedis                           and posterior tibial pulse.    Neurologic: patient oriented ×3.  Cranial  nerves grossly intact.  No sensory, cellebellar,                     or motor dysfunction.      Lab Results   Component Value Date    GLUCOSE 164 (H) 04/22/2020     (H) 04/22/2020    CREATININE 1.84 (H) 04/22/2020    EGFRIFNONA 27 (L) 04/22/2020    BCR 59.8 (H) 04/22/2020    CO2 17.1 (L) 04/22/2020    CALCIUM 8.2 (L) 04/22/2020    ALBUMIN 2.84 (L) 04/22/2020    LABIL2 1.2 (L) 09/15/2015    AST 8 04/22/2020    ALT 5 04/22/2020     WBC   Date Value Ref Range Status   04/22/2020 18.47 (H) 3.40 - 10.80 10*3/mm3 Final     RBC   Date Value Ref Range Status   04/22/2020 2.82 (L) 3.77 - 5.28 10*6/mm3 Final     Hemoglobin   Date Value Ref Range Status   04/22/2020 7.0 (L) 12.0 - 15.9 g/dL Final     Hematocrit   Date Value Ref Range Status   04/22/2020 22.2 (L) 34.0 - 46.6 % Final     MCV   Date  Value Ref Range Status   04/22/2020 85.8 79.0 - 97.0 fL Final     MCH   Date Value Ref Range Status   04/22/2020 26.2 (L) 26.6 - 33.0 pg Final     MCHC   Date Value Ref Range Status   04/22/2020 30.6 (L) 31.5 - 35.7 g/dL Final     RDW   Date Value Ref Range Status   04/22/2020 15.8 (H) 12.3 - 15.4 % Final     RDW-SD   Date Value Ref Range Status   04/22/2020 50.2 37.0 - 54.0 fl Final     MPV   Date Value Ref Range Status   04/22/2020 11.1 6.0 - 12.0 fL Final     Platelets   Date Value Ref Range Status   04/22/2020 394 140 - 450 10*3/mm3 Final     Neutrophils Absolute   Date Value Ref Range Status   04/22/2020 12.93 (H) 1.70 - 7.00 10*3/mm3 Final       Imaging Results (Last 72 Hours)     Procedure Component Value Units Date/Time    CT Abdomen Pelvis Without Contrast [[id number]] Collected:  04/21/20 2055     Updated:  04/21/20 2100    Narrative:       CT ABDOMEN PELVIS WO CONTRAST-     CLINICAL INDICATION: nausea/diarrhea/leukocytosis        COMPARISON: 05/15/2014     TECHNIQUE: Axial images were acquired from the lung bases through the  pubic symphysis without any IV or oral contrast.  Reformatted images were created in both the coronal and sagittal planes.     Radiation dose reduction techniques were utilized per ALARA protocol.  Automated exposure control was initiated through either or CareDose or  DoseRight software packages by  protocol.           FINDINGS:   There are no pleural effusions. Coronary artery calcifications are  present.     The liver is homogeneous. There is no evidence of focal hepatic mass     The spleen is homogeneous     There is no peripancreatic stranding or pancreatic head mass.     Left adrenal mass is unchanged in appearance and size.     There is evidence of atherosclerotic vascular disease.     Arthritic change in the spine     The kidneys show no evidence of hydronephrosis or hydroureter. I do not  see any distal ureteral stones.      Otherwise I do not see any free fluid  or walled off fluid collections.     The duodenal wall appears slightly thickened and there is minimal  adjacent stranding, possibly representing a duodenitis.     There is no evidence of mesenteric or retroperitoneal adenopathy             Impression:       :   1. Left adrenal nodule unchanged  2. Thickening of the duodenal wall and adjacent stranding, possibly  representing a duodenitis. Endoscopic evaluation may be of benefit.  3. Evidence of atherosclerotic vascular disease.  4. Arthritic change in the spine              This report was finalized on 4/21/2020 8:58 PM by Dr. Jonah Mcbride MD.       CT Chest Without Contrast [[id number]] Collected:  04/21/20 2051     Updated:  04/21/20 2057    Narrative:       EXAMINATION: CT CHEST WO CONTRAST-      CLINICAL INDICATION: leukocytosis/weakness/ soa        DOSE:  COMPARISON: 03/12/2019        Radiation dose reduction techniques were utilized per ALARA protocol.  Automated exposure control was initiated through either or [employer] or  DoseRight software packages by  protocol.           PROCEDURE:  Axial images were acquired from the thoracic inlet through the upper  abdomen without any IV contrast.  Reformatted images were created.     FINDINGS:  Adenopathy:No evidence of mediastinal or hilar lymph node enlargement.  No axillary lymph node enlargement.     Fluid:There are no pericardial or pleural effusions.     LUNGS:Low-attenuation mass in the left lung on image 41 of the axial  series. It measures 1.71 cm. Slightly larger than on the previous exam  where it was 1.50 cm. It contains fat density and is felt to be a benign  mass. No other parenchymal nodules or masses are seen.        Skeletal:There is arthritic change in the spine     Extensive coronary artery calcifications are present.       Impression:          1. Slight interval growth in left upper lobe mass. It does, however,  contain fat density and thus is most likely a benign mass.  Continued  follow-up suggested.  2. Coronary artery calcifications     This report was finalized on 4/21/2020 8:55 PM by Dr. Jonah Mcbride MD.       XR Chest 1 View [[id number]] Collected:  04/21/20 2016     Updated:  04/21/20 2018    Narrative:       XR CHEST 1 VW-     CLINICAL INDICATION: Simple Sepsis Protocol        COMPARISON: 12/16/2019      TECHNIQUE: Single frontal view of the chest.     FINDINGS:     There is no focal alveolar infiltrate or effusion.  The cardiac silhouette is normal. The pulmonary vasculature is  unremarkable.  There is no evidence of an acute osseous abnormality.   Parenchymal nodule in the left lung is unchanged          Impression:       Parenchymal nodule in the left lung unchanged from the prior study.     Radiopaque line projecting over the left neck. Recommend clinical  correlation     This report was finalized on 4/21/2020 8:16 PM by Dr. Jonah Mcbride MD.               Assessment:   Upper GI bleed  Duodenitis  Multiple comorbidities    Plan:  EGD    Dictated utilizing Dragon dictation

## 2020-04-22 NOTE — NURSING NOTE
[facility] ADMISSION NOTE:  Patient enrolled in the Transition program to assist with education and support during transition home from hospital. Transition home  will see patient at home within 48 hours of discharge and will follow up with patient in home and telephonically for 6 weeks post discharge under the [place].

## 2020-04-22 NOTE — H&P
Hospitalist History and Physical    Patient Identification:  Name: Albina Finley  Age/Sex: 75 y.o. female  :  1945  MRN: [id number]         Primary Care Physician: Jim Hernandez MD    Chief Complaint   Patient presents with   • Fever       History of Present Illness  Patient is a 75 y.o. female presents with the following: Weakness and vomiting    The patient is a 75-year-old female with past medical history significant for iron deficiency anemia, essential hypertension, COPD and diabetes mellitus who presents to the emergency department complaining of progressive weakness and vomiting.    The patient reports a 3 to 4-day history of vomiting.  She states that her vomitus was mostly clear in color.  She denied hematemesis.  Patient reports nausea.  Patient reports bloating.  Patient states that she has chronically dark appearing stools as she is on chronic iron therapy but states that her stools have appeared darker over the past 2 days.  She does report a 2-day history of diarrhea.  Patient denies any abdominal pain.  Patient denies any significant dyspepsia but does report occasional mild heartburn symptoms.  Patient denies any use of over-the-counter nonsteroidal anti-inflammatory drugs.    Patient states she was recently treated earlier this week for a UTI by her primary care provider.  Patient currently denies any hematuria, dysuria and/or urinary frequency.  Patient reports weakness as noted above but denies any recent falls.  She denies chest pains.  No lower extremity edema.    In the emergency department, patient's vital signs were largely unremarkable.  According to the emergency department physician, the patient had a positive Hemoccult in ED.  She was afebrile. Her hemoglobin upon arrival to the emergency department was 8.6 with a hematocrit of 27.0.  Patient's hemoglobin from earlier this month was 10.8 with a hematocrit of 33.1.  Her white blood cell count was 19.91.  CMP upon admission revealed  a BUN of 116 and a creatinine of 2.02.  Patient's BUN and creatinine were 35 and 1.64 respectively on .  Patient's glucose was 286, CO2 20.4, chloride 91 and anion gap 15.6.  C-RP 1.77 and lactate 1.5.  CT scan of the chest without contrast was ordered in the emergency department which revealed slight interval growth of the left upper mass that contained fat density and was thought to be benign.    Patient had a CT scan of her abdomen and pelvis without contrast that revealed an unchanged left adrenal nodule.  She had thickening of the duodenal wall and adjacent stranding thought to represent duodenitis.    Due to her complaints of diarrhea, a C.Diff study was ordered in ED.    Patient has been admitted to the telemetry unit for further evaluation and management.    Present during visit: JOYCE Tolbert  Past History:  Past Medical History:   Diagnosis Date   • Acute on chronic diastolic CHF (congestive heart failure) (CMS/HCC) 2019   • Anal polyp 2018    Added automatically from request for surgery [id number]   • Arthritis    • Chronic kidney disease    • COPD (chronic obstructive pulmonary disease) (CMS/HCC)    • Diabetes mellitus (CMS/HCC)    • Elevated cholesterol    • Essential hypertension 2019   • History of transfusion    • Incidental lung nodule, greater than or equal to 8mm     Left lower lobe, 15 mm based on  and 2019 chest CT scans with no growth in the size   • Iron deficiency anemia 2017   • Osteoporosis    • Pneumonia of right middle lobe due to infectious organism (CMS/HCC) 2019     Past Surgical History:   Procedure Laterality Date   • ANUS SURGERY N/A 2018    Procedure: Diagnostic anoscopy with anal polyp excision;  Surgeon: Marlyn Gutierrez MD;  Location: [facility];  Service: General   •  SECTION     • HIP BIPOLAR REPLACEMENT      left Dr. Cornejo    • HYSTERECTOMY       Family History   Problem Relation Age of Onset   • Heart disease Mother    • COPD  Mother    • Arthritis Mother    • Diabetes Father      Social History     Tobacco Use   • Smoking status: Former Smoker     Years: 15.00     Types: Cigarettes   • Smokeless tobacco: Never Used   • Tobacco comment: quit 15 years ago    Substance Use Topics   • Alcohol use: No   • Drug use: No     Medications Prior to Admission   Medication Sig Dispense Refill Last Dose   • aspirin 81 MG EC tablet Take 81 mg by mouth Daily.   4/21/2020 at 0530   • bumetanide (BUMEX) 1 MG tablet Take 1 mg by mouth Daily. PTA: Pt takes 2 tablets if her weight is above 152lb   Past Week at unknown   • calcium carbonate (OS-TIANNA) 600 MG tablet Take 600 mg by mouth Daily.   Past Week at Unknown time   • carvedilol (COREG) 25 MG tablet Take 25 mg by mouth 2 (Two) Times a Day With Meals.   4/21/2020 at 1700   • cetirizine (zyrTEC) 10 MG tablet Take 10 mg by mouth Daily.   4/21/2020 at 0530   • ciprofloxacin (CIPRO) 500 MG tablet Take 500 mg by mouth 2 (Two) Times a Day.   4/21/2020 at 1700   • cyanocobalamin 1000 MCG/ML injection Inject 1,000 mcg into the appropriate muscle as directed by prescriber Every 28 (Twenty-Eight) Days.   4/10/2020 at Unknown time   • famotidine (PEPCID) 20 MG tablet Take 20 mg by mouth Every Morning.   Past Week at unknown   • fenofibrate 160 MG tablet Take 160 mg by mouth Daily.   4/21/2020 at 0530   • ferrous sulfate 325 (65 FE) MG tablet Take 325 mg by mouth Every Evening.   4/21/2020 at 1700   • hydrALAZINE (APRESOLINE) 10 MG tablet Take 10 mg by mouth 3 (Three) Times a Day.   4/21/2020 at 1700   • insulin detemir (LEVEMIR) 100 UNIT/ML injection Inject 80 Units under the skin into the appropriate area as directed Every Morning.   4/21/2020 at 0530   • insulin detemir (LEVEMIR) 100 UNIT/ML injection Inject 40 Units under the skin into the appropriate area as directed Every Night.   4/21/2020 at 1700   • loperamide (IMODIUM) 2 MG capsule Take 2 mg by mouth Daily.   4/21/2020 at 0530   • metFORMIN (GLUCOPHAGE) 500  MG tablet Take 500 mg by mouth 2 (Two) Times a Day With Meals.   4/21/2020 at 1700   • metOLazone (ZAROXOLYN) 2.5 MG tablet Take 2.5 mg by mouth Every Other Day.   Past Week at Unknown time   • spironolactone (ALDACTONE) 25 MG tablet Take 25 mg by mouth Daily.   4/21/2020 at 0530     Allergies: Patient has no known allergies.    Review of Systems:  Review of Systems   Constitutional: Negative for chills, diaphoresis and fever.   HENT: Negative for hearing loss, tinnitus and trouble swallowing.    Eyes: Negative for discharge and visual disturbance.   Respiratory: Negative for cough, shortness of breath and wheezing.    Cardiovascular: Negative for chest pain, palpitations and leg swelling.   Gastrointestinal: Positive for abdominal distention, diarrhea, nausea and vomiting. Negative for abdominal pain and constipation.   Endocrine: Negative for polydipsia, polyphagia and polyuria.   Genitourinary: Negative for difficulty urinating, dysuria, frequency and hematuria.   Musculoskeletal: Negative for gait problem, myalgias and neck pain.   Skin: Negative for rash and wound.   Neurological: Positive for weakness. Negative for dizziness, tremors, seizures, syncope and light-headedness.   Hematological: Does not bruise/bleed easily.   Psychiatric/Behavioral: Negative for confusion, hallucinations and suicidal ideas.      Vital Signs  Temp:  [97.4 °F (36.3 °C)-98.4 °F (36.9 °C)] 97.4 °F (36.3 °C)  Heart Rate:  [64-73] 73  Resp:  [16-20] 16  BP: (111-139)/(37-59) 120/54  Body mass index is 26.91 kg/m².    Physical Exam:  Physical Exam   Constitutional: She appears well-developed and well-nourished. No distress.   Chronically ill-appearing but in no acute distress   HENT:   Head: Normocephalic and atraumatic.   Mouth/Throat: Oropharynx is clear and moist. Mucous membranes are dry.   Eyes: Pupils are equal, round, and reactive to light. Conjunctivae and EOM are normal.   Neck: Neck supple. No tracheal deviation present.    Cardiovascular: Normal rate and regular rhythm. Exam reveals no gallop and no friction rub.   No murmur heard.  Pulmonary/Chest: No respiratory distress. She has decreased breath sounds in the right lower field and the left lower field. She has no wheezes. She has no rales.   Abdominal: Soft. She exhibits no distension. Bowel sounds are decreased. There is no tenderness. There is no guarding.   Musculoskeletal: She exhibits no tenderness.   No significant lower extremity edema.   Neurological: She is alert. No cranial nerve deficit.   Patient oriented to self and place and able to follow commands.  No acute focal neurologic deficits.   Skin: Skin is warm and dry. No rash noted. No erythema.   Psychiatric: She has a normal mood and affect.      Results Review:    Results from last 7 days   Lab Units 04/21/20 2012   PH, ARTERIAL pH units 7.378   PO2 ART mm Hg 80.7*   PCO2, ARTERIAL mm Hg 32.2*   HCO3 ART mmol/L 18.9*       Results from last 7 days   Lab Units 04/22/20  0047 04/21/20 1942   WBC 10*3/mm3 18.47* 19.91*   HEMOGLOBIN g/dL 7.4* 8.6*   HEMATOCRIT % 24.2* 27.0*   PLATELETS 10*3/mm3 394 463*     Results from last 7 days   Lab Units 04/22/20 0039 04/21/20 1942   SODIUM mmol/L 130* 127*   POTASSIUM mmol/L 4.6 5.2   CHLORIDE mmol/L 99 91*   CO2 mmol/L 17.1* 20.4*   BUN mg/dL 110* 116*   CREATININE mg/dL 1.84* 2.02*   CALCIUM mg/dL 8.2* 8.9   GLUCOSE mg/dL 164* 284*     Results from last 7 days   Lab Units 04/22/20 0039 04/21/20 1942   BILIRUBIN mg/dL <0.2* <0.2*   ALK PHOS U/L 24* 31*   AST (SGOT) U/L 8 10   ALT (SGPT) U/L 5 6     Results from last 7 days   Lab Units 04/21/20 1942   CRP mg/dL 1.77*     Results from last 7 days   Lab Units 04/21/20 1942   MAGNESIUM mg/dL 1.9     Results from last 7 days   Lab Units 04/21/20 1942   TROPONIN T ng/mL 0.028     Results from last 7 days   Lab Units 04/21/20 1942   INR  1.21*       Imaging:    I have personally reviewed the EKG. pending cardiology  interpretation, however, per my view the patient has a normal sinus rhythm without acute ST-T wave changes.    CT scan of the abdomen and pelvis images have been reviewed.  Per my view the patient does have inflammation surrounding the duodenum.  She has some splenic calcifications.    Imaging Results (Most Recent)     Procedure Component Value Units Date/Time    CT Abdomen Pelvis Without Contrast [[id number]] Collected:  04/21/20 2055     Updated:  04/21/20 2100    Narrative:       CT ABDOMEN PELVIS WO CONTRAST-     CLINICAL INDICATION: nausea/diarrhea/leukocytosis        COMPARISON: 05/15/2014     TECHNIQUE: Axial images were acquired from the lung bases through the  pubic symphysis without any IV or oral contrast.  Reformatted images were created in both the coronal and sagittal planes.     Radiation dose reduction techniques were utilized per ALARA protocol.  Automated exposure control was initiated through either or morphCARD or  DoseRight software packages by  protocol.           FINDINGS:   There are no pleural effusions. Coronary artery calcifications are  present.     The liver is homogeneous. There is no evidence of focal hepatic mass     The spleen is homogeneous     There is no peripancreatic stranding or pancreatic head mass.     Left adrenal mass is unchanged in appearance and size.     There is evidence of atherosclerotic vascular disease.     Arthritic change in the spine     The kidneys show no evidence of hydronephrosis or hydroureter. I do not  see any distal ureteral stones.      Otherwise I do not see any free fluid or walled off fluid collections.     The duodenal wall appears slightly thickened and there is minimal  adjacent stranding, possibly representing a duodenitis.     There is no evidence of mesenteric or retroperitoneal adenopathy             Impression:       :   1. Left adrenal nodule unchanged  2. Thickening of the duodenal wall and adjacent stranding,  possibly  representing a duodenitis. Endoscopic evaluation may be of benefit.  3. Evidence of atherosclerotic vascular disease.  4. Arthritic change in the spine              This report was finalized on 4/21/2020 8:58 PM by Dr. Jonah Mcbride MD.       CT Chest Without Contrast [[id number]] Collected:  04/21/20 2051     Updated:  04/21/20 2057    Narrative:       EXAMINATION: CT CHEST WO CONTRAST-      CLINICAL INDICATION: leukocytosis/weakness/ soa        DOSE:  COMPARISON: 03/12/2019        Radiation dose reduction techniques were utilized per ALARA protocol.  Automated exposure control was initiated through either or [employer] or  [employer] software packages by  protocol.           PROCEDURE:  Axial images were acquired from the thoracic inlet through the upper  abdomen without any IV contrast.  Reformatted images were created.     FINDINGS:  Adenopathy:No evidence of mediastinal or hilar lymph node enlargement.  No axillary lymph node enlargement.     Fluid:There are no pericardial or pleural effusions.     LUNGS:Low-attenuation mass in the left lung on image 41 of the axial  series. It measures 1.71 cm. Slightly larger than on the previous exam  where it was 1.50 cm. It contains fat density and is felt to be a benign  mass. No other parenchymal nodules or masses are seen.        Skeletal:There is arthritic change in the spine     Extensive coronary artery calcifications are present.       Impression:          1. Slight interval growth in left upper lobe mass. It does, however,  contain fat density and thus is most likely a benign mass. Continued  follow-up suggested.  2. Coronary artery calcifications     This report was finalized on 4/21/2020 8:55 PM by Dr. Jonah Mcbride MD.       XR Chest 1 View [[id number]] Collected:  04/21/20 2016     Updated:  04/21/20 2018    Narrative:       XR CHEST 1 VW-     CLINICAL INDICATION: Simple Sepsis Protocol        COMPARISON: 12/16/2019      TECHNIQUE: Single  frontal view of the chest.     FINDINGS:     There is no focal alveolar infiltrate or effusion.  The cardiac silhouette is normal. The pulmonary vasculature is  unremarkable.  There is no evidence of an acute osseous abnormality.   Parenchymal nodule in the left lung is unchanged          Impression:       Parenchymal nodule in the left lung unchanged from the prior study.     Radiopaque line projecting over the left neck. Recommend clinical  correlation     This report was finalized on 4/21/2020 8:16 PM by Dr. Jonah Mcbride MD.             Assessment/Plan     -Acute duodenitis presenting with vomiting and abdominal bloating  -Leukocytosis likely due to above  -Suspected upper gastrointestinal bleed with elevated BUN/Cr  -Anion gap metabolic acidosis likely due to worsening renal failure in the setting of CKD Stage III  -Hyponatremia and hypochloremia, likely hypovolemic  -Acute on chronic normocytic/iron deficient anemia, likely exacerbated by above  -Diabetes mellitus type II that is uncontrolled with hyperglycemia  -Essential hypertension is currently controlled  -COPD currently without an acute exacerbation    Patient has been admitted to the telemetry unit.  Continue with nothing by mouth status, gentle IV fluid hydration and her pantoprazole infusion.  I have consulted general surgery for consideration of an EGD.  For now, continue with IV antibiotics; IV Rocephin and IV Flagyl although patient's leukocytosis could possibly be reactive.  Continue to follow her CBC and trend her temperature curve.  Continue to monitor her hemoglobin and hematocrit closely.  I have ordered every 6 hours H/H.  Plan to transfuse for hemoglobin less than 7.0 and/or active bleeding or hemodynamic instability.  Patient has been placed on Accu-Cheks and the hypoglycemia protocol.  Can add as needed sliding scale insulin based on repeat blood glucose levels.  Will hold patient's oral medications at this time.  Can add a an IV as needed  antihypertensive should this be needed.    DVT/GI prophylaxis: SCDs/pantoprazole infusion    Estimated Length of Stay: > 2 MNs    The patient is considered to be high risk due to: possible UGIB, acute on chronic anemia, possible duodenitis    I discussed the patients findings and my recommendations with patient and nursing staff      Alexandra Lora DO  04/22/20  04:52

## 2020-04-22 NOTE — DISCHARGE INSTRUCTIONS
You have been enrolled into the transition from hospital to home program.  A nurse will be contacting you after you discharge to home to set up a time to come out to your home for a follow-up visit.  If you have any questions or concerns you can call this number anytime and a nurse will contact you:  [facility]  [phone].

## 2020-04-23 ENCOUNTER — ANESTHESIA EVENT (OUTPATIENT)
Dept: PERIOP | Facility: HOSPITAL | Age: 75
End: 2020-04-23

## 2020-04-23 ENCOUNTER — ANESTHESIA (OUTPATIENT)
Dept: PERIOP | Facility: HOSPITAL | Age: 75
End: 2020-04-23

## 2020-04-23 PROBLEM — K92.2 UGIB (UPPER GASTROINTESTINAL BLEED): Status: RESOLVED | Noted: 2020-04-22 | Resolved: 2020-04-23

## 2020-04-23 LAB
ABO GROUP BLD: NORMAL
ABO GROUP BLD: NORMAL
ALBUMIN SERPL-MCNC: 3.01 G/DL (ref 3.5–5.2)
ALBUMIN/GLOB SERPL: 1.2 G/DL
ALP SERPL-CCNC: 24 U/L (ref 39–117)
ALT SERPL W P-5'-P-CCNC: 5 U/L (ref 1–33)
ANION GAP SERPL CALCULATED.3IONS-SCNC: 14.9 MMOL/L (ref 5–15)
ANISOCYTOSIS BLD QL: NORMAL
AST SERPL-CCNC: 10 U/L (ref 1–32)
BASOPHILS # BLD AUTO: 0.08 10*3/MM3 (ref 0–0.2)
BASOPHILS NFR BLD AUTO: 0.7 % (ref 0–1.5)
BILIRUB SERPL-MCNC: <0.2 MG/DL (ref 0.2–1.2)
BLD GP AB SCN SERPL QL: NEGATIVE
BUN BLD-MCNC: 64 MG/DL (ref 8–23)
BUN/CREAT SERPL: 45.7 (ref 7–25)
CALCIUM SPEC-SCNC: 8.5 MG/DL (ref 8.6–10.5)
CHLORIDE SERPL-SCNC: 105 MMOL/L (ref 98–107)
CO2 SERPL-SCNC: 17.1 MMOL/L (ref 22–29)
CREAT BLD-MCNC: 1.4 MG/DL (ref 0.57–1)
DEPRECATED RDW RBC AUTO: 54.2 FL (ref 37–54)
EOSINOPHIL # BLD AUTO: 0.29 10*3/MM3 (ref 0–0.4)
EOSINOPHIL NFR BLD AUTO: 2.4 % (ref 0.3–6.2)
ERYTHROCYTE [DISTWIDTH] IN BLOOD BY AUTOMATED COUNT: 16.2 % (ref 12.3–15.4)
GFR SERPL CREATININE-BSD FRML MDRD: 37 ML/MIN/1.73
GLOBULIN UR ELPH-MCNC: 2.6 GM/DL
GLUCOSE BLD-MCNC: 143 MG/DL (ref 65–99)
GLUCOSE BLDC GLUCOMTR-MCNC: 181 MG/DL (ref 70–130)
GLUCOSE BLDC GLUCOMTR-MCNC: 235 MG/DL (ref 70–130)
GLUCOSE BLDC GLUCOMTR-MCNC: 271 MG/DL (ref 70–130)
HCT VFR BLD AUTO: 23.2 % (ref 34–46.6)
HCT VFR BLD AUTO: 26.2 % (ref 34–46.6)
HGB BLD-MCNC: 6.7 G/DL (ref 12–15.9)
HGB BLD-MCNC: 8.3 G/DL (ref 12–15.9)
HYPOCHROMIA BLD QL: NORMAL
IMM GRANULOCYTES # BLD AUTO: 0.08 10*3/MM3 (ref 0–0.05)
IMM GRANULOCYTES NFR BLD AUTO: 0.7 % (ref 0–0.5)
LYMPHOCYTES # BLD AUTO: 3.04 10*3/MM3 (ref 0.7–3.1)
LYMPHOCYTES NFR BLD AUTO: 25.1 % (ref 19.6–45.3)
MAGNESIUM SERPL-MCNC: 2.9 MG/DL (ref 1.6–2.4)
MCH RBC QN AUTO: 26.6 PG (ref 26.6–33)
MCHC RBC AUTO-ENTMCNC: 28.9 G/DL (ref 31.5–35.7)
MCV RBC AUTO: 92.1 FL (ref 79–97)
MONOCYTES # BLD AUTO: 0.92 10*3/MM3 (ref 0.1–0.9)
MONOCYTES NFR BLD AUTO: 7.6 % (ref 5–12)
NEUTROPHILS # BLD AUTO: 7.7 10*3/MM3 (ref 1.7–7)
NEUTROPHILS NFR BLD AUTO: 63.5 % (ref 42.7–76)
NRBC BLD AUTO-RTO: 0 /100 WBC (ref 0–0.2)
PHOSPHATE SERPL-MCNC: 3.5 MG/DL (ref 2.5–4.5)
PLAT MORPH BLD: NORMAL
PLATELET # BLD AUTO: 370 10*3/MM3 (ref 140–450)
PMV BLD AUTO: 11.4 FL (ref 6–12)
POTASSIUM BLD-SCNC: 4.1 MMOL/L (ref 3.5–5.2)
PROT SERPL-MCNC: 5.6 G/DL (ref 6–8.5)
RBC # BLD AUTO: 2.52 10*6/MM3 (ref 3.77–5.28)
RH BLD: POSITIVE
RH BLD: POSITIVE
SODIUM BLD-SCNC: 137 MMOL/L (ref 136–145)
T&S EXPIRATION DATE: NORMAL
WBC NRBC COR # BLD: 12.11 10*3/MM3 (ref 3.4–10.8)

## 2020-04-23 PROCEDURE — 83735 ASSAY OF MAGNESIUM: CPT | Performed by: INTERNAL MEDICINE

## 2020-04-23 PROCEDURE — 85014 HEMATOCRIT: CPT | Performed by: INTERNAL MEDICINE

## 2020-04-23 PROCEDURE — P9016 RBC LEUKOCYTES REDUCED: HCPCS

## 2020-04-23 PROCEDURE — 86900 BLOOD TYPING SEROLOGIC ABO: CPT | Performed by: PHYSICIAN ASSISTANT

## 2020-04-23 PROCEDURE — 36430 TRANSFUSION BLD/BLD COMPNT: CPT

## 2020-04-23 PROCEDURE — 86850 RBC ANTIBODY SCREEN: CPT | Performed by: PHYSICIAN ASSISTANT

## 2020-04-23 PROCEDURE — 85025 COMPLETE CBC W/AUTO DIFF WBC: CPT | Performed by: INTERNAL MEDICINE

## 2020-04-23 PROCEDURE — 82962 GLUCOSE BLOOD TEST: CPT

## 2020-04-23 PROCEDURE — 88305 TISSUE EXAM BY PATHOLOGIST: CPT | Performed by: SURGERY

## 2020-04-23 PROCEDURE — 84100 ASSAY OF PHOSPHORUS: CPT | Performed by: INTERNAL MEDICINE

## 2020-04-23 PROCEDURE — 25010000002 PROPOFOL 10 MG/ML EMULSION: Performed by: NURSE ANESTHETIST, CERTIFIED REGISTERED

## 2020-04-23 PROCEDURE — 99232 SBSQ HOSP IP/OBS MODERATE 35: CPT | Performed by: INTERNAL MEDICINE

## 2020-04-23 PROCEDURE — 86923 COMPATIBILITY TEST ELECTRIC: CPT

## 2020-04-23 PROCEDURE — 85007 BL SMEAR W/DIFF WBC COUNT: CPT | Performed by: INTERNAL MEDICINE

## 2020-04-23 PROCEDURE — 80053 COMPREHEN METABOLIC PANEL: CPT | Performed by: INTERNAL MEDICINE

## 2020-04-23 PROCEDURE — 86901 BLOOD TYPING SEROLOGIC RH(D): CPT

## 2020-04-23 PROCEDURE — 86901 BLOOD TYPING SEROLOGIC RH(D): CPT | Performed by: PHYSICIAN ASSISTANT

## 2020-04-23 PROCEDURE — 85018 HEMOGLOBIN: CPT | Performed by: INTERNAL MEDICINE

## 2020-04-23 PROCEDURE — 86900 BLOOD TYPING SEROLOGIC ABO: CPT

## 2020-04-23 PROCEDURE — 0DB78ZX EXCISION OF STOMACH, PYLORUS, VIA NATURAL OR ARTIFICIAL OPENING ENDOSCOPIC, DIAGNOSTIC: ICD-10-PCS | Performed by: SURGERY

## 2020-04-23 PROCEDURE — 25010000002 CEFTRIAXONE PER 250 MG: Performed by: SURGERY

## 2020-04-23 PROCEDURE — 43239 EGD BIOPSY SINGLE/MULTIPLE: CPT | Performed by: SURGERY

## 2020-04-23 RX ORDER — OXYCODONE HYDROCHLORIDE AND ACETAMINOPHEN 5; 325 MG/1; MG/1
1 TABLET ORAL ONCE AS NEEDED
Status: DISCONTINUED | OUTPATIENT
Start: 2020-04-23 | End: 2020-04-23 | Stop reason: HOSPADM

## 2020-04-23 RX ORDER — ONDANSETRON 2 MG/ML
4 INJECTION INTRAMUSCULAR; INTRAVENOUS AS NEEDED
Status: DISCONTINUED | OUTPATIENT
Start: 2020-04-23 | End: 2020-04-23 | Stop reason: HOSPADM

## 2020-04-23 RX ORDER — FENTANYL CITRATE 50 UG/ML
50 INJECTION, SOLUTION INTRAMUSCULAR; INTRAVENOUS
Status: DISCONTINUED | OUTPATIENT
Start: 2020-04-23 | End: 2020-04-23 | Stop reason: HOSPADM

## 2020-04-23 RX ORDER — SODIUM CHLORIDE 0.9 % (FLUSH) 0.9 %
10 SYRINGE (ML) INJECTION AS NEEDED
Status: DISCONTINUED | OUTPATIENT
Start: 2020-04-23 | End: 2020-04-23 | Stop reason: HOSPADM

## 2020-04-23 RX ORDER — SODIUM CHLORIDE 0.9 % (FLUSH) 0.9 %
10 SYRINGE (ML) INJECTION EVERY 12 HOURS SCHEDULED
Status: DISCONTINUED | OUTPATIENT
Start: 2020-04-23 | End: 2020-04-23 | Stop reason: HOSPADM

## 2020-04-23 RX ORDER — CARVEDILOL 25 MG/1
25 TABLET ORAL 2 TIMES DAILY WITH MEALS
Status: DISCONTINUED | OUTPATIENT
Start: 2020-04-23 | End: 2020-04-24 | Stop reason: HOSPADM

## 2020-04-23 RX ORDER — IPRATROPIUM BROMIDE AND ALBUTEROL SULFATE 2.5; .5 MG/3ML; MG/3ML
3 SOLUTION RESPIRATORY (INHALATION) ONCE AS NEEDED
Status: DISCONTINUED | OUTPATIENT
Start: 2020-04-23 | End: 2020-04-23 | Stop reason: HOSPADM

## 2020-04-23 RX ORDER — MEPERIDINE HYDROCHLORIDE 25 MG/ML
12.5 INJECTION INTRAMUSCULAR; INTRAVENOUS; SUBCUTANEOUS
Status: DISCONTINUED | OUTPATIENT
Start: 2020-04-23 | End: 2020-04-23 | Stop reason: HOSPADM

## 2020-04-23 RX ORDER — SODIUM CHLORIDE, SODIUM LACTATE, POTASSIUM CHLORIDE, CALCIUM CHLORIDE 600; 310; 30; 20 MG/100ML; MG/100ML; MG/100ML; MG/100ML
125 INJECTION, SOLUTION INTRAVENOUS CONTINUOUS
Status: DISCONTINUED | OUTPATIENT
Start: 2020-04-23 | End: 2020-04-23

## 2020-04-23 RX ORDER — LIDOCAINE HYDROCHLORIDE 20 MG/ML
INJECTION, SOLUTION EPIDURAL; INFILTRATION; INTRACAUDAL; PERINEURAL AS NEEDED
Status: DISCONTINUED | OUTPATIENT
Start: 2020-04-23 | End: 2020-04-23 | Stop reason: SURG

## 2020-04-23 RX ORDER — PROPOFOL 10 MG/ML
VIAL (ML) INTRAVENOUS AS NEEDED
Status: DISCONTINUED | OUTPATIENT
Start: 2020-04-23 | End: 2020-04-23 | Stop reason: SURG

## 2020-04-23 RX ORDER — PANTOPRAZOLE SODIUM 40 MG/1
40 TABLET, DELAYED RELEASE ORAL
Status: DISCONTINUED | OUTPATIENT
Start: 2020-04-24 | End: 2020-04-24 | Stop reason: HOSPADM

## 2020-04-23 RX ORDER — GLYCOPYRROLATE 0.2 MG/ML
INJECTION INTRAMUSCULAR; INTRAVENOUS AS NEEDED
Status: DISCONTINUED | OUTPATIENT
Start: 2020-04-23 | End: 2020-04-23 | Stop reason: SURG

## 2020-04-23 RX ADMIN — SODIUM CHLORIDE 8 MG/HR: 900 INJECTION INTRAVENOUS at 10:18

## 2020-04-23 RX ADMIN — SODIUM CHLORIDE, PRESERVATIVE FREE 10 ML: 5 INJECTION INTRAVENOUS at 21:08

## 2020-04-23 RX ADMIN — SODIUM CHLORIDE 75 ML/HR: 9 INJECTION, SOLUTION INTRAVENOUS at 15:55

## 2020-04-23 RX ADMIN — CEFTRIAXONE 2 G: 2 INJECTION, POWDER, FOR SOLUTION INTRAMUSCULAR; INTRAVENOUS at 12:54

## 2020-04-23 RX ADMIN — METRONIDAZOLE 500 MG: 500 INJECTION, SOLUTION INTRAVENOUS at 21:08

## 2020-04-23 RX ADMIN — LIDOCAINE HYDROCHLORIDE 60 MG: 20 INJECTION, SOLUTION EPIDURAL; INFILTRATION; INTRACAUDAL; PERINEURAL at 07:47

## 2020-04-23 RX ADMIN — SODIUM CHLORIDE 20 MG: 900 INJECTION INTRAVENOUS at 07:43

## 2020-04-23 RX ADMIN — METRONIDAZOLE 500 MG: 500 INJECTION, SOLUTION INTRAVENOUS at 05:27

## 2020-04-23 RX ADMIN — SODIUM CHLORIDE: 9 INJECTION, SOLUTION INTRAVENOUS at 07:43

## 2020-04-23 RX ADMIN — METRONIDAZOLE 500 MG: 500 INJECTION, SOLUTION INTRAVENOUS at 14:10

## 2020-04-23 RX ADMIN — SODIUM CHLORIDE 8 MG/HR: 900 INJECTION INTRAVENOUS at 15:55

## 2020-04-23 RX ADMIN — SODIUM CHLORIDE 75 ML/HR: 9 INJECTION, SOLUTION INTRAVENOUS at 06:24

## 2020-04-23 RX ADMIN — SODIUM CHLORIDE 8 MG/HR: 900 INJECTION INTRAVENOUS at 05:28

## 2020-04-23 RX ADMIN — GLYCOPYRROLATE 0.1 MG: 0.4 INJECTION INTRAMUSCULAR; INTRAVENOUS at 07:47

## 2020-04-23 RX ADMIN — PROPOFOL 50 MG: 10 INJECTION, EMULSION INTRAVENOUS at 07:47

## 2020-04-23 RX ADMIN — CARVEDILOL 25 MG: 25 TABLET, FILM COATED ORAL at 21:11

## 2020-04-23 NOTE — PLAN OF CARE
Patient had surgery this morning; went well. Patient says she is feeling better today with no complaints. Will continue to monitor

## 2020-04-23 NOTE — PROGRESS NOTES
[facility] HOSPITALIST PROGRESS NOTE     Patient Identification:  Name:  Albina Finley  Age:  75 y.o.  Sex:  female  :  1945  MRN:  [id number]  Visit Number:  [id number]  ROOM: [address] [place] [address]     Primary Care Provider:  Jim Hernandez MD     Date of Admission: 2020    Length of stay in inpatient status:  2    Subjective     Chief Compliant:    Chief Complaint   Patient presents with   • Fever       History of Presenting Illness:  74 yo female admitted yesterday by Dr. Lora for generalized weakness and vomiting; imaging showed thickening of the duodenum with adjacent fat stranding.  She was placed on IV Rocephin and IV Flagyl.  She had EGD today and it showed a duodenal ulcer.  I saw her after she returned from the OR.  She feels okay with no chest pain, no abdominal pain, no trouble breathing, no coughing.  She has a little nausea.    Objective     Current Hospital Meds:  cefTRIAXone 2 g Intravenous Q24H   metroNIDAZOLE 500 mg Intravenous Q8H   sodium chloride 10 mL Intravenous Q12H     lactated ringers 125 mL/hr    pantoprazole 8 mg/hr Last Rate: 8 mg/hr (20 0528)   sodium chloride 75 mL/hr Last Rate: 75 mL/hr (20 0624)       Current Antimicrobial Therapy:  Anti-Infectives (From admission, onward)    Ordered     Dose/Rate Route Frequency Start Stop    20 0511  cefTRIAXone (ROCEPHIN) 2 g/100 mL 0.9% NS VTB (VENTURA)  Review   Ordering Provider:  Marlon Wray MD    2 g  over 30 Minutes Intravenous Every 24 Hours 20 1400 20 1359    20 0511  metroNIDAZOLE (FLAGYL) 500 mg/100mL IVPB  Review   Ordering Provider:  Marlon Wray MD    500 mg  over 60 Minutes Intravenous Every 8 Hours 20 0600 20 0559    20  cefTRIAXone (ROCEPHIN) 1 g/100 mL 0.9% NS (MBP)     Ordering Provider:  Win Harden MD    1 g  over 30 Minutes Intravenous Once 20 20220 2246        Current Diuretic Therapy:  Diuretics (From admission, onward)     None        ----------------------------------------------------------------------------------------------------------------------  Vital Signs:  Temp:  [97 °F (36.1 °C)-98.4 °F (36.9 °C)] 97 °F (36.1 °C)  Heart Rate:  [72-97] 83  Resp:  [12-20] 15  BP: (139-157)/(53-97) 152/84  SpO2:  [97 %-99 %] 99 %  on  Flow (L/min):  [2] 2;   Device (Oxygen Therapy): room air  Body mass index is 26.28 kg/m².    Wt Readings from Last 3 Encounters:   04/23/20 67.3 kg (148 lb 5 oz)   12/20/19 75.3 kg (166 lb)   03/15/19 72.2 kg (159 lb 1.6 oz)     Intake & Output (last 3 days)       04/20 0701 - 04/21 0700 04/21 0701 - 04/22 0700 04/22 0701 - 04/23 0700 04/23 0701 - 04/24 0700    P.O.   120     I.V. (mL/kg)  240 (3.5) 2278 (33.8) 50 (0.7)    IV Piggyback  90      Total Intake(mL/kg)  330 (4.8) 2398 (35.6) 50 (0.7)    Urine (mL/kg/hr)  225 1550 (1)     Total Output  225 1550     Net  +105 +848 +50                Diet Regular  ----------------------------------------------------------------------------------------------------------------------  Physical Exam   Constitutional: She is oriented to person, place, and time. She appears well-developed and well-nourished. No distress.   HENT:   Head: Normocephalic and atraumatic.   Right Ear: External ear normal.   Left Ear: External ear normal.   Eyes: Pupils are equal, round, and reactive to light. EOM are normal. Right eye exhibits no discharge. Left eye exhibits no discharge. No scleral icterus.   Cardiovascular: Normal rate and intact distal pulses.   No murmur heard.  Pulmonary/Chest: No respiratory distress. She has no wheezes. She has no rales.   Abdominal: Soft. Bowel sounds are normal.   Neurological: She is alert and oriented to person, place, and time. No cranial nerve deficit.   Skin: Capillary refill takes less than 2 seconds. No rash noted. No erythema.   Psychiatric: She has a normal mood and affect. Her behavior is normal.      ----------------------------------------------------------------------------------------------------------------------  Tele:  NS with heart rates 70-80's; I have personally reviewed/looked at the telemetry strips.  ----------------------------------------------------------------------------------------------------------------------  LABS:    CBC and coagulation:  Results from last 7 days   Lab Units 04/23/20  0212 04/22/20  1157 04/22/20  0705 04/22/20  0047 04/21/20  1942   PROCALCITONIN ng/mL  --   --   --   --  0.18   LACTATE mmol/L  --   --   --   --  1.5   CRP mg/dL  --   --   --   --  1.77*   WBC 10*3/mm3 12.11*  --   --  18.47* 19.91*   HEMOGLOBIN g/dL 6.7* 7.1* 7.0* 7.4* 8.6*   HEMATOCRIT % 23.2* 22.1* 22.2* 24.2* 27.0*   MCV fL 92.1  --   --  85.8 83.3   MCHC g/dL 28.9*  --   --  30.6* 31.9   PLATELETS 10*3/mm3 370  --   --  394 463*   INR   --   --   --   --  1.21*     Acid/base balance:  Results from last 7 days   Lab Units 04/21/20 2012   PH, ARTERIAL pH units 7.378   PO2 ART mm Hg 80.7*   PCO2, ARTERIAL mm Hg 32.2*   HCO3 ART mmol/L 18.9*     Renal and electrolytes:  Results from last 7 days   Lab Units 04/23/20 0212 04/22/20 0039 04/21/20 1942   SODIUM mmol/L 137 130* 127*   POTASSIUM mmol/L 4.1 4.6 5.2   MAGNESIUM mg/dL 2.9*  --  1.9   CHLORIDE mmol/L 105 99 91*   CO2 mmol/L 17.1* 17.1* 20.4*   BUN mg/dL 64* 110* 116*   CREATININE mg/dL 1.40* 1.84* 2.02*   EGFR IF NONAFRICN AM mL/min/1.73 37* 27* 24*   CALCIUM mg/dL 8.5* 8.2* 8.9   IONIZED CALCIUM mmol/L  --   --  1.22   PHOSPHORUS mg/dL 3.5  --   --    GLUCOSE mg/dL 143* 164* 284*     Estimated Creatinine Clearance: 31.5 mL/min (A) (by C-G formula based on SCr of 1.4 mg/dL (H)).    Liver and pancreatic function:  Results from last 7 days   Lab Units 04/23/20 0212 04/22/20 0039 04/21/20 1942   ALBUMIN g/dL 3.01* 2.84* 3.52   BILIRUBIN mg/dL <0.2* <0.2* <0.2*   ALK PHOS U/L 24* 24* 31*   AST (SGOT) U/L 10 8 10   ALT (SGPT) U/L 5 5 6      Endocrine function:  Lab Results   Component Value Date    HGBA1C 8.30 (H) 12/17/2019     Point of care bedside glucose levels:  Results from last 7 days   Lab Units 04/22/20 2003 04/22/20  1624 04/22/20  1020 04/22/20  0645 04/22/20  0004   GLUCOSE mg/dL 161* 140* 126 110 176*     Glucose levels from the CMP:  Results from last 7 days   Lab Units 04/23/20  0212 04/22/20  0039 04/21/20  1942   GLUCOSE mg/dL 143* 164* 284*     Lab Results   Component Value Date    TSH 1.750 12/16/2019     Cardiac:  Results from last 7 days   Lab Units 04/21/20  1942   TROPONIN T ng/mL 0.028       Cultures:  Brief Urine Lab Results  (Last result in the past 365 days)      Color   Clarity   Blood   Leuk Est   Nitrite   Protein   CREAT   Urine HCG        04/21/20 1958 Yellow Cloudy Negative Large (3+) Negative Negative             Lab Results   Component Value Date    URINECX No growth 04/21/2020    URINECX (A) 12/16/2019     >100,000 CFU/mL Klebsiella pneumoniae ssp pneumoniae    URINECX >100,000 CFU/mL Escherichia coli (A) 12/16/2019     Lab Results   Component Value Date    BLOODCX No growth at 24 hours 04/21/2020    BLOODCX No growth at 24 hours 04/21/2020    BLOODCX No growth at 5 days 12/16/2019       I have personally looked at the labs and they are summarized above.    Assessment & Plan      -Acute duodenitis causing leukocytosis  -Duodenum with 1 cm ulcer crater without evidence of bleeding  -Anion gap metabolic acidosis likely due to acute kidney injury in the setting of CKD Stage III, baseline Cr 1.25-1.4  -Acute hypomagnesemia  -Parenchymal nodule in the left upper lung slightly larger than 3/2019 (1.5 cm to 1.71 cm)  -Acute hyponatremia and hypochloremia, likely hypovolemic  -Essential hypertension  -Type 2 diabetes mellitus  -COPD  -Acute anemia on top of chronic Iron deficiency anemia  -Hypoalbuminemia    Will change the protonix drip to twice a day oral protonix.  Will continue with the IV antibiotics as stated  above.  Will add back the home Coreg.  Will repeat labs tomorrow to see how her hemoglobin is trending since she received a unit of PRBCs today.  Magnesium level has improved with replacement.    VTE Prophylaxis:   Mechanical Order History:      Ordered        04/21/20 2349  Place Sequential Compression Device  Once         04/21/20 2349  Maintain Sequential Compression Device  Continuous                 Pharmalogical Order History:     None          Manfred Smith MD  [facility] Hospitalist  04/23/20  09:33

## 2020-04-23 NOTE — NURSING NOTE
PRBC infusing at 100 ml/hour on arrival to PACU.  NADN, VSS at this time, will continue to monitor.

## 2020-04-23 NOTE — ANESTHESIA POSTPROCEDURE EVALUATION
Patient: Albina Finley    Procedure Summary     Date:  04/23/20 Room / Location:  [facility] OR 01 /  [facility] OR    Anesthesia Start:  0743 Anesthesia Stop:  0756    Procedure:  ESOPHAGOGASTRODUODENOSCOPY (N/A Esophagus) Diagnosis:       Duodenitis      UGIB (upper gastrointestinal bleed)      (Duodenitis [K29.80])      (UGIB (upper gastrointestinal bleed) [K92.2])    Surgeon:  Marlon Wray MD Provider:  Daniel Lezama MD    Anesthesia Type:  general ASA Status:  3          Anesthesia Type: general    Vitals  Vitals Value Taken Time   /84 4/23/2020  8:13 AM   Temp 97 °F (36.1 °C) 4/23/2020  8:13 AM   Pulse 97 4/23/2020  8:13 AM   Resp 15 4/23/2020  8:13 AM   SpO2 99 % 4/23/2020  8:13 AM           Post Anesthesia Care and Evaluation    Patient location during evaluation: PACU  Patient participation: complete - patient participated  Level of consciousness: awake and alert  Pain score: 1  Pain management: adequate  Airway patency: patent  Anesthetic complications: No anesthetic complications  PONV Status: controlled  Cardiovascular status: acceptable  Respiratory status: acceptable  Hydration status: acceptable

## 2020-04-23 NOTE — PLAN OF CARE
Problem: Patient Care Overview  Goal: Plan of Care Review  4/23/2020 0000 by Pablo Mae RN  Outcome: Ongoing (interventions implemented as appropriate)  4/22/2020 2355 by Pablo Mae RN  Outcome: Ongoing (interventions implemented as appropriate)  Goal: Individualization and Mutuality  4/23/2020 0000 by Pablo Mae RN  Outcome: Ongoing (interventions implemented as appropriate)  4/22/2020 2355 by Pablo Mae RN  Outcome: Ongoing (interventions implemented as appropriate)  Goal: Discharge Needs Assessment  4/23/2020 0000 by Pablo Mae RN  Outcome: Ongoing (interventions implemented as appropriate)  4/22/2020 2355 by Pablo Mae RN  Outcome: Ongoing (interventions implemented as appropriate)  Goal: Interprofessional Rounds/Family Conf  4/23/2020 0000 by Pablo Mae RN  Outcome: Ongoing (interventions implemented as appropriate)  4/22/2020 2355 by Pablo Mae RN  Outcome: Ongoing (interventions implemented as appropriate)     Problem: Fall Risk (Adult)  Goal: Identify Related Risk Factors and Signs and Symptoms  4/23/2020 0000 by Pablo Mae RN  Outcome: Ongoing (interventions implemented as appropriate)  4/22/2020 2355 by Pablo Mae RN  Outcome: Ongoing (interventions implemented as appropriate)  Goal: Absence of Fall  4/23/2020 0000 by Pablo Mae RN  Outcome: Ongoing (interventions implemented as appropriate)  4/22/2020 2355 by Pablo Mae RN  Outcome: Ongoing (interventions implemented as appropriate)     Problem: Skin Injury Risk (Adult)  Goal: Identify Related Risk Factors and Signs and Symptoms  4/23/2020 0000 by Pablo Mae RN  Outcome: Ongoing (interventions implemented as appropriate)  4/22/2020 2355 by Pablo Mae RN  Outcome: Ongoing (interventions implemented as appropriate)  Goal: Skin Health and Integrity  4/23/2020 0000 by Pablo Mae RN  Outcome: Ongoing (interventions implemented as appropriate)  4/22/2020 2355 by Carmelita  Pablo MESA RN  Outcome: Ongoing (interventions implemented as appropriate)     Problem: Chronic Obstructive Pulmonary Disease (Adult)  Goal: Signs and Symptoms of Listed Potential Problems Will be Absent, Minimized or Managed (Chronic Obstructive Pulmonary Disease)  Outcome: Ongoing (interventions implemented as appropriate)     Problem: Cardiac Output Decreased (Adult)  Goal: Identify Related Risk Factors and Signs and Symptoms  Outcome: Ongoing (interventions implemented as appropriate)  Goal: Effective Tissue Perfusion  Outcome: Ongoing (interventions implemented as appropriate)

## 2020-04-23 NOTE — ANESTHESIA PREPROCEDURE EVALUATION
Anesthesia Evaluation     Patient summary reviewed and Nursing notes reviewed   no history of anesthetic complications:  NPO Solid Status: > 8 hours  NPO Liquid Status: > 8 hours           Airway   Mallampati: III  TM distance: <3 FB  Neck ROM: full  No difficulty expected  Dental - normal exam   (+) edentulous    Pulmonary - normal exam   (+) pneumonia stable , a smoker Former, COPD,   Cardiovascular - normal exam    (+) hypertension, CHF , hyperlipidemia,       Neuro/Psych  GI/Hepatic/Renal/Endo    (+)  GI bleeding , renal disease CRI, diabetes mellitus type 2,     ROS Comment: Blood loss anemia    Musculoskeletal     Abdominal  - normal exam   Substance History      OB/GYN          Other   arthritis,                        Anesthesia Plan    ASA 3     general     intravenous induction     Anesthetic plan, all risks, benefits, and alternatives have been provided, discussed and informed consent has been obtained with: patient.    Plan discussed with CRNA.

## 2020-04-23 NOTE — PROGRESS NOTES
"Discharge Planning Assessment  [facility]     Patient Name: Albina Finley  MRN: [id number]  Today's Date: 4/23/2020    Admit Date: 4/21/2020    Discharge Needs Assessment     Row Name 04/23/20 1111       Living Environment    Lives With  alone    Name(s) of Who Lives With Patient  Pt lives at home alone and plans to return home at d/c.  Pt stated her daughter lives \"right up next to me\".    Current Living Arrangements  home/apartment/condo    Primary Care Provided by  self    Family Caregiver if Needed  child(lester), adult    Family Caregiver Names  Dtr Antonina POA    Quality of Family Relationships  helpful    Able to Return to Prior Arrangements  yes       Resource/Environmental Concerns    Resource/Environmental Concerns  none    Transportation Concerns  car, none       Transition Planning    Patient/Family Anticipates Transition to  home    Transportation Anticipated  family or friend will provide Pt's daughter will transport at d/c.        Discharge Needs Assessment    Readmission Within the Last 30 Days  no previous admission in last 30 days    Equipment Currently Used at Home  walker, standard Pt has a walker but does not currently have to use it.    Anticipated Changes Related to Illness  none    Equipment Needed After Discharge  none    Provided Post Acute Provider List?  N/A        Discharge Plan     Row Name 04/23/20 1022       Plan    Plan  CM spoke with pt via telephone. Pt lives at home alone and plans to return at d/c.  Pt has walker but does not currently use it.  No HH and denies any need.  Pt's daughter lives next to her and will provide transportation at d/c.  PCP is Dr. MO Hernandez, she gets Rx filled at [facility] and is enrolled with meds to bed.  Rocky Torres RN will f/u after d/c home.  Pt is independent with her care at home.  CM will follow.     Plan Comments  4/23 CM picked up with Nely Robertson. Pt admitted 4/20 dx of duodenitis and UGI bleed.  EGD today with 1 cm ulcer crater in duodenum " no evidence of bleeding. H/H down to 6.7/23.2, transfuse 1U PRBC, cont Protonix gtt, LR at 125, Rocephin and Flagyl IV, Reg diet.         Legal     Row Name 04/23/20 1117       Financial/Legal    Finance Comments  Pt has Medicare and [employer] care and denies any problems or issues with coverage. Gets Rx filled at [facility] and is enrolled with meds to bed here.                Hannah Kenny RN

## 2020-04-23 NOTE — OP NOTE
Albina Tushar  4/23/2020      Operative Progress Note:    Surgeon and Assistant: Dr. Wray    Pre-Operative Diagnosis: Upper GI bleed, duodenitis    Post-Operative Diagnosis: Upper GI bleed, duodenal ulcer    Procedure(s): EGD with biopsy    Type of Anesthesia Administered: IV general    Estimated Blood Loss: Minimal    Blood Products: None    Specimen Obtained/Removed: Antral    Complication(s):  None    Graft/Implant/Prosthetics/Implanted Device/Transplants:  None    Indication: Patient 75-year-old white female    Findings: Duodenum with 1 cm ulcer crater without evidence of bleeding.  Stomach normal, esophagus normal    Operative Report:  Patient was taken operating room and placed in a left lateral decubitus position.  IV sedation was given per anesthesia.  Gastroscope was introduced and passed into the duodenum.  The scope was slowly withdrawn.  I examined the duodenum, stomach and esophagus thoroughly.  Biopsies were taken as indicated above.  Findings are listed as above.      Electronically Signed by: Marlon Wray MD        Dictated Utilizing [employer]

## 2020-04-24 VITALS
TEMPERATURE: 98 F | OXYGEN SATURATION: 98 % | RESPIRATION RATE: 18 BRPM | WEIGHT: 155.38 LBS | BODY MASS INDEX: 27.53 KG/M2 | HEIGHT: 63 IN | HEART RATE: 69 BPM | DIASTOLIC BLOOD PRESSURE: 57 MMHG | SYSTOLIC BLOOD PRESSURE: 154 MMHG

## 2020-04-24 LAB
ALBUMIN SERPL-MCNC: 3.23 G/DL (ref 3.5–5.2)
ALBUMIN/GLOB SERPL: 1.3 G/DL
ALP SERPL-CCNC: 30 U/L (ref 39–117)
ALT SERPL W P-5'-P-CCNC: 7 U/L (ref 1–33)
ANION GAP SERPL CALCULATED.3IONS-SCNC: 11.6 MMOL/L (ref 5–15)
AST SERPL-CCNC: 11 U/L (ref 1–32)
BASOPHILS # BLD AUTO: 0.05 10*3/MM3 (ref 0–0.2)
BASOPHILS NFR BLD AUTO: 0.4 % (ref 0–1.5)
BH BB BLOOD EXPIRATION DATE: NORMAL
BH BB BLOOD TYPE BARCODE: 5100
BH BB DISPENSE STATUS: NORMAL
BH BB PRODUCT CODE: NORMAL
BH BB UNIT NUMBER: NORMAL
BILIRUB SERPL-MCNC: <0.2 MG/DL (ref 0.2–1.2)
BUN BLD-MCNC: 31 MG/DL (ref 8–23)
BUN/CREAT SERPL: 28.7 (ref 7–25)
CALCIUM SPEC-SCNC: 8.5 MG/DL (ref 8.6–10.5)
CHLORIDE SERPL-SCNC: 106 MMOL/L (ref 98–107)
CO2 SERPL-SCNC: 20.4 MMOL/L (ref 22–29)
CREAT BLD-MCNC: 1.08 MG/DL (ref 0.57–1)
CROSSMATCH INTERPRETATION: NORMAL
CRP SERPL-MCNC: 4.8 MG/DL (ref 0–0.5)
DEPRECATED RDW RBC AUTO: 47 FL (ref 37–54)
EOSINOPHIL # BLD AUTO: 0.34 10*3/MM3 (ref 0–0.4)
EOSINOPHIL NFR BLD AUTO: 3 % (ref 0.3–6.2)
ERYTHROCYTE [DISTWIDTH] IN BLOOD BY AUTOMATED COUNT: 15.5 % (ref 12.3–15.4)
GFR SERPL CREATININE-BSD FRML MDRD: 49 ML/MIN/1.73
GLOBULIN UR ELPH-MCNC: 2.5 GM/DL
GLUCOSE BLD-MCNC: 196 MG/DL (ref 65–99)
GLUCOSE BLDC GLUCOMTR-MCNC: 211 MG/DL (ref 70–130)
GLUCOSE BLDC GLUCOMTR-MCNC: 308 MG/DL (ref 70–130)
HCT VFR BLD AUTO: 22.9 % (ref 34–46.6)
HGB BLD-MCNC: 7.4 G/DL (ref 12–15.9)
IMM GRANULOCYTES # BLD AUTO: 0.07 10*3/MM3 (ref 0–0.05)
IMM GRANULOCYTES NFR BLD AUTO: 0.6 % (ref 0–0.5)
INR PPP: 1.22 (ref 0.9–1.1)
LYMPHOCYTES # BLD AUTO: 2.54 10*3/MM3 (ref 0.7–3.1)
LYMPHOCYTES NFR BLD AUTO: 22.2 % (ref 19.6–45.3)
MAGNESIUM SERPL-MCNC: 2.3 MG/DL (ref 1.6–2.4)
MCH RBC QN AUTO: 27.5 PG (ref 26.6–33)
MCHC RBC AUTO-ENTMCNC: 32.3 G/DL (ref 31.5–35.7)
MCV RBC AUTO: 85.1 FL (ref 79–97)
MONOCYTES # BLD AUTO: 0.92 10*3/MM3 (ref 0.1–0.9)
MONOCYTES NFR BLD AUTO: 8 % (ref 5–12)
NEUTROPHILS # BLD AUTO: 7.54 10*3/MM3 (ref 1.7–7)
NEUTROPHILS NFR BLD AUTO: 65.8 % (ref 42.7–76)
NRBC BLD AUTO-RTO: 0 /100 WBC (ref 0–0.2)
PHOSPHATE SERPL-MCNC: 2.5 MG/DL (ref 2.5–4.5)
PLATELET # BLD AUTO: 342 10*3/MM3 (ref 140–450)
PMV BLD AUTO: 11.5 FL (ref 6–12)
POTASSIUM BLD-SCNC: 3.8 MMOL/L (ref 3.5–5.2)
PROT SERPL-MCNC: 5.7 G/DL (ref 6–8.5)
PROTHROMBIN TIME: 16 SECONDS (ref 11–15.4)
RBC # BLD AUTO: 2.69 10*6/MM3 (ref 3.77–5.28)
SODIUM BLD-SCNC: 138 MMOL/L (ref 136–145)
UNIT  ABO: NORMAL
UNIT  RH: NORMAL
WBC NRBC COR # BLD: 11.46 10*3/MM3 (ref 3.4–10.8)

## 2020-04-24 PROCEDURE — 86140 C-REACTIVE PROTEIN: CPT | Performed by: INTERNAL MEDICINE

## 2020-04-24 PROCEDURE — 86900 BLOOD TYPING SEROLOGIC ABO: CPT

## 2020-04-24 PROCEDURE — 99239 HOSP IP/OBS DSCHRG MGMT >30: CPT | Performed by: INTERNAL MEDICINE

## 2020-04-24 PROCEDURE — 36430 TRANSFUSION BLD/BLD COMPNT: CPT

## 2020-04-24 PROCEDURE — 82962 GLUCOSE BLOOD TEST: CPT

## 2020-04-24 PROCEDURE — 83735 ASSAY OF MAGNESIUM: CPT | Performed by: INTERNAL MEDICINE

## 2020-04-24 PROCEDURE — 84100 ASSAY OF PHOSPHORUS: CPT | Performed by: INTERNAL MEDICINE

## 2020-04-24 PROCEDURE — 85025 COMPLETE CBC W/AUTO DIFF WBC: CPT | Performed by: INTERNAL MEDICINE

## 2020-04-24 PROCEDURE — P9016 RBC LEUKOCYTES REDUCED: HCPCS

## 2020-04-24 PROCEDURE — 80053 COMPREHEN METABOLIC PANEL: CPT | Performed by: INTERNAL MEDICINE

## 2020-04-24 PROCEDURE — 85610 PROTHROMBIN TIME: CPT | Performed by: INTERNAL MEDICINE

## 2020-04-24 RX ORDER — METRONIDAZOLE 500 MG/1
500 TABLET ORAL 3 TIMES DAILY
Qty: 12 TABLET | Refills: 0 | Status: SHIPPED | OUTPATIENT
Start: 2020-04-24 | End: 2020-04-28

## 2020-04-24 RX ORDER — PANTOPRAZOLE SODIUM 40 MG/1
40 TABLET, DELAYED RELEASE ORAL
Qty: 60 TABLET | Refills: 0 | Status: SHIPPED | OUTPATIENT
Start: 2020-04-24

## 2020-04-24 RX ORDER — CEFDINIR 300 MG/1
300 CAPSULE ORAL 2 TIMES DAILY
Qty: 8 CAPSULE | Refills: 0 | Status: SHIPPED | OUTPATIENT
Start: 2020-04-24 | End: 2020-04-28

## 2020-04-24 RX ORDER — FERROUS SULFATE 325(65) MG
325 TABLET ORAL 2 TIMES DAILY WITH MEALS
Qty: 60 TABLET | Refills: 0 | Status: ON HOLD | OUTPATIENT
Start: 2020-04-24 | End: 2021-11-13

## 2020-04-24 RX ADMIN — SODIUM CHLORIDE, PRESERVATIVE FREE 10 ML: 5 INJECTION INTRAVENOUS at 08:13

## 2020-04-24 RX ADMIN — CARVEDILOL 25 MG: 25 TABLET, FILM COATED ORAL at 15:52

## 2020-04-24 RX ADMIN — PANTOPRAZOLE SODIUM 40 MG: 40 TABLET, DELAYED RELEASE ORAL at 05:25

## 2020-04-24 RX ADMIN — PANTOPRAZOLE SODIUM 40 MG: 40 TABLET, DELAYED RELEASE ORAL at 15:52

## 2020-04-24 RX ADMIN — CARVEDILOL 25 MG: 25 TABLET, FILM COATED ORAL at 08:13

## 2020-04-24 RX ADMIN — METRONIDAZOLE 500 MG: 500 INJECTION, SOLUTION INTRAVENOUS at 05:25

## 2020-04-24 NOTE — PLAN OF CARE
Pt denies any SOB, chest pain, or other complaints. Pt ambulating to bathroom well; no respiratory distress, V/S stable. Will continue to monitor.

## 2020-04-24 NOTE — NURSING NOTE
Pt. Woke up with confusion only being oriented to self. Continuously getting out of bed, compromised with patient to set in a chair. Chair alarm in place.

## 2020-04-24 NOTE — DISCHARGE PLACEMENT REQUEST
"Albina Waddell (75 y.o. Female)     Date of Birth Social Security Number Address Home Phone MRN    1945  [address]  [place] [address] [phone] [id number]    Sabianist Marital Status          Yarsani        Admission Date Admission Type Admitting Provider Attending Provider Department, Room/Bed    4/21/20 Emergency Alexandra Lora DO Perkins, Jimmye S, MD [address], [address]    Discharge Date Discharge Disposition Discharge Destination         Home-Health Care [facility] [place], [place]              Attending Provider:  Manfred Smith MD    Allergies:  No Known Allergies    Isolation:  Spore   Infection:  C.difficile (rule out) (04/21/20)   Code Status:  CPR    Ht:  160 cm (62.99\")   Wt:  70.5 kg (155 lb 6 oz)    Admission Cmt:  None   Principal Problem:  None                Active Insurance as of 4/21/2020     Primary Coverage     Payor Plan Insurance Group Employer/Plan Group    MEDICARE RAILROAD MEDICARE      Payor Plan Address Payor Plan Phone Number Payor Plan Fax Number Effective Dates    [address] [phone]  4/1/2009 - None Entered    [facility] [id number]       Subscriber Name Subscriber Birth Date Member ID       ALBINA WADDELL 1945 [id number]           Secondary Coverage     Payor Plan Insurance Group Employer/Plan Group    [facility] [id number]     Payor Plan Address Payor Plan Phone Number Payor Plan Fax Number Effective Dates    [address]   1/1/2017 - None Entered    [facility] [address]       Subscriber Name Subscriber Birth Date Member ID       MRS CURTIS WADDELL 10/3/1940 [id number]                 Emergency Contacts      (Rel.) Home Phone Work Phone Mobile Phone    LeannCharityAntonina (Power of ) [phone]    FE WADDELL (Daughter) -- -- [phone]            Emergency Contact Information     Name Relation Home Work Mobile    Antonina Noriega Power of  [phone] " [phone]    FE WADDELL   [phone]          Insurance Information                MEDICARE/RAILROAD MEDICARE Phone: [phone]    Subscriber: Albina Waddell Subscriber#: [id number]    Group#:  Precert#:         [facility] Phone:     Subscriber: Mrs Abdias Waddell Subscriber#: [id number]    Group#: [id number] Precert#:           Treatment Team     Provider Relationship Specialty Contact    Manfred Smith MD Attending, Physician of Record Hospitalist [phone]    Fe Zamudio, RRT Respiratory Therapist --     Marlon Wray MD Consulting Physician, Surgeon General Surgery [phone]    Carissa Rivera, PCT Patient Care Technician --     Swapnil, Yvette, RN Registered Nurse -- [phone]          Problem List           Codes Noted - Resolved       Hospital    Duodenitis ICD-10-CM: K29.80  ICD-9-CM: 535.60 4/21/2020 - Present       Non-Hospital    Grade II diastolic dysfunction (Chronic) ICD-10-CM: I51.9  ICD-9-CM: 429.9 12/18/2019 - Present    Acute on chronic diastolic CHF (congestive heart failure) (CMS/HCC) (Chronic) ICD-10-CM: I50.33  ICD-9-CM: 428.33, 428.0 12/17/2019 - Present    Chronic kidney disease, stage III (moderate) (CMS/HCC) (Chronic) ICD-10-CM: N18.3  ICD-9-CM: 585.3 12/17/2019 - Present    Essential hypertension (Chronic) ICD-10-CM: I10  ICD-9-CM: 401.9 12/17/2019 - Present    Hyperlipidemia (Chronic) ICD-10-CM: E78.5  ICD-9-CM: 272.4 12/17/2019 - Present    Type II diabetes mellitus (CMS/HCC) (Chronic) ICD-10-CM: E11.9  ICD-9-CM: 250.00 12/17/2019 - Present    Arthritis (Chronic) ICD-10-CM: M19.90  ICD-9-CM: 716.90 12/17/2019 - Present    Osteoporosis (Chronic) ICD-10-CM: M81.0  ICD-9-CM: 733.00 12/17/2019 - Present    COPD with acute exacerbation (CMS/HCC) ICD-10-CM: J44.1  ICD-9-CM: 491.21 12/16/2019 - Present    Iron deficiency anemia (Chronic) ICD-10-CM: D50.9  ICD-9-CM: 280.9 7/7/2017 - Present             History & Physical       Alexandra Lora DO at 20 0452          Hospitalist History and Physical    Patient Identification:  Name: Albina Finley  Age/Sex: 75 y.o. female  :  1945  MRN: [id number]         Primary Care Physician: Jim Hernandez MD    Chief Complaint   Patient presents with   • Fever       History of Present Illness  Patient is a 75 y.o. female presents with the following: Weakness and vomiting    The patient is a 75-year-old female with past medical history significant for iron deficiency anemia, essential hypertension, COPD and diabetes mellitus who presents to the emergency department complaining of progressive weakness and vomiting.    The patient reports a 3 to 4-day history of vomiting.  She states that her vomitus was mostly clear in color.  She denied hematemesis.  Patient reports nausea.  Patient reports bloating.  Patient states that she has chronically dark appearing stools as she is on chronic iron therapy but states that her stools have appeared darker over the past 2 days.  She does report a 2-day history of diarrhea.  Patient denies any abdominal pain.  Patient denies any significant dyspepsia but does report occasional mild heartburn symptoms.  Patient denies any use of over-the-counter nonsteroidal anti-inflammatory drugs.    Patient states she was recently treated earlier this week for a UTI by her primary care provider.  Patient currently denies any hematuria, dysuria and/or urinary frequency.  Patient reports weakness as noted above but denies any recent falls.  She denies chest pains.  No lower extremity edema.    In the emergency department, patient's vital signs were largely unremarkable.  According to the emergency department physician, the patient had a positive Hemoccult in ED.  She was afebrile. Her hemoglobin upon arrival to the emergency department was 8.6 with a hematocrit of 27.0.  Patient's hemoglobin from earlier this month was 10.8 with a hematocrit of 33.1.  Her white  blood cell count was 19.91.  CMP upon admission revealed a BUN of 116 and a creatinine of 2.02.  Patient's BUN and creatinine were 35 and 1.64 respectively on .  Patient's glucose was 286, CO2 20.4, chloride 91 and anion gap 15.6.  C-RP 1.77 and lactate 1.5.  CT scan of the chest without contrast was ordered in the emergency department which revealed slight interval growth of the left upper mass that contained fat density and was thought to be benign.    Patient had a CT scan of her abdomen and pelvis without contrast that revealed an unchanged left adrenal nodule.  She had thickening of the duodenal wall and adjacent stranding thought to represent duodenitis.    Due to her complaints of diarrhea, a C.Diff study was ordered in ED.    Patient has been admitted to the telemetry unit for further evaluation and management.    Present during visit: JOYCE Tolbert  Past History:  Past Medical History:   Diagnosis Date   • Acute on chronic diastolic CHF (congestive heart failure) (CMS/HCC) 2019   • Anal polyp 2018    Added automatically from request for surgery [id number]   • Arthritis    • Chronic kidney disease    • COPD (chronic obstructive pulmonary disease) (CMS/[facility])    • Diabetes mellitus (CMS/[facility])    • Elevated cholesterol    • Essential hypertension 2019   • History of transfusion    • Incidental lung nodule, greater than or equal to 8mm     Left lower lobe, 15 mm based on  and 2019 chest CT scans with no growth in the size   • Iron deficiency anemia 2017   • Osteoporosis    • Pneumonia of right middle lobe due to infectious organism (CMS/HCC) 2019     Past Surgical History:   Procedure Laterality Date   • ANUS SURGERY N/A 2018    Procedure: Diagnostic anoscopy with anal polyp excision;  Surgeon: Marlyn Gutierrez MD;  Location[facility];  Service: General   •  SECTION     • HIP BIPOLAR REPLACEMENT      left Dr. Cornejo    • HYSTERECTOMY       Family History   Problem  Relation Age of Onset   • Heart disease Mother    • COPD Mother    • Arthritis Mother    • Diabetes Father      Social History     Tobacco Use   • Smoking status: Former Smoker     Years: 15.00     Types: Cigarettes   • Smokeless tobacco: Never Used   • Tobacco comment: quit 15 years ago    Substance Use Topics   • Alcohol use: No   • Drug use: No     Medications Prior to Admission   Medication Sig Dispense Refill Last Dose   • aspirin 81 MG EC tablet Take 81 mg by mouth Daily.   4/21/2020 at 0530   • bumetanide (BUMEX) 1 MG tablet Take 1 mg by mouth Daily. PTA: Pt takes 2 tablets if her weight is above 152lb   Past Week at unknown   • calcium carbonate (OS-TIANNA) 600 MG tablet Take 600 mg by mouth Daily.   Past Week at Unknown time   • carvedilol (COREG) 25 MG tablet Take 25 mg by mouth 2 (Two) Times a Day With Meals.   4/21/2020 at 1700   • cetirizine (zyrTEC) 10 MG tablet Take 10 mg by mouth Daily.   4/21/2020 at 0530   • ciprofloxacin (CIPRO) 500 MG tablet Take 500 mg by mouth 2 (Two) Times a Day.   4/21/2020 at 1700   • cyanocobalamin 1000 MCG/ML injection Inject 1,000 mcg into the appropriate muscle as directed by prescriber Every 28 (Twenty-Eight) Days.   4/10/2020 at Unknown time   • famotidine (PEPCID) 20 MG tablet Take 20 mg by mouth Every Morning.   Past Week at unknown   • fenofibrate 160 MG tablet Take 160 mg by mouth Daily.   4/21/2020 at 0530   • ferrous sulfate 325 (65 FE) MG tablet Take 325 mg by mouth Every Evening.   4/21/2020 at 1700   • hydrALAZINE (APRESOLINE) 10 MG tablet Take 10 mg by mouth 3 (Three) Times a Day.   4/21/2020 at 1700   • insulin detemir (LEVEMIR) 100 UNIT/ML injection Inject 80 Units under the skin into the appropriate area as directed Every Morning.   4/21/2020 at 0530   • insulin detemir (LEVEMIR) 100 UNIT/ML injection Inject 40 Units under the skin into the appropriate area as directed Every Night.   4/21/2020 at 1700   • loperamide (IMODIUM) 2 MG capsule Take 2 mg by mouth  Daily.   4/21/2020 at 0530   • metFORMIN (GLUCOPHAGE) 500 MG tablet Take 500 mg by mouth 2 (Two) Times a Day With Meals.   4/21/2020 at 1700   • metOLazone (ZAROXOLYN) 2.5 MG tablet Take 2.5 mg by mouth Every Other Day.   Past Week at Unknown time   • spironolactone (ALDACTONE) 25 MG tablet Take 25 mg by mouth Daily.   4/21/2020 at 0530     Allergies: Patient has no known allergies.    Review of Systems:  Review of Systems   Constitutional: Negative for chills, diaphoresis and fever.   HENT: Negative for hearing loss, tinnitus and trouble swallowing.    Eyes: Negative for discharge and visual disturbance.   Respiratory: Negative for cough, shortness of breath and wheezing.    Cardiovascular: Negative for chest pain, palpitations and leg swelling.   Gastrointestinal: Positive for abdominal distention, diarrhea, nausea and vomiting. Negative for abdominal pain and constipation.   Endocrine: Negative for polydipsia, polyphagia and polyuria.   Genitourinary: Negative for difficulty urinating, dysuria, frequency and hematuria.   Musculoskeletal: Negative for gait problem, myalgias and neck pain.   Skin: Negative for rash and wound.   Neurological: Positive for weakness. Negative for dizziness, tremors, seizures, syncope and light-headedness.   Hematological: Does not bruise/bleed easily.   Psychiatric/Behavioral: Negative for confusion, hallucinations and suicidal ideas.      Vital Signs  Temp:  [97.4 °F (36.3 °C)-98.4 °F (36.9 °C)] 97.4 °F (36.3 °C)  Heart Rate:  [64-73] 73  Resp:  [16-20] 16  BP: (111-139)/(37-59) 120/54  Body mass index is 26.91 kg/m².    Physical Exam:  Physical Exam   Constitutional: She appears well-developed and well-nourished. No distress.   Chronically ill-appearing but in no acute distress   HENT:   Head: Normocephalic and atraumatic.   Mouth/Throat: Oropharynx is clear and moist. Mucous membranes are dry.   Eyes: Pupils are equal, round, and reactive to light. Conjunctivae and EOM are normal.    Neck: Neck supple. No tracheal deviation present.   Cardiovascular: Normal rate and regular rhythm. Exam reveals no gallop and no friction rub.   No murmur heard.  Pulmonary/Chest: No respiratory distress. She has decreased breath sounds in the right lower field and the left lower field. She has no wheezes. She has no rales.   Abdominal: Soft. She exhibits no distension. Bowel sounds are decreased. There is no tenderness. There is no guarding.   Musculoskeletal: She exhibits no tenderness.   No significant lower extremity edema.   Neurological: She is alert. No cranial nerve deficit.   Patient oriented to self and place and able to follow commands.  No acute focal neurologic deficits.   Skin: Skin is warm and dry. No rash noted. No erythema.   Psychiatric: She has a normal mood and affect.      Results Review:    Results from last 7 days   Lab Units 04/21/20 2012   PH, ARTERIAL pH units 7.378   PO2 ART mm Hg 80.7*   PCO2, ARTERIAL mm Hg 32.2*   HCO3 ART mmol/L 18.9*       Results from last 7 days   Lab Units 04/22/20  0047 04/21/20 1942   WBC 10*3/mm3 18.47* 19.91*   HEMOGLOBIN g/dL 7.4* 8.6*   HEMATOCRIT % 24.2* 27.0*   PLATELETS 10*3/mm3 394 463*     Results from last 7 days   Lab Units 04/22/20 0039 04/21/20 1942   SODIUM mmol/L 130* 127*   POTASSIUM mmol/L 4.6 5.2   CHLORIDE mmol/L 99 91*   CO2 mmol/L 17.1* 20.4*   BUN mg/dL 110* 116*   CREATININE mg/dL 1.84* 2.02*   CALCIUM mg/dL 8.2* 8.9   GLUCOSE mg/dL 164* 284*     Results from last 7 days   Lab Units 04/22/20 0039 04/21/20 1942   BILIRUBIN mg/dL <0.2* <0.2*   ALK PHOS U/L 24* 31*   AST (SGOT) U/L 8 10   ALT (SGPT) U/L 5 6     Results from last 7 days   Lab Units 04/21/20 1942   CRP mg/dL 1.77*     Results from last 7 days   Lab Units 04/21/20 1942   MAGNESIUM mg/dL 1.9     Results from last 7 days   Lab Units 04/21/20 1942   TROPONIN T ng/mL 0.028     Results from last 7 days   Lab Units 04/21/20 1942   INR  1.21*       Imaging:    I have  personally reviewed the EKG. pending cardiology interpretation, however, per my view the patient has a normal sinus rhythm without acute ST-T wave changes.    CT scan of the abdomen and pelvis images have been reviewed.  Per my view the patient does have inflammation surrounding the duodenum.  She has some splenic calcifications.    Imaging Results (Most Recent)     Procedure Component Value Units Date/Time    CT Abdomen Pelvis Without Contrast [[id number]] Collected:  04/21/20 2055     Updated:  04/21/20 2100    Narrative:       CT ABDOMEN PELVIS WO CONTRAST-     CLINICAL INDICATION: nausea/diarrhea/leukocytosis        COMPARISON: 05/15/2014     TECHNIQUE: Axial images were acquired from the lung bases through the  pubic symphysis without any IV or oral contrast.  Reformatted images were created in both the coronal and sagittal planes.     Radiation dose reduction techniques were utilized per ALARA protocol.  Automated exposure control was initiated through either or [employer] or  DoseRight software packages by  protocol.           FINDINGS:   There are no pleural effusions. Coronary artery calcifications are  present.     The liver is homogeneous. There is no evidence of focal hepatic mass     The spleen is homogeneous     There is no peripancreatic stranding or pancreatic head mass.     Left adrenal mass is unchanged in appearance and size.     There is evidence of atherosclerotic vascular disease.     Arthritic change in the spine     The kidneys show no evidence of hydronephrosis or hydroureter. I do not  see any distal ureteral stones.      Otherwise I do not see any free fluid or walled off fluid collections.     The duodenal wall appears slightly thickened and there is minimal  adjacent stranding, possibly representing a duodenitis.     There is no evidence of mesenteric or retroperitoneal adenopathy             Impression:       :   1. Left adrenal nodule unchanged  2. Thickening of the duodenal  wall and adjacent stranding, possibly  representing a duodenitis. Endoscopic evaluation may be of benefit.  3. Evidence of atherosclerotic vascular disease.  4. Arthritic change in the spine              This report was finalized on 4/21/2020 8:58 PM by Dr. Jonah Mcbride MD.       CT Chest Without Contrast [[id number]] Collected:  04/21/20 2051     Updated:  04/21/20 2057    Narrative:       EXAMINATION: CT CHEST WO CONTRAST-      CLINICAL INDICATION: leukocytosis/weakness/ soa        DOSE:  COMPARISON: 03/12/2019        Radiation dose reduction techniques were utilized per ALARA protocol.  Automated exposure control was initiated through either or HCI or  DoseRight software packages by  protocol.           PROCEDURE:  Axial images were acquired from the thoracic inlet through the upper  abdomen without any IV contrast.  Reformatted images were created.     FINDINGS:  Adenopathy:No evidence of mediastinal or hilar lymph node enlargement.  No axillary lymph node enlargement.     Fluid:There are no pericardial or pleural effusions.     LUNGS:Low-attenuation mass in the left lung on image 41 of the axial  series. It measures 1.71 cm. Slightly larger than on the previous exam  where it was 1.50 cm. It contains fat density and is felt to be a benign  mass. No other parenchymal nodules or masses are seen.        Skeletal:There is arthritic change in the spine     Extensive coronary artery calcifications are present.       Impression:          1. Slight interval growth in left upper lobe mass. It does, however,  contain fat density and thus is most likely a benign mass. Continued  follow-up suggested.  2. Coronary artery calcifications     This report was finalized on 4/21/2020 8:55 PM by Dr. Jonah Mcbride MD.       XR Chest 1 View [[id number]] Collected:  04/21/20 2016     Updated:  04/21/20 2018    Narrative:       XR CHEST 1 VW-     CLINICAL INDICATION: Simple Sepsis Protocol        COMPARISON: 12/16/2019       TECHNIQUE: Single frontal view of the chest.     FINDINGS:     There is no focal alveolar infiltrate or effusion.  The cardiac silhouette is normal. The pulmonary vasculature is  unremarkable.  There is no evidence of an acute osseous abnormality.   Parenchymal nodule in the left lung is unchanged          Impression:       Parenchymal nodule in the left lung unchanged from the prior study.     Radiopaque line projecting over the left neck. Recommend clinical  correlation     This report was finalized on 4/21/2020 8:16 PM by Dr. Jonah Mcbride MD.             Assessment/Plan     -Acute duodenitis presenting with vomiting and abdominal bloating  -Leukocytosis likely due to above  -Suspected upper gastrointestinal bleed with elevated BUN/Cr  -Anion gap metabolic acidosis likely due to worsening renal failure in the setting of CKD Stage III  -Hyponatremia and hypochloremia, likely hypovolemic  -Acute on chronic normocytic/iron deficient anemia, likely exacerbated by above  -Diabetes mellitus type II that is uncontrolled with hyperglycemia  -Essential hypertension is currently controlled  -COPD currently without an acute exacerbation    Patient has been admitted to the telemetry unit.  Continue with nothing by mouth status, gentle IV fluid hydration and her pantoprazole infusion.  I have consulted general surgery for consideration of an EGD.  For now, continue with IV antibiotics; IV Rocephin and IV Flagyl although patient's leukocytosis could possibly be reactive.  Continue to follow her CBC and trend her temperature curve.  Continue to monitor her hemoglobin and hematocrit closely.  I have ordered every 6 hours H/H.  Plan to transfuse for hemoglobin less than 7.0 and/or active bleeding or hemodynamic instability.  Patient has been placed on Accu-Cheks and the hypoglycemia protocol.  Can add as needed sliding scale insulin based on repeat blood glucose levels.  Will hold patient's oral medications at this time.   Can add a an IV as needed antihypertensive should this be needed.    DVT/GI prophylaxis: SCDs/pantoprazole infusion    Estimated Length of Stay: > 2 MNs    The patient is considered to be high risk due to: possible UGIB, acute on chronic anemia, possible duodenitis    I discussed the patients findings and my recommendations with patient and nursing staff      Alexandra Lora DO  04/22/20  04:52    Electronically signed by Alexandra Lora DO at 04/22/20 0622       Vital Signs (last day)     Date/Time   Temp   Temp src   Pulse   Resp   BP   Patient Position   SpO2    04/24/20 1145   97.8 (36.6)   Oral   69   18   117/55   --   98    04/24/20 1130   97.6 (36.4)   Oral   69   18   125/48   --   95    04/24/20 1115   97.6 (36.4)   Oral   66   18   132/68   --   94    04/24/20 1104   97.8 (36.6)   Oral   75   18   116/44   --   94    04/24/20 0654   97.7 (36.5)   Oral   71   18   138/49   Lying   95    04/23/20 1824   98.2 (36.8)   Oral   89   18   133/54   Sitting   98    04/23/20 1505   --   --   100   18   --   --   --    04/23/20 1405   --   --   94   18   157/59   Lying   95    04/23/20 1305   --   --   112   18   168/96   Lying   96    04/23/20 1205   97.6 (36.4)   Axillary   86   18   125/65   Lying   92    04/23/20 1105   --   --   87   18   138/65   Lying   97    04/23/20 1035   --   --   88   18   152/67   Lying   98    04/23/20 1005   --   --   89   18   147/58   Lying   93    04/23/20 0946   97.5 (36.4)   Oral   83   18   147/58   --   98    04/23/20 0935   98.9 (37.2)   Axillary   82   16   144/48   Lying   94    04/23/20 0905   --   --   88   16   133/74   Lying   95    04/23/20 0855   --   --   83   16   129/69   Lying   96    04/23/20 0844   --   --   83   --   --   --   --    04/23/20 0840   --   --   86   16   137/67   Lying   97    04/23/20 0825   97.9 (36.6)   Axillary   90   16   124/80   Lying   96    04/23/20 0813   97 (36.1)   Temporal   97   15   152/84   Lying   99    04/23/20 0808    --   --   95   12   152/76   Lying   98    04/23/20 0803   --   --   96   13   157/85   Lying   98    04/23/20 0758   97.2 (36.2)   Temporal   86   14   149/71   Lying   99    04/23/20 0643   97.5 (36.4)   --   72   16   150/66   --   --    04/23/20 0626   97.5 (36.4)   Oral   74   18   141/61   --   97    04/23/20 0611   97.6 (36.4)   --   73   20   144/56   --   98    04/23/20 0550   97.4 (36.3)   Oral   74   20   147/97   --   97    04/23/20 0300   98 (36.7)   Oral   80   18   139/54   Lying   98              Lines, Drains & Airways    Active LDAs     Name:   Placement date:   Placement time:   Site:   Days:    Peripheral IV 04/22/20 0600 Anterior;Proximal;Right Antecubital   04/22/20    0600    Antecubital   2    Peripheral IV 04/23/20 2108 Posterior;Right Forearm   04/23/20 2108    Forearm   less than 1                  Current Facility-Administered Medications   Medication Dose Route Frequency Provider Last Rate Last Dose   • carvedilol (COREG) tablet 25 mg  25 mg Oral BID With Meals Manfred Smith MD   25 mg at 04/24/20 0813   • cefTRIAXone (ROCEPHIN) 2 g/100 mL 0.9% NS VTB (VENTURA)  2 g Intravenous Q24H Marlon Wray MD   2 g at 04/23/20 1254   • dextrose (D50W) 25 g/ 50mL Intravenous Solution 25 g  25 g Intravenous Q15 Min PRN Marlon Wray MD       • dextrose (GLUTOSE) oral gel 15 g  15 g Oral Q15 Min PRMarlon Jenkins MD       • glucagon (human recombinant) (GLUCAGEN DIAGNOSTIC) injection 1 mg  1 mg Subcutaneous Q15 Min PRMarlon Jenkins MD       • Magnesium Sulfate 2 gram Bolus, followed by 8 gram infusion (total Mg dose 10 grams)- Mg less than or equal to 1mg/dL  2 g Intravenous PRN Marlon Wray MD        Or   • Magnesium Sulfate 2 gram / 50mL Infusion (GIVE X 3 BAGS TO EQUAL 6GM TOTAL DOSE) - Mg 1.1 - 1.5 mg/dl  2 g Intravenous PRN Marlon Wray MD        Or   • Magnesium Sulfate 4 gram infusion- Mg 1.6-1.9 mg/dL  4 g Intravenous PRN Marlon Wray MD       • metroNIDAZOLE  (FLAGYL) 500 mg/100mL IVPB  500 mg Intravenous Q8H Marlon Wray MD   500 mg at 04/24/20 0525   • nitroglycerin (NITROSTAT) SL tablet 0.4 mg  0.4 mg Sublingual Q5 Min PRN Marlon Wray MD       • pantoprazole (PROTONIX) EC tablet 40 mg  40 mg Oral BID AC Manfred Smith MD   40 mg at 04/24/20 0525   • sodium chloride 0.9 % flush 10 mL  10 mL Intravenous PRN Marlon Wray MD       • sodium chloride 0.9 % flush 10 mL  10 mL Intravenous Q12H Marlon Wray MD   10 mL at 04/24/20 0813   • sodium chloride 0.9 % flush 10 mL  10 mL Intravenous PRN Marlon Wray MD           Lab Results (last 24 hours)     Procedure Component Value Units Date/Time    POC Glucose Once [[id number]]  (Abnormal) Collected:  04/24/20 1048    Specimen:  Blood Updated:  04/24/20 1115     Glucose 308 mg/dL     POC Glucose Once [[id number]]  (Abnormal) Collected:  04/24/20 0645    Specimen:  Blood Updated:  04/24/20 0651     Glucose 211 mg/dL     Phosphorus [[id number]]  (Normal) Collected:  04/24/20 0047    Specimen:  Blood Updated:  04/24/20 0202     Phosphorus 2.5 mg/dL     Comprehensive Metabolic Panel [[id number]]  (Abnormal) Collected:  04/24/20 0047    Specimen:  Blood Updated:  04/24/20 0202     Glucose 196 mg/dL      BUN 31 mg/dL      Creatinine 1.08 mg/dL      Sodium 138 mmol/L      Potassium 3.8 mmol/L      Chloride 106 mmol/L      CO2 20.4 mmol/L      Calcium 8.5 mg/dL      Total Protein 5.7 g/dL      Albumin 3.23 g/dL      ALT (SGPT) 7 U/L      AST (SGOT) 11 U/L      Alkaline Phosphatase 30 U/L      Total Bilirubin <0.2 mg/dL      eGFR Non African Amer 49 mL/min/1.73      Globulin 2.5 gm/dL      A/G Ratio 1.3 g/dL      BUN/Creatinine Ratio 28.7     Anion Gap 11.6 mmol/L     Narrative:       GFR Normal >60  Chronic Kidney Disease <60  Kidney Failure <15      Magnesium [[id number]]  (Normal) Collected:  04/24/20 0047    Specimen:  Blood Updated:  04/24/20 0150     Magnesium 2.3 mg/dL     C-reactive Protein [[id number]]   (Abnormal) Collected:  04/24/20 0047    Specimen:  Blood Updated:  04/24/20 0150     C-Reactive Protein 4.80 mg/dL     CBC & Differential [[id number]] Collected:  04/24/20 0047    Specimen:  Blood Updated:  04/24/20 0136    Narrative:       The following orders were created for panel order CBC & Differential.  Procedure                               Abnormality         Status                     ---------                               -----------         ------                     CBC Auto Differential[[id number]]        Abnormal            Final result                 Please view results for these tests on the individual orders.    CBC Auto Differential [[id number]]  (Abnormal) Collected:  04/24/20 0047    Specimen:  Blood Updated:  04/24/20 0136     WBC 11.46 10*3/mm3      RBC 2.69 10*6/mm3      Hemoglobin 7.4 g/dL      Hematocrit 22.9 %      MCV 85.1 fL      MCH 27.5 pg      MCHC 32.3 g/dL      RDW 15.5 %      RDW-SD 47.0 fl      MPV 11.5 fL      Platelets 342 10*3/mm3      Neutrophil % 65.8 %      Lymphocyte % 22.2 %      Monocyte % 8.0 %      Eosinophil % 3.0 %      Basophil % 0.4 %      Immature Grans % 0.6 %      Neutrophils, Absolute 7.54 10*3/mm3      Lymphocytes, Absolute 2.54 10*3/mm3      Monocytes, Absolute 0.92 10*3/mm3      Eosinophils, Absolute 0.34 10*3/mm3      Basophils, Absolute 0.05 10*3/mm3      Immature Grans, Absolute 0.07 10*3/mm3      nRBC 0.0 /100 WBC     Protime-INR [[id number]]  (Abnormal) Collected:  04/24/20 0047    Specimen:  Blood Updated:  04/24/20 0134     Protime 16.0 Seconds      INR 1.22    Narrative:       Suggested INR therapeutic range for stable oral anticoagulant therapy:    Low Intensity therapy:   1.5-2.0  Moderate Intensity therapy:   2.0-3.0  High Intensity therapy:   2.5-4.0    Blood Culture - Blood, Arm, Right [[id number]] Collected:  04/21/20 2010    Specimen:  Blood from Arm, Right Updated:  04/23/20 2015     Blood Culture No growth at 2 days    POC Glucose Once  [[id number]]  (Abnormal) Collected:  04/23/20 1958    Specimen:  Blood Updated:  04/23/20 2005     Glucose 271 mg/dL     Blood Culture - Blood, Arm, Left [[id number]] Collected:  04/21/20 1942    Specimen:  Blood from Arm, Left Updated:  04/23/20 2000     Blood Culture No growth at 2 days    POC Glucose Once [[id number]]  (Abnormal) Collected:  04/23/20 1648    Specimen:  Blood Updated:  04/23/20 1655     Glucose 235 mg/dL     Hemoglobin & Hematocrit, Blood [[id number]]  (Abnormal) Collected:  04/23/20 1403    Specimen:  Blood Updated:  04/23/20 1443     Hemoglobin 8.3 g/dL      Hematocrit 26.2 %     Tissue Pathology Exam [[id number]] Collected:  04/23/20 0750    Specimen:  Tissue from Gastric, Antrum Updated:  04/23/20 1329        Orders (last 24 hrs)      Start     Ordered    04/24/20 1149  Do NOT Discharge Patient Until  Continuous     Comments:  **A Discharge Order is Still Required; This Is NOT a Discharge Order**    04/24/20 1150    04/24/20 1148  Discharge patient  Once      04/24/20 1150    04/24/20 1148  Discontinue IV  Once      04/24/20 1150    04/24/20 1116  POC Glucose Once  Once      04/24/20 1048    04/24/20 0954  Verify Informed Consent for Blood Product Administration  Once      04/24/20 0953    04/24/20 0954  Prepare RBC, 1 Units  Blood - Once      04/24/20 0953    04/24/20 0953  Transfuse RBC, 1 Units Infuse Each Unit Over: 3.5H  Transfusion      04/24/20 0953    04/24/20 0730  pantoprazole (PROTONIX) EC tablet 40 mg  2 Times Daily Before Meals      04/23/20 2006 04/24/20 0652  POC Glucose Once  Once      04/24/20 0645    04/24/20 0600  Phosphorus  Morning Draw      04/23/20 2006 04/24/20 0600  Magnesium  Morning Draw      04/23/20 2006 04/24/20 0600  CBC & Differential  Morning Draw      04/23/20 2006 04/24/20 0600  Comprehensive Metabolic Panel  Morning Draw      04/23/20 2006 04/24/20 0600  Protime-INR  Morning Draw      04/23/20 2006 04/24/20 0600  C-reactive Protein  Morning Draw       04/23/20 2006 04/24/20 0600  CBC Auto Differential  PROCEDURE ONCE      04/24/20 0002    04/24/20 0000  pantoprazole (PROTONIX) 40 MG EC tablet  2 Times Daily Before Meals      04/24/20 1150    04/24/20 0000  metroNIDAZOLE (Flagyl) 500 MG tablet  3 Times Daily      04/24/20 1150    04/24/20 0000  cefdinir (OMNICEF) 300 MG capsule  2 Times Daily      04/24/20 1150    04/24/20 0000  ferrous sulfate 325 (65 FE) MG tablet  2 Times Daily With Meals     Note to Pharmacy:  For one month then go back down to once a day    04/24/20 1150    04/24/20 0000  Activity as Tolerated      04/24/20 1150    04/24/20 0000  Diet: Regular, Consistent Carbohydrate, Cardiac; Thin      04/24/20 1150    04/24/20 0000  Discharge Follow-up with PCP      04/24/20 1150    04/24/20 0000  Referral to Home Health      04/24/20 1150    04/24/20 0000  Discharge Instructions     Comments:  Please do not take the home aspirin for one month.  Please take the protonix and iron twice a day for one month then decrease down to once a day.    04/24/20 1150 04/23/20 2100  carvedilol (COREG) tablet 25 mg  2 Times Daily With Meals      04/23/20 2007 04/23/20 2006  POC Glucose Once  Once      04/23/20 1958 04/23/20 1954  Auto Discontinue in 48 Hours  ONCE CDIFF      04/21/20 1953 04/23/20 1954  Auto Discontinue GI Panel in 48 Hours if not Collected  ONCE GI PANEL      04/21/20 1953 04/23/20 1656  POC Glucose Once  Once      04/23/20 1648    04/23/20 1400  Hemoglobin & Hematocrit, Blood  Timed      04/23/20 1024    04/23/20 0727  lactated ringers infusion  Continuous,   Status:  Discontinued      04/23/20 0726    04/22/20 1607  Magnesium Sulfate 2 gram Bolus, followed by 8 gram infusion (total Mg dose 10 grams)- Mg less than or equal to 1mg/dL  As Needed      04/22/20 1608    04/22/20 1607  Magnesium Sulfate 2 gram / 50mL Infusion (GIVE X 3 BAGS TO EQUAL 6GM TOTAL DOSE) - Mg 1.1 - 1.5 mg/dl  As Needed      04/22/20 1608    04/22/20 1606   Magnesium Sulfate 4 gram infusion- Mg 1.6-1.9 mg/dL  As Needed      04/22/20 1608    04/22/20 1400  cefTRIAXone (ROCEPHIN) 2 g/100 mL 0.9% NS VTB (VENTURA)  Every 24 Hours      04/22/20 0511    04/22/20 0700  POC Glucose TID AC  3 Times Daily Before Meals      04/21/20 2349 04/22/20 0600  metroNIDAZOLE (FLAGYL) 500 mg/100mL IVPB  Every 8 Hours      04/22/20 0511    04/22/20 0045  sodium chloride 0.9 % flush 10 mL  Every 12 Hours Scheduled      04/21/20 2349    04/22/20 0045  sodium chloride 0.9 % infusion  Continuous,   Status:  Discontinued      04/21/20 2349 04/21/20 2349  sodium chloride 0.9 % flush 10 mL  As Needed      04/21/20 2349    04/21/20 2349  dextrose (GLUTOSE) oral gel 15 g  Every 15 Minutes PRN      04/21/20 2349    04/21/20 2349  dextrose (D50W) 25 g/ 50mL Intravenous Solution 25 g  Every 15 Minutes PRN      04/21/20 2349    04/21/20 2349  glucagon (human recombinant) (GLUCAGEN DIAGNOSTIC) injection 1 mg  Every 15 Minutes PRN      04/21/20 2349    04/21/20 2349  nitroglycerin (NITROSTAT) SL tablet 0.4 mg  Every 5 Minutes PRN      04/21/20 2349 04/21/20 2114  pantoprazole (PROTONIX) 40 mg/100 mL (0.4 mg/mL) in 0.9% NS IVPB  Continuous,   Status:  Discontinued      04/21/20 2112 04/21/20 1932  sodium chloride 0.9 % flush 10 mL  As Needed      04/21/20 1933    Unscheduled  ECG 12 Lead  As Needed     Comments:  Nurse to Release if Patient Expericences Acute Chest Pain or Dysrhythmias    04/21/20 2349    Unscheduled  Potassium  As Needed     Comments:  For Ventricular Arrhythmias      04/21/20 2349    Unscheduled  Magnesium  As Needed     Comments:  For Ventricular Arrhythmias      04/21/20 2349    Unscheduled  Troponin  As Needed     Comments:  For Chest Pain      04/21/20 2349    Unscheduled  Digoxin Level  As Needed     Comments:  For Atrial Arrhythmias      04/21/20 2349    Unscheduled  Blood Gas, Arterial  As Needed     Comments:  Per [employer] PolicyNotify Physician      04/21/20 9964     Unscheduled  Magnesium  As Needed      20    Unscheduled  Potassium  As Needed      20 1608    --  famotidine (PEPCID) 20 MG tablet  Every Morning      20    --  hydrALAZINE (APRESOLINE) 10 MG tablet  3 Times Daily      20    --  aspirin 81 MG EC tablet  Daily      20    --  metOLazone (ZAROXOLYN) 2.5 MG tablet  Every Other Day      20    --  ciprofloxacin (CIPRO) 500 MG tablet  2 Times Daily      20                Physician Progress Notes (last 24 hours) (Notes from 20 1300 through 20 1300)    No notes of this type exist for this encounter.         Consult Notes (last 24 hours) (Notes from 20 1300 through 20 1300)    No notes of this type exist for this encounter.         Physical Therapy Notes (last 24 hours) (Notes from 20 1300 through 20 1300)    No notes exist for this encounter.         Occupational Therapy Notes (last 24 hours) (Notes from 20 1300 through 20 1300)    No notes exist for this encounter.           Discharge Summary    No notes of this type exist for this encounter.         Discharge Order   [address] [facility] [address]  [place] [address]  Phone:  [phone]  Fax:   Date: 2020      Referral to Home Health     Patient:  Albina Finley MRN:  [id number]   [address] [place] [address]  [place] [address] :  1945  SSN:    Phone: [phone] Sex:  F      INSURANCE PAYOR PLAN GROUP # SUBSCRIBER ID   Primary:  Secondary:    MED[employer]ARE [employer] [id number]  [id number]  [id number]      Referring Provider Information:  SWETHA AGUIRRE Phone: [phone] Fax:       Referral Information:   # Visits:  1 Referral Type: Home Health [42]   Urgency:  Routine Referral Reason: Specialty Services Required   Start Date: 2020 End Date:  To be determined by Insurer   Diagnosis: Duodenal ulcer (K26.9 [ICD-10-CM] 532.90 [ICD-9-CM])      Refer to Dept:   Refer to Provider:    Refer to Facility:       Face to Face Visit Date: 4/24/2020  Follow-up provider for Plan of Care? I treated the patient in an acute care facility and will not continue treatment after discharge.  Follow-up provider: LAVINIA OKEEFE [[id number]]  Reason/Clinical Findings: Acute blood loss anemia from a bleeding duodenal ulcer  Describe mobility limitations that make leaving home difficult: The patient is elderly, has dementia.  It takes at least two people to help her to the car and then into a doctor's office.  Nursing/Therapeutic Services Requested: Skilled Nursing  Skilled nursing orders: Other (She needs a CBC drawn daily and sent to Dr. Lavinia Okeefe for the next one week)  Frequency: 1 Week 1     This document serves as a request of services and does not constitute Insurance authorization or approval of services.  To determine eligibility, please contact the members Insurance carrier to verify and review coverage.     If you have medical questions regarding this request for services. Please contact [address] at [phone] during normal business hours.       Authorizing Provider:Manfred Aguirre MD  Authorizing Provider's NPI: [id number]  Order Entered By: Manfred Aguirre MD 4/24/2020 11:50 AM     Electronically signed by: Manfred Aguirre MD 4/24/2020 11:50 AM        (From admission, onward)     Start     Ordered    04/24/20 1148  Discharge patient  Once     Expected Discharge Date:  04/24/20    [place] [facility] [place]    Physician of Record for Attribution - Please select from Treatment Team:  MANFRED AGUIRRE [[id number]]    Review needed by CMO to determine Physician of Record:  No       Question Answer Comment   Physician of Record for Attribution - Please select from Treatment Team MANFRED AGUIRRE    Review needed by CMO to determine Physician of Record No        04/24/20 8143

## 2020-04-24 NOTE — DISCHARGE SUMMARY
[facility] HOSPITALISTS DISCHARGE SUMMARY    Patient Identification:  Name:  Albina Filney  Age:  75 y.o.  Sex:  female  :  1945  MRN:  [id number]  Visit Number:  [id number]    Date of Admission: 2020  Date of Discharge: No discharge date for patient encounter.     PCP: Jim Hernandez MD    DISCHARGE DIAGNOSES  -Duodenum with 1 cm ulcer that caused an upper GI bleed that led to acute blood loss anemia  -Suspect acute duodenitis causing leukocytosis  -Acute blood loss anemia on top of chronic Iron deficiency anemia  -Anion gap metabolic acidosis likely due to acute kidney injury in the setting of CKD Stage III, baseline Cr 1.25-1.4  -Acute hypomagnesemia   -Parenchymal nodule in the left upper lung slightly larger than 3/2019 (1.5 cm to 1.71 cm)  -Acute hyponatremia and hypochloremia, likely hypovolemic  -Essential hypertension  -Type 2 diabetes mellitus  -COPD with no acute exacerbation  -Hypoalbuminemia    CONSULTS   Dr. Wray with general surgery    PROCEDURES PERFORMED  2020-2020:  Transfusion of PRBCs, two units total  2020:  EGD by Dr. Wray    HOSPITAL COURSE  Patient is a 75 y.o. female presented to [facility] on 2020 with generalized weakness and vomiting for 3-4 days.  CT scan of the abdomen and pelvis showed thickening of the duodenum.  She was thus admitted to the telemetry floor.  Please see the admitting history and physical for further details.  Blood cultures were obtained and she was empirically started on Rocephin and Flagyl.  She was noted to have a hemoglobin level of 8.6, which was lower than her baseline of 10.6; she was thus started on a Protonix drip.  We followed serial hemoglobin levels and it finally reached a sidney of 6.7; she was given a unit of PRBCs and general surgery was consulted.  EGD was performed and showed a 1 cm duodenal ulcer with no active bleeding seen.  The Protonix drip was then changed to BID PO Protonix.  I had a  long discussion with the daughter via phone as we currently have visitor restrictions due to COVID-19; the patient has called the daughter multiple times to come pick her up.  Since the patient is very adamant that she is not going to stay one more night for observation of her hemoglobin, I am discharging her today with closely outpatient follow up.  The patient does have strong family support.  Home health has been ordered.  She is to take iron and protonix twice a day for 4 weeks and then decrease back down to once a day thereafter.  She also needs to not take the aspirin for 4 weeks in order to give the ulcer time to heal.  She does use the enteric coated aspirin.  She and her daughter were instructed to look for any bloody or black discolored stools.    Please note that since the hemoglobin level was 7.4, I did give another unit of PRBCs as she was wanting to go home.    On the day of discharge, the patient was able to tolerate her normal diet and ambulate on her own.  She was able to perform her activities of daily living with some assistance.       VITAL SIGNS:  Temp:  [97.6 °F (36.4 °C)-98.2 °F (36.8 °C)] 97.8 °F (36.6 °C)  Heart Rate:  [] 69  Resp:  [18] 18  BP: (116-168)/(44-96) 117/55  SpO2:  [92 %-98 %] 98 %  Device (Oxygen Therapy): room air    Body mass index is 27.53 kg/m².  Wt Readings from Last 3 Encounters:   04/24/20 70.5 kg (155 lb 6 oz)   12/20/19 75.3 kg (166 lb)   03/15/19 72.2 kg (159 lb 1.6 oz)       PHYSICAL EXAM:  Physical Exam   Constitutional: She is oriented to person, place, and time. She appears well-developed and well-nourished. No distress.   HENT:   Head: Normocephalic and atraumatic.   Right Ear: External ear normal.   Left Ear: External ear normal.   Nose: Nose normal.   Eyes: Pupils are equal, round, and reactive to light. EOM are normal. Right eye exhibits no discharge. Left eye exhibits no discharge.   Neck: No JVD present.   Cardiovascular: Normal rate, regular rhythm  and intact distal pulses.   No murmur heard.  Pulmonary/Chest: Breath sounds normal. No respiratory distress. She has no wheezes. She has no rales.   Abdominal: Soft. Bowel sounds are normal. She exhibits no distension.   Musculoskeletal: She exhibits no edema or deformity.   Neurological: She is alert and oriented to person, place, and time.   Disoriented to situation.  Can follow commands.  No facial droop.   Skin: Capillary refill takes less than 2 seconds. No rash noted. No erythema.   Psychiatric: Her mood appears anxious. Her speech is delayed.        DISCHARGE DISPOSITION   Stable      DISCHARGE MEDICATIONS:     New Medications      Instructions Start Date   cefdinir 300 MG capsule  Commonly known as:  OMNICEF   300 mg, Oral, 2 Times Daily      metroNIDAZOLE 500 MG tablet  Commonly known as:  Flagyl   500 mg, Oral, 3 Times Daily      pantoprazole 40 MG EC tablet  Commonly known as:  PROTONIX   40 mg, Oral, 2 Times Daily Before Meals         Changes to Medications      Instructions Start Date   ferrous sulfate 325 (65 FE) MG tablet  What changed:  when to take this   325 mg, Oral, 2 Times Daily With Meals      insulin detemir 100 UNIT/ML injection  Commonly known as:  LEVEMIR  What changed:  Another medication with the same name was removed. Continue taking this medication, and follow the directions you see here.   40 Units, Subcutaneous, Nightly         Continue These Medications      Instructions Start Date   bumetanide 1 MG tablet  Commonly known as:  BUMEX   1 mg, Oral, Daily, PTA: Pt takes 2 tablets if her weight is above 152lb      calcium carbonate 600 MG tablet  Commonly known as:  OS-TIANNA   600 mg, Oral, Daily      carvedilol 25 MG tablet  Commonly known as:  COREG   25 mg, Oral, 2 Times Daily With Meals      cetirizine 10 MG tablet  Commonly known as:  zyrTEC   10 mg, Oral, Daily      cyanocobalamin 1000 MCG/ML injection   1,000 mcg, Intramuscular, Every 28 Days      fenofibrate 160 MG tablet   160  mg, Oral, Daily      loperamide 2 MG capsule  Commonly known as:  IMODIUM   2 mg, Oral, Daily      metFORMIN 500 MG tablet  Commonly known as:  GLUCOPHAGE   500 mg, Oral, 2 Times Daily With Meals      metOLazone 2.5 MG tablet  Commonly known as:  ZAROXOLYN   2.5 mg, Oral, Every Other Day      spironolactone 25 MG tablet  Commonly known as:  ALDACTONE   25 mg, Oral, Daily         Stop These Medications    aspirin 81 MG EC tablet     ciprofloxacin 500 MG tablet  Commonly known as:  CIPRO     famotidine 20 MG tablet  Commonly known as:  PEPCID     hydrALAZINE 10 MG tablet  Commonly known as:  APRESOLINE            Diet Instructions     Diet: Regular, Consistent Carbohydrate, Cardiac; Thin      Discharge Diet:   Regular, Consistent Carbohydrate, Cardiac       Fluid Consistency:  Thin        Activity Instructions     Activity as Tolerated          Additional Instructions for the Follow-ups that You Need to Schedule     Discharge Follow-up with PCP   As directed     Currently Documented PCP:    Jim Okeefe MD    PCP Phone Number:    [phone]     Follow Up Details:  3 days             Referral to Home Health   As directed    Face to Face Visit Date:  4/24/2020    Follow-up provider for Plan of Care?:  I treated the patient in an acute care facility and will not continue treatment after discharge.    Follow-up provider:  JIM OKEEFE [[id number]]    Reason/Clinical Findings:  Acute blood loss anemia from a bleeding duodenal ulcer    Describe mobility limitations that make leaving home difficult:  The patient is elderly, has dementia.  It takes at least two people to help her to the car and then into a doctor's office.    Nursing/Therapeutic Services Requested:  Skilled Nursing    Skilled nursing orders:  Other (She needs a CBC drawn daily and sent to Dr. Jim Okeefe for the next one week)    Frequency:  1 Week 1                TEST  RESULTS PENDING AT DISCHARGE:   Order Current Status    Tissue Pathology Exam In process     Blood Culture - Blood, Arm, Left Preliminary result    Blood Culture - Blood, Arm, Right Preliminary result           CODE STATUS  Code Status and Medical Interventions:   Ordered at: 04/21/20 2002     Code Status:    CPR     Medical Interventions (Level of Support Prior to Arrest):    Full       Manfred Smith MD  [facility]  04/24/20  11:50    Please note that this discharge summary required more than 30 minutes to complete.

## 2020-04-24 NOTE — PROGRESS NOTES
Discharge Planning Assessment   Phil     Patient Name: Albina Finley  MRN: [id number]  Today's Date: 4/24/2020    Admit Date: 4/21/2020      Discharge Plan     Row Name 04/24/20 1351       Plan    Plan  SS noted home health referral and spoke with pt's POA who stated no preference for home health agency.  SS made referral to [facility] at [phone] and faxed pt information to [phone].              Mary Ann Gee, CARMENW

## 2020-04-24 NOTE — PLAN OF CARE
Problem: Patient Care Overview  Goal: Plan of Care Review  Outcome: Ongoing (interventions implemented as appropriate)  Goal: Individualization and Mutuality  Outcome: Ongoing (interventions implemented as appropriate)  Goal: Discharge Needs Assessment  Outcome: Ongoing (interventions implemented as appropriate)  Goal: Interprofessional Rounds/Family Conf  Outcome: Ongoing (interventions implemented as appropriate)     Problem: Fall Risk (Adult)  Goal: Identify Related Risk Factors and Signs and Symptoms  Outcome: Ongoing (interventions implemented as appropriate)  Goal: Absence of Fall  Outcome: Ongoing (interventions implemented as appropriate)     Problem: Skin Injury Risk (Adult)  Goal: Identify Related Risk Factors and Signs and Symptoms  Outcome: Ongoing (interventions implemented as appropriate)  Goal: Skin Health and Integrity  Outcome: Ongoing (interventions implemented as appropriate)     Problem: Chronic Obstructive Pulmonary Disease (Adult)  Goal: Signs and Symptoms of Listed Potential Problems Will be Absent, Minimized or Managed (Chronic Obstructive Pulmonary Disease)  Outcome: Ongoing (interventions implemented as appropriate)     Problem: Cardiac Output Decreased (Adult)  Goal: Identify Related Risk Factors and Signs and Symptoms  Outcome: Ongoing (interventions implemented as appropriate)  Goal: Effective Tissue Perfusion  Outcome: Ongoing (interventions implemented as appropriate)

## 2020-04-24 NOTE — NURSING NOTE
Spoke with Dr. Smith about an H&H after pt's blood transfusion; she explained it wasn't necessary to get one prior to discharge because home health will be getting one tomorrow.

## 2020-04-24 NOTE — NURSING NOTE
Spoke with Radha, Nurse  at [facility] and provided pt report. Discussed new meds, current pt status, and explained that daughter has D/C paperwork.

## 2020-04-25 ENCOUNTER — READMISSION MANAGEMENT (OUTPATIENT)
Dept: CALL CENTER | Facility: HOSPITAL | Age: 75
End: 2020-04-25

## 2020-04-25 LAB
BH BB BLOOD EXPIRATION DATE: NORMAL
BH BB BLOOD TYPE BARCODE: 5100
BH BB DISPENSE STATUS: NORMAL
BH BB PRODUCT CODE: NORMAL
BH BB UNIT NUMBER: NORMAL
CROSSMATCH INTERPRETATION: NORMAL
UNIT  ABO: NORMAL
UNIT  RH: NORMAL

## 2020-04-25 NOTE — OUTREACH NOTE
Prep Survey      Responses   [facility] facility patient discharged from?  [facility]   Is LACE score < 7 ?  No   Eligibility  Readm Mgmt   Discharge diagnosis  Duodenal ulcer, upper GI bleed, acute blood loss anemia, type 2 DM, COPD not exacerbated,    COVID-19 Test Status  Not tested   Does the patient have one of the following disease processes/diagnoses(primary or secondary)?  COPD/Pneumonia   Does the patient have Home health ordered?  Yes   What is the Home health agency?   [facility] to [facility]  [phone]   Is there a DME ordered?  No   Comments regarding appointments  F/U Jim Hernandez MD in 3 days.  No appointment made.   Medication alerts for this patient  Start cefdinir, metronidazole and pantoprazole.  Stop aspirin, ciprofloxacin, famotidine and hydralazine.  Do not take the home aspirin for one month. Please take the protonix and iron twice a day for one month then decrease down to once a day.   Prep survey completed?  Yes          Gabriella Glez RN

## 2020-04-26 LAB
BACTERIA SPEC AEROBE CULT: NORMAL
BACTERIA SPEC AEROBE CULT: NORMAL

## 2020-04-27 ENCOUNTER — LAB REQUISITION (OUTPATIENT)
Dept: LAB | Facility: HOSPITAL | Age: 75
End: 2020-04-27

## 2020-04-27 DIAGNOSIS — D64.9 ANEMIA, UNSPECIFIED: ICD-10-CM

## 2020-04-27 LAB
BASOPHILS # BLD AUTO: 0.04 10*3/MM3 (ref 0–0.2)
BASOPHILS NFR BLD AUTO: 0.3 % (ref 0–1.5)
DEPRECATED RDW RBC AUTO: 49.8 FL (ref 37–54)
EOSINOPHIL # BLD AUTO: 0.32 10*3/MM3 (ref 0–0.4)
EOSINOPHIL NFR BLD AUTO: 2.8 % (ref 0.3–6.2)
ERYTHROCYTE [DISTWIDTH] IN BLOOD BY AUTOMATED COUNT: 15.6 % (ref 12.3–15.4)
HBA1C MFR BLD: 6.5 % (ref 4.8–5.6)
HCT VFR BLD AUTO: 26.3 % (ref 34–46.6)
HGB BLD-MCNC: 8.3 G/DL (ref 12–15.9)
IMM GRANULOCYTES # BLD AUTO: 0.06 10*3/MM3 (ref 0–0.05)
IMM GRANULOCYTES NFR BLD AUTO: 0.5 % (ref 0–0.5)
LAB AP CASE REPORT: NORMAL
LYMPHOCYTES # BLD AUTO: 2.9 10*3/MM3 (ref 0.7–3.1)
LYMPHOCYTES NFR BLD AUTO: 25.2 % (ref 19.6–45.3)
MCH RBC QN AUTO: 27.9 PG (ref 26.6–33)
MCHC RBC AUTO-ENTMCNC: 31.6 G/DL (ref 31.5–35.7)
MCV RBC AUTO: 88.6 FL (ref 79–97)
MONOCYTES # BLD AUTO: 1.09 10*3/MM3 (ref 0.1–0.9)
MONOCYTES NFR BLD AUTO: 9.5 % (ref 5–12)
NEUTROPHILS # BLD AUTO: 7.12 10*3/MM3 (ref 1.7–7)
NEUTROPHILS NFR BLD AUTO: 61.7 % (ref 42.7–76)
NRBC BLD AUTO-RTO: 0 /100 WBC (ref 0–0.2)
PATH REPORT.FINAL DX SPEC: NORMAL
PLATELET # BLD AUTO: 445 10*3/MM3 (ref 140–450)
PMV BLD AUTO: 11.5 FL (ref 6–12)
RBC # BLD AUTO: 2.97 10*6/MM3 (ref 3.77–5.28)
WBC NRBC COR # BLD: 11.53 10*3/MM3 (ref 3.4–10.8)

## 2020-04-27 PROCEDURE — 85025 COMPLETE CBC W/AUTO DIFF WBC: CPT | Performed by: FAMILY MEDICINE

## 2020-04-27 PROCEDURE — 83036 HEMOGLOBIN GLYCOSYLATED A1C: CPT | Performed by: FAMILY MEDICINE

## 2020-04-27 NOTE — PROGRESS NOTES
Continued Stay Note   [facility]     Patient Name: Albina Finley  MRN: [id number]  Today's Date: 4/27/2020    Admit Date: 4/21/2020    Discharge Plan     Row Name 04/27/20 0631       Plan    Final Discharge Disposition Code  01 - home or self-care    Final Note  Pt was d/c home 4/24/20 with PO abx and to f/u with PCP.  Home health was arranged by  prior to discharge.        Discharge Codes    No documentation.       Expected Discharge Date and Time     Expected Discharge Date Expected Discharge Time    Apr 24, 2020             Brittney Camarena RN

## 2020-04-28 ENCOUNTER — LAB REQUISITION (OUTPATIENT)
Dept: LAB | Facility: HOSPITAL | Age: 75
End: 2020-04-28

## 2020-04-28 ENCOUNTER — READMISSION MANAGEMENT (OUTPATIENT)
Dept: CALL CENTER | Facility: HOSPITAL | Age: 75
End: 2020-04-28

## 2020-04-28 DIAGNOSIS — D50.9 IRON DEFICIENCY ANEMIA, UNSPECIFIED: ICD-10-CM

## 2020-04-28 LAB
BASOPHILS # BLD AUTO: 0.04 10*3/MM3 (ref 0–0.2)
BASOPHILS NFR BLD AUTO: 0.4 % (ref 0–1.5)
DEPRECATED RDW RBC AUTO: 49.2 FL (ref 37–54)
EOSINOPHIL # BLD AUTO: 0.27 10*3/MM3 (ref 0–0.4)
EOSINOPHIL NFR BLD AUTO: 2.6 % (ref 0.3–6.2)
ERYTHROCYTE [DISTWIDTH] IN BLOOD BY AUTOMATED COUNT: 15.7 % (ref 12.3–15.4)
HCT VFR BLD AUTO: 27.4 % (ref 34–46.6)
HGB BLD-MCNC: 8.6 G/DL (ref 12–15.9)
IMM GRANULOCYTES # BLD AUTO: 0.04 10*3/MM3 (ref 0–0.05)
IMM GRANULOCYTES NFR BLD AUTO: 0.4 % (ref 0–0.5)
LYMPHOCYTES # BLD AUTO: 2.33 10*3/MM3 (ref 0.7–3.1)
LYMPHOCYTES NFR BLD AUTO: 22.6 % (ref 19.6–45.3)
MCH RBC QN AUTO: 27.5 PG (ref 26.6–33)
MCHC RBC AUTO-ENTMCNC: 31.4 G/DL (ref 31.5–35.7)
MCV RBC AUTO: 87.5 FL (ref 79–97)
MONOCYTES # BLD AUTO: 0.81 10*3/MM3 (ref 0.1–0.9)
MONOCYTES NFR BLD AUTO: 7.9 % (ref 5–12)
NEUTROPHILS # BLD AUTO: 6.81 10*3/MM3 (ref 1.7–7)
NEUTROPHILS NFR BLD AUTO: 66.1 % (ref 42.7–76)
NRBC BLD AUTO-RTO: 0 /100 WBC (ref 0–0.2)
PLATELET # BLD AUTO: 469 10*3/MM3 (ref 140–450)
PMV BLD AUTO: 11.3 FL (ref 6–12)
RBC # BLD AUTO: 3.13 10*6/MM3 (ref 3.77–5.28)
WBC NRBC COR # BLD: 10.3 10*3/MM3 (ref 3.4–10.8)

## 2020-04-28 PROCEDURE — 85025 COMPLETE CBC W/AUTO DIFF WBC: CPT | Performed by: FAMILY MEDICINE

## 2020-04-28 NOTE — OUTREACH NOTE
Medical Week 1 Survey      Responses   [facility] patient discharged from?  [facility]   COVID-19 Test Status  Not tested   Does the patient have one of the following disease processes/diagnoses(primary or secondary)?  Other   Is there a successful TCM telephone encounter documented?  No   Week 1 attempt successful?  Yes   Call start time  1821   Call end time  1827   Is patient permission given to speak with other caregiver?  Yes   List who call center can speak with  Tana and domingo    Person spoke with today (if not patient) and relationship  Antonina   Meds reviewed with patient/caregiver?  Yes   Is the patient having any side effects they believe may be caused by any medication additions or changes?  No   Does the patient have all medications ordered at discharge?  Yes   Is the patient taking all medications as directed (includes completed medication regime)?  Yes   Comments regarding appointments  She is not able to go in for a MD appointment.     Does the patient have a primary care provider?   Yes   Does the patient have an appointment with their PCP within 7 days of discharge?  Yes   Comments regarding PCP  Jim Hernandez MD   Has the patient kept scheduled appointments due by today?  No   What is preventing the patient from keeping their appointments?  -- [Did not know about the appointment.  ]   What is the Home health agency?   [facility]  [phone]   Has home health visited the patient within 72 hours of discharge?  Yes   Did the patient receive a copy of their discharge instructions?  Yes   Nursing interventions  Reviewed instructions with patient   What is the patient's perception of their health status since discharge?  Improving   Is the patient/caregiver able to teach back signs and symptoms related to disease process for when to call PCP?  Yes   Is the patient/caregiver able to teach back signs and symptoms related to disease process for when to call 911?  Yes    Is the patient/caregiver able to teach back the hierarchy of who to call/visit for symptoms/problems? PCP, Specialist, Home health nurse, Urgent Care, ED, 911  Yes   Week 1 call completed?  Yes          Heydi Elliott RN

## 2020-04-29 ENCOUNTER — LAB REQUISITION (OUTPATIENT)
Dept: LAB | Facility: HOSPITAL | Age: 75
End: 2020-04-29

## 2020-04-29 DIAGNOSIS — I10 ESSENTIAL (PRIMARY) HYPERTENSION: ICD-10-CM

## 2020-04-29 LAB
BASOPHILS # BLD AUTO: 0.03 10*3/MM3 (ref 0–0.2)
BASOPHILS NFR BLD AUTO: 0.3 % (ref 0–1.5)
DEPRECATED RDW RBC AUTO: 50.4 FL (ref 37–54)
EOSINOPHIL # BLD AUTO: 0.31 10*3/MM3 (ref 0–0.4)
EOSINOPHIL NFR BLD AUTO: 3.1 % (ref 0.3–6.2)
ERYTHROCYTE [DISTWIDTH] IN BLOOD BY AUTOMATED COUNT: 15.9 % (ref 12.3–15.4)
HCT VFR BLD AUTO: 27.7 % (ref 34–46.6)
HGB BLD-MCNC: 8.7 G/DL (ref 12–15.9)
IMM GRANULOCYTES # BLD AUTO: 0.05 10*3/MM3 (ref 0–0.05)
IMM GRANULOCYTES NFR BLD AUTO: 0.5 % (ref 0–0.5)
LYMPHOCYTES # BLD AUTO: 2.73 10*3/MM3 (ref 0.7–3.1)
LYMPHOCYTES NFR BLD AUTO: 27.1 % (ref 19.6–45.3)
MCH RBC QN AUTO: 27.6 PG (ref 26.6–33)
MCHC RBC AUTO-ENTMCNC: 31.4 G/DL (ref 31.5–35.7)
MCV RBC AUTO: 87.9 FL (ref 79–97)
MONOCYTES # BLD AUTO: 0.74 10*3/MM3 (ref 0.1–0.9)
MONOCYTES NFR BLD AUTO: 7.3 % (ref 5–12)
NEUTROPHILS # BLD AUTO: 6.21 10*3/MM3 (ref 1.7–7)
NEUTROPHILS NFR BLD AUTO: 61.7 % (ref 42.7–76)
NRBC BLD AUTO-RTO: 0 /100 WBC (ref 0–0.2)
PLATELET # BLD AUTO: 500 10*3/MM3 (ref 140–450)
PMV BLD AUTO: 11.4 FL (ref 6–12)
RBC # BLD AUTO: 3.15 10*6/MM3 (ref 3.77–5.28)
WBC NRBC COR # BLD: 10.07 10*3/MM3 (ref 3.4–10.8)

## 2020-04-29 PROCEDURE — 85025 COMPLETE CBC W/AUTO DIFF WBC: CPT | Performed by: FAMILY MEDICINE

## 2020-04-30 ENCOUNTER — LAB REQUISITION (OUTPATIENT)
Dept: LAB | Facility: HOSPITAL | Age: 75
End: 2020-04-30

## 2020-04-30 DIAGNOSIS — D50.9 IRON DEFICIENCY ANEMIA, UNSPECIFIED: ICD-10-CM

## 2020-04-30 LAB
BASOPHILS # BLD AUTO: 0.05 10*3/MM3 (ref 0–0.2)
BASOPHILS NFR BLD AUTO: 0.4 % (ref 0–1.5)
DEPRECATED RDW RBC AUTO: 50.6 FL (ref 37–54)
EOSINOPHIL # BLD AUTO: 0.43 10*3/MM3 (ref 0–0.4)
EOSINOPHIL NFR BLD AUTO: 3.9 % (ref 0.3–6.2)
ERYTHROCYTE [DISTWIDTH] IN BLOOD BY AUTOMATED COUNT: 15.9 % (ref 12.3–15.4)
HCT VFR BLD AUTO: 28.3 % (ref 34–46.6)
HGB BLD-MCNC: 8.9 G/DL (ref 12–15.9)
IMM GRANULOCYTES # BLD AUTO: 0.02 10*3/MM3 (ref 0–0.05)
IMM GRANULOCYTES NFR BLD AUTO: 0.2 % (ref 0–0.5)
LYMPHOCYTES # BLD AUTO: 3.72 10*3/MM3 (ref 0.7–3.1)
LYMPHOCYTES NFR BLD AUTO: 33.4 % (ref 19.6–45.3)
MCH RBC QN AUTO: 27.5 PG (ref 26.6–33)
MCHC RBC AUTO-ENTMCNC: 31.4 G/DL (ref 31.5–35.7)
MCV RBC AUTO: 87.3 FL (ref 79–97)
MONOCYTES # BLD AUTO: 0.8 10*3/MM3 (ref 0.1–0.9)
MONOCYTES NFR BLD AUTO: 7.2 % (ref 5–12)
NEUTROPHILS # BLD AUTO: 6.13 10*3/MM3 (ref 1.7–7)
NEUTROPHILS NFR BLD AUTO: 54.9 % (ref 42.7–76)
NRBC BLD AUTO-RTO: 0 /100 WBC (ref 0–0.2)
PLATELET # BLD AUTO: 577 10*3/MM3 (ref 140–450)
PMV BLD AUTO: 11.3 FL (ref 6–12)
RBC # BLD AUTO: 3.24 10*6/MM3 (ref 3.77–5.28)
WBC NRBC COR # BLD: 11.15 10*3/MM3 (ref 3.4–10.8)

## 2020-04-30 PROCEDURE — 85025 COMPLETE CBC W/AUTO DIFF WBC: CPT | Performed by: FAMILY MEDICINE

## 2020-05-01 ENCOUNTER — LAB REQUISITION (OUTPATIENT)
Dept: LAB | Facility: HOSPITAL | Age: 75
End: 2020-05-01

## 2020-05-01 DIAGNOSIS — I50.33 ACUTE ON CHRONIC DIASTOLIC (CONGESTIVE) HEART FAILURE (HCC): ICD-10-CM

## 2020-05-01 LAB
BASOPHILS # BLD AUTO: 0.04 10*3/MM3 (ref 0–0.2)
BASOPHILS NFR BLD AUTO: 0.4 % (ref 0–1.5)
DEPRECATED RDW RBC AUTO: 52.5 FL (ref 37–54)
EOSINOPHIL # BLD AUTO: 0.37 10*3/MM3 (ref 0–0.4)
EOSINOPHIL NFR BLD AUTO: 4.1 % (ref 0.3–6.2)
ERYTHROCYTE [DISTWIDTH] IN BLOOD BY AUTOMATED COUNT: 15.9 % (ref 12.3–15.4)
HCT VFR BLD AUTO: 28.5 % (ref 34–46.6)
HGB BLD-MCNC: 8.7 G/DL (ref 12–15.9)
IMM GRANULOCYTES # BLD AUTO: 0.04 10*3/MM3 (ref 0–0.05)
IMM GRANULOCYTES NFR BLD AUTO: 0.4 % (ref 0–0.5)
LYMPHOCYTES # BLD AUTO: 2.41 10*3/MM3 (ref 0.7–3.1)
LYMPHOCYTES NFR BLD AUTO: 26.4 % (ref 19.6–45.3)
MCH RBC QN AUTO: 27.4 PG (ref 26.6–33)
MCHC RBC AUTO-ENTMCNC: 30.5 G/DL (ref 31.5–35.7)
MCV RBC AUTO: 89.6 FL (ref 79–97)
MONOCYTES # BLD AUTO: 0.67 10*3/MM3 (ref 0.1–0.9)
MONOCYTES NFR BLD AUTO: 7.3 % (ref 5–12)
NEUTROPHILS # BLD AUTO: 5.6 10*3/MM3 (ref 1.7–7)
NEUTROPHILS NFR BLD AUTO: 61.4 % (ref 42.7–76)
NRBC BLD AUTO-RTO: 0 /100 WBC (ref 0–0.2)
PLATELET # BLD AUTO: 558 10*3/MM3 (ref 140–450)
PMV BLD AUTO: 11.1 FL (ref 6–12)
RBC # BLD AUTO: 3.18 10*6/MM3 (ref 3.77–5.28)
WBC NRBC COR # BLD: 9.13 10*3/MM3 (ref 3.4–10.8)

## 2020-05-01 PROCEDURE — 85025 COMPLETE CBC W/AUTO DIFF WBC: CPT | Performed by: FAMILY MEDICINE

## 2020-05-04 ENCOUNTER — READMISSION MANAGEMENT (OUTPATIENT)
Dept: CALL CENTER | Facility: HOSPITAL | Age: 75
End: 2020-05-04

## 2020-05-04 NOTE — OUTREACH NOTE
Medical Week 2 Survey      Responses   [facility] patient discharged from?  [facility]   COVID-19 Test Status  Not tested   Does the patient have one of the following disease processes/diagnoses(primary or secondary)?  Other   Week 2 attempt successful?  Yes   Call start time  0942   Discharge diagnosis  Duodenal ulcer, upper GI bleed, acute blood loss anemia, type 2 DM, COPD not exacerbated,    Call end time  0947   Person spoke with today (if not patient) and relationship  Angelique, daughter   Meds reviewed with patient/caregiver?  Yes   Is the patient having any side effects they believe may be caused by any medication additions or changes?  No   Does the patient have all medications ordered at discharge?  Yes   Is the patient taking all medications as directed (includes completed medication regime)?  Yes   Does the patient have a primary care provider?   Yes   Does the patient have an appointment with their PCP within 7 days of discharge?  Greater than 7 days   What is preventing the patient from scheduling follow up appointments within 7 days of discharge?  Earlier appointment not available   Has the patient kept scheduled appointments due by today?  N/A   What is the Home health agency?   [facility]   Has home health visited the patient within 72 hours of discharge?  Yes   Pulse Ox monitoring  Intermittent   Pulse Ox device source  Patient   O2 Sat comments  running @ 95, has not needed O2   Psychosocial issues?  No   Did the patient receive a copy of their discharge instructions?  Yes   Nursing interventions  Reviewed instructions with patient   What is the patient's perception of their health status since discharge?  Improving   Is the patient/caregiver able to teach back signs and symptoms related to disease process for when to call PCP?  Yes   Is the patient/caregiver able to teach back signs and symptoms related to disease process for when to call 911?  Yes   Is the patient/caregiver able to teach  back the hierarchy of who to call/visit for symptoms/problems? PCP, Specialist, Home health nurse, Urgent Care, ED, 911  Yes   Additional teach back comments  states appetite is slow to come back, has been trying to eat   Week 2 Call Completed?  Yes          Faiza Kerr RN

## 2020-05-12 ENCOUNTER — READMISSION MANAGEMENT (OUTPATIENT)
Dept: CALL CENTER | Facility: HOSPITAL | Age: 75
End: 2020-05-12

## 2020-05-12 NOTE — OUTREACH NOTE
Medical Week 3 Survey      Responses   [facility] patient discharged from?  [facility]   COVID-19 Test Status  Not tested   Does the patient have one of the following disease processes/diagnoses(primary or secondary)?  Other   Week 3 attempt successful?  Yes   Call start time  1656   Call end time  1705   Discharge diagnosis  Duodenal ulcer, upper GI bleed, acute blood loss anemia, type 2 DM, COPD not exacerbated,    Person spoke with today (if not patient) and relationship  Angelique, bre   Comments regarding appointments  .   Has the patient kept scheduled appointments due by today?  N/A   Comments  Pt has not been able to make a f/u appt, noone from office will call her back.    What is the Home health agency?   [facility]   Pulse Ox monitoring  Intermittent   Pulse Ox device source  Patient   Psychosocial issues?  No   Comments  Dtr reports having sweaty spells where she has burning in throat, gets nervous lasting few minutes, they have tried to get PCP to call them but have not been able to get anyone from there to call them back.    What is the patient's perception of their health status since discharge?  Same   Is the patient/caregiver able to teach back signs and symptoms related to disease process for when to call PCP?  Yes   Week 3 Call Completed?  Yes          Shelia Mckeon RN

## 2020-05-21 ENCOUNTER — READMISSION MANAGEMENT (OUTPATIENT)
Dept: CALL CENTER | Facility: HOSPITAL | Age: 75
End: 2020-05-21

## 2020-05-21 NOTE — OUTREACH NOTE
Medical Week 4 Survey      Responses   [facility] patient discharged from?  [facility]   COVID-19 Test Status  Not tested   Does the patient have one of the following disease processes/diagnoses(primary or secondary)?  Other   Week 4 attempt successful?  Yes   Call start time  0832   Call end time  0835   Person spoke with today (if not patient) and relationship  bre Merino reviewed with patient/caregiver?  Yes   Is the patient taking all medications as directed (includes completed medication regime)?  Yes   Has the patient kept scheduled appointments due by today?  Yes   Is the patient still receiving Home Health Services?  Yes   Home health comments  HH comes first time yesterday   Pulse Ox monitoring  Intermittent   Pulse Ox device source  Patient   O2 Sat comments  running in high 90's   O2 Sat: education provided  Sat levels   O2 Sat education comments  only uses oxygen as needed   What is the patient's perception of their health status since discharge?  Improving   Week 4 Call Completed?  Yes   Would the patient like one additional call?  No   Graduated  Yes   Did the patient feel the follow up calls were helpful during their recovery period?  Yes   Was the number of calls appropriate?  Yes   Wrap up additional comments  doing well, eating and drinking, is still weak, HH is coming started yesterday          Ivana Boyle RN

## 2020-05-28 ENCOUNTER — OFFICE VISIT (OUTPATIENT)
Dept: CARDIOLOGY | Facility: CLINIC | Age: 75
End: 2020-05-28

## 2020-05-28 VITALS
SYSTOLIC BLOOD PRESSURE: 162 MMHG | HEART RATE: 58 BPM | DIASTOLIC BLOOD PRESSURE: 68 MMHG | BODY MASS INDEX: 28.17 KG/M2 | HEIGHT: 63 IN | WEIGHT: 159 LBS | RESPIRATION RATE: 16 BRPM | TEMPERATURE: 98.8 F

## 2020-05-28 DIAGNOSIS — I21.4 NON-ST ELEVATION MYOCARDIAL INFARCTION (NSTEMI) (HCC): Primary | ICD-10-CM

## 2020-05-28 DIAGNOSIS — N18.30 CKD (CHRONIC KIDNEY DISEASE) STAGE 3, GFR 30-59 ML/MIN (HCC): ICD-10-CM

## 2020-05-28 DIAGNOSIS — I50.32 CHRONIC DIASTOLIC HEART FAILURE (HCC): ICD-10-CM

## 2020-05-28 DIAGNOSIS — I10 ESSENTIAL HYPERTENSION: ICD-10-CM

## 2020-05-28 DIAGNOSIS — K27.4 PEPTIC ULCER DISEASE WITH HEMORRHAGE: ICD-10-CM

## 2020-05-28 DIAGNOSIS — D50.0 ANEMIA DUE TO GI BLOOD LOSS: ICD-10-CM

## 2020-05-28 DIAGNOSIS — R94.39 ABNORMAL NUCLEAR STRESS TEST: ICD-10-CM

## 2020-05-28 PROCEDURE — 99204 OFFICE O/P NEW MOD 45 MIN: CPT | Performed by: INTERNAL MEDICINE

## 2020-05-28 PROCEDURE — 93000 ELECTROCARDIOGRAM COMPLETE: CPT | Performed by: INTERNAL MEDICINE

## 2020-05-28 RX ORDER — RANOLAZINE 1000 MG/1
1000 TABLET, EXTENDED RELEASE ORAL 2 TIMES DAILY
Qty: 60 TABLET | Refills: 2 | Status: SHIPPED | OUTPATIENT
Start: 2020-05-28 | End: 2020-06-17

## 2020-05-28 NOTE — PROGRESS NOTES
Jim Hernandez MD  Albina Finley  1945  05/28/2020    Patient Active Problem List   Diagnosis   • Iron deficiency anemia   • COPD with acute exacerbation (CMS/[facility])   • Chronic diastolic heart failure (CMS/[facility])   • CKD (chronic kidney disease) stage 3, GFR 30-59 ml/min (CMS/[facility])   • Essential hypertension   • Hyperlipidemia   • Type II diabetes mellitus (CMS/[facility])   • Arthritis   • Osteoporosis   • Grade II diastolic dysfunction   • Duodenitis   • Peptic ulcer disease with hemorrhage   • Non-ST elevation myocardial infarction (NSTEMI) (CMS/[facility])   • Abnormal nuclear stress test with mild degree of small size apical lateral wall myocardial ischemia in December 2019.   • Anemia due to GI blood loss       Dear Jim Hernandez MD:    Eulalio Finley is a 75 y.o. female with the problems as listed above, presents    Chief complaint: Recent non-ST elevation myocardial infarction for further cardiac evaluation and management.    History of Present Illness: Ms. Finley is a pleasant 75-year-old  female who was admitted in December 2019 when she was admitted with complaints of shortness of breath and was noted to be in acute respiratory failure along with some degree of acute decompensated heart failure.  At that time she had troponin elevation up to 0.237.  She was evaluated by Dr. Lozoya at that time with a Lexiscan sestamibi study and was noted to have a small size, mild degree of ischemia in the apical and lateral wall.  She also was noted to have some chronic kidney disease and hence was left on medical therapy including aspirin, carvedilol, statin.  She subsequently presented in April 2020 with an acute upper GI bleed and was noted to have duodenal ulcer on the EGD.  She had blood loss anemia and required 2 units of packed RBC.  She is being treated with a Protonix.  She is now referred by Dr. Hernandez for her history of nonspecific ST elevation myocardial infarction.  On further questioning Ms.  Tushar indicates that she has been having some episodes of epigastric pain with some heartburn associated with sweating spells.  These would come and go spontaneously.  These are associated with some shortness of breath as well.  He has dyspnea with mild exertion with 2-3 pillow orthopnea and some bilateral leg edema.  Her hemoglobin is still around 8.7 on the last check on 2020.  She has black stools but she is also on iron supplements.  Her daughter brought blood pressure readings from home which I reviewed and they are mostly in the range of 130s to 140s systolic and 80s diastolic.    Albina Finley   Regadenoson Stress Test With Myocardial Perfusion SPECT (Multi Study)   Order# [id number]   Reading physician:   Tremaine Stuart MD Ordering physician:   Leigh Javier APRN Study date: 19   Patient Information     Patient Name  Albina Finley MRN  [id number] Sex  Female  (Age)  1945 (75 y.o.)   Interpretation Summary     · Myocardial perfusion imaging indicates a small-sized, mildly severe area of ischemia in apical and lateral wall.  · Left ventricular ejection fraction is normal (Calculated EF = 66%).  · EKG indeterminate due to baseline changes. No arrhythmias.  · Impressions are consistent with an intermediate risk study.  · Breast attenuation artifact is present.     Albina Finley   Echo Complete w/Doppler and Color Flow   Order# [id number]   Reading physician:   Tremaine Stuart MD Ordering physician:   Tressa Echevarria PA-C Study date: 19   Patient Information     Patient Name  Albina Finley MRN  [id number] Sex  Female  (Age)  1945 (75 y.o.)   Sedation Narrator Report     Interpretation Summary     · Left ventricular systolic function is normal. Estimated EF appears to be in the range of 61 - 65%.  · Left ventricular diastolic dysfunction (grade II) consistent with pseudonormalization.  · Mild aortic valve stenosis is present.  · Mild pulmonary  hypertension is present.  · There is no evidence of pericardial effusion.  · No changes compared to previous study       No Known Allergies:      Current Outpatient Medications:   •  bumetanide (BUMEX) 1 MG tablet, Take 1 mg by mouth Daily. PTA: Pt takes 2 tablets if her weight is above 152lb, Disp: , Rfl:   •  carvedilol (COREG) 25 MG tablet, Take 25 mg by mouth 2 (Two) Times a Day With Meals., Disp: , Rfl:   •  cetirizine (zyrTEC) 10 MG tablet, Take 10 mg by mouth Daily., Disp: , Rfl:   •  cyanocobalamin 1000 MCG/ML injection, Inject 1,000 mcg into the appropriate muscle as directed by prescriber Every 28 (Twenty-Eight) Days., Disp: , Rfl:   •  fenofibrate 160 MG tablet, Take 160 mg by mouth Daily., Disp: , Rfl:   •  ferrous sulfate 325 (65 FE) MG tablet, Take 1 tablet by mouth 2 (Two) Times a Day With Meals., Disp: 60 tablet, Rfl: 0  •  insulin detemir (LEVEMIR) 100 UNIT/ML injection, Inject 40 Units under the skin into the appropriate area as directed Every Night., Disp: , Rfl:   •  loperamide (IMODIUM) 2 MG capsule, Take 2 mg by mouth Daily., Disp: , Rfl:   •  metFORMIN (GLUCOPHAGE) 500 MG tablet, Take 500 mg by mouth 2 (Two) Times a Day With Meals., Disp: , Rfl:   •  pantoprazole (PROTONIX) 40 MG EC tablet, Take 1 tablet by mouth 2 (Two) Times a Day Before Meals., Disp: 60 tablet, Rfl: 0  •  spironolactone (ALDACTONE) 25 MG tablet, Take 25 mg by mouth Daily., Disp: , Rfl:   •  calcium carbonate (OS-TIANNA) 600 MG tablet, Take 600 mg by mouth Daily., Disp: , Rfl:   •  metOLazone (ZAROXOLYN) 2.5 MG tablet, Take 2.5 mg by mouth Every Other Day., Disp: , Rfl:   •  ranolazine (RANEXA) 1000 MG 12 hr tablet, Take 1 tablet by mouth 2 (Two) Times a Day., Disp: 60 tablet, Rfl: 2    Past Medical History:   Diagnosis Date   • Acute on chronic diastolic CHF (congestive heart failure) (CMS/HCC) 12/17/2019   • Anal polyp 6/7/2018    Added automatically from request for surgery [id number]   • Arthritis    • Chronic kidney disease     • COPD (chronic obstructive pulmonary disease) (CMS/HCC)    • Diabetes mellitus (CMS/HCC)    • Elevated cholesterol    • Essential hypertension 2019   • History of transfusion    • Incidental lung nodule, greater than or equal to 8mm     Left lower lobe, 15 mm based on  and 2019 chest CT scans with no growth in the size   • Iron deficiency anemia 2017   • Osteoporosis    • Pneumonia of right middle lobe due to infectious organism (CMS/HCC) 2019     Past Surgical History:   Procedure Laterality Date   • ANUS SURGERY N/A 2018    Procedure: Diagnostic anoscopy with anal polyp excision;  Surgeon: Marlyn Gutierrez MD;  Location: [facility];  Service: General   •  SECTION     • ENDOSCOPY N/A 2020    Procedure: ESOPHAGOGASTRODUODENOSCOPY;  Surgeon: Marlon Wray MD;  Location: [facility] OR;  Service: Gastroenterology;  Laterality: N/A;   • HIP BIPOLAR REPLACEMENT      left Dr. Cornejo    • HYSTERECTOMY     • JOINT REPLACEMENT       Family History   Problem Relation Age of Onset   • Heart disease Mother    • COPD Mother    • Arthritis Mother    • Diabetes Father      Social History     Tobacco Use   • Smoking status: Former Smoker     Packs/day: 2.00     Years: 15.00     Pack years: 30.00     Types: Cigarettes   • Smokeless tobacco: Never Used   • Tobacco comment: quit 15 years ago    Substance Use Topics   • Alcohol use: No   • Drug use: No       Review of Systems   HENT: Positive for hearing loss.    Eyes: Positive for visual disturbance.   Cardiovascular: Positive for leg swelling. Negative for chest pain.   Respiratory: Positive for cough, shortness of breath and wheezing.    Endocrine: Positive for polydipsia and polyuria.   Hematologic/Lymphatic:        Hx transfusion/anemia   Skin: Positive for poor wound healing.   Gastrointestinal: Positive for abdominal pain, diarrhea and heartburn.        Hx ulcer   Genitourinary: Positive for bladder incontinence, dysuria and frequency.  "  Neurological: Positive for dizziness, headaches, light-headedness and weakness.   Psychiatric/Behavioral: Positive for memory loss. The patient has insomnia and is nervous/anxious.        Objective   Blood pressure 162/68, pulse 58, temperature 98.8 °F (37.1 °C), resp. rate 16, height 160 cm (63\"), weight 72.1 kg (159 lb), not currently breastfeeding.  Body mass index is 28.17 kg/m².        Physical Exam   Constitutional: She is oriented to person, place, and time. She appears well-developed and well-nourished.   Chronically ill looking lady who is alert, oriented x3 and is in no acute distress at this time.  She appears somewhat pale.   HENT:   Mouth/Throat: Oropharynx is clear and moist.   Facial pallor.   Eyes: Pupils are equal, round, and reactive to light. EOM are normal.   Neck: Neck supple. No JVD present. No tracheal deviation present. No thyromegaly present.   Cardiovascular: Normal rate, regular rhythm, S1 normal and S2 normal. Exam reveals no gallop and no friction rub.   No murmur heard.  Pulmonary/Chest: Effort normal. She has rales (Some scattered rales.).   Abdominal: Soft. Bowel sounds are normal. She exhibits no mass. There is no tenderness.   Musculoskeletal: Normal range of motion. She exhibits edema (1+ edema on both legs.).   Lymphadenopathy:     She has no cervical adenopathy.   Neurological: She is alert and oriented to person, place, and time.   Skin: Skin is warm and dry. No rash noted.   Psychiatric: She has a normal mood and affect.       Lab Results   Component Value Date     04/24/2020    K 3.8 04/24/2020     04/24/2020    CO2 20.4 (L) 04/24/2020    BUN 31 (H) 04/24/2020    CREATININE 1.08 (H) 04/24/2020    GLUCOSE 196 (H) 04/24/2020    CALCIUM 8.5 (L) 04/24/2020    AST 11 04/24/2020    ALT 7 04/24/2020    ALKPHOS 30 (L) 04/24/2020    LABIL2 1.2 (L) 09/15/2015     Lab Results   Component Value Date    CKTOTAL 27 12/16/2019     Lab Results   Component Value Date    WBC 9.13 " 05/01/2020    HGB 8.7 (L) 05/01/2020    HCT 28.5 (L) 05/01/2020     (H) 05/01/2020     Lab Results   Component Value Date    INR 1.22 (H) 04/24/2020    INR 1.21 (H) 04/21/2020    INR 0.93 12/19/2019     Lab Results   Component Value Date    MG 2.3 04/24/2020     Lab Results   Component Value Date    TSH 1.750 12/16/2019    TRIG 242 (H) 12/20/2019    HDL 32 (L) 12/20/2019    LDL 63 12/20/2019      Lab Results   Component Value Date    .0 (H) 02/14/2019       During this visit the following were done:  Labs Reviewed [x]    Radiology Reports Reviewed [x]    Hospital Records Reviewed [x]        ECG 12 Lead  Date/Time: 5/28/2020 1:17 PM  Performed by: Mauricio Medina MD  Authorized by: Mauricio Medina MD   Comparison: compared with previous ECG from 4/21/2020  Comparison to previous ECG: Patient has subtle T wave inversion in leads III and aVF on this EKG.  Rhythm: sinus rhythm  Ectopy: atrial premature contractions and infrequent PVCs  Other findings: non-specific ST-T wave changes            Assessment/Plan :   Diagnosis Plan   1. Non-ST elevation myocardial infarction (NSTEMI) (CMS/[facility])     2. Abnormal nuclear stress test with mild degree of small size apical lateral wall myocardial ischemia in December 2019.     3. Chronic diastolic heart failure (CMS/[facility])     4. Essential hypertension     5. Peptic ulcer disease with hemorrhage     6. Anemia due to GI blood loss     7. CKD (chronic kidney disease) stage 3, GFR 30-59 ml/min (CMS/HCC)         Recommendations:  Orders Placed This Encounter   Procedures   • CBC (No Diff)   • Comprehensive Metabolic Panel   • Ambulatory Referral to Cardiology   • ECG 12 Lead        1. This is a difficult situation as patient had recent significant upper GI bleed which precludes the usage of antiplatelet agents at least for some time.  Hence she would not be a candidate to have cardiac catheterization and coronary intervention at this time.  2. We will try to optimize her  antianginal therapy the best we can given her situation.  3. I will refer her back to Dr. Lozoya to have a follow-up with him and find a time when she would be able to have the cardiac catheterization and intervention.  4. I have discussed with her about the importance of salt restricted diet and the foods to avoid such as canned vegetables, canned soups, all chips, pickles, processed meats and pizzas etc.  Patient expressed understanding.  5. Add Ranexa thousand milligrams p.o. twice daily.    Return in about 4 weeks (around 6/25/2020).    As always, Jim Hernandez MD  I appreciate very much the opportunity to participate in the cardiovascular care of your patients. Please do not hesitate to call me with any questions with regards to Albina Finley's evaluation and management.       With Best Regards,        Mauricio Medina MD, [facility]    Please note that portions of this note were completed with a voice recognition program.

## 2020-06-02 ENCOUNTER — LAB (OUTPATIENT)
Dept: LAB | Facility: HOSPITAL | Age: 75
End: 2020-06-02

## 2020-06-02 ENCOUNTER — TRANSCRIBE ORDERS (OUTPATIENT)
Dept: ADMINISTRATIVE | Facility: HOSPITAL | Age: 75
End: 2020-06-02

## 2020-06-02 DIAGNOSIS — D50.9 IRON DEFICIENCY ANEMIA, UNSPECIFIED IRON DEFICIENCY ANEMIA TYPE: Primary | ICD-10-CM

## 2020-06-02 DIAGNOSIS — N18.30 CHRONIC KIDNEY DISEASE, STAGE III (MODERATE) (HCC): ICD-10-CM

## 2020-06-02 DIAGNOSIS — I50.32 CHRONIC DIASTOLIC HEART FAILURE (HCC): ICD-10-CM

## 2020-06-02 DIAGNOSIS — D50.9 IRON DEFICIENCY ANEMIA, UNSPECIFIED IRON DEFICIENCY ANEMIA TYPE: ICD-10-CM

## 2020-06-02 LAB
ALBUMIN UR-MCNC: 3.1 MG/DL
ANION GAP SERPL CALCULATED.3IONS-SCNC: 9.9 MMOL/L (ref 5–15)
BASOPHILS # BLD AUTO: 0.03 10*3/MM3 (ref 0–0.2)
BASOPHILS NFR BLD AUTO: 0.3 % (ref 0–1.5)
BUN BLD-MCNC: 37 MG/DL (ref 8–23)
BUN/CREAT SERPL: 20.7 (ref 7–25)
CALCIUM SPEC-SCNC: 9.5 MG/DL (ref 8.6–10.5)
CHLORIDE SERPL-SCNC: 99 MMOL/L (ref 98–107)
CO2 SERPL-SCNC: 29.1 MMOL/L (ref 22–29)
CREAT BLD-MCNC: 1.79 MG/DL (ref 0.57–1)
CREAT UR-MCNC: 21.7 MG/DL
CREAT UR-MCNC: 21.7 MG/DL
DEPRECATED RDW RBC AUTO: 50.9 FL (ref 37–54)
EOSINOPHIL # BLD AUTO: 0.26 10*3/MM3 (ref 0–0.4)
EOSINOPHIL NFR BLD AUTO: 2.9 % (ref 0.3–6.2)
ERYTHROCYTE [DISTWIDTH] IN BLOOD BY AUTOMATED COUNT: 15.9 % (ref 12.3–15.4)
FERRITIN SERPL-MCNC: 147 NG/ML (ref 13–150)
GFR SERPL CREATININE-BSD FRML MDRD: 28 ML/MIN/1.73
GLUCOSE BLD-MCNC: 56 MG/DL (ref 65–99)
HCT VFR BLD AUTO: 32.3 % (ref 34–46.6)
HGB BLD-MCNC: 10 G/DL (ref 12–15.9)
IMM GRANULOCYTES # BLD AUTO: 0.02 10*3/MM3 (ref 0–0.05)
IMM GRANULOCYTES NFR BLD AUTO: 0.2 % (ref 0–0.5)
IRON 24H UR-MRATE: 21 MCG/DL (ref 37–145)
IRON SATN MFR SERPL: 11 % (ref 20–50)
LYMPHOCYTES # BLD AUTO: 3.43 10*3/MM3 (ref 0.7–3.1)
LYMPHOCYTES NFR BLD AUTO: 38.9 % (ref 19.6–45.3)
MCH RBC QN AUTO: 27 PG (ref 26.6–33)
MCHC RBC AUTO-ENTMCNC: 31 G/DL (ref 31.5–35.7)
MCV RBC AUTO: 87.3 FL (ref 79–97)
MICROALBUMIN/CREAT UR: 142.9 MG/G
MONOCYTES # BLD AUTO: 0.75 10*3/MM3 (ref 0.1–0.9)
MONOCYTES NFR BLD AUTO: 8.5 % (ref 5–12)
NEUTROPHILS # BLD AUTO: 4.33 10*3/MM3 (ref 1.7–7)
NEUTROPHILS NFR BLD AUTO: 49.2 % (ref 42.7–76)
NRBC BLD AUTO-RTO: 0 /100 WBC (ref 0–0.2)
PLATELET # BLD AUTO: 459 10*3/MM3 (ref 140–450)
PMV BLD AUTO: 11.3 FL (ref 6–12)
POTASSIUM BLD-SCNC: 4.9 MMOL/L (ref 3.5–5.2)
PROT UR-MCNC: 22 MG/DL
PROT/CREAT UR: 1013.8 MG/G CREA (ref 0–200)
RBC # BLD AUTO: 3.7 10*6/MM3 (ref 3.77–5.28)
SODIUM BLD-SCNC: 138 MMOL/L (ref 136–145)
TIBC SERPL-MCNC: 185 MCG/DL (ref 298–536)
TRANSFERRIN SERPL-MCNC: 124 MG/DL (ref 200–360)
WBC NRBC COR # BLD: 8.82 10*3/MM3 (ref 3.4–10.8)

## 2020-06-02 PROCEDURE — 84466 ASSAY OF TRANSFERRIN: CPT

## 2020-06-02 PROCEDURE — 82570 ASSAY OF URINE CREATININE: CPT

## 2020-06-02 PROCEDURE — 36415 COLL VENOUS BLD VENIPUNCTURE: CPT

## 2020-06-02 PROCEDURE — 85025 COMPLETE CBC W/AUTO DIFF WBC: CPT

## 2020-06-02 PROCEDURE — 84156 ASSAY OF PROTEIN URINE: CPT

## 2020-06-02 PROCEDURE — 82043 UR ALBUMIN QUANTITATIVE: CPT

## 2020-06-02 PROCEDURE — 83540 ASSAY OF IRON: CPT

## 2020-06-02 PROCEDURE — 82728 ASSAY OF FERRITIN: CPT

## 2020-06-02 PROCEDURE — 80048 BASIC METABOLIC PNL TOTAL CA: CPT

## 2020-06-12 ENCOUNTER — TRANSCRIBE ORDERS (OUTPATIENT)
Dept: INFUSION THERAPY | Facility: HOSPITAL | Age: 75
End: 2020-06-12

## 2020-06-12 DIAGNOSIS — D50.9 IRON DEFICIENCY ANEMIA, UNSPECIFIED IRON DEFICIENCY ANEMIA TYPE: ICD-10-CM

## 2020-06-12 DIAGNOSIS — N18.30 CHRONIC KIDNEY DISEASE, STAGE III (MODERATE) (HCC): Primary | ICD-10-CM

## 2020-06-17 ENCOUNTER — CONSULT (OUTPATIENT)
Dept: CARDIOLOGY | Facility: CLINIC | Age: 75
End: 2020-06-17

## 2020-06-17 VITALS
WEIGHT: 160 LBS | HEART RATE: 56 BPM | HEIGHT: 63 IN | DIASTOLIC BLOOD PRESSURE: 70 MMHG | SYSTOLIC BLOOD PRESSURE: 164 MMHG | BODY MASS INDEX: 28.35 KG/M2 | OXYGEN SATURATION: 92 %

## 2020-06-17 DIAGNOSIS — I10 ESSENTIAL HYPERTENSION: ICD-10-CM

## 2020-06-17 DIAGNOSIS — E78.2 MIXED HYPERLIPIDEMIA: ICD-10-CM

## 2020-06-17 DIAGNOSIS — I50.32 CHRONIC DIASTOLIC HEART FAILURE (HCC): Primary | Chronic | ICD-10-CM

## 2020-06-17 DIAGNOSIS — I21.4 NON-ST ELEVATION MYOCARDIAL INFARCTION (NSTEMI) (HCC): ICD-10-CM

## 2020-06-17 PROCEDURE — 99214 OFFICE O/P EST MOD 30 MIN: CPT | Performed by: INTERNAL MEDICINE

## 2020-06-17 PROCEDURE — 93000 ELECTROCARDIOGRAM COMPLETE: CPT | Performed by: INTERNAL MEDICINE

## 2020-06-17 RX ORDER — HYDRALAZINE HYDROCHLORIDE 10 MG/1
10 TABLET, FILM COATED ORAL 3 TIMES DAILY
COMMUNITY
Start: 2020-06-10 | End: 2021-12-10 | Stop reason: HOSPADM

## 2020-06-18 ENCOUNTER — HOSPITAL ENCOUNTER (OUTPATIENT)
Dept: INFUSION THERAPY | Facility: HOSPITAL | Age: 75
Discharge: HOME | End: 2020-06-18
Admitting: INTERNAL MEDICINE

## 2020-06-18 VITALS
HEART RATE: 54 BPM | SYSTOLIC BLOOD PRESSURE: 172 MMHG | TEMPERATURE: 97.7 F | RESPIRATION RATE: 16 BRPM | DIASTOLIC BLOOD PRESSURE: 78 MMHG

## 2020-06-18 DIAGNOSIS — D50.9 IRON DEFICIENCY ANEMIA, UNSPECIFIED IRON DEFICIENCY ANEMIA TYPE: Primary | ICD-10-CM

## 2020-06-18 LAB
ANION GAP SERPL CALCULATED.3IONS-SCNC: 10.1 MMOL/L (ref 5–15)
BUN BLD-MCNC: 49 MG/DL (ref 8–23)
BUN/CREAT SERPL: 26.1 (ref 7–25)
CALCIUM SPEC-SCNC: 9.7 MG/DL (ref 8.6–10.5)
CHLORIDE SERPL-SCNC: 100 MMOL/L (ref 98–107)
CO2 SERPL-SCNC: 26.9 MMOL/L (ref 22–29)
CREAT BLD-MCNC: 1.88 MG/DL (ref 0.57–1)
GFR SERPL CREATININE-BSD FRML MDRD: 26 ML/MIN/1.73
GLUCOSE BLD-MCNC: 161 MG/DL (ref 65–99)
POTASSIUM BLD-SCNC: 4.8 MMOL/L (ref 3.5–5.2)
SODIUM BLD-SCNC: 137 MMOL/L (ref 136–145)

## 2020-06-18 PROCEDURE — 96365 THER/PROPH/DIAG IV INF INIT: CPT

## 2020-06-18 PROCEDURE — 80048 BASIC METABOLIC PNL TOTAL CA: CPT | Performed by: INTERNAL MEDICINE

## 2020-06-18 PROCEDURE — 25010000002 FERRIC CARBOXYMALTOSE 750 MG/15ML SOLUTION 15 ML VIAL: Performed by: INTERNAL MEDICINE

## 2020-06-18 RX ADMIN — FERRIC CARBOXYMALTOSE INJECTION 750 MG: 50 INJECTION, SOLUTION INTRAVENOUS at 09:53

## 2020-06-18 NOTE — PATIENT INSTRUCTIONS
Ferric carboxymaltose injection  What is this medicine?  FERRIC CARBOXYMALTOSE (ferr-ik car-box-ee-mol-toes) is an iron complex. Iron is used to make healthy red blood cells, which carry oxygen and nutrients throughout the body. This medicine is used to treat anemia in people with chronic kidney disease or people who cannot take iron by mouth.  This medicine may be used for other purposes; ask your health care provider or pharmacist if you have questions.  COMMON BRAND NAME(S): Injectafer  What should I tell my health care provider before I take this medicine?  They need to know if you have any of these conditions:  · high levels of iron in the blood  · liver disease  · an unusual or allergic reaction to iron, other medicines, foods, dyes, or preservatives  · pregnant or trying to get pregnant  · breast-feeding  How should I use this medicine?  This medicine is for infusion into a vein. It is given by a health care professional in a hospital or clinic setting.  Talk to your pediatrician regarding the use of this medicine in children. Special care may be needed.  Overdosage: If you think you have taken too much of this medicine contact a poison control center or emergency room at once.  NOTE: This medicine is only for you. Do not share this medicine with others.  What if I miss a dose?  It is important not to miss your dose. Call your doctor or health care professional if you are unable to keep an appointment.  What may interact with this medicine?  Do not take this medicine with any of the following medications:  · deferoxamine  · dimercaprol  · other iron products  This list may not describe all possible interactions. Give your health care provider a list of all the medicines, herbs, non-prescription drugs, or dietary supplements you use. Also tell them if you smoke, drink alcohol, or use illegal drugs. Some items may interact with your medicine.  What should I watch for while using this medicine?  Visit your  doctor or health care professional regularly. Tell your doctor if your symptoms do not start to get better or if they get worse. You may need blood work done while you are taking this medicine.  You may need to follow a special diet. Talk to your doctor. Foods that contain iron include: whole grains/cereals, dried fruits, beans, or peas, leafy green vegetables, and organ meats (liver, kidney).  What side effects may I notice from receiving this medicine?  Side effects that you should report to your doctor or health care professional as soon as possible:  · allergic reactions like skin rash, itching or hives, swelling of the face, lips, or tongue  · dizziness  · facial flushing  Side effects that usually do not require medical attention (report to your doctor or health care professional if they continue or are bothersome):  · changes in taste  · constipation  · headache  · nausea, vomiting  · pain, redness, or irritation at site where injected  This list may not describe all possible side effects. Call your doctor for medical advice about side effects. You may report side effects to FDA at 9-094-FDA-5538.  Where should I keep my medicine?  This drug is given in a hospital or clinic and will not be stored at home.  NOTE: This sheet is a summary. It may not cover all possible information. If you have questions about this medicine, talk to your doctor, pharmacist, or health care provider.  © 2020 [employer]/Gold Standard (2018-02-01 09:40:29)

## 2020-07-14 ENCOUNTER — TRANSCRIBE ORDERS (OUTPATIENT)
Dept: ADMINISTRATIVE | Facility: HOSPITAL | Age: 75
End: 2020-07-14

## 2020-07-14 ENCOUNTER — LAB (OUTPATIENT)
Dept: LAB | Facility: HOSPITAL | Age: 75
End: 2020-07-14

## 2020-07-14 DIAGNOSIS — N18.30 CHRONIC KIDNEY DISEASE, STAGE III (MODERATE) (HCC): ICD-10-CM

## 2020-07-14 DIAGNOSIS — N18.30 CHRONIC KIDNEY DISEASE, STAGE III (MODERATE) (HCC): Primary | ICD-10-CM

## 2020-07-14 LAB
ANION GAP SERPL CALCULATED.3IONS-SCNC: 12.9 MMOL/L (ref 5–15)
BUN SERPL-MCNC: 62 MG/DL (ref 8–23)
BUN/CREAT SERPL: 34.4 (ref 7–25)
CALCIUM SPEC-SCNC: 10 MG/DL (ref 8.6–10.5)
CHLORIDE SERPL-SCNC: 94 MMOL/L (ref 98–107)
CO2 SERPL-SCNC: 28.1 MMOL/L (ref 22–29)
CREAT SERPL-MCNC: 1.8 MG/DL (ref 0.57–1)
GFR SERPL CREATININE-BSD FRML MDRD: 27 ML/MIN/1.73
GLUCOSE SERPL-MCNC: 283 MG/DL (ref 65–99)
POTASSIUM SERPL-SCNC: 4.6 MMOL/L (ref 3.5–5.2)
SODIUM SERPL-SCNC: 135 MMOL/L (ref 136–145)

## 2020-07-14 PROCEDURE — 80048 BASIC METABOLIC PNL TOTAL CA: CPT

## 2020-07-14 PROCEDURE — 36415 COLL VENOUS BLD VENIPUNCTURE: CPT

## 2020-09-03 ENCOUNTER — LAB (OUTPATIENT)
Dept: LAB | Facility: HOSPITAL | Age: 75
End: 2020-09-03

## 2020-09-03 ENCOUNTER — TRANSCRIBE ORDERS (OUTPATIENT)
Dept: ADMINISTRATIVE | Facility: HOSPITAL | Age: 75
End: 2020-09-03

## 2020-09-03 DIAGNOSIS — D50.9 IRON DEFICIENCY ANEMIA, UNSPECIFIED IRON DEFICIENCY ANEMIA TYPE: ICD-10-CM

## 2020-09-03 DIAGNOSIS — N18.30 CHRONIC KIDNEY DISEASE, STAGE III (MODERATE) (HCC): ICD-10-CM

## 2020-09-03 DIAGNOSIS — N18.30 CHRONIC KIDNEY DISEASE, STAGE III (MODERATE) (HCC): Primary | ICD-10-CM

## 2020-09-03 LAB
ALBUMIN SERPL-MCNC: 4.2 G/DL (ref 3.5–5.2)
ALBUMIN UR-MCNC: <1.2 MG/DL
ALBUMIN/GLOB SERPL: 1.4 G/DL
ALP SERPL-CCNC: 67 U/L (ref 39–117)
ALT SERPL W P-5'-P-CCNC: 15 U/L (ref 1–33)
ANION GAP SERPL CALCULATED.3IONS-SCNC: 12.1 MMOL/L (ref 5–15)
AST SERPL-CCNC: 16 U/L (ref 1–32)
BASOPHILS # BLD AUTO: 0.03 10*3/MM3 (ref 0–0.2)
BASOPHILS NFR BLD AUTO: 0.3 % (ref 0–1.5)
BILIRUB SERPL-MCNC: 0.2 MG/DL (ref 0–1.2)
BUN SERPL-MCNC: 53 MG/DL (ref 8–23)
BUN/CREAT SERPL: 27.9 (ref 7–25)
CALCIUM SPEC-SCNC: 9.6 MG/DL (ref 8.6–10.5)
CHLORIDE SERPL-SCNC: 96 MMOL/L (ref 98–107)
CO2 SERPL-SCNC: 28.9 MMOL/L (ref 22–29)
CREAT SERPL-MCNC: 1.9 MG/DL (ref 0.57–1)
CREAT UR-MCNC: 20.5 MG/DL
CREAT UR-MCNC: 20.5 MG/DL
DEPRECATED RDW RBC AUTO: 50.7 FL (ref 37–54)
EOSINOPHIL # BLD AUTO: 0.23 10*3/MM3 (ref 0–0.4)
EOSINOPHIL NFR BLD AUTO: 2.6 % (ref 0.3–6.2)
ERYTHROCYTE [DISTWIDTH] IN BLOOD BY AUTOMATED COUNT: 16.4 % (ref 12.3–15.4)
FERRITIN SERPL-MCNC: 404 NG/ML (ref 13–150)
GFR SERPL CREATININE-BSD FRML MDRD: 26 ML/MIN/1.73
GLOBULIN UR ELPH-MCNC: 3.1 GM/DL
GLUCOSE SERPL-MCNC: 70 MG/DL (ref 65–99)
HCT VFR BLD AUTO: 36.1 % (ref 34–46.6)
HGB BLD-MCNC: 11.5 G/DL (ref 12–15.9)
IMM GRANULOCYTES # BLD AUTO: 0.03 10*3/MM3 (ref 0–0.05)
IMM GRANULOCYTES NFR BLD AUTO: 0.3 % (ref 0–0.5)
IRON 24H UR-MRATE: 40 MCG/DL (ref 37–145)
IRON SATN MFR SERPL: 29 % (ref 20–50)
LYMPHOCYTES # BLD AUTO: 3.73 10*3/MM3 (ref 0.7–3.1)
LYMPHOCYTES NFR BLD AUTO: 41.8 % (ref 19.6–45.3)
MCH RBC QN AUTO: 27 PG (ref 26.6–33)
MCHC RBC AUTO-ENTMCNC: 31.9 G/DL (ref 31.5–35.7)
MCV RBC AUTO: 84.7 FL (ref 79–97)
MICROALBUMIN/CREAT UR: NORMAL MG/G{CREAT}
MONOCYTES # BLD AUTO: 0.73 10*3/MM3 (ref 0.1–0.9)
MONOCYTES NFR BLD AUTO: 8.2 % (ref 5–12)
NEUTROPHILS NFR BLD AUTO: 4.17 10*3/MM3 (ref 1.7–7)
NEUTROPHILS NFR BLD AUTO: 46.8 % (ref 42.7–76)
NRBC BLD AUTO-RTO: 0 /100 WBC (ref 0–0.2)
PLATELET # BLD AUTO: 325 10*3/MM3 (ref 140–450)
PMV BLD AUTO: 11.3 FL (ref 6–12)
POTASSIUM SERPL-SCNC: 4.5 MMOL/L (ref 3.5–5.2)
PROT SERPL-MCNC: 7.3 G/DL (ref 6–8.5)
PROT UR-MCNC: 7 MG/DL
PROT/CREAT UR: 341.5 MG/G CREA (ref 0–200)
RBC # BLD AUTO: 4.26 10*6/MM3 (ref 3.77–5.28)
SODIUM SERPL-SCNC: 137 MMOL/L (ref 136–145)
TIBC SERPL-MCNC: 139 MCG/DL (ref 298–536)
TRANSFERRIN SERPL-MCNC: 93 MG/DL (ref 200–360)
WBC # BLD AUTO: 8.92 10*3/MM3 (ref 3.4–10.8)

## 2020-09-03 PROCEDURE — 84466 ASSAY OF TRANSFERRIN: CPT

## 2020-09-03 PROCEDURE — 36415 COLL VENOUS BLD VENIPUNCTURE: CPT

## 2020-09-03 PROCEDURE — 85025 COMPLETE CBC W/AUTO DIFF WBC: CPT

## 2020-09-03 PROCEDURE — 84156 ASSAY OF PROTEIN URINE: CPT

## 2020-09-03 PROCEDURE — 80053 COMPREHEN METABOLIC PANEL: CPT

## 2020-09-03 PROCEDURE — 82570 ASSAY OF URINE CREATININE: CPT

## 2020-09-03 PROCEDURE — 82728 ASSAY OF FERRITIN: CPT

## 2020-09-03 PROCEDURE — 83540 ASSAY OF IRON: CPT

## 2020-09-03 PROCEDURE — 82043 UR ALBUMIN QUANTITATIVE: CPT

## 2020-10-06 ENCOUNTER — LAB (OUTPATIENT)
Dept: LAB | Facility: HOSPITAL | Age: 75
End: 2020-10-06

## 2020-10-06 ENCOUNTER — TRANSCRIBE ORDERS (OUTPATIENT)
Dept: OTHER | Facility: OTHER | Age: 75
End: 2020-10-06

## 2020-10-06 DIAGNOSIS — C44.01 BASAL CELL CARCINOMA (BCC) OF SKIN OF LIP: ICD-10-CM

## 2020-10-06 DIAGNOSIS — IMO0002 TYPE II DIABETES MELLITUS WITH RENAL MANIFESTATIONS, UNCONTROLLED: ICD-10-CM

## 2020-10-06 DIAGNOSIS — D50.0 IRON DEFICIENCY ANEMIA SECONDARY TO BLOOD LOSS (CHRONIC): ICD-10-CM

## 2020-10-06 DIAGNOSIS — A04.72 ENTEROCOLITIS DUE TO CLOSTRIDIUM DIFFICILE, NOT SPECIFIED AS RECURRENT: Primary | ICD-10-CM

## 2020-10-06 DIAGNOSIS — D62 ACUTE POSTHEMORRHAGIC ANEMIA: ICD-10-CM

## 2020-10-06 DIAGNOSIS — A04.72 ENTEROCOLITIS DUE TO CLOSTRIDIUM DIFFICILE, NOT SPECIFIED AS RECURRENT: ICD-10-CM

## 2020-10-06 LAB
ALBUMIN SERPL-MCNC: 4 G/DL (ref 3.5–5.2)
ALBUMIN/GLOB SERPL: 1.2 G/DL
ALP SERPL-CCNC: 80 U/L (ref 39–117)
ALT SERPL W P-5'-P-CCNC: 13 U/L (ref 1–33)
ANION GAP SERPL CALCULATED.3IONS-SCNC: 11.7 MMOL/L (ref 5–15)
ARTICHOKE IGE QN: 41 MG/DL (ref 0–100)
AST SERPL-CCNC: 17 U/L (ref 1–32)
BASOPHILS # BLD AUTO: 0.04 10*3/MM3 (ref 0–0.2)
BASOPHILS NFR BLD AUTO: 0.5 % (ref 0–1.5)
BILIRUB SERPL-MCNC: 0.2 MG/DL (ref 0–1.2)
BUN SERPL-MCNC: 66 MG/DL (ref 8–23)
BUN/CREAT SERPL: 32.8 (ref 7–25)
CALCIUM SPEC-SCNC: 9.6 MG/DL (ref 8.6–10.5)
CHLORIDE SERPL-SCNC: 97 MMOL/L (ref 98–107)
CHOLEST SERPL-MCNC: 164 MG/DL (ref 0–200)
CO2 SERPL-SCNC: 27.3 MMOL/L (ref 22–29)
CREAT SERPL-MCNC: 2.01 MG/DL (ref 0.57–1)
DEPRECATED RDW RBC AUTO: 49.6 FL (ref 37–54)
EOSINOPHIL # BLD AUTO: 0.21 10*3/MM3 (ref 0–0.4)
EOSINOPHIL NFR BLD AUTO: 2.4 % (ref 0.3–6.2)
ERYTHROCYTE [DISTWIDTH] IN BLOOD BY AUTOMATED COUNT: 15.9 % (ref 12.3–15.4)
GFR SERPL CREATININE-BSD FRML MDRD: 24 ML/MIN/1.73
GLOBULIN UR ELPH-MCNC: 3.4 GM/DL
GLUCOSE SERPL-MCNC: 239 MG/DL (ref 65–99)
HBA1C MFR BLD: 10.18 % (ref 4.8–5.6)
HCT VFR BLD AUTO: 35.9 % (ref 34–46.6)
HDLC SERPL-MCNC: 20 MG/DL (ref 40–60)
HGB BLD-MCNC: 11.4 G/DL (ref 12–15.9)
IMM GRANULOCYTES # BLD AUTO: 0.03 10*3/MM3 (ref 0–0.05)
IMM GRANULOCYTES NFR BLD AUTO: 0.3 % (ref 0–0.5)
LDLC SERPL CALC-MCNC: ABNORMAL MG/DL
LDLC/HDLC SERPL: ABNORMAL {RATIO}
LYMPHOCYTES # BLD AUTO: 2.63 10*3/MM3 (ref 0.7–3.1)
LYMPHOCYTES NFR BLD AUTO: 30.3 % (ref 19.6–45.3)
MCH RBC QN AUTO: 27.1 PG (ref 26.6–33)
MCHC RBC AUTO-ENTMCNC: 31.8 G/DL (ref 31.5–35.7)
MCV RBC AUTO: 85.5 FL (ref 79–97)
MONOCYTES # BLD AUTO: 0.74 10*3/MM3 (ref 0.1–0.9)
MONOCYTES NFR BLD AUTO: 8.5 % (ref 5–12)
NEUTROPHILS NFR BLD AUTO: 5.02 10*3/MM3 (ref 1.7–7)
NEUTROPHILS NFR BLD AUTO: 58 % (ref 42.7–76)
NRBC BLD AUTO-RTO: 0.1 /100 WBC (ref 0–0.2)
PLATELET # BLD AUTO: 289 10*3/MM3 (ref 140–450)
PMV BLD AUTO: 11.8 FL (ref 6–12)
POTASSIUM SERPL-SCNC: 4.3 MMOL/L (ref 3.5–5.2)
PROT SERPL-MCNC: 7.4 G/DL (ref 6–8.5)
RBC # BLD AUTO: 4.2 10*6/MM3 (ref 3.77–5.28)
SODIUM SERPL-SCNC: 136 MMOL/L (ref 136–145)
T4 FREE SERPL-MCNC: 1.36 NG/DL (ref 0.93–1.7)
TRIGL SERPL-MCNC: 498 MG/DL (ref 0–150)
TSH SERPL DL<=0.05 MIU/L-ACNC: 1.32 UIU/ML (ref 0.27–4.2)
VLDLC SERPL-MCNC: ABNORMAL MG/DL
WBC # BLD AUTO: 8.67 10*3/MM3 (ref 3.4–10.8)

## 2020-10-06 PROCEDURE — 84439 ASSAY OF FREE THYROXINE: CPT

## 2020-10-06 PROCEDURE — 83721 ASSAY OF BLOOD LIPOPROTEIN: CPT

## 2020-10-06 PROCEDURE — 80053 COMPREHEN METABOLIC PANEL: CPT

## 2020-10-06 PROCEDURE — 83036 HEMOGLOBIN GLYCOSYLATED A1C: CPT

## 2020-10-06 PROCEDURE — 84443 ASSAY THYROID STIM HORMONE: CPT

## 2020-10-06 PROCEDURE — 85025 COMPLETE CBC W/AUTO DIFF WBC: CPT

## 2020-10-06 PROCEDURE — 80061 LIPID PANEL: CPT

## 2020-10-06 PROCEDURE — 36415 COLL VENOUS BLD VENIPUNCTURE: CPT

## 2020-12-09 ENCOUNTER — LAB (OUTPATIENT)
Dept: LAB | Facility: HOSPITAL | Age: 75
End: 2020-12-09

## 2020-12-09 ENCOUNTER — TRANSCRIBE ORDERS (OUTPATIENT)
Dept: ADMINISTRATIVE | Facility: HOSPITAL | Age: 75
End: 2020-12-09

## 2020-12-09 DIAGNOSIS — D50.9 IRON DEFICIENCY ANEMIA, UNSPECIFIED IRON DEFICIENCY ANEMIA TYPE: Primary | ICD-10-CM

## 2020-12-09 DIAGNOSIS — N18.30 CHRONIC KIDNEY INSUFFICIENCY, STAGE 3 (MODERATE) (HCC): ICD-10-CM

## 2020-12-09 DIAGNOSIS — D50.9 IRON DEFICIENCY ANEMIA, UNSPECIFIED IRON DEFICIENCY ANEMIA TYPE: ICD-10-CM

## 2020-12-09 DIAGNOSIS — N25.81 SECONDARY HYPERPARATHYROIDISM OF RENAL ORIGIN (HCC): ICD-10-CM

## 2020-12-09 LAB
ALBUMIN SERPL-MCNC: 4 G/DL (ref 3.5–5.2)
ALBUMIN UR-MCNC: <1.2 MG/DL
ALBUMIN/GLOB SERPL: 1.2 G/DL
ALP SERPL-CCNC: 74 U/L (ref 39–117)
ALT SERPL W P-5'-P-CCNC: 13 U/L (ref 1–33)
ANION GAP SERPL CALCULATED.3IONS-SCNC: 9.8 MMOL/L (ref 5–15)
AST SERPL-CCNC: 14 U/L (ref 1–32)
BASOPHILS # BLD AUTO: 0.04 10*3/MM3 (ref 0–0.2)
BASOPHILS NFR BLD AUTO: 0.5 % (ref 0–1.5)
BILIRUB SERPL-MCNC: 0.2 MG/DL (ref 0–1.2)
BUN SERPL-MCNC: 46 MG/DL (ref 8–23)
BUN/CREAT SERPL: 26.6 (ref 7–25)
CALCIUM SPEC-SCNC: 9.6 MG/DL (ref 8.6–10.5)
CHLORIDE SERPL-SCNC: 97 MMOL/L (ref 98–107)
CO2 SERPL-SCNC: 30.2 MMOL/L (ref 22–29)
CREAT SERPL-MCNC: 1.73 MG/DL (ref 0.57–1)
CREAT UR-MCNC: 20.1 MG/DL
CREAT UR-MCNC: 20.1 MG/DL
DEPRECATED RDW RBC AUTO: 46.6 FL (ref 37–54)
EOSINOPHIL # BLD AUTO: 0.24 10*3/MM3 (ref 0–0.4)
EOSINOPHIL NFR BLD AUTO: 2.7 % (ref 0.3–6.2)
ERYTHROCYTE [DISTWIDTH] IN BLOOD BY AUTOMATED COUNT: 15.7 % (ref 12.3–15.4)
FERRITIN SERPL-MCNC: 426 NG/ML (ref 13–150)
GFR SERPL CREATININE-BSD FRML MDRD: 29 ML/MIN/1.73
GLOBULIN UR ELPH-MCNC: 3.4 GM/DL
GLUCOSE SERPL-MCNC: 86 MG/DL (ref 65–99)
HCT VFR BLD AUTO: 34.7 % (ref 34–46.6)
HGB BLD-MCNC: 11.2 G/DL (ref 12–15.9)
IMM GRANULOCYTES # BLD AUTO: 0.06 10*3/MM3 (ref 0–0.05)
IMM GRANULOCYTES NFR BLD AUTO: 0.7 % (ref 0–0.5)
IRON 24H UR-MRATE: 37 MCG/DL (ref 37–145)
IRON SATN MFR SERPL: 28 % (ref 20–50)
LYMPHOCYTES # BLD AUTO: 3.6 10*3/MM3 (ref 0.7–3.1)
LYMPHOCYTES NFR BLD AUTO: 40.6 % (ref 19.6–45.3)
MCH RBC QN AUTO: 27 PG (ref 26.6–33)
MCHC RBC AUTO-ENTMCNC: 32.3 G/DL (ref 31.5–35.7)
MCV RBC AUTO: 83.6 FL (ref 79–97)
MICROALBUMIN/CREAT UR: NORMAL MG/G{CREAT}
MONOCYTES # BLD AUTO: 0.62 10*3/MM3 (ref 0.1–0.9)
MONOCYTES NFR BLD AUTO: 7 % (ref 5–12)
NEUTROPHILS NFR BLD AUTO: 4.3 10*3/MM3 (ref 1.7–7)
NEUTROPHILS NFR BLD AUTO: 48.5 % (ref 42.7–76)
NRBC BLD AUTO-RTO: 0 /100 WBC (ref 0–0.2)
PHOSPHATE SERPL-MCNC: 3.9 MG/DL (ref 2.5–4.5)
PLATELET # BLD AUTO: 332 10*3/MM3 (ref 140–450)
PMV BLD AUTO: 11.3 FL (ref 6–12)
POTASSIUM SERPL-SCNC: 4.7 MMOL/L (ref 3.5–5.2)
PROT SERPL-MCNC: 7.4 G/DL (ref 6–8.5)
PROT UR-MCNC: <4 MG/DL
PROT/CREAT UR: NORMAL MG/G{CREAT}
PTH-INTACT SERPL-MCNC: 64 PG/ML (ref 15–65)
RBC # BLD AUTO: 4.15 10*6/MM3 (ref 3.77–5.28)
SODIUM SERPL-SCNC: 137 MMOL/L (ref 136–145)
TIBC SERPL-MCNC: 134 MCG/DL (ref 298–536)
TRANSFERRIN SERPL-MCNC: 90 MG/DL (ref 200–360)
WBC # BLD AUTO: 8.86 10*3/MM3 (ref 3.4–10.8)

## 2020-12-09 PROCEDURE — 84466 ASSAY OF TRANSFERRIN: CPT

## 2020-12-09 PROCEDURE — 82570 ASSAY OF URINE CREATININE: CPT

## 2020-12-09 PROCEDURE — 36415 COLL VENOUS BLD VENIPUNCTURE: CPT

## 2020-12-09 PROCEDURE — 84100 ASSAY OF PHOSPHORUS: CPT

## 2020-12-09 PROCEDURE — 83970 ASSAY OF PARATHORMONE: CPT

## 2020-12-09 PROCEDURE — 84156 ASSAY OF PROTEIN URINE: CPT

## 2020-12-09 PROCEDURE — 83540 ASSAY OF IRON: CPT

## 2020-12-09 PROCEDURE — 85025 COMPLETE CBC W/AUTO DIFF WBC: CPT

## 2020-12-09 PROCEDURE — 80053 COMPREHEN METABOLIC PANEL: CPT

## 2020-12-09 PROCEDURE — 82728 ASSAY OF FERRITIN: CPT

## 2020-12-09 PROCEDURE — 82043 UR ALBUMIN QUANTITATIVE: CPT

## 2021-02-04 ENCOUNTER — LAB (OUTPATIENT)
Dept: LAB | Facility: HOSPITAL | Age: 76
End: 2021-02-04

## 2021-02-04 ENCOUNTER — TRANSCRIBE ORDERS (OUTPATIENT)
Dept: ADMINISTRATIVE | Facility: HOSPITAL | Age: 76
End: 2021-02-04

## 2021-02-04 DIAGNOSIS — E11.618 TYPE 2 DIABETES MELLITUS WITH OTHER DIABETIC ARTHROPATHY, UNSPECIFIED WHETHER LONG TERM INSULIN USE (HCC): ICD-10-CM

## 2021-02-04 DIAGNOSIS — C44.01 BASAL CELL CARCINOMA (BCC) OF SKIN OF LIP: ICD-10-CM

## 2021-02-04 DIAGNOSIS — D50.0 IRON DEFICIENCY ANEMIA SECONDARY TO BLOOD LOSS (CHRONIC): ICD-10-CM

## 2021-02-04 DIAGNOSIS — D62 ACUTE POSTHEMORRHAGIC ANEMIA: ICD-10-CM

## 2021-02-04 DIAGNOSIS — I50.22 CHRONIC SYSTOLIC HEART FAILURE (HCC): ICD-10-CM

## 2021-02-04 DIAGNOSIS — A04.72: Primary | ICD-10-CM

## 2021-02-04 DIAGNOSIS — A04.72: ICD-10-CM

## 2021-02-04 LAB
ALBUMIN SERPL-MCNC: 3.9 G/DL (ref 3.5–5.2)
ALBUMIN/GLOB SERPL: 1.3 G/DL
ALP SERPL-CCNC: 67 U/L (ref 39–117)
ALT SERPL W P-5'-P-CCNC: 9 U/L (ref 1–33)
ANION GAP SERPL CALCULATED.3IONS-SCNC: 12.4 MMOL/L (ref 5–15)
AST SERPL-CCNC: 12 U/L (ref 1–32)
BASOPHILS # BLD AUTO: 0.03 10*3/MM3 (ref 0–0.2)
BASOPHILS NFR BLD AUTO: 0.4 % (ref 0–1.5)
BILIRUB SERPL-MCNC: 0.2 MG/DL (ref 0–1.2)
BUN SERPL-MCNC: 49 MG/DL (ref 8–23)
BUN/CREAT SERPL: 27.4 (ref 7–25)
CALCIUM SPEC-SCNC: 9.6 MG/DL (ref 8.6–10.5)
CHLORIDE SERPL-SCNC: 99 MMOL/L (ref 98–107)
CHOLEST SERPL-MCNC: 138 MG/DL (ref 0–200)
CO2 SERPL-SCNC: 29.6 MMOL/L (ref 22–29)
CREAT SERPL-MCNC: 1.79 MG/DL (ref 0.57–1)
CRP SERPL-MCNC: 2.01 MG/DL (ref 0–0.5)
DEPRECATED RDW RBC AUTO: 48.1 FL (ref 37–54)
EOSINOPHIL # BLD AUTO: 0.26 10*3/MM3 (ref 0–0.4)
EOSINOPHIL NFR BLD AUTO: 3.3 % (ref 0.3–6.2)
ERYTHROCYTE [DISTWIDTH] IN BLOOD BY AUTOMATED COUNT: 16.1 % (ref 12.3–15.4)
GFR SERPL CREATININE-BSD FRML MDRD: 28 ML/MIN/1.73
GLOBULIN UR ELPH-MCNC: 3.1 GM/DL
GLUCOSE SERPL-MCNC: 104 MG/DL (ref 65–99)
HBA1C MFR BLD: 9.1 % (ref 4.8–5.6)
HCT VFR BLD AUTO: 34 % (ref 34–46.6)
HDLC SERPL-MCNC: 22 MG/DL (ref 40–60)
HGB BLD-MCNC: 11 G/DL (ref 12–15.9)
IMM GRANULOCYTES # BLD AUTO: 0.03 10*3/MM3 (ref 0–0.05)
IMM GRANULOCYTES NFR BLD AUTO: 0.4 % (ref 0–0.5)
LDLC SERPL CALC-MCNC: 80 MG/DL (ref 0–100)
LDLC/HDLC SERPL: 3.32 {RATIO}
LYMPHOCYTES # BLD AUTO: 2.66 10*3/MM3 (ref 0.7–3.1)
LYMPHOCYTES NFR BLD AUTO: 33.3 % (ref 19.6–45.3)
MCH RBC QN AUTO: 27 PG (ref 26.6–33)
MCHC RBC AUTO-ENTMCNC: 32.4 G/DL (ref 31.5–35.7)
MCV RBC AUTO: 83.3 FL (ref 79–97)
MONOCYTES # BLD AUTO: 0.65 10*3/MM3 (ref 0.1–0.9)
MONOCYTES NFR BLD AUTO: 8.1 % (ref 5–12)
NEUTROPHILS NFR BLD AUTO: 4.36 10*3/MM3 (ref 1.7–7)
NEUTROPHILS NFR BLD AUTO: 54.5 % (ref 42.7–76)
NRBC BLD AUTO-RTO: 0 /100 WBC (ref 0–0.2)
NT-PROBNP SERPL-MCNC: 718.1 PG/ML (ref 0–1800)
PLATELET # BLD AUTO: 332 10*3/MM3 (ref 140–450)
PMV BLD AUTO: 11.6 FL (ref 6–12)
POTASSIUM SERPL-SCNC: 4.5 MMOL/L (ref 3.5–5.2)
PROT SERPL-MCNC: 7 G/DL (ref 6–8.5)
RBC # BLD AUTO: 4.08 10*6/MM3 (ref 3.77–5.28)
SODIUM SERPL-SCNC: 141 MMOL/L (ref 136–145)
T4 FREE SERPL-MCNC: 1.38 NG/DL (ref 0.93–1.7)
TRIGL SERPL-MCNC: 215 MG/DL (ref 0–150)
TSH SERPL DL<=0.05 MIU/L-ACNC: 1.84 UIU/ML (ref 0.27–4.2)
VLDLC SERPL-MCNC: 36 MG/DL (ref 5–40)
WBC # BLD AUTO: 7.99 10*3/MM3 (ref 3.4–10.8)

## 2021-02-04 PROCEDURE — 36415 COLL VENOUS BLD VENIPUNCTURE: CPT

## 2021-02-04 PROCEDURE — 83036 HEMOGLOBIN GLYCOSYLATED A1C: CPT

## 2021-02-04 PROCEDURE — 83880 ASSAY OF NATRIURETIC PEPTIDE: CPT

## 2021-02-04 PROCEDURE — 85025 COMPLETE CBC W/AUTO DIFF WBC: CPT

## 2021-02-04 PROCEDURE — 86140 C-REACTIVE PROTEIN: CPT

## 2021-02-04 PROCEDURE — 84439 ASSAY OF FREE THYROXINE: CPT

## 2021-02-04 PROCEDURE — 80061 LIPID PANEL: CPT

## 2021-02-04 PROCEDURE — 80053 COMPREHEN METABOLIC PANEL: CPT

## 2021-02-04 PROCEDURE — 84443 ASSAY THYROID STIM HORMONE: CPT

## 2021-02-11 DIAGNOSIS — Z23 IMMUNIZATION DUE: ICD-10-CM

## 2021-03-01 ENCOUNTER — LAB (OUTPATIENT)
Dept: LAB | Facility: HOSPITAL | Age: 76
End: 2021-03-01

## 2021-03-01 ENCOUNTER — TRANSCRIBE ORDERS (OUTPATIENT)
Dept: ADMINISTRATIVE | Facility: HOSPITAL | Age: 76
End: 2021-03-01

## 2021-03-01 DIAGNOSIS — N18.30 CHRONIC KIDNEY FAILURE, STAGE 3 (MODERATE) (HCC): ICD-10-CM

## 2021-03-01 DIAGNOSIS — D50.9 IRON DEFICIENCY ANEMIA, UNSPECIFIED IRON DEFICIENCY ANEMIA TYPE: ICD-10-CM

## 2021-03-01 DIAGNOSIS — D50.9 IRON DEFICIENCY ANEMIA, UNSPECIFIED IRON DEFICIENCY ANEMIA TYPE: Primary | ICD-10-CM

## 2021-03-01 LAB
ALBUMIN SERPL-MCNC: 4.2 G/DL (ref 3.5–5.2)
ALBUMIN UR-MCNC: <1.2 MG/DL
ALBUMIN/GLOB SERPL: 1.1 G/DL
ALP SERPL-CCNC: 78 U/L (ref 39–117)
ALT SERPL W P-5'-P-CCNC: 11 U/L (ref 1–33)
ANION GAP SERPL CALCULATED.3IONS-SCNC: 15.8 MMOL/L (ref 5–15)
AST SERPL-CCNC: 13 U/L (ref 1–32)
BASOPHILS # BLD AUTO: 0.03 10*3/MM3 (ref 0–0.2)
BASOPHILS NFR BLD AUTO: 0.3 % (ref 0–1.5)
BILIRUB SERPL-MCNC: 0.2 MG/DL (ref 0–1.2)
BUN SERPL-MCNC: 75 MG/DL (ref 8–23)
BUN/CREAT SERPL: 29.1 (ref 7–25)
CALCIUM SPEC-SCNC: 9.7 MG/DL (ref 8.6–10.5)
CHLORIDE SERPL-SCNC: 90 MMOL/L (ref 98–107)
CO2 SERPL-SCNC: 30.2 MMOL/L (ref 22–29)
CREAT SERPL-MCNC: 2.58 MG/DL (ref 0.57–1)
CREAT UR-MCNC: 28.7 MG/DL
CREAT UR-MCNC: 28.7 MG/DL
DEPRECATED RDW RBC AUTO: 47 FL (ref 37–54)
EOSINOPHIL # BLD AUTO: 0.27 10*3/MM3 (ref 0–0.4)
EOSINOPHIL NFR BLD AUTO: 2.9 % (ref 0.3–6.2)
ERYTHROCYTE [DISTWIDTH] IN BLOOD BY AUTOMATED COUNT: 16 % (ref 12.3–15.4)
FERRITIN SERPL-MCNC: 525 NG/ML (ref 13–150)
GFR SERPL CREATININE-BSD FRML MDRD: 18 ML/MIN/1.73
GLOBULIN UR ELPH-MCNC: 3.9 GM/DL
GLUCOSE SERPL-MCNC: 149 MG/DL (ref 65–99)
HCT VFR BLD AUTO: 37.3 % (ref 34–46.6)
HGB BLD-MCNC: 12.4 G/DL (ref 12–15.9)
IMM GRANULOCYTES # BLD AUTO: 0.03 10*3/MM3 (ref 0–0.05)
IMM GRANULOCYTES NFR BLD AUTO: 0.3 % (ref 0–0.5)
IRON 24H UR-MRATE: 51 MCG/DL (ref 37–145)
IRON SATN MFR SERPL: 36 % (ref 20–50)
LYMPHOCYTES # BLD AUTO: 3.59 10*3/MM3 (ref 0.7–3.1)
LYMPHOCYTES NFR BLD AUTO: 38.9 % (ref 19.6–45.3)
MCH RBC QN AUTO: 27.4 PG (ref 26.6–33)
MCHC RBC AUTO-ENTMCNC: 33.2 G/DL (ref 31.5–35.7)
MCV RBC AUTO: 82.5 FL (ref 79–97)
MICROALBUMIN/CREAT UR: NORMAL MG/G{CREAT}
MONOCYTES # BLD AUTO: 0.71 10*3/MM3 (ref 0.1–0.9)
MONOCYTES NFR BLD AUTO: 7.7 % (ref 5–12)
NEUTROPHILS NFR BLD AUTO: 4.6 10*3/MM3 (ref 1.7–7)
NEUTROPHILS NFR BLD AUTO: 49.9 % (ref 42.7–76)
NRBC BLD AUTO-RTO: 0 /100 WBC (ref 0–0.2)
PLATELET # BLD AUTO: 333 10*3/MM3 (ref 140–450)
PMV BLD AUTO: 11.4 FL (ref 6–12)
POTASSIUM SERPL-SCNC: 4.6 MMOL/L (ref 3.5–5.2)
PROT SERPL-MCNC: 8.1 G/DL (ref 6–8.5)
PROT UR-MCNC: 5 MG/DL
PROT/CREAT UR: 174.2 MG/G CREA (ref 0–200)
RBC # BLD AUTO: 4.52 10*6/MM3 (ref 3.77–5.28)
SODIUM SERPL-SCNC: 136 MMOL/L (ref 136–145)
TIBC SERPL-MCNC: 142 MCG/DL (ref 298–536)
TRANSFERRIN SERPL-MCNC: 95 MG/DL (ref 200–360)
WBC # BLD AUTO: 9.23 10*3/MM3 (ref 3.4–10.8)

## 2021-03-01 PROCEDURE — 82043 UR ALBUMIN QUANTITATIVE: CPT

## 2021-03-01 PROCEDURE — 36415 COLL VENOUS BLD VENIPUNCTURE: CPT

## 2021-03-01 PROCEDURE — 85025 COMPLETE CBC W/AUTO DIFF WBC: CPT

## 2021-03-01 PROCEDURE — 83540 ASSAY OF IRON: CPT

## 2021-03-01 PROCEDURE — 82570 ASSAY OF URINE CREATININE: CPT

## 2021-03-01 PROCEDURE — 80053 COMPREHEN METABOLIC PANEL: CPT

## 2021-03-01 PROCEDURE — 84466 ASSAY OF TRANSFERRIN: CPT

## 2021-03-01 PROCEDURE — 82728 ASSAY OF FERRITIN: CPT

## 2021-03-01 PROCEDURE — 84156 ASSAY OF PROTEIN URINE: CPT

## 2021-03-22 ENCOUNTER — TRANSCRIBE ORDERS (OUTPATIENT)
Dept: ADMINISTRATIVE | Facility: HOSPITAL | Age: 76
End: 2021-03-22

## 2021-03-22 ENCOUNTER — LAB (OUTPATIENT)
Dept: LAB | Facility: HOSPITAL | Age: 76
End: 2021-03-22

## 2021-03-22 DIAGNOSIS — R79.89 PRERENAL AZOTEMIA: Primary | ICD-10-CM

## 2021-03-22 DIAGNOSIS — R79.89 PRERENAL AZOTEMIA: ICD-10-CM

## 2021-03-22 LAB
ANION GAP SERPL CALCULATED.3IONS-SCNC: 10.6 MMOL/L (ref 5–15)
BUN SERPL-MCNC: 56 MG/DL (ref 8–23)
BUN/CREAT SERPL: 30.1 (ref 7–25)
CALCIUM SPEC-SCNC: 9.8 MG/DL (ref 8.6–10.5)
CHLORIDE SERPL-SCNC: 94 MMOL/L (ref 98–107)
CO2 SERPL-SCNC: 34.4 MMOL/L (ref 22–29)
CREAT SERPL-MCNC: 1.86 MG/DL (ref 0.57–1)
GFR SERPL CREATININE-BSD FRML MDRD: 26 ML/MIN/1.73
GLUCOSE SERPL-MCNC: 111 MG/DL (ref 65–99)
POTASSIUM SERPL-SCNC: 4.1 MMOL/L (ref 3.5–5.2)
SODIUM SERPL-SCNC: 139 MMOL/L (ref 136–145)

## 2021-03-22 PROCEDURE — 36415 COLL VENOUS BLD VENIPUNCTURE: CPT

## 2021-03-22 PROCEDURE — 80048 BASIC METABOLIC PNL TOTAL CA: CPT

## 2021-06-09 ENCOUNTER — LAB (OUTPATIENT)
Dept: LAB | Facility: HOSPITAL | Age: 76
End: 2021-06-09

## 2021-06-09 ENCOUNTER — TRANSCRIBE ORDERS (OUTPATIENT)
Dept: ADMINISTRATIVE | Facility: HOSPITAL | Age: 76
End: 2021-06-09

## 2021-06-09 DIAGNOSIS — N18.30 CKD (CHRONIC KIDNEY DISEASE), SYMPTOM MANAGEMENT ONLY, STAGE 3 (MODERATE) (HCC): Primary | ICD-10-CM

## 2021-06-09 DIAGNOSIS — N18.30 CKD (CHRONIC KIDNEY DISEASE), SYMPTOM MANAGEMENT ONLY, STAGE 3 (MODERATE) (HCC): ICD-10-CM

## 2021-06-09 LAB
ALBUMIN SERPL-MCNC: 3.8 G/DL (ref 3.5–5.2)
ALBUMIN/GLOB SERPL: 1.1 G/DL
ALP SERPL-CCNC: 85 U/L (ref 39–117)
ALT SERPL W P-5'-P-CCNC: 13 U/L (ref 1–33)
ANION GAP SERPL CALCULATED.3IONS-SCNC: 12.2 MMOL/L (ref 5–15)
AST SERPL-CCNC: 20 U/L (ref 1–32)
BILIRUB SERPL-MCNC: 0.2 MG/DL (ref 0–1.2)
BUN SERPL-MCNC: 61 MG/DL (ref 8–23)
BUN/CREAT SERPL: 36.1 (ref 7–25)
CALCIUM SPEC-SCNC: 9.2 MG/DL (ref 8.6–10.5)
CHLORIDE SERPL-SCNC: 97 MMOL/L (ref 98–107)
CO2 SERPL-SCNC: 29.8 MMOL/L (ref 22–29)
CREAT SERPL-MCNC: 1.69 MG/DL (ref 0.57–1)
GFR SERPL CREATININE-BSD FRML MDRD: 29 ML/MIN/1.73
GLOBULIN UR ELPH-MCNC: 3.6 GM/DL
GLUCOSE SERPL-MCNC: 110 MG/DL (ref 65–99)
PHOSPHATE SERPL-MCNC: 3.7 MG/DL (ref 2.5–4.5)
POTASSIUM SERPL-SCNC: 3.9 MMOL/L (ref 3.5–5.2)
PROT SERPL-MCNC: 7.4 G/DL (ref 6–8.5)
SODIUM SERPL-SCNC: 139 MMOL/L (ref 136–145)

## 2021-06-09 PROCEDURE — 36415 COLL VENOUS BLD VENIPUNCTURE: CPT

## 2021-06-09 PROCEDURE — 80053 COMPREHEN METABOLIC PANEL: CPT

## 2021-06-09 PROCEDURE — 84100 ASSAY OF PHOSPHORUS: CPT

## 2021-10-20 ENCOUNTER — TRANSCRIBE ORDERS (OUTPATIENT)
Dept: ADMINISTRATIVE | Facility: HOSPITAL | Age: 76
End: 2021-10-20

## 2021-10-20 ENCOUNTER — HOSPITAL ENCOUNTER (OUTPATIENT)
Dept: GENERAL RADIOLOGY | Facility: HOSPITAL | Age: 76
Discharge: HOME | End: 2021-10-20
Admitting: INTERNAL MEDICINE

## 2021-10-20 DIAGNOSIS — R06.02 SHORTNESS OF BREATH: Primary | ICD-10-CM

## 2021-10-20 DIAGNOSIS — R06.02 SHORTNESS OF BREATH: ICD-10-CM

## 2021-10-20 PROCEDURE — 71046 X-RAY EXAM CHEST 2 VIEWS: CPT

## 2021-10-20 PROCEDURE — 71046 X-RAY EXAM CHEST 2 VIEWS: CPT | Performed by: RADIOLOGY

## 2021-11-13 ENCOUNTER — APPOINTMENT (OUTPATIENT)
Dept: ULTRASOUND IMAGING | Facility: HOSPITAL | Age: 76
End: 2021-11-13

## 2021-11-13 ENCOUNTER — APPOINTMENT (OUTPATIENT)
Dept: CARDIOLOGY | Facility: HOSPITAL | Age: 76
End: 2021-11-13

## 2021-11-13 ENCOUNTER — APPOINTMENT (OUTPATIENT)
Dept: GENERAL RADIOLOGY | Facility: HOSPITAL | Age: 76
End: 2021-11-13

## 2021-11-13 ENCOUNTER — HOSPITAL ENCOUNTER (INPATIENT)
Facility: HOSPITAL | Age: 76
LOS: 13 days | End: 2021-11-26
Attending: FAMILY MEDICINE | Admitting: INTERNAL MEDICINE

## 2021-11-13 ENCOUNTER — APPOINTMENT (OUTPATIENT)
Dept: CT IMAGING | Facility: HOSPITAL | Age: 76
End: 2021-11-13

## 2021-11-13 DIAGNOSIS — S72.002A CLOSED FRACTURE OF LEFT HIP, INITIAL ENCOUNTER (HCC): Primary | ICD-10-CM

## 2021-11-13 PROBLEM — S72.009A HIP FRACTURE (HCC): Status: ACTIVE | Noted: 2021-11-13

## 2021-11-13 LAB
A-A DO2: 36 MMHG (ref 0–300)
ALBUMIN SERPL-MCNC: 3.87 G/DL (ref 3.5–5.2)
ALBUMIN/GLOB SERPL: 1.1 G/DL
ALP SERPL-CCNC: 83 U/L (ref 39–117)
ALT SERPL W P-5'-P-CCNC: 11 U/L (ref 1–33)
ANION GAP SERPL CALCULATED.3IONS-SCNC: 11.3 MMOL/L (ref 5–15)
ARTERIAL PATENCY WRIST A: ABNORMAL
AST SERPL-CCNC: 14 U/L (ref 1–32)
ATMOSPHERIC PRESS: 728 MMHG
BACTERIA UR QL AUTO: ABNORMAL /HPF
BASE EXCESS BLDA CALC-SCNC: 1.9 MMOL/L (ref 0–2)
BASOPHILS # BLD AUTO: 0.04 10*3/MM3 (ref 0–0.2)
BASOPHILS NFR BLD AUTO: 0.3 % (ref 0–1.5)
BDY SITE: ABNORMAL
BILIRUB SERPL-MCNC: 0.3 MG/DL (ref 0–1.2)
BILIRUB UR QL STRIP: NEGATIVE
BODY TEMPERATURE: 0 C
BUN SERPL-MCNC: 52 MG/DL (ref 8–23)
BUN/CREAT SERPL: 24.4 (ref 7–25)
CALCIUM SPEC-SCNC: 9.1 MG/DL (ref 8.6–10.5)
CHLORIDE SERPL-SCNC: 100 MMOL/L (ref 98–107)
CK SERPL-CCNC: 82 U/L (ref 20–180)
CLARITY UR: CLEAR
CO2 BLDA-SCNC: 29.8 MMOL/L (ref 22–33)
CO2 SERPL-SCNC: 25.7 MMOL/L (ref 22–29)
COHGB MFR BLD: 1.6 % (ref 0–5)
COLOR UR: YELLOW
CREAT SERPL-MCNC: 2.13 MG/DL (ref 0.57–1)
D-LACTATE SERPL-SCNC: 1 MMOL/L (ref 0.5–2)
DEPRECATED RDW RBC AUTO: 50.8 FL (ref 37–54)
EOSINOPHIL # BLD AUTO: 0.04 10*3/MM3 (ref 0–0.4)
EOSINOPHIL NFR BLD AUTO: 0.3 % (ref 0.3–6.2)
ERYTHROCYTE [DISTWIDTH] IN BLOOD BY AUTOMATED COUNT: 16.1 % (ref 12.3–15.4)
FLUAV SUBTYP SPEC NAA+PROBE: NOT DETECTED
FLUBV RNA ISLT QL NAA+PROBE: NOT DETECTED
GFR SERPL CREATININE-BSD FRML MDRD: 22 ML/MIN/1.73
GLOBULIN UR ELPH-MCNC: 3.6 GM/DL
GLUCOSE BLDC GLUCOMTR-MCNC: 251 MG/DL (ref 70–130)
GLUCOSE SERPL-MCNC: 130 MG/DL (ref 65–99)
GLUCOSE UR STRIP-MCNC: NEGATIVE MG/DL
HBA1C MFR BLD: 9.2 % (ref 4.8–5.6)
HCO3 BLDA-SCNC: 28.2 MMOL/L (ref 20–26)
HCT VFR BLD AUTO: 37 % (ref 34–46.6)
HCT VFR BLD CALC: 35 % (ref 38–51)
HGB BLD-MCNC: 11.2 G/DL (ref 12–15.9)
HGB BLDA-MCNC: 11.4 G/DL (ref 13.5–17.5)
HGB UR QL STRIP.AUTO: NEGATIVE
HOLD SPECIMEN: NORMAL
HOLD SPECIMEN: NORMAL
HYALINE CASTS UR QL AUTO: ABNORMAL /LPF
IMM GRANULOCYTES # BLD AUTO: 0.07 10*3/MM3 (ref 0–0.05)
IMM GRANULOCYTES NFR BLD AUTO: 0.5 % (ref 0–0.5)
INHALED O2 CONCENTRATION: 21 %
INR PPP: 0.97 (ref 0.9–1.1)
KETONES UR QL STRIP: NEGATIVE
LEUKOCYTE ESTERASE UR QL STRIP.AUTO: ABNORMAL
LYMPHOCYTES # BLD AUTO: 1.7 10*3/MM3 (ref 0.7–3.1)
LYMPHOCYTES NFR BLD AUTO: 12 % (ref 19.6–45.3)
Lab: ABNORMAL
Lab: ABNORMAL
MAGNESIUM SERPL-MCNC: 1.4 MG/DL (ref 1.6–2.4)
MCH RBC QN AUTO: 25.9 PG (ref 26.6–33)
MCHC RBC AUTO-ENTMCNC: 30.3 G/DL (ref 31.5–35.7)
MCV RBC AUTO: 85.6 FL (ref 79–97)
METHGB BLD QL: 0.2 % (ref 0–3)
MODALITY: ABNORMAL
MONOCYTES # BLD AUTO: 0.62 10*3/MM3 (ref 0.1–0.9)
MONOCYTES NFR BLD AUTO: 4.4 % (ref 5–12)
NEUTROPHILS NFR BLD AUTO: 11.73 10*3/MM3 (ref 1.7–7)
NEUTROPHILS NFR BLD AUTO: 82.5 % (ref 42.7–76)
NITRITE UR QL STRIP: NEGATIVE
NOTE: ABNORMAL
NOTIFIED BY: ABNORMAL
NOTIFIED WHO: ABNORMAL
NRBC BLD AUTO-RTO: 0 /100 WBC (ref 0–0.2)
NT-PROBNP SERPL-MCNC: 1068 PG/ML (ref 0–1800)
OXYHGB MFR BLDV: 82.3 % (ref 94–99)
PCO2 BLDA: 50.8 MM HG (ref 35–45)
PCO2 TEMP ADJ BLD: ABNORMAL MM[HG]
PH BLDA: 7.35 PH UNITS (ref 7.35–7.45)
PH UR STRIP.AUTO: <=5 [PH] (ref 5–8)
PH, TEMP CORRECTED: ABNORMAL
PLATELET # BLD AUTO: 318 10*3/MM3 (ref 140–450)
PMV BLD AUTO: 10.6 FL (ref 6–12)
PO2 BLDA: 49.6 MM HG (ref 83–108)
PO2 TEMP ADJ BLD: ABNORMAL MM[HG]
POTASSIUM SERPL-SCNC: 4.7 MMOL/L (ref 3.5–5.2)
PROT SERPL-MCNC: 7.5 G/DL (ref 6–8.5)
PROT UR QL STRIP: NEGATIVE
PROTHROMBIN TIME: 13.3 SECONDS (ref 12.8–14.5)
RBC # BLD AUTO: 4.32 10*6/MM3 (ref 3.77–5.28)
RBC # UR: ABNORMAL /HPF
REF LAB TEST METHOD: ABNORMAL
SAO2 % BLDCOA: 83.8 % (ref 94–99)
SARS-COV-2 RNA PNL SPEC NAA+PROBE: NOT DETECTED
SODIUM SERPL-SCNC: 137 MMOL/L (ref 136–145)
SP GR UR STRIP: 1.01 (ref 1–1.03)
SQUAMOUS #/AREA URNS HPF: ABNORMAL /HPF
TROPONIN T SERPL-MCNC: <0.01 NG/ML (ref 0–0.03)
TROPONIN T SERPL-MCNC: <0.01 NG/ML (ref 0–0.03)
UROBILINOGEN UR QL STRIP: ABNORMAL
VENTILATOR MODE: ABNORMAL
WBC # BLD AUTO: 14.2 10*3/MM3 (ref 3.4–10.8)
WBC UR QL AUTO: ABNORMAL /HPF
WHOLE BLOOD HOLD SPECIMEN: NORMAL
WHOLE BLOOD HOLD SPECIMEN: NORMAL

## 2021-11-13 PROCEDURE — 82962 GLUCOSE BLOOD TEST: CPT

## 2021-11-13 PROCEDURE — 25010000002 ONDANSETRON PER 1 MG: Performed by: FAMILY MEDICINE

## 2021-11-13 PROCEDURE — 84484 ASSAY OF TROPONIN QUANT: CPT | Performed by: NURSE PRACTITIONER

## 2021-11-13 PROCEDURE — 99285 EMERGENCY DEPT VISIT HI MDM: CPT

## 2021-11-13 PROCEDURE — 73502 X-RAY EXAM HIP UNI 2-3 VIEWS: CPT

## 2021-11-13 PROCEDURE — 83036 HEMOGLOBIN GLYCOSYLATED A1C: CPT | Performed by: NURSE PRACTITIONER

## 2021-11-13 PROCEDURE — 71045 X-RAY EXAM CHEST 1 VIEW: CPT | Performed by: RADIOLOGY

## 2021-11-13 PROCEDURE — 84484 ASSAY OF TROPONIN QUANT: CPT | Performed by: PHYSICIAN ASSISTANT

## 2021-11-13 PROCEDURE — 82805 BLOOD GASES W/O2 SATURATION: CPT

## 2021-11-13 PROCEDURE — 25010000002 CEFTRIAXONE PER 250 MG: Performed by: NURSE PRACTITIONER

## 2021-11-13 PROCEDURE — 87636 SARSCOV2 & INF A&B AMP PRB: CPT | Performed by: PHYSICIAN ASSISTANT

## 2021-11-13 PROCEDURE — 83735 ASSAY OF MAGNESIUM: CPT | Performed by: NURSE PRACTITIONER

## 2021-11-13 PROCEDURE — 93306 TTE W/DOPPLER COMPLETE: CPT | Performed by: INTERNAL MEDICINE

## 2021-11-13 PROCEDURE — 99222 1ST HOSP IP/OBS MODERATE 55: CPT | Performed by: INTERNAL MEDICINE

## 2021-11-13 PROCEDURE — 85610 PROTHROMBIN TIME: CPT | Performed by: PHYSICIAN ASSISTANT

## 2021-11-13 PROCEDURE — 81001 URINALYSIS AUTO W/SCOPE: CPT | Performed by: PHYSICIAN ASSISTANT

## 2021-11-13 PROCEDURE — 93010 ELECTROCARDIOGRAM REPORT: CPT | Performed by: INTERNAL MEDICINE

## 2021-11-13 PROCEDURE — 25010000002 HEPARIN (PORCINE) PER 1000 UNITS: Performed by: INTERNAL MEDICINE

## 2021-11-13 PROCEDURE — 85025 COMPLETE CBC W/AUTO DIFF WBC: CPT | Performed by: PHYSICIAN ASSISTANT

## 2021-11-13 PROCEDURE — 83880 ASSAY OF NATRIURETIC PEPTIDE: CPT | Performed by: PHYSICIAN ASSISTANT

## 2021-11-13 PROCEDURE — 87186 SC STD MICRODIL/AGAR DIL: CPT | Performed by: NURSE PRACTITIONER

## 2021-11-13 PROCEDURE — 93005 ELECTROCARDIOGRAM TRACING: CPT | Performed by: PHYSICIAN ASSISTANT

## 2021-11-13 PROCEDURE — 94799 UNLISTED PULMONARY SVC/PX: CPT

## 2021-11-13 PROCEDURE — 80053 COMPREHEN METABOLIC PANEL: CPT | Performed by: PHYSICIAN ASSISTANT

## 2021-11-13 PROCEDURE — 25010000002 MORPHINE PER 10 MG: Performed by: FAMILY MEDICINE

## 2021-11-13 PROCEDURE — 87086 URINE CULTURE/COLONY COUNT: CPT | Performed by: NURSE PRACTITIONER

## 2021-11-13 PROCEDURE — 83605 ASSAY OF LACTIC ACID: CPT | Performed by: NURSE PRACTITIONER

## 2021-11-13 PROCEDURE — 71045 X-RAY EXAM CHEST 1 VIEW: CPT

## 2021-11-13 PROCEDURE — 93306 TTE W/DOPPLER COMPLETE: CPT

## 2021-11-13 PROCEDURE — 25010000002 MAGNESIUM SULFATE 2 GM/50ML SOLUTION: Performed by: NURSE PRACTITIONER

## 2021-11-13 PROCEDURE — 36600 WITHDRAWAL OF ARTERIAL BLOOD: CPT

## 2021-11-13 PROCEDURE — 73700 CT LOWER EXTREMITY W/O DYE: CPT

## 2021-11-13 PROCEDURE — 36415 COLL VENOUS BLD VENIPUNCTURE: CPT

## 2021-11-13 PROCEDURE — 63710000001 INSULIN DETEMIR PER 5 UNITS: Performed by: INTERNAL MEDICINE

## 2021-11-13 PROCEDURE — 25010000002 MORPHINE PER 10 MG: Performed by: HOSPITALIST

## 2021-11-13 PROCEDURE — 73502 X-RAY EXAM HIP UNI 2-3 VIEWS: CPT | Performed by: RADIOLOGY

## 2021-11-13 PROCEDURE — 93970 EXTREMITY STUDY: CPT

## 2021-11-13 PROCEDURE — 82550 ASSAY OF CK (CPK): CPT | Performed by: NURSE PRACTITIONER

## 2021-11-13 PROCEDURE — 82375 ASSAY CARBOXYHB QUANT: CPT

## 2021-11-13 PROCEDURE — 87077 CULTURE AEROBIC IDENTIFY: CPT | Performed by: NURSE PRACTITIONER

## 2021-11-13 PROCEDURE — 87040 BLOOD CULTURE FOR BACTERIA: CPT | Performed by: NURSE PRACTITIONER

## 2021-11-13 PROCEDURE — 83050 HGB METHEMOGLOBIN QUAN: CPT

## 2021-11-13 PROCEDURE — 73700 CT LOWER EXTREMITY W/O DYE: CPT | Performed by: RADIOLOGY

## 2021-11-13 PROCEDURE — 76775 US EXAM ABDO BACK WALL LIM: CPT

## 2021-11-13 RX ORDER — PANTOPRAZOLE SODIUM 40 MG/1
40 TABLET, DELAYED RELEASE ORAL
Status: DISCONTINUED | OUTPATIENT
Start: 2021-11-13 | End: 2021-11-26 | Stop reason: HOSPADM

## 2021-11-13 RX ORDER — SPIRONOLACTONE 25 MG/1
25 TABLET ORAL DAILY
Status: CANCELLED | OUTPATIENT
Start: 2021-11-13

## 2021-11-13 RX ORDER — LOPERAMIDE HYDROCHLORIDE 2 MG/1
2 CAPSULE ORAL DAILY
Status: CANCELLED | OUTPATIENT
Start: 2021-11-13

## 2021-11-13 RX ORDER — MORPHINE SULFATE 2 MG/ML
1 INJECTION, SOLUTION INTRAMUSCULAR; INTRAVENOUS EVERY 4 HOURS PRN
Status: DISCONTINUED | OUTPATIENT
Start: 2021-11-13 | End: 2021-11-14

## 2021-11-13 RX ORDER — SODIUM CHLORIDE 0.9 % (FLUSH) 0.9 %
10 SYRINGE (ML) INJECTION AS NEEDED
Status: DISCONTINUED | OUTPATIENT
Start: 2021-11-13 | End: 2021-11-26 | Stop reason: HOSPADM

## 2021-11-13 RX ORDER — CALCIUM CARBONATE 500(1250)
500 TABLET ORAL DAILY
Status: DISCONTINUED | OUTPATIENT
Start: 2021-11-13 | End: 2021-11-26 | Stop reason: HOSPADM

## 2021-11-13 RX ORDER — HYDRALAZINE HYDROCHLORIDE 10 MG/1
10 TABLET, FILM COATED ORAL 3 TIMES DAILY
Status: CANCELLED | OUTPATIENT
Start: 2021-11-13

## 2021-11-13 RX ORDER — BUMETANIDE 1 MG/1
2 TABLET ORAL DAILY PRN
Status: CANCELLED | OUTPATIENT
Start: 2021-11-13

## 2021-11-13 RX ORDER — MAGNESIUM SULFATE HEPTAHYDRATE 40 MG/ML
2 INJECTION, SOLUTION INTRAVENOUS AS NEEDED
Status: DISCONTINUED | OUTPATIENT
Start: 2021-11-13 | End: 2021-11-26 | Stop reason: HOSPADM

## 2021-11-13 RX ORDER — METOLAZONE 2.5 MG/1
10 TABLET ORAL
Status: CANCELLED | OUTPATIENT
Start: 2021-11-13

## 2021-11-13 RX ORDER — ONDANSETRON 2 MG/ML
4 INJECTION INTRAMUSCULAR; INTRAVENOUS ONCE
Status: COMPLETED | OUTPATIENT
Start: 2021-11-13 | End: 2021-11-13

## 2021-11-13 RX ORDER — SODIUM CHLORIDE 0.9 % (FLUSH) 0.9 %
10 SYRINGE (ML) INJECTION EVERY 12 HOURS SCHEDULED
Status: DISCONTINUED | OUTPATIENT
Start: 2021-11-13 | End: 2021-11-26 | Stop reason: HOSPADM

## 2021-11-13 RX ORDER — DEXTROSE MONOHYDRATE 25 G/50ML
25 INJECTION, SOLUTION INTRAVENOUS
Status: DISCONTINUED | OUTPATIENT
Start: 2021-11-13 | End: 2021-11-26 | Stop reason: HOSPADM

## 2021-11-13 RX ORDER — NICOTINE POLACRILEX 4 MG
15 LOZENGE BUCCAL
Status: DISCONTINUED | OUTPATIENT
Start: 2021-11-13 | End: 2021-11-26 | Stop reason: HOSPADM

## 2021-11-13 RX ORDER — MAGNESIUM SULFATE HEPTAHYDRATE 40 MG/ML
2 INJECTION, SOLUTION INTRAVENOUS ONCE
Status: COMPLETED | OUTPATIENT
Start: 2021-11-13 | End: 2021-11-13

## 2021-11-13 RX ORDER — BUMETANIDE 2 MG/1
2 TABLET ORAL DAILY PRN
COMMUNITY
End: 2021-12-10 | Stop reason: HOSPADM

## 2021-11-13 RX ORDER — HEPARIN SODIUM 5000 [USP'U]/ML
5000 INJECTION, SOLUTION INTRAVENOUS; SUBCUTANEOUS EVERY 8 HOURS SCHEDULED
Status: COMPLETED | OUTPATIENT
Start: 2021-11-13 | End: 2021-11-14

## 2021-11-13 RX ORDER — MORPHINE SULFATE 2 MG/ML
2 INJECTION, SOLUTION INTRAMUSCULAR; INTRAVENOUS ONCE
Status: COMPLETED | OUTPATIENT
Start: 2021-11-13 | End: 2021-11-13

## 2021-11-13 RX ORDER — CETIRIZINE HYDROCHLORIDE 10 MG/1
5 TABLET ORAL DAILY
Status: DISCONTINUED | OUTPATIENT
Start: 2021-11-13 | End: 2021-11-26 | Stop reason: HOSPADM

## 2021-11-13 RX ORDER — CARVEDILOL 6.25 MG/1
12.5 TABLET ORAL 2 TIMES DAILY WITH MEALS
Status: DISCONTINUED | OUTPATIENT
Start: 2021-11-13 | End: 2021-11-18

## 2021-11-13 RX ORDER — MAGNESIUM SULFATE 1 G/100ML
1 INJECTION INTRAVENOUS AS NEEDED
Status: DISCONTINUED | OUTPATIENT
Start: 2021-11-13 | End: 2021-11-26 | Stop reason: HOSPADM

## 2021-11-13 RX ORDER — METOLAZONE 10 MG/1
10 TABLET ORAL
COMMUNITY
End: 2021-12-10 | Stop reason: HOSPADM

## 2021-11-13 RX ORDER — FERROUS SULFATE 325(65) MG
325 TABLET ORAL EVERY EVENING
COMMUNITY

## 2021-11-13 RX ORDER — FERROUS SULFATE 325(65) MG
325 TABLET ORAL EVERY EVENING
Status: DISCONTINUED | OUTPATIENT
Start: 2021-11-13 | End: 2021-11-26 | Stop reason: HOSPADM

## 2021-11-13 RX ADMIN — MORPHINE SULFATE 2 MG: 2 INJECTION, SOLUTION INTRAMUSCULAR; INTRAVENOUS at 14:28

## 2021-11-13 RX ADMIN — CEFTRIAXONE 1 G: 1 INJECTION, POWDER, FOR SOLUTION INTRAMUSCULAR; INTRAVENOUS at 19:37

## 2021-11-13 RX ADMIN — CARVEDILOL 12.5 MG: 6.25 TABLET, FILM COATED ORAL at 20:27

## 2021-11-13 RX ADMIN — HEPARIN SODIUM 5000 UNITS: 5000 INJECTION INTRAVENOUS; SUBCUTANEOUS at 20:27

## 2021-11-13 RX ADMIN — FERROUS SULFATE TAB 325 MG (65 MG ELEMENTAL FE) 325 MG: 325 (65 FE) TAB at 20:28

## 2021-11-13 RX ADMIN — INSULIN DETEMIR 20 UNITS: 100 INJECTION, SOLUTION SUBCUTANEOUS at 22:05

## 2021-11-13 RX ADMIN — CALCIUM 500 MG: 500 TABLET ORAL at 22:06

## 2021-11-13 RX ADMIN — MAGNESIUM SULFATE 2 G: 2 INJECTION INTRAVENOUS at 18:43

## 2021-11-13 RX ADMIN — SODIUM CHLORIDE, PRESERVATIVE FREE 10 ML: 5 INJECTION INTRAVENOUS at 19:38

## 2021-11-13 RX ADMIN — ONDANSETRON 4 MG: 2 INJECTION INTRAMUSCULAR; INTRAVENOUS at 12:59

## 2021-11-13 RX ADMIN — PANTOPRAZOLE SODIUM 40 MG: 40 TABLET, DELAYED RELEASE ORAL at 20:28

## 2021-11-13 RX ADMIN — MORPHINE SULFATE 2 MG: 2 INJECTION, SOLUTION INTRAMUSCULAR; INTRAVENOUS at 13:00

## 2021-11-13 RX ADMIN — MORPHINE SULFATE 1 MG: 2 INJECTION, SOLUTION INTRAMUSCULAR; INTRAVENOUS at 23:02

## 2021-11-13 RX ADMIN — CETIRIZINE HYDROCHLORIDE 5 MG: 10 TABLET, FILM COATED ORAL at 20:28

## 2021-11-13 RX ADMIN — SODIUM CHLORIDE, PRESERVATIVE FREE 10 ML: 5 INJECTION INTRAVENOUS at 20:27

## 2021-11-14 LAB
ANION GAP SERPL CALCULATED.3IONS-SCNC: 10.5 MMOL/L (ref 5–15)
BH CV ECHO MEAS - AO MAX PG: 11.6 MMHG
BH CV ECHO MEAS - AO MEAN PG: 6 MMHG
BH CV ECHO MEAS - AO V2 MAX: 170 CM/SEC
BH CV ECHO MEAS - AO V2 MEAN: 102 CM/SEC
BH CV ECHO MEAS - AO V2 VTI: 34.7 CM
BH CV ECHO MEAS - BSA(HAYCOCK): 1.9 M^2
BH CV ECHO MEAS - BSA: 1.8 M^2
BH CV ECHO MEAS - BZI_BMI: 31.1 KILOGRAMS/M^2
BH CV ECHO MEAS - BZI_METRIC_HEIGHT: 157.5 CM
BH CV ECHO MEAS - BZI_METRIC_WEIGHT: 77.1 KG
BH CV ECHO MEAS - EDV(CUBED): 103.8 ML
BH CV ECHO MEAS - EDV(MOD-SP4): 38 ML
BH CV ECHO MEAS - EDV(TEICH): 102.4 ML
BH CV ECHO MEAS - EF(CUBED): 94.4 %
BH CV ECHO MEAS - EF(MOD-SP4): 76.1 %
BH CV ECHO MEAS - EF(TEICH): 90.5 %
BH CV ECHO MEAS - ESV(CUBED): 5.8 ML
BH CV ECHO MEAS - ESV(MOD-SP4): 9.1 ML
BH CV ECHO MEAS - ESV(TEICH): 9.7 ML
BH CV ECHO MEAS - FS: 61.7 %
BH CV ECHO MEAS - IVS/LVPW: 1.1
BH CV ECHO MEAS - IVSD: 1.1 CM
BH CV ECHO MEAS - LV DIASTOLIC VOL/BSA (35-75): 21.3 ML/M^2
BH CV ECHO MEAS - LV MASS(C)D: 175.8 GRAMS
BH CV ECHO MEAS - LV MASS(C)DI: 98.6 GRAMS/M^2
BH CV ECHO MEAS - LV SYSTOLIC VOL/BSA (12-30): 5.1 ML/M^2
BH CV ECHO MEAS - LVIDD: 4.7 CM
BH CV ECHO MEAS - LVIDS: 1.8 CM
BH CV ECHO MEAS - LVLD AP4: 6.1 CM
BH CV ECHO MEAS - LVLS AP4: 5.3 CM
BH CV ECHO MEAS - LVOT AREA (M): 2.3 CM^2
BH CV ECHO MEAS - LVOT AREA: 2.3 CM^2
BH CV ECHO MEAS - LVOT DIAM: 1.7 CM
BH CV ECHO MEAS - LVPWD: 1 CM
BH CV ECHO MEAS - MV A MAX VEL: 111 CM/SEC
BH CV ECHO MEAS - MV E MAX VEL: 110 CM/SEC
BH CV ECHO MEAS - MV E/A: 0.99
BH CV ECHO MEAS - RAP SYSTOLE: 10 MMHG
BH CV ECHO MEAS - RVSP: 68.4 MMHG
BH CV ECHO MEAS - SI(CUBED): 54.9 ML/M^2
BH CV ECHO MEAS - SI(MOD-SP4): 16.2 ML/M^2
BH CV ECHO MEAS - SI(TEICH): 51.9 ML/M^2
BH CV ECHO MEAS - SV(CUBED): 98 ML
BH CV ECHO MEAS - SV(MOD-SP4): 28.9 ML
BH CV ECHO MEAS - SV(TEICH): 92.6 ML
BH CV ECHO MEAS - TR MAX VEL: 382 CM/SEC
BUN SERPL-MCNC: 55 MG/DL (ref 8–23)
BUN/CREAT SERPL: 25.5 (ref 7–25)
CALCIUM SPEC-SCNC: 8.7 MG/DL (ref 8.6–10.5)
CHLORIDE SERPL-SCNC: 99 MMOL/L (ref 98–107)
CHLORIDE UR-SCNC: 48 MMOL/L
CO2 SERPL-SCNC: 23.5 MMOL/L (ref 22–29)
CREAT SERPL-MCNC: 2.16 MG/DL (ref 0.57–1)
DEPRECATED RDW RBC AUTO: 50.6 FL (ref 37–54)
ERYTHROCYTE [DISTWIDTH] IN BLOOD BY AUTOMATED COUNT: 16 % (ref 12.3–15.4)
GFR SERPL CREATININE-BSD FRML MDRD: 22 ML/MIN/1.73
GLUCOSE BLDC GLUCOMTR-MCNC: 231 MG/DL (ref 70–130)
GLUCOSE BLDC GLUCOMTR-MCNC: 231 MG/DL (ref 70–130)
GLUCOSE BLDC GLUCOMTR-MCNC: 246 MG/DL (ref 70–130)
GLUCOSE BLDC GLUCOMTR-MCNC: 258 MG/DL (ref 70–130)
GLUCOSE SERPL-MCNC: 240 MG/DL (ref 65–99)
HCT VFR BLD AUTO: 33.9 % (ref 34–46.6)
HGB BLD-MCNC: 10 G/DL (ref 12–15.9)
LV EF 2D ECHO EST: 65 %
MAGNESIUM SERPL-MCNC: 1.9 MG/DL (ref 1.6–2.4)
MAXIMAL PREDICTED HEART RATE: 143 BPM
MCH RBC QN AUTO: 25.4 PG (ref 26.6–33)
MCHC RBC AUTO-ENTMCNC: 29.5 G/DL (ref 31.5–35.7)
MCV RBC AUTO: 86.3 FL (ref 79–97)
PLATELET # BLD AUTO: 284 10*3/MM3 (ref 140–450)
PMV BLD AUTO: 11.3 FL (ref 6–12)
POTASSIUM SERPL-SCNC: 5.4 MMOL/L (ref 3.5–5.2)
POTASSIUM SERPL-SCNC: 6.1 MMOL/L (ref 3.5–5.2)
POTASSIUM UR-SCNC: 28.8 MMOL/L
RBC # BLD AUTO: 3.93 10*6/MM3 (ref 3.77–5.28)
SODIUM SERPL-SCNC: 133 MMOL/L (ref 136–145)
SODIUM UR-SCNC: 70 MMOL/L
STRESS TARGET HR: 122 BPM
WBC # BLD AUTO: 10.9 10*3/MM3 (ref 3.4–10.8)

## 2021-11-14 PROCEDURE — 82570 ASSAY OF URINE CREATININE: CPT | Performed by: INTERNAL MEDICINE

## 2021-11-14 PROCEDURE — 82436 ASSAY OF URINE CHLORIDE: CPT | Performed by: INTERNAL MEDICINE

## 2021-11-14 PROCEDURE — 63710000001 INSULIN REGULAR HUMAN PER 5 UNITS: Performed by: PHYSICIAN ASSISTANT

## 2021-11-14 PROCEDURE — 25010000002 MORPHINE PER 10 MG: Performed by: HOSPITALIST

## 2021-11-14 PROCEDURE — 25010000002 CEFTRIAXONE PER 250 MG: Performed by: NURSE PRACTITIONER

## 2021-11-14 PROCEDURE — 99233 SBSQ HOSP IP/OBS HIGH 50: CPT | Performed by: INTERNAL MEDICINE

## 2021-11-14 PROCEDURE — 63710000001 INSULIN ASPART PER 5 UNITS: Performed by: INTERNAL MEDICINE

## 2021-11-14 PROCEDURE — 83735 ASSAY OF MAGNESIUM: CPT | Performed by: NURSE PRACTITIONER

## 2021-11-14 PROCEDURE — 84133 ASSAY OF URINE POTASSIUM: CPT | Performed by: INTERNAL MEDICINE

## 2021-11-14 PROCEDURE — 99221 1ST HOSP IP/OBS SF/LOW 40: CPT | Performed by: NURSE PRACTITIONER

## 2021-11-14 PROCEDURE — 25010000002 HEPARIN (PORCINE) PER 1000 UNITS: Performed by: INTERNAL MEDICINE

## 2021-11-14 PROCEDURE — 84156 ASSAY OF PROTEIN URINE: CPT | Performed by: INTERNAL MEDICINE

## 2021-11-14 PROCEDURE — 63710000001 INSULIN DETEMIR PER 5 UNITS: Performed by: INTERNAL MEDICINE

## 2021-11-14 PROCEDURE — 85027 COMPLETE CBC AUTOMATED: CPT | Performed by: INTERNAL MEDICINE

## 2021-11-14 PROCEDURE — 93005 ELECTROCARDIOGRAM TRACING: CPT | Performed by: PHYSICIAN ASSISTANT

## 2021-11-14 PROCEDURE — 80048 BASIC METABOLIC PNL TOTAL CA: CPT | Performed by: INTERNAL MEDICINE

## 2021-11-14 PROCEDURE — 82962 GLUCOSE BLOOD TEST: CPT

## 2021-11-14 PROCEDURE — 84300 ASSAY OF URINE SODIUM: CPT | Performed by: INTERNAL MEDICINE

## 2021-11-14 PROCEDURE — 93010 ELECTROCARDIOGRAM REPORT: CPT | Performed by: INTERNAL MEDICINE

## 2021-11-14 PROCEDURE — 84132 ASSAY OF SERUM POTASSIUM: CPT | Performed by: PHYSICIAN ASSISTANT

## 2021-11-14 PROCEDURE — 94640 AIRWAY INHALATION TREATMENT: CPT

## 2021-11-14 RX ORDER — HEPARIN SODIUM 5000 [USP'U]/ML
5000 INJECTION, SOLUTION INTRAVENOUS; SUBCUTANEOUS EVERY 8 HOURS SCHEDULED
Status: DISCONTINUED | OUTPATIENT
Start: 2021-11-14 | End: 2021-11-17

## 2021-11-14 RX ORDER — SODIUM CHLORIDE 9 MG/ML
50 INJECTION, SOLUTION INTRAVENOUS CONTINUOUS
Status: DISCONTINUED | OUTPATIENT
Start: 2021-11-14 | End: 2021-11-14

## 2021-11-14 RX ORDER — DEXTROSE MONOHYDRATE 25 G/50ML
50 INJECTION, SOLUTION INTRAVENOUS ONCE
Status: COMPLETED | OUTPATIENT
Start: 2021-11-14 | End: 2021-11-14

## 2021-11-14 RX ORDER — SODIUM POLYSTYRENE SULFONATE 4.1 MEQ/G
30 POWDER, FOR SUSPENSION ORAL; RECTAL ONCE
Status: DISCONTINUED | OUTPATIENT
Start: 2021-11-14 | End: 2021-11-14

## 2021-11-14 RX ORDER — MORPHINE SULFATE 2 MG/ML
2 INJECTION, SOLUTION INTRAMUSCULAR; INTRAVENOUS EVERY 4 HOURS PRN
Status: DISCONTINUED | OUTPATIENT
Start: 2021-11-14 | End: 2021-11-16

## 2021-11-14 RX ORDER — MORPHINE SULFATE 2 MG/ML
1 INJECTION, SOLUTION INTRAMUSCULAR; INTRAVENOUS ONCE
Status: COMPLETED | OUTPATIENT
Start: 2021-11-14 | End: 2021-11-14

## 2021-11-14 RX ORDER — HEPARIN SODIUM 5000 [USP'U]/ML
5000 INJECTION, SOLUTION INTRAVENOUS; SUBCUTANEOUS EVERY 8 HOURS SCHEDULED
Status: DISCONTINUED | OUTPATIENT
Start: 2021-11-14 | End: 2021-11-14

## 2021-11-14 RX ADMIN — INSULIN DETEMIR 20 UNITS: 100 INJECTION, SOLUTION SUBCUTANEOUS at 20:14

## 2021-11-14 RX ADMIN — CARVEDILOL 12.5 MG: 6.25 TABLET, FILM COATED ORAL at 17:32

## 2021-11-14 RX ADMIN — MORPHINE SULFATE 1 MG: 2 INJECTION, SOLUTION INTRAMUSCULAR; INTRAVENOUS at 17:59

## 2021-11-14 RX ADMIN — HUMAN INSULIN 10 UNITS: 100 INJECTION, SOLUTION SUBCUTANEOUS at 04:04

## 2021-11-14 RX ADMIN — INSULIN ASPART 8 UNITS: 100 INJECTION, SOLUTION INTRAVENOUS; SUBCUTANEOUS at 17:59

## 2021-11-14 RX ADMIN — SODIUM CHLORIDE, PRESERVATIVE FREE 10 ML: 5 INJECTION INTRAVENOUS at 08:36

## 2021-11-14 RX ADMIN — MORPHINE SULFATE 1 MG: 2 INJECTION, SOLUTION INTRAMUSCULAR; INTRAVENOUS at 03:14

## 2021-11-14 RX ADMIN — HEPARIN SODIUM 5000 UNITS: 5000 INJECTION INTRAVENOUS; SUBCUTANEOUS at 20:14

## 2021-11-14 RX ADMIN — CEFTRIAXONE 1 G: 1 INJECTION, POWDER, FOR SOLUTION INTRAMUSCULAR; INTRAVENOUS at 17:32

## 2021-11-14 RX ADMIN — MORPHINE SULFATE 1 MG: 2 INJECTION, SOLUTION INTRAMUSCULAR; INTRAVENOUS at 20:59

## 2021-11-14 RX ADMIN — HEPARIN SODIUM 5000 UNITS: 5000 INJECTION INTRAVENOUS; SUBCUTANEOUS at 17:32

## 2021-11-14 RX ADMIN — SODIUM CHLORIDE, PRESERVATIVE FREE 10 ML: 5 INJECTION INTRAVENOUS at 20:14

## 2021-11-14 RX ADMIN — ALBUTEROL SULFATE 10 MG: 2.5 SOLUTION RESPIRATORY (INHALATION) at 03:52

## 2021-11-14 RX ADMIN — CALCIUM 500 MG: 500 TABLET ORAL at 17:33

## 2021-11-14 RX ADMIN — CARVEDILOL 12.5 MG: 6.25 TABLET, FILM COATED ORAL at 08:35

## 2021-11-14 RX ADMIN — PANTOPRAZOLE SODIUM 40 MG: 40 TABLET, DELAYED RELEASE ORAL at 17:32

## 2021-11-14 RX ADMIN — MORPHINE SULFATE 1 MG: 2 INJECTION, SOLUTION INTRAMUSCULAR; INTRAVENOUS at 08:35

## 2021-11-14 RX ADMIN — CETIRIZINE HYDROCHLORIDE 5 MG: 10 TABLET, FILM COATED ORAL at 08:35

## 2021-11-14 RX ADMIN — FERROUS SULFATE TAB 325 MG (65 MG ELEMENTAL FE) 325 MG: 325 (65 FE) TAB at 17:32

## 2021-11-14 RX ADMIN — MORPHINE SULFATE 2 MG: 2 INJECTION, SOLUTION INTRAMUSCULAR; INTRAVENOUS at 23:54

## 2021-11-14 RX ADMIN — HEPARIN SODIUM 5000 UNITS: 5000 INJECTION INTRAVENOUS; SUBCUTANEOUS at 04:04

## 2021-11-14 RX ADMIN — SODIUM CHLORIDE 50 ML/HR: 9 INJECTION, SOLUTION INTRAVENOUS at 08:54

## 2021-11-14 RX ADMIN — DEXTROSE MONOHYDRATE 50 ML: 25 INJECTION, SOLUTION INTRAVENOUS at 04:04

## 2021-11-14 RX ADMIN — PANTOPRAZOLE SODIUM 40 MG: 40 TABLET, DELAYED RELEASE ORAL at 08:35

## 2021-11-15 LAB
ALBUMIN SERPL-MCNC: 3.38 G/DL (ref 3.5–5.2)
ALBUMIN/GLOB SERPL: 1 G/DL
ALP SERPL-CCNC: 66 U/L (ref 39–117)
ALT SERPL W P-5'-P-CCNC: 6 U/L (ref 1–33)
ANION GAP SERPL CALCULATED.3IONS-SCNC: 8.1 MMOL/L (ref 5–15)
AST SERPL-CCNC: 9 U/L (ref 1–32)
BACTERIA SPEC AEROBE CULT: ABNORMAL
BASOPHILS # BLD AUTO: 0.03 10*3/MM3 (ref 0–0.2)
BASOPHILS NFR BLD AUTO: 0.3 % (ref 0–1.5)
BILIRUB SERPL-MCNC: 0.2 MG/DL (ref 0–1.2)
BUN SERPL-MCNC: 49 MG/DL (ref 8–23)
BUN/CREAT SERPL: 30.8 (ref 7–25)
CALCIUM SPEC-SCNC: 9.2 MG/DL (ref 8.6–10.5)
CHLORIDE SERPL-SCNC: 101 MMOL/L (ref 98–107)
CO2 SERPL-SCNC: 25.9 MMOL/L (ref 22–29)
CREAT SERPL-MCNC: 1.59 MG/DL (ref 0.57–1)
CREAT UR-MCNC: 115.1 MG/DL
DEPRECATED RDW RBC AUTO: 51.1 FL (ref 37–54)
EOSINOPHIL # BLD AUTO: 0.31 10*3/MM3 (ref 0–0.4)
EOSINOPHIL NFR BLD AUTO: 3.3 % (ref 0.3–6.2)
ERYTHROCYTE [DISTWIDTH] IN BLOOD BY AUTOMATED COUNT: 16 % (ref 12.3–15.4)
GFR SERPL CREATININE-BSD FRML MDRD: 31 ML/MIN/1.73
GLOBULIN UR ELPH-MCNC: 3.2 GM/DL
GLUCOSE BLDC GLUCOMTR-MCNC: 183 MG/DL (ref 70–130)
GLUCOSE BLDC GLUCOMTR-MCNC: 238 MG/DL (ref 70–130)
GLUCOSE BLDC GLUCOMTR-MCNC: 239 MG/DL (ref 70–130)
GLUCOSE BLDC GLUCOMTR-MCNC: 246 MG/DL (ref 70–130)
GLUCOSE SERPL-MCNC: 187 MG/DL (ref 65–99)
HCT VFR BLD AUTO: 31.4 % (ref 34–46.6)
HGB BLD-MCNC: 9.3 G/DL (ref 12–15.9)
IMM GRANULOCYTES # BLD AUTO: 0.03 10*3/MM3 (ref 0–0.05)
IMM GRANULOCYTES NFR BLD AUTO: 0.3 % (ref 0–0.5)
LYMPHOCYTES # BLD AUTO: 2.27 10*3/MM3 (ref 0.7–3.1)
LYMPHOCYTES NFR BLD AUTO: 24.5 % (ref 19.6–45.3)
MAGNESIUM SERPL-MCNC: 1.8 MG/DL (ref 1.6–2.4)
MAGNESIUM SERPL-MCNC: 2.2 MG/DL (ref 1.6–2.4)
MCH RBC QN AUTO: 25.7 PG (ref 26.6–33)
MCHC RBC AUTO-ENTMCNC: 29.6 G/DL (ref 31.5–35.7)
MCV RBC AUTO: 86.7 FL (ref 79–97)
MONOCYTES # BLD AUTO: 0.65 10*3/MM3 (ref 0.1–0.9)
MONOCYTES NFR BLD AUTO: 7 % (ref 5–12)
NEUTROPHILS NFR BLD AUTO: 5.99 10*3/MM3 (ref 1.7–7)
NEUTROPHILS NFR BLD AUTO: 64.6 % (ref 42.7–76)
NRBC BLD AUTO-RTO: 0 /100 WBC (ref 0–0.2)
PLATELET # BLD AUTO: 272 10*3/MM3 (ref 140–450)
PMV BLD AUTO: 11.4 FL (ref 6–12)
POTASSIUM SERPL-SCNC: 5.6 MMOL/L (ref 3.5–5.2)
POTASSIUM SERPL-SCNC: 5.6 MMOL/L (ref 3.5–5.2)
PROT SERPL-MCNC: 6.6 G/DL (ref 6–8.5)
PROT UR-MCNC: 6 MG/DL
PROT/CREAT UR: 52.1 MG/G CREA (ref 0–200)
RBC # BLD AUTO: 3.62 10*6/MM3 (ref 3.77–5.28)
SODIUM SERPL-SCNC: 135 MMOL/L (ref 136–145)
WBC # BLD AUTO: 9.28 10*3/MM3 (ref 3.4–10.8)

## 2021-11-15 PROCEDURE — 85025 COMPLETE CBC W/AUTO DIFF WBC: CPT | Performed by: INTERNAL MEDICINE

## 2021-11-15 PROCEDURE — 94799 UNLISTED PULMONARY SVC/PX: CPT

## 2021-11-15 PROCEDURE — 63710000001 INSULIN DETEMIR PER 5 UNITS: Performed by: INTERNAL MEDICINE

## 2021-11-15 PROCEDURE — 84132 ASSAY OF SERUM POTASSIUM: CPT | Performed by: PHYSICIAN ASSISTANT

## 2021-11-15 PROCEDURE — 82962 GLUCOSE BLOOD TEST: CPT

## 2021-11-15 PROCEDURE — 25010000002 MAGNESIUM SULFATE IN D5W 1G/100ML (PREMIX) 1-5 GM/100ML-% SOLUTION: Performed by: INTERNAL MEDICINE

## 2021-11-15 PROCEDURE — 25010000002 CEFTRIAXONE PER 250 MG: Performed by: NURSE PRACTITIONER

## 2021-11-15 PROCEDURE — 25010000002 HEPARIN (PORCINE) PER 1000 UNITS: Performed by: INTERNAL MEDICINE

## 2021-11-15 PROCEDURE — 25010000002 MORPHINE PER 10 MG: Performed by: HOSPITALIST

## 2021-11-15 PROCEDURE — 63710000001 INSULIN REGULAR HUMAN PER 5 UNITS: Performed by: PHYSICIAN ASSISTANT

## 2021-11-15 PROCEDURE — 63710000001 INSULIN ASPART PER 5 UNITS: Performed by: INTERNAL MEDICINE

## 2021-11-15 PROCEDURE — 99232 SBSQ HOSP IP/OBS MODERATE 35: CPT | Performed by: INTERNAL MEDICINE

## 2021-11-15 PROCEDURE — 0 TECHNETIUM SESTAMIBI: Performed by: INTERNAL MEDICINE

## 2021-11-15 PROCEDURE — 80053 COMPREHEN METABOLIC PANEL: CPT | Performed by: INTERNAL MEDICINE

## 2021-11-15 PROCEDURE — 99232 SBSQ HOSP IP/OBS MODERATE 35: CPT | Performed by: SPECIALIST

## 2021-11-15 PROCEDURE — 83735 ASSAY OF MAGNESIUM: CPT | Performed by: INTERNAL MEDICINE

## 2021-11-15 PROCEDURE — A9500 TC99M SESTAMIBI: HCPCS | Performed by: INTERNAL MEDICINE

## 2021-11-15 PROCEDURE — 83735 ASSAY OF MAGNESIUM: CPT | Performed by: PHYSICIAN ASSISTANT

## 2021-11-15 RX ORDER — ROSUVASTATIN CALCIUM 10 MG/1
10 TABLET, COATED ORAL NIGHTLY
Status: DISCONTINUED | OUTPATIENT
Start: 2021-11-15 | End: 2021-11-26 | Stop reason: HOSPADM

## 2021-11-15 RX ORDER — DEXTROSE MONOHYDRATE 25 G/50ML
50 INJECTION, SOLUTION INTRAVENOUS ONCE
Status: COMPLETED | OUTPATIENT
Start: 2021-11-15 | End: 2021-11-15

## 2021-11-15 RX ORDER — MAGNESIUM SULFATE 1 G/100ML
1 INJECTION INTRAVENOUS ONCE
Status: COMPLETED | OUTPATIENT
Start: 2021-11-15 | End: 2021-11-15

## 2021-11-15 RX ORDER — CEFDINIR 300 MG/1
300 CAPSULE ORAL EVERY 12 HOURS SCHEDULED
Status: COMPLETED | OUTPATIENT
Start: 2021-11-15 | End: 2021-11-20

## 2021-11-15 RX ADMIN — PANTOPRAZOLE SODIUM 40 MG: 40 TABLET, DELAYED RELEASE ORAL at 16:52

## 2021-11-15 RX ADMIN — CETIRIZINE HYDROCHLORIDE 5 MG: 10 TABLET, FILM COATED ORAL at 09:16

## 2021-11-15 RX ADMIN — CEFTRIAXONE 1 G: 1 INJECTION, POWDER, FOR SOLUTION INTRAMUSCULAR; INTRAVENOUS at 16:52

## 2021-11-15 RX ADMIN — INSULIN ASPART 5 UNITS: 100 INJECTION, SOLUTION INTRAVENOUS; SUBCUTANEOUS at 16:51

## 2021-11-15 RX ADMIN — MAGNESIUM SULFATE HEPTAHYDRATE 1 G: 1 INJECTION, SOLUTION INTRAVENOUS at 04:29

## 2021-11-15 RX ADMIN — MORPHINE SULFATE 2 MG: 2 INJECTION, SOLUTION INTRAMUSCULAR; INTRAVENOUS at 20:03

## 2021-11-15 RX ADMIN — SODIUM ZIRCONIUM CYCLOSILICATE 10 G: 10 POWDER, FOR SUSPENSION ORAL at 09:16

## 2021-11-15 RX ADMIN — MORPHINE SULFATE 2 MG: 2 INJECTION, SOLUTION INTRAMUSCULAR; INTRAVENOUS at 10:19

## 2021-11-15 RX ADMIN — CALCIUM 500 MG: 500 TABLET ORAL at 10:53

## 2021-11-15 RX ADMIN — CARVEDILOL 12.5 MG: 6.25 TABLET, FILM COATED ORAL at 16:52

## 2021-11-15 RX ADMIN — HUMAN INSULIN 10 UNITS: 100 INJECTION, SOLUTION SUBCUTANEOUS at 04:32

## 2021-11-15 RX ADMIN — SODIUM CHLORIDE, PRESERVATIVE FREE 10 ML: 5 INJECTION INTRAVENOUS at 07:42

## 2021-11-15 RX ADMIN — MORPHINE SULFATE 2 MG: 2 INJECTION, SOLUTION INTRAMUSCULAR; INTRAVENOUS at 14:46

## 2021-11-15 RX ADMIN — HEPARIN SODIUM 5000 UNITS: 5000 INJECTION INTRAVENOUS; SUBCUTANEOUS at 20:03

## 2021-11-15 RX ADMIN — INSULIN ASPART 5 UNITS: 100 INJECTION, SOLUTION INTRAVENOUS; SUBCUTANEOUS at 07:42

## 2021-11-15 RX ADMIN — ROSUVASTATIN CALCIUM 10 MG: 10 TABLET, FILM COATED ORAL at 20:02

## 2021-11-15 RX ADMIN — HEPARIN SODIUM 5000 UNITS: 5000 INJECTION INTRAVENOUS; SUBCUTANEOUS at 05:27

## 2021-11-15 RX ADMIN — FERROUS SULFATE TAB 325 MG (65 MG ELEMENTAL FE) 325 MG: 325 (65 FE) TAB at 16:51

## 2021-11-15 RX ADMIN — SODIUM CHLORIDE, PRESERVATIVE FREE 10 ML: 5 INJECTION INTRAVENOUS at 20:03

## 2021-11-15 RX ADMIN — TECHNETIUM TC 99M SESTAMIBI 1 DOSE: 1 INJECTION INTRAVENOUS at 10:35

## 2021-11-15 RX ADMIN — HEPARIN SODIUM 5000 UNITS: 5000 INJECTION INTRAVENOUS; SUBCUTANEOUS at 14:46

## 2021-11-15 RX ADMIN — ALBUTEROL SULFATE 10 MG: 2.5 SOLUTION RESPIRATORY (INHALATION) at 04:20

## 2021-11-15 RX ADMIN — CEFDINIR 300 MG: 300 CAPSULE ORAL at 20:08

## 2021-11-15 RX ADMIN — MORPHINE SULFATE 2 MG: 2 INJECTION, SOLUTION INTRAMUSCULAR; INTRAVENOUS at 03:46

## 2021-11-15 RX ADMIN — DEXTROSE MONOHYDRATE 50 ML: 25 INJECTION, SOLUTION INTRAVENOUS at 03:46

## 2021-11-15 RX ADMIN — INSULIN DETEMIR 20 UNITS: 100 INJECTION, SOLUTION SUBCUTANEOUS at 20:08

## 2021-11-16 LAB
ANION GAP SERPL CALCULATED.3IONS-SCNC: 8.8 MMOL/L (ref 5–15)
BUN SERPL-MCNC: 44 MG/DL (ref 8–23)
BUN/CREAT SERPL: 25.4 (ref 7–25)
CALCIUM SPEC-SCNC: 8.9 MG/DL (ref 8.6–10.5)
CHLORIDE SERPL-SCNC: 100 MMOL/L (ref 98–107)
CHOLEST SERPL-MCNC: 140 MG/DL (ref 0–200)
CO2 SERPL-SCNC: 28.2 MMOL/L (ref 22–29)
CREAT SERPL-MCNC: 1.73 MG/DL (ref 0.57–1)
DEPRECATED RDW RBC AUTO: 51.5 FL (ref 37–54)
ERYTHROCYTE [DISTWIDTH] IN BLOOD BY AUTOMATED COUNT: 15.8 % (ref 12.3–15.4)
GFR SERPL CREATININE-BSD FRML MDRD: 29 ML/MIN/1.73
GLUCOSE BLDC GLUCOMTR-MCNC: 152 MG/DL (ref 70–130)
GLUCOSE BLDC GLUCOMTR-MCNC: 197 MG/DL (ref 70–130)
GLUCOSE BLDC GLUCOMTR-MCNC: 206 MG/DL (ref 70–130)
GLUCOSE BLDC GLUCOMTR-MCNC: 243 MG/DL (ref 70–130)
GLUCOSE SERPL-MCNC: 221 MG/DL (ref 65–99)
HCT VFR BLD AUTO: 32.9 % (ref 34–46.6)
HDLC SERPL-MCNC: 27 MG/DL (ref 40–60)
HGB BLD-MCNC: 9.7 G/DL (ref 12–15.9)
LDLC SERPL CALC-MCNC: 77 MG/DL (ref 0–100)
LDLC/HDLC SERPL: 2.6 {RATIO}
MAGNESIUM SERPL-MCNC: 2 MG/DL (ref 1.6–2.4)
MCH RBC QN AUTO: 26.1 PG (ref 26.6–33)
MCHC RBC AUTO-ENTMCNC: 29.5 G/DL (ref 31.5–35.7)
MCV RBC AUTO: 88.4 FL (ref 79–97)
PHOSPHATE SERPL-MCNC: 3.8 MG/DL (ref 2.5–4.5)
PLATELET # BLD AUTO: 263 10*3/MM3 (ref 140–450)
PMV BLD AUTO: 10.6 FL (ref 6–12)
POTASSIUM SERPL-SCNC: 5.3 MMOL/L (ref 3.5–5.2)
QT INTERVAL: 418 MS
QT INTERVAL: 430 MS
QTC INTERVAL: 444 MS
QTC INTERVAL: 457 MS
RBC # BLD AUTO: 3.72 10*6/MM3 (ref 3.77–5.28)
SODIUM SERPL-SCNC: 137 MMOL/L (ref 136–145)
TRIGL SERPL-MCNC: 214 MG/DL (ref 0–150)
VLDLC SERPL-MCNC: 36 MG/DL (ref 5–40)
WBC # BLD AUTO: 8.44 10*3/MM3 (ref 3.4–10.8)

## 2021-11-16 PROCEDURE — 80061 LIPID PANEL: CPT | Performed by: SPECIALIST

## 2021-11-16 PROCEDURE — 94799 UNLISTED PULMONARY SVC/PX: CPT

## 2021-11-16 PROCEDURE — 25010000002 HEPARIN (PORCINE) PER 1000 UNITS: Performed by: INTERNAL MEDICINE

## 2021-11-16 PROCEDURE — 82962 GLUCOSE BLOOD TEST: CPT

## 2021-11-16 PROCEDURE — 80048 BASIC METABOLIC PNL TOTAL CA: CPT | Performed by: INTERNAL MEDICINE

## 2021-11-16 PROCEDURE — 85027 COMPLETE CBC AUTOMATED: CPT | Performed by: INTERNAL MEDICINE

## 2021-11-16 PROCEDURE — 63710000001 INSULIN DETEMIR PER 5 UNITS: Performed by: INTERNAL MEDICINE

## 2021-11-16 PROCEDURE — 83735 ASSAY OF MAGNESIUM: CPT | Performed by: INTERNAL MEDICINE

## 2021-11-16 PROCEDURE — 99232 SBSQ HOSP IP/OBS MODERATE 35: CPT | Performed by: SPECIALIST

## 2021-11-16 PROCEDURE — 84100 ASSAY OF PHOSPHORUS: CPT | Performed by: INTERNAL MEDICINE

## 2021-11-16 PROCEDURE — 99232 SBSQ HOSP IP/OBS MODERATE 35: CPT | Performed by: INTERNAL MEDICINE

## 2021-11-16 PROCEDURE — 63710000001 INSULIN ASPART PER 5 UNITS: Performed by: INTERNAL MEDICINE

## 2021-11-16 PROCEDURE — 25010000002 MORPHINE PER 10 MG: Performed by: HOSPITALIST

## 2021-11-16 RX ORDER — ACETAMINOPHEN AND CODEINE PHOSPHATE 300; 30 MG/1; MG/1
1 TABLET ORAL EVERY 6 HOURS
Status: DISCONTINUED | OUTPATIENT
Start: 2021-11-16 | End: 2021-11-20

## 2021-11-16 RX ORDER — ASPIRIN 81 MG/1
81 TABLET, CHEWABLE ORAL DAILY
Status: DISCONTINUED | OUTPATIENT
Start: 2021-11-16 | End: 2021-11-17

## 2021-11-16 RX ADMIN — MORPHINE SULFATE 2 MG: 2 INJECTION, SOLUTION INTRAMUSCULAR; INTRAVENOUS at 05:01

## 2021-11-16 RX ADMIN — CEFDINIR 300 MG: 300 CAPSULE ORAL at 08:02

## 2021-11-16 RX ADMIN — PANTOPRAZOLE SODIUM 40 MG: 40 TABLET, DELAYED RELEASE ORAL at 06:11

## 2021-11-16 RX ADMIN — FERROUS SULFATE TAB 325 MG (65 MG ELEMENTAL FE) 325 MG: 325 (65 FE) TAB at 17:01

## 2021-11-16 RX ADMIN — CALCIUM 500 MG: 500 TABLET ORAL at 08:02

## 2021-11-16 RX ADMIN — HEPARIN SODIUM 5000 UNITS: 5000 INJECTION INTRAVENOUS; SUBCUTANEOUS at 06:11

## 2021-11-16 RX ADMIN — PANTOPRAZOLE SODIUM 40 MG: 40 TABLET, DELAYED RELEASE ORAL at 17:01

## 2021-11-16 RX ADMIN — INSULIN ASPART 3 UNITS: 100 INJECTION, SOLUTION INTRAVENOUS; SUBCUTANEOUS at 17:01

## 2021-11-16 RX ADMIN — CETIRIZINE HYDROCHLORIDE 5 MG: 10 TABLET, FILM COATED ORAL at 08:02

## 2021-11-16 RX ADMIN — CARVEDILOL 12.5 MG: 6.25 TABLET, FILM COATED ORAL at 17:01

## 2021-11-16 RX ADMIN — MORPHINE SULFATE 2 MG: 2 INJECTION, SOLUTION INTRAMUSCULAR; INTRAVENOUS at 08:53

## 2021-11-16 RX ADMIN — CEFDINIR 300 MG: 300 CAPSULE ORAL at 21:21

## 2021-11-16 RX ADMIN — ROSUVASTATIN CALCIUM 10 MG: 10 TABLET, FILM COATED ORAL at 21:21

## 2021-11-16 RX ADMIN — ASPIRIN 81 MG: 81 TABLET, CHEWABLE ORAL at 17:01

## 2021-11-16 RX ADMIN — MORPHINE SULFATE 2 MG: 2 INJECTION, SOLUTION INTRAMUSCULAR; INTRAVENOUS at 00:44

## 2021-11-16 RX ADMIN — SODIUM CHLORIDE, PRESERVATIVE FREE 10 ML: 5 INJECTION INTRAVENOUS at 21:21

## 2021-11-16 RX ADMIN — HEPARIN SODIUM 5000 UNITS: 5000 INJECTION INTRAVENOUS; SUBCUTANEOUS at 21:21

## 2021-11-16 RX ADMIN — HEPARIN SODIUM 5000 UNITS: 5000 INJECTION INTRAVENOUS; SUBCUTANEOUS at 15:33

## 2021-11-16 RX ADMIN — ACETAMINOPHEN AND CODEINE PHOSPHATE 1 TABLET: 300; 30 TABLET ORAL at 21:21

## 2021-11-16 RX ADMIN — SODIUM CHLORIDE, PRESERVATIVE FREE 10 ML: 5 INJECTION INTRAVENOUS at 08:03

## 2021-11-16 RX ADMIN — INSULIN ASPART 5 UNITS: 100 INJECTION, SOLUTION INTRAVENOUS; SUBCUTANEOUS at 11:33

## 2021-11-16 RX ADMIN — CARVEDILOL 12.5 MG: 6.25 TABLET, FILM COATED ORAL at 06:54

## 2021-11-16 RX ADMIN — INSULIN ASPART 5 UNITS: 100 INJECTION, SOLUTION INTRAVENOUS; SUBCUTANEOUS at 08:02

## 2021-11-16 RX ADMIN — INSULIN DETEMIR 20 UNITS: 100 INJECTION, SOLUTION SUBCUTANEOUS at 21:21

## 2021-11-17 ENCOUNTER — APPOINTMENT (OUTPATIENT)
Dept: GENERAL RADIOLOGY | Facility: HOSPITAL | Age: 76
End: 2021-11-17

## 2021-11-17 LAB
ANION GAP SERPL CALCULATED.3IONS-SCNC: 9.7 MMOL/L (ref 5–15)
APTT PPP: 35.7 SECONDS (ref 25.5–35.4)
APTT PPP: 57.3 SECONDS (ref 25.5–35.4)
BASOPHILS # BLD AUTO: 0.04 10*3/MM3 (ref 0–0.2)
BASOPHILS NFR BLD AUTO: 0.5 % (ref 0–1.5)
BUN SERPL-MCNC: 47 MG/DL (ref 8–23)
BUN/CREAT SERPL: 31.8 (ref 7–25)
CALCIUM SPEC-SCNC: 9.1 MG/DL (ref 8.6–10.5)
CHLORIDE SERPL-SCNC: 103 MMOL/L (ref 98–107)
CO2 SERPL-SCNC: 27.3 MMOL/L (ref 22–29)
CREAT SERPL-MCNC: 1.48 MG/DL (ref 0.57–1)
DEPRECATED RDW RBC AUTO: 49.6 FL (ref 37–54)
DEPRECATED RDW RBC AUTO: 50.6 FL (ref 37–54)
EOSINOPHIL # BLD AUTO: 0.22 10*3/MM3 (ref 0–0.4)
EOSINOPHIL NFR BLD AUTO: 2.9 % (ref 0.3–6.2)
ERYTHROCYTE [DISTWIDTH] IN BLOOD BY AUTOMATED COUNT: 15.6 % (ref 12.3–15.4)
ERYTHROCYTE [DISTWIDTH] IN BLOOD BY AUTOMATED COUNT: 15.8 % (ref 12.3–15.4)
GFR SERPL CREATININE-BSD FRML MDRD: 34 ML/MIN/1.73
GLUCOSE BLDC GLUCOMTR-MCNC: 151 MG/DL (ref 70–130)
GLUCOSE BLDC GLUCOMTR-MCNC: 212 MG/DL (ref 70–130)
GLUCOSE BLDC GLUCOMTR-MCNC: 235 MG/DL (ref 70–130)
GLUCOSE BLDC GLUCOMTR-MCNC: 266 MG/DL (ref 70–130)
GLUCOSE SERPL-MCNC: 181 MG/DL (ref 65–99)
HCT VFR BLD AUTO: 31.7 % (ref 34–46.6)
HCT VFR BLD AUTO: 32 % (ref 34–46.6)
HGB BLD-MCNC: 9.4 G/DL (ref 12–15.9)
HGB BLD-MCNC: 9.5 G/DL (ref 12–15.9)
IMM GRANULOCYTES # BLD AUTO: 0.02 10*3/MM3 (ref 0–0.05)
IMM GRANULOCYTES NFR BLD AUTO: 0.3 % (ref 0–0.5)
INR PPP: 1.04 (ref 0.9–1.1)
LYMPHOCYTES # BLD AUTO: 2.08 10*3/MM3 (ref 0.7–3.1)
LYMPHOCYTES NFR BLD AUTO: 27.6 % (ref 19.6–45.3)
MAGNESIUM SERPL-MCNC: 2.1 MG/DL (ref 1.6–2.4)
MCH RBC QN AUTO: 26.1 PG (ref 26.6–33)
MCH RBC QN AUTO: 26.3 PG (ref 26.6–33)
MCHC RBC AUTO-ENTMCNC: 29.7 G/DL (ref 31.5–35.7)
MCHC RBC AUTO-ENTMCNC: 29.7 G/DL (ref 31.5–35.7)
MCV RBC AUTO: 87.9 FL (ref 79–97)
MCV RBC AUTO: 88.5 FL (ref 79–97)
MONOCYTES # BLD AUTO: 0.64 10*3/MM3 (ref 0.1–0.9)
MONOCYTES NFR BLD AUTO: 8.5 % (ref 5–12)
NEUTROPHILS NFR BLD AUTO: 4.53 10*3/MM3 (ref 1.7–7)
NEUTROPHILS NFR BLD AUTO: 60.2 % (ref 42.7–76)
NRBC BLD AUTO-RTO: 0 /100 WBC (ref 0–0.2)
PHOSPHATE SERPL-MCNC: 3.9 MG/DL (ref 2.5–4.5)
PLATELET # BLD AUTO: 266 10*3/MM3 (ref 140–450)
PLATELET # BLD AUTO: 274 10*3/MM3 (ref 140–450)
PMV BLD AUTO: 10.5 FL (ref 6–12)
PMV BLD AUTO: 10.9 FL (ref 6–12)
POTASSIUM SERPL-SCNC: 5.2 MMOL/L (ref 3.5–5.2)
PROTHROMBIN TIME: 14 SECONDS (ref 12.8–14.5)
RBC # BLD AUTO: 3.58 10*6/MM3 (ref 3.77–5.28)
RBC # BLD AUTO: 3.64 10*6/MM3 (ref 3.77–5.28)
SODIUM SERPL-SCNC: 140 MMOL/L (ref 136–145)
WBC # BLD AUTO: 7.7 10*3/MM3 (ref 3.4–10.8)
WBC NRBC COR # BLD: 7.53 10*3/MM3 (ref 3.4–10.8)

## 2021-11-17 PROCEDURE — 85025 COMPLETE CBC W/AUTO DIFF WBC: CPT | Performed by: INTERNAL MEDICINE

## 2021-11-17 PROCEDURE — 97162 PT EVAL MOD COMPLEX 30 MIN: CPT

## 2021-11-17 PROCEDURE — 85730 THROMBOPLASTIN TIME PARTIAL: CPT | Performed by: INTERNAL MEDICINE

## 2021-11-17 PROCEDURE — 80048 BASIC METABOLIC PNL TOTAL CA: CPT | Performed by: INTERNAL MEDICINE

## 2021-11-17 PROCEDURE — 84484 ASSAY OF TROPONIN QUANT: CPT | Performed by: INTERNAL MEDICINE

## 2021-11-17 PROCEDURE — 63710000001 INSULIN DETEMIR PER 5 UNITS: Performed by: INTERNAL MEDICINE

## 2021-11-17 PROCEDURE — 85027 COMPLETE CBC AUTOMATED: CPT | Performed by: INTERNAL MEDICINE

## 2021-11-17 PROCEDURE — 82962 GLUCOSE BLOOD TEST: CPT

## 2021-11-17 PROCEDURE — 92610 EVALUATE SWALLOWING FUNCTION: CPT

## 2021-11-17 PROCEDURE — 97167 OT EVAL HIGH COMPLEX 60 MIN: CPT

## 2021-11-17 PROCEDURE — 84100 ASSAY OF PHOSPHORUS: CPT | Performed by: INTERNAL MEDICINE

## 2021-11-17 PROCEDURE — 92523 SPEECH SOUND LANG COMPREHEN: CPT

## 2021-11-17 PROCEDURE — 83735 ASSAY OF MAGNESIUM: CPT | Performed by: INTERNAL MEDICINE

## 2021-11-17 PROCEDURE — 85610 PROTHROMBIN TIME: CPT | Performed by: INTERNAL MEDICINE

## 2021-11-17 PROCEDURE — 25010000002 HEPARIN (PORCINE) PER 1000 UNITS: Performed by: INTERNAL MEDICINE

## 2021-11-17 PROCEDURE — 99232 SBSQ HOSP IP/OBS MODERATE 35: CPT | Performed by: INTERNAL MEDICINE

## 2021-11-17 PROCEDURE — 74018 RADEX ABDOMEN 1 VIEW: CPT

## 2021-11-17 PROCEDURE — 63710000001 INSULIN ASPART PER 5 UNITS: Performed by: INTERNAL MEDICINE

## 2021-11-17 RX ORDER — HEPARIN SODIUM 5000 [USP'U]/ML
60 INJECTION, SOLUTION INTRAVENOUS; SUBCUTANEOUS AS NEEDED
Status: DISCONTINUED | OUTPATIENT
Start: 2021-11-17 | End: 2021-11-21

## 2021-11-17 RX ORDER — DOCUSATE SODIUM 100 MG/1
100 CAPSULE, LIQUID FILLED ORAL 2 TIMES DAILY
Status: DISCONTINUED | OUTPATIENT
Start: 2021-11-17 | End: 2021-11-26 | Stop reason: HOSPADM

## 2021-11-17 RX ORDER — HEPARIN SODIUM 10000 [USP'U]/100ML
12 INJECTION, SOLUTION INTRAVENOUS
Status: DISCONTINUED | OUTPATIENT
Start: 2021-11-17 | End: 2021-11-21

## 2021-11-17 RX ORDER — HEPARIN SODIUM 5000 [USP'U]/ML
60 INJECTION, SOLUTION INTRAVENOUS; SUBCUTANEOUS ONCE
Status: DISCONTINUED | OUTPATIENT
Start: 2021-11-17 | End: 2021-11-21

## 2021-11-17 RX ORDER — POLYETHYLENE GLYCOL 3350 17 G/17G
17 POWDER, FOR SOLUTION ORAL DAILY
Status: DISCONTINUED | OUTPATIENT
Start: 2021-11-17 | End: 2021-11-26 | Stop reason: HOSPADM

## 2021-11-17 RX ORDER — HEPARIN SODIUM 5000 [USP'U]/ML
30 INJECTION, SOLUTION INTRAVENOUS; SUBCUTANEOUS AS NEEDED
Status: DISCONTINUED | OUTPATIENT
Start: 2021-11-17 | End: 2021-11-21

## 2021-11-17 RX ADMIN — DOCUSATE SODIUM 100 MG: 100 CAPSULE ORAL at 20:16

## 2021-11-17 RX ADMIN — INSULIN DETEMIR 20 UNITS: 100 INJECTION, SOLUTION SUBCUTANEOUS at 20:17

## 2021-11-17 RX ADMIN — HEPARIN SODIUM 12 UNITS/KG/HR: 10000 INJECTION, SOLUTION INTRAVENOUS at 17:27

## 2021-11-17 RX ADMIN — DOCUSATE SODIUM 100 MG: 100 CAPSULE ORAL at 09:36

## 2021-11-17 RX ADMIN — ACETAMINOPHEN AND CODEINE PHOSPHATE 1 TABLET: 300; 30 TABLET ORAL at 20:16

## 2021-11-17 RX ADMIN — ROSUVASTATIN CALCIUM 10 MG: 10 TABLET, FILM COATED ORAL at 20:16

## 2021-11-17 RX ADMIN — CEFDINIR 300 MG: 300 CAPSULE ORAL at 20:16

## 2021-11-17 RX ADMIN — INSULIN ASPART 3 UNITS: 100 INJECTION, SOLUTION INTRAVENOUS; SUBCUTANEOUS at 09:36

## 2021-11-17 RX ADMIN — CEFDINIR 300 MG: 300 CAPSULE ORAL at 09:36

## 2021-11-17 RX ADMIN — ACETAMINOPHEN AND CODEINE PHOSPHATE 1 TABLET: 300; 30 TABLET ORAL at 09:37

## 2021-11-17 RX ADMIN — CETIRIZINE HYDROCHLORIDE 5 MG: 10 TABLET, FILM COATED ORAL at 09:36

## 2021-11-17 RX ADMIN — ACETAMINOPHEN AND CODEINE PHOSPHATE 1 TABLET: 300; 30 TABLET ORAL at 15:08

## 2021-11-17 RX ADMIN — PANTOPRAZOLE SODIUM 40 MG: 40 TABLET, DELAYED RELEASE ORAL at 17:26

## 2021-11-17 RX ADMIN — ASPIRIN 81 MG: 81 TABLET, CHEWABLE ORAL at 09:36

## 2021-11-17 RX ADMIN — INSULIN ASPART 8 UNITS: 100 INJECTION, SOLUTION INTRAVENOUS; SUBCUTANEOUS at 17:18

## 2021-11-17 RX ADMIN — CARVEDILOL 12.5 MG: 6.25 TABLET, FILM COATED ORAL at 09:36

## 2021-11-17 RX ADMIN — SODIUM CHLORIDE, PRESERVATIVE FREE 10 ML: 5 INJECTION INTRAVENOUS at 09:37

## 2021-11-17 RX ADMIN — HEPARIN SODIUM 5000 UNITS: 5000 INJECTION INTRAVENOUS; SUBCUTANEOUS at 14:43

## 2021-11-17 RX ADMIN — SODIUM CHLORIDE, PRESERVATIVE FREE 10 ML: 5 INJECTION INTRAVENOUS at 20:16

## 2021-11-17 RX ADMIN — INSULIN ASPART 5 UNITS: 100 INJECTION, SOLUTION INTRAVENOUS; SUBCUTANEOUS at 12:38

## 2021-11-17 RX ADMIN — POLYETHYLENE GLYCOL (3350) 17 G: 17 POWDER, FOR SOLUTION ORAL at 09:36

## 2021-11-17 RX ADMIN — CALCIUM 500 MG: 500 TABLET ORAL at 09:36

## 2021-11-17 RX ADMIN — HEPARIN SODIUM 5000 UNITS: 5000 INJECTION INTRAVENOUS; SUBCUTANEOUS at 04:11

## 2021-11-17 RX ADMIN — GLYCERIN 1 G: 1 SUPPOSITORY RECTAL at 20:16

## 2021-11-17 RX ADMIN — CARVEDILOL 12.5 MG: 6.25 TABLET, FILM COATED ORAL at 17:19

## 2021-11-17 RX ADMIN — PANTOPRAZOLE SODIUM 40 MG: 40 TABLET, DELAYED RELEASE ORAL at 09:36

## 2021-11-17 RX ADMIN — FERROUS SULFATE TAB 325 MG (65 MG ELEMENTAL FE) 325 MG: 325 (65 FE) TAB at 17:18

## 2021-11-18 LAB
ABO GROUP BLD: NORMAL
ABO GROUP BLD: NORMAL
ANION GAP SERPL CALCULATED.3IONS-SCNC: 10 MMOL/L (ref 5–15)
ANION GAP SERPL CALCULATED.3IONS-SCNC: 7.7 MMOL/L (ref 5–15)
APTT PPP: 33.9 SECONDS (ref 25.5–35.4)
APTT PPP: 45.2 SECONDS (ref 25.5–35.4)
APTT PPP: 56.5 SECONDS (ref 25.5–35.4)
APTT PPP: 59 SECONDS (ref 25.5–35.4)
BACTERIA SPEC AEROBE CULT: NORMAL
BACTERIA SPEC AEROBE CULT: NORMAL
BLD GP AB SCN SERPL QL: NEGATIVE
BLD GP AB SCN SERPL QL: NEGATIVE
BUN SERPL-MCNC: 45 MG/DL (ref 8–23)
BUN SERPL-MCNC: 49 MG/DL (ref 8–23)
BUN/CREAT SERPL: 30.8 (ref 7–25)
BUN/CREAT SERPL: 37.2 (ref 7–25)
CALCIUM SPEC-SCNC: 8.8 MG/DL (ref 8.6–10.5)
CALCIUM SPEC-SCNC: 9.2 MG/DL (ref 8.6–10.5)
CHLORIDE SERPL-SCNC: 101 MMOL/L (ref 98–107)
CHLORIDE SERPL-SCNC: 101 MMOL/L (ref 98–107)
CO2 SERPL-SCNC: 29.3 MMOL/L (ref 22–29)
CO2 SERPL-SCNC: 30 MMOL/L (ref 22–29)
CREAT SERPL-MCNC: 1.21 MG/DL (ref 0.57–1)
CREAT SERPL-MCNC: 1.59 MG/DL (ref 0.57–1)
DEPRECATED RDW RBC AUTO: 49.5 FL (ref 37–54)
DEPRECATED RDW RBC AUTO: 51.4 FL (ref 37–54)
ERYTHROCYTE [DISTWIDTH] IN BLOOD BY AUTOMATED COUNT: 15.6 % (ref 12.3–15.4)
ERYTHROCYTE [DISTWIDTH] IN BLOOD BY AUTOMATED COUNT: 15.7 % (ref 12.3–15.4)
GFR SERPL CREATININE-BSD FRML MDRD: 31 ML/MIN/1.73
GFR SERPL CREATININE-BSD FRML MDRD: 43 ML/MIN/1.73
GLUCOSE BLDC GLUCOMTR-MCNC: 179 MG/DL (ref 70–130)
GLUCOSE BLDC GLUCOMTR-MCNC: 185 MG/DL (ref 70–130)
GLUCOSE BLDC GLUCOMTR-MCNC: 186 MG/DL (ref 70–130)
GLUCOSE BLDC GLUCOMTR-MCNC: 190 MG/DL (ref 70–130)
GLUCOSE BLDC GLUCOMTR-MCNC: 193 MG/DL (ref 70–130)
GLUCOSE BLDC GLUCOMTR-MCNC: 205 MG/DL (ref 70–130)
GLUCOSE SERPL-MCNC: 157 MG/DL (ref 65–99)
GLUCOSE SERPL-MCNC: 188 MG/DL (ref 65–99)
HCT VFR BLD AUTO: 32.6 % (ref 34–46.6)
HCT VFR BLD AUTO: 33.8 % (ref 34–46.6)
HGB BLD-MCNC: 10.2 G/DL (ref 12–15.9)
HGB BLD-MCNC: 9.4 G/DL (ref 12–15.9)
INR PPP: 1.06 (ref 0.9–1.1)
MAGNESIUM SERPL-MCNC: 2 MG/DL (ref 1.6–2.4)
MAGNESIUM SERPL-MCNC: 2 MG/DL (ref 1.6–2.4)
MCH RBC QN AUTO: 26 PG (ref 26.6–33)
MCH RBC QN AUTO: 26.1 PG (ref 26.6–33)
MCHC RBC AUTO-ENTMCNC: 28.8 G/DL (ref 31.5–35.7)
MCHC RBC AUTO-ENTMCNC: 30.2 G/DL (ref 31.5–35.7)
MCV RBC AUTO: 86.4 FL (ref 79–97)
MCV RBC AUTO: 90.1 FL (ref 79–97)
PHOSPHATE SERPL-MCNC: 3.6 MG/DL (ref 2.5–4.5)
PHOSPHATE SERPL-MCNC: 3.9 MG/DL (ref 2.5–4.5)
PLATELET # BLD AUTO: 257 10*3/MM3 (ref 140–450)
PLATELET # BLD AUTO: 298 10*3/MM3 (ref 140–450)
PMV BLD AUTO: 10.5 FL (ref 6–12)
PMV BLD AUTO: 10.8 FL (ref 6–12)
POTASSIUM SERPL-SCNC: 5.4 MMOL/L (ref 3.5–5.2)
POTASSIUM SERPL-SCNC: 5.5 MMOL/L (ref 3.5–5.2)
POTASSIUM SERPL-SCNC: 5.6 MMOL/L (ref 3.5–5.2)
PROTHROMBIN TIME: 14.2 SECONDS (ref 12.8–14.5)
RBC # BLD AUTO: 3.62 10*6/MM3 (ref 3.77–5.28)
RBC # BLD AUTO: 3.91 10*6/MM3 (ref 3.77–5.28)
RH BLD: POSITIVE
RH BLD: POSITIVE
SODIUM SERPL-SCNC: 138 MMOL/L (ref 136–145)
SODIUM SERPL-SCNC: 141 MMOL/L (ref 136–145)
T&S EXPIRATION DATE: NORMAL
T&S EXPIRATION DATE: NORMAL
TROPONIN T SERPL-MCNC: <0.01 NG/ML (ref 0–0.03)
WBC NRBC COR # BLD: 8.02 10*3/MM3 (ref 3.4–10.8)
WBC NRBC COR # BLD: 8.1 10*3/MM3 (ref 3.4–10.8)

## 2021-11-18 PROCEDURE — 25010000002 FUROSEMIDE PER 20 MG: Performed by: INTERNAL MEDICINE

## 2021-11-18 PROCEDURE — 86850 RBC ANTIBODY SCREEN: CPT | Performed by: NURSE PRACTITIONER

## 2021-11-18 PROCEDURE — 86850 RBC ANTIBODY SCREEN: CPT | Performed by: PHYSICIAN ASSISTANT

## 2021-11-18 PROCEDURE — 84100 ASSAY OF PHOSPHORUS: CPT | Performed by: INTERNAL MEDICINE

## 2021-11-18 PROCEDURE — 84132 ASSAY OF SERUM POTASSIUM: CPT | Performed by: PHYSICIAN ASSISTANT

## 2021-11-18 PROCEDURE — 85610 PROTHROMBIN TIME: CPT | Performed by: NURSE PRACTITIONER

## 2021-11-18 PROCEDURE — 97530 THERAPEUTIC ACTIVITIES: CPT

## 2021-11-18 PROCEDURE — 85730 THROMBOPLASTIN TIME PARTIAL: CPT | Performed by: INTERNAL MEDICINE

## 2021-11-18 PROCEDURE — 85027 COMPLETE CBC AUTOMATED: CPT | Performed by: INTERNAL MEDICINE

## 2021-11-18 PROCEDURE — 83735 ASSAY OF MAGNESIUM: CPT | Performed by: INTERNAL MEDICINE

## 2021-11-18 PROCEDURE — 86900 BLOOD TYPING SEROLOGIC ABO: CPT | Performed by: PHYSICIAN ASSISTANT

## 2021-11-18 PROCEDURE — 63710000001 INSULIN DETEMIR PER 5 UNITS: Performed by: INTERNAL MEDICINE

## 2021-11-18 PROCEDURE — 86901 BLOOD TYPING SEROLOGIC RH(D): CPT | Performed by: NURSE PRACTITIONER

## 2021-11-18 PROCEDURE — 80048 BASIC METABOLIC PNL TOTAL CA: CPT | Performed by: INTERNAL MEDICINE

## 2021-11-18 PROCEDURE — 86901 BLOOD TYPING SEROLOGIC RH(D): CPT | Performed by: PHYSICIAN ASSISTANT

## 2021-11-18 PROCEDURE — 99232 SBSQ HOSP IP/OBS MODERATE 35: CPT | Performed by: INTERNAL MEDICINE

## 2021-11-18 PROCEDURE — 86900 BLOOD TYPING SEROLOGIC ABO: CPT | Performed by: NURSE PRACTITIONER

## 2021-11-18 PROCEDURE — 63710000001 INSULIN REGULAR HUMAN PER 5 UNITS: Performed by: PHYSICIAN ASSISTANT

## 2021-11-18 PROCEDURE — 82962 GLUCOSE BLOOD TEST: CPT

## 2021-11-18 PROCEDURE — 25010000002 HEPARIN (PORCINE) PER 1000 UNITS: Performed by: INTERNAL MEDICINE

## 2021-11-18 PROCEDURE — 63710000001 INSULIN ASPART PER 5 UNITS: Performed by: INTERNAL MEDICINE

## 2021-11-18 RX ORDER — FUROSEMIDE 10 MG/ML
20 INJECTION INTRAMUSCULAR; INTRAVENOUS ONCE
Status: COMPLETED | OUTPATIENT
Start: 2021-11-18 | End: 2021-11-18

## 2021-11-18 RX ORDER — DEXTROSE MONOHYDRATE 25 G/50ML
50 INJECTION, SOLUTION INTRAVENOUS ONCE
Status: COMPLETED | OUTPATIENT
Start: 2021-11-19 | End: 2021-11-19

## 2021-11-18 RX ORDER — CARVEDILOL 6.25 MG/1
6.25 TABLET ORAL 2 TIMES DAILY WITH MEALS
Status: DISCONTINUED | OUTPATIENT
Start: 2021-11-18 | End: 2021-11-26 | Stop reason: HOSPADM

## 2021-11-18 RX ORDER — DEXTROSE MONOHYDRATE 25 G/50ML
50 INJECTION, SOLUTION INTRAVENOUS ONCE
Status: COMPLETED | OUTPATIENT
Start: 2021-11-18 | End: 2021-11-18

## 2021-11-18 RX ORDER — BISACODYL 10 MG
10 SUPPOSITORY, RECTAL RECTAL ONCE
Status: COMPLETED | OUTPATIENT
Start: 2021-11-19 | End: 2021-11-19

## 2021-11-18 RX ORDER — MAGNESIUM CARB/ALUMINUM HYDROX 105-160MG
150 TABLET,CHEWABLE ORAL ONCE
Status: DISCONTINUED | OUTPATIENT
Start: 2021-11-18 | End: 2021-11-26 | Stop reason: HOSPADM

## 2021-11-18 RX ADMIN — DOCUSATE SODIUM 100 MG: 100 CAPSULE ORAL at 20:45

## 2021-11-18 RX ADMIN — SODIUM BICARBONATE 50 MEQ: 84 INJECTION, SOLUTION INTRAVENOUS at 00:48

## 2021-11-18 RX ADMIN — FUROSEMIDE 20 MG: 10 INJECTION, SOLUTION INTRAMUSCULAR; INTRAVENOUS at 12:18

## 2021-11-18 RX ADMIN — INSULIN DETEMIR 20 UNITS: 100 INJECTION, SOLUTION SUBCUTANEOUS at 20:45

## 2021-11-18 RX ADMIN — PANTOPRAZOLE SODIUM 40 MG: 40 TABLET, DELAYED RELEASE ORAL at 17:49

## 2021-11-18 RX ADMIN — POLYETHYLENE GLYCOL (3350) 17 G: 17 POWDER, FOR SOLUTION ORAL at 09:14

## 2021-11-18 RX ADMIN — INSULIN ASPART 3 UNITS: 100 INJECTION, SOLUTION INTRAVENOUS; SUBCUTANEOUS at 17:49

## 2021-11-18 RX ADMIN — ROSUVASTATIN CALCIUM 10 MG: 10 TABLET, FILM COATED ORAL at 20:44

## 2021-11-18 RX ADMIN — DEXTROSE MONOHYDRATE 50 ML: 25 INJECTION, SOLUTION INTRAVENOUS at 00:48

## 2021-11-18 RX ADMIN — CARVEDILOL 6.25 MG: 6.25 TABLET, FILM COATED ORAL at 09:14

## 2021-11-18 RX ADMIN — SODIUM CHLORIDE, PRESERVATIVE FREE 10 ML: 5 INJECTION INTRAVENOUS at 09:14

## 2021-11-18 RX ADMIN — CARVEDILOL 6.25 MG: 6.25 TABLET, FILM COATED ORAL at 17:49

## 2021-11-18 RX ADMIN — HEPARIN SODIUM 14 UNITS/KG/HR: 10000 INJECTION, SOLUTION INTRAVENOUS at 15:06

## 2021-11-18 RX ADMIN — HEPARIN SODIUM 14 UNITS/KG/HR: 10000 INJECTION, SOLUTION INTRAVENOUS at 20:45

## 2021-11-18 RX ADMIN — ACETAMINOPHEN AND CODEINE PHOSPHATE 1 TABLET: 300; 30 TABLET ORAL at 09:18

## 2021-11-18 RX ADMIN — INSULIN ASPART 3 UNITS: 100 INJECTION, SOLUTION INTRAVENOUS; SUBCUTANEOUS at 05:59

## 2021-11-18 RX ADMIN — CALCIUM 500 MG: 500 TABLET ORAL at 12:15

## 2021-11-18 RX ADMIN — SODIUM CHLORIDE, PRESERVATIVE FREE 10 ML: 5 INJECTION INTRAVENOUS at 20:45

## 2021-11-18 RX ADMIN — HUMAN INSULIN 10 UNITS: 100 INJECTION, SOLUTION SUBCUTANEOUS at 00:46

## 2021-11-18 RX ADMIN — CEFDINIR 300 MG: 300 CAPSULE ORAL at 09:14

## 2021-11-18 RX ADMIN — ACETAMINOPHEN AND CODEINE PHOSPHATE 1 TABLET: 300; 30 TABLET ORAL at 03:11

## 2021-11-18 RX ADMIN — DOCUSATE SODIUM 100 MG: 100 CAPSULE ORAL at 09:14

## 2021-11-18 RX ADMIN — FERROUS SULFATE TAB 325 MG (65 MG ELEMENTAL FE) 325 MG: 325 (65 FE) TAB at 17:49

## 2021-11-18 RX ADMIN — CEFDINIR 300 MG: 300 CAPSULE ORAL at 20:44

## 2021-11-18 RX ADMIN — CETIRIZINE HYDROCHLORIDE 5 MG: 10 TABLET, FILM COATED ORAL at 09:14

## 2021-11-18 RX ADMIN — ACETAMINOPHEN AND CODEINE PHOSPHATE 1 TABLET: 300; 30 TABLET ORAL at 20:44

## 2021-11-18 RX ADMIN — INSULIN ASPART 3 UNITS: 100 INJECTION, SOLUTION INTRAVENOUS; SUBCUTANEOUS at 12:15

## 2021-11-18 RX ADMIN — PANTOPRAZOLE SODIUM 40 MG: 40 TABLET, DELAYED RELEASE ORAL at 05:58

## 2021-11-19 ENCOUNTER — APPOINTMENT (OUTPATIENT)
Dept: GENERAL RADIOLOGY | Facility: HOSPITAL | Age: 76
End: 2021-11-19

## 2021-11-19 ENCOUNTER — ANESTHESIA EVENT (OUTPATIENT)
Dept: PERIOP | Facility: HOSPITAL | Age: 76
End: 2021-11-19

## 2021-11-19 ENCOUNTER — ANESTHESIA (OUTPATIENT)
Dept: PERIOP | Facility: HOSPITAL | Age: 76
End: 2021-11-19

## 2021-11-19 LAB
FLUAV SUBTYP SPEC NAA+PROBE: NOT DETECTED
FLUBV RNA ISLT QL NAA+PROBE: NOT DETECTED
GLUCOSE BLDC GLUCOMTR-MCNC: 167 MG/DL (ref 70–130)
GLUCOSE BLDC GLUCOMTR-MCNC: 172 MG/DL (ref 70–130)
GLUCOSE BLDC GLUCOMTR-MCNC: 181 MG/DL (ref 70–130)
GLUCOSE BLDC GLUCOMTR-MCNC: 188 MG/DL (ref 70–130)
GLUCOSE BLDC GLUCOMTR-MCNC: 91 MG/DL (ref 70–130)
POTASSIUM SERPL-SCNC: 4.3 MMOL/L (ref 3.5–5.2)
SARS-COV-2 RNA PNL SPEC NAA+PROBE: NOT DETECTED

## 2021-11-19 PROCEDURE — 25010000002 ONDANSETRON PER 1 MG: Performed by: NURSE ANESTHETIST, CERTIFIED REGISTERED

## 2021-11-19 PROCEDURE — 99232 SBSQ HOSP IP/OBS MODERATE 35: CPT | Performed by: INTERNAL MEDICINE

## 2021-11-19 PROCEDURE — 25010000002 FENTANYL CITRATE (PF) 50 MCG/ML SOLUTION: Performed by: NURSE ANESTHETIST, CERTIFIED REGISTERED

## 2021-11-19 PROCEDURE — 73501 X-RAY EXAM HIP UNI 1 VIEW: CPT | Performed by: RADIOLOGY

## 2021-11-19 PROCEDURE — C1713 ANCHOR/SCREW BN/BN,TIS/BN: HCPCS | Performed by: ORTHOPAEDIC SURGERY

## 2021-11-19 PROCEDURE — 63710000001 INSULIN ASPART PER 5 UNITS: Performed by: ORTHOPAEDIC SURGERY

## 2021-11-19 PROCEDURE — 25010000002 HYDRALAZINE PER 20 MG: Performed by: NURSE ANESTHETIST, CERTIFIED REGISTERED

## 2021-11-19 PROCEDURE — 25010000002 KETOROLAC TROMETHAMINE PER 15 MG: Performed by: NURSE ANESTHETIST, CERTIFIED REGISTERED

## 2021-11-19 PROCEDURE — 63710000001 INSULIN REGULAR HUMAN PER 5 UNITS: Performed by: PHYSICIAN ASSISTANT

## 2021-11-19 PROCEDURE — 84132 ASSAY OF SERUM POTASSIUM: CPT | Performed by: PHYSICIAN ASSISTANT

## 2021-11-19 PROCEDURE — 0 CEFAZOLIN SODIUM-DEXTROSE 2-3 GM-%(50ML) RECONSTITUTED SOLUTION: Performed by: ORTHOPAEDIC SURGERY

## 2021-11-19 PROCEDURE — 82962 GLUCOSE BLOOD TEST: CPT

## 2021-11-19 PROCEDURE — 94799 UNLISTED PULMONARY SVC/PX: CPT

## 2021-11-19 PROCEDURE — 25010000002 HEPARIN (PORCINE) PER 1000 UNITS: Performed by: INTERNAL MEDICINE

## 2021-11-19 PROCEDURE — 25010000002 NEOSTIGMINE 10 MG/10ML SOLUTION: Performed by: NURSE ANESTHETIST, CERTIFIED REGISTERED

## 2021-11-19 PROCEDURE — 87636 SARSCOV2 & INF A&B AMP PRB: CPT | Performed by: INTERNAL MEDICINE

## 2021-11-19 PROCEDURE — 63710000001 INSULIN DETEMIR PER 5 UNITS: Performed by: ORTHOPAEDIC SURGERY

## 2021-11-19 PROCEDURE — 25010000002 VANCOMYCIN 1 G RECONSTITUTED SOLUTION 1 EACH VIAL: Performed by: ORTHOPAEDIC SURGERY

## 2021-11-19 PROCEDURE — 0QS704Z REPOSITION LEFT UPPER FEMUR WITH INTERNAL FIXATION DEVICE, OPEN APPROACH: ICD-10-PCS | Performed by: ORTHOPAEDIC SURGERY

## 2021-11-19 PROCEDURE — 0 MEPERIDINE PER 100 MG: Performed by: NURSE ANESTHETIST, CERTIFIED REGISTERED

## 2021-11-19 PROCEDURE — 25010000002 THIAMINE PER 100 MG: Performed by: INTERNAL MEDICINE

## 2021-11-19 PROCEDURE — 73501 X-RAY EXAM HIP UNI 1 VIEW: CPT

## 2021-11-19 DEVICE — IMPLANTABLE DEVICE: Type: IMPLANTABLE DEVICE | Site: FEMUR | Status: FUNCTIONAL

## 2021-11-19 DEVICE — PIN CERCLG POSITION THRD 4.5MM STRL: Type: IMPLANTABLE DEVICE | Site: FEMUR | Status: FUNCTIONAL

## 2021-11-19 DEVICE — CABL W CR1.7MM 750ML STRL: Type: IMPLANTABLE DEVICE | Site: FEMUR | Status: FUNCTIONAL

## 2021-11-19 RX ORDER — HYDRALAZINE HYDROCHLORIDE 20 MG/ML
INJECTION INTRAMUSCULAR; INTRAVENOUS AS NEEDED
Status: DISCONTINUED | OUTPATIENT
Start: 2021-11-19 | End: 2021-11-19 | Stop reason: SURG

## 2021-11-19 RX ORDER — METOPROLOL TARTRATE 5 MG/5ML
INJECTION INTRAVENOUS AS NEEDED
Status: DISCONTINUED | OUTPATIENT
Start: 2021-11-19 | End: 2021-11-19 | Stop reason: SURG

## 2021-11-19 RX ORDER — SODIUM CHLORIDE, SODIUM LACTATE, POTASSIUM CHLORIDE, CALCIUM CHLORIDE 600; 310; 30; 20 MG/100ML; MG/100ML; MG/100ML; MG/100ML
100 INJECTION, SOLUTION INTRAVENOUS ONCE AS NEEDED
Status: DISCONTINUED | OUTPATIENT
Start: 2021-11-19 | End: 2021-11-19 | Stop reason: HOSPADM

## 2021-11-19 RX ORDER — DROPERIDOL 2.5 MG/ML
0.62 INJECTION, SOLUTION INTRAMUSCULAR; INTRAVENOUS ONCE AS NEEDED
Status: DISCONTINUED | OUTPATIENT
Start: 2021-11-19 | End: 2021-11-19 | Stop reason: HOSPADM

## 2021-11-19 RX ORDER — MAGNESIUM HYDROXIDE 1200 MG/15ML
LIQUID ORAL AS NEEDED
Status: DISCONTINUED | OUTPATIENT
Start: 2021-11-19 | End: 2021-11-19 | Stop reason: HOSPADM

## 2021-11-19 RX ORDER — THIAMINE HYDROCHLORIDE 100 MG/ML
100 INJECTION, SOLUTION INTRAMUSCULAR; INTRAVENOUS DAILY
Status: DISCONTINUED | OUTPATIENT
Start: 2021-11-19 | End: 2021-11-19 | Stop reason: ALTCHOICE

## 2021-11-19 RX ORDER — FENTANYL CITRATE 50 UG/ML
INJECTION, SOLUTION INTRAMUSCULAR; INTRAVENOUS AS NEEDED
Status: DISCONTINUED | OUTPATIENT
Start: 2021-11-19 | End: 2021-11-19 | Stop reason: SURG

## 2021-11-19 RX ORDER — ETOMIDATE 2 MG/ML
INJECTION INTRAVENOUS AS NEEDED
Status: DISCONTINUED | OUTPATIENT
Start: 2021-11-19 | End: 2021-11-19 | Stop reason: SURG

## 2021-11-19 RX ORDER — ONDANSETRON 2 MG/ML
4 INJECTION INTRAMUSCULAR; INTRAVENOUS AS NEEDED
Status: DISCONTINUED | OUTPATIENT
Start: 2021-11-19 | End: 2021-11-19 | Stop reason: HOSPADM

## 2021-11-19 RX ORDER — ONDANSETRON 2 MG/ML
INJECTION INTRAMUSCULAR; INTRAVENOUS AS NEEDED
Status: DISCONTINUED | OUTPATIENT
Start: 2021-11-19 | End: 2021-11-19 | Stop reason: SURG

## 2021-11-19 RX ORDER — SODIUM CHLORIDE 0.9 % (FLUSH) 0.9 %
10 SYRINGE (ML) INJECTION EVERY 12 HOURS SCHEDULED
Status: DISCONTINUED | OUTPATIENT
Start: 2021-11-19 | End: 2021-11-19 | Stop reason: HOSPADM

## 2021-11-19 RX ORDER — KETOROLAC TROMETHAMINE 30 MG/ML
15 INJECTION, SOLUTION INTRAMUSCULAR; INTRAVENOUS EVERY 6 HOURS PRN
Status: COMPLETED | OUTPATIENT
Start: 2021-11-19 | End: 2021-11-19

## 2021-11-19 RX ORDER — SODIUM CHLORIDE, SODIUM LACTATE, POTASSIUM CHLORIDE, CALCIUM CHLORIDE 600; 310; 30; 20 MG/100ML; MG/100ML; MG/100ML; MG/100ML
125 INJECTION, SOLUTION INTRAVENOUS ONCE
Status: COMPLETED | OUTPATIENT
Start: 2021-11-19 | End: 2021-11-19

## 2021-11-19 RX ORDER — MIDAZOLAM HYDROCHLORIDE 1 MG/ML
0.5 INJECTION INTRAMUSCULAR; INTRAVENOUS
Status: DISCONTINUED | OUTPATIENT
Start: 2021-11-19 | End: 2021-11-19 | Stop reason: HOSPADM

## 2021-11-19 RX ORDER — IPRATROPIUM BROMIDE AND ALBUTEROL SULFATE 2.5; .5 MG/3ML; MG/3ML
3 SOLUTION RESPIRATORY (INHALATION) ONCE AS NEEDED
Status: DISCONTINUED | OUTPATIENT
Start: 2021-11-19 | End: 2021-11-19 | Stop reason: HOSPADM

## 2021-11-19 RX ORDER — SODIUM CHLORIDE 0.9 % (FLUSH) 0.9 %
10 SYRINGE (ML) INJECTION AS NEEDED
Status: DISCONTINUED | OUTPATIENT
Start: 2021-11-19 | End: 2021-11-19 | Stop reason: HOSPADM

## 2021-11-19 RX ORDER — OXYCODONE HYDROCHLORIDE AND ACETAMINOPHEN 5; 325 MG/1; MG/1
1 TABLET ORAL ONCE AS NEEDED
Status: DISCONTINUED | OUTPATIENT
Start: 2021-11-19 | End: 2021-11-19 | Stop reason: HOSPADM

## 2021-11-19 RX ORDER — FENTANYL CITRATE 50 UG/ML
50 INJECTION, SOLUTION INTRAMUSCULAR; INTRAVENOUS
Status: DISCONTINUED | OUTPATIENT
Start: 2021-11-19 | End: 2021-11-19 | Stop reason: HOSPADM

## 2021-11-19 RX ORDER — NEOSTIGMINE METHYLSULFATE 1 MG/ML
INJECTION, SOLUTION INTRAVENOUS AS NEEDED
Status: DISCONTINUED | OUTPATIENT
Start: 2021-11-19 | End: 2021-11-19 | Stop reason: SURG

## 2021-11-19 RX ORDER — SODIUM CHLORIDE, SODIUM LACTATE, POTASSIUM CHLORIDE, CALCIUM CHLORIDE 600; 310; 30; 20 MG/100ML; MG/100ML; MG/100ML; MG/100ML
INJECTION, SOLUTION INTRAVENOUS CONTINUOUS PRN
Status: DISCONTINUED | OUTPATIENT
Start: 2021-11-19 | End: 2021-11-19 | Stop reason: SURG

## 2021-11-19 RX ORDER — CEFAZOLIN SODIUM 2 G/50ML
2 SOLUTION INTRAVENOUS ONCE
Status: COMPLETED | OUTPATIENT
Start: 2021-11-19 | End: 2021-11-19

## 2021-11-19 RX ORDER — GLYCOPYRROLATE 0.2 MG/ML
INJECTION INTRAMUSCULAR; INTRAVENOUS AS NEEDED
Status: DISCONTINUED | OUTPATIENT
Start: 2021-11-19 | End: 2021-11-19 | Stop reason: SURG

## 2021-11-19 RX ORDER — VECURONIUM BROMIDE 1 MG/ML
INJECTION, POWDER, LYOPHILIZED, FOR SOLUTION INTRAVENOUS AS NEEDED
Status: DISCONTINUED | OUTPATIENT
Start: 2021-11-19 | End: 2021-11-19 | Stop reason: SURG

## 2021-11-19 RX ORDER — MEPERIDINE HYDROCHLORIDE 25 MG/ML
12.5 INJECTION INTRAMUSCULAR; INTRAVENOUS; SUBCUTANEOUS
Status: DISCONTINUED | OUTPATIENT
Start: 2021-11-19 | End: 2021-11-19 | Stop reason: HOSPADM

## 2021-11-19 RX ADMIN — KETOROLAC TROMETHAMINE 15 MG: 30 INJECTION, SOLUTION INTRAMUSCULAR; INTRAVENOUS at 11:47

## 2021-11-19 RX ADMIN — HYDRALAZINE HYDROCHLORIDE 20 MG: 20 INJECTION INTRAMUSCULAR; INTRAVENOUS at 11:09

## 2021-11-19 RX ADMIN — FERROUS SULFATE TAB 325 MG (65 MG ELEMENTAL FE) 325 MG: 325 (65 FE) TAB at 17:16

## 2021-11-19 RX ADMIN — ACETAMINOPHEN AND CODEINE PHOSPHATE 1 TABLET: 300; 30 TABLET ORAL at 13:31

## 2021-11-19 RX ADMIN — HEPARIN SODIUM 4500 UNITS: 5000 INJECTION INTRAVENOUS; SUBCUTANEOUS at 00:21

## 2021-11-19 RX ADMIN — DOCUSATE SODIUM 100 MG: 100 CAPSULE ORAL at 20:59

## 2021-11-19 RX ADMIN — ACETAMINOPHEN AND CODEINE PHOSPHATE 1 TABLET: 300; 30 TABLET ORAL at 20:59

## 2021-11-19 RX ADMIN — PANTOPRAZOLE SODIUM 40 MG: 40 TABLET, DELAYED RELEASE ORAL at 17:17

## 2021-11-19 RX ADMIN — ROSUVASTATIN CALCIUM 10 MG: 10 TABLET, FILM COATED ORAL at 20:59

## 2021-11-19 RX ADMIN — CALCIUM 500 MG: 500 TABLET ORAL at 09:00

## 2021-11-19 RX ADMIN — CARVEDILOL 6.25 MG: 6.25 TABLET, FILM COATED ORAL at 17:16

## 2021-11-19 RX ADMIN — SODIUM CHLORIDE, PRESERVATIVE FREE 10 ML: 5 INJECTION INTRAVENOUS at 20:59

## 2021-11-19 RX ADMIN — DOCUSATE SODIUM 100 MG: 100 CAPSULE ORAL at 09:00

## 2021-11-19 RX ADMIN — DEXTROSE MONOHYDRATE 50 ML: 25 INJECTION, SOLUTION INTRAVENOUS at 00:20

## 2021-11-19 RX ADMIN — CEFAZOLIN SODIUM 2 G: 2 SOLUTION INTRAVENOUS at 09:15

## 2021-11-19 RX ADMIN — CEFDINIR 300 MG: 300 CAPSULE ORAL at 20:59

## 2021-11-19 RX ADMIN — CEFDINIR 300 MG: 300 CAPSULE ORAL at 13:32

## 2021-11-19 RX ADMIN — NEOSTIGMINE 3 MG: 1 INJECTION INTRAVENOUS at 11:19

## 2021-11-19 RX ADMIN — ETOMIDATE 8 MG: 2 INJECTION, SOLUTION INTRAVENOUS at 09:21

## 2021-11-19 RX ADMIN — POLYETHYLENE GLYCOL (3350) 17 G: 17 POWDER, FOR SOLUTION ORAL at 09:00

## 2021-11-19 RX ADMIN — GLYCOPYRROLATE 0.4 MCG: 0.2 INJECTION, SOLUTION INTRAMUSCULAR; INTRAVENOUS at 11:19

## 2021-11-19 RX ADMIN — ONDANSETRON 4 MG: 2 INJECTION INTRAMUSCULAR; INTRAVENOUS at 09:47

## 2021-11-19 RX ADMIN — FENTANYL CITRATE 50 MCG: 50 INJECTION INTRAMUSCULAR; INTRAVENOUS at 11:52

## 2021-11-19 RX ADMIN — HUMAN INSULIN 10 UNITS: 100 INJECTION, SOLUTION SUBCUTANEOUS at 00:20

## 2021-11-19 RX ADMIN — VECURONIUM BROMIDE 4 MG: 1 INJECTION, POWDER, LYOPHILIZED, FOR SOLUTION INTRAVENOUS at 09:21

## 2021-11-19 RX ADMIN — FENTANYL CITRATE 50 MCG: 50 INJECTION INTRAMUSCULAR; INTRAVENOUS at 09:28

## 2021-11-19 RX ADMIN — CETIRIZINE HYDROCHLORIDE 5 MG: 10 TABLET, FILM COATED ORAL at 09:00

## 2021-11-19 RX ADMIN — BISACODYL 10 MG: 10 SUPPOSITORY RECTAL at 00:14

## 2021-11-19 RX ADMIN — INSULIN DETEMIR 20 UNITS: 100 INJECTION, SOLUTION SUBCUTANEOUS at 21:00

## 2021-11-19 RX ADMIN — SODIUM BICARBONATE 50 MEQ: 84 INJECTION, SOLUTION INTRAVENOUS at 00:20

## 2021-11-19 RX ADMIN — SODIUM CHLORIDE, POTASSIUM CHLORIDE, SODIUM LACTATE AND CALCIUM CHLORIDE 125 ML/HR: 600; 310; 30; 20 INJECTION, SOLUTION INTRAVENOUS at 09:12

## 2021-11-19 RX ADMIN — MEPERIDINE HYDROCHLORIDE 12.5 MG: 25 INJECTION INTRAMUSCULAR; INTRAVENOUS; SUBCUTANEOUS at 11:47

## 2021-11-19 RX ADMIN — ONDANSETRON 4 MG: 2 INJECTION INTRAMUSCULAR; INTRAVENOUS at 09:15

## 2021-11-19 RX ADMIN — SODIUM CHLORIDE, PRESERVATIVE FREE 10 ML: 5 INJECTION INTRAVENOUS at 07:52

## 2021-11-19 RX ADMIN — INSULIN ASPART 2 UNITS: 100 INJECTION, SOLUTION INTRAVENOUS; SUBCUTANEOUS at 17:17

## 2021-11-19 RX ADMIN — THIAMINE HYDROCHLORIDE: 100 INJECTION, SOLUTION INTRAMUSCULAR; INTRAVENOUS at 20:59

## 2021-11-19 RX ADMIN — FENTANYL CITRATE 50 MCG: 50 INJECTION INTRAMUSCULAR; INTRAVENOUS at 09:47

## 2021-11-19 RX ADMIN — SODIUM CHLORIDE, POTASSIUM CHLORIDE, SODIUM LACTATE AND CALCIUM CHLORIDE: 600; 310; 30; 20 INJECTION, SOLUTION INTRAVENOUS at 09:15

## 2021-11-19 RX ADMIN — METOPROLOL TARTRATE 3 MG: 1 INJECTION, SOLUTION INTRAVENOUS at 11:25

## 2021-11-19 NOTE — ANESTHESIA PROCEDURE NOTES
Airway  Urgency: elective    Date/Time: 11/19/2021 9:23 AM  Airway not difficult    General Information and Staff    Patient location during procedure: OR  Anesthesiologist: Eric Herrmann MD  CRNA: Mati Bravo CRNA    Indications and Patient Condition  Indications for airway management: airway protection    Preoxygenated: yes  MILS not maintained throughout  Mask difficulty assessment: 1 - vent by mask    Final Airway Details  Final airway type: endotracheal airway      Successful airway: ETT  Cuffed: yes   Successful intubation technique: direct laryngoscopy  Endotracheal tube insertion site: oral  Blade: Finley  Blade size: 2  ETT size (mm): 7.0  Cormack-Lehane Classification: grade I - full view of glottis  Placement verified by: chest auscultation and capnometry   Measured from: lips  ETT/EBT  to lips (cm): 21  Number of attempts at approach: 1  Assessment: lips, teeth, and gum same as pre-op and atraumatic intubation    Additional Comments  Atraumatic ETT placement, dentition unchanged.

## 2021-11-19 NOTE — ANESTHESIA PREPROCEDURE EVALUATION
Anesthesia Evaluation     Patient summary reviewed and Nursing notes reviewed   no history of anesthetic complications:  NPO Solid Status: > 8 hours  NPO Liquid Status: > 8 hours           Airway   Mallampati: III  TM distance: <3 FB  Neck ROM: full  No difficulty expected  Dental - normal exam   (+) edentulous    Pulmonary - normal exam   (+) pneumonia stable , a smoker Former, COPD,   Cardiovascular - normal exam    (+) hypertension, past MI  >12 months, CHF , hyperlipidemia,       Neuro/Psych  (+) poor historian.,       ROS Comment: Patients daughter primary source of information, baseline kirby  GI/Hepatic/Renal/Endo    (+)  GI bleeding , renal disease CRI, diabetes mellitus type 2,     ROS Comment: Blood loss anemia    Musculoskeletal     Abdominal  - normal exam   Substance History - negative use     OB/GYN negative ob/gyn ROS         Other   arthritis,                        Anesthesia Plan    ASA 3     general   (Discussed and addressed concerns specific to patient and different anesthetic issues with daughter )  intravenous induction     Anesthetic plan, all risks, benefits, and alternatives have been provided, discussed and informed consent has been obtained with: child and patient.  Use of blood products discussed with patient and child  Consented to blood products.         Lab Results   Component Value Date    WBC 8.10 11/18/2021    HGB 10.2 (L) 11/18/2021    HCT 33.8 (L) 11/18/2021    MCV 86.4 11/18/2021     11/18/2021       Lab Results   Component Value Date    GLUCOSE 157 (H) 11/18/2021    BUN 45 (H) 11/18/2021    CREATININE 1.21 (H) 11/18/2021    EGFRIFNONA 43 (L) 11/18/2021    BCR 37.2 (H) 11/18/2021    K 4.3 11/19/2021    CO2 30.0 (H) 11/18/2021    CALCIUM 9.2 11/18/2021    ALBUMIN 3.38 (L) 11/15/2021    LABIL2 1.2 (L) 09/15/2015    AST 9 11/15/2021    ALT 6 11/15/2021

## 2021-11-19 NOTE — ANESTHESIA PREPROCEDURE EVALUATION
Anesthesia Evaluation                  Airway   Dental      Pulmonary    (+) COPD severe,   Cardiovascular     (+) valvular problems/murmurs MR,       Neuro/Psych  (+) poor historian.,     GI/Hepatic/Renal/Endo      Musculoskeletal     Abdominal    Substance History      OB/GYN          Other                        Anesthesia Plan    ASA 4     general       Anesthetic plan, all risks, benefits, and alternatives have been provided, discussed and informed consent has been obtained with: child (Daughter).  Use of blood products discussed with child .

## 2021-11-19 NOTE — ANESTHESIA POSTPROCEDURE EVALUATION
Patient: Albina Finley    Procedure Summary     Date: 11/19/21 Room / Location: [facility] OR 03 / [facility] OR    Anesthesia Start: 0916 Anesthesia Stop: 1129    Procedure: Left hip hook plate operative fixation (Left Thigh) Diagnosis:       Closed fracture of left hip, initial encounter ([facility])      (Closed fracture of left hip, initial encounter ([facility]) [S72.002A])    Surgeons: Lupillo Cornejo MD Provider: Eric Herrmann MD    Anesthesia Type: general ASA Status: 3          Anesthesia Type: general    Vitals  Vitals Value Taken Time   /49 11/19/21 1225   Temp 97.4 °F (36.3 °C) 11/19/21 1205   Pulse 63 11/19/21 1225   Resp 15 11/19/21 1225   SpO2 97 % 11/19/21 1225           Post Anesthesia Care and Evaluation    Patient location during evaluation: PACU  Patient participation: complete - patient participated  Level of consciousness: responsive to verbal stimuli  Pain score: 1  Pain management: adequate  Airway patency: patent  Anesthetic complications: No anesthetic complications  PONV Status: none  Cardiovascular status: hemodynamically stable  Respiratory status: nasal cannula  Hydration status: acceptable

## 2021-11-20 LAB
ALBUMIN SERPL-MCNC: 3.38 G/DL (ref 3.5–5.2)
ALBUMIN/GLOB SERPL: 1 G/DL
ALP SERPL-CCNC: 63 U/L (ref 39–117)
ALT SERPL W P-5'-P-CCNC: 11 U/L (ref 1–33)
ANION GAP SERPL CALCULATED.3IONS-SCNC: 11.4 MMOL/L (ref 5–15)
ANION GAP SERPL CALCULATED.3IONS-SCNC: 14.2 MMOL/L (ref 5–15)
APTT PPP: 33.6 SECONDS (ref 25.5–35.4)
APTT PPP: 47.8 SECONDS (ref 25.5–35.4)
AST SERPL-CCNC: 22 U/L (ref 1–32)
ATMOSPHERIC PRESS: 735 MMHG
BASE EXCESS BLDV CALC-SCNC: 13.1 MMOL/L (ref 0–2)
BDY SITE: ABNORMAL
BILIRUB SERPL-MCNC: 0.3 MG/DL (ref 0–1.2)
BODY TEMPERATURE: 37 C
BUN SERPL-MCNC: 50 MG/DL (ref 8–23)
BUN SERPL-MCNC: 51 MG/DL (ref 8–23)
BUN/CREAT SERPL: 36.4 (ref 7–25)
BUN/CREAT SERPL: 38.2 (ref 7–25)
CALCIUM SPEC-SCNC: 9.1 MG/DL (ref 8.6–10.5)
CALCIUM SPEC-SCNC: 9.5 MG/DL (ref 8.6–10.5)
CHLORIDE SERPL-SCNC: 96 MMOL/L (ref 98–107)
CHLORIDE SERPL-SCNC: 96 MMOL/L (ref 98–107)
CO2 BLDA-SCNC: 40.6 MMOL/L (ref 22–33)
CO2 SERPL-SCNC: 26.8 MMOL/L (ref 22–29)
CO2 SERPL-SCNC: 32.6 MMOL/L (ref 22–29)
COHGB MFR BLD: 2.3 % (ref 0–5)
CREAT SERPL-MCNC: 1.31 MG/DL (ref 0.57–1)
CREAT SERPL-MCNC: 1.4 MG/DL (ref 0.57–1)
D-LACTATE SERPL-SCNC: 1.2 MMOL/L (ref 0.5–2)
DEPRECATED RDW RBC AUTO: 51.3 FL (ref 37–54)
ERYTHROCYTE [DISTWIDTH] IN BLOOD BY AUTOMATED COUNT: 15.9 % (ref 12.3–15.4)
GAS FLOW AIRWAY: 2 LPM
GFR SERPL CREATININE-BSD FRML MDRD: 36 ML/MIN/1.73
GFR SERPL CREATININE-BSD FRML MDRD: 39 ML/MIN/1.73
GLOBULIN UR ELPH-MCNC: 3.3 GM/DL
GLUCOSE BLDC GLUCOMTR-MCNC: 255 MG/DL (ref 70–130)
GLUCOSE BLDC GLUCOMTR-MCNC: 265 MG/DL (ref 70–130)
GLUCOSE BLDC GLUCOMTR-MCNC: 275 MG/DL (ref 70–130)
GLUCOSE BLDC GLUCOMTR-MCNC: 302 MG/DL (ref 70–130)
GLUCOSE BLDC GLUCOMTR-MCNC: 348 MG/DL (ref 70–130)
GLUCOSE SERPL-MCNC: 242 MG/DL (ref 65–99)
GLUCOSE SERPL-MCNC: 282 MG/DL (ref 65–99)
HCO3 BLDV-SCNC: 38.9 MMOL/L (ref 22–28)
HCT VFR BLD AUTO: 31.9 % (ref 34–46.6)
HGB BLD-MCNC: 9.4 G/DL (ref 12–15.9)
HGB BLDA-MCNC: 9.8 G/DL (ref 13.5–17.5)
INHALED O2 CONCENTRATION: 28 %
INR PPP: 1.14 (ref 0.9–1.1)
Lab: ABNORMAL
MAGNESIUM SERPL-MCNC: 2.1 MG/DL (ref 1.6–2.4)
MAGNESIUM SERPL-MCNC: 2.2 MG/DL (ref 1.6–2.4)
MCH RBC QN AUTO: 26 PG (ref 26.6–33)
MCHC RBC AUTO-ENTMCNC: 29.5 G/DL (ref 31.5–35.7)
MCV RBC AUTO: 88.4 FL (ref 79–97)
METHGB BLD QL: 0.2 % (ref 0–3)
MODALITY: ABNORMAL
OXYHGB MFR BLDV: 87 % (ref 45–75)
PCO2 BLDV: 55.9 MM HG (ref 41–51)
PH BLDV: 7.45 PH UNITS (ref 7.32–7.42)
PHOSPHATE SERPL-MCNC: 3.9 MG/DL (ref 2.5–4.5)
PHOSPHATE SERPL-MCNC: 4.4 MG/DL (ref 2.5–4.5)
PLATELET # BLD AUTO: 314 10*3/MM3 (ref 140–450)
PMV BLD AUTO: 11.3 FL (ref 6–12)
PO2 BLDV: 54.8 MM HG (ref 27–53)
POTASSIUM SERPL-SCNC: 5.2 MMOL/L (ref 3.5–5.2)
POTASSIUM SERPL-SCNC: 5.2 MMOL/L (ref 3.5–5.2)
POTASSIUM SERPL-SCNC: 6.1 MMOL/L (ref 3.5–5.2)
PROT SERPL-MCNC: 6.7 G/DL (ref 6–8.5)
PROTHROMBIN TIME: 15.1 SECONDS (ref 12.8–14.5)
RBC # BLD AUTO: 3.61 10*6/MM3 (ref 3.77–5.28)
SAO2 % BLDCOV: 89.2 % (ref 45–75)
SODIUM SERPL-SCNC: 137 MMOL/L (ref 136–145)
SODIUM SERPL-SCNC: 140 MMOL/L (ref 136–145)
VENTILATOR MODE: ABNORMAL
WBC NRBC COR # BLD: 13.76 10*3/MM3 (ref 3.4–10.8)

## 2021-11-20 PROCEDURE — 83735 ASSAY OF MAGNESIUM: CPT | Performed by: INTERNAL MEDICINE

## 2021-11-20 PROCEDURE — 85027 COMPLETE CBC AUTOMATED: CPT | Performed by: ORTHOPAEDIC SURGERY

## 2021-11-20 PROCEDURE — 85610 PROTHROMBIN TIME: CPT | Performed by: INTERNAL MEDICINE

## 2021-11-20 PROCEDURE — 94799 UNLISTED PULMONARY SVC/PX: CPT

## 2021-11-20 PROCEDURE — 99232 SBSQ HOSP IP/OBS MODERATE 35: CPT | Performed by: INTERNAL MEDICINE

## 2021-11-20 PROCEDURE — 80053 COMPREHEN METABOLIC PANEL: CPT | Performed by: ORTHOPAEDIC SURGERY

## 2021-11-20 PROCEDURE — 63710000001 INSULIN ASPART PER 5 UNITS: Performed by: ORTHOPAEDIC SURGERY

## 2021-11-20 PROCEDURE — 82962 GLUCOSE BLOOD TEST: CPT

## 2021-11-20 PROCEDURE — 63710000001 INSULIN REGULAR HUMAN PER 5 UNITS: Performed by: PHYSICIAN ASSISTANT

## 2021-11-20 PROCEDURE — 82820 HEMOGLOBIN-OXYGEN AFFINITY: CPT

## 2021-11-20 PROCEDURE — 84100 ASSAY OF PHOSPHORUS: CPT | Performed by: INTERNAL MEDICINE

## 2021-11-20 PROCEDURE — 25010000002 THIAMINE PER 100 MG: Performed by: INTERNAL MEDICINE

## 2021-11-20 PROCEDURE — 25010000002 HEPARIN (PORCINE) PER 1000 UNITS: Performed by: ORTHOPAEDIC SURGERY

## 2021-11-20 PROCEDURE — 25010000002 DIPHENHYDRAMINE PER 50 MG: Performed by: PHYSICIAN ASSISTANT

## 2021-11-20 PROCEDURE — 84132 ASSAY OF SERUM POTASSIUM: CPT | Performed by: PHYSICIAN ASSISTANT

## 2021-11-20 PROCEDURE — 63710000001 INSULIN DETEMIR PER 5 UNITS: Performed by: ORTHOPAEDIC SURGERY

## 2021-11-20 PROCEDURE — 83605 ASSAY OF LACTIC ACID: CPT | Performed by: INTERNAL MEDICINE

## 2021-11-20 PROCEDURE — 82805 BLOOD GASES W/O2 SATURATION: CPT

## 2021-11-20 PROCEDURE — 97116 GAIT TRAINING THERAPY: CPT

## 2021-11-20 PROCEDURE — 97530 THERAPEUTIC ACTIVITIES: CPT

## 2021-11-20 PROCEDURE — 85730 THROMBOPLASTIN TIME PARTIAL: CPT | Performed by: INTERNAL MEDICINE

## 2021-11-20 RX ORDER — DEXTROSE MONOHYDRATE 25 G/50ML
50 INJECTION, SOLUTION INTRAVENOUS ONCE
Status: COMPLETED | OUTPATIENT
Start: 2021-11-20 | End: 2021-11-20

## 2021-11-20 RX ORDER — DIPHENHYDRAMINE HYDROCHLORIDE 50 MG/ML
50 INJECTION INTRAMUSCULAR; INTRAVENOUS ONCE
Status: COMPLETED | OUTPATIENT
Start: 2021-11-20 | End: 2021-11-20

## 2021-11-20 RX ADMIN — CARVEDILOL 6.25 MG: 6.25 TABLET, FILM COATED ORAL at 17:22

## 2021-11-20 RX ADMIN — FERROUS SULFATE TAB 325 MG (65 MG ELEMENTAL FE) 325 MG: 325 (65 FE) TAB at 17:22

## 2021-11-20 RX ADMIN — ROSUVASTATIN CALCIUM 10 MG: 10 TABLET, FILM COATED ORAL at 20:18

## 2021-11-20 RX ADMIN — DOCUSATE SODIUM 100 MG: 100 CAPSULE ORAL at 08:27

## 2021-11-20 RX ADMIN — THIAMINE HYDROCHLORIDE: 100 INJECTION, SOLUTION INTRAMUSCULAR; INTRAVENOUS at 20:18

## 2021-11-20 RX ADMIN — DEXTROSE MONOHYDRATE 50 ML: 25 INJECTION, SOLUTION INTRAVENOUS at 05:47

## 2021-11-20 RX ADMIN — PANTOPRAZOLE SODIUM 40 MG: 40 TABLET, DELAYED RELEASE ORAL at 08:27

## 2021-11-20 RX ADMIN — SODIUM BICARBONATE 50 MEQ: 84 INJECTION, SOLUTION INTRAVENOUS at 05:47

## 2021-11-20 RX ADMIN — INSULIN ASPART 10 UNITS: 100 INJECTION, SOLUTION INTRAVENOUS; SUBCUTANEOUS at 08:22

## 2021-11-20 RX ADMIN — SODIUM ZIRCONIUM CYCLOSILICATE 10 G: 10 POWDER, FOR SUSPENSION ORAL at 20:18

## 2021-11-20 RX ADMIN — DIPHENHYDRAMINE HYDROCHLORIDE 50 MG: 50 INJECTION, SOLUTION INTRAMUSCULAR; INTRAVENOUS at 20:38

## 2021-11-20 RX ADMIN — ACETAMINOPHEN AND CODEINE PHOSPHATE 1 TABLET: 300; 30 TABLET ORAL at 08:27

## 2021-11-20 RX ADMIN — DOCUSATE SODIUM 100 MG: 100 CAPSULE ORAL at 20:18

## 2021-11-20 RX ADMIN — CARVEDILOL 6.25 MG: 6.25 TABLET, FILM COATED ORAL at 08:23

## 2021-11-20 RX ADMIN — CETIRIZINE HYDROCHLORIDE 5 MG: 10 TABLET, FILM COATED ORAL at 08:23

## 2021-11-20 RX ADMIN — INSULIN ASPART 8 UNITS: 100 INJECTION, SOLUTION INTRAVENOUS; SUBCUTANEOUS at 12:11

## 2021-11-20 RX ADMIN — CALCIUM 500 MG: 500 TABLET ORAL at 08:27

## 2021-11-20 RX ADMIN — ACETAMINOPHEN AND CODEINE PHOSPHATE 1 TABLET: 300; 30 TABLET ORAL at 02:47

## 2021-11-20 RX ADMIN — SODIUM CHLORIDE, PRESERVATIVE FREE 10 ML: 5 INJECTION INTRAVENOUS at 08:27

## 2021-11-20 RX ADMIN — HUMAN INSULIN 10 UNITS: 100 INJECTION, SOLUTION SUBCUTANEOUS at 05:47

## 2021-11-20 RX ADMIN — CEFDINIR 300 MG: 300 CAPSULE ORAL at 08:27

## 2021-11-20 RX ADMIN — ALBUTEROL SULFATE 10 MG: 2.5 SOLUTION RESPIRATORY (INHALATION) at 06:15

## 2021-11-20 RX ADMIN — HEPARIN SODIUM 12 UNITS/KG/HR: 10000 INJECTION, SOLUTION INTRAVENOUS at 12:03

## 2021-11-20 RX ADMIN — PANTOPRAZOLE SODIUM 40 MG: 40 TABLET, DELAYED RELEASE ORAL at 17:22

## 2021-11-20 RX ADMIN — POLYETHYLENE GLYCOL (3350) 17 G: 17 POWDER, FOR SOLUTION ORAL at 08:23

## 2021-11-20 RX ADMIN — INSULIN DETEMIR 20 UNITS: 100 INJECTION, SOLUTION SUBCUTANEOUS at 20:18

## 2021-11-20 RX ADMIN — INSULIN ASPART 8 UNITS: 100 INJECTION, SOLUTION INTRAVENOUS; SUBCUTANEOUS at 17:23

## 2021-11-21 ENCOUNTER — APPOINTMENT (OUTPATIENT)
Dept: CARDIOLOGY | Facility: HOSPITAL | Age: 76
End: 2021-11-21

## 2021-11-21 ENCOUNTER — APPOINTMENT (OUTPATIENT)
Dept: NUCLEAR MEDICINE | Facility: HOSPITAL | Age: 76
End: 2021-11-21

## 2021-11-21 LAB
ANION GAP SERPL CALCULATED.3IONS-SCNC: 10.3 MMOL/L (ref 5–15)
APTT PPP: 65.5 SECONDS (ref 25.5–35.4)
APTT PPP: 72.3 SECONDS (ref 25.5–35.4)
BUN SERPL-MCNC: 50 MG/DL (ref 8–23)
BUN/CREAT SERPL: 32.7 (ref 7–25)
CALCIUM SPEC-SCNC: 9.1 MG/DL (ref 8.6–10.5)
CHLORIDE SERPL-SCNC: 93 MMOL/L (ref 98–107)
CO2 SERPL-SCNC: 33.7 MMOL/L (ref 22–29)
CREAT SERPL-MCNC: 1.53 MG/DL (ref 0.57–1)
DEPRECATED RDW RBC AUTO: 50.8 FL (ref 37–54)
ERYTHROCYTE [DISTWIDTH] IN BLOOD BY AUTOMATED COUNT: 16 % (ref 12.3–15.4)
GFR SERPL CREATININE-BSD FRML MDRD: 33 ML/MIN/1.73
GLUCOSE BLDC GLUCOMTR-MCNC: 220 MG/DL (ref 70–130)
GLUCOSE BLDC GLUCOMTR-MCNC: 240 MG/DL (ref 70–130)
GLUCOSE BLDC GLUCOMTR-MCNC: 281 MG/DL (ref 70–130)
GLUCOSE BLDC GLUCOMTR-MCNC: 336 MG/DL (ref 70–130)
GLUCOSE SERPL-MCNC: 231 MG/DL (ref 65–99)
HCT VFR BLD AUTO: 28.7 % (ref 34–46.6)
HGB BLD-MCNC: 8.6 G/DL (ref 12–15.9)
MAGNESIUM SERPL-MCNC: 2 MG/DL (ref 1.6–2.4)
MCH RBC QN AUTO: 25.9 PG (ref 26.6–33)
MCHC RBC AUTO-ENTMCNC: 30 G/DL (ref 31.5–35.7)
MCV RBC AUTO: 86.4 FL (ref 79–97)
PHOSPHATE SERPL-MCNC: 3.3 MG/DL (ref 2.5–4.5)
PLATELET # BLD AUTO: 283 10*3/MM3 (ref 140–450)
PMV BLD AUTO: 11.1 FL (ref 6–12)
POTASSIUM SERPL-SCNC: 4.7 MMOL/L (ref 3.5–5.2)
RBC # BLD AUTO: 3.32 10*6/MM3 (ref 3.77–5.28)
SODIUM SERPL-SCNC: 137 MMOL/L (ref 136–145)
WBC NRBC COR # BLD: 12.62 10*3/MM3 (ref 3.4–10.8)

## 2021-11-21 PROCEDURE — 82962 GLUCOSE BLOOD TEST: CPT

## 2021-11-21 PROCEDURE — 85730 THROMBOPLASTIN TIME PARTIAL: CPT | Performed by: INTERNAL MEDICINE

## 2021-11-21 PROCEDURE — 94760 N-INVAS EAR/PLS OXIMETRY 1: CPT

## 2021-11-21 PROCEDURE — 63710000001 INSULIN ASPART PER 5 UNITS: Performed by: ORTHOPAEDIC SURGERY

## 2021-11-21 PROCEDURE — 94799 UNLISTED PULMONARY SVC/PX: CPT

## 2021-11-21 PROCEDURE — 83735 ASSAY OF MAGNESIUM: CPT | Performed by: INTERNAL MEDICINE

## 2021-11-21 PROCEDURE — 85027 COMPLETE CBC AUTOMATED: CPT | Performed by: ORTHOPAEDIC SURGERY

## 2021-11-21 PROCEDURE — 63710000001 INSULIN DETEMIR PER 5 UNITS: Performed by: ORTHOPAEDIC SURGERY

## 2021-11-21 PROCEDURE — 0 TECHNETIUM SESTAMIBI: Performed by: INTERNAL MEDICINE

## 2021-11-21 PROCEDURE — 25010000002 THIAMINE PER 100 MG: Performed by: INTERNAL MEDICINE

## 2021-11-21 PROCEDURE — 84100 ASSAY OF PHOSPHORUS: CPT | Performed by: INTERNAL MEDICINE

## 2021-11-21 PROCEDURE — 25010000002 ENOXAPARIN PER 10 MG: Performed by: INTERNAL MEDICINE

## 2021-11-21 PROCEDURE — 80048 BASIC METABOLIC PNL TOTAL CA: CPT | Performed by: ORTHOPAEDIC SURGERY

## 2021-11-21 PROCEDURE — A9500 TC99M SESTAMIBI: HCPCS | Performed by: INTERNAL MEDICINE

## 2021-11-21 PROCEDURE — 99232 SBSQ HOSP IP/OBS MODERATE 35: CPT | Performed by: INTERNAL MEDICINE

## 2021-11-21 RX ORDER — TRAMADOL HYDROCHLORIDE 50 MG/1
25 TABLET ORAL EVERY 12 HOURS PRN
Status: DISCONTINUED | OUTPATIENT
Start: 2021-11-21 | End: 2021-11-26 | Stop reason: HOSPADM

## 2021-11-21 RX ADMIN — TECHNETIUM TC 99M SESTAMIBI 1 DOSE: 1 INJECTION INTRAVENOUS at 07:24

## 2021-11-21 RX ADMIN — CETIRIZINE HYDROCHLORIDE 5 MG: 10 TABLET, FILM COATED ORAL at 10:50

## 2021-11-21 RX ADMIN — TRAMADOL HYDROCHLORIDE 25 MG: 50 TABLET ORAL at 16:06

## 2021-11-21 RX ADMIN — SODIUM ZIRCONIUM CYCLOSILICATE 10 G: 10 POWDER, FOR SUSPENSION ORAL at 10:51

## 2021-11-21 RX ADMIN — CALCIUM 500 MG: 500 TABLET ORAL at 10:50

## 2021-11-21 RX ADMIN — DOCUSATE SODIUM 100 MG: 100 CAPSULE ORAL at 10:51

## 2021-11-21 RX ADMIN — ROSUVASTATIN CALCIUM 10 MG: 10 TABLET, FILM COATED ORAL at 19:30

## 2021-11-21 RX ADMIN — FERROUS SULFATE TAB 325 MG (65 MG ELEMENTAL FE) 325 MG: 325 (65 FE) TAB at 17:12

## 2021-11-21 RX ADMIN — THIAMINE HYDROCHLORIDE: 100 INJECTION, SOLUTION INTRAMUSCULAR; INTRAVENOUS at 19:31

## 2021-11-21 RX ADMIN — PANTOPRAZOLE SODIUM 40 MG: 40 TABLET, DELAYED RELEASE ORAL at 17:12

## 2021-11-21 RX ADMIN — DOCUSATE SODIUM 100 MG: 100 CAPSULE ORAL at 19:30

## 2021-11-21 RX ADMIN — INSULIN ASPART 5 UNITS: 100 INJECTION, SOLUTION INTRAVENOUS; SUBCUTANEOUS at 17:12

## 2021-11-21 RX ADMIN — INSULIN DETEMIR 20 UNITS: 100 INJECTION, SOLUTION SUBCUTANEOUS at 19:31

## 2021-11-21 RX ADMIN — CARVEDILOL 6.25 MG: 6.25 TABLET, FILM COATED ORAL at 17:12

## 2021-11-21 RX ADMIN — SODIUM CHLORIDE, PRESERVATIVE FREE 10 ML: 5 INJECTION INTRAVENOUS at 10:50

## 2021-11-21 RX ADMIN — ENOXAPARIN SODIUM 30 MG: 30 INJECTION SUBCUTANEOUS at 17:11

## 2021-11-21 RX ADMIN — POLYETHYLENE GLYCOL (3350) 17 G: 17 POWDER, FOR SOLUTION ORAL at 10:51

## 2021-11-21 RX ADMIN — INSULIN ASPART 10 UNITS: 100 INJECTION, SOLUTION INTRAVENOUS; SUBCUTANEOUS at 11:02

## 2021-11-22 LAB
ANION GAP SERPL CALCULATED.3IONS-SCNC: 9.6 MMOL/L (ref 5–15)
APTT PPP: 37.4 SECONDS (ref 25.5–35.4)
BUN SERPL-MCNC: 46 MG/DL (ref 8–23)
BUN/CREAT SERPL: 38.7 (ref 7–25)
CALCIUM SPEC-SCNC: 8.8 MG/DL (ref 8.6–10.5)
CHLORIDE SERPL-SCNC: 96 MMOL/L (ref 98–107)
CO2 SERPL-SCNC: 31.4 MMOL/L (ref 22–29)
CREAT SERPL-MCNC: 1.19 MG/DL (ref 0.57–1)
DEPRECATED RDW RBC AUTO: 51.8 FL (ref 37–54)
ERYTHROCYTE [DISTWIDTH] IN BLOOD BY AUTOMATED COUNT: 16.1 % (ref 12.3–15.4)
GFR SERPL CREATININE-BSD FRML MDRD: 44 ML/MIN/1.73
GLUCOSE BLDC GLUCOMTR-MCNC: 232 MG/DL (ref 70–130)
GLUCOSE BLDC GLUCOMTR-MCNC: 246 MG/DL (ref 70–130)
GLUCOSE BLDC GLUCOMTR-MCNC: 263 MG/DL (ref 70–130)
GLUCOSE BLDC GLUCOMTR-MCNC: 415 MG/DL (ref 70–130)
GLUCOSE SERPL-MCNC: 236 MG/DL (ref 65–99)
HCT VFR BLD AUTO: 27.2 % (ref 34–46.6)
HGB BLD-MCNC: 7.9 G/DL (ref 12–15.9)
MCH RBC QN AUTO: 25.7 PG (ref 26.6–33)
MCHC RBC AUTO-ENTMCNC: 29 G/DL (ref 31.5–35.7)
MCV RBC AUTO: 88.6 FL (ref 79–97)
PLATELET # BLD AUTO: 274 10*3/MM3 (ref 140–450)
PMV BLD AUTO: 11.2 FL (ref 6–12)
POTASSIUM SERPL-SCNC: 4.1 MMOL/L (ref 3.5–5.2)
RBC # BLD AUTO: 3.07 10*6/MM3 (ref 3.77–5.28)
SODIUM SERPL-SCNC: 137 MMOL/L (ref 136–145)
WBC NRBC COR # BLD: 10.49 10*3/MM3 (ref 3.4–10.8)

## 2021-11-22 PROCEDURE — 85027 COMPLETE CBC AUTOMATED: CPT | Performed by: ORTHOPAEDIC SURGERY

## 2021-11-22 PROCEDURE — 99232 SBSQ HOSP IP/OBS MODERATE 35: CPT | Performed by: INTERNAL MEDICINE

## 2021-11-22 PROCEDURE — 63710000001 INSULIN DETEMIR PER 5 UNITS: Performed by: INTERNAL MEDICINE

## 2021-11-22 PROCEDURE — 82962 GLUCOSE BLOOD TEST: CPT

## 2021-11-22 PROCEDURE — 85730 THROMBOPLASTIN TIME PARTIAL: CPT | Performed by: INTERNAL MEDICINE

## 2021-11-22 PROCEDURE — 25010000002 THIAMINE PER 100 MG: Performed by: INTERNAL MEDICINE

## 2021-11-22 PROCEDURE — 80048 BASIC METABOLIC PNL TOTAL CA: CPT | Performed by: ORTHOPAEDIC SURGERY

## 2021-11-22 PROCEDURE — 63710000001 INSULIN ASPART PER 5 UNITS: Performed by: ORTHOPAEDIC SURGERY

## 2021-11-22 PROCEDURE — 25010000002 ENOXAPARIN PER 10 MG

## 2021-11-22 RX ORDER — DIPHENHYDRAMINE HYDROCHLORIDE 50 MG/ML
50 INJECTION INTRAMUSCULAR; INTRAVENOUS ONCE
Status: COMPLETED | OUTPATIENT
Start: 2021-11-23 | End: 2021-11-22

## 2021-11-22 RX ORDER — CHOLECALCIFEROL (VITAMIN D3) 125 MCG
10 CAPSULE ORAL NIGHTLY PRN
Status: DISCONTINUED | OUTPATIENT
Start: 2021-11-22 | End: 2021-11-26 | Stop reason: HOSPADM

## 2021-11-22 RX ADMIN — TRAMADOL HYDROCHLORIDE 25 MG: 50 TABLET ORAL at 21:32

## 2021-11-22 RX ADMIN — ROSUVASTATIN CALCIUM 10 MG: 10 TABLET, FILM COATED ORAL at 20:28

## 2021-11-22 RX ADMIN — INSULIN DETEMIR 23 UNITS: 100 INJECTION, SOLUTION SUBCUTANEOUS at 20:28

## 2021-11-22 RX ADMIN — PANTOPRAZOLE SODIUM 40 MG: 40 TABLET, DELAYED RELEASE ORAL at 17:16

## 2021-11-22 RX ADMIN — POLYETHYLENE GLYCOL (3350) 17 G: 17 POWDER, FOR SOLUTION ORAL at 08:12

## 2021-11-22 RX ADMIN — PANTOPRAZOLE SODIUM 40 MG: 40 TABLET, DELAYED RELEASE ORAL at 08:12

## 2021-11-22 RX ADMIN — CETIRIZINE HYDROCHLORIDE 5 MG: 10 TABLET, FILM COATED ORAL at 08:12

## 2021-11-22 RX ADMIN — FERROUS SULFATE TAB 325 MG (65 MG ELEMENTAL FE) 325 MG: 325 (65 FE) TAB at 17:16

## 2021-11-22 RX ADMIN — INSULIN ASPART 14 UNITS: 100 INJECTION, SOLUTION INTRAVENOUS; SUBCUTANEOUS at 11:51

## 2021-11-22 RX ADMIN — SODIUM CHLORIDE, PRESERVATIVE FREE 10 ML: 5 INJECTION INTRAVENOUS at 08:19

## 2021-11-22 RX ADMIN — SODIUM ZIRCONIUM CYCLOSILICATE 10 G: 10 POWDER, FOR SUSPENSION ORAL at 08:12

## 2021-11-22 RX ADMIN — ENOXAPARIN SODIUM 40 MG: 30 INJECTION SUBCUTANEOUS at 17:17

## 2021-11-22 RX ADMIN — CARVEDILOL 6.25 MG: 6.25 TABLET, FILM COATED ORAL at 17:16

## 2021-11-22 RX ADMIN — DOCUSATE SODIUM 100 MG: 100 CAPSULE ORAL at 08:12

## 2021-11-22 RX ADMIN — THIAMINE HYDROCHLORIDE: 100 INJECTION, SOLUTION INTRAMUSCULAR; INTRAVENOUS at 20:28

## 2021-11-22 RX ADMIN — DIPHENHYDRAMINE HYDROCHLORIDE 50 MG: 50 INJECTION, SOLUTION INTRAMUSCULAR; INTRAVENOUS at 23:21

## 2021-11-22 RX ADMIN — TRAMADOL HYDROCHLORIDE 25 MG: 50 TABLET ORAL at 09:26

## 2021-11-22 RX ADMIN — INSULIN ASPART 8 UNITS: 100 INJECTION, SOLUTION INTRAVENOUS; SUBCUTANEOUS at 17:16

## 2021-11-22 RX ADMIN — CALCIUM 500 MG: 500 TABLET ORAL at 09:26

## 2021-11-22 RX ADMIN — CARVEDILOL 6.25 MG: 6.25 TABLET, FILM COATED ORAL at 08:12

## 2021-11-22 RX ADMIN — INSULIN ASPART 5 UNITS: 100 INJECTION, SOLUTION INTRAVENOUS; SUBCUTANEOUS at 08:12

## 2021-11-23 ENCOUNTER — APPOINTMENT (OUTPATIENT)
Dept: CT IMAGING | Facility: HOSPITAL | Age: 76
End: 2021-11-23

## 2021-11-23 LAB
A-A DO2: 43.6 MMHG (ref 0–300)
AMMONIA BLD-SCNC: 26 UMOL/L (ref 11–51)
ANION GAP SERPL CALCULATED.3IONS-SCNC: 7.8 MMOL/L (ref 5–15)
ARTERIAL PATENCY WRIST A: POSITIVE
ATMOSPHERIC PRESS: 733 MMHG
BACTERIA UR QL AUTO: ABNORMAL /HPF
BASE EXCESS BLDA CALC-SCNC: 9.3 MMOL/L (ref 0–2)
BDY SITE: ABNORMAL
BILIRUB UR QL STRIP: ABNORMAL
BODY TEMPERATURE: 0 C
BUN SERPL-MCNC: 40 MG/DL (ref 8–23)
BUN/CREAT SERPL: 33.1 (ref 7–25)
CALCIUM SPEC-SCNC: 8.8 MG/DL (ref 8.6–10.5)
CHLORIDE SERPL-SCNC: 94 MMOL/L (ref 98–107)
CLARITY UR: ABNORMAL
CO2 BLDA-SCNC: 37.2 MMOL/L (ref 22–33)
CO2 SERPL-SCNC: 32.2 MMOL/L (ref 22–29)
COHGB MFR BLD: 1.9 % (ref 0–5)
COLOR UR: ABNORMAL
CREAT SERPL-MCNC: 1.21 MG/DL (ref 0.57–1)
DEPRECATED RDW RBC AUTO: 50.4 FL (ref 37–54)
ERYTHROCYTE [DISTWIDTH] IN BLOOD BY AUTOMATED COUNT: 15.8 % (ref 12.3–15.4)
GFR SERPL CREATININE-BSD FRML MDRD: 43 ML/MIN/1.73
GLUCOSE BLDC GLUCOMTR-MCNC: 186 MG/DL (ref 70–130)
GLUCOSE BLDC GLUCOMTR-MCNC: 213 MG/DL (ref 70–130)
GLUCOSE BLDC GLUCOMTR-MCNC: 225 MG/DL (ref 70–130)
GLUCOSE BLDC GLUCOMTR-MCNC: 246 MG/DL (ref 70–130)
GLUCOSE SERPL-MCNC: 172 MG/DL (ref 65–99)
GLUCOSE UR STRIP-MCNC: ABNORMAL MG/DL
HCO3 BLDA-SCNC: 35.4 MMOL/L (ref 20–26)
HCT VFR BLD AUTO: 27.8 % (ref 34–46.6)
HCT VFR BLD CALC: 26.2 % (ref 38–51)
HGB BLD-MCNC: 8.2 G/DL (ref 12–15.9)
HGB BLDA-MCNC: 8.5 G/DL (ref 13.5–17.5)
HGB UR QL STRIP.AUTO: ABNORMAL
HYALINE CASTS UR QL AUTO: ABNORMAL /LPF
INHALED O2 CONCENTRATION: 28 %
KETONES UR QL STRIP: NEGATIVE
LEUKOCYTE ESTERASE UR QL STRIP.AUTO: ABNORMAL
Lab: ABNORMAL
MCH RBC QN AUTO: 25.9 PG (ref 26.6–33)
MCHC RBC AUTO-ENTMCNC: 29.5 G/DL (ref 31.5–35.7)
MCV RBC AUTO: 87.7 FL (ref 79–97)
METHGB BLD QL: 0 % (ref 0–3)
MODALITY: ABNORMAL
NITRITE UR QL STRIP: NEGATIVE
NOTE: ABNORMAL
OXYHGB MFR BLDV: 95.5 % (ref 94–99)
PCO2 BLDA: 57.4 MM HG (ref 35–45)
PCO2 TEMP ADJ BLD: ABNORMAL MM[HG]
PH BLDA: 7.4 PH UNITS (ref 7.35–7.45)
PH UR STRIP.AUTO: 5.5 [PH] (ref 5–8)
PH, TEMP CORRECTED: ABNORMAL
PLATELET # BLD AUTO: 351 10*3/MM3 (ref 140–450)
PMV BLD AUTO: 11.5 FL (ref 6–12)
PO2 BLDA: 84.3 MM HG (ref 83–108)
PO2 TEMP ADJ BLD: ABNORMAL MM[HG]
POTASSIUM SERPL-SCNC: 3.7 MMOL/L (ref 3.5–5.2)
PROT UR QL STRIP: ABNORMAL
RBC # BLD AUTO: 3.17 10*6/MM3 (ref 3.77–5.28)
RBC # UR STRIP: ABNORMAL /HPF
REF LAB TEST METHOD: ABNORMAL
SAO2 % BLDCOA: 97.2 % (ref 94–99)
SODIUM SERPL-SCNC: 134 MMOL/L (ref 136–145)
SP GR UR STRIP: 1.03 (ref 1–1.03)
SQUAMOUS #/AREA URNS HPF: ABNORMAL /HPF
TRANS CELLS #/AREA URNS HPF: ABNORMAL /HPF
UROBILINOGEN UR QL STRIP: ABNORMAL
VENTILATOR MODE: ABNORMAL
WBC # UR STRIP: ABNORMAL /HPF
WBC NRBC COR # BLD: 10.53 10*3/MM3 (ref 3.4–10.8)
YEAST URNS QL MICRO: ABNORMAL /HPF

## 2021-11-23 PROCEDURE — 82375 ASSAY CARBOXYHB QUANT: CPT

## 2021-11-23 PROCEDURE — 25010000002 ENOXAPARIN PER 10 MG

## 2021-11-23 PROCEDURE — 70450 CT HEAD/BRAIN W/O DYE: CPT

## 2021-11-23 PROCEDURE — 82140 ASSAY OF AMMONIA: CPT | Performed by: INTERNAL MEDICINE

## 2021-11-23 PROCEDURE — 80048 BASIC METABOLIC PNL TOTAL CA: CPT | Performed by: ORTHOPAEDIC SURGERY

## 2021-11-23 PROCEDURE — 81001 URINALYSIS AUTO W/SCOPE: CPT | Performed by: INTERNAL MEDICINE

## 2021-11-23 PROCEDURE — 82805 BLOOD GASES W/O2 SATURATION: CPT

## 2021-11-23 PROCEDURE — 97116 GAIT TRAINING THERAPY: CPT

## 2021-11-23 PROCEDURE — 97164 PT RE-EVAL EST PLAN CARE: CPT

## 2021-11-23 PROCEDURE — 99232 SBSQ HOSP IP/OBS MODERATE 35: CPT | Performed by: INTERNAL MEDICINE

## 2021-11-23 PROCEDURE — 82962 GLUCOSE BLOOD TEST: CPT

## 2021-11-23 PROCEDURE — 25010000002 DIPHENHYDRAMINE PER 50 MG: Performed by: PHYSICIAN ASSISTANT

## 2021-11-23 PROCEDURE — 85027 COMPLETE CBC AUTOMATED: CPT | Performed by: ORTHOPAEDIC SURGERY

## 2021-11-23 PROCEDURE — 63710000001 INSULIN DETEMIR PER 5 UNITS: Performed by: INTERNAL MEDICINE

## 2021-11-23 PROCEDURE — 83050 HGB METHEMOGLOBIN QUAN: CPT

## 2021-11-23 PROCEDURE — 63710000001 INSULIN ASPART PER 5 UNITS: Performed by: ORTHOPAEDIC SURGERY

## 2021-11-23 PROCEDURE — 25010000002 THIAMINE PER 100 MG: Performed by: INTERNAL MEDICINE

## 2021-11-23 PROCEDURE — 36600 WITHDRAWAL OF ARTERIAL BLOOD: CPT

## 2021-11-23 PROCEDURE — 87086 URINE CULTURE/COLONY COUNT: CPT | Performed by: INTERNAL MEDICINE

## 2021-11-23 RX ORDER — DIPHENHYDRAMINE HYDROCHLORIDE 50 MG/ML
50 INJECTION INTRAMUSCULAR; INTRAVENOUS ONCE
Status: COMPLETED | OUTPATIENT
Start: 2021-11-23 | End: 2021-11-23

## 2021-11-23 RX ADMIN — CALCIUM 500 MG: 500 TABLET ORAL at 08:12

## 2021-11-23 RX ADMIN — TRAMADOL HYDROCHLORIDE 25 MG: 50 TABLET ORAL at 19:41

## 2021-11-23 RX ADMIN — INSULIN DETEMIR 23 UNITS: 100 INJECTION, SOLUTION SUBCUTANEOUS at 19:42

## 2021-11-23 RX ADMIN — CARVEDILOL 6.25 MG: 6.25 TABLET, FILM COATED ORAL at 08:12

## 2021-11-23 RX ADMIN — PANTOPRAZOLE SODIUM 40 MG: 40 TABLET, DELAYED RELEASE ORAL at 17:13

## 2021-11-23 RX ADMIN — FERROUS SULFATE TAB 325 MG (65 MG ELEMENTAL FE) 325 MG: 325 (65 FE) TAB at 17:13

## 2021-11-23 RX ADMIN — INSULIN ASPART 3 UNITS: 100 INJECTION, SOLUTION INTRAVENOUS; SUBCUTANEOUS at 17:13

## 2021-11-23 RX ADMIN — CETIRIZINE HYDROCHLORIDE 5 MG: 10 TABLET, FILM COATED ORAL at 08:12

## 2021-11-23 RX ADMIN — CARVEDILOL 6.25 MG: 6.25 TABLET, FILM COATED ORAL at 17:13

## 2021-11-23 RX ADMIN — TRAMADOL HYDROCHLORIDE 25 MG: 50 TABLET ORAL at 08:15

## 2021-11-23 RX ADMIN — INSULIN ASPART 8 UNITS: 100 INJECTION, SOLUTION INTRAVENOUS; SUBCUTANEOUS at 12:36

## 2021-11-23 RX ADMIN — ENOXAPARIN SODIUM 40 MG: 30 INJECTION SUBCUTANEOUS at 17:13

## 2021-11-23 RX ADMIN — DOCUSATE SODIUM 100 MG: 100 CAPSULE ORAL at 19:41

## 2021-11-23 RX ADMIN — SODIUM CHLORIDE, PRESERVATIVE FREE 10 ML: 5 INJECTION INTRAVENOUS at 08:12

## 2021-11-23 RX ADMIN — Medication 10 MG: at 19:41

## 2021-11-23 RX ADMIN — INSULIN ASPART 5 UNITS: 100 INJECTION, SOLUTION INTRAVENOUS; SUBCUTANEOUS at 08:16

## 2021-11-23 RX ADMIN — DOCUSATE SODIUM 100 MG: 100 CAPSULE ORAL at 08:12

## 2021-11-23 RX ADMIN — DIPHENHYDRAMINE HYDROCHLORIDE 50 MG: 50 INJECTION INTRAMUSCULAR; INTRAVENOUS at 19:41

## 2021-11-23 RX ADMIN — THIAMINE HYDROCHLORIDE: 100 INJECTION, SOLUTION INTRAMUSCULAR; INTRAVENOUS at 19:42

## 2021-11-23 RX ADMIN — POLYETHYLENE GLYCOL (3350) 17 G: 17 POWDER, FOR SOLUTION ORAL at 08:12

## 2021-11-23 RX ADMIN — PANTOPRAZOLE SODIUM 40 MG: 40 TABLET, DELAYED RELEASE ORAL at 08:12

## 2021-11-23 RX ADMIN — SODIUM ZIRCONIUM CYCLOSILICATE 10 G: 10 POWDER, FOR SUSPENSION ORAL at 08:12

## 2021-11-23 RX ADMIN — ROSUVASTATIN CALCIUM 10 MG: 10 TABLET, FILM COATED ORAL at 19:41

## 2021-11-24 LAB
ANION GAP SERPL CALCULATED.3IONS-SCNC: 12.9 MMOL/L (ref 5–15)
BACTERIA SPEC AEROBE CULT: NORMAL
BUN SERPL-MCNC: 40 MG/DL (ref 8–23)
BUN/CREAT SERPL: 30.8 (ref 7–25)
CALCIUM SPEC-SCNC: 8.5 MG/DL (ref 8.6–10.5)
CHLORIDE SERPL-SCNC: 95 MMOL/L (ref 98–107)
CO2 SERPL-SCNC: 28.1 MMOL/L (ref 22–29)
CREAT SERPL-MCNC: 1.3 MG/DL (ref 0.57–1)
DEPRECATED RDW RBC AUTO: 50.3 FL (ref 37–54)
ERYTHROCYTE [DISTWIDTH] IN BLOOD BY AUTOMATED COUNT: 15.9 % (ref 12.3–15.4)
GFR SERPL CREATININE-BSD FRML MDRD: 40 ML/MIN/1.73
GLUCOSE BLDC GLUCOMTR-MCNC: 147 MG/DL (ref 70–130)
GLUCOSE BLDC GLUCOMTR-MCNC: 196 MG/DL (ref 70–130)
GLUCOSE BLDC GLUCOMTR-MCNC: 205 MG/DL (ref 70–130)
GLUCOSE BLDC GLUCOMTR-MCNC: 247 MG/DL (ref 70–130)
GLUCOSE SERPL-MCNC: 190 MG/DL (ref 65–99)
HCT VFR BLD AUTO: 27.3 % (ref 34–46.6)
HGB BLD-MCNC: 8.1 G/DL (ref 12–15.9)
MCH RBC QN AUTO: 25.9 PG (ref 26.6–33)
MCHC RBC AUTO-ENTMCNC: 29.7 G/DL (ref 31.5–35.7)
MCV RBC AUTO: 87.2 FL (ref 79–97)
PLATELET # BLD AUTO: 370 10*3/MM3 (ref 140–450)
PMV BLD AUTO: 11.2 FL (ref 6–12)
POTASSIUM SERPL-SCNC: 3.9 MMOL/L (ref 3.5–5.2)
RBC # BLD AUTO: 3.13 10*6/MM3 (ref 3.77–5.28)
SODIUM SERPL-SCNC: 136 MMOL/L (ref 136–145)
WBC NRBC COR # BLD: 10.02 10*3/MM3 (ref 3.4–10.8)

## 2021-11-24 PROCEDURE — 25010000002 THIAMINE PER 100 MG: Performed by: INTERNAL MEDICINE

## 2021-11-24 PROCEDURE — 97110 THERAPEUTIC EXERCISES: CPT

## 2021-11-24 PROCEDURE — 63710000001 INSULIN DETEMIR PER 5 UNITS: Performed by: INTERNAL MEDICINE

## 2021-11-24 PROCEDURE — 85027 COMPLETE CBC AUTOMATED: CPT | Performed by: ORTHOPAEDIC SURGERY

## 2021-11-24 PROCEDURE — 63710000001 INSULIN ASPART PER 5 UNITS: Performed by: ORTHOPAEDIC SURGERY

## 2021-11-24 PROCEDURE — 25010000002 CEFTRIAXONE PER 250 MG: Performed by: INTERNAL MEDICINE

## 2021-11-24 PROCEDURE — 80048 BASIC METABOLIC PNL TOTAL CA: CPT | Performed by: ORTHOPAEDIC SURGERY

## 2021-11-24 PROCEDURE — 97530 THERAPEUTIC ACTIVITIES: CPT

## 2021-11-24 PROCEDURE — 99232 SBSQ HOSP IP/OBS MODERATE 35: CPT | Performed by: INTERNAL MEDICINE

## 2021-11-24 PROCEDURE — 82962 GLUCOSE BLOOD TEST: CPT

## 2021-11-24 PROCEDURE — 25010000002 ENOXAPARIN PER 10 MG

## 2021-11-24 RX ADMIN — ENOXAPARIN SODIUM 40 MG: 30 INJECTION SUBCUTANEOUS at 17:23

## 2021-11-24 RX ADMIN — CARVEDILOL 6.25 MG: 6.25 TABLET, FILM COATED ORAL at 17:23

## 2021-11-24 RX ADMIN — POLYETHYLENE GLYCOL (3350) 17 G: 17 POWDER, FOR SOLUTION ORAL at 08:40

## 2021-11-24 RX ADMIN — TRAMADOL HYDROCHLORIDE 25 MG: 50 TABLET ORAL at 20:12

## 2021-11-24 RX ADMIN — Medication 10 MG: at 20:12

## 2021-11-24 RX ADMIN — PANTOPRAZOLE SODIUM 40 MG: 40 TABLET, DELAYED RELEASE ORAL at 17:24

## 2021-11-24 RX ADMIN — INSULIN ASPART 3 UNITS: 100 INJECTION, SOLUTION INTRAVENOUS; SUBCUTANEOUS at 08:39

## 2021-11-24 RX ADMIN — PANTOPRAZOLE SODIUM 40 MG: 40 TABLET, DELAYED RELEASE ORAL at 04:48

## 2021-11-24 RX ADMIN — INSULIN ASPART 5 UNITS: 100 INJECTION, SOLUTION INTRAVENOUS; SUBCUTANEOUS at 17:24

## 2021-11-24 RX ADMIN — CETIRIZINE HYDROCHLORIDE 5 MG: 10 TABLET, FILM COATED ORAL at 08:40

## 2021-11-24 RX ADMIN — THIAMINE HYDROCHLORIDE: 100 INJECTION, SOLUTION INTRAMUSCULAR; INTRAVENOUS at 20:11

## 2021-11-24 RX ADMIN — INSULIN DETEMIR 23 UNITS: 100 INJECTION, SOLUTION SUBCUTANEOUS at 20:12

## 2021-11-24 RX ADMIN — CARVEDILOL 6.25 MG: 6.25 TABLET, FILM COATED ORAL at 08:39

## 2021-11-24 RX ADMIN — SODIUM CHLORIDE, PRESERVATIVE FREE 10 ML: 5 INJECTION INTRAVENOUS at 08:40

## 2021-11-24 RX ADMIN — ROSUVASTATIN CALCIUM 10 MG: 10 TABLET, FILM COATED ORAL at 20:11

## 2021-11-24 RX ADMIN — FERROUS SULFATE TAB 325 MG (65 MG ELEMENTAL FE) 325 MG: 325 (65 FE) TAB at 17:23

## 2021-11-24 RX ADMIN — DOCUSATE SODIUM 100 MG: 100 CAPSULE ORAL at 08:40

## 2021-11-24 RX ADMIN — CEFTRIAXONE 1 G: 1 INJECTION, POWDER, FOR SOLUTION INTRAMUSCULAR; INTRAVENOUS at 08:40

## 2021-11-24 RX ADMIN — CALCIUM 500 MG: 500 TABLET ORAL at 08:39

## 2021-11-24 RX ADMIN — DOCUSATE SODIUM 100 MG: 100 CAPSULE ORAL at 20:11

## 2021-11-25 LAB
ANION GAP SERPL CALCULATED.3IONS-SCNC: 10.2 MMOL/L (ref 5–15)
BUN SERPL-MCNC: 31 MG/DL (ref 8–23)
BUN/CREAT SERPL: 28.4 (ref 7–25)
CALCIUM SPEC-SCNC: 8.6 MG/DL (ref 8.6–10.5)
CHLORIDE SERPL-SCNC: 99 MMOL/L (ref 98–107)
CO2 SERPL-SCNC: 28.8 MMOL/L (ref 22–29)
CREAT SERPL-MCNC: 1.09 MG/DL (ref 0.57–1)
DEPRECATED RDW RBC AUTO: 51.8 FL (ref 37–54)
ERYTHROCYTE [DISTWIDTH] IN BLOOD BY AUTOMATED COUNT: 15.8 % (ref 12.3–15.4)
GFR SERPL CREATININE-BSD FRML MDRD: 49 ML/MIN/1.73
GLUCOSE BLDC GLUCOMTR-MCNC: 140 MG/DL (ref 70–130)
GLUCOSE BLDC GLUCOMTR-MCNC: 275 MG/DL (ref 70–130)
GLUCOSE BLDC GLUCOMTR-MCNC: 317 MG/DL (ref 70–130)
GLUCOSE BLDC GLUCOMTR-MCNC: 324 MG/DL (ref 70–130)
GLUCOSE SERPL-MCNC: 137 MG/DL (ref 65–99)
HCT VFR BLD AUTO: 26.4 % (ref 34–46.6)
HGB BLD-MCNC: 7.9 G/DL (ref 12–15.9)
MCH RBC QN AUTO: 26.8 PG (ref 26.6–33)
MCHC RBC AUTO-ENTMCNC: 29.9 G/DL (ref 31.5–35.7)
MCV RBC AUTO: 89.5 FL (ref 79–97)
PLATELET # BLD AUTO: 382 10*3/MM3 (ref 140–450)
PMV BLD AUTO: 11 FL (ref 6–12)
POTASSIUM SERPL-SCNC: 4 MMOL/L (ref 3.5–5.2)
RBC # BLD AUTO: 2.95 10*6/MM3 (ref 3.77–5.28)
SARS-COV-2 RNA PNL SPEC NAA+PROBE: NOT DETECTED
SODIUM SERPL-SCNC: 138 MMOL/L (ref 136–145)
WBC NRBC COR # BLD: 8.95 10*3/MM3 (ref 3.4–10.8)

## 2021-11-25 PROCEDURE — 25010000002 CEFTRIAXONE PER 250 MG: Performed by: INTERNAL MEDICINE

## 2021-11-25 PROCEDURE — 82962 GLUCOSE BLOOD TEST: CPT

## 2021-11-25 PROCEDURE — 25010000002 THIAMINE PER 100 MG: Performed by: INTERNAL MEDICINE

## 2021-11-25 PROCEDURE — 94799 UNLISTED PULMONARY SVC/PX: CPT

## 2021-11-25 PROCEDURE — 87635 SARS-COV-2 COVID-19 AMP PRB: CPT | Performed by: INTERNAL MEDICINE

## 2021-11-25 PROCEDURE — 80048 BASIC METABOLIC PNL TOTAL CA: CPT | Performed by: ORTHOPAEDIC SURGERY

## 2021-11-25 PROCEDURE — 63710000001 INSULIN ASPART PER 5 UNITS: Performed by: ORTHOPAEDIC SURGERY

## 2021-11-25 PROCEDURE — 25010000002 ENOXAPARIN PER 10 MG

## 2021-11-25 PROCEDURE — 99231 SBSQ HOSP IP/OBS SF/LOW 25: CPT | Performed by: INTERNAL MEDICINE

## 2021-11-25 PROCEDURE — 63710000001 INSULIN DETEMIR PER 5 UNITS: Performed by: INTERNAL MEDICINE

## 2021-11-25 PROCEDURE — 85027 COMPLETE CBC AUTOMATED: CPT | Performed by: ORTHOPAEDIC SURGERY

## 2021-11-25 RX ADMIN — SODIUM CHLORIDE, PRESERVATIVE FREE 10 ML: 5 INJECTION INTRAVENOUS at 20:38

## 2021-11-25 RX ADMIN — CEFTRIAXONE 1 G: 1 INJECTION, POWDER, FOR SOLUTION INTRAMUSCULAR; INTRAVENOUS at 08:23

## 2021-11-25 RX ADMIN — INSULIN ASPART 8 UNITS: 100 INJECTION, SOLUTION INTRAVENOUS; SUBCUTANEOUS at 11:31

## 2021-11-25 RX ADMIN — ENOXAPARIN SODIUM 40 MG: 30 INJECTION SUBCUTANEOUS at 17:34

## 2021-11-25 RX ADMIN — SODIUM CHLORIDE, PRESERVATIVE FREE 10 ML: 5 INJECTION INTRAVENOUS at 08:23

## 2021-11-25 RX ADMIN — FERROUS SULFATE TAB 325 MG (65 MG ELEMENTAL FE) 325 MG: 325 (65 FE) TAB at 17:33

## 2021-11-25 RX ADMIN — DOCUSATE SODIUM 100 MG: 100 CAPSULE ORAL at 08:22

## 2021-11-25 RX ADMIN — CALCIUM 500 MG: 500 TABLET ORAL at 08:22

## 2021-11-25 RX ADMIN — PANTOPRAZOLE SODIUM 40 MG: 40 TABLET, DELAYED RELEASE ORAL at 17:33

## 2021-11-25 RX ADMIN — PANTOPRAZOLE SODIUM 40 MG: 40 TABLET, DELAYED RELEASE ORAL at 08:22

## 2021-11-25 RX ADMIN — TRAMADOL HYDROCHLORIDE 25 MG: 50 TABLET ORAL at 22:19

## 2021-11-25 RX ADMIN — CARVEDILOL 6.25 MG: 6.25 TABLET, FILM COATED ORAL at 08:23

## 2021-11-25 RX ADMIN — CETIRIZINE HYDROCHLORIDE 5 MG: 10 TABLET, FILM COATED ORAL at 08:22

## 2021-11-25 RX ADMIN — CARVEDILOL 6.25 MG: 6.25 TABLET, FILM COATED ORAL at 17:33

## 2021-11-25 RX ADMIN — INSULIN ASPART 10 UNITS: 100 INJECTION, SOLUTION INTRAVENOUS; SUBCUTANEOUS at 17:34

## 2021-11-25 RX ADMIN — ROSUVASTATIN CALCIUM 10 MG: 10 TABLET, FILM COATED ORAL at 20:37

## 2021-11-25 RX ADMIN — THIAMINE HYDROCHLORIDE: 100 INJECTION, SOLUTION INTRAMUSCULAR; INTRAVENOUS at 20:41

## 2021-11-25 RX ADMIN — POLYETHYLENE GLYCOL (3350) 17 G: 17 POWDER, FOR SOLUTION ORAL at 08:23

## 2021-11-25 RX ADMIN — INSULIN DETEMIR 23 UNITS: 100 INJECTION, SOLUTION SUBCUTANEOUS at 20:44

## 2021-11-26 ENCOUNTER — HOSPITAL ENCOUNTER (INPATIENT)
Facility: HOSPITAL | Age: 76
LOS: 14 days | Discharge: HOME HEALTH | End: 2021-12-10
Attending: INTERNAL MEDICINE | Admitting: INTERNAL MEDICINE

## 2021-11-26 VITALS
TEMPERATURE: 98.3 F | DIASTOLIC BLOOD PRESSURE: 49 MMHG | BODY MASS INDEX: 29.79 KG/M2 | WEIGHT: 161.9 LBS | HEART RATE: 58 BPM | RESPIRATION RATE: 20 BRPM | SYSTOLIC BLOOD PRESSURE: 107 MMHG | OXYGEN SATURATION: 99 % | HEIGHT: 62 IN

## 2021-11-26 DIAGNOSIS — D50.9 IRON DEFICIENCY ANEMIA, UNSPECIFIED IRON DEFICIENCY ANEMIA TYPE: Chronic | ICD-10-CM

## 2021-11-26 DIAGNOSIS — I10 ESSENTIAL HYPERTENSION: Chronic | ICD-10-CM

## 2021-11-26 DIAGNOSIS — J44.1 COPD WITH ACUTE EXACERBATION (HCC): ICD-10-CM

## 2021-11-26 DIAGNOSIS — S72.002A CLOSED FRACTURE OF LEFT HIP, INITIAL ENCOUNTER (HCC): Primary | ICD-10-CM

## 2021-11-26 DIAGNOSIS — D50.0 ANEMIA DUE TO GI BLOOD LOSS: ICD-10-CM

## 2021-11-26 DIAGNOSIS — I50.32 CHRONIC DIASTOLIC HEART FAILURE (HCC): Chronic | ICD-10-CM

## 2021-11-26 DIAGNOSIS — K27.4 PEPTIC ULCER DISEASE WITH HEMORRHAGE: ICD-10-CM

## 2021-11-26 LAB
ANION GAP SERPL CALCULATED.3IONS-SCNC: 9.2 MMOL/L (ref 5–15)
BUN SERPL-MCNC: 21 MG/DL (ref 8–23)
BUN/CREAT SERPL: 21.9 (ref 7–25)
CALCIUM SPEC-SCNC: 8.3 MG/DL (ref 8.6–10.5)
CHLORIDE SERPL-SCNC: 100 MMOL/L (ref 98–107)
CO2 SERPL-SCNC: 28.8 MMOL/L (ref 22–29)
CREAT SERPL-MCNC: 0.96 MG/DL (ref 0.57–1)
DEPRECATED RDW RBC AUTO: 50.6 FL (ref 37–54)
ERYTHROCYTE [DISTWIDTH] IN BLOOD BY AUTOMATED COUNT: 15.8 % (ref 12.3–15.4)
GFR SERPL CREATININE-BSD FRML MDRD: 56 ML/MIN/1.73
GLUCOSE BLDC GLUCOMTR-MCNC: 147 MG/DL (ref 70–130)
GLUCOSE BLDC GLUCOMTR-MCNC: 226 MG/DL (ref 70–130)
GLUCOSE BLDC GLUCOMTR-MCNC: 265 MG/DL (ref 70–130)
GLUCOSE SERPL-MCNC: 127 MG/DL (ref 65–99)
HCT VFR BLD AUTO: 26.8 % (ref 34–46.6)
HGB BLD-MCNC: 7.8 G/DL (ref 12–15.9)
MCH RBC QN AUTO: 25.7 PG (ref 26.6–33)
MCHC RBC AUTO-ENTMCNC: 29.1 G/DL (ref 31.5–35.7)
MCV RBC AUTO: 88.4 FL (ref 79–97)
PLATELET # BLD AUTO: 424 10*3/MM3 (ref 140–450)
PMV BLD AUTO: 11.1 FL (ref 6–12)
POTASSIUM SERPL-SCNC: 4 MMOL/L (ref 3.5–5.2)
RBC # BLD AUTO: 3.03 10*6/MM3 (ref 3.77–5.28)
SODIUM SERPL-SCNC: 138 MMOL/L (ref 136–145)
WBC NRBC COR # BLD: 9.67 10*3/MM3 (ref 3.4–10.8)

## 2021-11-26 PROCEDURE — 85027 COMPLETE CBC AUTOMATED: CPT | Performed by: ORTHOPAEDIC SURGERY

## 2021-11-26 PROCEDURE — 63710000001 INSULIN ASPART PER 5 UNITS: Performed by: ORTHOPAEDIC SURGERY

## 2021-11-26 PROCEDURE — 25010000002 THIAMINE PER 100 MG: Performed by: INTERNAL MEDICINE

## 2021-11-26 PROCEDURE — 25010000002 ENOXAPARIN PER 10 MG: Performed by: INTERNAL MEDICINE

## 2021-11-26 PROCEDURE — 80048 BASIC METABOLIC PNL TOTAL CA: CPT | Performed by: ORTHOPAEDIC SURGERY

## 2021-11-26 PROCEDURE — 63710000001 INSULIN DETEMIR PER 5 UNITS: Performed by: INTERNAL MEDICINE

## 2021-11-26 PROCEDURE — 25010000002 CEFTRIAXONE PER 250 MG: Performed by: INTERNAL MEDICINE

## 2021-11-26 PROCEDURE — 82962 GLUCOSE BLOOD TEST: CPT

## 2021-11-26 PROCEDURE — 99239 HOSP IP/OBS DSCHRG MGMT >30: CPT | Performed by: INTERNAL MEDICINE

## 2021-11-26 RX ORDER — CALCIUM CARBONATE 500(1250)
500 TABLET ORAL DAILY
Status: DISCONTINUED | OUTPATIENT
Start: 2021-11-27 | End: 2021-12-10 | Stop reason: HOSPADM

## 2021-11-26 RX ORDER — TRAMADOL HYDROCHLORIDE 50 MG/1
25 TABLET ORAL EVERY 12 HOURS PRN
Status: CANCELLED | OUTPATIENT
Start: 2021-11-26 | End: 2021-11-28

## 2021-11-26 RX ORDER — CLONIDINE HYDROCHLORIDE 0.1 MG/1
0.1 TABLET ORAL ONCE
Status: COMPLETED | OUTPATIENT
Start: 2021-11-26 | End: 2021-11-26

## 2021-11-26 RX ORDER — NICOTINE POLACRILEX 4 MG
15 LOZENGE BUCCAL
Status: CANCELLED | OUTPATIENT
Start: 2021-11-26

## 2021-11-26 RX ORDER — DOCUSATE SODIUM 100 MG/1
100 CAPSULE, LIQUID FILLED ORAL 2 TIMES DAILY
Status: DISCONTINUED | OUTPATIENT
Start: 2021-11-26 | End: 2021-12-10 | Stop reason: HOSPADM

## 2021-11-26 RX ORDER — FERROUS SULFATE 325(65) MG
325 TABLET ORAL EVERY EVENING
Status: CANCELLED | OUTPATIENT
Start: 2021-11-26

## 2021-11-26 RX ORDER — CETIRIZINE HYDROCHLORIDE 10 MG/1
5 TABLET ORAL DAILY
Status: DISCONTINUED | OUTPATIENT
Start: 2021-11-27 | End: 2021-12-10 | Stop reason: HOSPADM

## 2021-11-26 RX ORDER — CALCIUM CARBONATE 500(1250)
500 TABLET ORAL DAILY
Status: CANCELLED | OUTPATIENT
Start: 2021-11-26

## 2021-11-26 RX ORDER — DEXTROSE MONOHYDRATE 25 G/50ML
25 INJECTION, SOLUTION INTRAVENOUS
Status: DISCONTINUED | OUTPATIENT
Start: 2021-11-26 | End: 2021-12-10 | Stop reason: HOSPADM

## 2021-11-26 RX ORDER — PANTOPRAZOLE SODIUM 40 MG/1
40 TABLET, DELAYED RELEASE ORAL
Status: DISCONTINUED | OUTPATIENT
Start: 2021-11-26 | End: 2021-12-10 | Stop reason: HOSPADM

## 2021-11-26 RX ORDER — NICOTINE POLACRILEX 4 MG
15 LOZENGE BUCCAL
Status: DISCONTINUED | OUTPATIENT
Start: 2021-11-26 | End: 2021-12-10 | Stop reason: HOSPADM

## 2021-11-26 RX ORDER — ROSUVASTATIN CALCIUM 10 MG/1
10 TABLET, COATED ORAL NIGHTLY
Status: DISCONTINUED | OUTPATIENT
Start: 2021-11-26 | End: 2021-12-10 | Stop reason: HOSPADM

## 2021-11-26 RX ORDER — CEFDINIR 300 MG/1
300 CAPSULE ORAL EVERY 12 HOURS SCHEDULED
Status: CANCELLED | OUTPATIENT
Start: 2021-11-27 | End: 2021-12-01

## 2021-11-26 RX ORDER — CETIRIZINE HYDROCHLORIDE 10 MG/1
5 TABLET ORAL DAILY
Status: CANCELLED | OUTPATIENT
Start: 2021-11-26

## 2021-11-26 RX ORDER — CEFDINIR 300 MG/1
300 CAPSULE ORAL EVERY 12 HOURS SCHEDULED
Status: COMPLETED | OUTPATIENT
Start: 2021-11-27 | End: 2021-11-30

## 2021-11-26 RX ORDER — POLYETHYLENE GLYCOL 3350 17 G/17G
17 POWDER, FOR SOLUTION ORAL DAILY
Status: DISCONTINUED | OUTPATIENT
Start: 2021-11-27 | End: 2021-12-10 | Stop reason: HOSPADM

## 2021-11-26 RX ORDER — TRAMADOL HYDROCHLORIDE 50 MG/1
25 TABLET ORAL EVERY 12 HOURS PRN
Status: DISCONTINUED | OUTPATIENT
Start: 2021-11-26 | End: 2021-11-27

## 2021-11-26 RX ORDER — CARVEDILOL 6.25 MG/1
6.25 TABLET ORAL 2 TIMES DAILY WITH MEALS
Status: DISCONTINUED | OUTPATIENT
Start: 2021-11-26 | End: 2021-12-10 | Stop reason: HOSPADM

## 2021-11-26 RX ORDER — CARVEDILOL 6.25 MG/1
6.25 TABLET ORAL 2 TIMES DAILY WITH MEALS
Status: CANCELLED | OUTPATIENT
Start: 2021-11-26

## 2021-11-26 RX ORDER — DEXTROSE MONOHYDRATE 25 G/50ML
25 INJECTION, SOLUTION INTRAVENOUS
Status: CANCELLED | OUTPATIENT
Start: 2021-11-26

## 2021-11-26 RX ORDER — POLYETHYLENE GLYCOL 3350 17 G/17G
17 POWDER, FOR SOLUTION ORAL DAILY
Status: CANCELLED | OUTPATIENT
Start: 2021-11-26

## 2021-11-26 RX ORDER — ACETAMINOPHEN 325 MG/1
650 TABLET ORAL EVERY 6 HOURS PRN
Status: DISCONTINUED | OUTPATIENT
Start: 2021-11-26 | End: 2021-12-10 | Stop reason: HOSPADM

## 2021-11-26 RX ORDER — DOCUSATE SODIUM 100 MG/1
100 CAPSULE, LIQUID FILLED ORAL 2 TIMES DAILY
Status: CANCELLED | OUTPATIENT
Start: 2021-11-26

## 2021-11-26 RX ORDER — ROSUVASTATIN CALCIUM 10 MG/1
10 TABLET, COATED ORAL NIGHTLY
Status: CANCELLED | OUTPATIENT
Start: 2021-11-26

## 2021-11-26 RX ORDER — FERROUS SULFATE 325(65) MG
325 TABLET ORAL EVERY EVENING
Status: DISCONTINUED | OUTPATIENT
Start: 2021-11-26 | End: 2021-12-10 | Stop reason: HOSPADM

## 2021-11-26 RX ORDER — CHOLECALCIFEROL (VITAMIN D3) 125 MCG
10 CAPSULE ORAL NIGHTLY PRN
Status: DISCONTINUED | OUTPATIENT
Start: 2021-11-26 | End: 2021-12-10 | Stop reason: HOSPADM

## 2021-11-26 RX ORDER — CHOLECALCIFEROL (VITAMIN D3) 125 MCG
10 CAPSULE ORAL NIGHTLY PRN
Status: CANCELLED | OUTPATIENT
Start: 2021-11-26

## 2021-11-26 RX ORDER — PANTOPRAZOLE SODIUM 40 MG/1
40 TABLET, DELAYED RELEASE ORAL
Status: CANCELLED | OUTPATIENT
Start: 2021-11-26

## 2021-11-26 RX ADMIN — CARVEDILOL 6.25 MG: 6.25 TABLET, FILM COATED ORAL at 08:11

## 2021-11-26 RX ADMIN — ENOXAPARIN SODIUM 40 MG: 40 INJECTION SUBCUTANEOUS at 18:07

## 2021-11-26 RX ADMIN — INSULIN ASPART 5 UNITS: 100 INJECTION, SOLUTION INTRAVENOUS; SUBCUTANEOUS at 11:08

## 2021-11-26 RX ADMIN — CLONIDINE HYDROCHLORIDE 0.1 MG: 0.1 TABLET ORAL at 21:39

## 2021-11-26 RX ADMIN — ACETAMINOPHEN 650 MG: 325 TABLET ORAL at 23:27

## 2021-11-26 RX ADMIN — PANTOPRAZOLE SODIUM 40 MG: 40 TABLET, DELAYED RELEASE ORAL at 08:11

## 2021-11-26 RX ADMIN — INSULIN DETEMIR 23 UNITS: 100 INJECTION, SOLUTION SUBCUTANEOUS at 21:54

## 2021-11-26 RX ADMIN — CEFTRIAXONE 1 G: 1 INJECTION, POWDER, FOR SOLUTION INTRAMUSCULAR; INTRAVENOUS at 09:24

## 2021-11-26 RX ADMIN — ROSUVASTATIN CALCIUM 10 MG: 10 TABLET, FILM COATED ORAL at 20:41

## 2021-11-26 RX ADMIN — SODIUM CHLORIDE, PRESERVATIVE FREE 10 ML: 5 INJECTION INTRAVENOUS at 08:11

## 2021-11-26 RX ADMIN — FERROUS SULFATE TAB 325 MG (65 MG ELEMENTAL FE) 325 MG: 325 (65 FE) TAB at 18:06

## 2021-11-26 RX ADMIN — THIAMINE HYDROCHLORIDE: 100 INJECTION, SOLUTION INTRAMUSCULAR; INTRAVENOUS at 20:43

## 2021-11-26 RX ADMIN — DOCUSATE SODIUM 100 MG: 100 CAPSULE ORAL at 20:41

## 2021-11-26 RX ADMIN — TRAMADOL HYDROCHLORIDE 25 MG: 50 TABLET, COATED ORAL at 20:40

## 2021-11-26 RX ADMIN — DOCUSATE SODIUM 100 MG: 100 CAPSULE ORAL at 08:11

## 2021-11-26 RX ADMIN — PANTOPRAZOLE SODIUM 40 MG: 40 TABLET, DELAYED RELEASE ORAL at 18:06

## 2021-11-26 RX ADMIN — POLYETHYLENE GLYCOL (3350) 17 G: 17 POWDER, FOR SOLUTION ORAL at 08:09

## 2021-11-26 RX ADMIN — CALCIUM 500 MG: 500 TABLET ORAL at 08:11

## 2021-11-26 RX ADMIN — CARVEDILOL 6.25 MG: 6.25 TABLET, FILM COATED ORAL at 18:07

## 2021-11-26 RX ADMIN — CETIRIZINE HYDROCHLORIDE 5 MG: 10 TABLET, FILM COATED ORAL at 08:11

## 2021-11-27 PROBLEM — Z87.81 S/P LEFT HIP FRACTURE: Status: ACTIVE | Noted: 2021-11-27

## 2021-11-27 LAB
GLUCOSE BLDC GLUCOMTR-MCNC: 153 MG/DL (ref 70–130)
GLUCOSE BLDC GLUCOMTR-MCNC: 209 MG/DL (ref 70–130)
GLUCOSE BLDC GLUCOMTR-MCNC: 257 MG/DL (ref 70–130)
GLUCOSE BLDC GLUCOMTR-MCNC: 308 MG/DL (ref 70–130)

## 2021-11-27 PROCEDURE — 97167 OT EVAL HIGH COMPLEX 60 MIN: CPT

## 2021-11-27 PROCEDURE — 97530 THERAPEUTIC ACTIVITIES: CPT

## 2021-11-27 PROCEDURE — 97116 GAIT TRAINING THERAPY: CPT

## 2021-11-27 PROCEDURE — 63710000001 INSULIN ASPART PER 5 UNITS: Performed by: INTERNAL MEDICINE

## 2021-11-27 PROCEDURE — 25010000002 THIAMINE PER 100 MG: Performed by: INTERNAL MEDICINE

## 2021-11-27 PROCEDURE — 97110 THERAPEUTIC EXERCISES: CPT

## 2021-11-27 PROCEDURE — 82962 GLUCOSE BLOOD TEST: CPT

## 2021-11-27 PROCEDURE — 97162 PT EVAL MOD COMPLEX 30 MIN: CPT

## 2021-11-27 PROCEDURE — 63710000001 INSULIN DETEMIR PER 5 UNITS: Performed by: INTERNAL MEDICINE

## 2021-11-27 PROCEDURE — 94799 UNLISTED PULMONARY SVC/PX: CPT

## 2021-11-27 PROCEDURE — 25010000002 ENOXAPARIN PER 10 MG: Performed by: INTERNAL MEDICINE

## 2021-11-27 RX ORDER — TRAMADOL HYDROCHLORIDE 50 MG/1
25 TABLET ORAL EVERY 6 HOURS PRN
Status: DISCONTINUED | OUTPATIENT
Start: 2021-11-27 | End: 2021-12-10 | Stop reason: HOSPADM

## 2021-11-27 RX ORDER — OXYCODONE HYDROCHLORIDE AND ACETAMINOPHEN 5; 325 MG/1; MG/1
1 TABLET ORAL EVERY 6 HOURS PRN
Status: DISCONTINUED | OUTPATIENT
Start: 2021-11-27 | End: 2021-12-10 | Stop reason: HOSPADM

## 2021-11-27 RX ADMIN — INSULIN ASPART 5 UNITS: 100 INJECTION, SOLUTION INTRAVENOUS; SUBCUTANEOUS at 16:51

## 2021-11-27 RX ADMIN — THIAMINE HYDROCHLORIDE: 100 INJECTION, SOLUTION INTRAMUSCULAR; INTRAVENOUS at 20:13

## 2021-11-27 RX ADMIN — CETIRIZINE HYDROCHLORIDE 5 MG: 10 TABLET, FILM COATED ORAL at 08:23

## 2021-11-27 RX ADMIN — INSULIN DETEMIR 23 UNITS: 100 INJECTION, SOLUTION SUBCUTANEOUS at 21:11

## 2021-11-27 RX ADMIN — OXYCODONE HYDROCHLORIDE AND ACETAMINOPHEN 1 TABLET: 5; 325 TABLET ORAL at 20:13

## 2021-11-27 RX ADMIN — OXYCODONE HYDROCHLORIDE AND ACETAMINOPHEN 1 TABLET: 5; 325 TABLET ORAL at 07:40

## 2021-11-27 RX ADMIN — PANTOPRAZOLE SODIUM 40 MG: 40 TABLET, DELAYED RELEASE ORAL at 07:41

## 2021-11-27 RX ADMIN — OXYCODONE HYDROCHLORIDE AND ACETAMINOPHEN 1 TABLET: 5; 325 TABLET ORAL at 13:43

## 2021-11-27 RX ADMIN — POLYETHYLENE GLYCOL 3350 17 G: 17 POWDER, FOR SOLUTION ORAL at 08:22

## 2021-11-27 RX ADMIN — Medication 500 MG: at 08:23

## 2021-11-27 RX ADMIN — INSULIN ASPART 3 UNITS: 100 INJECTION, SOLUTION INTRAVENOUS; SUBCUTANEOUS at 07:40

## 2021-11-27 RX ADMIN — DOCUSATE SODIUM 100 MG: 100 CAPSULE ORAL at 20:13

## 2021-11-27 RX ADMIN — CEFDINIR 300 MG: 300 CAPSULE ORAL at 20:13

## 2021-11-27 RX ADMIN — CARVEDILOL 6.25 MG: 6.25 TABLET, FILM COATED ORAL at 18:28

## 2021-11-27 RX ADMIN — CEFDINIR 300 MG: 300 CAPSULE ORAL at 08:23

## 2021-11-27 RX ADMIN — PANTOPRAZOLE SODIUM 40 MG: 40 TABLET, DELAYED RELEASE ORAL at 16:50

## 2021-11-27 RX ADMIN — ENOXAPARIN SODIUM 40 MG: 40 INJECTION SUBCUTANEOUS at 16:50

## 2021-11-27 RX ADMIN — DOCUSATE SODIUM 100 MG: 100 CAPSULE ORAL at 08:23

## 2021-11-27 RX ADMIN — CARVEDILOL 6.25 MG: 6.25 TABLET, FILM COATED ORAL at 07:41

## 2021-11-27 RX ADMIN — ROSUVASTATIN CALCIUM 10 MG: 10 TABLET, FILM COATED ORAL at 20:13

## 2021-11-27 RX ADMIN — FERROUS SULFATE TAB 325 MG (65 MG ELEMENTAL FE) 325 MG: 325 (65 FE) TAB at 16:52

## 2021-11-27 RX ADMIN — INSULIN ASPART 10 UNITS: 100 INJECTION, SOLUTION INTRAVENOUS; SUBCUTANEOUS at 11:37

## 2021-11-28 LAB
GLUCOSE BLDC GLUCOMTR-MCNC: 156 MG/DL (ref 70–130)
GLUCOSE BLDC GLUCOMTR-MCNC: 230 MG/DL (ref 70–130)
GLUCOSE BLDC GLUCOMTR-MCNC: 257 MG/DL (ref 70–130)
GLUCOSE BLDC GLUCOMTR-MCNC: 327 MG/DL (ref 70–130)

## 2021-11-28 PROCEDURE — 82962 GLUCOSE BLOOD TEST: CPT

## 2021-11-28 PROCEDURE — 25010000002 ENOXAPARIN PER 10 MG: Performed by: INTERNAL MEDICINE

## 2021-11-28 PROCEDURE — 25010000002 THIAMINE PER 100 MG: Performed by: INTERNAL MEDICINE

## 2021-11-28 PROCEDURE — 63710000001 INSULIN DETEMIR PER 5 UNITS: Performed by: INTERNAL MEDICINE

## 2021-11-28 PROCEDURE — 63710000001 INSULIN ASPART PER 5 UNITS: Performed by: INTERNAL MEDICINE

## 2021-11-28 RX ADMIN — PANTOPRAZOLE SODIUM 40 MG: 40 TABLET, DELAYED RELEASE ORAL at 08:34

## 2021-11-28 RX ADMIN — ENOXAPARIN SODIUM 40 MG: 40 INJECTION SUBCUTANEOUS at 17:27

## 2021-11-28 RX ADMIN — INSULIN ASPART 8 UNITS: 100 INJECTION, SOLUTION INTRAVENOUS; SUBCUTANEOUS at 17:26

## 2021-11-28 RX ADMIN — DOCUSATE SODIUM 100 MG: 100 CAPSULE ORAL at 20:21

## 2021-11-28 RX ADMIN — Medication 500 MG: at 08:34

## 2021-11-28 RX ADMIN — DOCUSATE SODIUM 100 MG: 100 CAPSULE ORAL at 08:34

## 2021-11-28 RX ADMIN — CETIRIZINE HYDROCHLORIDE 5 MG: 10 TABLET, FILM COATED ORAL at 08:34

## 2021-11-28 RX ADMIN — THIAMINE HYDROCHLORIDE: 100 INJECTION, SOLUTION INTRAMUSCULAR; INTRAVENOUS at 20:22

## 2021-11-28 RX ADMIN — PANTOPRAZOLE SODIUM 40 MG: 40 TABLET, DELAYED RELEASE ORAL at 17:26

## 2021-11-28 RX ADMIN — ROSUVASTATIN CALCIUM 10 MG: 10 TABLET, FILM COATED ORAL at 20:21

## 2021-11-28 RX ADMIN — CARVEDILOL 6.25 MG: 6.25 TABLET, FILM COATED ORAL at 17:26

## 2021-11-28 RX ADMIN — FERROUS SULFATE TAB 325 MG (65 MG ELEMENTAL FE) 325 MG: 325 (65 FE) TAB at 17:26

## 2021-11-28 RX ADMIN — INSULIN DETEMIR 23 UNITS: 100 INJECTION, SOLUTION SUBCUTANEOUS at 20:59

## 2021-11-28 RX ADMIN — OXYCODONE HYDROCHLORIDE AND ACETAMINOPHEN 1 TABLET: 5; 325 TABLET ORAL at 20:21

## 2021-11-28 RX ADMIN — INSULIN ASPART 3 UNITS: 100 INJECTION, SOLUTION INTRAVENOUS; SUBCUTANEOUS at 08:34

## 2021-11-28 RX ADMIN — CARVEDILOL 6.25 MG: 6.25 TABLET, FILM COATED ORAL at 08:34

## 2021-11-28 RX ADMIN — INSULIN ASPART 10 UNITS: 100 INJECTION, SOLUTION INTRAVENOUS; SUBCUTANEOUS at 11:38

## 2021-11-28 RX ADMIN — CEFDINIR 300 MG: 300 CAPSULE ORAL at 08:34

## 2021-11-28 RX ADMIN — CEFDINIR 300 MG: 300 CAPSULE ORAL at 20:21

## 2021-11-29 LAB
GLUCOSE BLDC GLUCOMTR-MCNC: 156 MG/DL (ref 70–130)
GLUCOSE BLDC GLUCOMTR-MCNC: 217 MG/DL (ref 70–130)
GLUCOSE BLDC GLUCOMTR-MCNC: 298 MG/DL (ref 70–130)
GLUCOSE BLDC GLUCOMTR-MCNC: 313 MG/DL (ref 70–130)

## 2021-11-29 PROCEDURE — 99231 SBSQ HOSP IP/OBS SF/LOW 25: CPT | Performed by: FAMILY MEDICINE

## 2021-11-29 PROCEDURE — 63710000001 INSULIN DETEMIR PER 5 UNITS: Performed by: INTERNAL MEDICINE

## 2021-11-29 PROCEDURE — 63710000001 INSULIN ASPART PER 5 UNITS: Performed by: INTERNAL MEDICINE

## 2021-11-29 PROCEDURE — 97530 THERAPEUTIC ACTIVITIES: CPT

## 2021-11-29 PROCEDURE — 82962 GLUCOSE BLOOD TEST: CPT

## 2021-11-29 PROCEDURE — 97116 GAIT TRAINING THERAPY: CPT

## 2021-11-29 PROCEDURE — 94799 UNLISTED PULMONARY SVC/PX: CPT

## 2021-11-29 PROCEDURE — 97535 SELF CARE MNGMENT TRAINING: CPT

## 2021-11-29 PROCEDURE — 97110 THERAPEUTIC EXERCISES: CPT

## 2021-11-29 PROCEDURE — 25010000002 ENOXAPARIN PER 10 MG: Performed by: INTERNAL MEDICINE

## 2021-11-29 PROCEDURE — 25010000002 THIAMINE PER 100 MG: Performed by: INTERNAL MEDICINE

## 2021-11-29 RX ADMIN — CEFDINIR 300 MG: 300 CAPSULE ORAL at 20:20

## 2021-11-29 RX ADMIN — INSULIN DETEMIR 23 UNITS: 100 INJECTION, SOLUTION SUBCUTANEOUS at 21:10

## 2021-11-29 RX ADMIN — THIAMINE HYDROCHLORIDE: 100 INJECTION, SOLUTION INTRAMUSCULAR; INTRAVENOUS at 20:20

## 2021-11-29 RX ADMIN — PANTOPRAZOLE SODIUM 40 MG: 40 TABLET, DELAYED RELEASE ORAL at 17:10

## 2021-11-29 RX ADMIN — ROSUVASTATIN CALCIUM 10 MG: 10 TABLET, FILM COATED ORAL at 20:20

## 2021-11-29 RX ADMIN — INSULIN ASPART 3 UNITS: 100 INJECTION, SOLUTION INTRAVENOUS; SUBCUTANEOUS at 08:53

## 2021-11-29 RX ADMIN — INSULIN ASPART 5 UNITS: 100 INJECTION, SOLUTION INTRAVENOUS; SUBCUTANEOUS at 12:27

## 2021-11-29 RX ADMIN — CETIRIZINE HYDROCHLORIDE 5 MG: 10 TABLET, FILM COATED ORAL at 08:52

## 2021-11-29 RX ADMIN — CARVEDILOL 6.25 MG: 6.25 TABLET, FILM COATED ORAL at 17:10

## 2021-11-29 RX ADMIN — OXYCODONE HYDROCHLORIDE AND ACETAMINOPHEN 1 TABLET: 5; 325 TABLET ORAL at 20:20

## 2021-11-29 RX ADMIN — INSULIN ASPART 10 UNITS: 100 INJECTION, SOLUTION INTRAVENOUS; SUBCUTANEOUS at 17:09

## 2021-11-29 RX ADMIN — FERROUS SULFATE TAB 325 MG (65 MG ELEMENTAL FE) 325 MG: 325 (65 FE) TAB at 17:10

## 2021-11-29 RX ADMIN — ENOXAPARIN SODIUM 40 MG: 40 INJECTION SUBCUTANEOUS at 17:10

## 2021-11-29 RX ADMIN — CARVEDILOL 6.25 MG: 6.25 TABLET, FILM COATED ORAL at 08:53

## 2021-11-29 RX ADMIN — DOCUSATE SODIUM 100 MG: 100 CAPSULE ORAL at 20:20

## 2021-11-29 RX ADMIN — PANTOPRAZOLE SODIUM 40 MG: 40 TABLET, DELAYED RELEASE ORAL at 06:36

## 2021-11-29 RX ADMIN — DOCUSATE SODIUM 100 MG: 100 CAPSULE ORAL at 08:52

## 2021-11-29 RX ADMIN — Medication 500 MG: at 08:53

## 2021-11-29 RX ADMIN — CEFDINIR 300 MG: 300 CAPSULE ORAL at 08:52

## 2021-11-30 LAB
ANION GAP SERPL CALCULATED.3IONS-SCNC: 8.6 MMOL/L (ref 5–15)
BASOPHILS # BLD AUTO: 0.03 10*3/MM3 (ref 0–0.2)
BASOPHILS NFR BLD AUTO: 0.3 % (ref 0–1.5)
BUN SERPL-MCNC: 22 MG/DL (ref 8–23)
BUN/CREAT SERPL: 22 (ref 7–25)
CALCIUM SPEC-SCNC: 8.5 MG/DL (ref 8.6–10.5)
CHLORIDE SERPL-SCNC: 102 MMOL/L (ref 98–107)
CO2 SERPL-SCNC: 26.4 MMOL/L (ref 22–29)
CREAT SERPL-MCNC: 1 MG/DL (ref 0.57–1)
DEPRECATED RDW RBC AUTO: 50.4 FL (ref 37–54)
EOSINOPHIL # BLD AUTO: 0.26 10*3/MM3 (ref 0–0.4)
EOSINOPHIL NFR BLD AUTO: 2.9 % (ref 0.3–6.2)
ERYTHROCYTE [DISTWIDTH] IN BLOOD BY AUTOMATED COUNT: 15.8 % (ref 12.3–15.4)
GFR SERPL CREATININE-BSD FRML MDRD: 54 ML/MIN/1.73
GLUCOSE BLDC GLUCOMTR-MCNC: 238 MG/DL (ref 70–130)
GLUCOSE BLDC GLUCOMTR-MCNC: 280 MG/DL (ref 70–130)
GLUCOSE BLDC GLUCOMTR-MCNC: 371 MG/DL (ref 70–130)
GLUCOSE SERPL-MCNC: 199 MG/DL (ref 65–99)
HCT VFR BLD AUTO: 28.2 % (ref 34–46.6)
HGB BLD-MCNC: 8.4 G/DL (ref 12–15.9)
IMM GRANULOCYTES # BLD AUTO: 0.04 10*3/MM3 (ref 0–0.05)
IMM GRANULOCYTES NFR BLD AUTO: 0.4 % (ref 0–0.5)
LYMPHOCYTES # BLD AUTO: 2.86 10*3/MM3 (ref 0.7–3.1)
LYMPHOCYTES NFR BLD AUTO: 32.1 % (ref 19.6–45.3)
MCH RBC QN AUTO: 25.8 PG (ref 26.6–33)
MCHC RBC AUTO-ENTMCNC: 29.8 G/DL (ref 31.5–35.7)
MCV RBC AUTO: 86.8 FL (ref 79–97)
MONOCYTES # BLD AUTO: 0.75 10*3/MM3 (ref 0.1–0.9)
MONOCYTES NFR BLD AUTO: 8.4 % (ref 5–12)
NEUTROPHILS NFR BLD AUTO: 4.98 10*3/MM3 (ref 1.7–7)
NEUTROPHILS NFR BLD AUTO: 55.9 % (ref 42.7–76)
NRBC BLD AUTO-RTO: 0 /100 WBC (ref 0–0.2)
PLATELET # BLD AUTO: 533 10*3/MM3 (ref 140–450)
PMV BLD AUTO: 10.7 FL (ref 6–12)
POTASSIUM SERPL-SCNC: 4.6 MMOL/L (ref 3.5–5.2)
RBC # BLD AUTO: 3.25 10*6/MM3 (ref 3.77–5.28)
SODIUM SERPL-SCNC: 137 MMOL/L (ref 136–145)
WBC NRBC COR # BLD: 8.92 10*3/MM3 (ref 3.4–10.8)

## 2021-11-30 PROCEDURE — 97535 SELF CARE MNGMENT TRAINING: CPT

## 2021-11-30 PROCEDURE — 94799 UNLISTED PULMONARY SVC/PX: CPT

## 2021-11-30 PROCEDURE — 82962 GLUCOSE BLOOD TEST: CPT

## 2021-11-30 PROCEDURE — 80048 BASIC METABOLIC PNL TOTAL CA: CPT | Performed by: FAMILY MEDICINE

## 2021-11-30 PROCEDURE — 25010000002 ENOXAPARIN PER 10 MG: Performed by: INTERNAL MEDICINE

## 2021-11-30 PROCEDURE — 99231 SBSQ HOSP IP/OBS SF/LOW 25: CPT | Performed by: FAMILY MEDICINE

## 2021-11-30 PROCEDURE — 97110 THERAPEUTIC EXERCISES: CPT

## 2021-11-30 PROCEDURE — 97530 THERAPEUTIC ACTIVITIES: CPT

## 2021-11-30 PROCEDURE — 25010000002 THIAMINE PER 100 MG: Performed by: INTERNAL MEDICINE

## 2021-11-30 PROCEDURE — 85025 COMPLETE CBC W/AUTO DIFF WBC: CPT | Performed by: FAMILY MEDICINE

## 2021-11-30 PROCEDURE — 63710000001 INSULIN DETEMIR PER 5 UNITS: Performed by: INTERNAL MEDICINE

## 2021-11-30 PROCEDURE — 97116 GAIT TRAINING THERAPY: CPT

## 2021-11-30 PROCEDURE — 63710000001 INSULIN ASPART PER 5 UNITS: Performed by: INTERNAL MEDICINE

## 2021-11-30 RX ORDER — HYDROCORTISONE 25 MG/G
CREAM TOPICAL 2 TIMES DAILY
Status: DISCONTINUED | OUTPATIENT
Start: 2021-11-30 | End: 2021-12-10 | Stop reason: HOSPADM

## 2021-11-30 RX ADMIN — INSULIN ASPART 3 UNITS: 100 INJECTION, SOLUTION INTRAVENOUS; SUBCUTANEOUS at 08:43

## 2021-11-30 RX ADMIN — ENOXAPARIN SODIUM 40 MG: 40 INJECTION SUBCUTANEOUS at 16:38

## 2021-11-30 RX ADMIN — OXYCODONE HYDROCHLORIDE AND ACETAMINOPHEN 1 TABLET: 5; 325 TABLET ORAL at 20:55

## 2021-11-30 RX ADMIN — CARVEDILOL 6.25 MG: 6.25 TABLET, FILM COATED ORAL at 08:42

## 2021-11-30 RX ADMIN — ACETAMINOPHEN 650 MG: 325 TABLET ORAL at 22:18

## 2021-11-30 RX ADMIN — INSULIN ASPART 8 UNITS: 100 INJECTION, SOLUTION INTRAVENOUS; SUBCUTANEOUS at 16:37

## 2021-11-30 RX ADMIN — INSULIN DETEMIR 23 UNITS: 100 INJECTION, SOLUTION SUBCUTANEOUS at 20:56

## 2021-11-30 RX ADMIN — Medication 10 MG: at 20:55

## 2021-11-30 RX ADMIN — PANTOPRAZOLE SODIUM 40 MG: 40 TABLET, DELAYED RELEASE ORAL at 06:30

## 2021-11-30 RX ADMIN — INSULIN ASPART 12 UNITS: 100 INJECTION, SOLUTION INTRAVENOUS; SUBCUTANEOUS at 12:06

## 2021-11-30 RX ADMIN — DOCUSATE SODIUM 100 MG: 100 CAPSULE ORAL at 08:42

## 2021-11-30 RX ADMIN — HYDROCORTISONE 2.5%: 25 CREAM TOPICAL at 20:56

## 2021-11-30 RX ADMIN — POLYETHYLENE GLYCOL 3350 17 G: 17 POWDER, FOR SOLUTION ORAL at 08:42

## 2021-11-30 RX ADMIN — Medication 500 MG: at 08:42

## 2021-11-30 RX ADMIN — CARVEDILOL 6.25 MG: 6.25 TABLET, FILM COATED ORAL at 17:28

## 2021-11-30 RX ADMIN — FERROUS SULFATE TAB 325 MG (65 MG ELEMENTAL FE) 325 MG: 325 (65 FE) TAB at 16:38

## 2021-11-30 RX ADMIN — DOCUSATE SODIUM 100 MG: 100 CAPSULE ORAL at 20:55

## 2021-11-30 RX ADMIN — CETIRIZINE HYDROCHLORIDE 5 MG: 10 TABLET, FILM COATED ORAL at 08:42

## 2021-11-30 RX ADMIN — CEFDINIR 300 MG: 300 CAPSULE ORAL at 08:42

## 2021-11-30 RX ADMIN — PANTOPRAZOLE SODIUM 40 MG: 40 TABLET, DELAYED RELEASE ORAL at 16:38

## 2021-11-30 RX ADMIN — CEFDINIR 300 MG: 300 CAPSULE ORAL at 20:55

## 2021-11-30 RX ADMIN — THIAMINE HYDROCHLORIDE: 100 INJECTION, SOLUTION INTRAMUSCULAR; INTRAVENOUS at 20:56

## 2021-11-30 RX ADMIN — ROSUVASTATIN CALCIUM 10 MG: 10 TABLET, FILM COATED ORAL at 20:55

## 2021-12-01 LAB
GLUCOSE BLDC GLUCOMTR-MCNC: 174 MG/DL (ref 70–130)
GLUCOSE BLDC GLUCOMTR-MCNC: 220 MG/DL (ref 70–130)
GLUCOSE BLDC GLUCOMTR-MCNC: 278 MG/DL (ref 70–130)
GLUCOSE BLDC GLUCOMTR-MCNC: 329 MG/DL (ref 70–130)

## 2021-12-01 PROCEDURE — 82962 GLUCOSE BLOOD TEST: CPT

## 2021-12-01 PROCEDURE — 97116 GAIT TRAINING THERAPY: CPT

## 2021-12-01 PROCEDURE — 97110 THERAPEUTIC EXERCISES: CPT

## 2021-12-01 PROCEDURE — 63710000001 INSULIN ASPART PER 5 UNITS: Performed by: INTERNAL MEDICINE

## 2021-12-01 PROCEDURE — 97535 SELF CARE MNGMENT TRAINING: CPT

## 2021-12-01 PROCEDURE — 63710000001 INSULIN DETEMIR PER 5 UNITS: Performed by: INTERNAL MEDICINE

## 2021-12-01 PROCEDURE — 25010000002 ENOXAPARIN PER 10 MG: Performed by: INTERNAL MEDICINE

## 2021-12-01 PROCEDURE — 94799 UNLISTED PULMONARY SVC/PX: CPT

## 2021-12-01 PROCEDURE — 99231 SBSQ HOSP IP/OBS SF/LOW 25: CPT | Performed by: FAMILY MEDICINE

## 2021-12-01 PROCEDURE — 97530 THERAPEUTIC ACTIVITIES: CPT

## 2021-12-01 PROCEDURE — 97112 NEUROMUSCULAR REEDUCATION: CPT

## 2021-12-01 PROCEDURE — 25010000002 THIAMINE PER 100 MG: Performed by: INTERNAL MEDICINE

## 2021-12-01 RX ADMIN — DOCUSATE SODIUM 100 MG: 100 CAPSULE ORAL at 21:43

## 2021-12-01 RX ADMIN — PANTOPRAZOLE SODIUM 40 MG: 40 TABLET, DELAYED RELEASE ORAL at 17:18

## 2021-12-01 RX ADMIN — OXYCODONE HYDROCHLORIDE AND ACETAMINOPHEN 1 TABLET: 5; 325 TABLET ORAL at 05:32

## 2021-12-01 RX ADMIN — THIAMINE HYDROCHLORIDE: 100 INJECTION, SOLUTION INTRAMUSCULAR; INTRAVENOUS at 21:43

## 2021-12-01 RX ADMIN — DOCUSATE SODIUM 100 MG: 100 CAPSULE ORAL at 08:23

## 2021-12-01 RX ADMIN — OXYCODONE HYDROCHLORIDE AND ACETAMINOPHEN 1 TABLET: 5; 325 TABLET ORAL at 21:43

## 2021-12-01 RX ADMIN — PANTOPRAZOLE SODIUM 40 MG: 40 TABLET, DELAYED RELEASE ORAL at 08:23

## 2021-12-01 RX ADMIN — CARVEDILOL 6.25 MG: 6.25 TABLET, FILM COATED ORAL at 08:23

## 2021-12-01 RX ADMIN — ENOXAPARIN SODIUM 40 MG: 40 INJECTION SUBCUTANEOUS at 17:18

## 2021-12-01 RX ADMIN — Medication 10 MG: at 21:43

## 2021-12-01 RX ADMIN — INSULIN ASPART 3 UNITS: 100 INJECTION, SOLUTION INTRAVENOUS; SUBCUTANEOUS at 08:26

## 2021-12-01 RX ADMIN — INSULIN ASPART 8 UNITS: 100 INJECTION, SOLUTION INTRAVENOUS; SUBCUTANEOUS at 17:18

## 2021-12-01 RX ADMIN — CARVEDILOL 6.25 MG: 6.25 TABLET, FILM COATED ORAL at 17:18

## 2021-12-01 RX ADMIN — Medication 500 MG: at 08:23

## 2021-12-01 RX ADMIN — INSULIN ASPART 5 UNITS: 100 INJECTION, SOLUTION INTRAVENOUS; SUBCUTANEOUS at 11:54

## 2021-12-01 RX ADMIN — FERROUS SULFATE TAB 325 MG (65 MG ELEMENTAL FE) 325 MG: 325 (65 FE) TAB at 17:18

## 2021-12-01 RX ADMIN — ROSUVASTATIN CALCIUM 10 MG: 10 TABLET, FILM COATED ORAL at 21:43

## 2021-12-01 RX ADMIN — INSULIN DETEMIR 23 UNITS: 100 INJECTION, SOLUTION SUBCUTANEOUS at 21:44

## 2021-12-01 RX ADMIN — HYDROCORTISONE 2.5%: 25 CREAM TOPICAL at 21:43

## 2021-12-01 RX ADMIN — CETIRIZINE HYDROCHLORIDE 5 MG: 10 TABLET, FILM COATED ORAL at 08:23

## 2021-12-01 RX ADMIN — HYDROCORTISONE 2.5%: 25 CREAM TOPICAL at 08:23

## 2021-12-01 RX ADMIN — ACETAMINOPHEN 650 MG: 325 TABLET ORAL at 08:21

## 2021-12-01 NOTE — PROGRESS NOTES
Rehabilitation Nursing  Inpatient Rehabilitation Plan of Care Note    Plan of Care  Copy from [place]    Pain Management (Active)  Current Status (11/26/2021 12:51:00 PM): Potential for pain  Weekly Goal: Decreased pain this week  Discharge Goal: No pain    Safety    Potential for Injury (Active)  Current Status (11/26/2021 12:51:00 PM): At risk for injury  Weekly Goal: No injury this week  Discharge Goal: No injury    Signed by: Komal Vang Nurse

## 2021-12-01 NOTE — THERAPY TREATMENT NOTE
Inpatient Rehabilitation - Physical Therapy Treatment Note       EDWIGE Villarreal     Patient Name: Albina Finley  : 1945  MRN: [id number]    Today's Date: 2021                    Admit Date: 2021      Visit Dx:   No diagnosis found.    Patient Active Problem List   Diagnosis   • Iron deficiency anemia   • COPD with acute exacerbation ([facility])   • Chronic diastolic heart failure ([facility])   • CKD (chronic kidney disease) stage 3, GFR 30-59 ml/min ([facility])   • Essential hypertension   • Hyperlipidemia   • Type II diabetes mellitus ([facility])   • Arthritis   • Osteoporosis   • Grade II diastolic dysfunction   • Duodenitis   • Peptic ulcer disease with hemorrhage   • Non-ST elevation myocardial infarction (NSTEMI) ([facility])   • Abnormal nuclear stress test with mild degree of small size apical lateral wall myocardial ischemia in 2019.   • Anemia due to GI blood loss   • Hip fracture ([facility])   • S/p left hip fracture       Past Medical History:   Diagnosis Date   • Abnormal nuclear stress test with mild degree of small size apical lateral wall myocardial ischemia in 2019. 2020   • Acute on chronic diastolic CHF (congestive heart failure) ([facility]) 2019   • Anal polyp 2018    Added automatically from request for surgery [id number]   • Arthritis    • Chronic kidney disease    • COPD (chronic obstructive pulmonary disease) ([facility])    • Diabetes mellitus ([facility])    • Elevated cholesterol    • Essential hypertension 2019   • History of transfusion    • Incidental lung nodule, greater than or equal to 8mm     Left lower lobe, 15 mm based on  and 2019 chest CT scans with no growth in the size   • Iron deficiency anemia 2017   • Osteoporosis    • Peptic ulcer disease with hemorrhage 2020   • Pneumonia of right middle lobe due to infectious organism 2019       Past Surgical History:   Procedure Laterality Date   • ANUS SURGERY N/A 2018    Procedure: Diagnostic anoscopy with anal  polyp excision;  Surgeon: Marlyn Gutierrez MD;  Location: [facility] OR;  Service: General   •  SECTION     • ENDOSCOPY N/A 2020    Procedure: ESOPHAGOGASTRODUODENOSCOPY;  Surgeon: Marlon Wray MD;  Location: [facility] OR;  Service: Gastroenterology;  Laterality: N/A;   • FEMUR OPEN REDUCTION INTERNAL FIXATION Left 2021    Procedure: Left hip hook plate operative fixation;  Surgeon: Lupillo Cornejo MD;  Location: [facility] OR;  Service: Orthopedics;  Laterality: Left;   • HIP BIPOLAR REPLACEMENT      left Dr. Cornejo    • HYSTERECTOMY     • JOINT REPLACEMENT         PT ASSESSMENT (last 12 hours)     IRF PT Evaluation and Treatment     Row Name 21 1159          PT Time and Intention    Document Type daily treatment  -RF     Mode of Treatment physical therapy  -RF     Patient/Family/Caregiver Comments/Observations Pt c/o L hip pain, fatigue, and SOA with activity this date.  -RF     Row Name 21 1159          General Information    Patient Profile Reviewed yes  -RF     Row Name 21 1159          Cognition/Psychosocial    Affect/Mental Status (Cognitive) WFL  -RF     Orientation Status (Cognition) oriented to; person; place  -RF     Follows Commands (Cognition) WFL; follows one-step commands; repetition of directions required  -RF     Row Name 21 1159          Mobility    Extremity Weight-bearing Status left lower extremity  -RF     Left Lower Extremity (Weight-bearing Status) weight-bearing as tolerated (WBAT); other (see comments)  per chart review, physician orders  -RF     Row Name 21 1159          Transfer Assessment/Treatment    Transfers sit-stand transfer; stand-sit transfer; car transfer  -     Row Name 21 1159          Transfers    Sit-Stand [place] (Transfers) standby assist  -RF     Stand-Sit [place] (Transfers) standby assist  -RF     [place] Level (Toilet Transfer) minimum assist (75% patient effort)  -RF     Assistive Device (Toilet  Transfer) wheelchair; grab bars/safety frame  -RF     Row Name 12/01/21 1159          Sit-Stand Transfer    Assistive Device (Sit-Stand Transfers) walker, front-wheeled  -RF     Row Name 12/01/21 1159          Stand-Sit Transfer    Assistive Device (Stand-Sit Transfers) walker, front-wheeled  -RF     Row Name 12/01/21 1159          Car Transfer    Type (Car Transfer) stand pivot/stand step  -RF     [place] Level (Car Transfer) contact guard; standby assist  -RF     Assistive Device (Car Transfer) wheelchair  -RF     Row Name 12/01/21 1159          Gait/Stairs (Locomotion)    Gait/Stairs Locomotion gait/ambulation assistive device  -RF     [place] Level (Gait) contact guard; standby assist  -RF     Assistive Device (Gait) walker, front-wheeled  -RF     Distance in Feet (Gait) 80X2  -RF     Pattern (Gait) step-to  -RF     Deviations/Abnormal Patterns (Gait) antalgic  -RF     Bilateral Gait Deviations forward flexed posture  -RF     Comment (Gait/Stairs) Improving pattern noted; conitnued decreased WB noted on LLE due to pain and weakness.  -RF     Row Name 12/01/21 1159          Balance    Balance Assessment standing dynamic balance  -RF     Dynamic Standing Balance mild impairment; supported; standing; asymmetrical weight shifting; other (see comments)  Pt performed bag toss and  with reacher while reaching outside [place] and across midline  -RF     Comment, Balance IMproved WS noted; no significant LOB observed. Rest required due to fatigue.  -RF     Row Name 12/01/21 1159          Hip (Therapeutic Exercise)    Hip Strengthening (Therapeutic Exercise) bilateral; flexion; extension; aBduction; aDduction; marching while seated; sitting; resistance band; green; 2 sets; 10 repetitions; other (see comments)  BRIT req for L hip flexion iniitally  -RF     Row Name 12/01/21 1159          Knee (Therapeutic Exercise)    Knee Strengthening (Therapeutic Exercise) bilateral; flexion; extension; heel slides;  marching while seated; LAQ (long arc quad); hamstring curls; sitting; resistance band; green; 2 sets; 10 repetitions  -RF     Row Name 12/01/21 1159          Ankle (Therapeutic Exercise)    Ankle Strengthening (Therapeutic Exercise) bilateral; dorsiflexion; plantarflexion; sitting; 2 sets; 10 repetitions  -RF     Row Name 12/01/21 1159          IRF PT Goals    Transfer Goal Selection (PT-IRF) transfers, PT goal 1  -RF     Gait (Walking Locomotion) Goal Selection (PT-IRF) gait, PT goal 1  -RF     Row Name 12/01/21 1152          Transfer Goal 1 (PT-IRF)    Activity/Assistive Device (Transfer Goal 1, PT-IRF) all transfers  -RF     [place] Level (Transfer Goal 1, PT-IRF) standby assist  -RF     Time Frame (Transfer Goal 1, PT-IRF) 2 weeks  -RF     Row Name 12/01/21 1152          Gait/Walking Locomotion Goal 1 (PT-IRF)    Activity/Assistive Device (Gait/Walking Locomotion Goal 1, PT-IRF) gait (walking locomotion); assistive device use  -RF     Gait/Walking Locomotion Distance Goal 1 (PT-IRF) 100  -RF     [place] Level (Gait/Walking Locomotion Goal 1, PT-IRF) contact guard assist  -RF     Time Frame (Gait/Walking Locomotion Goal 1, PT-IRF) 2 weeks  -RF     Row Name 12/01/21 1155          Positioning and Restraints    Pre-Treatment Position sitting in chair/recliner  -RF     Post Treatment Position wheelchair  -RF     In Wheelchair sitting; call light within reach; encouraged to call for assist  -RF     Row Name 12/01/21 8642          Therapy Assessment/Plan (PT)    Rehab Potential/Prognosis (PT) good, to achieve stated therapy goals  -RF     Frequency of Treatment (PT) 5 times per week  -RF     Estimated Duration of Therapy (PT) 2 weeks  -RF     Row Name 12/01/21 3347          Daily Progress Summary (PT)    Functional Goal Overall Progress (PT) progressing toward functional goals as expected  -RF     Daily Progress Summary (PT) Pt continues to progress with LE strength; less assistance is required for safety  with all activities. Continued skilled care required for further functional improvements to ensure maximum safe function upon D/C.  -RF     Impairments Still Limiting Function (PT) strength decreased; impaired functional activity tolerance; pain  -RF     Recommendations (PT) Continue per current POC  -RF     Row Name 12/01/21 1159          Therapy Plan Review/Discharge Plan (PT)    Anticipated Equipment Needs at Discharge (PT Eval) other (see comments)  TBD  -RF     Expected Discharge Disposition (PT Eval) home; home with caregiver  -RF           User Key  (r) = Recorded By, (t) = Taken By, (c) = Cosigned By    Initials Name Provider Type    RF Ayla Gonzalez PTA Physical Therapy Assistant              Wound 11/19/21 0948 Left lateral hip Incision (Active)   Dressing Appearance dry; intact 12/01/21 1040   Closure GUILLERMO 11/30/21 1915   Base dressing in place, unable to visualize 11/30/21 1915     Physical Therapy Education                 Title: PT OT SLP Therapies (Done)     Topic: Physical Therapy (Done)     Point: Mobility training (Done)     Learning Progress Summary           Patient Acceptance, E,TB, VU by RF at 12/1/2021 1204      Show all documentation for this point (2)                 Point: Home exercise program (Done)     Learning Progress Summary           Patient Acceptance, E,TB, VU by RF at 12/1/2021 1204      Show all documentation for this point (2)                 Point: Body mechanics (Done)     Learning Progress Summary           Patient Acceptance, E,TB, VU by RF at 12/1/2021 1204      Show all documentation for this point (2)                 Point: Precautions (Done)     Learning Progress Summary           Patient Acceptance, E,TB, VU by RF at 12/1/2021 1204      Show all documentation for this point (2)                             User Key     Initials Effective Dates Name Provider Type Discipline    RF 06/16/21 -  Ayla Gonzalez PTA Physical Therapy Assistant PT                PT  Recommendation and Plan    Frequency of Treatment (PT): 5 times per week  Anticipated Equipment Needs at Discharge (PT Eval): other (see comments) (TBD)  Daily Progress Summary (PT)  Functional Goal Overall Progress (PT): progressing toward functional goals as expected  Daily Progress Summary (PT): Pt continues to progress with LE strength; less assistance is required for safety with all activities. Continued skilled care required for further functional improvements to ensure maximum safe function upon D/C.  Impairments Still Limiting Function (PT): strength decreased, impaired functional activity tolerance, pain  Recommendations (PT): Continue per current POC               Time Calculation:      PT Charges     Row Name 12/01/21 1204             Time Calculation    Start Time 0915  -RF      Stop Time 1045  -RF      Time Calculation (min) 90 min  -RF      PT Received On 12/01/21  -RF      PT - Next Appointment 12/02/21  -RF      PT Goal Re-Cert Due Date 12/03/21  -RF              Time Calculation- PT    Total Timed Code Minutes- PT 90 minute(s)  -RF            User Key  (r) = Recorded By, (t) = Taken By, (c) = Cosigned By    Initials Name Provider Type    RF Ayla Gonzalez, PTA Physical Therapy Assistant                Therapy Charges for Today     Code Description Service Date Service Provider Modifiers Qty    91028007261 HC GAIT TRAINING EA 15 MIN 11/30/2021 Ayla Gonzalez, PTA GP 2    52210536353 HC PT THER PROC EA 15 MIN 11/30/2021 Ayla Gonzalez, PTA GP 3    38716247197 HC PT THERAPEUTIC ACT EA 15 MIN 11/30/2021 Ayla Gonzalez, PTA GP 1    48338949921 HC GAIT TRAINING EA 15 MIN 12/1/2021 Ayla Gonzalez, PTA GP 2    51175858000 HC PT NEUROMUSC RE EDUCATION EA 15 MIN 12/1/2021 Ayla Gonzalez, PTA GP 1    04815444328 HC PT THER PROC EA 15 MIN 12/1/2021 Ayla Gonzalez, PTA GP 2    15940269076 HC PT THERAPEUTIC ACT EA 15 MIN 12/1/2021 Ayla Gonzalez, PTA GP 1                   Ayla DELANEY  Carlos, PTA  12/1/2021

## 2021-12-01 NOTE — PROGRESS NOTES
[facility]  PROGRESS NOTE     Patient Identification:  Name:  Albina Finley  Age:  76 y.o.  Sex:  female  :  1945  MRN:  [id number]  Visit Number:  [id number]  ROOM: 102/     Primary Care Provider:  Micah Núñez PA    Length of stay in inpatient status:  5    Subjective     Chief Compliant:  No chief complaint on file.      History of Presenting Illness: 76-year-old female with recent left hip fracture with ORIF, chronic kidney disease stage III, chronic nocturnal hypoxic respiratory failure, hypertension, hyperlipidemia, CAD, CHF preserved EF, severe pulmonary hypertension, diabetes mellitus and COPD.  Patient states that she is doing well has no new complaints    Objective     Current Hospital Meds:calcium carbonate (oyster shell), 500 mg, Oral, Daily  carvedilol, 6.25 mg, Oral, BID With Meals  cetirizine, 5 mg, Oral, Daily  docusate sodium, 100 mg, Oral, BID  enoxaparin, 40 mg, Subcutaneous, Q24H  ferrous sulfate, 325 mg, Oral, Q PM  Hydrocortisone (Perianal), , Rectal, BID  insulin aspart, 0-14 Units, Subcutaneous, TID AC  insulin detemir, 23 Units, Subcutaneous, Nightly  pantoprazole, 40 mg, Oral, BID AC  polyethylene glycol, 17 g, Oral, Daily  rosuvastatin, 10 mg, Oral, Nightly  IVPB builder, , Intravenous, Nightly       ----------------------------------------------------------------------------------------------------------------------  Vital Signs:  Temp:  [98.3 °F (36.8 °C)] 98.3 °F (36.8 °C)  Heart Rate:  [66-74] 74  Resp:  [18] 18  BP: (130-152)/(62-69) 152/69  SpO2:  [95 %-98 %] 98 %  on  Flow (L/min):  [1-2] 2;   Device (Oxygen Therapy): nasal cannula  Body mass index is 29.59 kg/m².    Wt Readings from Last 3 Encounters:   21 73.4 kg (161 lb 13.1 oz)   21 73.4 kg (161 lb 14.4 oz)   20 72.6 kg (160 lb)     Intake & Output (last 3 days)        0701   0700  0701   07 0701   0700  0701   0700    P.O. 1200 1440 2160  360    IV Piggyback   100     Total Intake(mL/kg) 1200 (16.3) 1440 (19.6) 2260 (30.8) 360 (4.9)    Net +1200 +1440 +2260 +360            Urine Unmeasured Occurrence 7 x 9 x 12 x 2 x    Stool Unmeasured Occurrence  2 x 1 x         Diet Soft Texture; [employer]; Thin  ----------------------------------------------------------------------------------------------------------------------  Physical exam:  Constitutional:   No acute distress  HEENT: Normocephalic atraumatic  Neck: Supple   Cardiovascular: Regular rate and rhythm  Pulmonary/Chest: Clear to auscultation  Abdominal: Positive bowel sounds soft.   Musculoskeletal: Status post left hip repair  Neurological: No focal deficits  Skin: No rash  Peripheral vascular:  Genitourinary:  ----------------------------------------------------------------------------------------------------------------------    Last echocardiogram:  Results for orders placed during the hospital encounter of 11/13/21    Adult Transthoracic Echo Complete W/ Cont if Necessary Per Protocol    Interpretation Summary  · Estimated left ventricular EF = 65% Left ventricular ejection fraction appears to be 61 - 65%. Left ventricular systolic function is normal.  · Left ventricular diastolic function was normal.  · Estimated right ventricular systolic pressure from tricuspid regurgitation is markedly elevated (>55 mmHg).  · Severe pulmonary hypertension is present.  · No significant mitral or aortic regurgitation  · No pericardial effusion  · Since previous study of 12/17/19 Severe pulmonary hypertension is now present    ----------------------------------------------------------------------------------------------------------------------  Results from last 7 days   Lab Units 11/30/21  0023 11/26/21  0454 11/25/21  0527   WBC 10*3/mm3 8.92 9.67 8.95   HEMOGLOBIN g/dL 8.4* 7.8* 7.9*   HEMATOCRIT % 28.2* 26.8* 26.4*   MCV fL 86.8 88.4 89.5   MCHC g/dL 29.8* 29.1* 29.9*   PLATELETS 10*3/mm3 533* 424 163          Results from last 7 days   Lab Units 11/30/21  0023 11/26/21  0454 11/25/21  0527   SODIUM mmol/L 137 138 138   POTASSIUM mmol/L 4.6 4.0 4.0   CHLORIDE mmol/L 102 100 99   CO2 mmol/L 26.4 28.8 28.8   BUN mg/dL 22 21 31*   CREATININE mg/dL 1.00 0.96 1.09*   EGFR IF NONAFRICN AM mL/min/1.73 54* 56* 49*   CALCIUM mg/dL 8.5* 8.3* 8.6   GLUCOSE mg/dL 199* 127* 137*   Estimated Creatinine Clearance: 44.2 mL/min (by C-G formula based on SCr of 1 mg/dL).  No results found for: AMMONIA              Glucose   Date/Time Value Ref Range Status   12/01/2021 0559 174 (H) 70 - 130 mg/dL Final     Comment:     Meter: [id number] : [id number] [place]   11/30/2021 1956 238 (H) 70 - 130 mg/dL Final     Comment:     Meter: [id number] : [id number] Gee Crystal   11/30/2021 1553 280 (H) 70 - 130 mg/dL Final     Comment:     Meter: [id number] : [id number] Gisela Morel   11/30/2021 1146 371 (H) 70 - 130 mg/dL Final     Comment:     Meter: [id number] : [id number] Pierre Doris   11/29/2021 1950 298 (H) 70 - 130 mg/dL Final     Comment:     Meter: [id number] : [id number] [place]   11/29/2021 1620 313 (H) 70 - 130 mg/dL Final     Comment:     Meter: [id number] : [id number] Jay Doris   11/29/2021 1131 217 (H) 70 - 130 mg/dL Final     Comment:     Meter: [id number] : [id number] Kiesha Miner   11/29/2021 0620 156 (H) 70 - 130 mg/dL Final     Comment:     Meter: [id number] : [id number] [place]     Lab Results   Component Value Date    TSH 1.840 02/04/2021    FREET4 1.38 02/04/2021     No results found for: PREGTESTUR, PREGSERUM, HCG, HCGQUANT  Pain Management Panel     Pain Management Panel Latest Ref Rng & Units 11/14/2021 3/1/2021    CREATININE UR mg/dL 115.1 28.7        Brief Urine Lab Results  (Last result in the past 365 days)      Color   Clarity   Blood   Leuk Est   Nitrite   Protein   CREAT   Urine HCG        11/23/21 1731 Dark Yellow   Turbid   Trace   Large (3+)   Negative   30  mg/dL (1+)               No results found for: BLOODCX      No results found for: URINECX  No results found for: WOUNDCX  No results found for: STOOLCX        I have personally looked at the labs and they are summarized above.  ----------------------------------------------------------------------------------------------------------------------  Detailed radiology reports for the last 24 hours:    Imaging Results (Last 24 Hours)     ** No results found for the last 24 hours. **        Final impressions for the last 30 days of radiology reports:    CT Head Without Contrast    Result Date: 11/23/2021  1.  Senescent changes without acute intracranial abnormality Signer Name: NGOC ISSA MD  Signed: 11/23/2021 7:17 PM  Workstation Name: [facility]    XR Chest 1 View    Result Date: 11/13/2021  Probable mild CHF  This report was finalized on 11/13/2021 2:30 PM by Dr. Jonah Mcbride MD.      US Renal Bilateral    Result Date: 11/13/2021  Negative bilateral renal ultrasound examination. Signer Name: NGOC ISSA MD  Signed: 11/13/2021 7:40 PM  Workstation Name: [facility]    US Venous Doppler Lower Extremity Bilateral (duplex)    Result Date: 11/13/2021  No deep venous thrombus seen in either lower extremity. Signer Name: NGOC ISSA MD  Signed: 11/13/2021 4:28 PM  Workstation Name: [facility]    CT Lower Extremity Left Without Contrast    Result Date: 11/13/2021  Acute proximal left femoral fracture  This report was finalized on 11/13/2021 2:13 PM by Dr. Jonah Mcbride MD.      XR Abdomen KUB    Result Date: 11/17/2021  No change in proximal left lateral cortex femur fracture Normal bowel gas pattern Signer Name: Haider Payton MD  Signed: 11/17/2021 6:54 AM  Workstation Name: [facility]    XR Hip With or Without Pelvis 1 View Left    Result Date: 11/19/2021    Status post left  hip arthroplasty.  This report was finalized on 11/19/2021 4:23 PM by Dr. Ceasar Woodard MD.      XR Hip With or Without Pelvis 2 - 3 View Left    Result Date: 11/13/2021  Proximal left femoral fracture  This report was finalized on 11/13/2021 1:15 PM by Dr. Jonah Mcbride MD.      I have personally looked at the radiology images and read the final radiology report.    Assessment & Plan    Status post left foot fracture with ORIF--patient requiring maximum assistance for lower body dressing; requires set up for grooming.  Did work on motor skills yesterday including functional endurance exercises with therapeutic exercise range of motion, gross motor coordination activities, fine motor manipulation dexterity activities.  Patient requires contact-guard for transfers; ambulated 80 feet x 2 and 60 feet yesterday with front wheel walker and contact-guard    Diabetes mellitus--having some sporadic elevated blood pressures.  Will reevaluate insulin protocol today    Chronic nocturnal hypoxic respiratory failure stable continue O2 2 L    CKD stage III stable    Hypertension controlled continue Coreg    Hyperlipidemia continue statin therapy    CHF with preserved EF--compensated    Anemia with history of iron deficiency anemia and B12 deficiency.  Stable    VTE Prophylaxis:   Mechanical Order History:     None      Pharmalogical Order History:      Ordered     Dose Route Frequency Stop    11/26/21 4107  enoxaparin (LOVENOX) syringe 40 mg         40 mg SC Every 24 Hours --                    Jesus Gaytan MD  [facility]  12/01/21  10:34 EST

## 2021-12-01 NOTE — PROGRESS NOTES
Occupational Therapy:    Physical Therapy: Individual: 90 minutes.    Speech Language Pathology:    Signed by: Ayla Gonzalez PTA

## 2021-12-01 NOTE — THERAPY TREATMENT NOTE
Inpatient Rehabilitation - Occupational Therapy Treatment Note    EDWIGE Villarreal     Patient Name: Albina Finley  : 1945  MRN: [id number]    Today's Date: 2021                 Admit Date: 2021       No diagnosis found.    Patient Active Problem List   Diagnosis   • Iron deficiency anemia   • COPD with acute exacerbation ([facility])   • Chronic diastolic heart failure ([facility])   • CKD (chronic kidney disease) stage 3, GFR 30-59 ml/min ([facility])   • Essential hypertension   • Hyperlipidemia   • Type II diabetes mellitus ([facility])   • Arthritis   • Osteoporosis   • Grade II diastolic dysfunction   • Duodenitis   • Peptic ulcer disease with hemorrhage   • Non-ST elevation myocardial infarction (NSTEMI) ([facility])   • Abnormal nuclear stress test with mild degree of small size apical lateral wall myocardial ischemia in 2019.   • Anemia due to GI blood loss   • Hip fracture ([facility])   • S/p left hip fracture       Past Medical History:   Diagnosis Date   • Abnormal nuclear stress test with mild degree of small size apical lateral wall myocardial ischemia in 2019. 2020   • Acute on chronic diastolic CHF (congestive heart failure) ([facility]) 2019   • Anal polyp 2018    Added automatically from request for surgery [id number]   • Arthritis    • Chronic kidney disease    • COPD (chronic obstructive pulmonary disease) ([facility])    • Diabetes mellitus ([facility])    • Elevated cholesterol    • Essential hypertension 2019   • History of transfusion    • Incidental lung nodule, greater than or equal to 8mm     Left lower lobe, 15 mm based on  and 2019 chest CT scans with no growth in the size   • Iron deficiency anemia 2017   • Osteoporosis    • Peptic ulcer disease with hemorrhage 2020   • Pneumonia of right middle lobe due to infectious organism 2019       Past Surgical History:   Procedure Laterality Date   • ANUS SURGERY N/A 2018    Procedure: Diagnostic anoscopy with anal polyp excision;   Surgeon: Marlyn Gutierrez MD;  Location:  [facility] OR;  Service: General   •  SECTION     • ENDOSCOPY N/A 2020    Procedure: ESOPHAGOGASTRODUODENOSCOPY;  Surgeon: Marlon Wray MD;  Location:  [facility] OR;  Service: Gastroenterology;  Laterality: N/A;   • FEMUR OPEN REDUCTION INTERNAL FIXATION Left 2021    Procedure: Left hip hook plate operative fixation;  Surgeon: Lupillo Cornejo MD;  Location: [facility] OR;  Service: Orthopedics;  Laterality: Left;   • HIP BIPOLAR REPLACEMENT      left Dr. Cornejo    • HYSTERECTOMY     • JOINT REPLACEMENT               IRF OT ASSESSMENT FLOWSHEET (last 12 hours)     IRF OT Evaluation and Treatment     Row Name 21 1408          OT Time and Intention    Document Type daily treatment  -     Mode of Treatment occupational therapy  -     Symptoms Noted During/After Treatment --  c/o LLE pain.  RN notified.  -     Row Name 21 140          General Information    Patient/Family/Caregiver Comments/Observations agreeable to therapy  -     Existing Precautions/Restrictions fall; oxygen therapy device and L/min  decreased skin integrity  -     Row Name 21 1408          Cognition/Psychosocial    Follows Commands (Cognition) verbal cues/prompting required; repetition of directions required  -     Row Name 21 1408          Transfers    Bed-Chair [place] (Transfers) minimum assist (75% patient effort); verbal cues  -     Assistive Device (Bed-Chair Transfers) wheelchair  -     [place] Level (Toilet Transfer) minimum assist (75% patient effort); verbal cues  -     Assistive Device (Toilet Transfer) grab bars/safety frame; wheelchair  -     Row Name 21 1408          Motor Skills    Motor Control/Coordination Interventions therapeutic exercise/ROM; gross motor coordination activities; fine motor manipulation/dexterity activities  BUE ther ex/act, bilat coord ex, GMC/FMC, hand exerciser  -     Row Name 21 1403           Bathing    [place] Level (Bathing) moderate assist (50% patient effort); verbal cues  -     Row Name 12/01/21 1408          Upper Body Dressing    [place] Level (Upper Body Dressing) set up assistance; supervision; verbal cues  -     Row Name 12/01/21 1408          Lower Body Dressing    [place] Level (Lower Body Dressing) moderate assist (50% patient effort); maximum assist (25% patient effort); verbal cues  -     Assistive Device Use (Lower Body Dressing) reacher; sock-aid  -     Row Name 12/01/21 1408          Grooming    [place] Level (Grooming) set up; supervision; verbal cues  -     Row Name 12/01/21 1408          Toileting    [place] Level (Toileting) moderate assist (50% patient effort); verbal cues  -     Assistive Device Use (Toileting) grab bar/safety frame  -     Row Name 12/01/21 1408          Positioning and Restraints    Post Treatment Position wheelchair  -     In Wheelchair with PT; sitting; call light within reach; encouraged to call for assist; notified nsg  PT - am tx;  room - pm tx  -     Row Name 12/01/21 1408          Vital Signs    Intra SpO2 (%) 98  -     O2 Delivery Intra Treatment supplemental O2  -           User Key  (r) = Recorded By, (t) = Taken By, (c) = Cosigned By    Initials Name Effective Dates     Itzel Sears, OT 06/16/21 -                  Occupational Therapy Education                 Title: PT OT SLP Therapies (Done)     Topic: Occupational Therapy (Done)     Point: ADL training (Done)     Description:   Instruct learner(s) on proper safety adaptation and remediation techniques during self care or transfers.   Instruct in proper use of assistive devices.              Learning Progress Summary           Patient Acceptance, E,D, VU,NR by  at 12/1/2021 1413      Show all documentation for this point (3)                 Point: Precautions (Done)     Description:   Instruct learner(s) on prescribed precautions during  self-care and functional transfers.              Learning Progress Summary           Patient Acceptance, E,D, VU,NR by  at 12/1/2021 1413      Show all documentation for this point (3)                             User Key     Initials Effective Dates Name Provider Type Discipline     06/16/21 -  Itzel Sears OT Occupational Therapist OT                    OT Recommendation and Plan    Planned Therapy Interventions (OT): activity tolerance training, adaptive equipment training, BADL retraining, patient/caregiver education/training, ROM/therapeutic exercise, strengthening exercise, occupation/activity based interventions, transfer/mobility retraining                    Time Calculation:      Time Calculation- OT     Row Name 12/01/21 1413 12/01/21 1412          Time Calculation- OT    OT Start Time 1245  - 0800  -     OT Stop Time 1310  - 0915  -     OT Time Calculation (min) 25 min  - 75 min  -     Total Timed Code Minutes- OT 25 minute(s)  - 75 minute(s)  -           User Key  (r) = Recorded By, (t) = Taken By, (c) = Cosigned By    Initials Name Provider Type     Itzel Sears OT Occupational Therapist              Therapy Charges for Today     Code Description Service Date Service Provider Modifiers Qty    74290328546 HC OT SELF CARE/MGMT/TRAIN EA 15 MIN 11/30/2021 Itzel Sears OT GO 2    49026774220 HC OT THER PROC EA 15 MIN 11/30/2021 Itzel Sears OT GO 2    43795564745 HC OT THERAPEUTIC ACT EA 15 MIN 11/30/2021 Itzel Sears OT GO 3    86436763864 HC OT SELF CARE/MGMT/TRAIN EA 15 MIN 12/1/2021 Itzel Sears OT GO 3    35973094104 HC OT THER PROC EA 15 MIN 12/1/2021 Itzel Sears OT GO 1    94503900567 HC OT THERAPEUTIC ACT EA 15 MIN 12/1/2021 Itzel Sears OT GO 3                   Itzel Sears OT  12/1/2021

## 2021-12-01 NOTE — PROGRESS NOTES
Occupational Therapy: Individual: 100 minutes.    Physical Therapy:    Speech Language Pathology:    Signed by: Itzel Sears, Occupational Therapist

## 2021-12-01 NOTE — PLAN OF CARE
Goal Outcome Evaluation:            Pt is progressing physically and medically with all therapies, continue with current plan of care.

## 2021-12-02 LAB
GLUCOSE BLDC GLUCOMTR-MCNC: 201 MG/DL (ref 70–130)
GLUCOSE BLDC GLUCOMTR-MCNC: 236 MG/DL (ref 70–130)
GLUCOSE BLDC GLUCOMTR-MCNC: 257 MG/DL (ref 70–130)
GLUCOSE BLDC GLUCOMTR-MCNC: 279 MG/DL (ref 70–130)

## 2021-12-02 PROCEDURE — 99231 SBSQ HOSP IP/OBS SF/LOW 25: CPT | Performed by: FAMILY MEDICINE

## 2021-12-02 PROCEDURE — 97110 THERAPEUTIC EXERCISES: CPT

## 2021-12-02 PROCEDURE — 63710000001 INSULIN ASPART PER 5 UNITS: Performed by: INTERNAL MEDICINE

## 2021-12-02 PROCEDURE — 97530 THERAPEUTIC ACTIVITIES: CPT

## 2021-12-02 PROCEDURE — 97535 SELF CARE MNGMENT TRAINING: CPT

## 2021-12-02 PROCEDURE — 25010000002 THIAMINE PER 100 MG: Performed by: INTERNAL MEDICINE

## 2021-12-02 PROCEDURE — 97116 GAIT TRAINING THERAPY: CPT

## 2021-12-02 PROCEDURE — 94799 UNLISTED PULMONARY SVC/PX: CPT

## 2021-12-02 PROCEDURE — 82962 GLUCOSE BLOOD TEST: CPT

## 2021-12-02 PROCEDURE — 63710000001 INSULIN DETEMIR PER 5 UNITS: Performed by: FAMILY MEDICINE

## 2021-12-02 PROCEDURE — 25010000002 ENOXAPARIN PER 10 MG: Performed by: INTERNAL MEDICINE

## 2021-12-02 RX ADMIN — Medication 500 MG: at 09:13

## 2021-12-02 RX ADMIN — INSULIN ASPART 5 UNITS: 100 INJECTION, SOLUTION INTRAVENOUS; SUBCUTANEOUS at 09:12

## 2021-12-02 RX ADMIN — DOCUSATE SODIUM 100 MG: 100 CAPSULE ORAL at 09:11

## 2021-12-02 RX ADMIN — INSULIN ASPART 5 UNITS: 100 INJECTION, SOLUTION INTRAVENOUS; SUBCUTANEOUS at 16:57

## 2021-12-02 RX ADMIN — ACETAMINOPHEN 650 MG: 325 TABLET ORAL at 21:04

## 2021-12-02 RX ADMIN — Medication 10 MG: at 21:04

## 2021-12-02 RX ADMIN — OXYCODONE HYDROCHLORIDE AND ACETAMINOPHEN 1 TABLET: 5; 325 TABLET ORAL at 05:23

## 2021-12-02 RX ADMIN — CARVEDILOL 6.25 MG: 6.25 TABLET, FILM COATED ORAL at 09:11

## 2021-12-02 RX ADMIN — PANTOPRAZOLE SODIUM 40 MG: 40 TABLET, DELAYED RELEASE ORAL at 16:58

## 2021-12-02 RX ADMIN — FERROUS SULFATE TAB 325 MG (65 MG ELEMENTAL FE) 325 MG: 325 (65 FE) TAB at 16:58

## 2021-12-02 RX ADMIN — DOCUSATE SODIUM 100 MG: 100 CAPSULE ORAL at 21:04

## 2021-12-02 RX ADMIN — INSULIN DETEMIR 27 UNITS: 100 INJECTION, SOLUTION SUBCUTANEOUS at 21:05

## 2021-12-02 RX ADMIN — CETIRIZINE HYDROCHLORIDE 5 MG: 10 TABLET, FILM COATED ORAL at 09:11

## 2021-12-02 RX ADMIN — ROSUVASTATIN CALCIUM 10 MG: 10 TABLET, FILM COATED ORAL at 21:04

## 2021-12-02 RX ADMIN — THIAMINE HYDROCHLORIDE: 100 INJECTION, SOLUTION INTRAMUSCULAR; INTRAVENOUS at 21:08

## 2021-12-02 RX ADMIN — POLYETHYLENE GLYCOL 3350 17 G: 17 POWDER, FOR SOLUTION ORAL at 09:10

## 2021-12-02 RX ADMIN — INSULIN ASPART 8 UNITS: 100 INJECTION, SOLUTION INTRAVENOUS; SUBCUTANEOUS at 11:45

## 2021-12-02 RX ADMIN — HYDROCORTISONE 2.5%: 25 CREAM TOPICAL at 21:05

## 2021-12-02 RX ADMIN — PANTOPRAZOLE SODIUM 40 MG: 40 TABLET, DELAYED RELEASE ORAL at 09:10

## 2021-12-02 RX ADMIN — CARVEDILOL 6.25 MG: 6.25 TABLET, FILM COATED ORAL at 17:23

## 2021-12-02 RX ADMIN — ENOXAPARIN SODIUM 40 MG: 40 INJECTION SUBCUTANEOUS at 16:57

## 2021-12-02 RX ADMIN — OXYCODONE HYDROCHLORIDE AND ACETAMINOPHEN 1 TABLET: 5; 325 TABLET ORAL at 19:32

## 2021-12-02 RX ADMIN — HYDROCORTISONE 2.5%: 25 CREAM TOPICAL at 09:13

## 2021-12-02 NOTE — PROGRESS NOTES
Occupational Therapy: Individual: 90 minutes.    Physical Therapy:    Speech Language Pathology:    Signed by: Itzel Sears, Occupational Therapist

## 2021-12-02 NOTE — PROGRESS NOTES
Case Management  Inpatient Rehabilitation Team Conference    Conference Date/Time: 11/30/2021 7:28:04 AM    Team Conference Attendees:  MD Jaquelin Barrett SW Jessica Bill, RN,   JOYCE Mejia, PT  Itzel Sears OT    Demographics            Age: 76Y            Gender: Female    Admission Date: 11/26/2021 12:51:00 PM  Rehabilitation Diagnosis:  Left proximal femure fracture  Comorbidities:      Plan of Care  Anticipated Discharge Date/Estimated Length of Stay: 14 days  Anticipated Discharge Destination: Community discharge with assistance  Discharge Plan : Pt plans to return home with family providing 24 hour  assistance if needed.  Medical Necessity Expected Level Rationale: good  Intensity and Duration: an average of 3 hours/5 days per week  Medical Supervision and 24 Hour Rehab Nursing: x  Physical Therapy: x  PT Intensity/Duration: PT 1.5 hours per day/5 days per week  Occupational Therapy: x  OT Intensity/Duration: OT 1.5 hours per day/5 days per week  Social Work: x  Therapeutic Recreation: x  Updated (if changes indicated)    Anticipated Discharge Date/Estimated Length of Stay:   12-10-21      Discharge Plan of Care:    Based on the patient's medical and functional status, their prognosis and  expected level of functional improvement is: good      Interdisciplinary Problem/Goals/Status  Mobility    [PT] Toilet Transfers(Active)  Current Status(11/27/2021): CGA/[place]  Weekly Goal(12/04/2021): SBA/CGA  Discharge Goal: SBA    [PT] Walk(Active)  Current Status(11/27/2021): CGA 30  Weekly Goal(12/04/2021): CGA 50  Discharge Goal:  RW        Pain    [RN] Pain Management(Active)  Current Status(11/26/2021): Potential for pain  Weekly Goal(12/03/2021): Decreased pain this week  Discharge Goal: No pain        Safety    [RN] Potential for Injury(Active)  Current Status(11/26/2021): At risk for injury  Weekly Goal(12/03/2021): No injury this week  Discharge Goal:  No injury        Self Care    [OT] Dressing (Lower)(Active)  Current Status(11/27/2021): TotalA  Weekly Goal(12/07/2021): MaxA  Discharge Goal: ModA    Comments: Pt plans to return home with family providing 24 hour assistance if  needed.    Signed by: BRENDON Zheng    Physician CoSigned By: Jesus Gaytan 12/02/2021 12:38:58

## 2021-12-02 NOTE — THERAPY TREATMENT NOTE
Inpatient Rehabilitation - Occupational Therapy Treatment Note    EDWIGE Villarreal     Patient Name: Albina Finley  : 1945  MRN: [id number]    Today's Date: 2021                 Admit Date: 2021       No diagnosis found.    Patient Active Problem List   Diagnosis   • Iron deficiency anemia   • COPD with acute exacerbation ([facility])   • Chronic diastolic heart failure ([facility])   • CKD (chronic kidney disease) stage 3, GFR 30-59 ml/min ([facility])   • Essential hypertension   • Hyperlipidemia   • Type II diabetes mellitus ([facility])   • Arthritis   • Osteoporosis   • Grade II diastolic dysfunction   • Duodenitis   • Peptic ulcer disease with hemorrhage   • Non-ST elevation myocardial infarction (NSTEMI) ([facility])   • Abnormal nuclear stress test with mild degree of small size apical lateral wall myocardial ischemia in 2019.   • Anemia due to GI blood loss   • Hip fracture ([facility])   • S/p left hip fracture       Past Medical History:   Diagnosis Date   • Abnormal nuclear stress test with mild degree of small size apical lateral wall myocardial ischemia in 2019. 2020   • Acute on chronic diastolic CHF (congestive heart failure) ([facility]) 2019   • Anal polyp 2018    Added automatically from request for surgery [id number]   • Arthritis    • Chronic kidney disease    • COPD (chronic obstructive pulmonary disease) ([facility])    • Diabetes mellitus ([facility])    • Elevated cholesterol    • Essential hypertension 2019   • History of transfusion    • Incidental lung nodule, greater than or equal to 8mm     Left lower lobe, 15 mm based on  and 2019 chest CT scans with no growth in the size   • Iron deficiency anemia 2017   • Osteoporosis    • Peptic ulcer disease with hemorrhage 2020   • Pneumonia of right middle lobe due to infectious organism 2019       Past Surgical History:   Procedure Laterality Date   • ANUS SURGERY N/A 2018    Procedure: Diagnostic anoscopy with anal polyp excision;   Surgeon: Marlyn Gutierrez MD;  Location: [facility] OR;  Service: General   •  SECTION     • ENDOSCOPY N/A 2020    Procedure: ESOPHAGOGASTRODUODENOSCOPY;  Surgeon: Marlon Wray MD;  Location: [facility] OR;  Service: Gastroenterology;  Laterality: N/A;   • FEMUR OPEN REDUCTION INTERNAL FIXATION Left 2021    Procedure: Left hip hook plate operative fixation;  Surgeon: Lupillo Cornejo MD;  Location: [facility] OR;  Service: Orthopedics;  Laterality: Left;   • HIP BIPOLAR REPLACEMENT      left Dr. Cornejo    • HYSTERECTOMY     • JOINT REPLACEMENT               IRF OT ASSESSMENT FLOWSHEET (last 12 hours)     IRF OT Evaluation and Treatment     Row Name 21          OT Time and Intention    Document Type daily treatment  -     Mode of Treatment occupational therapy  -     Symptoms Noted During/After Treatment --  c/o LLE pain;  RN notified  -     Row Name 21          General Information    Patient/Family/Caregiver Comments/Observations agreeable to therapy  -     Existing Precautions/Restrictions fall; oxygen therapy device and L/min  decreased skin integrity  -     Row Name 21 08          Cognition/Psychosocial    Follows Commands (Cognition) verbal cues/prompting required; repetition of directions required  -     Row Name 21          Transfers    [place] Level (Toilet Transfer) contact guard; minimum assist (75% patient effort); verbal cues  -     Assistive Device (Toilet Transfer) grab bars/safety frame  -     Row Name 21 08          Motor Skills    Motor Control/Coordination Interventions therapeutic exercise/ROM; gross motor coordination activities; fine motor manipulation/dexterity activities  BUE ther ex/act, bilat coord ex, GMC/FMC  -     Therapeutic Exercise --  dowel wrist rolls X 2  -     Row Name 21          Grooming    [place] Level (Grooming) set up; supervision  -     Row Name 21           Toileting    [place] Level (Toileting) moderate assist (50% patient effort); verbal cues  -     Assistive Device Use (Toileting) grab bar/safety frame  -     Row Name 12/02/21 0858          Positioning and Restraints    Post Treatment Position wheelchair  -     In Bed --  -CJ     In Wheelchair with PT; sitting; call light within reach; encouraged to call for assist; notified nsg  PT - am tx;  room - pm tx  -     Row Name 12/02/21 0858          Vital Signs    Intra SpO2 (%) 99  -     O2 Delivery Intra Treatment supplemental O2  -           User Key  (r) = Recorded By, (t) = Taken By, (c) = Cosigned By    Initials Name Effective Dates     Itzel Sears OT 06/16/21 -                  Occupational Therapy Education                 Title: PT OT SLP Therapies (Done)     Topic: Occupational Therapy (Done)     Point: ADL training (Done)     Description:   Instruct learner(s) on proper safety adaptation and remediation techniques during self care or transfers.   Instruct in proper use of assistive devices.              Learning Progress Summary           Patient Acceptance, E,D, VU,NR by  at 12/2/2021 0910      Show all documentation for this point (4)                 Point: Precautions (Done)     Description:   Instruct learner(s) on prescribed precautions during self-care and functional transfers.              Learning Progress Summary           Patient Acceptance, E,D, VU,NR by  at 12/2/2021 0910      Show all documentation for this point (4)                             User Key     Initials Effective Dates Name Provider Type Discipline     06/16/21 -  Itzel Sears OT Occupational Therapist OT                    OT Recommendation and Plan    Planned Therapy Interventions (OT): activity tolerance training, adaptive equipment training, BADL retraining, patient/caregiver education/training, ROM/therapeutic exercise, strengthening exercise, occupation/activity based interventions,  transfer/mobility retraining                    Time Calculation:      Time Calculation- OT     Row Name 12/02/21 1325 12/02/21 1324          Time Calculation- OT    OT Start Time 1225  - 0800  -     OT Stop Time 1240  - 0915  -     OT Time Calculation (min) 15 min  - 75 min  -     Total Timed Code Minutes- OT 15 minute(s)  - 75 minute(s)  -           User Key  (r) = Recorded By, (t) = Taken By, (c) = Cosigned By    Initials Name Provider Type     Itzel Sears OT Occupational Therapist              Therapy Charges for Today     Code Description Service Date Service Provider Modifiers Qty    41634879492 HC OT SELF CARE/MGMT/TRAIN EA 15 MIN 12/1/2021 Itzel Sears OT GO 3    34888682816 HC OT THER PROC EA 15 MIN 12/1/2021 Itzel Sears OT GO 1    43713511051 HC OT THERAPEUTIC ACT EA 15 MIN 12/1/2021 Itzel Sears OT GO 3    59725000389 HC OT SELF CARE/MGMT/TRAIN EA 15 MIN 12/2/2021 Itzel Sears OT GO 2    64880325844 HC OT THERAPEUTIC ACT EA 15 MIN 12/2/2021 Itzel Sears OT GO 3    03022699503 HC OT THER PROC EA 15 MIN 12/2/2021 Itzel Sears OT GO 1                   Itzel Sears OT  12/2/2021

## 2021-12-02 NOTE — PROGRESS NOTES
[facility]  PROGRESS NOTE     Patient Identification:  Name:  Albina Finley  Age:  76 y.o.  Sex:  female  :  1945  MRN:  [id number]  Visit Number:  [id number]  ROOM: 102/     Primary Care Provider:  Micah Núñez PA    Length of stay in inpatient status:  6    Subjective     Chief Compliant:  No chief complaint on file.      History of Presenting Illness: 76-year-old female with recent left hip fracture with ORIF, chronic kidney disease stage III, chronic nocturnal hypoxic respiratory failure, hypertension, hyperlipidemia, CAD, CHF preserved EF, severe pulmonary hypertension, diabetes mellitus and COPD.  Has no complaints    Objective     Current Hospital Meds:calcium carbonate (oyster shell), 500 mg, Oral, Daily  carvedilol, 6.25 mg, Oral, BID With Meals  cetirizine, 5 mg, Oral, Daily  docusate sodium, 100 mg, Oral, BID  enoxaparin, 40 mg, Subcutaneous, Q24H  ferrous sulfate, 325 mg, Oral, Q PM  Hydrocortisone (Perianal), , Rectal, BID  insulin aspart, 0-14 Units, Subcutaneous, TID AC  insulin detemir, 23 Units, Subcutaneous, Nightly  pantoprazole, 40 mg, Oral, BID AC  polyethylene glycol, 17 g, Oral, Daily  rosuvastatin, 10 mg, Oral, Nightly  IVPB builder, , Intravenous, Nightly       ----------------------------------------------------------------------------------------------------------------------  Vital Signs:  Temp:  [97.9 °F (36.6 °C)] 97.9 °F (36.6 °C)  Heart Rate:  [67] 67  Resp:  [18] 18  BP: (170)/(59) 170/59  SpO2:  [97 %-99 %] 99 %  on  Flow (L/min):  [2] 2;   Device (Oxygen Therapy): nasal cannula  Body mass index is 29.59 kg/m².    Wt Readings from Last 3 Encounters:   21 73.4 kg (161 lb 13.1 oz)   21 73.4 kg (161 lb 14.4 oz)   20 72.6 kg (160 lb)     Intake & Output (last 3 days)        0701   0700  0701   0700  0701   0700  0701   0700    P.O. 1440 2160 1680     IV Piggyback  100      Total Intake(mL/kg) 1440  (19.6) 2260 (30.8) 1680 (22.9)     Net +1440 +2260 +1680             Urine Unmeasured Occurrence 9 x 12 x 12 x     Stool Unmeasured Occurrence 2 x 1 x 1 x         Diet Soft Texture; [employer]; Thin  ----------------------------------------------------------------------------------------------------------------------  Physical exam:  Constitutional:   No acute distress  HEENT: Normocephalic atraumatic  Neck:    Supple  Cardiovascular: Regular rate and rhythm  Pulmonary/Chest: Clear to auscultation  Abdominal: Positive bowel sounds soft.   Musculoskeletal: No arthropathy.  Status post hip fracture with ORIF  Neurological: No focal deficits  Skin: No rash  Peripheral vascular:  Genitourinary:  ----------------------------------------------------------------------------------------------------------------------    Last echocardiogram:  Results for orders placed during the hospital encounter of 11/13/21    Adult Transthoracic Echo Complete W/ Cont if Necessary Per Protocol    Interpretation Summary  · Estimated left ventricular EF = 65% Left ventricular ejection fraction appears to be 61 - 65%. Left ventricular systolic function is normal.  · Left ventricular diastolic function was normal.  · Estimated right ventricular systolic pressure from tricuspid regurgitation is markedly elevated (>55 mmHg).  · Severe pulmonary hypertension is present.  · No significant mitral or aortic regurgitation  · No pericardial effusion  · Since previous study of 12/17/19 Severe pulmonary hypertension is now present    ----------------------------------------------------------------------------------------------------------------------  Results from last 7 days   Lab Units 11/30/21 0023 11/26/21 0454   WBC 10*3/mm3 8.92 9.67   HEMOGLOBIN g/dL 8.4* 7.8*   HEMATOCRIT % 28.2* 26.8*   MCV fL 86.8 88.4   MCHC g/dL 29.8* 29.1*   PLATELETS 10*3/mm3 533* 424         Results from last 7 days   Lab Units 11/30/21 0023 11/26/21 0454   SODIUM  mmol/L 137 138   POTASSIUM mmol/L 4.6 4.0   CHLORIDE mmol/L 102 100   CO2 mmol/L 26.4 28.8   BUN mg/dL 22 21   CREATININE mg/dL 1.00 0.96   EGFR IF NONAFRICN AM mL/min/1.73 54* 56*   CALCIUM mg/dL 8.5* 8.3*   GLUCOSE mg/dL 199* 127*   Estimated Creatinine Clearance: 44.2 mL/min (by C-G formula based on SCr of 1 mg/dL).  No results found for: AMMONIA              Glucose   Date/Time Value Ref Range Status   12/02/2021 1057 279 (H) 70 - 130 mg/dL Final     Comment:     Meter: [id number] : [id number] Pierre George   12/02/2021 0617 201 (H) 70 - 130 mg/dL Final     Comment:     Meter: [id number] : [id number] [place] Komal   12/01/2021 1930 329 (H) 70 - 130 mg/dL Final     Comment:     Meter: [id number] : [id number] Gee Crystal   12/01/2021 1631 278 (H) 70 - 130 mg/dL Final     Comment:     Meter: [id number] : [id number] khadijah juana   12/01/2021 1050 220 (H) 70 - 130 mg/dL Final     Comment:     Meter: [id number] : [id number] Gisela Morel   12/01/2021 0559 174 (H) 70 - 130 mg/dL Final     Comment:     Meter: [id number] : [id number] [place] Crystal   11/30/2021 1956 238 (H) 70 - 130 mg/dL Final     Comment:     Meter: [id number] : [id number] Gee Crystal   11/30/2021 1553 280 (H) 70 - 130 mg/dL Final     Comment:     Meter: [id number] : [id number] Gisela Morel     Lab Results   Component Value Date    TSH 1.840 02/04/2021    FREET4 1.38 02/04/2021     No results found for: PREGTESTUR, PREGSERUM, HCG, HCGQUANT  Pain Management Panel     Pain Management Panel Latest Ref Rng & Units 11/14/2021 3/1/2021    CREATININE UR mg/dL 115.1 28.7        Brief Urine Lab Results  (Last result in the past 365 days)      Color   Clarity   Blood   Leuk Est   Nitrite   Protein   CREAT   Urine HCG        11/23/21 1731 Dark Yellow   Turbid   Trace   Large (3+)   Negative   30 mg/dL (1+)               No results found for: BLOODCX      No results found for: URINECX  No results found for: WOUNDCX  No  results found for: STOOLCX        I have personally looked at the labs and they are summarized above.  ----------------------------------------------------------------------------------------------------------------------  Detailed radiology reports for the last 24 hours:    Imaging Results (Last 24 Hours)     ** No results found for the last 24 hours. **        Final impressions for the last 30 days of radiology reports:    CT Head Without Contrast    Result Date: 11/23/2021  1.  Senescent changes without acute intracranial abnormality Signer Name: NGOC ISSA MD  Signed: 11/23/2021 7:17 PM  Workstation Name: [facility]  Radiology [facility]    XR Chest 1 View    Result Date: 11/13/2021  Probable mild CHF  This report was finalized on 11/13/2021 2:30 PM by Dr. Jonah Mcbride MD.      US Renal Bilateral    Result Date: 11/13/2021  Negative bilateral renal ultrasound examination. Signer Name: NGOC ISSA MD  Signed: 11/13/2021 7:40 PM  Workstation Name: [facility]  Radiology [facility]    US Venous Doppler Lower Extremity Bilateral (duplex)    Result Date: 11/13/2021  No deep venous thrombus seen in either lower extremity. Signer Name: NGOC ISSA MD  Signed: 11/13/2021 4:28 PM  Workstation Name: [facility]  Radiology [facility]    CT Lower Extremity Left Without Contrast    Result Date: 11/13/2021  Acute proximal left femoral fracture  This report was finalized on 11/13/2021 2:13 PM by Dr. Jonah Mcbride MD.      XR Abdomen KUB    Result Date: 11/17/2021  No change in proximal left lateral cortex femur fracture Normal bowel gas pattern Signer Name: Haider Payton MD  Signed: 11/17/2021 6:54 AM  Workstation Name: [facility]  Radiology [facility]    XR Hip With or Without Pelvis 1 View Left    Result Date: 11/19/2021    Status post left hip arthroplasty.  This report was finalized on 11/19/2021 4:23 PM by Dr. Ceasar Woodard MD.      XR Hip With or Without  Pelvis 2 - 3 View Left    Result Date: 11/13/2021  Proximal left femoral fracture  This report was finalized on 11/13/2021 1:15 PM by Dr. Jonah Mcbride MD.      I have personally looked at the radiology images and read the final radiology report.    Assessment & Plan    Status post left hip fracture with ORIF--requiring minimum assistance for up with transfers; moderate assistance for bathing; set up assistance for upper body dressing; moderate to maximum assistance for lower body dressing; set up for grooming; moderate assistance for toileting.  Ambulated 80 feet x 2 with front wheel walker and contact-guard yesterday    Diabetes mellitus suboptimal control will adjust insulin    Chronic hypoxemia--continue O2 2 L per nasal cannula    CKD stage III stable  Hypertension well-controlled continue Coreg    Hyperlipidemia continue statin therapy    CHF preserved EF--compensated    Anemia stable      VTE Prophylaxis:   Mechanical Order History:     None      Pharmalogical Order History:      Ordered     Dose Route Frequency Stop    11/26/21 9824  enoxaparin (LOVENOX) syringe 40 mg         40 mg SC Every 24 Hours --                    Jesus Gaytan MD  [facility]  12/02/21  12:45 EST

## 2021-12-02 NOTE — THERAPY TREATMENT NOTE
Inpatient Rehabilitation - Physical Therapy Treatment Note       EDWIGE Villarreal     Patient Name: Albina Finley  : 1945  MRN: [id number]    Today's Date: 2021                    Admit Date: 2021      Visit Dx:   No diagnosis found.    Patient Active Problem List   Diagnosis   • Iron deficiency anemia   • COPD with acute exacerbation ([facility])   • Chronic diastolic heart failure (HCC)   • CKD (chronic kidney disease) stage 3, GFR 30-59 ml/min ([facility])   • Essential hypertension   • Hyperlipidemia   • Type II diabetes mellitus ([facility])   • Arthritis   • Osteoporosis   • Grade II diastolic dysfunction   • Duodenitis   • Peptic ulcer disease with hemorrhage   • Non-ST elevation myocardial infarction (NSTEMI) ([facility])   • Abnormal nuclear stress test with mild degree of small size apical lateral wall myocardial ischemia in 2019.   • Anemia due to GI blood loss   • Hip fracture ([facility])   • S/p left hip fracture       Past Medical History:   Diagnosis Date   • Abnormal nuclear stress test with mild degree of small size apical lateral wall myocardial ischemia in 2019. 2020   • Acute on chronic diastolic CHF (congestive heart failure) ([facility]) 2019   • Anal polyp 2018    Added automatically from request for surgery [id number]   • Arthritis    • Chronic kidney disease    • COPD (chronic obstructive pulmonary disease) ([facility])    • Diabetes mellitus ([facility])    • Elevated cholesterol    • Essential hypertension 2019   • History of transfusion    • Incidental lung nodule, greater than or equal to 8mm     Left lower lobe, 15 mm based on  and 2019 chest CT scans with no growth in the size   • Iron deficiency anemia 2017   • Osteoporosis    • Peptic ulcer disease with hemorrhage 2020   • Pneumonia of right middle lobe due to infectious organism 2019       Past Surgical History:   Procedure Laterality Date   • ANUS SURGERY N/A 2018    Procedure: Diagnostic anoscopy with anal  polyp excision;  Surgeon: Marlyn Gutierrez MD;  Location: [facility] OR;  Service: General   •  SECTION     • ENDOSCOPY N/A 2020    Procedure: ESOPHAGOGASTRODUODENOSCOPY;  Surgeon: Marlon Wray MD;  Location: [facility] OR;  Service: Gastroenterology;  Laterality: N/A;   • FEMUR OPEN REDUCTION INTERNAL FIXATION Left 2021    Procedure: Left hip hook plate operative fixation;  Surgeon: Lupillo Cornejo MD;  Location: [facility] OR;  Service: Orthopedics;  Laterality: Left;   • HIP BIPOLAR REPLACEMENT      left Dr. Cornejo    • HYSTERECTOMY     • JOINT REPLACEMENT         PT ASSESSMENT (last 12 hours)     IRF PT Evaluation and Treatment     Row Name 21 1553          PT Time and Intention    Document Type daily treatment  -RF     Mode of Treatment physical therapy  -RF     Patient/Family/Caregiver Comments/Observations Pt c/o fatigue and SOA with activity BID. Frequent rest breaks required for recovery.  -RF     Row Name 21 155          General Information    Patient Profile Reviewed yes  -RF     Row Name 21 1733          Cognition/Psychosocial    Affect/Mental Status (Cognitive) WFL  -RF     Orientation Status (Cognition) oriented to; person; place  -RF     Follows Commands (Cognition) WFL; follows one-step commands; repetition of directions required  -RF     Row Name 21 9481          Mobility    Extremity Weight-bearing Status left lower extremity  -RF     Left Lower Extremity (Weight-bearing Status) weight-bearing as tolerated (WBAT); other (see comments)  per chart review, physician orders  -RF     Row Name 21 2270          Bed Mobility    Supine-Sit [place] (Bed Mobility) minimum assist (75% patient effort)  -RF     Comment (Bed Mobility) From mat table  -RF     Row Name 21 6805          Transfer Assessment/Treatment    Transfers sit-stand transfer; stand-sit transfer; car transfer; chair-bed transfer  -RF     Row Name 21 2087          Transfers     Bed-Chair [place] (Transfers) contact guard; standby assist  -RF     Chair-Bed [place] (Transfers) contact guard; standby assist  -RF     Assistive Device (Bed-Chair Transfers) wheelchair  -RF     Sit-Stand [place] (Transfers) standby assist  -RF     Stand-Sit [place] (Transfers) standby assist  -RF     Row Name 12/02/21 1554          Chair-Bed Transfer    Assistive Device (Chair-Bed Transfers) wheelchair  -RF     Row Name 12/02/21 1554          Sit-Stand Transfer    Assistive Device (Sit-Stand Transfers) walker, front-wheeled  -RF     Row Name 12/02/21 1554          Stand-Sit Transfer    Assistive Device (Stand-Sit Transfers) walker, front-wheeled  -RF     Row Name 12/02/21 1554          Gait/Stairs (Locomotion)    Gait/Stairs Locomotion gait/ambulation assistive device  -RF     [place] Level (Gait) contact guard; standby assist  -RF     Assistive Device (Gait) walker, front-wheeled  -RF     Distance in Feet (Gait) 100, 80 in AM, 80 in PM  -RF     Pattern (Gait) step-to  -RF     Deviations/Abnormal Patterns (Gait) antalgic  -RF     Bilateral Gait Deviations forward flexed posture  -RF     Comment (Gait/Stairs) Antalgic pattern worsens with fatigue  -RF     Row Name 12/02/21 1554          Hip (Therapeutic Exercise)    Hip Strengthening (Therapeutic Exercise) bilateral; flexion; extension; aBduction; aDduction; heel slides; marching while seated; sitting; supine; resistance band; green; 2 sets; 10 repetitions  -RF     Row Name 12/02/21 1554          Knee (Therapeutic Exercise)    Knee Strengthening (Therapeutic Exercise) bilateral; flexion; extension; heel slides; marching while seated; SAQ (short arc quad); LAQ (long arc quad); hamstring curls; sitting; supine; resistance band; green; 2 sets; 10 repetitions  -RF     Row Name 12/02/21 1554          Ankle (Therapeutic Exercise)    Ankle Strengthening (Therapeutic Exercise) bilateral; dorsiflexion; plantarflexion; sitting; supine; 2 sets; 10  repetitions  -RF     Row Name 12/02/21 1554          IRF PT Goals    Transfer Goal Selection (PT-IRF) transfers, PT goal 1  -RF     Gait (Walking Locomotion) Goal Selection (PT-IRF) gait, PT goal 1  -RF     Row Name 12/02/21 1554          Transfer Goal 1 (PT-IRF)    Activity/Assistive Device (Transfer Goal 1, PT-IRF) all transfers  -RF     [place] Level (Transfer Goal 1, PT-IRF) standby assist  -RF     Time Frame (Transfer Goal 1, PT-IRF) 2 weeks  -RF     Row Name 12/02/21 1554          Gait/Walking Locomotion Goal 1 (PT-IRF)    Activity/Assistive Device (Gait/Walking Locomotion Goal 1, PT-IRF) gait (walking locomotion); assistive device use  -RF     Gait/Walking Locomotion Distance Goal 1 (PT-IRF) 100  -RF     [place] Level (Gait/Walking Locomotion Goal 1, PT-IRF) contact guard assist  -RF     Time Frame (Gait/Walking Locomotion Goal 1, PT-IRF) 2 weeks  -RF     Row Name 12/02/21 1554          Positioning and Restraints    Pre-Treatment Position sitting in chair/recliner  BID  -RF     Post Treatment Position wheelchair  -RF     In Wheelchair sitting; call light within reach; encouraged to call for assist  BID  -RF     Row Name 12/02/21 1554          Vital Signs    Intra SpO2 (%) 99  -RF     O2 Delivery Intra Treatment supplemental O2  2L NC  -RF     Row Name 12/02/21 1559          Therapy Assessment/Plan (PT)    Rehab Potential/Prognosis (PT) good, to achieve stated therapy goals  -RF     Frequency of Treatment (PT) 5 times per week  -RF     Estimated Duration of Therapy (PT) 2 weeks  -RF     Row Name 12/02/21 1559          Daily Progress Summary (PT)    Functional Goal Overall Progress (PT) progressing toward functional goals as expected  -RF     Daily Progress Summary (PT) Improving functional mobility and ambulation quality noted this date. Improved LE strength noted as well, however deficits remain. Pt conitnues to demonstrate decreased activity tolerance as fatigue and SOA is reported with increased  activity. Continued skilled care required for further improvements to ensure maximum safe function upon D/C.  -RF     Impairments Still Limiting Function (PT) strength decreased; impaired functional activity tolerance; pain  -RF     Recommendations (PT) Continue per current POC  -RF     Row Name 12/02/21 1554          Therapy Plan Review/Discharge Plan (PT)    Anticipated Equipment Needs at Discharge (PT Eval) other (see comments)  TBD  -RF     Expected Discharge Disposition (PT Eval) home; home with caregiver  -RF           User Key  (r) = Recorded By, (t) = Taken By, (c) = Cosigned By    Initials Name Provider Type    RF Ayla Gonzalez PTA Physical Therapy Assistant              Wound 11/19/21 0948 Left lateral hip Incision (Active)   Dressing Appearance dry 12/02/21 0735   Closure GUILLERMO 12/01/21 1920   Base dressing in place, unable to visualize 12/01/21 1920     Physical Therapy Education                 Title: PT OT SLP Therapies (Done)     Topic: Physical Therapy (Done)     Point: Mobility training (Done)     Learning Progress Summary           Patient Acceptance, E,TB, VU by RF at 12/2/2021 1559      Show all documentation for this point (3)                 Point: Home exercise program (Done)     Learning Progress Summary           Patient Acceptance, E,TB, VU by RF at 12/2/2021 1559      Show all documentation for this point (3)                 Point: Body mechanics (Done)     Learning Progress Summary           Patient Acceptance, E,TB, VU by RF at 12/2/2021 1559      Show all documentation for this point (3)                 Point: Precautions (Done)     Learning Progress Summary           Patient Acceptance, E,TB, VU by RF at 12/2/2021 1559      Show all documentation for this point (3)                             User Key     Initials Effective Dates Name Provider Type Discipline    RF 06/16/21 -  Ayla Gonzalez PTA Physical Therapy Assistant PT                PT Recommendation and Plan    Frequency  of Treatment (PT): 5 times per week  Anticipated Equipment Needs at Discharge (PT Eval): other (see comments) (TBD)  Daily Progress Summary (PT)  Functional Goal Overall Progress (PT): progressing toward functional goals as expected  Daily Progress Summary (PT): Improving functional mobility and ambulation quality noted this date. Improved LE strength noted as well, however deficits remain. Pt conitnues to demonstrate decreased activity tolerance as fatigue and SOA is reported with increased activity. Continued skilled care required for further improvements to ensure maximum safe function upon D/C.  Impairments Still Limiting Function (PT): strength decreased, impaired functional activity tolerance, pain  Recommendations (PT): Continue per current POC               Time Calculation:      PT Charges     Row Name 12/02/21 1600 12/02/21 1559          Time Calculation    Start Time 1300  -RF 0915  -RF     Stop Time 1345  -RF 1000  -RF     Time Calculation (min) 45 min  -RF 45 min  -RF     PT Received On 12/02/21  -RF 12/02/21  -RF     PT - Next Appointment 12/03/21  -RF 12/02/21  -RF     PT Goal Re-Cert Due Date 12/03/21  -RF 12/03/21  -RF            Time Calculation- PT    Total Timed Code Minutes- PT 45 minute(s)  -RF 45 minute(s)  -RF           User Key  (r) = Recorded By, (t) = Taken By, (c) = Cosigned By    Initials Name Provider Type    RF Ayla Gonzalez PTA Physical Therapy Assistant                Therapy Charges for Today     Code Description Service Date Service Provider Modifiers Qty    48469348742 HC GAIT TRAINING EA 15 MIN 12/1/2021 Ayla Gonzalez, PTA GP 2    47041476159 HC PT NEUROMUSC RE EDUCATION EA 15 MIN 12/1/2021 Ayla Gonzalez, PTA GP 1    07436339297 HC PT THER PROC EA 15 MIN 12/1/2021 Ayla Gonzalez, PTA GP 2    64078864414 HC PT THERAPEUTIC ACT EA 15 MIN 12/1/2021 Ayla Gonzalez, PTA GP 1    47418390275 HC GAIT TRAINING EA 15 MIN 12/2/2021 Ayla Gonzalez, PTA GP 2     [id number]  PT THER PROC EA 15 MIN 12/2/2021 Ayla Gonzalez, PTA  4                   Ayla Gonzalez, ROHINI  12/2/2021

## 2021-12-02 NOTE — PROGRESS NOTES
Rehabilitation Nursing  Inpatient Rehabilitation Plan of Care Note    Plan of Care  Pain    Pain Management (Active)  Current Status (11/26/2021 12:51:00 PM): Potential for pain  Weekly Goal: Decreased pain this week  Discharge Goal: No pain    Safety    Potential for Injury (Active)  Current Status (11/26/2021 12:51:00 PM): At risk for injury  Weekly Goal: No injury this week  Discharge Goal: No injuryC    Signed by: Lucia Ha Nurse

## 2021-12-03 LAB
ANION GAP SERPL CALCULATED.3IONS-SCNC: 7.9 MMOL/L (ref 5–15)
BASOPHILS # BLD AUTO: 0.04 10*3/MM3 (ref 0–0.2)
BASOPHILS NFR BLD AUTO: 0.5 % (ref 0–1.5)
BUN SERPL-MCNC: 27 MG/DL (ref 8–23)
BUN/CREAT SERPL: 23.1 (ref 7–25)
CALCIUM SPEC-SCNC: 8.5 MG/DL (ref 8.6–10.5)
CHLORIDE SERPL-SCNC: 101 MMOL/L (ref 98–107)
CO2 SERPL-SCNC: 25.1 MMOL/L (ref 22–29)
CREAT SERPL-MCNC: 1.17 MG/DL (ref 0.57–1)
DEPRECATED RDW RBC AUTO: 51.3 FL (ref 37–54)
EOSINOPHIL # BLD AUTO: 0.25 10*3/MM3 (ref 0–0.4)
EOSINOPHIL NFR BLD AUTO: 2.9 % (ref 0.3–6.2)
ERYTHROCYTE [DISTWIDTH] IN BLOOD BY AUTOMATED COUNT: 15.9 % (ref 12.3–15.4)
GFR SERPL CREATININE-BSD FRML MDRD: 45 ML/MIN/1.73
GLUCOSE BLDC GLUCOMTR-MCNC: 162 MG/DL (ref 70–130)
GLUCOSE BLDC GLUCOMTR-MCNC: 177 MG/DL (ref 70–130)
GLUCOSE BLDC GLUCOMTR-MCNC: 291 MG/DL (ref 70–130)
GLUCOSE BLDC GLUCOMTR-MCNC: 319 MG/DL (ref 70–130)
GLUCOSE SERPL-MCNC: 228 MG/DL (ref 65–99)
HCT VFR BLD AUTO: 27.8 % (ref 34–46.6)
HGB BLD-MCNC: 8.1 G/DL (ref 12–15.9)
IMM GRANULOCYTES # BLD AUTO: 0.02 10*3/MM3 (ref 0–0.05)
IMM GRANULOCYTES NFR BLD AUTO: 0.2 % (ref 0–0.5)
LYMPHOCYTES # BLD AUTO: 3.36 10*3/MM3 (ref 0.7–3.1)
LYMPHOCYTES NFR BLD AUTO: 38.6 % (ref 19.6–45.3)
MCH RBC QN AUTO: 25.6 PG (ref 26.6–33)
MCHC RBC AUTO-ENTMCNC: 29.1 G/DL (ref 31.5–35.7)
MCV RBC AUTO: 87.7 FL (ref 79–97)
MONOCYTES # BLD AUTO: 0.68 10*3/MM3 (ref 0.1–0.9)
MONOCYTES NFR BLD AUTO: 7.8 % (ref 5–12)
NEUTROPHILS NFR BLD AUTO: 4.35 10*3/MM3 (ref 1.7–7)
NEUTROPHILS NFR BLD AUTO: 50 % (ref 42.7–76)
NRBC BLD AUTO-RTO: 0 /100 WBC (ref 0–0.2)
PLATELET # BLD AUTO: 505 10*3/MM3 (ref 140–450)
PMV BLD AUTO: 10.6 FL (ref 6–12)
POTASSIUM SERPL-SCNC: 5.2 MMOL/L (ref 3.5–5.2)
RBC # BLD AUTO: 3.17 10*6/MM3 (ref 3.77–5.28)
SODIUM SERPL-SCNC: 134 MMOL/L (ref 136–145)
WBC NRBC COR # BLD: 8.7 10*3/MM3 (ref 3.4–10.8)

## 2021-12-03 PROCEDURE — 25010000002 THIAMINE PER 100 MG: Performed by: INTERNAL MEDICINE

## 2021-12-03 PROCEDURE — 94799 UNLISTED PULMONARY SVC/PX: CPT

## 2021-12-03 PROCEDURE — 82962 GLUCOSE BLOOD TEST: CPT

## 2021-12-03 PROCEDURE — 85025 COMPLETE CBC W/AUTO DIFF WBC: CPT | Performed by: FAMILY MEDICINE

## 2021-12-03 PROCEDURE — 97535 SELF CARE MNGMENT TRAINING: CPT

## 2021-12-03 PROCEDURE — 80048 BASIC METABOLIC PNL TOTAL CA: CPT | Performed by: FAMILY MEDICINE

## 2021-12-03 PROCEDURE — 97530 THERAPEUTIC ACTIVITIES: CPT

## 2021-12-03 PROCEDURE — 63710000001 INSULIN DETEMIR PER 5 UNITS: Performed by: FAMILY MEDICINE

## 2021-12-03 PROCEDURE — 25010000002 ENOXAPARIN PER 10 MG: Performed by: INTERNAL MEDICINE

## 2021-12-03 PROCEDURE — 97110 THERAPEUTIC EXERCISES: CPT

## 2021-12-03 PROCEDURE — 99231 SBSQ HOSP IP/OBS SF/LOW 25: CPT | Performed by: FAMILY MEDICINE

## 2021-12-03 PROCEDURE — 63710000001 INSULIN ASPART PER 5 UNITS: Performed by: INTERNAL MEDICINE

## 2021-12-03 PROCEDURE — 97116 GAIT TRAINING THERAPY: CPT

## 2021-12-03 RX ADMIN — OXYCODONE HYDROCHLORIDE AND ACETAMINOPHEN 1 TABLET: 5; 325 TABLET ORAL at 05:35

## 2021-12-03 RX ADMIN — DOCUSATE SODIUM 100 MG: 100 CAPSULE ORAL at 20:22

## 2021-12-03 RX ADMIN — INSULIN ASPART 3 UNITS: 100 INJECTION, SOLUTION INTRAVENOUS; SUBCUTANEOUS at 08:56

## 2021-12-03 RX ADMIN — INSULIN DETEMIR 27 UNITS: 100 INJECTION, SOLUTION SUBCUTANEOUS at 21:50

## 2021-12-03 RX ADMIN — DOCUSATE SODIUM 100 MG: 100 CAPSULE ORAL at 08:55

## 2021-12-03 RX ADMIN — PANTOPRAZOLE SODIUM 40 MG: 40 TABLET, DELAYED RELEASE ORAL at 17:21

## 2021-12-03 RX ADMIN — Medication 500 MG: at 08:55

## 2021-12-03 RX ADMIN — ROSUVASTATIN CALCIUM 10 MG: 10 TABLET, FILM COATED ORAL at 20:22

## 2021-12-03 RX ADMIN — CETIRIZINE HYDROCHLORIDE 5 MG: 10 TABLET, FILM COATED ORAL at 08:55

## 2021-12-03 RX ADMIN — ENOXAPARIN SODIUM 40 MG: 40 INJECTION SUBCUTANEOUS at 17:21

## 2021-12-03 RX ADMIN — FERROUS SULFATE TAB 325 MG (65 MG ELEMENTAL FE) 325 MG: 325 (65 FE) TAB at 17:21

## 2021-12-03 RX ADMIN — CARVEDILOL 6.25 MG: 6.25 TABLET, FILM COATED ORAL at 08:55

## 2021-12-03 RX ADMIN — INSULIN ASPART 3 UNITS: 100 INJECTION, SOLUTION INTRAVENOUS; SUBCUTANEOUS at 17:20

## 2021-12-03 RX ADMIN — OXYCODONE HYDROCHLORIDE AND ACETAMINOPHEN 1 TABLET: 5; 325 TABLET ORAL at 20:22

## 2021-12-03 RX ADMIN — THIAMINE HYDROCHLORIDE: 100 INJECTION, SOLUTION INTRAMUSCULAR; INTRAVENOUS at 20:23

## 2021-12-03 RX ADMIN — CARVEDILOL 6.25 MG: 6.25 TABLET, FILM COATED ORAL at 17:21

## 2021-12-03 RX ADMIN — HYDROCORTISONE 2.5%: 25 CREAM TOPICAL at 08:56

## 2021-12-03 RX ADMIN — PANTOPRAZOLE SODIUM 40 MG: 40 TABLET, DELAYED RELEASE ORAL at 08:55

## 2021-12-03 RX ADMIN — INSULIN ASPART 8 UNITS: 100 INJECTION, SOLUTION INTRAVENOUS; SUBCUTANEOUS at 11:45

## 2021-12-03 RX ADMIN — POLYETHYLENE GLYCOL 3350 17 G: 17 POWDER, FOR SOLUTION ORAL at 08:55

## 2021-12-03 RX ADMIN — HYDROCORTISONE 2.5%: 25 CREAM TOPICAL at 21:51

## 2021-12-03 NOTE — PROGRESS NOTES
[facility]  PROGRESS NOTE     Patient Identification:  Name:  Albina Finley  Age:  76 y.o.  Sex:  female  :  1945  MRN:  [id number]  Visit Number:  [id number]  ROOM: [facility]     Primary Care Provider:  Micah Núñez PA    Length of stay in inpatient status:  7    Subjective     Chief Compliant:  No chief complaint on file.      History of Presenting Illness: 76-year-old female with recent left hip fracture with ORIF, chronic kidney disease stage III, chronic hypoxic respiratory failure mostly at night, hypertension, hyperlipidemia, CAD, CHF preserved EF, severe pulmonary hypertension, diabetes mellitus and COPD.  Patient states she is fatigued today.  Has no other specific complaints    Objective     Current Hospital Meds:calcium carbonate (oyster shell), 500 mg, Oral, Daily  carvedilol, 6.25 mg, Oral, BID With Meals  cetirizine, 5 mg, Oral, Daily  docusate sodium, 100 mg, Oral, BID  enoxaparin, 40 mg, Subcutaneous, Q24H  ferrous sulfate, 325 mg, Oral, Q PM  Hydrocortisone (Perianal), , Rectal, BID  insulin aspart, 0-14 Units, Subcutaneous, TID AC  insulin detemir, 27 Units, Subcutaneous, Nightly  pantoprazole, 40 mg, Oral, BID AC  polyethylene glycol, 17 g, Oral, Daily  rosuvastatin, 10 mg, Oral, Nightly  IVPB builder, , Intravenous, Nightly       ----------------------------------------------------------------------------------------------------------------------  Vital Signs:  Temp:  [98 °F (36.7 °C)-98.8 °F (37.1 °C)] 98 °F (36.7 °C)  Heart Rate:  [68-70] 68  Resp:  [20] 20  BP: (158-183)/(58-62) 158/58  SpO2:  [98 %] 98 %  on  Flow (L/min):  [1.5] 1.5;   Device (Oxygen Therapy): nasal cannula  Body mass index is 29.59 kg/m².    Wt Readings from Last 3 Encounters:   21 73.4 kg (161 lb 13.1 oz)   21 73.4 kg (161 lb 14.4 oz)   20 72.6 kg (160 lb)     Intake & Output (last 3 days)        0701   0700  0701   07 07 07 07  0700    P.O. 2160 1680 1560 240    IV Piggyback 100       Total Intake(mL/kg) 2260 (30.8) 1680 (22.9) 1560 (21.3) 240 (3.3)    Net +2260 +1680 +1560 +240            Urine Unmeasured Occurrence 12 x 12 x 9 x 1 x    Stool Unmeasured Occurrence 1 x 1 x 2 x         Diet Soft Texture; [employer]; Thin  ----------------------------------------------------------------------------------------------------------------------  Physical exam:  Constitutional: No acute distress   HEENT: Normocephalic atraumatic  Neck: Supple   Cardiovascular: Regular rate and rhythm  Pulmonary/Chest: Clear to auscultation  Abdominal: Positive bowel sounds soft.   Musculoskeletal: No arthropathy; status post left hip repair  Neurological: No focal deficits  Skin: No rash  Peripheral vascular:  Genitourinary:  ----------------------------------------------------------------------------------------------------------------------    Last echocardiogram:  Results for orders placed during the hospital encounter of 11/13/21    Adult Transthoracic Echo Complete W/ Cont if Necessary Per Protocol    Interpretation Summary  · Estimated left ventricular EF = 65% Left ventricular ejection fraction appears to be 61 - 65%. Left ventricular systolic function is normal.  · Left ventricular diastolic function was normal.  · Estimated right ventricular systolic pressure from tricuspid regurgitation is markedly elevated (>55 mmHg).  · Severe pulmonary hypertension is present.  · No significant mitral or aortic regurgitation  · No pericardial effusion  · Since previous study of 12/17/19 Severe pulmonary hypertension is now present    ----------------------------------------------------------------------------------------------------------------------  Results from last 7 days   Lab Units 12/03/21  0133 11/30/21  0023   WBC 10*3/mm3 8.70 8.92   HEMOGLOBIN g/dL 8.1* 8.4*   HEMATOCRIT % 27.8* 28.2*   MCV fL 87.7 86.8   MCHC g/dL 29.1* 29.8*   PLATELETS 10*3/mm3 505*  533*         Results from last 7 days   Lab Units 12/03/21  0133 11/30/21  0023   SODIUM mmol/L 134* 137   POTASSIUM mmol/L 5.2 4.6   CHLORIDE mmol/L 101 102   CO2 mmol/L 25.1 26.4   BUN mg/dL 27* 22   CREATININE mg/dL 1.17* 1.00   EGFR IF NONAFRICN AM mL/min/1.73 45* 54*   CALCIUM mg/dL 8.5* 8.5*   GLUCOSE mg/dL 228* 199*   Estimated Creatinine Clearance: 37.8 mL/min (A) (by C-G formula based on SCr of 1.17 mg/dL (H)).  No results found for: AMMONIA              Glucose   Date/Time Value Ref Range Status   12/03/2021 1105 291 (H) 70 - 130 mg/dL Final     Comment:     RN Notified Meter: [id number] : [id number] JENIFER GILBERT   12/03/2021 0640 177 (H) 70 - 130 mg/dL Final     Comment:     Meter: [id number] : [id number] Alcon Isaacs   12/02/2021 1955 257 (H) 70 - 130 mg/dL Final     Comment:     Meter: [id number] : [id number] Alcon Mayes Shital   12/02/2021 1619 236 (H) 70 - 130 mg/dL Final     Comment:     Meter: [id number] : [id number] Pierre George   12/02/2021 1057 279 (H) 70 - 130 mg/dL Final     Comment:     Meter: [id number] : [id number] Pierre Doris   12/02/2021 0617 201 (H) 70 - 130 mg/dL Final     Comment:     Meter: [id number] : [id number] Ciera Komal   12/01/2021 1930 329 (H) 70 - 130 mg/dL Final     Comment:     Meter: [id number] : [id number] Gee Crystal   12/01/2021 1631 278 (H) 70 - 130 mg/dL Final     Comment:     Meter: [id number] : [id number] khadijah arias     Lab Results   Component Value Date    TSH 1.840 02/04/2021    FREET4 1.38 02/04/2021     No results found for: PREGTESTUR, PREGSERUM, HCG, HCGQUANT  Pain Management Panel     Pain Management Panel Latest Ref Rng & Units 11/14/2021 3/1/2021    CREATININE UR mg/dL 115.1 28.7        Brief Urine Lab Results  (Last result in the past 365 days)      Color   Clarity   Blood   Leuk Est   Nitrite   Protein   CREAT   Urine HCG        11/23/21 1731 Dark Yellow   Turbid   Trace   Large (3+)   Negative   30 mg/dL  (1+)               No results found for: BLOODCX      No results found for: URINECX  No results found for: WOUNDCX  No results found for: STOOLCX        I have personally looked at the labs and they are summarized above.  ----------------------------------------------------------------------------------------------------------------------  Detailed radiology reports for the last 24 hours:    Imaging Results (Last 24 Hours)     ** No results found for the last 24 hours. **        Final impressions for the last 30 days of radiology reports:    CT Head Without Contrast    Result Date: 11/23/2021  1.  Senescent changes without acute intracranial abnormality Signer Name: NGOC ISSA MD  Signed: 11/23/2021 7:17 PM  Workstation Name: [facility]    XR Chest 1 View    Result Date: 11/13/2021  Probable mild CHF  This report was finalized on 11/13/2021 2:30 PM by Dr. Jonah Mcbride MD.      US Renal Bilateral    Result Date: 11/13/2021  Negative bilateral renal ultrasound examination. Signer Name: NGOC ISSA MD  Signed: 11/13/2021 7:40 PM  Workstation Name: [facility]    US Venous Doppler Lower Extremity Bilateral (duplex)    Result Date: 11/13/2021  No deep venous thrombus seen in either lower extremity. Signer Name: NGOC ISSA MD  Signed: 11/13/2021 4:28 PM  Workstation Name: [facility]    CT Lower Extremity Left Without Contrast    Result Date: 11/13/2021  Acute proximal left femoral fracture  This report was finalized on 11/13/2021 2:13 PM by Dr. Jonah Mcbride MD.      XR Abdomen KUB    Result Date: 11/17/2021  No change in proximal left lateral cortex femur fracture Normal bowel gas pattern Signer Name: Haider Payton MD  Signed: 11/17/2021 6:54 AM  Workstation Name: [facility]  [facility]    XR Hip With or Without Pelvis 1 View Left    Result Date: 11/19/2021    Status post left hip  arthroplasty.  This report was finalized on 11/19/2021 4:23 PM by Dr. Ceasar Woodard MD.      XR Hip With or Without Pelvis 2 - 3 View Left    Result Date: 11/13/2021  Proximal left femoral fracture  This report was finalized on 11/13/2021 1:15 PM by Dr. Jonah Mcbride MD.      I have personally looked at the radiology images and read the final radiology report.    Assessment & Plan    Status post left hip fracture with ORIF--patient requiring contact-guard assistance for up with transfers; ambulated 100 feet and 80 feet in the morning yesterday along with 80 feet in the afternoon with front wheel walker and standby assistance.  Patient requiring moderate assistance for up with toileting.  Set up for grooming.  Continues to work on motor skills including therapeutic exercise activities, gross motor coordination activities, fine motor manipulation dexterity activities.    Diabetes mellitus continue insulin protocol.    Chronic hypoxemia stable continue O2 at 2 L per nasal cannula    CKD stage III--has been stable.  Creatinine is 1.17    Hypertension reasonably well controlled    Hyperlipidemia continue statin therapy    CHF with preserved EF--compensated for now.  Monitor closely    Anemia--stable    VTE Prophylaxis:   Mechanical Order History:     None      Pharmalogical Order History:      Ordered     Dose Route Frequency Stop    11/26/21 0113  enoxaparin (LOVENOX) syringe 40 mg         40 mg SC Every 24 Hours --                    Jesus Gaytan MD  [facility]  12/03/21  13:21 EST

## 2021-12-03 NOTE — PROGRESS NOTES
Rehabilitation Nursing  Inpatient Rehabilitation Plan of Care Note    Plan of Care  Copy from [place]    Pain Management (Active)  Current Status (11/26/2021 12:51:00 PM): Potential for pain  Weekly Goal: Decreased pain this week  Discharge Goal: No pain    Safety    Potential for Injury (Active)  Current Status (11/26/2021 12:51:00 PM): At risk for injury  Weekly Goal: No injury this week  Discharge Goal: No injury    Signed by: Lissa Lewis, Nurse

## 2021-12-03 NOTE — THERAPY TREATMENT NOTE
Inpatient Rehabilitation - Occupational Therapy Progress Note and Treatment Note    EDWIGE Villarreal     Patient Name: Albina Finley  : 1945  MRN: [id number]    Today's Date: 12/3/2021                 Admit Date: 2021       No diagnosis found.    Patient Active Problem List   Diagnosis   • Iron deficiency anemia   • COPD with acute exacerbation ([facility])   • Chronic diastolic heart failure ([facility])   • CKD (chronic kidney disease) stage 3, GFR 30-59 ml/min ([facility])   • Essential hypertension   • Hyperlipidemia   • Type II diabetes mellitus ([facility])   • Arthritis   • Osteoporosis   • Grade II diastolic dysfunction   • Duodenitis   • Peptic ulcer disease with hemorrhage   • Non-ST elevation myocardial infarction (NSTEMI) ([facility])   • Abnormal nuclear stress test with mild degree of small size apical lateral wall myocardial ischemia in 2019.   • Anemia due to GI blood loss   • Hip fracture ([facility])   • S/p left hip fracture       Past Medical History:   Diagnosis Date   • Abnormal nuclear stress test with mild degree of small size apical lateral wall myocardial ischemia in 2019. 2020   • Acute on chronic diastolic CHF (congestive heart failure) ([facility]) 2019   • Anal polyp 2018    Added automatically from request for surgery [id number]   • Arthritis    • Chronic kidney disease    • COPD (chronic obstructive pulmonary disease) ([facility])    • Diabetes mellitus ([facility])    • Elevated cholesterol    • Essential hypertension 2019   • History of transfusion    • Incidental lung nodule, greater than or equal to 8mm     Left lower lobe, 15 mm based on  and 2019 chest CT scans with no growth in the size   • Iron deficiency anemia 2017   • Osteoporosis    • Peptic ulcer disease with hemorrhage 2020   • Pneumonia of right middle lobe due to infectious organism 2019       Past Surgical History:   Procedure Laterality Date   • ANUS SURGERY N/A 2018    Procedure: Diagnostic anoscopy with anal  polyp excision;  Surgeon: Marlyn Gutierrez MD;  Location: [facility] OR;  Service: General   •  SECTION     • ENDOSCOPY N/A 2020    Procedure: ESOPHAGOGASTRODUODENOSCOPY;  Surgeon: Marlon Wray MD;  Location:  [facility] OR;  Service: Gastroenterology;  Laterality: N/A;   • FEMUR OPEN REDUCTION INTERNAL FIXATION Left 2021    Procedure: Left hip hook plate operative fixation;  Surgeon: Lupillo Cornejo MD;  Location: [facility] OR;  Service: Orthopedics;  Laterality: Left;   • HIP BIPOLAR REPLACEMENT      left Dr. Cornejo    • HYSTERECTOMY     • JOINT REPLACEMENT               IRF OT ASSESSMENT FLOWSHEET (last 12 hours)     IRF OT Evaluation and Treatment     Row Name 21          OT Time and Intention    Document Type progress note; daily treatment  -     Mode of Treatment occupational therapy  -     Symptoms Noted During/After Treatment --  c/o not feeling well in am;  RN notified  -     Row Name 21 09          General Information    Patient/Family/Caregiver Comments/Observations agreeable to therapy  -     Existing Precautions/Restrictions fall; oxygen therapy device and L/min  decreased skin integrity  -     Row Name 21 0908          Cognition/Psychosocial    Follows Commands (Cognition) verbal cues/prompting required; repetition of directions required  -     Row Name 21 0908          Transfers    Bed-Chair [place] (Transfers) minimum assist (75% patient effort); verbal cues  -     Assistive Device (Bed-Chair Transfers) wheelchair  -     [place] Level (Toilet Transfer) contact guard; minimum assist (75% patient effort); verbal cues  -     Assistive Device (Toilet Transfer) grab bars/safety frame  -     Row Name 21 09          Motor Skills    Motor Control/Coordination Interventions therapeutic exercise/ROM; gross motor coordination activities; fine motor manipulation/dexterity activities  BUE ther ex/act, bilat coord ex, GMC;/FMC  -      Row Name 12/03/21 0908          Bathing    [place] Level (Bathing) moderate assist (50% patient effort); verbal cues  -     Row Name 12/03/21 0908          Upper Body Dressing    [place] Level (Upper Body Dressing) set up assistance; supervision; verbal cues  -     Row Name 12/03/21 0908          Lower Body Dressing    [place] Level (Lower Body Dressing) moderate assist (50% patient effort); maximum assist (25% patient effort); verbal cues  -     Assistive Device Use (Lower Body Dressing) reacher; sock-aid  -     Row Name 12/03/21 0908          Grooming    [place] Level (Grooming) set up; supervision; verbal cues  -     Row Name 12/03/21 0908          Toileting    [place] Level (Toileting) moderate assist (50% patient effort); verbal cues  -     Assistive Device Use (Toileting) grab bar/safety frame  -     Row Name 12/03/21 0910 12/03/21 0908       LB Dressing Goal 1 (OT-IRF)    [place] (LB Dressing Goal 1, OT-IRF) moderate assist (50-74% patient effort)  - maximum assist (25-49% patient effort)  -    Time Frame (LB Dressing Goal 1, OT-IRF) short-term goal (STG)  - --    Progress/Outcomes (LB Dressing Goal 1, OT-IRF) -- goal met  -    Row Name 12/03/21 0910 12/03/21 0908       Toileting Goal 1 (OT-IRF)    [place] Level (Toileting Goal 1, OT-IRF) minimum assist (75% or more patient effort)  - maximum assist (25-49% patient effort)  -    Progress/Outcomes (Toileting Goal 1, OT-IRF) -- goal met  -    Time Frame (Toileting Goal 1, OT-IRF) short-term goal (STG)  - --    Row Name 12/03/21 0908          Positioning and Restraints    Post Treatment Position wheelchair  -     In Wheelchair with PT; sitting; call light within reach; encouraged to call for assist; notified nsg  PT- am tx;  room - pm tx  -     Row Name 12/03/21 0908          Vital Signs    Intra SpO2 (%) 98  -     O2 Delivery Intra Treatment supplemental O2  -           User Key  (r) =  Recorded By, (t) = Taken By, (c) = Cosigned By    Initials Name Effective Dates     Itzel Sears OT 06/16/21 -                  Occupational Therapy Education                 Title: PT OT SLP Therapies (Done)     Topic: Occupational Therapy (Done)     Point: ADL training (Done)     Description:   Instruct learner(s) on proper safety adaptation and remediation techniques during self care or transfers.   Instruct in proper use of assistive devices.              Learning Progress Summary           Patient Acceptance, E,D, VU,NR by  at 12/3/2021 1411      Show all documentation for this point (6)                 Point: Precautions (Done)     Description:   Instruct learner(s) on prescribed precautions during self-care and functional transfers.              Learning Progress Summary           Patient Acceptance, E,D, VU,NR by  at 12/3/2021 1411      Show all documentation for this point (6)                             User Key     Initials Effective Dates Name Provider Type Discipline     06/16/21 -  Itzel Sears OT Occupational Therapist OT                    OT Recommendation and Plan    Planned Therapy Interventions (OT): activity tolerance training, adaptive equipment training, BADL retraining, patient/caregiver education/training, ROM/therapeutic exercise, strengthening exercise, occupation/activity based interventions, transfer/mobility retraining                    Time Calculation:      Time Calculation- OT     Row Name 12/03/21 1414 12/03/21 1412          Time Calculation- OT    OT Start Time 1355  - 0805  -     OT Stop Time 1415  - 0915  -     OT Time Calculation (min) 20 min  - 70 min  -     Total Timed Code Minutes- OT 20 minute(s)  - 70 minute(s)  -           User Key  (r) = Recorded By, (t) = Taken By, (c) = Cosigned By    Initials Name Provider Type     Itzel Sears OT Occupational Therapist              Therapy Charges for Today     Code Description Service Date  Service Provider Modifiers Qty    46916377235 HC OT SELF CARE/MGMT/TRAIN EA 15 MIN 12/2/2021 Itzel Sears, OT GO 2    29916645428 HC OT THERAPEUTIC ACT EA 15 MIN 12/2/2021 Itzel Sears, OT GO 3    49524123594 HC OT THER PROC EA 15 MIN 12/2/2021 Itzel Sears, OT GO 1    32545512808 HC OT SELF CARE/MGMT/TRAIN EA 15 MIN 12/3/2021 Itzel Sears, OT GO 3    99208589303 HC OT THERAPEUTIC ACT EA 15 MIN 12/3/2021 Itzel Sears OT GO 3                   Itzel Sears OT  12/3/2021

## 2021-12-03 NOTE — THERAPY PROGRESS REPORT/RE-CERT
Inpatient Rehabilitation - Physical Therapy Progress Note       EDWIGE Villarreal     Patient Name: Albina Finley  : 1945  MRN: [id number]    Today's Date: 12/3/2021                    Admit Date: 2021      Visit Dx:   No diagnosis found.    Patient Active Problem List   Diagnosis   • Iron deficiency anemia   • COPD with acute exacerbation ([facility])   • Chronic diastolic heart failure ([facility])   • CKD (chronic kidney disease) stage 3, GFR 30-59 ml/min ([facility])   • Essential hypertension   • Hyperlipidemia   • Type II diabetes mellitus ([facility])   • Arthritis   • Osteoporosis   • Grade II diastolic dysfunction   • Duodenitis   • Peptic ulcer disease with hemorrhage   • Non-ST elevation myocardial infarction (NSTEMI) ([facility])   • Abnormal nuclear stress test with mild degree of small size apical lateral wall myocardial ischemia in 2019.   • Anemia due to GI blood loss   • Hip fracture ([facility])   • S/p left hip fracture       Past Medical History:   Diagnosis Date   • Abnormal nuclear stress test with mild degree of small size apical lateral wall myocardial ischemia in 2019. 2020   • Acute on chronic diastolic CHF (congestive heart failure) ([facility]) 2019   • Anal polyp 2018    Added automatically from request for surgery [id number]   • Arthritis    • Chronic kidney disease    • COPD (chronic obstructive pulmonary disease) ([facility])    • Diabetes mellitus ([facility])    • Elevated cholesterol    • Essential hypertension 2019   • History of transfusion    • Incidental lung nodule, greater than or equal to 8mm     Left lower lobe, 15 mm based on  and 2019 chest CT scans with no growth in the size   • Iron deficiency anemia 2017   • Osteoporosis    • Peptic ulcer disease with hemorrhage 2020   • Pneumonia of right middle lobe due to infectious organism 2019       Past Surgical History:   Procedure Laterality Date   • ANUS SURGERY N/A 2018    Procedure: Diagnostic anoscopy with anal polyp  excision;  Surgeon: Marlyn Gutierrez MD;  Location: [facility] OR;  Service: General   •  SECTION     • ENDOSCOPY N/A 2020    Procedure: ESOPHAGOGASTRODUODENOSCOPY;  Surgeon: Marlon Wray MD;  Location: [facility] OR;  Service: Gastroenterology;  Laterality: N/A;   • FEMUR OPEN REDUCTION INTERNAL FIXATION Left 2021    Procedure: Left hip hook plate operative fixation;  Surgeon: Lupillo Cornejo MD;  Location: [facility] OR;  Service: Orthopedics;  Laterality: Left;   • HIP BIPOLAR REPLACEMENT      left Dr. Cornejo    • HYSTERECTOMY     • JOINT REPLACEMENT         PT ASSESSMENT (last 12 hours)     IRF PT Evaluation and Treatment     Row Name 21 162          PT Time and Intention    Document Type daily treatment; progress note  -RF     Mode of Treatment physical therapy  -RF     Patient/Family/Caregiver Comments/Observations Pt c/o pain and fatigue with treatment this date.  -RF     Row Name 21 162          General Information    Patient Profile Reviewed yes  -RF     Row Name 21 162          Cognition/Psychosocial    Affect/Mental Status (Cognitive) WFL  -RF     Orientation Status (Cognition) oriented to; person; place  -RF     Follows Commands (Cognition) WFL; follows one-step commands; repetition of directions required  -RF     Row Name 21 1628          Mobility    Extremity Weight-bearing Status left lower extremity  -RF     Left Lower Extremity (Weight-bearing Status) weight-bearing as tolerated (WBAT); other (see comments)  per chart review, physician orders  -RF     Row Name 21 1628          Transfer Assessment/Treatment    Transfers sit-stand transfer; stand-sit transfer; car transfer; chair-bed transfer  -RF     Row Name 21 1629          Transfers    Bed-Chair [place] (Transfers) standby assist  -RF     Chair-Bed [place] (Transfers) standby assist  -RF     Assistive Device (Bed-Chair Transfers) wheelchair  -RF     Sit-Stand [place] (Transfers)  standby assist  -RF     Stand-Sit [place] (Transfers) standby assist  -RF     Row Name 12/03/21 1625          Chair-Bed Transfer    Assistive Device (Chair-Bed Transfers) wheelchair  -RF     Row Name 12/03/21 1625          Sit-Stand Transfer    Assistive Device (Sit-Stand Transfers) walker, front-wheeled  -RF     Row Name 12/03/21 1625          Stand-Sit Transfer    Assistive Device (Stand-Sit Transfers) walker, front-wheeled  -RF     Row Name 12/03/21 1625          Gait/Stairs (Locomotion)    Gait/Stairs Locomotion gait/ambulation assistive device  -RF     [place] Level (Gait) standby assist  -RF     Assistive Device (Gait) walker, front-wheeled  -RF     Distance in Feet (Gait) 100, 60 in AM, 80X2 in PM  -RF     Pattern (Gait) step-to  -RF     Deviations/Abnormal Patterns (Gait) antalgic  -RF     Bilateral Gait Deviations forward flexed posture  -RF     Row Name 12/03/21 1625          Hip (Therapeutic Exercise)    Hip Strengthening (Therapeutic Exercise) bilateral; flexion; extension; aBduction; aDduction; marching while seated; marching while standing; sitting; standing; resistance band; 2 sets; 10 repetitions; blue  -RF     Row Name 12/03/21 1625          Knee (Therapeutic Exercise)    Knee Strengthening (Therapeutic Exercise) bilateral; flexion; extension; marching while seated; marching while standing; LAQ (long arc quad); hamstring curls; sitting; standing; resistance band; blue; 2 sets; 10 repetitions  -RF     Row Name 12/03/21 1625          Ankle (Therapeutic Exercise)    Ankle Strengthening (Therapeutic Exercise) bilateral; dorsiflexion; plantarflexion; sitting; 2 sets; 10 repetitions; standing  -RF     Row Name 12/03/21 1625          IRF PT Goals    Transfer Goal Selection (PT-IRF) transfers, PT goal 1  -RF     Gait (Walking Locomotion) Goal Selection (PT-IRF) gait, PT goal 1  -RF     Row Name 12/03/21 1625          Transfer Goal 1 (PT-IRF)    Activity/Assistive Device (Transfer Goal 1, PT-IRF)  all transfers  -RF     [place] Level (Transfer Goal 1, PT-IRF) standby assist  -RF     Time Frame (Transfer Goal 1, PT-IRF) 2 weeks  -RF     Progress/Outcomes (Transfer Goal 1, PT-IRF) goal met  -RF     Row Name 12/03/21 1625          Gait/Walking Locomotion Goal 1 (PT-IRF)    Activity/Assistive Device (Gait/Walking Locomotion Goal 1, PT-IRF) gait (walking locomotion); assistive device use  -RF     Gait/Walking Locomotion Distance Goal 1 (PT-IRF) 100  -RF     [place] Level (Gait/Walking Locomotion Goal 1, PT-IRF) contact guard assist  -RF     Time Frame (Gait/Walking Locomotion Goal 1, PT-IRF) 2 weeks  -RF     Progress/Outcomes (Gait/Walking Locomotion Goal 1, PT-IRF) goal ongoing  -RF     Row Name 12/03/21 1625          Positioning and Restraints    Pre-Treatment Position sitting in chair/recliner  BID  -RF     In Wheelchair sitting; call light within reach; encouraged to call for assist  BID  -RF     Row Name 12/03/21 162          Therapy Assessment/Plan (PT)    Rehab Potential/Prognosis (PT) good, to achieve stated therapy goals  -RF     Frequency of Treatment (PT) 5 times per week  -RF     Estimated Duration of Therapy (PT) 2 weeks  -RF     Row Name 12/03/21 1620          Daily Progress Summary (PT)    Functional Goal Overall Progress (PT) progressing toward functional goals as expected  -RF     Daily Progress Summary (PT) Pt demonstrates significnat improvements in functional mobility and ambulation quality this date. Continued skilled care required for further LE strengthening to ensure maximum safe function upon D/C.  -RF     Impairments Still Limiting Function (PT) strength decreased; impaired functional activity tolerance; pain  -RF     Recommendations (PT) Continue per current POC  -RF     Row Name 12/03/21 1622          Therapy Plan Review/Discharge Plan (PT)    Anticipated Equipment Needs at Discharge (PT Eval) other (see comments)  TBD  -RF     Expected Discharge Disposition (PT Eval) home;  home with caregiver  -RF           User Key  (r) = Recorded By, (t) = Taken By, (c) = Cosigned By    Initials Name Provider Type     Ayla Gonzalez PTA Physical Therapy Assistant              Wound 11/19/21 0948 Left lateral hip Incision (Active)   Dressing Appearance dry 12/03/21 0730   Closure GUILLERMO 12/02/21 1920   Base dressing in place, unable to visualize 12/02/21 1920     Physical Therapy Education                 Title: PT OT SLP Therapies (Done)     Topic: Physical Therapy (Done)     Point: Mobility training (Done)     Learning Progress Summary           Patient Acceptance, E,TB, VU by RF at 12/3/2021 1628      Show all documentation for this point (4)                 Point: Home exercise program (Done)     Learning Progress Summary           Patient Acceptance, E,TB, VU by RF at 12/3/2021 1628      Show all documentation for this point (4)                 Point: Body mechanics (Done)     Learning Progress Summary           Patient Acceptance, E,TB, VU by RF at 12/3/2021 1628      Show all documentation for this point (4)                 Point: Precautions (Done)     Learning Progress Summary           Patient Acceptance, E,TB, VU by RF at 12/3/2021 1628      Show all documentation for this point (4)                             User Key     Initials Effective Dates Name Provider Type Discipline     06/16/21 -  Ayla Gonzalez PTA Physical Therapy Assistant PT                PT Recommendation and Plan    Frequency of Treatment (PT): 5 times per week  Anticipated Equipment Needs at Discharge (PT Eval): other (see comments) (TBD)  Daily Progress Summary (PT)  Functional Goal Overall Progress (PT): progressing toward functional goals as expected  Daily Progress Summary (PT): Pt demonstrates significnat improvements in functional mobility and ambulation quality this date. Continued skilled care required for further LE strengthening to ensure maximum safe function upon D/C.  Impairments Still Limiting  Function (PT): strength decreased, impaired functional activity tolerance, pain  Recommendations (PT): Continue per current POC               Time Calculation:      PT Charges     Row Name 12/03/21 1629 12/03/21 1628          Time Calculation    Start Time 1300  -RF 0915  -RF     Stop Time 1345  -RF 1000  -RF     Time Calculation (min) 45 min  -RF 45 min  -RF     PT Received On 12/03/21  -RF 12/03/21  -RF     PT - Next Appointment 12/06/21  -RF 12/03/21  -RF     PT Goal Re-Cert Due Date 12/10/21  -RF 12/03/21  -RF            Time Calculation- PT    Total Timed Code Minutes- PT 45 minute(s)  -RF 45 minute(s)  -RF           User Key  (r) = Recorded By, (t) = Taken By, (c) = Cosigned By    Initials Name Provider Type    RF Ayla Gonzalez, ROHINI Physical Therapy Assistant                Therapy Charges for Today     Code Description Service Date Service Provider Modifiers Qty    11101421318 HC GAIT TRAINING EA 15 MIN 12/2/2021 Ayla Gonzalez, PTA GP 2    96084910896 HC PT THER PROC EA 15 MIN 12/2/2021 Ayla Gonzalez, PTA GP 4    55294265001 HC GAIT TRAINING EA 15 MIN 12/3/2021 Ayla Gonzalez, PTA GP 2    42237121929 HC PT THER PROC EA 15 MIN 12/3/2021 Ayla Gonzalez, PTA GP 4                   Ayla Gonzalez PTA  12/3/2021

## 2021-12-04 LAB
GLUCOSE BLDC GLUCOMTR-MCNC: 206 MG/DL (ref 70–130)
GLUCOSE BLDC GLUCOMTR-MCNC: 247 MG/DL (ref 70–130)
GLUCOSE BLDC GLUCOMTR-MCNC: 295 MG/DL (ref 70–130)
GLUCOSE BLDC GLUCOMTR-MCNC: 355 MG/DL (ref 70–130)

## 2021-12-04 PROCEDURE — 25010000002 ENOXAPARIN PER 10 MG: Performed by: INTERNAL MEDICINE

## 2021-12-04 PROCEDURE — 63710000001 INSULIN ASPART PER 5 UNITS: Performed by: INTERNAL MEDICINE

## 2021-12-04 PROCEDURE — 25010000002 THIAMINE PER 100 MG: Performed by: INTERNAL MEDICINE

## 2021-12-04 PROCEDURE — 82962 GLUCOSE BLOOD TEST: CPT

## 2021-12-04 PROCEDURE — 63710000001 INSULIN DETEMIR PER 5 UNITS: Performed by: FAMILY MEDICINE

## 2021-12-04 RX ADMIN — PANTOPRAZOLE SODIUM 40 MG: 40 TABLET, DELAYED RELEASE ORAL at 17:27

## 2021-12-04 RX ADMIN — CARVEDILOL 6.25 MG: 6.25 TABLET, FILM COATED ORAL at 17:29

## 2021-12-04 RX ADMIN — CARVEDILOL 6.25 MG: 6.25 TABLET, FILM COATED ORAL at 09:25

## 2021-12-04 RX ADMIN — INSULIN ASPART 8 UNITS: 100 INJECTION, SOLUTION INTRAVENOUS; SUBCUTANEOUS at 11:30

## 2021-12-04 RX ADMIN — FERROUS SULFATE TAB 325 MG (65 MG ELEMENTAL FE) 325 MG: 325 (65 FE) TAB at 17:27

## 2021-12-04 RX ADMIN — INSULIN ASPART 5 UNITS: 100 INJECTION, SOLUTION INTRAVENOUS; SUBCUTANEOUS at 09:27

## 2021-12-04 RX ADMIN — POLYETHYLENE GLYCOL 3350 17 G: 17 POWDER, FOR SOLUTION ORAL at 09:48

## 2021-12-04 RX ADMIN — HYDROCORTISONE 2.5%: 25 CREAM TOPICAL at 09:48

## 2021-12-04 RX ADMIN — ROSUVASTATIN CALCIUM 10 MG: 10 TABLET, FILM COATED ORAL at 20:29

## 2021-12-04 RX ADMIN — OXYCODONE HYDROCHLORIDE AND ACETAMINOPHEN 1 TABLET: 5; 325 TABLET ORAL at 20:30

## 2021-12-04 RX ADMIN — INSULIN DETEMIR 27 UNITS: 100 INJECTION, SOLUTION SUBCUTANEOUS at 20:30

## 2021-12-04 RX ADMIN — Medication 500 MG: at 09:25

## 2021-12-04 RX ADMIN — PANTOPRAZOLE SODIUM 40 MG: 40 TABLET, DELAYED RELEASE ORAL at 09:25

## 2021-12-04 RX ADMIN — ENOXAPARIN SODIUM 40 MG: 40 INJECTION SUBCUTANEOUS at 17:27

## 2021-12-04 RX ADMIN — THIAMINE HYDROCHLORIDE: 100 INJECTION, SOLUTION INTRAMUSCULAR; INTRAVENOUS at 20:48

## 2021-12-04 RX ADMIN — DOCUSATE SODIUM 100 MG: 100 CAPSULE ORAL at 09:26

## 2021-12-04 RX ADMIN — CETIRIZINE HYDROCHLORIDE 5 MG: 10 TABLET, FILM COATED ORAL at 09:26

## 2021-12-04 RX ADMIN — INSULIN ASPART 12 UNITS: 100 INJECTION, SOLUTION INTRAVENOUS; SUBCUTANEOUS at 17:18

## 2021-12-04 RX ADMIN — HYDROCORTISONE 2.5%: 25 CREAM TOPICAL at 21:23

## 2021-12-04 RX ADMIN — DOCUSATE SODIUM 100 MG: 100 CAPSULE ORAL at 20:29

## 2021-12-04 RX ADMIN — OXYCODONE HYDROCHLORIDE AND ACETAMINOPHEN 1 TABLET: 5; 325 TABLET ORAL at 09:26

## 2021-12-04 NOTE — PLAN OF CARE
Goal Outcome Evaluation:  Plan of Care Reviewed With: patient        Progress: improving  Outcome Summary: Pt progressing with theapy, Cont POC.

## 2021-12-05 LAB
GLUCOSE BLDC GLUCOMTR-MCNC: 208 MG/DL (ref 70–130)
GLUCOSE BLDC GLUCOMTR-MCNC: 223 MG/DL (ref 70–130)
GLUCOSE BLDC GLUCOMTR-MCNC: 233 MG/DL (ref 70–130)
GLUCOSE BLDC GLUCOMTR-MCNC: 291 MG/DL (ref 70–130)

## 2021-12-05 PROCEDURE — 25010000002 ENOXAPARIN PER 10 MG: Performed by: INTERNAL MEDICINE

## 2021-12-05 PROCEDURE — 82962 GLUCOSE BLOOD TEST: CPT

## 2021-12-05 PROCEDURE — 25010000002 THIAMINE PER 100 MG: Performed by: INTERNAL MEDICINE

## 2021-12-05 PROCEDURE — 63710000001 INSULIN ASPART PER 5 UNITS: Performed by: INTERNAL MEDICINE

## 2021-12-05 PROCEDURE — 63710000001 INSULIN DETEMIR PER 5 UNITS: Performed by: FAMILY MEDICINE

## 2021-12-05 RX ADMIN — INSULIN ASPART 5 UNITS: 100 INJECTION, SOLUTION INTRAVENOUS; SUBCUTANEOUS at 09:18

## 2021-12-05 RX ADMIN — FERROUS SULFATE TAB 325 MG (65 MG ELEMENTAL FE) 325 MG: 325 (65 FE) TAB at 17:01

## 2021-12-05 RX ADMIN — DOCUSATE SODIUM 100 MG: 100 CAPSULE ORAL at 20:17

## 2021-12-05 RX ADMIN — INSULIN DETEMIR 27 UNITS: 100 INJECTION, SOLUTION SUBCUTANEOUS at 20:17

## 2021-12-05 RX ADMIN — THIAMINE HYDROCHLORIDE: 100 INJECTION, SOLUTION INTRAMUSCULAR; INTRAVENOUS at 20:17

## 2021-12-05 RX ADMIN — HYDROCORTISONE 2.5%: 25 CREAM TOPICAL at 21:06

## 2021-12-05 RX ADMIN — CARVEDILOL 6.25 MG: 6.25 TABLET, FILM COATED ORAL at 17:01

## 2021-12-05 RX ADMIN — PANTOPRAZOLE SODIUM 40 MG: 40 TABLET, DELAYED RELEASE ORAL at 17:01

## 2021-12-05 RX ADMIN — HYDROCORTISONE 2.5%: 25 CREAM TOPICAL at 10:07

## 2021-12-05 RX ADMIN — INSULIN ASPART 5 UNITS: 100 INJECTION, SOLUTION INTRAVENOUS; SUBCUTANEOUS at 12:33

## 2021-12-05 RX ADMIN — DOCUSATE SODIUM 100 MG: 100 CAPSULE ORAL at 09:17

## 2021-12-05 RX ADMIN — ENOXAPARIN SODIUM 40 MG: 40 INJECTION SUBCUTANEOUS at 17:00

## 2021-12-05 RX ADMIN — PANTOPRAZOLE SODIUM 40 MG: 40 TABLET, DELAYED RELEASE ORAL at 09:17

## 2021-12-05 RX ADMIN — OXYCODONE HYDROCHLORIDE AND ACETAMINOPHEN 1 TABLET: 5; 325 TABLET ORAL at 20:17

## 2021-12-05 RX ADMIN — CETIRIZINE HYDROCHLORIDE 5 MG: 10 TABLET, FILM COATED ORAL at 09:17

## 2021-12-05 RX ADMIN — CARVEDILOL 6.25 MG: 6.25 TABLET, FILM COATED ORAL at 09:17

## 2021-12-05 RX ADMIN — Medication 500 MG: at 09:17

## 2021-12-05 RX ADMIN — INSULIN ASPART 8 UNITS: 100 INJECTION, SOLUTION INTRAVENOUS; SUBCUTANEOUS at 17:00

## 2021-12-05 RX ADMIN — ROSUVASTATIN CALCIUM 10 MG: 10 TABLET, FILM COATED ORAL at 20:17

## 2021-12-05 NOTE — PLAN OF CARE
Goal Outcome Evaluation:  Plan of Care Reviewed With: patient        Progress: improving  Outcome Summary: Pt progressing with therapy, cont POC.

## 2021-12-05 NOTE — PROGRESS NOTES
Rehabilitation Nursing  Inpatient Rehabilitation Plan of Care Note    Plan of Care  Copy from POC  Pain    Pain Management (Active)  Current Status (11/26/2021 12:51:00 PM): Potential for pain  Weekly Goal: Decreased pain this week  Discharge Goal: No pain    Safety    Potential for Injury (Active)  Current Status (11/26/2021 12:51:00 PM): At risk for injury  Weekly Goal: No injury this week  Discharge Goal: No injury    Signed by: Serge Soto RN

## 2021-12-06 LAB
ANION GAP SERPL CALCULATED.3IONS-SCNC: 7 MMOL/L (ref 5–15)
BASOPHILS # BLD AUTO: 0.03 10*3/MM3 (ref 0–0.2)
BASOPHILS NFR BLD AUTO: 0.4 % (ref 0–1.5)
BUN SERPL-MCNC: 26 MG/DL (ref 8–23)
BUN/CREAT SERPL: 23.4 (ref 7–25)
CALCIUM SPEC-SCNC: 8.8 MG/DL (ref 8.6–10.5)
CHLORIDE SERPL-SCNC: 103 MMOL/L (ref 98–107)
CO2 SERPL-SCNC: 25 MMOL/L (ref 22–29)
CREAT SERPL-MCNC: 1.11 MG/DL (ref 0.57–1)
DEPRECATED RDW RBC AUTO: 51.8 FL (ref 37–54)
EOSINOPHIL # BLD AUTO: 0.23 10*3/MM3 (ref 0–0.4)
EOSINOPHIL NFR BLD AUTO: 2.9 % (ref 0.3–6.2)
ERYTHROCYTE [DISTWIDTH] IN BLOOD BY AUTOMATED COUNT: 16.1 % (ref 12.3–15.4)
GFR SERPL CREATININE-BSD FRML MDRD: 48 ML/MIN/1.73
GLUCOSE BLDC GLUCOMTR-MCNC: 145 MG/DL (ref 70–130)
GLUCOSE BLDC GLUCOMTR-MCNC: 226 MG/DL (ref 70–130)
GLUCOSE BLDC GLUCOMTR-MCNC: 251 MG/DL (ref 70–130)
GLUCOSE BLDC GLUCOMTR-MCNC: 278 MG/DL (ref 70–130)
GLUCOSE SERPL-MCNC: 112 MG/DL (ref 65–99)
HCT VFR BLD AUTO: 27.1 % (ref 34–46.6)
HGB BLD-MCNC: 7.9 G/DL (ref 12–15.9)
IMM GRANULOCYTES # BLD AUTO: 0.02 10*3/MM3 (ref 0–0.05)
IMM GRANULOCYTES NFR BLD AUTO: 0.3 % (ref 0–0.5)
LYMPHOCYTES # BLD AUTO: 3.41 10*3/MM3 (ref 0.7–3.1)
LYMPHOCYTES NFR BLD AUTO: 43.3 % (ref 19.6–45.3)
MCH RBC QN AUTO: 25.6 PG (ref 26.6–33)
MCHC RBC AUTO-ENTMCNC: 29.2 G/DL (ref 31.5–35.7)
MCV RBC AUTO: 87.7 FL (ref 79–97)
MONOCYTES # BLD AUTO: 0.61 10*3/MM3 (ref 0.1–0.9)
MONOCYTES NFR BLD AUTO: 7.7 % (ref 5–12)
NEUTROPHILS NFR BLD AUTO: 3.58 10*3/MM3 (ref 1.7–7)
NEUTROPHILS NFR BLD AUTO: 45.4 % (ref 42.7–76)
NRBC BLD AUTO-RTO: 0 /100 WBC (ref 0–0.2)
PLATELET # BLD AUTO: 411 10*3/MM3 (ref 140–450)
PMV BLD AUTO: 10.8 FL (ref 6–12)
POTASSIUM SERPL-SCNC: 5 MMOL/L (ref 3.5–5.2)
RBC # BLD AUTO: 3.09 10*6/MM3 (ref 3.77–5.28)
SODIUM SERPL-SCNC: 135 MMOL/L (ref 136–145)
WBC NRBC COR # BLD: 7.88 10*3/MM3 (ref 3.4–10.8)

## 2021-12-06 PROCEDURE — 80048 BASIC METABOLIC PNL TOTAL CA: CPT | Performed by: FAMILY MEDICINE

## 2021-12-06 PROCEDURE — 85025 COMPLETE CBC W/AUTO DIFF WBC: CPT | Performed by: FAMILY MEDICINE

## 2021-12-06 PROCEDURE — 97530 THERAPEUTIC ACTIVITIES: CPT

## 2021-12-06 PROCEDURE — 97110 THERAPEUTIC EXERCISES: CPT

## 2021-12-06 PROCEDURE — 25010000002 ENOXAPARIN PER 10 MG: Performed by: INTERNAL MEDICINE

## 2021-12-06 PROCEDURE — 97535 SELF CARE MNGMENT TRAINING: CPT

## 2021-12-06 PROCEDURE — 97116 GAIT TRAINING THERAPY: CPT

## 2021-12-06 PROCEDURE — 63710000001 INSULIN DETEMIR PER 5 UNITS: Performed by: FAMILY MEDICINE

## 2021-12-06 PROCEDURE — 25010000002 THIAMINE PER 100 MG: Performed by: INTERNAL MEDICINE

## 2021-12-06 PROCEDURE — 82962 GLUCOSE BLOOD TEST: CPT

## 2021-12-06 PROCEDURE — 63710000001 INSULIN ASPART PER 5 UNITS: Performed by: INTERNAL MEDICINE

## 2021-12-06 RX ADMIN — PANTOPRAZOLE SODIUM 40 MG: 40 TABLET, DELAYED RELEASE ORAL at 09:28

## 2021-12-06 RX ADMIN — CETIRIZINE HYDROCHLORIDE 5 MG: 10 TABLET, FILM COATED ORAL at 09:28

## 2021-12-06 RX ADMIN — FERROUS SULFATE TAB 325 MG (65 MG ELEMENTAL FE) 325 MG: 325 (65 FE) TAB at 17:28

## 2021-12-06 RX ADMIN — INSULIN ASPART 8 UNITS: 100 INJECTION, SOLUTION INTRAVENOUS; SUBCUTANEOUS at 11:38

## 2021-12-06 RX ADMIN — ROSUVASTATIN CALCIUM 10 MG: 10 TABLET, FILM COATED ORAL at 20:23

## 2021-12-06 RX ADMIN — OXYCODONE HYDROCHLORIDE AND ACETAMINOPHEN 1 TABLET: 5; 325 TABLET ORAL at 20:23

## 2021-12-06 RX ADMIN — INSULIN DETEMIR 27 UNITS: 100 INJECTION, SOLUTION SUBCUTANEOUS at 20:24

## 2021-12-06 RX ADMIN — HYDROCORTISONE 2.5%: 25 CREAM TOPICAL at 09:33

## 2021-12-06 RX ADMIN — THIAMINE HYDROCHLORIDE: 100 INJECTION, SOLUTION INTRAMUSCULAR; INTRAVENOUS at 20:23

## 2021-12-06 RX ADMIN — PANTOPRAZOLE SODIUM 40 MG: 40 TABLET, DELAYED RELEASE ORAL at 18:04

## 2021-12-06 RX ADMIN — ENOXAPARIN SODIUM 40 MG: 40 INJECTION SUBCUTANEOUS at 17:28

## 2021-12-06 RX ADMIN — POLYETHYLENE GLYCOL 3350 17 G: 17 POWDER, FOR SOLUTION ORAL at 09:28

## 2021-12-06 RX ADMIN — Medication 500 MG: at 10:23

## 2021-12-06 RX ADMIN — CARVEDILOL 6.25 MG: 6.25 TABLET, FILM COATED ORAL at 17:28

## 2021-12-06 RX ADMIN — INSULIN ASPART 8 UNITS: 100 INJECTION, SOLUTION INTRAVENOUS; SUBCUTANEOUS at 17:58

## 2021-12-06 RX ADMIN — CARVEDILOL 6.25 MG: 6.25 TABLET, FILM COATED ORAL at 09:28

## 2021-12-06 RX ADMIN — DOCUSATE SODIUM 100 MG: 100 CAPSULE ORAL at 09:28

## 2021-12-06 RX ADMIN — HYDROCORTISONE 2.5%: 25 CREAM TOPICAL at 20:26

## 2021-12-06 RX ADMIN — DOCUSATE SODIUM 100 MG: 100 CAPSULE ORAL at 20:23

## 2021-12-06 NOTE — PROGRESS NOTES
Inpatient Rehabilitation Functional Measures Assessment and Plan of Care    Plan of Care  Self Care    [OT] Dressing (Lower)(Active)  Current Status(12/06/2021): modA  Weekly Goal(12/14/2021): Tiffany  Discharge Goal: Tiffany    Performed Intervention(s)  ADL re-trng, AE trng, ther ex/act, education    Functional Measures  [place] Eating:  [place] Grooming:  [place] Bathing:  [place] Upper Body Dressing:  [place] Lower Body Dressing:  [place] Toileting:    [place] Bladder Management  Level of Assistance:  Frequency/Number of Accidents this Shift:    [facility] Bowel Management  Level of Assistance:  Frequency/Number of Accidents this Shift:    [place] Bed/Chair/Wheelchair Transfer:  [place] Toilet Transfer:  [place] Tub/Shower Transfer:    Previously Documented Mode of Locomotion at Discharge:  [facility] Expected Mode of Locomotion at Discharge:  [facility] Walk/Wheelchair:  [facility] Stairs:    [facility] Comprehension:  [facility] Expression:  [facility] Social Interaction:  [facility] Problem Solving:  [facility] Memory:    Therapy Mode Minutes  Occupational Therapy: Individual: 90 minutes.  Physical Therapy:  Speech Language Pathology:    Discharge Functional Goals:    Signed by: Itzel Sears Occupational Therapist

## 2021-12-06 NOTE — PROGRESS NOTES
PROGRESS NOTE         Patient Identification:  Name:  Albina Finley  Age:  76 y.o.  Sex:  female  :  1945  MRN:  [id number]  Visit Number:  [id number]  Primary Care Provider:  Micah Núñez PA         LOS: 10 days       ----------------------------------------------------------------------------------------------------------------------  Subjective       Chief Complaints:    Left hip fracture status post ORIF  UTI  OPHELIA on CKD stage III      Interval History:     76-year-old female with recent left hip fracture with ORIF, chronic kidney disease stage III, chronic hypoxic respiratory failure mostly at night, hypertension, hyperlipidemia, CAD, CHF preserved EF, severe pulmonary hypertension, diabetes mellitus and COPD.    Participated with physical and occupational therapy on 12/3/2021: Requires standby assist for transfer activity; utilizes wheelchair/front wheel walker for chair to bed, sit to stand/stand to sit transfer; ambulated 100 feet, 60 feet, 80 feet x 2 with front wheeled walker and standby assist; requires moderate assist with verbal cues for bathing activity; set up assist with supervision verbal cues for upper body dressing; moderate to max assist with verbal cues and sock aid reacher for lower body dressing; set up assist to supervision verbal cues for grooming; moderate assist with verbal cues and grab bar/safety frame for toileting          Objective       Current Hospital Meds:  calcium carbonate (oyster shell), 500 mg, Oral, Daily  carvedilol, 6.25 mg, Oral, BID With Meals  cetirizine, 5 mg, Oral, Daily  docusate sodium, 100 mg, Oral, BID  enoxaparin, 40 mg, Subcutaneous, Q24H  ferrous sulfate, 325 mg, Oral, Q PM  Hydrocortisone (Perianal), , Rectal, BID  insulin aspart, 0-14 Units, Subcutaneous, TID AC  insulin detemir, 27 Units, Subcutaneous, Nightly  pantoprazole, 40 mg, Oral, BID AC  polyethylene glycol, 17 g, Oral, Daily  rosuvastatin, 10 mg, Oral, Nightly  IVPB builder, ,  Intravenous, Nightly         ----------------------------------------------------------------------------------------------------------------------    Vital Signs:  Temp:  [97.8 °F (36.6 °C)] 97.8 °F (36.6 °C)  Heart Rate:  [64-65] 64  Resp:  [18] 18  BP: (140-162)/(55-60) 140/55  No data found.  SpO2 Percentage    12/03/21 1906 12/04/21 1906 12/05/21 1904   SpO2: 97% 98% 97%     SpO2:  [97 %] 97 %  on  Flow (L/min):  [1.5] 1.5;   Device (Oxygen Therapy): nasal cannula    Body mass index is 29.59 kg/m².  Wt Readings from Last 3 Encounters:   11/26/21 73.4 kg (161 lb 13.1 oz)   11/26/21 73.4 kg (161 lb 14.4 oz)   06/17/20 72.6 kg (160 lb)        Intake/Output Summary (Last 24 hours) at 12/6/2021 1155  Last data filed at 12/6/2021 0908  Gross per 24 hour   Intake 1560 ml   Output --   Net 1560 ml     Diet Soft Texture; [employer]; Thin  ----------------------------------------------------------------------------------------------------------------------      Physical Exam:    General Appearance: alert, pleasant, interactive and cooperative.  Generalized weakness, mildly confused.  Working with OT.     Head: normocephalic, without obvious abnormality and atraumatic     Lungs: clear to auscultation, respirations regular, respirations even and  respirations unlabored. No accessory muscle use.      Heart:: regular rhythm & normal rate, normal S1, S2, no murmur, no gallop, no  rub and no click.  Chest wall with no abnormalities observed. PMI nondisplaced     Abdomen: normal bowel sounds in all quadrants, soft non-tender, no guarding and no rebound tenderness     Extremities: no edema, no cyanosis, no redness, no tenderness, no clubbing     Musculoskeletal: joints with no effusion nor erythema nor warmth.  Pedal pulses palpable and equal bilaterally     Skin: no bleeding, bruising or rash and no lesions noted     Neurologic:               Mental Status: Patient is awake alert and mildly confused              Sensory:  Sensory overall seems to be intact in BLE and BUE.              Motor strength: Generalized weakness        ----------------------------------------------------------------------------------------------------------------------            Results from last 7 days   Lab Units 12/06/21 0121 12/03/21 0133 11/30/21  0023   WBC 10*3/mm3 7.88 8.70 8.92   HEMOGLOBIN g/dL 7.9* 8.1* 8.4*   HEMATOCRIT % 27.1* 27.8* 28.2*   MCV fL 87.7 87.7 86.8   MCHC g/dL 29.2* 29.1* 29.8*   PLATELETS 10*3/mm3 411 505* 533*     Results from last 7 days   Lab Units 12/06/21 0121 12/03/21 0133 11/30/21  0023   SODIUM mmol/L 135* 134* 137   POTASSIUM mmol/L 5.0 5.2 4.6   CHLORIDE mmol/L 103 101 102   CO2 mmol/L 25.0 25.1 26.4   BUN mg/dL 26* 27* 22   CREATININE mg/dL 1.11* 1.17* 1.00   EGFR IF NONAFRICN AM mL/min/1.73 48* 45* 54*   CALCIUM mg/dL 8.8 8.5* 8.5*   GLUCOSE mg/dL 112* 228* 199*   Estimated Creatinine Clearance: 39.8 mL/min (A) (by C-G formula based on SCr of 1.11 mg/dL (H)).  No results found for: AMMONIA    Glucose   Date/Time Value Ref Range Status   12/06/2021 1101 278 (H) 70 - 130 mg/dL Final     Comment:     Meter: [id number] : [id number] Qasim Bennett   12/06/2021 0628 145 (H) 70 - 130 mg/dL Final     Comment:     Meter: [id number] : [id number] Alcon Mayes Shital   12/05/2021 1945 233 (H) 70 - 130 mg/dL Final     Comment:     Meter: [id number] : [id number] Alcon Mayes Shital   12/05/2021 1605 291 (H) 70 - 130 mg/dL Final     Comment:     Meter: [id number] : [id number] Pierre Doris   12/05/2021 1114 223 (H) 70 - 130 mg/dL Final     Comment:     Meter: [id number] : [id number] Pierre Doris   12/05/2021 0618 208 (H) 70 - 130 mg/dL Final     Comment:     Meter: [id number] : [id number] Alcon Mayes Shital   12/04/2021 2009 247 (H) 70 - 130 mg/dL Final     Comment:     Meter: [id number] : [id number] Alcon Mayes Shital   12/04/2021 1614 355 (H) 70 - 130 mg/dL Final     Comment:     RN Notified Meter:  [id number] : [id number] BROOKE ERNANDEZ     Lab Results   Component Value Date    HGBA1C 9.20 (H) 11/13/2021     Lab Results   Component Value Date    TSH 1.840 02/04/2021    FREET4 1.38 02/04/2021       No results found for: BLOODCX  No results found for: URINECX  No results found for: WOUNDCX  No results found for: STOOLCX  No results found for: RESPCX  Pain Management Panel     Pain Management Panel Latest Ref Rng & Units 11/14/2021 3/1/2021    CREATININE UR mg/dL 115.1 28.7            ----------------------------------------------------------------------------------------------------------------------  Imaging Results (Last 24 Hours)     ** No results found for the last 24 hours. **          ----------------------------------------------------------------------------------------------------------------------    Assessment/Plan       Assessment/Plan     ASSESSMENT:    Status post left hip fracture with ORIF  Diabetes mellitus  Chronic hypoxemia  CKD stage III  Hypertension  Hyperlipidemia  CHF with preserved EF  Anemia    PLAN:    Status post left hip fracture with ORIF--Participated with physical and occupational therapy on 12/3/2021: Requires standby assist for transfer activity; utilizes wheelchair/front wheel walker for chair to bed, sit to stand/stand to sit transfer; ambulated 100 feet, 60 feet, 80 feet x 2 with front wheeled walker and standby assist; requires moderate assist with verbal cues for bathing activity; set up assist with supervision verbal cues for upper body dressing; moderate to max assist with verbal cues and sock aid reacher for lower body dressing; set up assist to supervision verbal cues for grooming; moderate assist with verbal cues and grab bar/safety frame for toileting     Diabetes mellitus continue insulin protocol.     Chronic hypoxemia stable continue O2 at 2 L per nasal cannula     CKD stage III--has been stable.     Hypertension reasonably well controlled     Hyperlipidemia  continue statin therapy     CHF with preserved EF--compensated for now.  Monitor closely     Anemia--stable        Code Status:   Code Status and Medical Interventions:   Ordered at: 11/26/21 9075     Code Status (Patient has no pulse and is not breathing):    CPR (Attempt to Resuscitate)     Medical Interventions (Patient has pulse or is breathing):    Full Support     Traci Villeda MD  12/06/21  11:55 EST

## 2021-12-06 NOTE — PLAN OF CARE
Goal Outcome Evaluation:  Plan of Care Reviewed With: patient           Outcome Summary: Pt progressing with therapy. Pt voices no complaints or concerns at this time. No s/s of acute distress noted. Will continue to follow plan of care.

## 2021-12-06 NOTE — THERAPY TREATMENT NOTE
Inpatient Rehabilitation - Occupational Therapy Treatment Note    EDWIGE Villarreal     Patient Name: Albina Finley  : 1945  MRN: [id number]    Today's Date: 2021                 Admit Date: 2021       No diagnosis found.    Patient Active Problem List   Diagnosis   • Iron deficiency anemia   • COPD with acute exacerbation ([facility])   • Chronic diastolic heart failure ([facility])   • CKD (chronic kidney disease) stage 3, GFR 30-59 ml/min ([facility])   • Essential hypertension   • Hyperlipidemia   • Type II diabetes mellitus ([facility])   • Arthritis   • Osteoporosis   • Grade II diastolic dysfunction   • Duodenitis   • Peptic ulcer disease with hemorrhage   • Non-ST elevation myocardial infarction (NSTEMI) ([facility])   • Abnormal nuclear stress test with mild degree of small size apical lateral wall myocardial ischemia in 2019.   • Anemia due to GI blood loss   • Hip fracture ([facility])   • S/p left hip fracture       Past Medical History:   Diagnosis Date   • Abnormal nuclear stress test with mild degree of small size apical lateral wall myocardial ischemia in 2019. 2020   • Acute on chronic diastolic CHF (congestive heart failure) ([facility]) 2019   • Anal polyp 2018    Added automatically from request for surgery [id number]   • Arthritis    • Chronic kidney disease    • COPD (chronic obstructive pulmonary disease) ([facility])    • Diabetes mellitus ([facility])    • Elevated cholesterol    • Essential hypertension 2019   • History of transfusion    • Incidental lung nodule, greater than or equal to 8mm     Left lower lobe, 15 mm based on  and 2019 chest CT scans with no growth in the size   • Iron deficiency anemia 2017   • Osteoporosis    • Peptic ulcer disease with hemorrhage 2020   • Pneumonia of right middle lobe due to infectious organism 2019       Past Surgical History:   Procedure Laterality Date   • ANUS SURGERY N/A 2018    Procedure: Diagnostic anoscopy with anal polyp excision;   Surgeon: Marlyn Gutierrez MD;  Location: [facility] OR;  Service: General   •  SECTION     • ENDOSCOPY N/A 2020    Procedure: ESOPHAGOGASTRODUODENOSCOPY;  Surgeon: Marlon Wray MD;  Location: [facility] OR;  Service: Gastroenterology;  Laterality: N/A;   • FEMUR OPEN REDUCTION INTERNAL FIXATION Left 2021    Procedure: Left hip hook plate operative fixation;  Surgeon: Lupillo Cornejo MD;  Location: [facility] OR;  Service: Orthopedics;  Laterality: Left;   • HIP BIPOLAR REPLACEMENT      left Dr. Cornejo    • HYSTERECTOMY     • JOINT REPLACEMENT               IRF OT ASSESSMENT FLOWSHEET (last 12 hours)     IRF OT Evaluation and Treatment     Row Name 21 1448          OT Time and Intention    Document Type daily treatment  -     Mode of Treatment occupational therapy  -     Symptoms Noted During/After Treatment none  -     Row Name 21 1448          General Information    Patient/Family/Caregiver Comments/Observations agreeable to therapy  -     Existing Precautions/Restrictions fall; oxygen therapy device and L/min  decreased skin integrity  -     Row Name 21 1448          Cognition/Psychosocial    Follows Commands (Cognition) verbal cues/prompting required; repetition of directions required  -     Row Name 21 1448          Transfers    [place] Level (Toilet Transfer) contact guard; verbal cues  -     Assistive Device (Toilet Transfer) grab bars/safety frame  -     Row Name 21 1448          Motor Skills    Motor Control/Coordination Interventions therapeutic exercise/ROM; gross motor coordination activities; fine motor manipulation/dexterity activities  BUE ther ex/act, bilat coord ex, GMC/FMC, hand exerciser  -     Row Name 21 1448          Lower Body Dressing    [place] Level (Lower Body Dressing) moderate assist (50% patient effort); verbal cues  -     Assistive Device Use (Lower Body Dressing) reacher; sock-aid  -     Row Name 21  1448          Grooming    [place] Level (Grooming) set up; supervision; verbal cues  -     Row Name 12/06/21 1448          Toileting    [place] Level (Toileting) minimum assist (75% patient effort); verbal cues  -     Assistive Device Use (Toileting) grab bar/safety frame  -     Row Name 12/06/21 1448          Positioning and Restraints    Post Treatment Position wheelchair  -     In Bed --  -CJ     In Wheelchair with PT; sitting; call light within reach; encouraged to call for assist; notified nsg  PT- am tx;  room - pm tx  -     Row Name 12/06/21 1448          Vital Signs    Intra SpO2 (%) 99  -     O2 Delivery Intra Treatment supplemental O2  -           User Key  (r) = Recorded By, (t) = Taken By, (c) = Cosigned By    Initials Name Effective Dates     Itzel Sears OT 06/16/21 -                  Occupational Therapy Education                 Title: PT OT SLP Therapies (Done)     Topic: Occupational Therapy (Done)     Point: ADL training (Done)     Description:   Instruct learner(s) on proper safety adaptation and remediation techniques during self care or transfers.   Instruct in proper use of assistive devices.              Learning Progress Summary           Patient Acceptance, E,D, VU,NR by  at 12/6/2021 1455      Show all documentation for this point (7)                 Point: Precautions (Done)     Description:   Instruct learner(s) on prescribed precautions during self-care and functional transfers.              Learning Progress Summary           Patient Acceptance, E,D, VU,NR by  at 12/6/2021 1455      Show all documentation for this point (7)                             User Key     Initials Effective Dates Name Provider Type Discipline     06/16/21 -  Itzel Sears OT Occupational Therapist OT                    OT Recommendation and Plan    Planned Therapy Interventions (OT): activity tolerance training, adaptive equipment training, BADL retraining,  patient/caregiver education/training, ROM/therapeutic exercise, strengthening exercise, occupation/activity based interventions, transfer/mobility retraining                    Time Calculation:      Time Calculation- OT     Row Name 12/06/21 1446 12/06/21 1444          Time Calculation- OT    OT Start Time 1410  - 0805  -     OT Stop Time 1430  - 0915  -     OT Time Calculation (min) 20 min  - 70 min  -     Total Timed Code Minutes- OT 20 minute(s)  - 70 minute(s)  -           User Key  (r) = Recorded By, (t) = Taken By, (c) = Cosigned By    Initials Name Provider Type     Itzel Sears OT Occupational Therapist              Therapy Charges for Today     Code Description Service Date Service Provider Modifiers Qty    44194892649 HC OT SELF CARE/MGMT/TRAIN EA 15 MIN 12/6/2021 Itzel Sears OT GO 2    38187050808 HC OT THERAPEUTIC ACT EA 15 MIN 12/6/2021 Itzel Sears OT GO 3    11938673826 HC OT THER PROC EA 15 MIN 12/6/2021 Itzel Sears OT GO 1                   Itzel Sears OT  12/6/2021

## 2021-12-06 NOTE — PROGRESS NOTES
Rehabilitation Nursing  Inpatient Rehabilitation Plan of Care Note    Plan of Care  Pain    Pain Management (Active)  Current Status (11/26/2021 12:51:00 PM): Potential for pain  Weekly Goal: Decreased pain this week  Discharge Goal: No pain    Safety    Potential for Injury (Active)  Current Status (11/26/2021 12:51:00 PM): At risk for injury  Weekly Goal: No injury this week  Discharge Goal: No injury    Signed by: Dorothy Frederick RN

## 2021-12-06 NOTE — THERAPY TREATMENT NOTE
Inpatient Rehabilitation - Physical Therapy Treatment Note       EDWIGE Villarreal     Patient Name: Albnia Finley  : 1945  MRN: [id number]    Today's Date: 2021                    Admit Date: 2021      Visit Dx:   No diagnosis found.    Patient Active Problem List   Diagnosis   • Iron deficiency anemia   • COPD with acute exacerbation ([facility])   • Chronic diastolic heart failure (HCC)   • CKD (chronic kidney disease) stage 3, GFR 30-59 ml/min ([facility])   • Essential hypertension   • Hyperlipidemia   • Type II diabetes mellitus ([facility])   • Arthritis   • Osteoporosis   • Grade II diastolic dysfunction   • Duodenitis   • Peptic ulcer disease with hemorrhage   • Non-ST elevation myocardial infarction (NSTEMI) ([facility])   • Abnormal nuclear stress test with mild degree of small size apical lateral wall myocardial ischemia in 2019.   • Anemia due to GI blood loss   • Hip fracture ([facility])   • S/p left hip fracture       Past Medical History:   Diagnosis Date   • Abnormal nuclear stress test with mild degree of small size apical lateral wall myocardial ischemia in 2019. 2020   • Acute on chronic diastolic CHF (congestive heart failure) ([facility]) 2019   • Anal polyp 2018    Added automatically from request for surgery [id number]   • Arthritis    • Chronic kidney disease    • COPD (chronic obstructive pulmonary disease) ([facility])    • Diabetes mellitus ([facility])    • Elevated cholesterol    • Essential hypertension 2019   • History of transfusion    • Incidental lung nodule, greater than or equal to 8mm     Left lower lobe, 15 mm based on  and 2019 chest CT scans with no growth in the size   • Iron deficiency anemia 2017   • Osteoporosis    • Peptic ulcer disease with hemorrhage 2020   • Pneumonia of right middle lobe due to infectious organism 2019       Past Surgical History:   Procedure Laterality Date   • ANUS SURGERY N/A 2018    Procedure: Diagnostic anoscopy with anal  polyp excision;  Surgeon: Marlyn Gutierrez MD;  Location: [facility] OR;  Service: General   •  SECTION     • ENDOSCOPY N/A 2020    Procedure: ESOPHAGOGASTRODUODENOSCOPY;  Surgeon: Marlon Wray MD;  Location: [facility] OR;  Service: Gastroenterology;  Laterality: N/A;   • FEMUR OPEN REDUCTION INTERNAL FIXATION Left 2021    Procedure: Left hip hook plate operative fixation;  Surgeon: Lupillo Cornejo MD;  Location: [facility] OR;  Service: Orthopedics;  Laterality: Left;   • HIP BIPOLAR REPLACEMENT      left Dr. Cornejo    • HYSTERECTOMY     • JOINT REPLACEMENT         PT ASSESSMENT (last 12 hours)     IRF PT Evaluation and Treatment     Row Name 21 1616          PT Time and Intention    Document Type daily treatment; progress note  -RF     Mode of Treatment physical therapy  -RF     Patient/Family/Caregiver Comments/Observations Pt c/o fatigue and SOA with increased activity.  -RF     Row Name 21 1616          General Information    Patient Profile Reviewed yes  -RF     Row Name 21 1616          Cognition/Psychosocial    Affect/Mental Status (Cognitive) WFL  -RF     Orientation Status (Cognition) oriented to; person; place  -RF     Follows Commands (Cognition) WFL; follows one-step commands; repetition of directions required  -RF     Row Name 21 1616          Mobility    Extremity Weight-bearing Status left lower extremity  -RF     Left Lower Extremity (Weight-bearing Status) weight-bearing as tolerated (WBAT); other (see comments)  per chart review, physician orders  -RF     Row Name 21 1616          Transfer Assessment/Treatment    Transfers sit-stand transfer; stand-sit transfer; car transfer; chair-bed transfer  -RF     Row Name 21 1616          Transfers    Bed-Chair [place] (Transfers) standby assist  -RF     Chair-Bed [place] (Transfers) standby assist  -RF     Assistive Device (Bed-Chair Transfers) wheelchair  -RF     Sit-Stand [place]  "(Transfers) standby assist  -RF     Stand-Sit [place] (Transfers) standby assist  -RF     Row Name 12/06/21 1616          Chair-Bed Transfer    Assistive Device (Chair-Bed Transfers) wheelchair  -RF     Row Name 12/06/21 1616          Sit-Stand Transfer    Assistive Device (Sit-Stand Transfers) walker, front-wheeled  -RF     Row Name 12/06/21 1616          Stand-Sit Transfer    Assistive Device (Stand-Sit Transfers) walker, front-wheeled  -RF     Row Name 12/06/21 1616          Gait/Stairs (Locomotion)    Gait/Stairs Locomotion gait/ambulation assistive device  -RF     [place] Level (Gait) standby assist  -RF     Assistive Device (Gait) walker, front-wheeled  -RF     Distance in Feet (Gait) 80X2 BID  -RF     Pattern (Gait) step-to  -RF     Deviations/Abnormal Patterns (Gait) antalgic  -RF     Bilateral Gait Deviations forward flexed posture  -RF     Comment (Gait/Stairs) Antalgic pattern continually noted due to R hip \"soreness\". Cuing for increased HS and QS in stance phase does not correct pattern. Pattern worsens with fatigue.  -RF     Row Name 12/06/21 1616          Hip (Therapeutic Exercise)    Hip Strengthening (Therapeutic Exercise) bilateral; flexion; extension; aBduction; aDduction; marching while seated; marching while standing; sitting; standing; resistance band; green; 2 sets; 10 repetitions  -RF     Row Name 12/06/21 1616          Knee (Therapeutic Exercise)    Knee Strengthening (Therapeutic Exercise) bilateral; flexion; extension; marching while seated; marching while standing; hamstring curls; sitting; standing; resistance band; green; 2 sets; 10 repetitions  -RF     Row Name 12/06/21 1616          Ankle (Therapeutic Exercise)    Ankle Strengthening (Therapeutic Exercise) bilateral; dorsiflexion; plantarflexion; sitting; standing; 2 sets; 10 repetitions  -RF     Row Name 12/06/21 1616          IRF PT Goals    Transfer Goal Selection (PT-IRF) transfers, PT goal 1  -RF     Gait (Walking " Locomotion) Goal Selection (PT-IRF) gait, PT goal 1  -RF     Row Name 12/06/21 1616          Transfer Goal 1 (PT-IRF)    Activity/Assistive Device (Transfer Goal 1, PT-IRF) all transfers  -RF     [place] Level (Transfer Goal 1, PT-IRF) standby assist  -RF     Time Frame (Transfer Goal 1, PT-IRF) 2 weeks  -RF     Progress/Outcomes (Transfer Goal 1, PT-IRF) goal met  -RF     Row Name 12/06/21 1616          Gait/Walking Locomotion Goal 1 (PT-IRF)    Activity/Assistive Device (Gait/Walking Locomotion Goal 1, PT-IRF) gait (walking locomotion); assistive device use  -RF     Gait/Walking Locomotion Distance Goal 1 (PT-IRF) 100  -RF     [place] Level (Gait/Walking Locomotion Goal 1, PT-IRF) contact guard assist  -RF     Time Frame (Gait/Walking Locomotion Goal 1, PT-IRF) 2 weeks  -RF     Progress/Outcomes (Gait/Walking Locomotion Goal 1, PT-IRF) goal ongoing  -RF     Row Name 12/06/21 1616          Positioning and Restraints    Pre-Treatment Position sitting in chair/recliner  BID  -RF     In Wheelchair sitting; call light within reach; encouraged to call for assist  BID  -RF     Row Name 12/06/21 1616          Therapy Assessment/Plan (PT)    Rehab Potential/Prognosis (PT) good, to achieve stated therapy goals  -RF     Frequency of Treatment (PT) 5 times per week  -RF     Estimated Duration of Therapy (PT) 2 weeks  -RF     Row Name 12/06/21 1616          Daily Progress Summary (PT)    Functional Goal Overall Progress (PT) progressing toward functional goals as expected  -RF     Daily Progress Summary (PT) Patient demonstrates good functional mobility and ambulation quality this date. Antalgic pattern continually noted due to pain. Continued skilled care required for further LE strengthening to ensure maximum safe function upon D/C.  -RF     Impairments Still Limiting Function (PT) strength decreased; impaired functional activity tolerance; pain  -RF     Recommendations (PT) Continue per current POC  -RF     Row  Name 12/06/21 1616          Therapy Plan Review/Discharge Plan (PT)    Anticipated Equipment Needs at Discharge (PT Eval) other (see comments)  TBD  -RF     Expected Discharge Disposition (PT Eval) home; home with caregiver  -RF           User Key  (r) = Recorded By, (t) = Taken By, (c) = Cosigned By    Initials Name Provider Type     Ayla Gonzalez PTA Physical Therapy Assistant              Wound 11/19/21 0948 Left lateral hip Incision (Active)   Dressing Appearance dry; intact 12/06/21 0930   Closure GUILLERMO 12/06/21 0930   Base scab; clean 12/06/21 0930     Physical Therapy Education                 Title: PT OT SLP Therapies (Done)     Topic: Physical Therapy (Done)     Point: Mobility training (Done)     Learning Progress Summary           Patient Acceptance, E,TB, VU by RF at 12/6/2021 1620      Show all documentation for this point (5)                 Point: Home exercise program (Done)     Learning Progress Summary           Patient Acceptance, E,TB, VU by RF at 12/6/2021 1620      Show all documentation for this point (5)                 Point: Body mechanics (Done)     Learning Progress Summary           Patient Acceptance, E,TB, VU by RF at 12/6/2021 1620      Show all documentation for this point (5)                 Point: Precautions (Done)     Learning Progress Summary           Patient Acceptance, E,TB, VU by RF at 12/6/2021 1620      Show all documentation for this point (5)                             User Key     Initials Effective Dates Name Provider Type [facility] 06/16/21 -  Ayla Gonzalez PTA Physical Therapy Assistant PT                PT Recommendation and Plan    Frequency of Treatment (PT): 5 times per week  Anticipated Equipment Needs at Discharge (PT Eval): other (see comments) (TBD)  Daily Progress Summary (PT)  Functional Goal Overall Progress (PT): progressing toward functional goals as expected  Daily Progress Summary (PT): Patient demonstrates good functional mobility  and ambulation quality this date. Antalgic pattern continually noted due to pain. Continued skilled care required for further LE strengthening to ensure maximum safe function upon D/C.  Impairments Still Limiting Function (PT): strength decreased, impaired functional activity tolerance, pain  Recommendations (PT): Continue per current POC               Time Calculation:      PT Charges     Row Name 12/06/21 1621 12/06/21 1620          Time Calculation    Start Time 1245  -RF 0915  -RF     Stop Time 1330  -RF 1000  -RF     Time Calculation (min) 45 min  -RF 45 min  -RF     PT Received On 12/06/21  -RF 12/06/21  -RF     PT - Next Appointment 12/07/21  -RF 12/06/21  -RF     PT Goal Re-Cert Due Date 12/10/21  -RF 12/10/21  -RF            Time Calculation- PT    Total Timed Code Minutes- PT 45 minute(s)  -RF 45 minute(s)  -RF           User Key  (r) = Recorded By, (t) = Taken By, (c) = Cosigned By    Initials Name Provider Type    RF Ayla Gonzalez, PTA Physical Therapy Assistant                Therapy Charges for Today     Code Description Service Date Service Provider Modifiers Qty    48805079787 HC GAIT TRAINING EA 15 MIN 12/6/2021 Ayla Gonzalez, PTA  2    93703719316 HC PT THER PROC EA 15 MIN 12/6/2021 Ayla Gonzalez, ROHINI  4                   Ayla Gonzalez, ROHINI  12/6/2021

## 2021-12-07 LAB
GLUCOSE BLDC GLUCOMTR-MCNC: 188 MG/DL (ref 70–130)
GLUCOSE BLDC GLUCOMTR-MCNC: 195 MG/DL (ref 70–130)
GLUCOSE BLDC GLUCOMTR-MCNC: 287 MG/DL (ref 70–130)
GLUCOSE BLDC GLUCOMTR-MCNC: 316 MG/DL (ref 70–130)

## 2021-12-07 PROCEDURE — 97535 SELF CARE MNGMENT TRAINING: CPT

## 2021-12-07 PROCEDURE — 25010000002 ENOXAPARIN PER 10 MG: Performed by: INTERNAL MEDICINE

## 2021-12-07 PROCEDURE — 97110 THERAPEUTIC EXERCISES: CPT

## 2021-12-07 PROCEDURE — 63710000001 INSULIN ASPART PER 5 UNITS: Performed by: INTERNAL MEDICINE

## 2021-12-07 PROCEDURE — 82962 GLUCOSE BLOOD TEST: CPT

## 2021-12-07 PROCEDURE — 63710000001 INSULIN DETEMIR PER 5 UNITS: Performed by: FAMILY MEDICINE

## 2021-12-07 PROCEDURE — 25010000002 THIAMINE PER 100 MG: Performed by: INTERNAL MEDICINE

## 2021-12-07 PROCEDURE — 97116 GAIT TRAINING THERAPY: CPT

## 2021-12-07 PROCEDURE — 97530 THERAPEUTIC ACTIVITIES: CPT

## 2021-12-07 RX ADMIN — FERROUS SULFATE TAB 325 MG (65 MG ELEMENTAL FE) 325 MG: 325 (65 FE) TAB at 16:46

## 2021-12-07 RX ADMIN — PANTOPRAZOLE SODIUM 40 MG: 40 TABLET, DELAYED RELEASE ORAL at 09:40

## 2021-12-07 RX ADMIN — CARVEDILOL 6.25 MG: 6.25 TABLET, FILM COATED ORAL at 17:11

## 2021-12-07 RX ADMIN — ACETAMINOPHEN 650 MG: 325 TABLET ORAL at 09:44

## 2021-12-07 RX ADMIN — PANTOPRAZOLE SODIUM 40 MG: 40 TABLET, DELAYED RELEASE ORAL at 16:46

## 2021-12-07 RX ADMIN — HYDROCORTISONE 2.5%: 25 CREAM TOPICAL at 12:01

## 2021-12-07 RX ADMIN — DOCUSATE SODIUM 100 MG: 100 CAPSULE ORAL at 09:40

## 2021-12-07 RX ADMIN — ACETAMINOPHEN 650 MG: 325 TABLET ORAL at 20:29

## 2021-12-07 RX ADMIN — CETIRIZINE HYDROCHLORIDE 5 MG: 10 TABLET, FILM COATED ORAL at 09:40

## 2021-12-07 RX ADMIN — DOCUSATE SODIUM 100 MG: 100 CAPSULE ORAL at 20:17

## 2021-12-07 RX ADMIN — Medication 500 MG: at 10:13

## 2021-12-07 RX ADMIN — CARVEDILOL 6.25 MG: 6.25 TABLET, FILM COATED ORAL at 09:40

## 2021-12-07 RX ADMIN — THIAMINE HYDROCHLORIDE: 100 INJECTION, SOLUTION INTRAMUSCULAR; INTRAVENOUS at 20:17

## 2021-12-07 RX ADMIN — ENOXAPARIN SODIUM 40 MG: 40 INJECTION SUBCUTANEOUS at 16:47

## 2021-12-07 RX ADMIN — INSULIN DETEMIR 27 UNITS: 100 INJECTION, SOLUTION SUBCUTANEOUS at 20:17

## 2021-12-07 RX ADMIN — INSULIN ASPART 3 UNITS: 100 INJECTION, SOLUTION INTRAVENOUS; SUBCUTANEOUS at 09:40

## 2021-12-07 RX ADMIN — INSULIN ASPART 10 UNITS: 100 INJECTION, SOLUTION INTRAVENOUS; SUBCUTANEOUS at 12:02

## 2021-12-07 RX ADMIN — INSULIN ASPART 3 UNITS: 100 INJECTION, SOLUTION INTRAVENOUS; SUBCUTANEOUS at 16:47

## 2021-12-07 RX ADMIN — ROSUVASTATIN CALCIUM 10 MG: 10 TABLET, FILM COATED ORAL at 20:17

## 2021-12-07 NOTE — SIGNIFICANT NOTE
12/07/21 2598   Plan   Plan Team conference held today.  Spoke to daughter/POA Antonina about plans for pt to be discharged on 12-10-21, how she is progressing in therapy, recommendations for home health PT, OT, and rolling walker.  Pt's staples are out of left hip and she has steri-strips.  Daughter says she and sister Pilar can assist with applying rectal cream.  Daughters can be taught how to administer Lovenox injections if needed at home.  [facility] and [facility] preferred.  Daughter has a wheelchair for pt to use at home.  Antonina requests she and sister Pilar both come for pt's discharge instructions.  SS will have to get approval from rehab clinical manager.  Polina Sarkar will transport pt home at discharge.  Daughter is applying for FMLA and MD has paperwork to complete.  Pt will return to her home with family providing 24 hour assistance.   Provided Post Acute Provider List? Yes   Post Acute Provider List DME Supplier   Delivered To Support Person   Support Person Antonina Fore-daughter/POA   Method of Delivery Telephone   Patient/Family in Agreement with Plan yes

## 2021-12-07 NOTE — PLAN OF CARE
Goal Outcome Evaluation:  Plan of Care Reviewed With: patient        Progress: improving  Outcome Summary: Pt progressing with therapy, cont POC

## 2021-12-07 NOTE — NURSING NOTE
Pt heard yelling from hallway. Went into patient's room found pt sitting on end of bed saying she needed to go to the bathroom and has been waiting a long time. Pt had call bell in reach and had not used it to ring for assistance. Pt educated on using call bell when needing to use the restroom. Pt was taken to the bathroom and told to pull cord when she was finished.

## 2021-12-07 NOTE — THERAPY TREATMENT NOTE
Inpatient Rehabilitation - Occupational Therapy Treatment Note    EDWIGE Villarreal     Patient Name: Albina Finley  : 1945  MRN: [id number]    Today's Date: 2021                 Admit Date: 2021       No diagnosis found.    Patient Active Problem List   Diagnosis   • Iron deficiency anemia   • COPD with acute exacerbation ([facility])   • Chronic diastolic heart failure ([facility])   • CKD (chronic kidney disease) stage 3, GFR 30-59 ml/min ([facility])   • Essential hypertension   • Hyperlipidemia   • Type II diabetes mellitus ([facility])   • Arthritis   • Osteoporosis   • Grade II diastolic dysfunction   • Duodenitis   • Peptic ulcer disease with hemorrhage   • Non-ST elevation myocardial infarction (NSTEMI) ([facility])   • Abnormal nuclear stress test with mild degree of small size apical lateral wall myocardial ischemia in 2019.   • Anemia due to GI blood loss   • Hip fracture ([facility])   • S/p left hip fracture       Past Medical History:   Diagnosis Date   • Abnormal nuclear stress test with mild degree of small size apical lateral wall myocardial ischemia in 2019. 2020   • Acute on chronic diastolic CHF (congestive heart failure) ([facility]) 2019   • Anal polyp 2018    Added automatically from request for surgery [id number]   • Arthritis    • Chronic kidney disease    • COPD (chronic obstructive pulmonary disease) ([facility])    • Diabetes mellitus ([facility])    • Elevated cholesterol    • Essential hypertension 2019   • History of transfusion    • Incidental lung nodule, greater than or equal to 8mm     Left lower lobe, 15 mm based on  and 2019 chest CT scans with no growth in the size   • Iron deficiency anemia 2017   • Osteoporosis    • Peptic ulcer disease with hemorrhage 2020   • Pneumonia of right middle lobe due to infectious organism 2019       Past Surgical History:   Procedure Laterality Date   • ANUS SURGERY N/A 2018    Procedure: Diagnostic anoscopy with anal polyp excision;   Surgeon: Marlyn Gutierrez MD;  Location: [facility] OR;  Service: General   •  SECTION     • ENDOSCOPY N/A 2020    Procedure: ESOPHAGOGASTRODUODENOSCOPY;  Surgeon: Marlon Wray MD;  Location: [facility] OR;  Service: Gastroenterology;  Laterality: N/A;   • FEMUR OPEN REDUCTION INTERNAL FIXATION Left 2021    Procedure: Left hip hook plate operative fixation;  Surgeon: Lupillo Cornejo MD;  Location: [facility] OR;  Service: Orthopedics;  Laterality: Left;   • HIP BIPOLAR REPLACEMENT      left Dr. Cornejo    • HYSTERECTOMY     • JOINT REPLACEMENT               IRF OT ASSESSMENT FLOWSHEET (last 12 hours)     IRF OT Evaluation and Treatment     Row Name 21 1203          OT Time and Intention    Document Type daily treatment  -     Mode of Treatment occupational therapy  -     Symptoms Noted During/After Treatment --  c/o headache in am;  RN notified  -     Row Name 21 1203          General Information    Patient/Family/Caregiver Comments/Observations agreeable to therapy  -     Existing Precautions/Restrictions fall; oxygen therapy device and L/min  -     Row Name 21 1203          Cognition/Psychosocial    Follows Commands (Cognition) verbal cues/prompting required; repetition of directions required  -     Row Name 21 1203          Transfers    [place] Level (Toilet Transfer) contact guard; verbal cues  -     Assistive Device (Toilet Transfer) grab bars/safety frame  -     Row Name 21 1203          Motor Skills    Motor Control/Coordination Interventions therapeutic exercise/ROM; gross motor coordination activities; fine motor manipulation/dexterity activities  BUE ther ex/act, bilat coord ex, GMC/FMC  -     Therapeutic Exercise --  dowel wrist rolls X 2  -     Row Name 21 1203          Grooming    [place] Level (Grooming) set up  -     Row Name 21 1203          Toileting    [place] Level (Toileting) minimum assist (75% patient  effort); verbal cues  -     Assistive Device Use (Toileting) grab bar/safety frame  -     Row Name 12/07/21 1203          Positioning and Restraints    Post Treatment Position wheelchair  -     In Wheelchair with PT; sitting; call light within reach; encouraged to call for assist; notified nsg  PT - am tx;  room - pm tx  -     Row Name 12/07/21 1203          Vital Signs    Intra SpO2 (%) 98  -     O2 Delivery Intra Treatment supplemental O2  -           User Key  (r) = Recorded By, (t) = Taken By, (c) = Cosigned By    Initials Name Effective Dates     Itzel Sears, OT 06/16/21 -                  Occupational Therapy Education                 Title: PT OT SLP Therapies (Done)     Topic: Occupational Therapy (Done)     Point: ADL training (Done)     Description:   Instruct learner(s) on proper safety adaptation and remediation techniques during self care or transfers.   Instruct in proper use of assistive devices.              Learning Progress Summary           Patient Acceptance, E,TB, VU by  at 12/7/2021 1359      Show all documentation for this point (9)                 Point: Precautions (Done)     Description:   Instruct learner(s) on prescribed precautions during self-care and functional transfers.              Learning Progress Summary           Patient Acceptance, E,TB, VU by  at 12/7/2021 1359      Show all documentation for this point (9)                             User Key     Initials Effective Dates Name Provider Type Discipline     08/05/21 -  Sangeeta Bañuelos, RN Registered Nurse Nurse                    OT Recommendation and Plan    Planned Therapy Interventions (OT): activity tolerance training, adaptive equipment training, BADL retraining, patient/caregiver education/training, ROM/therapeutic exercise, strengthening exercise, occupation/activity based interventions, transfer/mobility retraining                    Time Calculation:      Time Calculation- OT     Row Name  12/07/21 1420 12/07/21 1418          Time Calculation- OT    OT Start Time 1225  - 0805  -     OT Stop Time 1245  - 0915  -     OT Time Calculation (min) 20 min  - 70 min  -     Total Timed Code Minutes- OT 20 minute(s)  - 70 minute(s)  -           User Key  (r) = Recorded By, (t) = Taken By, (c) = Cosigned By    Initials Name Provider Type     Itzel Sears OT Occupational Therapist              Therapy Charges for Today     Code Description Service Date Service Provider Modifiers Qty    66152437745 HC OT SELF CARE/MGMT/TRAIN EA 15 MIN 12/6/2021 Itzel Sears OT GO 2    77167290233 HC OT THERAPEUTIC ACT EA 15 MIN 12/6/2021 Itzel Sears OT GO 3    82935066916 HC OT THER PROC EA 15 MIN 12/6/2021 Itzel Sears OT GO 1    59296525917 HC OT SELF CARE/MGMT/TRAIN EA 15 MIN 12/7/2021 Itzel Sears OT GO 2    87476595525 HC OT THER PROC EA 15 MIN 12/7/2021 Itzel Sears OT GO 1    83528866055 HC OT THERAPEUTIC ACT EA 15 MIN 12/7/2021 Itzel Sears OT GO 3                   Itzel Sears OT  12/7/2021

## 2021-12-07 NOTE — PLAN OF CARE
Goal Outcome Evaluation:              Outcome Summary: Pt is progressing with therapy. Pt had c/o headache; prn med given. No other acute changes or complaints to note at this time.

## 2021-12-07 NOTE — THERAPY TREATMENT NOTE
Inpatient Rehabilitation - Physical Therapy Treatment Note       EDWIGE Villarreal     Patient Name: Albina Finley  : 1945  MRN: [id number]    Today's Date: 2021                    Admit Date: 2021      Visit Dx:   No diagnosis found.    Patient Active Problem List   Diagnosis   • Iron deficiency anemia   • COPD with acute exacerbation ([facility])   • Chronic diastolic heart failure (HCC)   • CKD (chronic kidney disease) stage 3, GFR 30-59 ml/min ([facility])   • Essential hypertension   • Hyperlipidemia   • Type II diabetes mellitus ([facility])   • Arthritis   • Osteoporosis   • Grade II diastolic dysfunction   • Duodenitis   • Peptic ulcer disease with hemorrhage   • Non-ST elevation myocardial infarction (NSTEMI) ([facility])   • Abnormal nuclear stress test with mild degree of small size apical lateral wall myocardial ischemia in 2019.   • Anemia due to GI blood loss   • Hip fracture ([facility])   • S/p left hip fracture       Past Medical History:   Diagnosis Date   • Abnormal nuclear stress test with mild degree of small size apical lateral wall myocardial ischemia in 2019. 2020   • Acute on chronic diastolic CHF (congestive heart failure) ([facility]) 2019   • Anal polyp 2018    Added automatically from request for surgery [id number]   • Arthritis    • Chronic kidney disease    • COPD (chronic obstructive pulmonary disease) ([facility])    • Diabetes mellitus ([facility])    • Elevated cholesterol    • Essential hypertension 2019   • History of transfusion    • Incidental lung nodule, greater than or equal to 8mm     Left lower lobe, 15 mm based on  and 2019 chest CT scans with no growth in the size   • Iron deficiency anemia 2017   • Osteoporosis    • Peptic ulcer disease with hemorrhage 2020   • Pneumonia of right middle lobe due to infectious organism 2019       Past Surgical History:   Procedure Laterality Date   • ANUS SURGERY N/A 2018    Procedure: Diagnostic anoscopy with anal  polyp excision;  Surgeon: Marlyn Gutierrez MD;  Location: [facility] OR;  Service: General   •  SECTION     • ENDOSCOPY N/A 2020    Procedure: ESOPHAGOGASTRODUODENOSCOPY;  Surgeon: Marlon Wray MD;  Location: [facility] OR;  Service: Gastroenterology;  Laterality: N/A;   • FEMUR OPEN REDUCTION INTERNAL FIXATION Left 2021    Procedure: Left hip hook plate operative fixation;  Surgeon: Lupillo Cornejo MD;  Location: [facility] OR;  Service: Orthopedics;  Laterality: Left;   • HIP BIPOLAR REPLACEMENT      left Dr. Cornejo    • HYSTERECTOMY     • JOINT REPLACEMENT         PT ASSESSMENT (last 12 hours)     IRF PT Evaluation and Treatment     Row Name 21 163          PT Time and Intention    Document Type daily treatment; progress note  -RF     Mode of Treatment physical therapy  -RF     Patient/Family/Caregiver Comments/Observations Pt c/o SOA and fatigue with increased activity.  -RF     Row Name 21 163          General Information    Patient Profile Reviewed yes  -RF     Row Name 21 163          Cognition/Psychosocial    Affect/Mental Status (Cognitive) WFL  -RF     Orientation Status (Cognition) oriented to; person; place  -RF     Follows Commands (Cognition) WFL; follows one-step commands; repetition of directions required  -RF     Row Name 21 163          Mobility    Extremity Weight-bearing Status left lower extremity  -RF     Left Lower Extremity (Weight-bearing Status) weight-bearing as tolerated (WBAT); other (see comments)  per chart review, physician orders  -RF     Row Name 21 163          Bed Mobility    Supine-Sit [place] (Bed Mobility) supervision  -RF     Sit-Supine [place] (Bed Mobility) supervision  -RF     Assistive Device (Bed Mobility) bed rails  -RF     Row Name 21 163          Transfer Assessment/Treatment    Transfers sit-stand transfer; stand-sit transfer; car transfer; chair-bed transfer  -     Row Name 21           Transfers    Bed-Chair [place] (Transfers) supervision  -RF     Chair-Bed [place] (Transfers) supervision  -RF     Assistive Device (Bed-Chair Transfers) wheelchair  -RF     Sit-Stand [place] (Transfers) supervision  -RF     Stand-Sit [place] (Transfers) supervision  -RF     Row Name 12/07/21 1637          Chair-Bed Transfer    Assistive Device (Chair-Bed Transfers) wheelchair  -RF     Row Name 12/07/21 1637          Sit-Stand Transfer    Assistive Device (Sit-Stand Transfers) walker, front-wheeled  -RF     Row Name 12/07/21 1637          Stand-Sit Transfer    Assistive Device (Stand-Sit Transfers) walker, front-wheeled  -RF     Row Name 12/07/21 1637          Gait/Stairs (Locomotion)    Gait/Stairs Locomotion gait/ambulation assistive device  -RF     [place] Level (Gait) standby assist  -RF     Assistive Device (Gait) walker, front-wheeled  -RF     Distance in Feet (Gait) 100, 80, 60 in AM  -RF     Pattern (Gait) step-to  -RF     Deviations/Abnormal Patterns (Gait) antalgic  -RF     Bilateral Gait Deviations forward flexed posture  -RF     [place] Level (Stairs) contact guard  -RF     Handrail Location (Stairs) both sides  -RF     Number of Steps (Stairs) 5  X2 trials  -RF     Ascending Technique (Stairs) step-to-step  -RF     Descending Technique (Stairs) step-to-step  -RF     Stairs, Impairments strength decreased  -RF     Comment (Gait/Stairs) Fatigue noted with increased distances  -RF     Row Name 12/07/21 1637          Hip (Therapeutic Exercise)    Hip Strengthening (Therapeutic Exercise) bilateral; flexion; extension; aBduction; aDduction; external rotation; internal rotation; heel slides; marching while seated; sitting; supine; 2 lb free weight; resistance band; green; 2 sets; 10 repetitions  -RF     Row Name 12/07/21 1637          Knee (Therapeutic Exercise)    Knee Strengthening (Therapeutic Exercise) bilateral; flexion; extension; marching while seated; LAQ (long arc quad);  SAQ (short arc quad); heel slides; sitting; supine; 2 lb free weight; resistance band; green; 2 sets; 10 repetitions  -RF     Row Name 12/07/21 1637          Ankle (Therapeutic Exercise)    Ankle Strengthening (Therapeutic Exercise) bilateral; dorsiflexion; plantarflexion; sitting; 2 sets; 10 repetitions  -RF     Row Name 12/07/21 1637          IRF PT Goals    Transfer Goal Selection (PT-IRF) transfers, PT goal 1  -RF     Gait (Walking Locomotion) Goal Selection (PT-IRF) gait, PT goal 1  -RF     Row Name 12/07/21 1637          Transfer Goal 1 (PT-IRF)    Activity/Assistive Device (Transfer Goal 1, PT-IRF) all transfers  -RF     [place] Level (Transfer Goal 1, PT-IRF) standby assist  -RF     Time Frame (Transfer Goal 1, PT-IRF) 2 weeks  -RF     Progress/Outcomes (Transfer Goal 1, PT-IRF) goal met  -RF     Row Name 12/07/21 1637          Gait/Walking Locomotion Goal 1 (PT-IRF)    Activity/Assistive Device (Gait/Walking Locomotion Goal 1, PT-IRF) gait (walking locomotion); assistive device use  -RF     Gait/Walking Locomotion Distance Goal 1 (PT-IRF) 100  -RF     [place] Level (Gait/Walking Locomotion Goal 1, PT-IRF) contact guard assist  -RF     Time Frame (Gait/Walking Locomotion Goal 1, PT-IRF) 2 weeks  -RF     Progress/Outcomes (Gait/Walking Locomotion Goal 1, PT-IRF) goal ongoing  -RF     Row Name 12/07/21 1637          Positioning and Restraints    Pre-Treatment Position sitting in chair/recliner  bed in PM  -RF     In Wheelchair sitting; call light within reach; encouraged to call for assist  BID  -RF     Row Name 12/07/21 1637          Therapy Assessment/Plan (PT)    Rehab Potential/Prognosis (PT) good, to achieve stated therapy goals  -RF     Frequency of Treatment (PT) 5 times per week  -RF     Estimated Duration of Therapy (PT) 2 weeks  -RF     Row Name 12/07/21 1637          Daily Progress Summary (PT)    Functional Goal Overall Progress (PT) progressing toward functional goals as expected  -RF      Daily Progress Summary (PT) Improvements in functional mobility and ambulation quality continually noted. Decreased activity tolerance and L hip pain persist. Continued skilled care required for further LE strengthening to ensure maximum safe function upon D/C.  -RF     Impairments Still Limiting Function (PT) strength decreased; impaired functional activity tolerance; pain  -RF     Recommendations (PT) Conitnue per current POC  -RF     Row Name 12/07/21 1637          Therapy Plan Review/Discharge Plan (PT)    Anticipated Equipment Needs at Discharge (PT Eval) other (see comments)  TBD  -RF     Expected Discharge Disposition (PT Eval) home; home with caregiver  -RF           User Key  (r) = Recorded By, (t) = Taken By, (c) = Cosigned By    Initials Name Provider Type    RF Ayla Gonzalez, ROHINI Physical Therapy Assistant              Wound 11/19/21 0948 Left lateral hip Incision (Active)   Dressing Appearance open to air 12/07/21 0940   Closure Other (Comment) 12/07/21 0600   Base clean 12/07/21 0600   Care, Wound other (see comments) 12/07/21 0600     Physical Therapy Education                 Title: PT OT SLP Therapies (Done)     Topic: Physical Therapy (Done)     Point: Mobility training (Done)     Learning Progress Summary           Patient Acceptance, E,TB, VU by RF at 12/7/2021 1643      Show all documentation for this point (7)                 Point: Home exercise program (Done)     Learning Progress Summary           Patient Acceptance, E,TB, VU by RF at 12/7/2021 1643      Show all documentation for this point (7)                 Point: Body mechanics (Done)     Learning Progress Summary           Patient Acceptance, E,TB, VU by RF at 12/7/2021 1643      Show all documentation for this point (7)                 Point: Precautions (Done)     Learning Progress Summary           Patient Acceptance, E,TB, VU by RF at 12/7/2021 1643      Show all documentation for this point (7)                              User Key     Initials Effective Dates Name Provider Type Discipline    RF 06/16/21 -  Ayla Gonzalez PTA Physical Therapy Assistant PT                PT Recommendation and Plan    Frequency of Treatment (PT): 5 times per week  Anticipated Equipment Needs at Discharge (PT Eval): other (see comments) (TBD)  Daily Progress Summary (PT)  Functional Goal Overall Progress (PT): progressing toward functional goals as expected  Daily Progress Summary (PT): Improvements in functional mobility and ambulation quality continually noted. Decreased activity tolerance and L hip pain persist. Continued skilled care required for further LE strengthening to ensure maximum safe function upon D/C.  Impairments Still Limiting Function (PT): strength decreased, impaired functional activity tolerance, pain  Recommendations (PT): Conitnue per current POC               Time Calculation:      PT Charges     Row Name 12/07/21 1644 12/07/21 1643          Time Calculation    Start Time 1340  -RF 0915  -RF     Stop Time 1425  -RF 1000  -RF     Time Calculation (min) 45 min  -RF 45 min  -RF     PT Received On 12/07/21  -RF 12/07/21  -RF     PT - Next Appointment 12/08/21  -RF 12/07/21  -RF     PT Goal Re-Cert Due Date 12/10/21  -RF 12/10/21  -RF            Time Calculation- PT    Total Timed Code Minutes- PT 45 minute(s)  -RF 45 minute(s)  -RF           User Key  (r) = Recorded By, (t) = Taken By, (c) = Cosigned By    Initials Name Provider Type    RF Ayla Gonzalez PTA Physical Therapy Assistant                Therapy Charges for Today     Code Description Service Date Service Provider Modifiers Qty    43669533629 HC GAIT TRAINING EA 15 MIN 12/6/2021 Ayla Gonzalez, PTA GP 2    33710955089 HC PT THER PROC EA 15 MIN 12/6/2021 Ayla Gonzalez, PTA GP 4    80519217852 HC GAIT TRAINING EA 15 MIN 12/7/2021 Ayla Gonzalez, PTA GP 1    96978515071 HC PT THER PROC EA 15 MIN 12/7/2021 Ayla Gonzalez, PTA GP 4    15102049220 HC PT  THERAPEUTIC ACT EA 15 MIN 12/7/2021 Ayla Gonzalez, PTA GP 1                   Ayla Gonzalez, ROHINI  12/7/2021

## 2021-12-07 NOTE — NURSING NOTE
Pt up in wheelchair. Offered to assist patient into bed several times. Each time the pt has stated she wants to stay up in her chair.

## 2021-12-07 NOTE — PROGRESS NOTES
PROGRESS NOTE         Patient Identification:  Name:  Albina Finley  Age:  76 y.o.  Sex:  female  :  1945  MRN:  [id number]  Visit Number:  [id number]  Primary Care Provider:  Micah Núñez PA         LOS: 11 days       ----------------------------------------------------------------------------------------------------------------------  Subjective       Chief Complaints:    Left hip fracture status post ORIF  UTI  OPHELIA on CKD stage III      Interval History:     76-year-old female with recent left hip fracture with ORIF, chronic kidney disease stage III, chronic hypoxic respiratory failure mostly at night, hypertension, hyperlipidemia, CAD, CHF preserved EF, severe pulmonary hypertension, diabetes mellitus and COPD.    Participated with physical and occupational therapy on 2021: Performs transfer activities with standby assist; utilizes wheelchair for chair to bed transfer; utilizes front wheeled walker for sit to stand/stand to sit transfer; ambulated 80 feet x 2 with front wheeled walker and standby assist; participated with therapeutic exercise/ROM; gross motor coronation activities; fine motor manipulation/dexterity activity; required moderate assist with verbal cues for lower body dressing; set up assist for grooming; minimal assist with verbal cues and grab bar/safety frame for toileting      Objective       Current Hospital Meds:  calcium carbonate (oyster shell), 500 mg, Oral, Daily  carvedilol, 6.25 mg, Oral, BID With Meals  cetirizine, 5 mg, Oral, Daily  docusate sodium, 100 mg, Oral, BID  enoxaparin, 40 mg, Subcutaneous, Q24H  ferrous sulfate, 325 mg, Oral, Q PM  Hydrocortisone (Perianal), , Rectal, BID  insulin aspart, 0-14 Units, Subcutaneous, TID AC  insulin detemir, 27 Units, Subcutaneous, Nightly  pantoprazole, 40 mg, Oral, BID AC  polyethylene glycol, 17 g, Oral, Daily  rosuvastatin, 10 mg, Oral, Nightly  IVPB builder, , Intravenous, Nightly          ----------------------------------------------------------------------------------------------------------------------    Vital Signs:  Temp:  [98.1 °F (36.7 °C)] 98.1 °F (36.7 °C)  Heart Rate:  [67-72] 72  Resp:  [20] 20  BP: (145-159)/(54-75) 145/75  No data found.  SpO2 Percentage    12/04/21 1906 12/05/21 1904 12/06/21 1906   SpO2: 98% 97% 100%     SpO2:  [100 %] 100 %  on  Flow (L/min):  [1.5] 1.5;   Device (Oxygen Therapy): nasal cannula    Body mass index is 29.59 kg/m².  Wt Readings from Last 3 Encounters:   11/26/21 73.4 kg (161 lb 13.1 oz)   11/26/21 73.4 kg (161 lb 14.4 oz)   06/17/20 72.6 kg (160 lb)        Intake/Output Summary (Last 24 hours) at 12/7/2021 0952  Last data filed at 12/7/2021 0708  Gross per 24 hour   Intake 1560 ml   Output --   Net 1560 ml     Diet Soft Texture; [employer]; Thin  ----------------------------------------------------------------------------------------------------------------------      Physical Exam:    General Appearance: alert, pleasant, interactive and cooperative.  Generalized weakness, feels great.     Head: normocephalic, without obvious abnormality and atraumatic     Lungs: clear to auscultation, respirations regular, respirations even and  respirations unlabored. No accessory muscle use.      Heart:: regular rhythm & normal rate, normal S1, S2, no murmur, no gallop, no  rub and no click.  Chest wall with no abnormalities observed. PMI nondisplaced     Abdomen: normal bowel sounds in all quadrants, soft non-tender, no guarding and no rebound tenderness     Extremities: no edema, no cyanosis, no redness, no tenderness, no clubbing     Musculoskeletal: joints with no effusion nor erythema nor warmth.  Pedal pulses palpable and equal bilaterally     Skin: no bleeding, bruising or rash and no lesions noted     Neurologic:               Mental Status: Patient is awake alert and mildly confused              Sensory: Sensory overall seems to be intact in BLE and  EMILEE.              Motor strength: Generalized weakness        ----------------------------------------------------------------------------------------------------------------------            Results from last 7 days   Lab Units 12/06/21  0121 12/03/21  0133   WBC 10*3/mm3 7.88 8.70   HEMOGLOBIN g/dL 7.9* 8.1*   HEMATOCRIT % 27.1* 27.8*   MCV fL 87.7 87.7   MCHC g/dL 29.2* 29.1*   PLATELETS 10*3/mm3 411 505*     Results from last 7 days   Lab Units 12/06/21 0121 12/03/21 0133   SODIUM mmol/L 135* 134*   POTASSIUM mmol/L 5.0 5.2   CHLORIDE mmol/L 103 101   CO2 mmol/L 25.0 25.1   BUN mg/dL 26* 27*   CREATININE mg/dL 1.11* 1.17*   EGFR IF NONAFRICN AM mL/min/1.73 48* 45*   CALCIUM mg/dL 8.8 8.5*   GLUCOSE mg/dL 112* 228*   Estimated Creatinine Clearance: 39.8 mL/min (A) (by C-G formula based on SCr of 1.11 mg/dL (H)).  No results found for: AMMONIA    Glucose   Date/Time Value Ref Range Status   12/07/2021 0614 195 (H) 70 - 130 mg/dL Final     Comment:     Meter: [id number] : [id number] Ciera Sauceda   12/06/2021 2014 226 (H) 70 - 130 mg/dL Final     Comment:     Meter: [id number] : [id number] Alcon Isaacs   12/06/2021 1629 251 (H) 70 - 130 mg/dL Final     Comment:     Meter: [id number] : [id number] Qasim Bennett   12/06/2021 1101 278 (H) 70 - 130 mg/dL Final     Comment:     Meter: [id number] : [id number] Qasim Bennett   12/06/2021 0628 145 (H) 70 - 130 mg/dL Final     Comment:     Meter: [id number] : [id number] Alcon Isaacs   12/05/2021 1944 233 (H) 70 - 130 mg/dL Final     Comment:     Meter: [id number] : [id number] Alcon Isaacs   12/05/2021 1605 291 (H) 70 - 130 mg/dL Final     Comment:     Meter: [id number] : [id number] Pierre Doris   12/05/2021 1114 223 (H) 70 - 130 mg/dL Final     Comment:     Meter: [id number] : [id number] Pierre George     Lab Results   Component Value Date    HGBA1C 9.20 (H) 11/13/2021     Lab Results   Component Value Date    TSH 1.840  02/04/2021    FREET4 1.38 02/04/2021       No results found for: BLOODCX  No results found for: URINECX  No results found for: WOUNDCX  No results found for: STOOLCX  No results found for: RESPCX  Pain Management Panel     Pain Management Panel Latest Ref Rng & Units 11/14/2021 3/1/2021    CREATININE UR mg/dL 115.1 28.7            ----------------------------------------------------------------------------------------------------------------------  Imaging Results (Last 24 Hours)     ** No results found for the last 24 hours. **          ----------------------------------------------------------------------------------------------------------------------    Assessment/Plan       Assessment/Plan     ASSESSMENT:    Status post left hip fracture with ORIF  Diabetes mellitus  Chronic hypoxemia  CKD stage III  Hypertension  Hyperlipidemia  CHF with preserved EF  Anemia    PLAN:    Status post left hip fracture with ORIF--Participated with physical and occupational therapy on 12/6/2021: Performs transfer activities with standby assist; utilizes wheelchair for chair to bed transfer; utilizes front wheeled walker for sit to stand/stand to sit transfer; ambulated 80 feet x 2 with front wheeled walker and standby assist; participated with therapeutic exercise/ROM; gross motor coronation activities; fine motor manipulation/dexterity activity; required moderate assist with verbal cues for lower body dressing; set up assist for grooming; minimal assist with verbal cues and grab bar/safety frame for toileting    Staples removed 12/7/21    Diabetes mellitus-- continue insulin protocol.     Chronic hypoxemia-- stable continue O2 at 2 L per nasal cannula     CKD stage III--has been stable.     Hypertension-- reasonably well controlled     Hyperlipidemia-- continue statin therapy     CHF with preserved EF--compensated for now.  Monitor closely     Anemia--stable        Code Status:   Code Status and Medical Interventions:   Ordered  at: 11/26/21 1459     Code Status (Patient has no pulse and is not breathing):    CPR (Attempt to Resuscitate)     Medical Interventions (Patient has pulse or is breathing):    Full Support     Traci Villeda MD  12/07/21  11:43 EST

## 2021-12-08 LAB
GLUCOSE BLDC GLUCOMTR-MCNC: 175 MG/DL (ref 70–130)
GLUCOSE BLDC GLUCOMTR-MCNC: 179 MG/DL (ref 70–130)
GLUCOSE BLDC GLUCOMTR-MCNC: 190 MG/DL (ref 70–130)
GLUCOSE BLDC GLUCOMTR-MCNC: 235 MG/DL (ref 70–130)

## 2021-12-08 PROCEDURE — 97116 GAIT TRAINING THERAPY: CPT

## 2021-12-08 PROCEDURE — 97112 NEUROMUSCULAR REEDUCATION: CPT

## 2021-12-08 PROCEDURE — 97530 THERAPEUTIC ACTIVITIES: CPT

## 2021-12-08 PROCEDURE — 63710000001 INSULIN ASPART PER 5 UNITS: Performed by: INTERNAL MEDICINE

## 2021-12-08 PROCEDURE — 25010000002 THIAMINE PER 100 MG: Performed by: INTERNAL MEDICINE

## 2021-12-08 PROCEDURE — 97110 THERAPEUTIC EXERCISES: CPT

## 2021-12-08 PROCEDURE — 97535 SELF CARE MNGMENT TRAINING: CPT

## 2021-12-08 PROCEDURE — 63710000001 INSULIN DETEMIR PER 5 UNITS: Performed by: FAMILY MEDICINE

## 2021-12-08 PROCEDURE — 25010000002 ENOXAPARIN PER 10 MG: Performed by: INTERNAL MEDICINE

## 2021-12-08 PROCEDURE — 82962 GLUCOSE BLOOD TEST: CPT

## 2021-12-08 RX ADMIN — Medication 500 MG: at 08:40

## 2021-12-08 RX ADMIN — HYDROCORTISONE 2.5%: 25 CREAM TOPICAL at 21:15

## 2021-12-08 RX ADMIN — PANTOPRAZOLE SODIUM 40 MG: 40 TABLET, DELAYED RELEASE ORAL at 08:41

## 2021-12-08 RX ADMIN — INSULIN ASPART 5 UNITS: 100 INJECTION, SOLUTION INTRAVENOUS; SUBCUTANEOUS at 11:16

## 2021-12-08 RX ADMIN — PANTOPRAZOLE SODIUM 40 MG: 40 TABLET, DELAYED RELEASE ORAL at 17:10

## 2021-12-08 RX ADMIN — INSULIN DETEMIR 27 UNITS: 100 INJECTION, SOLUTION SUBCUTANEOUS at 21:15

## 2021-12-08 RX ADMIN — THIAMINE HYDROCHLORIDE: 100 INJECTION, SOLUTION INTRAMUSCULAR; INTRAVENOUS at 21:15

## 2021-12-08 RX ADMIN — INSULIN ASPART 3 UNITS: 100 INJECTION, SOLUTION INTRAVENOUS; SUBCUTANEOUS at 16:56

## 2021-12-08 RX ADMIN — CARVEDILOL 6.25 MG: 6.25 TABLET, FILM COATED ORAL at 08:40

## 2021-12-08 RX ADMIN — ENOXAPARIN SODIUM 40 MG: 40 INJECTION SUBCUTANEOUS at 17:10

## 2021-12-08 RX ADMIN — HYDROCORTISONE 2.5%: 25 CREAM TOPICAL at 08:41

## 2021-12-08 RX ADMIN — INSULIN ASPART 3 UNITS: 100 INJECTION, SOLUTION INTRAVENOUS; SUBCUTANEOUS at 08:51

## 2021-12-08 RX ADMIN — CARVEDILOL 6.25 MG: 6.25 TABLET, FILM COATED ORAL at 17:10

## 2021-12-08 RX ADMIN — CETIRIZINE HYDROCHLORIDE 5 MG: 10 TABLET, FILM COATED ORAL at 08:40

## 2021-12-08 RX ADMIN — FERROUS SULFATE TAB 325 MG (65 MG ELEMENTAL FE) 325 MG: 325 (65 FE) TAB at 17:10

## 2021-12-08 RX ADMIN — DOCUSATE SODIUM 100 MG: 100 CAPSULE ORAL at 08:40

## 2021-12-08 RX ADMIN — ROSUVASTATIN CALCIUM 10 MG: 10 TABLET, FILM COATED ORAL at 21:15

## 2021-12-08 NOTE — SIGNIFICANT NOTE
12/08/21 0820   Plan   Plan Spoke to pt about plans for discharge on 12-10-21, how she has progressed in therapy, recommendations for home health PT/OT, and rolling walker.  Informed her daughter Antonina voiced preference for [facility] and [facility].  Pt is agreeable to these providers.  Pt agreeable to return home with family providing 24 hour assistance.  Will follow.   Patient/Family in Agreement with Plan yes

## 2021-12-08 NOTE — THERAPY TREATMENT NOTE
Inpatient Rehabilitation - Physical Therapy Treatment Note       EDWIGE Villarreal     Patient Name: Albina Finley  : 1945  MRN: [id number]    Today's Date: 2021                    Admit Date: 2021      Visit Dx:   No diagnosis found.    Patient Active Problem List   Diagnosis   • Iron deficiency anemia   • COPD with acute exacerbation ([facility])   • Chronic diastolic heart failure (HCC)   • CKD (chronic kidney disease) stage 3, GFR 30-59 ml/min ([facility])   • Essential hypertension   • Hyperlipidemia   • Type II diabetes mellitus ([facility])   • Arthritis   • Osteoporosis   • Grade II diastolic dysfunction   • Duodenitis   • Peptic ulcer disease with hemorrhage   • Non-ST elevation myocardial infarction (NSTEMI) ([facility])   • Abnormal nuclear stress test with mild degree of small size apical lateral wall myocardial ischemia in 2019.   • Anemia due to GI blood loss   • Hip fracture ([facility])   • S/p left hip fracture       Past Medical History:   Diagnosis Date   • Abnormal nuclear stress test with mild degree of small size apical lateral wall myocardial ischemia in 2019. 2020   • Acute on chronic diastolic CHF (congestive heart failure) ([facility]) 2019   • Anal polyp 2018    Added automatically from request for surgery [id number]   • Arthritis    • Chronic kidney disease    • COPD (chronic obstructive pulmonary disease) ([facility])    • Diabetes mellitus ([facility])    • Elevated cholesterol    • Essential hypertension 2019   • History of transfusion    • Incidental lung nodule, greater than or equal to 8mm     Left lower lobe, 15 mm based on  and 2019 chest CT scans with no growth in the size   • Iron deficiency anemia 2017   • Osteoporosis    • Peptic ulcer disease with hemorrhage 2020   • Pneumonia of right middle lobe due to infectious organism 2019       Past Surgical History:   Procedure Laterality Date   • ANUS SURGERY N/A 2018    Procedure: Diagnostic anoscopy with anal  polyp excision;  Surgeon: Marlyn Gutierrez MD;  Location: [facility] OR;  Service: General   •  SECTION     • ENDOSCOPY N/A 2020    Procedure: ESOPHAGOGASTRODUODENOSCOPY;  Surgeon: Marlon Wray MD;  Location: [facility] OR;  Service: Gastroenterology;  Laterality: N/A;   • FEMUR OPEN REDUCTION INTERNAL FIXATION Left 2021    Procedure: Left hip hook plate operative fixation;  Surgeon: Lupillo Cornejo MD;  Location: [facility] OR;  Service: Orthopedics;  Laterality: Left;   • HIP BIPOLAR REPLACEMENT      left Dr. Cornejo    • HYSTERECTOMY     • JOINT REPLACEMENT         PT ASSESSMENT (last 12 hours)     IRF PT Evaluation and Treatment     Row Name 21 1633          PT Time and Intention    Document Type daily treatment; progress note  -RF     Mode of Treatment physical therapy  -RF     Patient/Family/Caregiver Comments/Observations Pt c/o fatigue and SOA with increased activity.  -RF     Row Name 21 1633          General Information    Patient Profile Reviewed yes  -RF     Row Name 21 1633          Cognition/Psychosocial    Affect/Mental Status (Cognitive) WFL  -RF     Orientation Status (Cognition) oriented to; person; place  -RF     Follows Commands (Cognition) WFL; follows one-step commands; repetition of directions required  -RF     Row Name 21 1633          Mobility    Extremity Weight-bearing Status left lower extremity  -RF     Left Lower Extremity (Weight-bearing Status) weight-bearing as tolerated (WBAT); other (see comments)  per chart review, physician orders  -RF     Row Name 21 1633          Bed Mobility    Supine-Sit [place] (Bed Mobility) supervision  -RF     Sit-Supine [place] (Bed Mobility) supervision  -RF     Assistive Device (Bed Mobility) bed rails  -     Row Name 21 1633          Transfer Assessment/Treatment    Transfers sit-stand transfer; stand-sit transfer; car transfer; chair-bed transfer  -     Row Name 21 1639           Transfers    Bed-Chair [place] (Transfers) supervision  -RF     Chair-Bed [place] (Transfers) supervision  -RF     Assistive Device (Bed-Chair Transfers) wheelchair  -RF     Sit-Stand [place] (Transfers) supervision  -RF     Stand-Sit [place] (Transfers) supervision  -RF     Row Name 12/08/21 1633          Chair-Bed Transfer    Assistive Device (Chair-Bed Transfers) wheelchair  -RF     Row Name 12/08/21 1633          Sit-Stand Transfer    Assistive Device (Sit-Stand Transfers) walker, front-wheeled  -RF     Row Name 12/08/21 1633          Stand-Sit Transfer    Assistive Device (Stand-Sit Transfers) walker, front-wheeled  -RF     Row Name 12/08/21 1633          Gait/Stairs (Locomotion)    Gait/Stairs Locomotion gait/ambulation assistive device  -RF     [place] Level (Gait) standby assist  -RF     Assistive Device (Gait) walker, front-wheeled  -RF     Distance in Feet (Gait) 120, 80 in AM, 100, 60  -RF     Pattern (Gait) step-to  -RF     Deviations/Abnormal Patterns (Gait) antalgic  -RF     Bilateral Gait Deviations forward flexed posture  -RF     Comment (Gait/Stairs) Cues provided for proper technique with RW; antalgic pattern worsens with fatigue.  -RF     Row Name 12/08/21 1633          Balance    Dynamic Standing Balance mild impairment; unsupported; standing; asymmetrical weight shifting; other (see comments)  bag toss with reach outside [place] to R  -RF     Row Name 12/08/21 1633          Hip (Therapeutic Exercise)    Hip Strengthening (Therapeutic Exercise) bilateral; flexion; extension; aBduction; aDduction; heel slides; marching while seated; marching while standing; sitting; standing; 2 lb free weight; resistance band; green; 2 sets; 10 repetitions; other (see comments)  #2 on RLE in sitting  -RF     Row Name 12/08/21 1633          Knee (Therapeutic Exercise)    Knee Strengthening (Therapeutic Exercise) bilateral; flexion; extension; marching while seated; marching while standing; LAQ  (long arc quad); hamstring curls; sitting; standing; 2 lb free weight; resistance band; green; 2 sets; 10 repetitions; other (see comments)  #2 on RLE in sitting  -RF     Row Name 12/08/21 1633          Ankle (Therapeutic Exercise)    Ankle Strengthening (Therapeutic Exercise) bilateral; dorsiflexion; plantarflexion; sitting; standing; 2 lb free weight; 2 sets; 10 repetitions; other (see comments)  #2 on RLE in sitting  -RF     Row Name 12/08/21 1633          IRF PT Goals    Transfer Goal Selection (PT-IRF) transfers, PT goal 1  -RF     Gait (Walking Locomotion) Goal Selection (PT-IRF) gait, PT goal 1  -RF     Row Name 12/08/21 1633          Transfer Goal 1 (PT-IRF)    Activity/Assistive Device (Transfer Goal 1, PT-IRF) all transfers  -RF     [place] Level (Transfer Goal 1, PT-IRF) standby assist  -RF     Time Frame (Transfer Goal 1, PT-IRF) 2 weeks  -RF     Progress/Outcomes (Transfer Goal 1, PT-IRF) goal met  -RF     Row Name 12/08/21 1633          Gait/Walking Locomotion Goal 1 (PT-IRF)    Activity/Assistive Device (Gait/Walking Locomotion Goal 1, PT-IRF) gait (walking locomotion); assistive device use  -RF     Gait/Walking Locomotion Distance Goal 1 (PT-IRF) 100  -RF     [place] Level (Gait/Walking Locomotion Goal 1, PT-IRF) contact guard assist  -RF     Time Frame (Gait/Walking Locomotion Goal 1, PT-IRF) 2 weeks  -RF     Progress/Outcomes (Gait/Walking Locomotion Goal 1, PT-IRF) goal ongoing  -RF     Row Name 12/08/21 1633          Positioning and Restraints    Pre-Treatment Position sitting in chair/recliner  BID  -RF     In Wheelchair sitting; call light within reach; encouraged to call for assist  BID  -RF     Row Name 12/08/21 1633          Therapy Assessment/Plan (PT)    Rehab Potential/Prognosis (PT) good, to achieve stated therapy goals  -RF     Frequency of Treatment (PT) 5 times per week  -RF     Estimated Duration of Therapy (PT) 2 weeks  -RF     Row Name 12/08/21 1633          Daily  Progress Summary (PT)    Functional Goal Overall Progress (PT) progressing toward functional goals as expected  -RF     Daily Progress Summary (PT) Good functional mobility and fair ambulation quality noted this date. Conitnued skilled care required for further LE strengthening to ensure maximum safe function upon D/C.  -RF     Impairments Still Limiting Function (PT) strength decreased; impaired functional activity tolerance; pain  -RF     Recommendations (PT) Continue per current POC  -RF     Row Name 12/08/21 1633          Therapy Plan Review/Discharge Plan (PT)    Anticipated Equipment Needs at Discharge (PT Eval) other (see comments)  TBD  -RF     Expected Discharge Disposition (PT Eval) home; home with caregiver  -RF           User Key  (r) = Recorded By, (t) = Taken By, (c) = Cosigned By    Initials Name Provider Type    RF Ayla Gonzalez PTA Physical Therapy Assistant              Wound 11/19/21 0948 Left lateral hip Incision (Active)   Dressing Appearance open to air 12/08/21 0710   Closure Other (Comment) 12/07/21 2045   Base clean 12/07/21 2045   Periwound blanchable; intact 12/07/21 2045     Physical Therapy Education                 Title: PT OT SLP Therapies (Done)     Topic: Physical Therapy (Done)     Point: Mobility training (Done)     Learning Progress Summary           Patient Acceptance, E,TB, VU by RF at 12/8/2021 1637      Show all documentation for this point (8)                 Point: Home exercise program (Done)     Learning Progress Summary           Patient Acceptance, E,TB, VU by RF at 12/8/2021 1637      Show all documentation for this point (8)                 Point: Body mechanics (Done)     Learning Progress Summary           Patient Acceptance, E,TB, VU by RF at 12/8/2021 1637      Show all documentation for this point (8)                 Point: Precautions (Done)     Learning Progress Summary           Patient Acceptance, E,TB, VU by RF at 12/8/2021 1637      Show all  documentation for this point (8)                             User Key     Initials Effective Dates Name Provider Type Discipline    RF 06/16/21 -  Ayla Gonzalez PTA Physical Therapy Assistant PT                PT Recommendation and Plan    Frequency of Treatment (PT): 5 times per week  Anticipated Equipment Needs at Discharge (PT Eval): other (see comments) (TBD)  Daily Progress Summary (PT)  Functional Goal Overall Progress (PT): progressing toward functional goals as expected  Daily Progress Summary (PT): Good functional mobility and fair ambulation quality noted this date. Conitnued skilled care required for further LE strengthening to ensure maximum safe function upon D/C.  Impairments Still Limiting Function (PT): strength decreased, impaired functional activity tolerance, pain  Recommendations (PT): Continue per current POC               Time Calculation:      PT Charges     Row Name 12/08/21 1638 12/08/21 1637          Time Calculation    Start Time 1340  -RF 0915  -RF     Stop Time 1425  -RF 1000  -RF     Time Calculation (min) 45 min  -RF 45 min  -RF     PT Received On 12/08/21  -RF 12/08/21  -RF     PT - Next Appointment 12/08/21  -RF 12/08/21  -RF     PT Goal Re-Cert Due Date 12/10/21  -RF 12/10/21  -RF            Time Calculation- PT    Total Timed Code Minutes- PT 45 minute(s)  -RF 45 minute(s)  -RF           User Key  (r) = Recorded By, (t) = Taken By, (c) = Cosigned By    Initials Name Provider Type    RF Ayla Gonzalez PTA Physical Therapy Assistant                Therapy Charges for Today     Code Description Service Date Service Provider Modifiers Qty    31126259459 HC GAIT TRAINING EA 15 MIN 12/7/2021 Ayla Gonzalez, PTA GP 1    35045929845 HC PT THER PROC EA 15 MIN 12/7/2021 Ayla Gonzalez, PTA GP 4    90892008257 HC PT THERAPEUTIC ACT EA 15 MIN 12/7/2021 Ayla Gonzalez, PTA GP 1    12647579869 HC GAIT TRAINING EA 15 MIN 12/8/2021 Ayla Gonzalez, PTA GP 2    31553015688 HC PT  NEUROMUSC RE EDUCATION EA 15 MIN 12/8/2021 Ayla Gonzalez, PTA GP 1    33321125411  PT THER PROC EA 15 MIN 12/8/2021 Ayla Gonzalez, PTA GP 2    38950105980  PT THERAPEUTIC ACT EA 15 MIN 12/8/2021 Ayla Gonzalez, PTA GP 1                   Ayla Gonzalez, PTA  12/8/2021

## 2021-12-08 NOTE — THERAPY TREATMENT NOTE
Inpatient Rehabilitation - Occupational Therapy Treatment Note    EDWIGE Villarreal     Patient Name: Albina Finley  : 1945  MRN: [id number]    Today's Date: 2021                 Admit Date: 2021       No diagnosis found.    Patient Active Problem List   Diagnosis   • Iron deficiency anemia   • COPD with acute exacerbation ([facility])   • Chronic diastolic heart failure ([facility])   • CKD (chronic kidney disease) stage 3, GFR 30-59 ml/min ([facility])   • Essential hypertension   • Hyperlipidemia   • Type II diabetes mellitus ([facility])   • Arthritis   • Osteoporosis   • Grade II diastolic dysfunction   • Duodenitis   • Peptic ulcer disease with hemorrhage   • Non-ST elevation myocardial infarction (NSTEMI) ([facility])   • Abnormal nuclear stress test with mild degree of small size apical lateral wall myocardial ischemia in 2019.   • Anemia due to GI blood loss   • Hip fracture ([facility])   • S/p left hip fracture       Past Medical History:   Diagnosis Date   • Abnormal nuclear stress test with mild degree of small size apical lateral wall myocardial ischemia in 2019. 2020   • Acute on chronic diastolic CHF (congestive heart failure) ([facility]) 2019   • Anal polyp 2018    Added automatically from request for surgery [id number]   • Arthritis    • Chronic kidney disease    • COPD (chronic obstructive pulmonary disease) ([facility])    • Diabetes mellitus ([facility])    • Elevated cholesterol    • Essential hypertension 2019   • History of transfusion    • Incidental lung nodule, greater than or equal to 8mm     Left lower lobe, 15 mm based on  and 2019 chest CT scans with no growth in the size   • Iron deficiency anemia 2017   • Osteoporosis    • Peptic ulcer disease with hemorrhage 2020   • Pneumonia of right middle lobe due to infectious organism 2019       Past Surgical History:   Procedure Laterality Date   • ANUS SURGERY N/A 2018    Procedure: Diagnostic anoscopy with anal polyp excision;   Surgeon: Marlyn Gutierrez MD;  Location: [facility] OR;  Service: General   •  SECTION     • ENDOSCOPY N/A 2020    Procedure: ESOPHAGOGASTRODUODENOSCOPY;  Surgeon: Marlon Wray MD;  Location:  [facility] OR;  Service: Gastroenterology;  Laterality: N/A;   • FEMUR OPEN REDUCTION INTERNAL FIXATION Left 2021    Procedure: Left hip hook plate operative fixation;  Surgeon: Lupillo Cornejo MD;  Location: [facility] OR;  Service: Orthopedics;  Laterality: Left;   • HIP BIPOLAR REPLACEMENT      left Dr. Cornejo    • HYSTERECTOMY     • JOINT REPLACEMENT               IRF OT ASSESSMENT FLOWSHEET (last 12 hours)     IRF OT Evaluation and Treatment     Row Name 21 1419          OT Time and Intention    Document Type daily treatment  -     Mode of Treatment occupational therapy  -     Symptoms Noted During/After Treatment fatigue  drowsy in pm  -     Row Name 21 1419          General Information    Patient/Family/Caregiver Comments/Observations agreeable to therapy  -     Existing Precautions/Restrictions fall; oxygen therapy device and L/min  -     Row Name 21 1419          Cognition/Psychosocial    Follows Commands (Cognition) verbal cues/prompting required; repetition of directions required  -     Row Name 21 1419          Motor Skills    Motor Skills functional endurance  -     Motor Control/Coordination Interventions therapeutic exercise/ROM; gross motor coordination activities; fine motor manipulation/dexterity activities  BUE ther ex/act, bilat coord ex, GMC/FMC, hand exerciser  -     Therapeutic Exercise --  dowel wrist rolls X 2  -     Row Name 21 1419          Lower Body Dressing    [place] Level (Lower Body Dressing) moderate assist (50% patient effort); minimum assist (75% patient effort); verbal cues; nonverbal cues (demo/gesture)  -     Assistive Device Use (Lower Body Dressing) reacher; sock-aid  -     Row Name 21 1419          Grooming     [place] Level (Grooming) set up  -     Row Name 12/08/21 1419          Positioning and Restraints    Post Treatment Position wheelchair  -     In Wheelchair sitting; call light within reach; encouraged to call for assist; exit alarm on; notified nsg; with PT  room - am tx;  PT- pm tx  -     Row Name 12/08/21 1419          Vital Signs    Intra SpO2 (%) 96  room air- in am;  99 - w/supplemental O2 in pm  -           User Key  (r) = Recorded By, (t) = Taken By, (c) = Cosigned By    Initials Name Effective Dates     Itzel Sears OT 06/16/21 -                  Occupational Therapy Education                 Title: PT OT SLP Therapies (Done)     Topic: Occupational Therapy (Done)     Point: ADL training (Done)     Description:   Instruct learner(s) on proper safety adaptation and remediation techniques during self care or transfers.   Instruct in proper use of assistive devices.              Learning Progress Summary           Patient Acceptance, E,D, VU,NR by  at 12/8/2021 1425      Show all documentation for this point (10)                 Point: Precautions (Done)     Description:   Instruct learner(s) on prescribed precautions during self-care and functional transfers.              Learning Progress Summary           Patient Acceptance, E,D, VU,NR by  at 12/8/2021 1425      Show all documentation for this point (10)                             User Key     Initials Effective Dates Name Provider Type Discipline     06/16/21 -  Itzel Sears OT Occupational Therapist OT                    OT Recommendation and Plan    Planned Therapy Interventions (OT): activity tolerance training, adaptive equipment training, BADL retraining, patient/caregiver education/training, ROM/therapeutic exercise, strengthening exercise, occupation/activity based interventions, transfer/mobility retraining                    Time Calculation:      Time Calculation- OT     Row Name 12/08/21 1426 12/08/21 1424           Time Calculation- OT    OT Start Time 1245  - 1045  -     OT Stop Time 1330  - 1140  -     OT Time Calculation (min) 45 min  - 55 min  -     Total Timed Code Minutes- OT 45 minute(s)  - 55 minute(s)  -           User Key  (r) = Recorded By, (t) = Taken By, (c) = Cosigned By    Initials Name Provider Type     Itzel Sears OT Occupational Therapist              Therapy Charges for Today     Code Description Service Date Service Provider Modifiers Qty    51438773578 HC OT SELF CARE/MGMT/TRAIN EA 15 MIN 12/7/2021 Itzel Sears, OT GO 2    91934731207 HC OT THER PROC EA 15 MIN 12/7/2021 Itzel Sears OT GO 1    35247801713 HC OT THERAPEUTIC ACT EA 15 MIN 12/7/2021 Itzel Sears, OT GO 3    53495287212 HC OT SELF CARE/MGMT/TRAIN EA 15 MIN 12/8/2021 Itzel Sears OT GO 2    87779615141 HC OT THERAPEUTIC ACT EA 15 MIN 12/8/2021 Itzel Sears OT GO 3    12123934502 HC OT THER PROC EA 15 MIN 12/8/2021 Itzel Sears, OT GO 2                   Itzel Sears OT  12/8/2021

## 2021-12-08 NOTE — NURSING NOTE
Pt heard yelling for help to go to the bathroom. Upon assessment call bell was found to be within reach of the patient. Pt was reeducated on use of call bell. Pt was taken to the bathroom. Offered to put pt back into bed, pt denied. Call bell within reach.

## 2021-12-08 NOTE — NURSING NOTE
Pt found getting out of bed without assistance. Pt now up in chair with chair alarm in use. Pt's call bell within reach and reeducated on use of call bell when assistance is needed.

## 2021-12-08 NOTE — PLAN OF CARE
Goal Outcome Evaluation:  Plan of Care Reviewed With: patient        Progress: improving  Outcome Summary: Progressing with therapy, cont POC

## 2021-12-08 NOTE — NURSING NOTE
During rounding pt was found to have O2 NC off. Offered to reapply O2 NC but pt refused. Pt educated on importance of wearing O2 but still refused to place it back on. Pt was also offered with assistance to get back into bed from wheelchair. Pt declined. Call bell within reach.

## 2021-12-08 NOTE — PROGRESS NOTES
PROGRESS NOTE         Patient Identification:  Name:  Albina Finley  Age:  76 y.o.  Sex:  female  :  1945  MRN:  [id number]  Visit Number:  [id number]  Primary Care Provider:  Micah Núñez PA         LOS: 12 days       ----------------------------------------------------------------------------------------------------------------------  Subjective       Chief Complaints:    Left hip fracture status post ORIF  UTI  OPHELIA on CKD stage III      Interval History:     76-year-old female with recent left hip fracture with ORIF, chronic kidney disease stage III, chronic hypoxic respiratory failure mostly at night, hypertension, hyperlipidemia, CAD, CHF preserved EF, severe pulmonary hypertension, diabetes mellitus and COPD.    Patient had an uneventful night with no issues reported, vitals are stable with no fever documented overnight.  Patient denies any chest pain, shortness of breath, nausea, vomiting, diarrhea or headache.  Has been participating with physical therapy without any difficulty.  Seems to be fairly comfortable this morning with no evidence of acute distress.    Participated with physical and occupational therapy on 2021: Perform bed mobility activities and supervision; perform transfer activities supervision; utilized wheelchair for chair to bed transfer; utilize front wheeled walker for sit to stand/stand to sit transfer; ambulated 100 feet, 80 feet, 60 feet with front wheeled walker and standby assist; climbed 5 stairs x2 with contact-guard assist and handrails on both sides; required set up assistance for grooming activity; minimal assist with verbal cues for toileting activity; participated with therapeutic exercise/ROM; gross motor coordination activities; fine motor manipulation/dexterity activities      Objective       Current Hospital Meds:  calcium carbonate (oyster shell), 500 mg, Oral, Daily  carvedilol, 6.25 mg, Oral, BID With Meals  cetirizine, 5 mg, Oral,  Daily  docusate sodium, 100 mg, Oral, BID  enoxaparin, 40 mg, Subcutaneous, Q24H  ferrous sulfate, 325 mg, Oral, Q PM  Hydrocortisone (Perianal), , Rectal, BID  insulin aspart, 0-14 Units, Subcutaneous, TID AC  insulin detemir, 27 Units, Subcutaneous, Nightly  pantoprazole, 40 mg, Oral, BID AC  polyethylene glycol, 17 g, Oral, Daily  rosuvastatin, 10 mg, Oral, Nightly  IVPB builder, , Intravenous, Nightly         ----------------------------------------------------------------------------------------------------------------------    Vital Signs:  Temp:  [97.7 °F (36.5 °C)-98.2 °F (36.8 °C)] 98.2 °F (36.8 °C)  Heart Rate:  [67-99] 82  Resp:  [16-18] 16  BP: (150-166)/(58-74) 166/74  No data found.  SpO2 Percentage    12/07/21 0935 12/07/21 1624 12/07/21 1903   SpO2: 94% 94% 96%     SpO2:  [94 %-96 %] 96 %  on  Flow (L/min):  [1.5] 1.5;   Device (Oxygen Therapy): nasal cannula    Body mass index is 29.59 kg/m².  Wt Readings from Last 3 Encounters:   11/26/21 73.4 kg (161 lb 13.1 oz)   11/26/21 73.4 kg (161 lb 14.4 oz)   06/17/20 72.6 kg (160 lb)        Intake/Output Summary (Last 24 hours) at 12/8/2021 0793  Last data filed at 12/8/2021 0405  Gross per 24 hour   Intake 1440 ml   Output --   Net 1440 ml     Diet Soft Texture; [employer]; Thin  ----------------------------------------------------------------------------------------------------------------------      Physical Exam:    General Appearance: alert, pleasant, no complaints this morning.     Head: normocephalic, without obvious abnormality and atraumatic     Lungs: clear to auscultation, respirations regular, respirations even and  respirations unlabored. No accessory muscle use.      Heart:: regular rhythm & normal rate, normal S1, S2, no murmur, no gallop, no  rub and no click.  Chest wall with no abnormalities observed. PMI nondisplaced     Abdomen: normal bowel sounds in all quadrants, soft non-tender, no guarding and no rebound  tenderness     Extremities: no edema, no cyanosis, no redness, no tenderness, no clubbing     Musculoskeletal: joints with no effusion nor erythema nor warmth.  Pedal pulses palpable and equal bilaterally     Skin: no bleeding, bruising or rash and no lesions noted     Neurologic:               Mental Status: Patient is awake alert and mildly confused              Sensory: Sensory overall seems to be intact in BLE and BUE.              Motor strength: Generalized weakness        ----------------------------------------------------------------------------------------------------------------------            Results from last 7 days   Lab Units 12/06/21 0121 12/03/21  0133   WBC 10*3/mm3 7.88 8.70   HEMOGLOBIN g/dL 7.9* 8.1*   HEMATOCRIT % 27.1* 27.8*   MCV fL 87.7 87.7   MCHC g/dL 29.2* 29.1*   PLATELETS 10*3/mm3 411 505*     Results from last 7 days   Lab Units 12/06/21  0121 12/03/21  0133   SODIUM mmol/L 135* 134*   POTASSIUM mmol/L 5.0 5.2   CHLORIDE mmol/L 103 101   CO2 mmol/L 25.0 25.1   BUN mg/dL 26* 27*   CREATININE mg/dL 1.11* 1.17*   EGFR IF NONAFRICN AM mL/min/1.73 48* 45*   CALCIUM mg/dL 8.8 8.5*   GLUCOSE mg/dL 112* 228*   Estimated Creatinine Clearance: 39.8 mL/min (A) (by C-G formula based on SCr of 1.11 mg/dL (H)).  No results found for: AMMONIA    Glucose   Date/Time Value Ref Range Status   12/08/2021 0623 175 (H) 70 - 130 mg/dL Final     Comment:     Meter: [id number] : [id number] Ed Crystal   12/07/2021 2009 287 (H) 70 - 130 mg/dL Final     Comment:     Meter: [id number] : [id number] Ed Crystal   12/07/2021 1618 188 (H) 70 - 130 mg/dL Final     Comment:     Meter: [id number] : [id number] Tripp Sabrina   12/07/2021 1115 316 (H) 70 - 130 mg/dL Final     Comment:     Meter: [id number] : [id number] Kiesha Miner   12/07/2021 0614 195 (H) 70 - 130 mg/dL Final     Comment:     Meter: [id number] : [id number] Ciera Sauceda   12/06/2021 2014 226 (H) 70 - 130 mg/dL  Final     Comment:     Meter: [id number] : [id number] Alcon Mayes Shital   12/06/2021 1629 251 (H) 70 - 130 mg/dL Final     Comment:     Meter: [id number] : [id number] Trippnatalie Viverosa   12/06/2021 1101 278 (H) 70 - 130 mg/dL Final     Comment:     Meter: [id number] : [id number] Tripp Sabrina     Lab Results   Component Value Date    HGBA1C 9.20 (H) 11/13/2021     Lab Results   Component Value Date    TSH 1.840 02/04/2021    FREET4 1.38 02/04/2021       No results found for: BLOODCX  No results found for: URINECX  No results found for: WOUNDCX  No results found for: STOOLCX  No results found for: RESPCX  Pain Management Panel     Pain Management Panel Latest Ref Rng & Units 11/14/2021 3/1/2021    CREATININE UR mg/dL 115.1 28.7            ----------------------------------------------------------------------------------------------------------------------  Imaging Results (Last 24 Hours)     ** No results found for the last 24 hours. **          ----------------------------------------------------------------------------------------------------------------------    Assessment/Plan       Assessment/Plan     ASSESSMENT:    Status post left hip fracture with ORIF  Diabetes mellitus  Chronic hypoxemia  CKD stage III  Hypertension  Hyperlipidemia  CHF with preserved EF  Anemia    PLAN:    Status post left hip fracture with ORIF--Participated with physical and occupational therapy on 12/7/2021: Perform bed mobility activities and supervision; perform transfer activities supervision; utilized wheelchair for chair to bed transfer; utilize front wheeled walker for sit to stand/stand to sit transfer; ambulated 100 feet, 80 feet, 60 feet with front wheeled walker and standby assist; climbed 5 stairs x2 with contact-guard assist and handrails on both sides; required set up assistance for grooming activity; minimal assist with verbal cues for toileting activity; participated with therapeutic exercise/ROM; gross  motor coordination activities; fine motor manipulation/dexterity activities    Staples removed 12/7/21    Diabetes mellitus-- continue insulin protocol.     Chronic hypoxemia-- stable continue O2 at 2 L per nasal cannula     CKD stage III--has been stable.     Hypertension-- reasonably well controlled     Hyperlipidemia-- continue statin therapy     CHF with preserved EF--compensated for now.  Monitor closely     Anemia--stable        Code Status:   Code Status and Medical Interventions:   Ordered at: 11/26/21 1451     Code Status (Patient has no pulse and is not breathing):    CPR (Attempt to Resuscitate)     Medical Interventions (Patient has pulse or is breathing):    Full Support     Traci Villeda MD  12/08/21  12:42 EST

## 2021-12-09 LAB
ALBUMIN SERPL-MCNC: 3.27 G/DL (ref 3.5–5.2)
ALBUMIN/GLOB SERPL: 0.9 G/DL
ALP SERPL-CCNC: 169 U/L (ref 39–117)
ALT SERPL W P-5'-P-CCNC: 13 U/L (ref 1–33)
ANION GAP SERPL CALCULATED.3IONS-SCNC: 10.2 MMOL/L (ref 5–15)
AST SERPL-CCNC: 16 U/L (ref 1–32)
BASOPHILS # BLD AUTO: 0.04 10*3/MM3 (ref 0–0.2)
BASOPHILS NFR BLD AUTO: 0.5 % (ref 0–1.5)
BILIRUB SERPL-MCNC: 0.2 MG/DL (ref 0–1.2)
BUN SERPL-MCNC: 24 MG/DL (ref 8–23)
BUN/CREAT SERPL: 21.2 (ref 7–25)
CALCIUM SPEC-SCNC: 9.1 MG/DL (ref 8.6–10.5)
CHLORIDE SERPL-SCNC: 104 MMOL/L (ref 98–107)
CO2 SERPL-SCNC: 19.8 MMOL/L (ref 22–29)
CREAT SERPL-MCNC: 1.13 MG/DL (ref 0.57–1)
DEPRECATED RDW RBC AUTO: 52.6 FL (ref 37–54)
EOSINOPHIL # BLD AUTO: 0.22 10*3/MM3 (ref 0–0.4)
EOSINOPHIL NFR BLD AUTO: 2.9 % (ref 0.3–6.2)
ERYTHROCYTE [DISTWIDTH] IN BLOOD BY AUTOMATED COUNT: 16.2 % (ref 12.3–15.4)
GFR SERPL CREATININE-BSD FRML MDRD: 47 ML/MIN/1.73
GLOBULIN UR ELPH-MCNC: 3.6 GM/DL
GLUCOSE BLDC GLUCOMTR-MCNC: 174 MG/DL (ref 70–130)
GLUCOSE BLDC GLUCOMTR-MCNC: 188 MG/DL (ref 70–130)
GLUCOSE BLDC GLUCOMTR-MCNC: 200 MG/DL (ref 70–130)
GLUCOSE BLDC GLUCOMTR-MCNC: 230 MG/DL (ref 70–130)
GLUCOSE SERPL-MCNC: 185 MG/DL (ref 65–99)
HCT VFR BLD AUTO: 31.2 % (ref 34–46.6)
HGB BLD-MCNC: 9.2 G/DL (ref 12–15.9)
IMM GRANULOCYTES # BLD AUTO: 0.01 10*3/MM3 (ref 0–0.05)
IMM GRANULOCYTES NFR BLD AUTO: 0.1 % (ref 0–0.5)
LYMPHOCYTES # BLD AUTO: 2.83 10*3/MM3 (ref 0.7–3.1)
LYMPHOCYTES NFR BLD AUTO: 37.2 % (ref 19.6–45.3)
MAGNESIUM SERPL-MCNC: 1.6 MG/DL (ref 1.6–2.4)
MCH RBC QN AUTO: 25.9 PG (ref 26.6–33)
MCHC RBC AUTO-ENTMCNC: 29.5 G/DL (ref 31.5–35.7)
MCV RBC AUTO: 87.9 FL (ref 79–97)
MONOCYTES # BLD AUTO: 0.6 10*3/MM3 (ref 0.1–0.9)
MONOCYTES NFR BLD AUTO: 7.9 % (ref 5–12)
NEUTROPHILS NFR BLD AUTO: 3.9 10*3/MM3 (ref 1.7–7)
NEUTROPHILS NFR BLD AUTO: 51.4 % (ref 42.7–76)
NRBC BLD AUTO-RTO: 0 /100 WBC (ref 0–0.2)
PLATELET # BLD AUTO: 331 10*3/MM3 (ref 140–450)
PMV BLD AUTO: 11 FL (ref 6–12)
POTASSIUM SERPL-SCNC: 5.4 MMOL/L (ref 3.5–5.2)
PROT SERPL-MCNC: 6.9 G/DL (ref 6–8.5)
RBC # BLD AUTO: 3.55 10*6/MM3 (ref 3.77–5.28)
SODIUM SERPL-SCNC: 134 MMOL/L (ref 136–145)
WBC NRBC COR # BLD: 7.6 10*3/MM3 (ref 3.4–10.8)

## 2021-12-09 PROCEDURE — 97535 SELF CARE MNGMENT TRAINING: CPT

## 2021-12-09 PROCEDURE — 82962 GLUCOSE BLOOD TEST: CPT

## 2021-12-09 PROCEDURE — 97530 THERAPEUTIC ACTIVITIES: CPT

## 2021-12-09 PROCEDURE — 97116 GAIT TRAINING THERAPY: CPT

## 2021-12-09 PROCEDURE — 83735 ASSAY OF MAGNESIUM: CPT | Performed by: INTERNAL MEDICINE

## 2021-12-09 PROCEDURE — 85025 COMPLETE CBC W/AUTO DIFF WBC: CPT | Performed by: INTERNAL MEDICINE

## 2021-12-09 PROCEDURE — 97110 THERAPEUTIC EXERCISES: CPT

## 2021-12-09 PROCEDURE — 80053 COMPREHEN METABOLIC PANEL: CPT | Performed by: INTERNAL MEDICINE

## 2021-12-09 PROCEDURE — 63710000001 INSULIN DETEMIR PER 5 UNITS: Performed by: FAMILY MEDICINE

## 2021-12-09 PROCEDURE — 25010000002 ENOXAPARIN PER 10 MG: Performed by: INTERNAL MEDICINE

## 2021-12-09 PROCEDURE — 63710000001 INSULIN ASPART PER 5 UNITS: Performed by: INTERNAL MEDICINE

## 2021-12-09 PROCEDURE — 25010000002 THIAMINE PER 100 MG: Performed by: INTERNAL MEDICINE

## 2021-12-09 RX ADMIN — INSULIN ASPART 3 UNITS: 100 INJECTION, SOLUTION INTRAVENOUS; SUBCUTANEOUS at 11:47

## 2021-12-09 RX ADMIN — INSULIN DETEMIR 27 UNITS: 100 INJECTION, SOLUTION SUBCUTANEOUS at 20:35

## 2021-12-09 RX ADMIN — CARVEDILOL 6.25 MG: 6.25 TABLET, FILM COATED ORAL at 08:48

## 2021-12-09 RX ADMIN — PANTOPRAZOLE SODIUM 40 MG: 40 TABLET, DELAYED RELEASE ORAL at 06:32

## 2021-12-09 RX ADMIN — INSULIN ASPART 3 UNITS: 100 INJECTION, SOLUTION INTRAVENOUS; SUBCUTANEOUS at 17:37

## 2021-12-09 RX ADMIN — THIAMINE HYDROCHLORIDE: 100 INJECTION, SOLUTION INTRAMUSCULAR; INTRAVENOUS at 20:35

## 2021-12-09 RX ADMIN — INSULIN ASPART 3 UNITS: 100 INJECTION, SOLUTION INTRAVENOUS; SUBCUTANEOUS at 07:06

## 2021-12-09 RX ADMIN — DOCUSATE SODIUM 100 MG: 100 CAPSULE ORAL at 08:49

## 2021-12-09 RX ADMIN — Medication 500 MG: at 08:48

## 2021-12-09 RX ADMIN — ENOXAPARIN SODIUM 40 MG: 40 INJECTION SUBCUTANEOUS at 16:55

## 2021-12-09 RX ADMIN — POLYETHYLENE GLYCOL 3350 17 G: 17 POWDER, FOR SOLUTION ORAL at 08:49

## 2021-12-09 RX ADMIN — DOCUSATE SODIUM 100 MG: 100 CAPSULE ORAL at 20:35

## 2021-12-09 RX ADMIN — CARVEDILOL 6.25 MG: 6.25 TABLET, FILM COATED ORAL at 17:37

## 2021-12-09 RX ADMIN — PANTOPRAZOLE SODIUM 40 MG: 40 TABLET, DELAYED RELEASE ORAL at 16:56

## 2021-12-09 RX ADMIN — CETIRIZINE HYDROCHLORIDE 5 MG: 10 TABLET, FILM COATED ORAL at 08:48

## 2021-12-09 RX ADMIN — FERROUS SULFATE TAB 325 MG (65 MG ELEMENTAL FE) 325 MG: 325 (65 FE) TAB at 16:56

## 2021-12-09 RX ADMIN — ROSUVASTATIN CALCIUM 10 MG: 10 TABLET, FILM COATED ORAL at 20:35

## 2021-12-09 RX ADMIN — HYDROCORTISONE 2.5%: 25 CREAM TOPICAL at 08:49

## 2021-12-09 NOTE — PROGRESS NOTES
Case Management  Inpatient Rehabilitation Team Conference    Conference Date/Time: 12/7/2021 1:50:01 PM    Team Conference Attendees:  MD Jaquelin Ceballos SW Jessica Bill, JOYCE,   Eloise Mccarthy, PT  Itzel Sears, OT   Sangeeta Bañuelos RN    Demographics            Age: 76Y            Gender: Female    Admission Date: 11/26/2021 12:51:00 PM  Rehabilitation Diagnosis:  Left proximal femure fracture  Comorbidities: N39.0 Urinary tract infection, site not specified  N17.9 Acute kidney failure, unspecified  I13.0 Hypertensive heart and chronic kidney disease with heart failure and stage  1 through stage 4 chronic kidney disease, or unspecified chronic kidney disease  I50.32 Chronic diastolic (congestive) heart failure  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 diabetes mellitus with diabetic chronic kidney disease  M81.0 Age-related osteoporosis without current pathological fracture  Z87.891 Personal history of nicotine dependence  Z79.4 Long term (current) use of insulin  E66.9 Obesity, unspecified  Z68.30 Body mass index (BMI) 30.0-30.9, adult  J43.9 Emphysema, unspecified  I27.20 Pulmonary hypertension, unspecified  I25.10 Atherosclerotic heart disease of native coronary artery without angina  pectoris  W19.XXXA Unspecified fall, initial encounter  Y92.009 Unspecified place in unspecified non-institutional (private) residence  as the place of occurrence of the external cause      Plan of Care  Anticipated Discharge Date/Estimated Length of Stay: 12-10-21  Anticipated Discharge Destination: Community discharge with assistance  Discharge Plan : Pt plans to return home with family providing 24 hour  assistance if needed.  Medical Necessity Expected Level Rationale: good  Intensity and Duration: an average of 3 hours/5 days per week  Medical Supervision and 24 Hour Rehab Nursing: x  Physical Therapy: x  PT Intensity/Duration: PT 1.5 hours per day/5 days per week  Occupational Therapy: x  OT Intensity/Duration: OT  1.5 hours per day/5 days per week  Social Work: x  Therapeutic Recreation: x  Updated (if changes indicated)    Anticipated Discharge Date/Estimated Length of Stay:   12-10-21  Anticipated Discharge Date/Estimated Length of Stay:   12-10-21      Discharge Plan of Care: Home Health Services Physical Therapy strengthening,  gait training, home safety 3 times per week for 4 weeks Occupational Therapy ADL  re-training, home safety, functional mobility, strengthening,  endurance 3 times  per week for 4 weeks Ambulatory: walker with wheels    Based on the patient's medical and functional status, their prognosis and  expected level of functional improvement is: good      Interdisciplinary Problem/Goals/Status  Mobility    [PT] Toilet Transfers(Active)  Current Status(11/27/2021): CGA/Tiffany  Weekly Goal(12/04/2021): SBA/CGA  Discharge Goal: SBA    [PT] Walk(Active)  Current Status(11/27/2021): CGA 30  Weekly Goal(12/04/2021): CGA 50  Discharge Goal:  RW        Pain    [RN] Pain Management(Active)  Current Status(11/26/2021): Potential for pain  Weekly Goal(12/10/2021): Decreased pain this week  Discharge Goal: No pain        Safety    [RN] Potential for Injury(Active)  Current Status(11/26/2021): At risk for injury  Weekly Goal(12/10/2021): No injury this week  Discharge Goal: No injury        Self Care    [OT] Dressing (Lower)(Active)  Current Status(12/06/2021): modA  Weekly Goal(12/14/2021): Tiffany  Discharge Goal: Tiffany    Comments: Pt plans to return home with family providing 24 hour assistance.    Signed by: BRENDON Zheng    Physician CoSigned By: Traci Villeda 12/09/2021 08:35:02

## 2021-12-09 NOTE — SIGNIFICANT NOTE
12/09/21 1140   Plan   Plan Contacted [facility] [phone] per preference per Zohreh with discharge on 12-10-21 and order for rolling walker.  Pt is home O2 at 2L NC at night since 2019.  PTA tested O2 sats and pt does not need continuous O2.  RW to be delivered to rehab this pm or in the am.  Faxed DWO, face sheet, H&P, and PT note to [phone].  Contacted [facility] [phone] per preference per Chasity with discharge on 12-10-21, referral and orders for PT 2-3 x wk x 8 wks for strengthening, gait training, home safety and OT 2-3 x wk x 8 wks for ADL re-training, home safety, functional mobility, strengthening, endurance.  Faxed HH referral with orders, PT/OT notes, H&P, and face sheet to  [phone].  HH to be contacted at discharge.  Spoke to bre Quezada who came for education and observed pt in therapy today about ordering RW and home health PT/OT.  MD to complete FMLA paperwork for bre Sarkar today.  Pilar says she can pick-up paperwork this pm.  Pt is going home with family providing 24 hour assistance.   Patient/Family in Agreement with Plan yes

## 2021-12-09 NOTE — THERAPY TREATMENT NOTE
Inpatient Rehabilitation - Occupational Therapy Treatment Note    EDWIGE Villarreal     Patient Name: Albina Finley  : 1945  MRN: [id number]    Today's Date: 2021                 Admit Date: 2021       No diagnosis found.    Patient Active Problem List   Diagnosis   • Iron deficiency anemia   • COPD with acute exacerbation ([facility])   • Chronic diastolic heart failure ([facility])   • CKD (chronic kidney disease) stage 3, GFR 30-59 ml/min ([facility])   • Essential hypertension   • Hyperlipidemia   • Type II diabetes mellitus ([facility])   • Arthritis   • Osteoporosis   • Grade II diastolic dysfunction   • Duodenitis   • Peptic ulcer disease with hemorrhage   • Non-ST elevation myocardial infarction (NSTEMI) ([facility])   • Abnormal nuclear stress test with mild degree of small size apical lateral wall myocardial ischemia in 2019.   • Anemia due to GI blood loss   • Hip fracture ([facility])   • S/p left hip fracture       Past Medical History:   Diagnosis Date   • Abnormal nuclear stress test with mild degree of small size apical lateral wall myocardial ischemia in 2019. 2020   • Acute on chronic diastolic CHF (congestive heart failure) ([facility]) 2019   • Anal polyp 2018    Added automatically from request for surgery [id number]   • Arthritis    • Chronic kidney disease    • COPD (chronic obstructive pulmonary disease) ([facility])    • Diabetes mellitus ([facility])    • Elevated cholesterol    • Essential hypertension 2019   • History of transfusion    • Incidental lung nodule, greater than or equal to 8mm     Left lower lobe, 15 mm based on  and 2019 chest CT scans with no growth in the size   • Iron deficiency anemia 2017   • Osteoporosis    • Peptic ulcer disease with hemorrhage 2020   • Pneumonia of right middle lobe due to infectious organism 2019       Past Surgical History:   Procedure Laterality Date   • ANUS SURGERY N/A 2018    Procedure: Diagnostic anoscopy with anal polyp excision;   Surgeon: Marlyn Gutierrez MD;  Location: [facility] OR;  Service: General   •  SECTION     • ENDOSCOPY N/A 2020    Procedure: ESOPHAGOGASTRODUODENOSCOPY;  Surgeon: Marlon Wray MD;  Location: [facility] OR;  Service: Gastroenterology;  Laterality: N/A;   • FEMUR OPEN REDUCTION INTERNAL FIXATION Left 2021    Procedure: Left hip hook plate operative fixation;  Surgeon: Lupillo Cornejo MD;  Location: [facility] OR;  Service: Orthopedics;  Laterality: Left;   • HIP BIPOLAR REPLACEMENT      left Dr. Cornejo    • HYSTERECTOMY     • JOINT REPLACEMENT               IRF OT ASSESSMENT FLOWSHEET (last 12 hours)     IRF OT Evaluation and Treatment     Row Name 21 1206          OT Time and Intention    Document Type daily treatment  -     Mode of Treatment occupational therapy  -     Symptoms Noted During/After Treatment none  -     Row Name 21 120          General Information    Patient/Family/Caregiver Comments/Observations Agreeable to therapy.  Education provided to pt and daughter re:  ADL status, safety, AE, DME, 24 hr supervision, precautions and D/C concerns.  Verbalized understanding.  -     Existing Precautions/Restrictions fall; oxygen therapy device and L/min  -     Row Name 21 1206          Cognition/Psychosocial    Follows Commands (Cognition) verbal cues/prompting required; repetition of directions required  -     Row Name 21 1206          Transfers    [place] Level (Toilet Transfer) standby assist; verbal cues  -     Assistive Device (Toilet Transfer) grab bars/safety frame  -     Row Name 21 1206          Motor Skills    Motor Control/Coordination Interventions therapeutic exercise/ROM; gross motor coordination activities; fine motor manipulation/dexterity activities  BUE ther ex/act, bilat coord ex, GMC/FMC  -     Row Name 21 1206          Bathing    Comment (Bathing) [place]  -     Row Name 21 1206          Upper Body Dressing     Comment (Upper Body Dressing) Set up  -     Row Name 12/09/21 1206          Lower Body Dressing    Assistive Device Use (Lower Body Dressing) reacher; sock-aid  -     Comment (Lower Body Dressing) ModA  -     Row Name 12/09/21 1206          Grooming    [place] Level (Grooming) set up  -     Row Name 12/09/21 1206          Toileting    [place] Level (Toileting) minimum assist (75% patient effort); verbal cues  -     Assistive Device Use (Toileting) grab bar/safety frame  -     Row Name 12/09/21 1206          Self-Feeding    [place] Level (Self-Feeding) modified independence  -     Row Name 12/09/21 1206          Positioning and Restraints    Post Treatment Position wheelchair  -     In Wheelchair sitting; call light within reach; encouraged to call for assist; with family/caregiver  -     Row Name 12/09/21 1206          Vital Signs    Intra SpO2 (%) 98  -     O2 Delivery Intra Treatment supplemental O2  -           User Key  (r) = Recorded By, (t) = Taken By, (c) = Cosigned By    Initials Name Effective Dates     Itzel Sears, OT 06/16/21 -                  Occupational Therapy Education                 Title: PT OT SLP Therapies (Done)     Topic: Occupational Therapy (Done)     Point: ADL training (Done)     Description:   Instruct learner(s) on proper safety adaptation and remediation techniques during self care or transfers.   Instruct in proper use of assistive devices.              Learning Progress Summary           Patient Acceptance, E,D, VU,NR by  at 12/9/2021 1450   Family Acceptance, E,D, VU by  at 12/9/2021 1451      Show all documentation for this point (11)                 Point: Precautions (Done)     Description:   Instruct learner(s) on prescribed precautions during self-care and functional transfers.              Learning Progress Summary           Patient Acceptance, E,D, VU,NR by  at 12/9/2021 1450   Family Acceptance, E,D, VU by  at 12/9/2021  1454      Show all documentation for this point (11)                             User Key     Initials Effective Dates Name Provider Type [facility] 06/16/21 -  Itzel Sears OT Occupational Therapist OT                    OT Recommendation and Plan    Planned Therapy Interventions (OT): activity tolerance training, adaptive equipment training, BADL retraining, patient/caregiver education/training, ROM/therapeutic exercise, strengthening exercise, occupation/activity based interventions, transfer/mobility retraining                    Time Calculation:      Time Calculation- OT     Row Name 12/09/21 1218             Time Calculation- OT    OT Start Time 0800  -      OT Stop Time 0940  -      OT Time Calculation (min) 100 min  -      Total Timed Code Minutes-  minute(s)  -            User Key  (r) = Recorded By, (t) = Taken By, (c) = Cosigned By    Initials Name Provider Type     Itzel Sears OT Occupational Therapist              Therapy Charges for Today     Code Description Service Date Service Provider Modifiers Qty    65278363827 HC OT SELF CARE/MGMT/TRAIN EA 15 MIN 12/8/2021 Itzel Sears OT GO 2    72315758386 HC OT THERAPEUTIC ACT EA 15 MIN 12/8/2021 Itzel Sears OT GO 3    62140885393 HC OT THER PROC EA 15 MIN 12/8/2021 Itzel Sears OT GO 2    98529147491 HC OT SELF CARE/MGMT/TRAIN EA 15 MIN 12/9/2021 Itzel Sears OT GO 4    82095380515 HC OT THERAPEUTIC ACT EA 15 MIN 12/9/2021 Itzel Sears OT GO 3                   Itzel Sears OT  12/9/2021

## 2021-12-09 NOTE — SIGNIFICANT NOTE
12/09/21 8886   Plan   Plan MD completed [place] paperwork for daughter Antonina and he contacted her about paperwork being ready for pick-up; she or her sister will pick-up paperwork this pm.

## 2021-12-09 NOTE — PROGRESS NOTES
PROGRESS NOTE         Patient Identification:  Name:  Albina Finley  Age:  76 y.o.  Sex:  female  :  1945  MRN:  [id number]  Visit Number:  [id number]  Primary Care Provider:  Micah Núñez PA         LOS: 13 days       ----------------------------------------------------------------------------------------------------------------------  Subjective       Chief Complaints:    Left hip fracture status post ORIF  UTI  OPHELIA on CKD stage III      Interval History:     76-year-old female with recent left hip fracture with ORIF, chronic kidney disease stage III, chronic hypoxic respiratory failure mostly at night, hypertension, hyperlipidemia, CAD, CHF preserved EF, severe pulmonary hypertension, diabetes mellitus and COPD.    Participated with physical and occupational therapy on 2021: Performs bed mobility activities with supervision and bedrail; performs transfer activities with supervision; performed chair to bed transfer with wheelchair; perform sit to stand transfer with front wheeled walker; ambulated 120 feet, 80 feet, 100 feet, and 60 feet with front wheeled walker and standby assist; required minimal to moderate assist with verbal/nonverbal cues for lower body dressing; set up assist for grooming activities    Objective       Current Hospital Meds:  calcium carbonate (oyster shell), 500 mg, Oral, Daily  carvedilol, 6.25 mg, Oral, BID With Meals  cetirizine, 5 mg, Oral, Daily  docusate sodium, 100 mg, Oral, BID  enoxaparin, 40 mg, Subcutaneous, Q24H  ferrous sulfate, 325 mg, Oral, Q PM  Hydrocortisone (Perianal), , Rectal, BID  insulin aspart, 0-14 Units, Subcutaneous, TID AC  insulin detemir, 27 Units, Subcutaneous, Nightly  pantoprazole, 40 mg, Oral, BID AC  polyethylene glycol, 17 g, Oral, Daily  rosuvastatin, 10 mg, Oral, Nightly  IVPB builder, , Intravenous, Nightly          ----------------------------------------------------------------------------------------------------------------------    Vital Signs:  Temp:  [97.8 °F (36.6 °C)-98.2 °F (36.8 °C)] 98.2 °F (36.8 °C)  Heart Rate:  [70-72] 70  Resp:  [18-20] 20  BP: (151-162)/(68-78) 162/78  No data found.  SpO2 Percentage    12/07/21 1624 12/07/21 1903 12/08/21 1905   SpO2: 94% 96% 95%     SpO2:  [95 %] 95 %  on  Flow (L/min):  [1.5] 1.5;   Device (Oxygen Therapy): nasal cannula    Body mass index is 29.59 kg/m².  Wt Readings from Last 3 Encounters:   11/26/21 73.4 kg (161 lb 13.1 oz)   11/26/21 73.4 kg (161 lb 14.4 oz)   06/17/20 72.6 kg (160 lb)        Intake/Output Summary (Last 24 hours) at 12/9/2021 1052  Last data filed at 12/9/2021 0830  Gross per 24 hour   Intake 1200 ml   Output --   Net 1200 ml     Diet Soft Texture; Whole [employer]; Thin  ----------------------------------------------------------------------------------------------------------------------      Physical Exam:    General Appearance: alert, pleasant, no complaints this morning. Daughter at bedside     Head: normocephalic, without obvious abnormality and atraumatic     Lungs: clear to auscultation, respirations regular, respirations even and  respirations unlabored. No accessory muscle use.      Heart:: regular rhythm & normal rate, normal S1, S2, no murmur, no gallop, no  rub and no click.  Chest wall with no abnormalities observed. PMI nondisplaced     Abdomen: normal bowel sounds in all quadrants, soft non-tender, no guarding and no rebound tenderness     Extremities: no edema, no cyanosis, no redness, no tenderness, no clubbing     Musculoskeletal: joints with no effusion nor erythema nor warmth.  Pedal pulses palpable and equal bilaterally     Skin: no bleeding, bruising or rash and no lesions noted     Neurologic:               Mental Status: Patient is awake alert and mildly confused              Sensory: Sensory overall seems to be intact in BLE and  EMILEE.              Motor strength: Generalized weakness        ----------------------------------------------------------------------------------------------------------------------            Results from last 7 days   Lab Units 12/09/21 0640 12/06/21 0121 12/03/21 0133   WBC 10*3/mm3 7.60 7.88 8.70   HEMOGLOBIN g/dL 9.2* 7.9* 8.1*   HEMATOCRIT % 31.2* 27.1* 27.8*   MCV fL 87.9 87.7 87.7   MCHC g/dL 29.5* 29.2* 29.1*   PLATELETS 10*3/mm3 331 411 505*     Results from last 7 days   Lab Units 12/09/21 0640 12/06/21 0121 12/03/21 0133   SODIUM mmol/L 134* 135* 134*   POTASSIUM mmol/L 5.4* 5.0 5.2   MAGNESIUM mg/dL 1.6  --   --    CHLORIDE mmol/L 104 103 101   CO2 mmol/L 19.8* 25.0 25.1   BUN mg/dL 24* 26* 27*   CREATININE mg/dL 1.13* 1.11* 1.17*   EGFR IF NONAFRICN AM mL/min/1.73 47* 48* 45*   CALCIUM mg/dL 9.1 8.8 8.5*   GLUCOSE mg/dL 185* 112* 228*   ALBUMIN g/dL 3.27*  --   --    BILIRUBIN mg/dL 0.2  --   --    ALK PHOS U/L 169*  --   --    AST (SGOT) U/L 16  --   --    ALT (SGPT) U/L 13  --   --    Estimated Creatinine Clearance: 39.1 mL/min (A) (by C-G formula based on SCr of 1.13 mg/dL (H)).  No results found for: AMMONIA    Glucose   Date/Time Value Ref Range Status   12/09/2021 0611 174 (H) 70 - 130 mg/dL Final     Comment:     Meter: [id number] : [id number] Ciera Sauceda   12/08/2021 1946 190 (H) 70 - 130 mg/dL Final     Comment:     Meter: [id number] : [id number] Ed Crystal   12/08/2021 1633 179 (H) 70 - 130 mg/dL Final     Comment:     Meter: [id number] : [id number] Jay Doris   12/08/2021 1109 235 (H) 70 - 130 mg/dL Final     Comment:     Meter: [id number] : [id number] Jay Doris   12/08/2021 0623 175 (H) 70 - 130 mg/dL Final     Comment:     Meter: [id number] : [id number] Ed Crystal   12/07/2021 2009 287 (H) 70 - 130 mg/dL Final     Comment:     Meter: [id number] : [id number] Ed Crystal   12/07/2021 1618 188 (H) 70 - 130 mg/dL Final     Comment:     Meter:  [id number] : [id number] Qasim Bennett   12/07/2021 1115 316 (H) 70 - 130 mg/dL Final     Comment:     Meter: [id number] : [id number] Kiesha Miner     Lab Results   Component Value Date    HGBA1C 9.20 (H) 11/13/2021     Lab Results   Component Value Date    TSH 1.840 02/04/2021    FREET4 1.38 02/04/2021       No results found for: BLOODCX  No results found for: URINECX  No results found for: WOUNDCX  No results found for: STOOLCX  No results found for: RESPCX  Pain Management Panel     Pain Management Panel Latest Ref Rng & Units 11/14/2021 3/1/2021    CREATININE UR mg/dL 115.1 28.7            ----------------------------------------------------------------------------------------------------------------------  Imaging Results (Last 24 Hours)     ** No results found for the last 24 hours. **          ----------------------------------------------------------------------------------------------------------------------    Assessment/Plan       Assessment/Plan     ASSESSMENT:    Status post left hip fracture with ORIF  Diabetes mellitus  Chronic hypoxemia  CKD stage III  Hypertension  Hyperlipidemia  CHF with preserved EF  Anemia    PLAN:    Status post left hip fracture with ORIF--Participated with physical and occupational therapy on 12/8/2021: Performs bed mobility activities with supervision and bedrail; performs transfer activities with supervision; performed chair to bed transfer with wheelchair; perform sit to stand transfer with front wheeled walker; ambulated 120 feet, 80 feet, 100 feet, and 60 feet with front wheeled walker and standby assist; required minimal to moderate assist with verbal/nonverbal cues for lower body dressing; set up assist for grooming activities    Staples removed 12/7/21    Diabetes mellitus-- continue insulin protocol.     Chronic hypoxemia-- stable continue O2 at 2 L per nasal cannula     CKD stage III--has been stable.     Hypertension-- reasonably well  controlled     Hyperlipidemia-- continue statin therapy     CHF with preserved EF--compensated for now.  Monitor closely     Anemia--stable    Hyperkalemia, will give Lokelma and recheck in am        Code Status:   Code Status and Medical Interventions:   Ordered at: 11/26/21 4371     Code Status (Patient has no pulse and is not breathing):    CPR (Attempt to Resuscitate)     Medical Interventions (Patient has pulse or is breathing):    Full Support     Traci Villeda MD  12/09/21  12:23 EST

## 2021-12-09 NOTE — DISCHARGE INSTR - OTHER ORDERS
[facility] [phone] for PT/OT 2-3 x wk x 8 wks and nursing.      [facility] [phone] for rolling walker.   Statement Selected Statement Selected Statement Selected Statement Selected Statement Selected Statement Selected

## 2021-12-09 NOTE — PLAN OF CARE
Goal Outcome Evaluation:  Plan of Care Reviewed With: patient        Progress: improving  Outcome Summary: CONTINUE POC

## 2021-12-09 NOTE — THERAPY TREATMENT NOTE
Inpatient Rehabilitation - Physical Therapy Treatment Note       EDWIGE Villarreal     Patient Name: Albina Finley  : 1945  MRN: [id number]    Today's Date: 2021                    Admit Date: 2021      Visit Dx:   No diagnosis found.    Patient Active Problem List   Diagnosis   • Iron deficiency anemia   • COPD with acute exacerbation ([facility])   • Chronic diastolic heart failure ([facility])   • CKD (chronic kidney disease) stage 3, GFR 30-59 ml/min ([facility])   • Essential hypertension   • Hyperlipidemia   • Type II diabetes mellitus ([facility])   • Arthritis   • Osteoporosis   • Grade II diastolic dysfunction   • Duodenitis   • Peptic ulcer disease with hemorrhage   • Non-ST elevation myocardial infarction (NSTEMI) ([facility])   • Abnormal nuclear stress test with mild degree of small size apical lateral wall myocardial ischemia in 2019.   • Anemia due to GI blood loss   • Hip fracture ([facility])   • S/p left hip fracture       Past Medical History:   Diagnosis Date   • Abnormal nuclear stress test with mild degree of small size apical lateral wall myocardial ischemia in 2019. 2020   • Acute on chronic diastolic CHF (congestive heart failure) ([facility]) 2019   • Anal polyp 2018    Added automatically from request for surgery [id number]   • Arthritis    • Chronic kidney disease    • COPD (chronic obstructive pulmonary disease) ([facility])    • Diabetes mellitus ([facility])    • Elevated cholesterol    • Essential hypertension 2019   • History of transfusion    • Incidental lung nodule, greater than or equal to 8mm     Left lower lobe, 15 mm based on  and 2019 chest CT scans with no growth in the size   • Iron deficiency anemia 2017   • Osteoporosis    • Peptic ulcer disease with hemorrhage 2020   • Pneumonia of right middle lobe due to infectious organism 2019       Past Surgical History:   Procedure Laterality Date   • ANUS SURGERY N/A 2018    Procedure: Diagnostic anoscopy with anal  polyp excision;  Surgeon: Marlyn Gutierrez MD;  Location: [facility] OR;  Service: General   •  SECTION     • ENDOSCOPY N/A 2020    Procedure: ESOPHAGOGASTRODUODENOSCOPY;  Surgeon: Marlon Wray MD;  Location: [facility] OR;  Service: Gastroenterology;  Laterality: N/A;   • FEMUR OPEN REDUCTION INTERNAL FIXATION Left 2021    Procedure: Left hip hook plate operative fixation;  Surgeon: Lupillo Cornejo MD;  Location: [facility] OR;  Service: Orthopedics;  Laterality: Left;   • HIP BIPOLAR REPLACEMENT      left Dr. Cornejo    • HYSTERECTOMY     • JOINT REPLACEMENT         PT ASSESSMENT (last 12 hours)     IRF PT Evaluation and Treatment     Row Name 21          PT Time and Intention    Document Type daily treatment; progress note  -RF     Mode of Treatment physical therapy  -RF     Patient/Family/Caregiver Comments/Observations No significant c/o noted this date. Family teaching performed this date with pt and daughter; education provided on techniques for home safety, functional mobility, ambualtion, and strengthening TE. All parties verbalized understanding to all education.  -RF     Row Name 21 161          General Information    Patient Profile Reviewed yes  -RF     Row Name 21          Cognition/Psychosocial    Affect/Mental Status (Cognitive) WFL  -RF     Orientation Status (Cognition) oriented to; person; place  -RF     Follows Commands (Cognition) WFL; follows one-step commands; repetition of directions required  -RF     Row Name 21          Mobility    Extremity Weight-bearing Status left lower extremity  -RF     Left Lower Extremity (Weight-bearing Status) weight-bearing as tolerated (WBAT); other (see comments)  per chart review, physician orders  -RF     Row Name 21          Transfer Assessment/Treatment    Transfers sit-stand transfer; stand-sit transfer; car transfer; chair-bed transfer  -     Row Name 21 161          Transfers     "Bed-Chair [place] (Transfers) supervision  -RF     Chair-Bed [place] (Transfers) supervision  -RF     Assistive Device (Bed-Chair Transfers) wheelchair  -RF     Sit-Stand [place] (Transfers) supervision  -RF     Stand-Sit [place] (Transfers) supervision  -RF     [place] Level (Toilet Transfer) standby assist  -RF     Assistive Device (Toilet Transfer) wheelchair; grab bars/safety frame  -RF     Row Name 12/09/21 1614          Chair-Bed Transfer    Assistive Device (Chair-Bed Transfers) wheelchair  -RF     Row Name 12/09/21 1614          Sit-Stand Transfer    Assistive Device (Sit-Stand Transfers) walker, front-wheeled  -RF     Row Name 12/09/21 1614          Stand-Sit Transfer    Assistive Device (Stand-Sit Transfers) walker, front-wheeled  -RF     Row Name 12/09/21 1614          Car Transfer    Type (Car Transfer) stand pivot/stand step  to elevated height- 6\" step used  -RF     [place] Level (Car Transfer) moderate assist (50% patient effort)  -RF     Assistive Device (Car Transfer) wheelchair  -RF     Row Name 12/09/21 1614          Gait/Stairs (Locomotion)    Gait/Stairs Locomotion gait/ambulation assistive device  -RF     [place] Level (Gait) standby assist  -RF     Assistive Device (Gait) walker, front-wheeled  -RF     Distance in Feet (Gait) 160, 20, 60 in AM, 100 in PM  -RF     Pattern (Gait) step-to  -RF     Deviations/Abnormal Patterns (Gait) antalgic  -RF     Bilateral Gait Deviations forward flexed posture  -RF     Row Name 12/09/21 1614          Hip (Therapeutic Exercise)    Hip Strengthening (Therapeutic Exercise) bilateral; flexion; extension; aBduction; aDduction; marching while seated; sitting; 2 lb free weight; resistance band; blue; 2 sets; 10 repetitions; other (see comments)  #2 on RLE  -RF     Row Name 12/09/21 1614          Knee (Therapeutic Exercise)    Knee Strengthening (Therapeutic Exercise) bilateral; flexion; extension; marching while seated; LAQ (long " arc quad); hamstring curls; sitting; 2 lb free weight; resistance band; blue; 2 sets; 10 repetitions; other (see comments)  #2 on RLE  -RF     Row Name 12/09/21 1614          Ankle (Therapeutic Exercise)    Ankle Strengthening (Therapeutic Exercise) bilateral; dorsiflexion; plantarflexion; sitting; 2 lb free weight; 2 sets; 10 repetitions  -RF     Row Name 12/09/21 1614          IRF PT Goals    Transfer Goal Selection (PT-IRF) transfers, PT goal 1  -RF     Gait (Walking Locomotion) Goal Selection (PT-IRF) gait, PT goal 1  -RF     Row Name 12/09/21 1614          Transfer Goal 1 (PT-IRF)    Activity/Assistive Device (Transfer Goal 1, PT-IRF) all transfers  -RF     [place] Level (Transfer Goal 1, PT-IRF) standby assist  -RF     Time Frame (Transfer Goal 1, PT-IRF) 2 weeks  -RF     Progress/Outcomes (Transfer Goal 1, PT-IRF) goal met  -RF     Row Name 12/09/21 1614          Gait/Walking Locomotion Goal 1 (PT-IRF)    Activity/Assistive Device (Gait/Walking Locomotion Goal 1, PT-IRF) gait (walking locomotion); assistive device use  -RF     Gait/Walking Locomotion Distance Goal 1 (PT-IRF) 100  -RF     [place] Level (Gait/Walking Locomotion Goal 1, PT-IRF) contact guard assist  -RF     Time Frame (Gait/Walking Locomotion Goal 1, PT-IRF) 2 weeks  -RF     Progress/Outcomes (Gait/Walking Locomotion Goal 1, PT-IRF) goal ongoing  -RF     Row Name 12/09/21 1614          Positioning and Restraints    Pre-Treatment Position sitting in chair/recliner  BID  -RF     In Wheelchair sitting; call light within reach; encouraged to call for assist  BID  -RF     Row Name 12/09/21 1614          Therapy Assessment/Plan (PT)    Rehab Potential/Prognosis (PT) good, to achieve stated therapy goals  -RF     Frequency of Treatment (PT) 5 times per week  -RF     Estimated Duration of Therapy (PT) 2 weeks  -RF     Row Name 12/09/21 1614          Daily Progress Summary (PT)    Functional Goal Overall Progress (PT) progressing toward  functional goals as expected  -RF     Daily Progress Summary (PT) Good functional mobility and ambulation quality noted this date. Pt appropriate for D/C home in AM with assist and home health services.  -RF     Impairments Still Limiting Function (PT) strength decreased; impaired functional activity tolerance; pain  -RF     Row Name 12/09/21 1614          Therapy Plan Review/Discharge Plan (PT)    Anticipated Equipment Needs at Discharge (PT Eval) other (see comments)  TBD  -RF     Expected Discharge Disposition (PT Eval) home; home with caregiver  -RF           User Key  (r) = Recorded By, (t) = Taken By, (c) = Cosigned By    Initials Name Provider Type    RF Ayla Gonzalez PTA Physical Therapy Assistant              Wound 11/19/21 0948 Left lateral hip Incision (Active)   Dressing Appearance open to air 12/09/21 0720   Periwound intact 12/08/21 2115   Periwound Temperature warm 12/08/21 2115   Periwound Skin Turgor firm 12/08/21 2115     Physical Therapy Education                 Title: PT OT SLP Therapies (Done)     Topic: Physical Therapy (Done)     Point: Mobility training (Done)     Learning Progress Summary           Patient Acceptance, E,TB, VU by RF at 12/9/2021 1619      Show all documentation for this point (9)                 Point: Home exercise program (Done)     Learning Progress Summary           Patient Acceptance, E,TB, VU by RF at 12/9/2021 1619      Show all documentation for this point (9)                 Point: Body mechanics (Done)     Learning Progress Summary           Patient Acceptance, E,TB, VU by RF at 12/9/2021 1619      Show all documentation for this point (9)                 Point: Precautions (Done)     Learning Progress Summary           Patient Acceptance, E,TB, VU by RF at 12/9/2021 1619      Show all documentation for this point (9)                             User Key     Initials Effective Dates Name Provider Type Discipline    RF 06/16/21 -  Ayla Gonzalez PTA  Physical Therapy Assistant PT                PT Recommendation and Plan    Frequency of Treatment (PT): 5 times per week  Anticipated Equipment Needs at Discharge (PT Eval): other (see comments) (TBD)  Daily Progress Summary (PT)  Functional Goal Overall Progress (PT): progressing toward functional goals as expected  Daily Progress Summary (PT): Good functional mobility and ambulation quality noted this date. Pt appropriate for D/C home in AM with assist and home health services.  Impairments Still Limiting Function (PT): strength decreased, impaired functional activity tolerance, pain  Recommendations (PT): Continue per current POC               Time Calculation:      PT Charges     Row Name 12/09/21 1620 12/09/21 1619          Time Calculation    Start Time 1245  -RF 0915  -RF     Stop Time 1330  -RF 1000  -RF     Time Calculation (min) 45 min  -RF 45 min  -RF     PT Received On 12/09/21  -RF 12/09/21  -RF     PT - Next Appointment 12/09/21  -RF 12/09/21  -RF     PT Goal Re-Cert Due Date 12/10/21  -RF 12/10/21  -RF            Time Calculation- PT    Total Timed Code Minutes- PT 45 minute(s)  -RF 45 minute(s)  -RF           User Key  (r) = Recorded By, (t) = Taken By, (c) = Cosigned By    Initials Name Provider Type    RF Ayla Gonzalez PTA Physical Therapy Assistant                Therapy Charges for Today     Code Description Service Date Service Provider Modifiers Qty    17622295353 HC GAIT TRAINING EA 15 MIN 12/8/2021 Ayla Gonzalez, PTA GP 2    05360196736 HC PT NEUROMUSC RE EDUCATION EA 15 MIN 12/8/2021 Ayla Gonzalez, PTA GP 1    21405209803 HC PT THER PROC EA 15 MIN 12/8/2021 Ayla Gonzalez, PTA GP 2    9941944 HC PT THERAPEUTIC ACT EA 15 MIN 12/8/2021 Ayla Gonzalez, PTA GP 1    26813753773 HC GAIT TRAINING EA 15 MIN 12/9/2021 Ayla Gonzalez, PTA GP 2    38957024569 HC PT THER PROC EA 15 MIN 12/9/2021 Ayla Gonzalez, PTA GP 1    41208457181 HC PT THERAPEUTIC ACT EA 15 MIN  12/9/2021 Ayla Gonzalez, PTA GP 3                   Ayla Gonzalez, PTA  12/9/2021

## 2021-12-10 VITALS
SYSTOLIC BLOOD PRESSURE: 164 MMHG | OXYGEN SATURATION: 97 % | RESPIRATION RATE: 20 BRPM | WEIGHT: 161.82 LBS | BODY MASS INDEX: 29.78 KG/M2 | HEIGHT: 62 IN | HEART RATE: 68 BPM | TEMPERATURE: 98.8 F | DIASTOLIC BLOOD PRESSURE: 58 MMHG

## 2021-12-10 LAB
ANION GAP SERPL CALCULATED.3IONS-SCNC: 8.1 MMOL/L (ref 5–15)
BUN SERPL-MCNC: 22 MG/DL (ref 8–23)
BUN/CREAT SERPL: 18.8 (ref 7–25)
CALCIUM SPEC-SCNC: 8.8 MG/DL (ref 8.6–10.5)
CHLORIDE SERPL-SCNC: 107 MMOL/L (ref 98–107)
CO2 SERPL-SCNC: 22.9 MMOL/L (ref 22–29)
CREAT SERPL-MCNC: 1.17 MG/DL (ref 0.57–1)
GFR SERPL CREATININE-BSD FRML MDRD: 45 ML/MIN/1.73
GLUCOSE BLDC GLUCOMTR-MCNC: 169 MG/DL (ref 70–130)
GLUCOSE BLDC GLUCOMTR-MCNC: 169 MG/DL (ref 70–130)
GLUCOSE SERPL-MCNC: 195 MG/DL (ref 65–99)
POTASSIUM SERPL-SCNC: 4.8 MMOL/L (ref 3.5–5.2)
SODIUM SERPL-SCNC: 138 MMOL/L (ref 136–145)

## 2021-12-10 PROCEDURE — 80048 BASIC METABOLIC PNL TOTAL CA: CPT | Performed by: INTERNAL MEDICINE

## 2021-12-10 PROCEDURE — 97530 THERAPEUTIC ACTIVITIES: CPT

## 2021-12-10 PROCEDURE — 63710000001 INSULIN ASPART PER 5 UNITS: Performed by: INTERNAL MEDICINE

## 2021-12-10 PROCEDURE — 97535 SELF CARE MNGMENT TRAINING: CPT

## 2021-12-10 PROCEDURE — 82962 GLUCOSE BLOOD TEST: CPT

## 2021-12-10 PROCEDURE — 25010000002 ENOXAPARIN PER 10 MG: Performed by: INTERNAL MEDICINE

## 2021-12-10 RX ORDER — POLYETHYLENE GLYCOL 3350 17 G/17G
17 POWDER, FOR SOLUTION ORAL DAILY
Qty: 10 PACKET | Refills: 0 | Status: SHIPPED | OUTPATIENT
Start: 2021-12-11 | End: 2021-12-21

## 2021-12-10 RX ORDER — OXYCODONE HYDROCHLORIDE AND ACETAMINOPHEN 5; 325 MG/1; MG/1
1 TABLET ORAL EVERY 6 HOURS PRN
Qty: 12 TABLET | Refills: 0 | Status: SHIPPED | OUTPATIENT
Start: 2021-12-10 | End: 2021-12-16

## 2021-12-10 RX ORDER — ROSUVASTATIN CALCIUM 10 MG/1
10 TABLET, COATED ORAL NIGHTLY
Qty: 30 TABLET | Refills: 0 | Status: SHIPPED | OUTPATIENT
Start: 2021-12-10 | End: 2021-12-27

## 2021-12-10 RX ORDER — CETIRIZINE HYDROCHLORIDE 5 MG/1
5 TABLET ORAL DAILY
Qty: 10 TABLET | Refills: 0 | Status: SHIPPED | OUTPATIENT
Start: 2021-12-11 | End: 2022-01-10

## 2021-12-10 RX ORDER — PSEUDOEPHEDRINE HCL 30 MG
100 TABLET ORAL 2 TIMES DAILY
Qty: 40 CAPSULE | Refills: 0 | Status: SHIPPED | OUTPATIENT
Start: 2021-12-10 | End: 2021-12-27

## 2021-12-10 RX ORDER — CARVEDILOL 6.25 MG/1
6.25 TABLET ORAL 2 TIMES DAILY WITH MEALS
Qty: 30 TABLET | Refills: 0 | Status: SHIPPED | OUTPATIENT
Start: 2021-12-10 | End: 2022-01-10

## 2021-12-10 RX ADMIN — PANTOPRAZOLE SODIUM 40 MG: 40 TABLET, DELAYED RELEASE ORAL at 09:08

## 2021-12-10 RX ADMIN — CARVEDILOL 6.25 MG: 6.25 TABLET, FILM COATED ORAL at 09:08

## 2021-12-10 RX ADMIN — CETIRIZINE HYDROCHLORIDE 5 MG: 10 TABLET, FILM COATED ORAL at 09:08

## 2021-12-10 RX ADMIN — INSULIN ASPART 3 UNITS: 100 INJECTION, SOLUTION INTRAVENOUS; SUBCUTANEOUS at 11:39

## 2021-12-10 RX ADMIN — ENOXAPARIN SODIUM 40 MG: 40 INJECTION SUBCUTANEOUS at 12:34

## 2021-12-10 RX ADMIN — Medication 500 MG: at 09:09

## 2021-12-10 RX ADMIN — DOCUSATE SODIUM 100 MG: 100 CAPSULE ORAL at 09:08

## 2021-12-10 RX ADMIN — POLYETHYLENE GLYCOL 3350 17 G: 17 POWDER, FOR SOLUTION ORAL at 09:10

## 2021-12-10 RX ADMIN — INSULIN ASPART 3 UNITS: 100 INJECTION, SOLUTION INTRAVENOUS; SUBCUTANEOUS at 09:09

## 2021-12-10 RX ADMIN — HYDROCORTISONE 2.5%: 25 CREAM TOPICAL at 09:09

## 2021-12-10 NOTE — PROGRESS NOTES
Patient Assessment Instrument  Quality Indicators - Discharge    Section GG. Self-Care Performance     Eating: Patient completed the activities by him/herself with no assistance from  a helper.   Oral Hygiene: [place] sets up or cleans up; patient completes activity. [place]  assists only prior to or following the activity.   Toileting Hygiene: : [place] does less than half the effort. [place] lifts, holds  or supports trunk or limbs but provides less than half the effort.   Shower/Bathe Self: [place] does less than half the effort. [place] lifts, holds  or supports trunk or limbs but provides less than half the effort.   Upper Body Dressing: [place] sets up or cleans up; patient completes activity.  [place] assists only prior to or following the activity.   Lower Body Dressing: [place] provides verbal cues and/or touching/steadying  and/or contact guard assistance as patient completes activity.   Putting On/Taking Off Footwear: [place] does less than half the effort. [place]  lifts, holds or supports trunk or limbs but provides less than half the effort.    Section GG. Mobility Performance      Section J. Health Conditions Discharge      Section M. Skin Conditions Discharge      . Current Number of Unhealed Pressure Ulcers      Section N. Medication    Signed by: Itzel Sears, Occupational Therapist

## 2021-12-10 NOTE — THERAPY DISCHARGE NOTE
Inpatient Rehabilitation - [facility] Occupational Therapy  /Discharge   Phil     Patient Name: Albina Finley  : 1945  MRN: [id number]  Today's Date: 12/10/2021               Admit Date: 2021       ICD-10-CM ICD-9-CM   1. Closed fracture of left hip, initial encounter ([facility])  S72.002A 820.8   2. Anemia due to GI blood loss  D50.0 280.0   3. Peptic ulcer disease with hemorrhage  K27.4 533.40   4. Essential hypertension  I10 401.9   5. Chronic diastolic heart failure ([facility])  I50.32 428.32   6. COPD with acute exacerbation ([facility])  J44.1 491.21   7. Iron deficiency anemia, unspecified iron deficiency anemia type  D50.9 280.9     Patient Active Problem List   Diagnosis   • Iron deficiency anemia   • COPD with acute exacerbation ([facility])   • Chronic diastolic heart failure ([facility])   • CKD (chronic kidney disease) stage 3, GFR 30-59 ml/min ([facility])   • Essential hypertension   • Hyperlipidemia   • Type II diabetes mellitus ([facility])   • Arthritis   • Osteoporosis   • Grade II diastolic dysfunction   • Duodenitis   • Peptic ulcer disease with hemorrhage   • Non-ST elevation myocardial infarction (NSTEMI) ([facility])   • Abnormal nuclear stress test with mild degree of small size apical lateral wall myocardial ischemia in 2019.   • Anemia due to GI blood loss   • Hip fracture ([facility])   • S/p left hip fracture     Past Medical History:   Diagnosis Date   • Abnormal nuclear stress test with mild degree of small size apical lateral wall myocardial ischemia in 2019. 2020   • Acute on chronic diastolic CHF (congestive heart failure) ([facility]) 2019   • Anal polyp 2018    Added automatically from request for surgery [id number]   • Arthritis    • Chronic kidney disease    • COPD (chronic obstructive pulmonary disease) ([facility])    • Diabetes mellitus ([facility])    • Elevated cholesterol    • Essential hypertension 2019   • History of transfusion    • Incidental lung nodule, greater than or equal to 8mm     Left lower lobe,  15 mm based on  and 2019 chest CT scans with no growth in the size   • Iron deficiency anemia 2017   • Osteoporosis    • Peptic ulcer disease with hemorrhage 2020   • Pneumonia of right middle lobe due to infectious organism 2019     Past Surgical History:   Procedure Laterality Date   • ANUS SURGERY N/A 2018    Procedure: Diagnostic anoscopy with anal polyp excision;  Surgeon: Marlyn Gutierrez MD;  Location: [facility] OR;  Service: General   •  SECTION     • ENDOSCOPY N/A 2020    Procedure: ESOPHAGOGASTRODUODENOSCOPY;  Surgeon: Marlon Wray MD;  Location: [facility] OR;  Service: Gastroenterology;  Laterality: N/A;   • FEMUR OPEN REDUCTION INTERNAL FIXATION Left 2021    Procedure: Left hip hook plate operative fixation;  Surgeon: Lupillo Cornejo MD;  Location: [facility] OR;  Service: Orthopedics;  Laterality: Left;   • HIP BIPOLAR REPLACEMENT      left Dr. Cornejo    • HYSTERECTOMY     • JOINT REPLACEMENT         IRF OT ASSESSMENT FLOWSHEET (last 12 hours)     IRF OT Evaluation and Treatment     Row Name 12/10/21 1201          OT Time and Intention    Document Type discharge evaluation  -     Mode of Treatment occupational therapy  -     Row Name 12/10/21 1201          General Information    Existing Precautions/Restrictions fall; oxygen therapy device and L/min  -     Row Name 12/10/21 1201          Sensory    Hearing Status hearing aid, bilateral  not available  -     Row Name 12/10/21 1201          Vision Assessment/Intervention    Visual Impairment/Limitations corrective lenses for reading  -     Row Name 12/10/21 1201          Cognition/Psychosocial    Follows Commands (Cognition) verbal cues/prompting required; repetition of directions required  -     Personal Safety Interventions gait belt; nonskid shoes/slippers when out of bed; supervised activity  -     Cognitive Function (Cognitive) memory deficit; safety deficit  -     Row Name 12/10/21 1201           Range of Motion Comprehensive    General Range of Motion bilateral upper extremity ROM WFL  -     Row Name 12/10/21 1201          Strength Comprehensive (MMT)    Comment, General Manual Muscle Testing (MMT) Assessment BUE - 4 to 4+/5  -     Row Name 12/10/21 1201          Transfers    [place] Level (Toilet Transfer) standby assist; verbal cues  -     Assistive Device (Toilet Transfer) grab bars/safety frame; wheelchair  -     Row Name 12/10/21 1201          Bathing    Assistive Device (Bathing) long-handled sponge  -     Comment (Bathing) [place]  -     Row Name 12/10/21 1201          Upper Body Dressing    Comment (Upper Body Dressing) Set up  -     Row Name 12/10/21 1201          Lower Body Dressing    Assistive Device Use (Lower Body Dressing) reacher; sock-aid  -     Comment (Lower Body Dressing) ModA  -     Row Name 12/10/21 1201          Grooming    Comment (Grooming) Set up  -     Row Name 12/10/21 1201          Toileting    Assistive Device Use (Toileting) grab bar/safety frame  -     Comment (Toileting) [place]  -     Row Name 12/10/21 1201          Self-Feeding    [place] Level (Self-Feeding) modified independence  -     Row Name 12/10/21 1201          LB Dressing Goal 1 (OT-IRF)    Progress/Outcomes (LB Dressing Goal 1, OT-IRF) goal met  -     Row Name 12/10/21 1201          LB Dressing Goal 2 (OT-IRF)    Progress/Outcomes (LB Dressing Goal 2, OT-IRF) goal met  -     Row Name 12/10/21 1201          Toileting Goal 1 (OT-IRF)    Progress/Outcomes (Toileting Goal 1, OT-IRF) goal met  -     Row Name 12/10/21 1201          Toileting Goal 2 (OT-IRF)    Progress/Outcomes (Toileting Goal 2, OT-IRF) goal met  -     Row Name 12/10/21 1201          Positioning and Restraints    Post Treatment Position wheelchair  -     In Wheelchair sitting; call light within reach; encouraged to call for assist; with family/caregiver; notified nsg  -     Row Name 12/10/21 1201           Discharge Summary (OT)    Additional Documentation Discharge Summary (OT) (Group)  -     Row Name 12/10/21 1201          Discharge Summary (OT)    Discharge Summary Statement (OT) Education completed with pt and daughter re:  ADL status, safety, AE, DME, home program, 24 hr supervision, precautions, and D/C concerns.  Verbalized understanding.  -           User Key  (r) = Recorded By, (t) = Taken By, (c) = Cosigned By    Initials Name Effective Dates     Itzel Sears FIFI, OT 06/16/21 -               Wound 11/19/21 0948 Left lateral hip Incision (Active)   Dressing Appearance open to air 12/10/21 0730   Base clean 12/09/21 2000       Occupational Therapy Education                 Title: PT OT SLP Therapies (Done)     Topic: Occupational Therapy (Resolved)     Point: ADL training (Resolved)     Description:   Instruct learner(s) on proper safety adaptation and remediation techniques during self care or transfers.   Instruct in proper use of assistive devices.              Learning Progress Summary           Patient Acceptance, E,D,H, VU,DU by  at 12/10/2021 1210   Family Acceptance, E,D,H, VU,DU by  at 12/10/2021 1210      Show all documentation for this point (13)                 Point: Home exercise program (Resolved)     Description:   Instruct learner(s) on appropriate technique for monitoring, assisting and/or progressing therapeutic exercises/activities.              Learning Progress Summary           Patient Acceptance, E,D,H, VU,DU by  at 12/10/2021 1210   Family Acceptance, E,D,H, VU,DU by  at 12/10/2021 1210                   Point: Precautions (Resolved)     Description:   Instruct learner(s) on prescribed precautions during self-care and functional transfers.              Learning Progress Summary           Patient Acceptance, E,D,H, VU,DU by OH at 12/10/2021 1210   Family Acceptance, E,D,H, VU,DU by  at 12/10/2021 1210      Show all documentation for this point (13)                              User Key     Initials Effective Dates Name Provider Type Discipline     06/16/21 -  Itzel Sears OT Occupational Therapist OT                OT Recommendation and Plan  Anticipated Discharge Disposition (OT): home with 24/7 care, home with home health  Planned Therapy Interventions (OT): activity tolerance training, adaptive equipment training, BADL retraining, patient/caregiver education/training, ROM/therapeutic exercise, strengthening exercise, occupation/activity based interventions, transfer/mobility retraining           OT IRF GOALS     Row Name 12/10/21 1201 12/03/21 0910 12/03/21 0908       LB Dressing Goal 1 (OT-IRF)    [place] (LB Dressing Goal 1, OT-IRF) -- moderate assist (50-74% patient effort)  - maximum assist (25-49% patient effort)  -    Time Frame (LB Dressing Goal 1, OT-IRF) -- short-term goal (STG)  - --    Progress/Outcomes (LB Dressing Goal 1, OT-IRF) goal met  - -- goal met  -       LB Dressing Goal 2 (OT-IRF)    Progress/Outcomes (LB Dressing Goal 2, OT-IRF) goal met  - -- --       Toileting Goal 1 (OT-IRF)    [place] Level (Toileting Goal 1, OT-IRF) -- minimum assist (75% or more patient effort)  - maximum assist (25-49% patient effort)  -    Progress/Outcomes (Toileting Goal 1, OT-IRF) goal met  - -- goal met  -    Time Frame (Toileting Goal 1, OT-IRF) -- short-term goal (STG)  - --       Toileting Goal 2 (OT-IRF)    Progress/Outcomes (Toileting Goal 2, OT-IRF) goal met  - -- --    Row Name 11/27/21 1322             LB Dressing Goal 1 (OT-IRF)    [place] (LB Dressing Goal 1, OT-IRF) maximum assist (25-49% patient effort)  -      Time Frame (LB Dressing Goal 1, OT-IRF) short-term goal (STG)  -              LB Dressing Goal 2 (OT-IRF)    [place] (LB Dressing Goal 2, OT-IRF) moderate assist (50-74% patient effort)  -      Time Frame (LB Dressing Goal 2, OT-IRF) long-term goal (LTG); by discharge  -              Toileting Goal  1 (OT-IRF)    [place] Level (Toileting Goal 1, OT-IRF) maximum assist (25-49% patient effort)  -      Time Frame (Toileting Goal 1, OT-IRF) short-term goal (STG)  -              Toileting Goal 2 (OT-IRF)    [place] Level (Toileting Goal 2, OT-IRF) moderate assist (50-74% patient effort)  -      Time Frame (Toileting Goal 2, OT-IRF) long-term goal (LTG); by discharge  -            User Key  (r) = Recorded By, (t) = Taken By, (c) = Cosigned By    Initials Name Provider Type    Itzel Knowles OT Occupational Therapist                    Time Calculation:       Therapy Charges for Today     Code Description Service Date Service Provider Modifiers Qty    [id number]  OT SELF CARE/MGMT/TRAIN EA 15 MIN 12/9/2021 Itzel Sears OT GO 4    [id number]  OT THERAPEUTIC ACT EA 15 MIN 12/9/2021 Itzel Sears OT GO 3    [id number]  OT SELF CARE/MGMT/TRAIN EA 15 MIN 12/10/2021 Itzel Sears OT GO 1               OT Discharge Summary  Anticipated Discharge Disposition (OT): home with 24/7 care, home with home health  Reason for Discharge: Discharge from facility  Discharge Destination: Home with home health, Home with assist    Itzel Sears OT  12/10/2021

## 2021-12-10 NOTE — DISCHARGE SUMMARY
DISCHARGE SUMMARY        Patient Identification:  Name:  Albina Finley  Age:  76 y.o.  Sex:  female  :  1945  MRN:  [id number]  Visit Number:  [id number]    Date of Admission: 2021  Date of Discharge:  12/10/2021      PCP: Micah úNñez PA    Discharging Provider: KARON Rendon      Discharge Diagnoses     Acute, Closed, Nondisplaced, Non-comminuted, L Proximal Femur Fx in setting of L prosthesis now s/p ORIF  Nonoliguric OPHELIA on CKDIII c/b HyperK - OPHELIA and HyperK Resolved   Acute Hypoxic on Chronic Nocturnal Hypoxic Respiratory Failure - Acute Resolved  Acute Uncomplicated UTI - Recurrent, not POA  Acute Uncomplicated UTI - Resolved  HTN/HLD/Non-obstructive CAD  Chronic HFpEF  Hx abnormal stress test 2019  Severe Pulm HTN  IDDM Type II, controlled, unknown complications  COPD/Emphysema w/o Acute Exacerbation  Lung Nodule  Anemia w/ Hx AMPARO and Hx Vit B deficiency  Obesity by BMI  Former Smoker      Consults     Consults     Date and Time Order Name Status Description    2021  5:33 PM Inpatient Cardiology Consult Completed           History of Presenting Illness     Patient is 76-year-old lady who sustained a fall at home and was diagnosed with a proximal left femoral neck fracture as well as sepsis mild CHF and acute hypoxic respiratory failure.  Patient is usually on 2 L of nasal cannula at home and that is what she is on right now.     During her admission patient was treated for acute UTI with Rocephin and later deescalated to Omnicef p.o.     She was taken to the OR and underwent a left hip ORIF with nonoliguric OPHELIA on chronic kidney disease stage III along with hyperkalemia nephrology was consulted and recommended to hold diuretics and to hold fluids.  Since patient's creatinine has improved.     Patient continued to progress medically and physical therapy and Occupational Therapy evaluations were completed with recommended acute inpatient rehabilitation referral for  continued functional mobility intervention and reeducation.  Acute rehab referral ordered for continued medical monitoring and management post prolonged hospitalization, continued respiratory status monitoring, lab monitoring, pain management needs, bowel and bladder management with new medication reeducation, skin monitoring and breakdown prevention along with ongoing multiple comorbidities that require intervention for regulation.        Hospital Course     Participated with physical and occupational therapy on 12/9/2021: Performs transfer activities with supervision to standby assist with wheelchair/grab bars safety frame; utilizes wheelchair/front wheeled walker for chair to bed, sit to stand/stand to sit transfer; ambulated 160 feet, 20 feet, 60 feet and 100 feet in front wheeled walker standby assist; required minimal assist for bathing; set up assist for upper body dressing; moderate assist with reacher/sock aid for lower body dressing; set up assist for grooming; minimal assist with verbal cues and grab bar/safety frame for toileting activities; and modified independence for self-feeding    Staples removed 12/7/21     Diabetes mellitus-- continue insulin protocol.     Chronic hypoxemia-- stable continue O2 at 2 L per nasal cannula     CKD stage III--has been stable.     Hypertension-- reasonably well controlled     Hyperlipidemia-- continue statin therapy     CHF with preserved EF--compensated for now.  Monitor closely     Anemia--stable     Hyperkalemia, will give Lokelma     Discharge Vitals/Physical Examination     Vital Signs:  Temp:  [97.2 °F (36.2 °C)] 97.2 °F (36.2 °C)  Heart Rate:  [81] 81  Resp:  [20] 20  BP: (174)/(63) 174/63  No data found.  SpO2 Percentage    12/07/21 1903 12/08/21 1905 12/09/21 1904   SpO2: 96% 95% 97%     SpO2:  [97 %] 97 %  on  Flow (L/min):  [1.5] 1.5;   Device (Oxygen Therapy): nasal cannula    Body mass index is 29.59 kg/m².  Wt Readings from Last 3 Encounters:   11/26/21  73.4 kg (161 lb 13.1 oz)   11/26/21 73.4 kg (161 lb 14.4 oz)   06/17/20 72.6 kg (160 lb)         Physical Exam:    General Appearance: alert, pleasant, no complaints this morning. Excited to go home     Head: normocephalic, without obvious abnormality and atraumatic     Lungs: clear to auscultation, respirations regular, respirations even and  respirations unlabored. No accessory muscle use.      Heart:: regular rhythm & normal rate, normal S1, S2, no murmur, no gallop, no  rub and no click.  Chest wall with no abnormalities observed. PMI nondisplaced     Abdomen: normal bowel sounds in all quadrants, soft non-tender, no guarding and no rebound tenderness     Extremities: mild edema, no cyanosis, no redness, no tenderness, no clubbing     Musculoskeletal: joints with no effusion nor erythema nor warmth.  Pedal pulses palpable and equal bilaterally     Skin: no bleeding, bruising or rash and no lesions noted     Neurologic:               Mental Status: Patient is awake alert and mildly confused              Sensory: Sensory overall seems to be intact in BLE and BUE.              Motor strength: Generalized weakness    Pertinent Laboratory/Radiology Results     Pertinent Laboratory Results:              Results from last 7 days   Lab Units 12/09/21  0640 12/06/21  0121   WBC 10*3/mm3 7.60 7.88   HEMOGLOBIN g/dL 9.2* 7.9*   HEMATOCRIT % 31.2* 27.1*   MCV fL 87.9 87.7   MCHC g/dL 29.5* 29.2*   PLATELETS 10*3/mm3 331 411     Results from last 7 days   Lab Units 12/10/21  0118 12/09/21  0640 12/06/21  0121   SODIUM mmol/L 138 134* 135*   POTASSIUM mmol/L 4.8 5.4* 5.0   MAGNESIUM mg/dL  --  1.6  --    CHLORIDE mmol/L 107 104 103   CO2 mmol/L 22.9 19.8* 25.0   BUN mg/dL 22 24* 26*   CREATININE mg/dL 1.17* 1.13* 1.11*   EGFR IF NONAFRICN AM mL/min/1.73 45* 47* 48*   CALCIUM mg/dL 8.8 9.1 8.8   GLUCOSE mg/dL 195* 185* 112*   ALBUMIN g/dL  --  3.27*  --    BILIRUBIN mg/dL  --  0.2  --    ALK PHOS U/L  --  169*  --    AST  (SGOT) U/L  --  16  --    ALT (SGPT) U/L  --  13  --    Estimated Creatinine Clearance: 37.8 mL/min (A) (by C-G formula based on SCr of 1.17 mg/dL (H)).  No results found for: AMMONIA    Glucose   Date/Time Value Ref Range Status   12/10/2021 0656 169 (H) 70 - 130 mg/dL Final     Comment:     Meter: [id number] : [id number] Nolan Marialuisa   12/09/2021 2124 230 (H) 70 - 130 mg/dL Final     Comment:     Meter: [id number] : [id number] Ed Crystal   12/09/2021 1618 200 (H) 70 - 130 mg/dL Final     Comment:     Meter: [id number] : [id number] crystal lopez   12/09/2021 1124 188 (H) 70 - 130 mg/dL Final     Comment:     Meter: [id number] : [id number] crystal lopez   12/09/2021 0611 174 (H) 70 - 130 mg/dL Final     Comment:     Meter: [id number] : [id number] Ciera Komal   12/08/2021 1946 190 (H) 70 - 130 mg/dL Final     Comment:     Meter: [id number] : [id number] Ed Crystal   12/08/2021 1633 179 (H) 70 - 130 mg/dL Final     Comment:     Meter: [id number] : [id number] Jay Doris   12/08/2021 1109 235 (H) 70 - 130 mg/dL Final     Comment:     Meter: [id number] : [id number] Jay Doris     Lab Results   Component Value Date    HGBA1C 9.20 (H) 11/13/2021     Lab Results   Component Value Date    TSH 1.840 02/04/2021    FREET4 1.38 02/04/2021       No results found for: BLOODCX  No results found for: URINECX  No results found for: WOUNDCX  No results found for: STOOLCX  No results found for: RESPCX  Pain Management Panel     Pain Management Panel Latest Ref Rng & Units 11/14/2021 3/1/2021    CREATININE UR mg/dL 115.1 28.7          Pertinent Radiology Results:    Imaging Results (All)     None          Test Results Pending at Discharge:        Discharge Disposition/Discharge Medications/Discharge Appointments     Discharge Disposition:         Code Status While Inpatient:    Code Status and Medical Interventions:   Ordered at: 11/26/21 1459     Code Status (Patient has no pulse and is  not breathing):    CPR (Attempt to Resuscitate)     Medical Interventions (Patient has pulse or is breathing):    Full Support       Discharge Medications:       Discharge Medications      ASK your doctor about these medications      Instructions Start Date   bumetanide 2 MG tablet  Commonly known as: BUMEX   2 mg, Oral, Daily PRN      calcium carbonate 600 MG tablet  Commonly known as: OS-TIANNA   600 mg, Oral, Daily      carvedilol 25 MG tablet  Commonly known as: COREG   25 mg, Oral, 2 Times Daily With Meals      cetirizine 10 MG tablet  Commonly known as: zyrTEC   10 mg, Oral, Daily      cyanocobalamin 1000 MCG/ML injection   1,000 mcg, Intramuscular, Every 28 Days      ferrous sulfate 325 (65 FE) MG tablet   325 mg, Oral, Every Evening      hydrALAZINE 10 MG tablet  Commonly known as: APRESOLINE   10 mg, Oral, 3 Times Daily      insulin detemir 100 UNIT/ML injection  Commonly known as: LEVEMIR   100 Units, Subcutaneous, 2 Times Daily      loperamide 2 MG capsule  Commonly known as: IMODIUM   2 mg, Oral, Daily      metOLazone 10 MG tablet  Commonly known as: ZAROXOLYN   10 mg, Oral, Every 48 Hours PRN      pantoprazole 40 MG EC tablet  Commonly known as: PROTONIX   40 mg, Oral, 2 Times Daily Before Meals      spironolactone 25 MG tablet  Commonly known as: ALDACTONE   25 mg, Oral, Daily             Discharge Appointments:         Contact information for follow-up providers     Micah Núñez PA. Go on 12/14/2021.    Specialty: Physician Assistant  Why: at 11:30 AM  Contact information:  [address] [facility] [address]  [phone]             Lupillo Cornejo MD. Go on 12/21/2021.    Specialty: Orthopedic Surgery  Why: at 2:00 PM  Contact information:  [address] [place] [address]  [phone]                   Contact information for after-discharge care     Home Medical Care     [facility] .    Service: Home Health Services  Contact information:  [address]  [facility] [address]  [phone]                                 Condition at Discharge:    Stable, much improved with no issues today    Discharge Plan Of Care:  Contacted [facility] [phone] per preference per Zohreh with discharge on 12-10-21 and order for rolling walker.  Pt is home O2 at 2L NC at night since 2019.  PTA tested O2 sats and pt does not need continuous O2.  RW to be delivered to rehab this pm or in the am.  Faxed DWO, face sheet, H&P, and PT note to [phone].      Contacted [facility] [phone] per preference per Chasity with discharge on 12-10-21, referral and orders for PT 2-3 x wk x 8 wks for strengthening, gait training, home safety and OT 2-3 x wk x 8 wks for ADL re-training, home safety, functional mobility, strengthening, endurance.  Faxed HH referral with orders, PT/OT notes, H&P, and face sheet to  [phone].  HH to be contacted at discharge.  Spoke to bre Quezada who came for education and observed pt in therapy today about ordering RW and home health PT/OT.  MD to complete FMLA paperwork for bre Sarkar today.  Pilar says she can pick-up paperwork this pm.  Pt is going home with family providing 24 hour assistance.    Spoke with Dr. Cornejo and he recommended to continue a total of 35 days of Lovenox and patient will need another 13 days from today.        Please note that this discharge summary required more than 30 minutes to complete.      Scribed for Traci Villeda MD by KARON Rendon. 12/10/2021  10:04 EST       KARON Rendon  12/10/21  10:04 EST

## 2021-12-10 NOTE — PROGRESS NOTES
Occupational Therapy:    Physical Therapy: Individual: 15 minutes.    Speech Language Pathology:    Signed by: Ayla Gonzalez PTA

## 2021-12-10 NOTE — PROGRESS NOTES
Patient Assessment Instrument  Quality Indicators - Discharge    Section GG. Self-Care Performance      Section GG. Mobility Performance     Roll Left and Right: [place] provides verbal cues and/or touching/steadying  and/or contact guard assistance as patient completes activity. Assistance may be  provided throughout the activity or intermittently.   Sit to Lying: [place] provides verbal cues and/or touching/steadying and/or  contact guard assistance as patient completes activity. Assistance may be  provided throughout the activity or intermittently.   Lying to Sitting on Side of Bed: [place] provides verbal cues and/or  touching/steadying and/or contact guard assistance as patient completes  activity. Assistance may be provided throughout the activity or intermittently.   Sit to Stand: [place] provides verbal cues and/or touching/steadying and/or  contact guard assistance as patient completes activity. Assistance may be  provided throughout the activity or intermittently.   Chair/Bed to Chair Transfer: [place] provides verbal cues and/or  touching/steadying and/or contact guard assistance as patient completes  activity. Assistance may be provided throughout the activity or intermittently.   Toilet Transfer [place] provides verbal cues and/or touching/steadying and/or  contact guard assistance as patient completes activity. Assistance may be  provided throughout the activity or intermittently.   Car Transfer: [place] does less than half the effort. [place] lifts, holds or  supports trunk or limbs but provides less than half the effort.   Walk 10 Feet:   [place] provides verbal cues and/or touching/steadying and/or  contact guard assistance as patient completes activity. Assistance may be  provided throughout the activity or intermittently.  Walk 50 Feet with 2 Turns:   [place] provides verbal cues and/or  touching/steadying and/or contact guard assistance as patient completes  activity. Assistance may be provided throughout  the activity or intermittently.  Walk 150 Feet:   [place] provides verbal cues and/or touching/steadying and/or  contact guard assistance as patient completes activity. Assistance may be  provided throughout the activity or intermittently.  Walking 10 Feet on Uneven Surfaces:   Not attempted due to medical or safety  concerns.  1 Step Over Curb or Up/Down Stair:   [place] provides verbal cues and/or  touching/steadying and/or contact guard assistance as patient completes  activity. Assistance may be provided throughout the activity or intermittently.  4 Steps Up and Down, With/Without Rail:   [place] provides verbal cues and/or  touching/steadying and/or contact guard assistance as patient completes  activity. Assistance may be provided throughout the activity or intermittently.  12 Steps Up and Down, With/Without Rail:   [place] provides verbal cues and/or  touching/steadying and/or contact guard assistance as patient completes  activity. Assistance may be provided throughout the activity or intermittently.  Picking up an Object:   Not attempted due to medical or safety concerns. Uses  Wheelchair and/or Scooter: No    Section J. Health Conditions Discharge      Section M. Skin Conditions Discharge      . Current Number of Unhealed Pressure Ulcers      Section N. Medication    Signed by: Ayla Gonzalez PTA

## 2021-12-10 NOTE — SIGNIFICANT NOTE
12/10/21 1213   Plan   Plan Contacted [facility] [phone] per Chasity and Radha about discharge and new orders for nursing for cardiopulmonary assessments, neurovascular assessments, medication education, pain management, wound care dressing changes (pt has steri-strips on left hip) and Lovenox 40 mg subcutaneous daily x 13 days.  Pt will receive Lovenox injection prior to discharge today.   nurse to start care on 12-11-21 and will do teaching with family regarding Lovenox injections.  Faxed ambulatory referral with face to face and orders and discharge summary to  [phone].   Informed pt and daughter Antonina about Lovenox injections daily x 13 days, [facility] to provide teaching for Lovenox and start care on 12-11-21.  Pt is going home today with family providing 24 hour assistance.  Daughter will provide transportation home.   Patient/Family in Agreement with Plan yes   Final Discharge Disposition Code 06 - home with home health care

## 2021-12-10 NOTE — PROGRESS NOTES
Occupational Therapy: Individual: 15 minutes.    Physical Therapy:    Speech Language Pathology:    Signed by: Itzel Sears, Occupational Therapist

## 2021-12-10 NOTE — THERAPY DISCHARGE NOTE
Inpatient Rehabilitation - Physical Therapy Treatment Note/Discharge  EDWIGE Villarreal     Patient Name: Albina Finley  : 1945  MRN: [id number]  Today's Date: 12/10/2021                Admit Date: 2021    Visit Dx:    ICD-10-CM ICD-9-CM   1. Closed fracture of left hip, initial encounter (Former[facility])  S72.002A 820.8   2. Anemia due to GI blood loss  D50.0 280.0   3. Peptic ulcer disease with hemorrhage  K27.4 533.40   4. Essential hypertension  I10 401.9   5. Chronic diastolic heart failure ([facility])  I50.32 428.32   6. COPD with acute exacerbation ([facility])  J44.1 491.21   7. Iron deficiency anemia, unspecified iron deficiency anemia type  D50.9 280.9     Patient Active Problem List   Diagnosis   • Iron deficiency anemia   • COPD with acute exacerbation ([facility])   • Chronic diastolic heart failure ([facility])   • CKD (chronic kidney disease) stage 3, GFR 30-59 ml/min ([facility])   • Essential hypertension   • Hyperlipidemia   • Type II diabetes mellitus ([facility])   • Arthritis   • Osteoporosis   • Grade II diastolic dysfunction   • Duodenitis   • Peptic ulcer disease with hemorrhage   • Non-ST elevation myocardial infarction (NSTEMI) (Former[facility])   • Abnormal nuclear stress test with mild degree of small size apical lateral wall myocardial ischemia in 2019.   • Anemia due to GI blood loss   • Hip fracture ([facility])   • S/p left hip fracture     Past Medical History:   Diagnosis Date   • Abnormal nuclear stress test with mild degree of small size apical lateral wall myocardial ischemia in 2019. 2020   • Acute on chronic diastolic CHF (congestive heart failure) ([facility]) 2019   • Anal polyp 2018    Added automatically from request for surgery [id number]   • Arthritis    • Chronic kidney disease    • COPD (chronic obstructive pulmonary disease) ([facility])    • Diabetes mellitus ([facility])    • Elevated cholesterol    • Essential hypertension 2019   • History of transfusion    • Incidental lung nodule, greater than or equal to 8mm      Left lower lobe, 15 mm based on  and 2019 chest CT scans with no growth in the size   • Iron deficiency anemia 2017   • Osteoporosis    • Peptic ulcer disease with hemorrhage 2020   • Pneumonia of right middle lobe due to infectious organism 2019     Past Surgical History:   Procedure Laterality Date   • ANUS SURGERY N/A 2018    Procedure: Diagnostic anoscopy with anal polyp excision;  Surgeon: Marlyn Gutierrez MD;  Location: [facility] OR;  Service: General   •  SECTION     • ENDOSCOPY N/A 2020    Procedure: ESOPHAGOGASTRODUODENOSCOPY;  Surgeon: Marlon Wray MD;  Location: [facility] OR;  Service: Gastroenterology;  Laterality: N/A;   • FEMUR OPEN REDUCTION INTERNAL FIXATION Left 2021    Procedure: Left hip hook plate operative fixation;  Surgeon: Lupillo Cornejo MD;  Location: [facility] OR;  Service: Orthopedics;  Laterality: Left;   • HIP BIPOLAR REPLACEMENT      left Dr. Cornejo    • HYSTERECTOMY     • JOINT REPLACEMENT         PT ASSESSMENT (last 12 hours)     IRF PT Evaluation and Treatment     Row Name 12/10/21 1539          PT Time and Intention    Document Type discharge evaluation  -RF     Mode of Treatment physical therapy  -RF     Patient/Family/Caregiver Comments/Observations D/C planning performed with patient and daughter this date. Education provided on techniques for home safety and importance of compliance with HEP for conitnued LE strengthening. Pt and daughter verbalized understanding to all education with no questions or concerns noted.  -RF     Row Name 12/10/21 1539          Cognition/Psychosocial    Affect/Mental Status (Cognitive) WFL  -RF     Row Name 12/10/21 1539          Transfer Goal 1 (PT-IRF)    Progress/Outcomes (Transfer Goal 1, PT-IRF) goal met  -RF     Row Name 12/10/21 1539          Gait/Walking Locomotion Goal 1 (PT-IRF)    Progress/Outcomes (Gait/Walking Locomotion Goal 1, PT-IRF) goal met  -RF     Row Name 12/10/21 1539           Positioning and Restraints    Pre-Treatment Position sitting in chair/recliner  -RF     Post Treatment Position wheelchair  -RF     In Wheelchair sitting; call light within reach; encouraged to call for assist; with family/caregiver  -           User Key  (r) = Recorded By, (t) = Taken By, (c) = Cosigned By    Initials Name Provider Type    RF Ayla Gonzalez PTA Physical Therapy Assistant                Physical Therapy Education                 Title: PT OT SLP Therapies (Resolved)     Topic: Physical Therapy (Resolved)     Point: Mobility training (Resolved)     Learning Progress Summary           Patient Acceptance, E,TB, VU by RF at 12/9/2021 1619      Show all documentation for this point (9)                 Point: Home exercise program (Resolved)     Learning Progress Summary           Patient Acceptance, E,TB, VU by RF at 12/9/2021 1619      Show all documentation for this point (9)                 Point: Body mechanics (Resolved)     Learning Progress Summary           Patient Acceptance, E,TB, VU by RF at 12/9/2021 1619      Show all documentation for this point (9)                 Point: Precautions (Resolved)     Learning Progress Summary           Patient Acceptance, E,TB, VU by RF at 12/9/2021 1619      Show all documentation for this point (9)                             User Key     Initials Effective Dates Name Provider Type Discipline     06/16/21 -  Ayla Gonzalez PTA Physical Therapy Assistant PT                PT Recommendation and Plan        Daily Progress Summary (PT)  Functional Goal Overall Progress (PT): progressing toward functional goals as expected  Daily Progress Summary (PT): Good functional mobility and ambulation quality noted this date. Pt appropriate for D/C home in AM with assist and home health services.  Impairments Still Limiting Function (PT): strength decreased, impaired functional activity tolerance, pain  Recommendations (PT): Continue per current POC          Time Calculation:    PT Charges     Row Name 12/10/21 1542             Time Calculation    PT Received On 12/10/21  -RF              Time Calculation- PT    Total Timed Code Minutes- PT 15 minute(s)  -RF            User Key  (r) = Recorded By, (t) = Taken By, (c) = Cosigned By    Initials Name Provider Type    RF Ayla Gonzalez PTA Physical Therapy Assistant                Therapy Charges for Today     Code Description Service Date Service Provider Modifiers Qty    32159490115 HC GAIT TRAINING EA 15 MIN 12/9/2021 Ayla Gonzalez, ROHINI GP 2    05821768700 HC PT THER PROC EA 15 MIN 12/9/2021 Ayla Gonzalez, PTA GP 1    39186403536 HC PT THERAPEUTIC ACT EA 15 MIN 12/9/2021 Ayla Gonzalez, ROHINI GP 3    09682496666 HC PT THERAPEUTIC ACT EA 15 MIN 12/10/2021 Ayla Gonzalez, ROHINI GP 1                    Ayla Gonzalez PTA  12/10/2021

## 2021-12-10 NOTE — PLAN OF CARE
Goal Outcome Evaluation:  Plan of Care Reviewed With: patient        Progress: improving  Outcome Summary: Pt improving with therapy, cont POC

## 2021-12-10 NOTE — PROGRESS NOTES
Patient Assessment Instrument  Quality Indicators - Discharge    Section GG. Self-Care Performance      Section GG. Mobility Performance      Section J. Health Conditions Discharge  Fall(s) Since Admission:  No    Section M. Skin Conditions Discharge  Unhealed Pressure Ulcer(s)/Injurie(s) at Stage 1 or Higher:  No    . Current Number of Unhealed Pressure Ulcers      Section N. Medication    Medication Intervention: N/A - There were no potential clinically significant  medication issues identified since admission or patient is not taking any  medications.    Signed by: Yancy Vargas, Supervisor

## 2021-12-13 NOTE — PROGRESS NOTES
Patient Assessment Instrument  Quality Indicators - Discharge    Section GG. Self-Care Performance      Section GG. Mobility Performance     Roll Left and Right: [place] provides verbal cues and/or touching/steadying  and/or contact guard assistance as patient completes activity. Assistance may be  provided throughout the activity or intermittently.   Sit to Lying: [place] provides verbal cues and/or touching/steadying and/or  contact guard assistance as patient completes activity. Assistance may be  provided throughout the activity or intermittently.   Lying to Sitting on Side of Bed: [place] provides verbal cues and/or  touching/steadying and/or contact guard assistance as patient completes  activity. Assistance may be provided throughout the activity or intermittently.   Sit to Stand: [place] provides verbal cues and/or touching/steadying and/or  contact guard assistance as patient completes activity. Assistance may be  provided throughout the activity or intermittently.   Chair/Bed to Chair Transfer: [place] provides verbal cues and/or  touching/steadying and/or contact guard assistance as patient completes  activity. Assistance may be provided throughout the activity or intermittently.   Toilet Transfer [place] provides verbal cues and/or touching/steadying and/or  contact guard assistance as patient completes activity. Assistance may be  provided throughout the activity or intermittently.   Car Transfer: [place] does less than half the effort. [place] lifts, holds or  supports trunk or limbs but provides less than half the effort.   Walk 10 Feet:   [place] provides verbal cues and/or touching/steadying and/or  contact guard assistance as patient completes activity. Assistance may be  provided throughout the activity or intermittently.  Walk 50 Feet with 2 Turns:   [place] provides verbal cues and/or  touching/steadying and/or contact guard assistance as patient completes  activity. Assistance may be provided throughout  the activity or intermittently.  Walk 150 Feet:   [place] provides verbal cues and/or touching/steadying and/or  contact guard assistance as patient completes activity. Assistance may be  provided throughout the activity or intermittently.  Walking 10 Feet on Uneven Surfaces:   Not attempted due to medical or safety  concerns.  1 Step Over Curb or Up/Down Stair:   [place] provides verbal cues and/or  touching/steadying and/or contact guard assistance as patient completes  activity. Assistance may be provided throughout the activity or intermittently.  4 Steps Up and Down, With/Without Rail:   [place] provides verbal cues and/or  touching/steadying and/or contact guard assistance as patient completes  activity. Assistance may be provided throughout the activity or intermittently.  12 Steps Up and Down, With/Without Rail:   [place] provides verbal cues and/or  touching/steadying and/or contact guard assistance as patient completes  activity. Assistance may be provided throughout the activity or intermittently.  Picking up an Object:   Not attempted due to medical or safety concerns. Uses  Wheelchair and/or Scooter: No    Section J. Health Conditions Discharge      Section M. Skin Conditions Discharge      . Current Number of Unhealed Pressure Ulcers      Section N. Medication    Signed by: Yancy Vargas, Supervisor

## 2021-12-14 NOTE — PROGRESS NOTES
PPS CMG Coordinator  Inpatient Rehabilitation Discharge    Mode of Locomotion: Walking.    Discharge Against Medical Advice:  No.  Discharge Information  Patient Discharged Alive:  Yes  Discharge Destination/Living Setting: Home with Home Health Services  Diagnosis for Interruption/Death:    Impairment Group: 08.11 Status Post Unilateral Hip Fracture    Comorbidities: Rank Code      Description      1    N39.0     Urinary tract infection, site not specified  2    N17.9     Acute kidney failure, unspecified  3    I13.0     Hypertensive heart and chronic kidney disease                 with heart failure and stage 1 through stage 4                 chronic kidney disease, or unspecified chronic                 kidney disease  4    I50.32    Chronic diastolic (congestive) heart failure  5    E11.22    Type 2 diabetes mellitus with diabetic chronic                 kidney disease  6    M81.0     Age-related osteoporosis without current                 pathological fracture  7    Z87.891   Personal history of nicotine dependence  8    Z79.4     Long term (current) use of insulin  9    E66.9     Obesity, unspecified  10   Z68.30    Body mass index (BMI) 30.0-30.9, adult  11   J43.9     Emphysema, unspecified  12   I27.20    Pulmonary hypertension, unspecified  13   I25.10    Atherosclerotic heart disease of native                 coronary artery without angina pectoris  14   W19.XXXA  Unspecified fall, initial encounter  15   Y92.009   Unspecified place in unspecified                 non-institutional (private) residence as the                 place of occurrence of the external cause    Complications:      [place] Bladder Accidents: 0  - Accidents.  Bladder Score = 7. Patient has not had an accident.  [place] Bowel Accident: 0  - Accidents.  Bowel Score = 6. Patient has no accidents, but uses a device/medications. bowel  med    Signed by: Shital Coronel Nurse

## 2021-12-26 ENCOUNTER — APPOINTMENT (OUTPATIENT)
Dept: GENERAL RADIOLOGY | Facility: HOSPITAL | Age: 76
End: 2021-12-26

## 2021-12-26 ENCOUNTER — APPOINTMENT (OUTPATIENT)
Dept: CT IMAGING | Facility: HOSPITAL | Age: 76
End: 2021-12-26

## 2021-12-26 ENCOUNTER — HOSPITAL ENCOUNTER (INPATIENT)
Facility: HOSPITAL | Age: 76
LOS: 5 days | Discharge: HOME HEALTH | End: 2022-01-02
Attending: EMERGENCY MEDICINE | Admitting: INTERNAL MEDICINE

## 2021-12-26 DIAGNOSIS — A49.9 UTI (URINARY TRACT INFECTION), BACTERIAL: ICD-10-CM

## 2021-12-26 DIAGNOSIS — I50.33 ACUTE ON CHRONIC DIASTOLIC CONGESTIVE HEART FAILURE: ICD-10-CM

## 2021-12-26 DIAGNOSIS — I50.9 ACUTE ON CHRONIC CONGESTIVE HEART FAILURE, UNSPECIFIED HEART FAILURE TYPE: Primary | ICD-10-CM

## 2021-12-26 DIAGNOSIS — N39.0 UTI (URINARY TRACT INFECTION), BACTERIAL: ICD-10-CM

## 2021-12-26 LAB
A-A DO2: 15.2 MMHG (ref 0–300)
ALBUMIN SERPL-MCNC: 3.55 G/DL (ref 3.5–5.2)
ALBUMIN/GLOB SERPL: 1.2 G/DL
ALP SERPL-CCNC: 120 U/L (ref 39–117)
ALT SERPL W P-5'-P-CCNC: 8 U/L (ref 1–33)
AMPHET+METHAMPHET UR QL: NEGATIVE
AMPHETAMINES UR QL: NEGATIVE
ANION GAP SERPL CALCULATED.3IONS-SCNC: 10.6 MMOL/L (ref 5–15)
ARTERIAL PATENCY WRIST A: POSITIVE
AST SERPL-CCNC: 8 U/L (ref 1–32)
ATMOSPHERIC PRESS: 727 MMHG
BACTERIA UR QL AUTO: ABNORMAL /HPF
BARBITURATES UR QL SCN: NEGATIVE
BASE EXCESS BLDA CALC-SCNC: -2.1 MMOL/L (ref 0–2)
BASOPHILS # BLD AUTO: 0.02 10*3/MM3 (ref 0–0.2)
BASOPHILS NFR BLD AUTO: 0.2 % (ref 0–1.5)
BDY SITE: ABNORMAL
BENZODIAZ UR QL SCN: NEGATIVE
BILIRUB SERPL-MCNC: <0.2 MG/DL (ref 0–1.2)
BILIRUB UR QL STRIP: NEGATIVE
BODY TEMPERATURE: 0 C
BUN SERPL-MCNC: 23 MG/DL (ref 8–23)
BUN/CREAT SERPL: 18.1 (ref 7–25)
BUPRENORPHINE SERPL-MCNC: NEGATIVE NG/ML
CALCIUM SPEC-SCNC: 8.9 MG/DL (ref 8.6–10.5)
CANNABINOIDS SERPL QL: NEGATIVE
CHLORIDE SERPL-SCNC: 108 MMOL/L (ref 98–107)
CLARITY UR: ABNORMAL
CO2 BLDA-SCNC: 25.7 MMOL/L (ref 22–33)
CO2 SERPL-SCNC: 22.4 MMOL/L (ref 22–29)
COCAINE UR QL: NEGATIVE
COHGB MFR BLD: 1.5 % (ref 0–5)
COLOR UR: YELLOW
CREAT SERPL-MCNC: 1.27 MG/DL (ref 0.57–1)
CRP SERPL-MCNC: 3.87 MG/DL (ref 0–0.5)
D DIMER PPP FEU-MCNC: 0.94 MCGFEU/ML (ref 0–0.5)
D-LACTATE SERPL-SCNC: 0.5 MMOL/L (ref 0.5–2)
DEPRECATED RDW RBC AUTO: 56.5 FL (ref 37–54)
EOSINOPHIL # BLD AUTO: 0.18 10*3/MM3 (ref 0–0.4)
EOSINOPHIL NFR BLD AUTO: 2.1 % (ref 0.3–6.2)
ERYTHROCYTE [DISTWIDTH] IN BLOOD BY AUTOMATED COUNT: 17.5 % (ref 12.3–15.4)
GAS FLOW AIRWAY: 2 LPM
GFR SERPL CREATININE-BSD FRML MDRD: 41 ML/MIN/1.73
GLOBULIN UR ELPH-MCNC: 3.1 GM/DL
GLUCOSE SERPL-MCNC: 133 MG/DL (ref 65–99)
GLUCOSE UR STRIP-MCNC: NEGATIVE MG/DL
HCO3 BLDA-SCNC: 24.2 MMOL/L (ref 20–26)
HCT VFR BLD AUTO: 30.7 % (ref 34–46.6)
HCT VFR BLD CALC: 26.1 % (ref 38–51)
HGB BLD-MCNC: 8.9 G/DL (ref 12–15.9)
HGB BLDA-MCNC: 8.5 G/DL (ref 13.5–17.5)
HGB UR QL STRIP.AUTO: NEGATIVE
HOLD SPECIMEN: NORMAL
HOLD SPECIMEN: NORMAL
HYALINE CASTS UR QL AUTO: ABNORMAL /LPF
IMM GRANULOCYTES # BLD AUTO: 0.03 10*3/MM3 (ref 0–0.05)
IMM GRANULOCYTES NFR BLD AUTO: 0.3 % (ref 0–0.5)
INHALED O2 CONCENTRATION: 28 %
KETONES UR QL STRIP: NEGATIVE
LEUKOCYTE ESTERASE UR QL STRIP.AUTO: ABNORMAL
LYMPHOCYTES # BLD AUTO: 2.46 10*3/MM3 (ref 0.7–3.1)
LYMPHOCYTES NFR BLD AUTO: 28.1 % (ref 19.6–45.3)
Lab: ABNORMAL
MCH RBC QN AUTO: 26 PG (ref 26.6–33)
MCHC RBC AUTO-ENTMCNC: 29 G/DL (ref 31.5–35.7)
MCV RBC AUTO: 89.8 FL (ref 79–97)
METHADONE UR QL SCN: NEGATIVE
METHGB BLD QL: 0.3 % (ref 0–3)
MODALITY: ABNORMAL
MONOCYTES # BLD AUTO: 0.57 10*3/MM3 (ref 0.1–0.9)
MONOCYTES NFR BLD AUTO: 6.5 % (ref 5–12)
NEUTROPHILS NFR BLD AUTO: 5.48 10*3/MM3 (ref 1.7–7)
NEUTROPHILS NFR BLD AUTO: 62.8 % (ref 42.7–76)
NITRITE UR QL STRIP: NEGATIVE
NOTE: ABNORMAL
NOTIFIED BY: ABNORMAL
NOTIFIED WHO: ABNORMAL
NRBC BLD AUTO-RTO: 0 /100 WBC (ref 0–0.2)
NT-PROBNP SERPL-MCNC: 2647 PG/ML (ref 0–1800)
OPIATES UR QL: NEGATIVE
OXYCODONE UR QL SCN: NEGATIVE
OXYHGB MFR BLDV: 97.4 % (ref 94–99)
PCO2 BLDA: 48.4 MM HG (ref 35–45)
PCO2 TEMP ADJ BLD: ABNORMAL MM[HG]
PCP UR QL SCN: NEGATIVE
PH BLDA: 7.31 PH UNITS (ref 7.35–7.45)
PH UR STRIP.AUTO: <=5 [PH] (ref 5–8)
PH, TEMP CORRECTED: ABNORMAL
PLATELET # BLD AUTO: 389 10*3/MM3 (ref 140–450)
PMV BLD AUTO: 10.7 FL (ref 6–12)
PO2 BLDA: 121 MM HG (ref 83–108)
PO2 TEMP ADJ BLD: ABNORMAL MM[HG]
POTASSIUM SERPL-SCNC: 4.7 MMOL/L (ref 3.5–5.2)
PROPOXYPH UR QL: NEGATIVE
PROT SERPL-MCNC: 6.6 G/DL (ref 6–8.5)
PROT UR QL STRIP: ABNORMAL
RBC # BLD AUTO: 3.42 10*6/MM3 (ref 3.77–5.28)
RBC # UR STRIP: ABNORMAL /HPF
REF LAB TEST METHOD: ABNORMAL
SAO2 % BLDCOA: 99.2 % (ref 94–99)
SODIUM SERPL-SCNC: 141 MMOL/L (ref 136–145)
SP GR UR STRIP: 1.02 (ref 1–1.03)
SQUAMOUS #/AREA URNS HPF: ABNORMAL /HPF
TRICYCLICS UR QL SCN: NEGATIVE
TROPONIN T SERPL-MCNC: <0.01 NG/ML (ref 0–0.03)
UROBILINOGEN UR QL STRIP: ABNORMAL
VENTILATOR MODE: ABNORMAL
WBC # UR STRIP: ABNORMAL /HPF
WBC NRBC COR # BLD: 8.74 10*3/MM3 (ref 3.4–10.8)
WHOLE BLOOD HOLD SPECIMEN: NORMAL
WHOLE BLOOD HOLD SPECIMEN: NORMAL

## 2021-12-26 PROCEDURE — 25010000002 FUROSEMIDE PER 20 MG: Performed by: EMERGENCY MEDICINE

## 2021-12-26 PROCEDURE — 87186 SC STD MICRODIL/AGAR DIL: CPT | Performed by: INTERNAL MEDICINE

## 2021-12-26 PROCEDURE — 83880 ASSAY OF NATRIURETIC PEPTIDE: CPT | Performed by: PHYSICIAN ASSISTANT

## 2021-12-26 PROCEDURE — 85379 FIBRIN DEGRADATION QUANT: CPT | Performed by: PHYSICIAN ASSISTANT

## 2021-12-26 PROCEDURE — 87088 URINE BACTERIA CULTURE: CPT | Performed by: INTERNAL MEDICINE

## 2021-12-26 PROCEDURE — 87086 URINE CULTURE/COLONY COUNT: CPT | Performed by: INTERNAL MEDICINE

## 2021-12-26 PROCEDURE — 81001 URINALYSIS AUTO W/SCOPE: CPT | Performed by: PHYSICIAN ASSISTANT

## 2021-12-26 PROCEDURE — 86140 C-REACTIVE PROTEIN: CPT | Performed by: PHYSICIAN ASSISTANT

## 2021-12-26 PROCEDURE — 94799 UNLISTED PULMONARY SVC/PX: CPT

## 2021-12-26 PROCEDURE — 85025 COMPLETE CBC W/AUTO DIFF WBC: CPT | Performed by: PHYSICIAN ASSISTANT

## 2021-12-26 PROCEDURE — 82805 BLOOD GASES W/O2 SATURATION: CPT

## 2021-12-26 PROCEDURE — 93005 ELECTROCARDIOGRAM TRACING: CPT | Performed by: EMERGENCY MEDICINE

## 2021-12-26 PROCEDURE — 82375 ASSAY CARBOXYHB QUANT: CPT

## 2021-12-26 PROCEDURE — 87040 BLOOD CULTURE FOR BACTERIA: CPT | Performed by: PHYSICIAN ASSISTANT

## 2021-12-26 PROCEDURE — 83605 ASSAY OF LACTIC ACID: CPT | Performed by: PHYSICIAN ASSISTANT

## 2021-12-26 PROCEDURE — 84484 ASSAY OF TROPONIN QUANT: CPT | Performed by: PHYSICIAN ASSISTANT

## 2021-12-26 PROCEDURE — 99285 EMERGENCY DEPT VISIT HI MDM: CPT

## 2021-12-26 PROCEDURE — 80306 DRUG TEST PRSMV INSTRMNT: CPT | Performed by: PHYSICIAN ASSISTANT

## 2021-12-26 PROCEDURE — 83050 HGB METHEMOGLOBIN QUAN: CPT

## 2021-12-26 PROCEDURE — 80053 COMPREHEN METABOLIC PANEL: CPT | Performed by: PHYSICIAN ASSISTANT

## 2021-12-26 PROCEDURE — 36600 WITHDRAWAL OF ARTERIAL BLOOD: CPT

## 2021-12-26 PROCEDURE — 71046 X-RAY EXAM CHEST 2 VIEWS: CPT

## 2021-12-26 PROCEDURE — 94640 AIRWAY INHALATION TREATMENT: CPT

## 2021-12-26 PROCEDURE — 93005 ELECTROCARDIOGRAM TRACING: CPT | Performed by: PHYSICIAN ASSISTANT

## 2021-12-26 RX ORDER — IPRATROPIUM BROMIDE AND ALBUTEROL SULFATE 2.5; .5 MG/3ML; MG/3ML
3 SOLUTION RESPIRATORY (INHALATION) ONCE
Status: COMPLETED | OUTPATIENT
Start: 2021-12-26 | End: 2021-12-26

## 2021-12-26 RX ORDER — FUROSEMIDE 10 MG/ML
80 INJECTION INTRAMUSCULAR; INTRAVENOUS ONCE
Status: COMPLETED | OUTPATIENT
Start: 2021-12-26 | End: 2021-12-26

## 2021-12-26 RX ADMIN — FUROSEMIDE 80 MG: 10 INJECTION, SOLUTION INTRAMUSCULAR; INTRAVENOUS at 20:24

## 2021-12-26 RX ADMIN — IPRATROPIUM BROMIDE AND ALBUTEROL SULFATE 3 ML: .5; 3 SOLUTION RESPIRATORY (INHALATION) at 21:37

## 2021-12-26 RX ADMIN — NITROGLYCERIN 1 INCH: 20 OINTMENT TOPICAL at 20:24

## 2021-12-27 ENCOUNTER — APPOINTMENT (OUTPATIENT)
Dept: GENERAL RADIOLOGY | Facility: HOSPITAL | Age: 76
End: 2021-12-27

## 2021-12-27 PROBLEM — I50.9 CHF EXACERBATION (HCC): Status: ACTIVE | Noted: 2021-12-27

## 2021-12-27 LAB
A-A DO2: 31.9 MMHG (ref 0–300)
A-A DO2: 33 MMHG (ref 0–300)
ANION GAP SERPL CALCULATED.3IONS-SCNC: 10.4 MMOL/L (ref 5–15)
ARTERIAL PATENCY WRIST A: ABNORMAL
ARTERIAL PATENCY WRIST A: POSITIVE
ATMOSPHERIC PRESS: 722 MMHG
ATMOSPHERIC PRESS: 722 MMHG
ATMOSPHERIC PRESS: 723 MMHG
B PARAPERT DNA SPEC QL NAA+PROBE: NOT DETECTED
B PERT DNA SPEC QL NAA+PROBE: NOT DETECTED
BASE EXCESS BLDA CALC-SCNC: 0 MMOL/L (ref 0–2)
BASE EXCESS BLDA CALC-SCNC: 0.5 MMOL/L (ref 0–2)
BASE EXCESS BLDV CALC-SCNC: 0.1 MMOL/L (ref 0–2)
BASOPHILS # BLD AUTO: 0.02 10*3/MM3 (ref 0–0.2)
BASOPHILS NFR BLD AUTO: 0.2 % (ref 0–1.5)
BDY SITE: ABNORMAL
BODY TEMPERATURE: 0 C
BODY TEMPERATURE: 0 C
BODY TEMPERATURE: 37 C
BUN SERPL-MCNC: 22 MG/DL (ref 8–23)
BUN/CREAT SERPL: 15.7 (ref 7–25)
C PNEUM DNA NPH QL NAA+NON-PROBE: NOT DETECTED
CALCIUM SPEC-SCNC: 9 MG/DL (ref 8.6–10.5)
CHLORIDE SERPL-SCNC: 106 MMOL/L (ref 98–107)
CO2 BLDA-SCNC: 27.2 MMOL/L (ref 22–33)
CO2 BLDA-SCNC: 28.4 MMOL/L (ref 22–33)
CO2 BLDA-SCNC: 28.7 MMOL/L (ref 22–33)
CO2 SERPL-SCNC: 23.6 MMOL/L (ref 22–29)
COHGB MFR BLD: 1.5 % (ref 0–5)
COHGB MFR BLD: 2 % (ref 0–5)
COHGB MFR BLD: 2 % (ref 0–5)
CREAT SERPL-MCNC: 1.4 MG/DL (ref 0.57–1)
DEPRECATED RDW RBC AUTO: 55.8 FL (ref 37–54)
EOSINOPHIL # BLD AUTO: 0.18 10*3/MM3 (ref 0–0.4)
EOSINOPHIL NFR BLD AUTO: 2.2 % (ref 0.3–6.2)
ERYTHROCYTE [DISTWIDTH] IN BLOOD BY AUTOMATED COUNT: 17.5 % (ref 12.3–15.4)
FLUAV SUBTYP SPEC NAA+PROBE: NOT DETECTED
FLUBV RNA ISLT QL NAA+PROBE: NOT DETECTED
GAS FLOW AIRWAY: 2 LPM
GAS FLOW AIRWAY: 2 LPM
GFR SERPL CREATININE-BSD FRML MDRD: 36 ML/MIN/1.73
GLUCOSE BLDC GLUCOMTR-MCNC: 184 MG/DL (ref 70–130)
GLUCOSE BLDC GLUCOMTR-MCNC: 188 MG/DL (ref 70–130)
GLUCOSE BLDC GLUCOMTR-MCNC: 85 MG/DL (ref 70–130)
GLUCOSE SERPL-MCNC: 156 MG/DL (ref 65–99)
HADV DNA SPEC NAA+PROBE: NOT DETECTED
HCO3 BLDA-SCNC: 25.9 MMOL/L (ref 20–26)
HCO3 BLDA-SCNC: 26.8 MMOL/L (ref 20–26)
HCO3 BLDV-SCNC: 27 MMOL/L (ref 22–28)
HCOV 229E RNA SPEC QL NAA+PROBE: NOT DETECTED
HCOV HKU1 RNA SPEC QL NAA+PROBE: NOT DETECTED
HCOV NL63 RNA SPEC QL NAA+PROBE: NOT DETECTED
HCOV OC43 RNA SPEC QL NAA+PROBE: NOT DETECTED
HCT VFR BLD AUTO: 27.2 % (ref 34–46.6)
HCT VFR BLD CALC: 25.1 % (ref 38–51)
HCT VFR BLD CALC: 27.9 % (ref 38–51)
HGB BLD-MCNC: 8.1 G/DL (ref 12–15.9)
HGB BLDA-MCNC: 8.2 G/DL (ref 13.5–17.5)
HGB BLDA-MCNC: 8.5 G/DL (ref 13.5–17.5)
HGB BLDA-MCNC: 9.1 G/DL (ref 13.5–17.5)
HMPV RNA NPH QL NAA+NON-PROBE: NOT DETECTED
HPIV1 RNA ISLT QL NAA+PROBE: NOT DETECTED
HPIV2 RNA SPEC QL NAA+PROBE: NOT DETECTED
HPIV3 RNA NPH QL NAA+PROBE: NOT DETECTED
HPIV4 P GENE NPH QL NAA+PROBE: NOT DETECTED
IMM GRANULOCYTES # BLD AUTO: 0.02 10*3/MM3 (ref 0–0.05)
IMM GRANULOCYTES NFR BLD AUTO: 0.2 % (ref 0–0.5)
INHALED O2 CONCENTRATION: 21 %
INHALED O2 CONCENTRATION: 28 %
INHALED O2 CONCENTRATION: 28 %
LYMPHOCYTES # BLD AUTO: 2.58 10*3/MM3 (ref 0.7–3.1)
LYMPHOCYTES NFR BLD AUTO: 31.3 % (ref 19.6–45.3)
Lab: ABNORMAL
M PNEUMO IGG SER IA-ACNC: NOT DETECTED
MAGNESIUM SERPL-MCNC: 1.4 MG/DL (ref 1.6–2.4)
MCH RBC QN AUTO: 26.1 PG (ref 26.6–33)
MCHC RBC AUTO-ENTMCNC: 29.8 G/DL (ref 31.5–35.7)
MCV RBC AUTO: 87.7 FL (ref 79–97)
METHGB BLD QL: 0 % (ref 0–3)
METHGB BLD QL: <-0.1 % (ref 0–3)
METHGB BLD QL: <-0.1 % (ref 0–3)
MODALITY: ABNORMAL
MONOCYTES # BLD AUTO: 0.64 10*3/MM3 (ref 0.1–0.9)
MONOCYTES NFR BLD AUTO: 7.8 % (ref 5–12)
NEUTROPHILS NFR BLD AUTO: 4.79 10*3/MM3 (ref 1.7–7)
NEUTROPHILS NFR BLD AUTO: 58.3 % (ref 42.7–76)
NOTE: ABNORMAL
NOTE: ABNORMAL
NOTIFIED BY: ABNORMAL
NOTIFIED WHO: ABNORMAL
NOTIFIED WHO: ABNORMAL
NRBC BLD AUTO-RTO: 0 /100 WBC (ref 0–0.2)
OXYHGB MFR BLDV: 72.6 % (ref 45–75)
OXYHGB MFR BLDV: 90 % (ref 94–99)
OXYHGB MFR BLDV: 95.9 % (ref 94–99)
PCO2 BLDA: 44.4 MM HG (ref 35–45)
PCO2 BLDA: 54.4 MM HG (ref 35–45)
PCO2 BLDV: 55.7 MM HG (ref 41–51)
PCO2 TEMP ADJ BLD: ABNORMAL MM[HG]
PCO2 TEMP ADJ BLD: ABNORMAL MM[HG]
PH BLDA: 7.3 PH UNITS (ref 7.35–7.45)
PH BLDA: 7.37 PH UNITS (ref 7.35–7.45)
PH BLDV: 7.29 PH UNITS (ref 7.32–7.42)
PH, TEMP CORRECTED: ABNORMAL
PH, TEMP CORRECTED: ABNORMAL
PHOSPHATE SERPL-MCNC: 3.7 MG/DL (ref 2.5–4.5)
PLATELET # BLD AUTO: 374 10*3/MM3 (ref 140–450)
PMV BLD AUTO: 10.1 FL (ref 6–12)
PO2 BLDA: 60 MM HG (ref 83–108)
PO2 BLDA: 95.2 MM HG (ref 83–108)
PO2 BLDV: 43.8 MM HG (ref 27–53)
PO2 TEMP ADJ BLD: ABNORMAL MM[HG]
PO2 TEMP ADJ BLD: ABNORMAL MM[HG]
POTASSIUM SERPL-SCNC: 4.5 MMOL/L (ref 3.5–5.2)
QT INTERVAL: 398 MS
QTC INTERVAL: 426 MS
RBC # BLD AUTO: 3.1 10*6/MM3 (ref 3.77–5.28)
RHINOVIRUS RNA SPEC NAA+PROBE: NOT DETECTED
RSV RNA NPH QL NAA+NON-PROBE: NOT DETECTED
SAO2 % BLDCOA: 90.5 % (ref 94–99)
SAO2 % BLDCOA: 97.8 % (ref 94–99)
SAO2 % BLDCOV: 73.9 % (ref 45–75)
SARS-COV-2 RNA NPH QL NAA+NON-PROBE: NOT DETECTED
SODIUM SERPL-SCNC: 140 MMOL/L (ref 136–145)
TROPONIN T SERPL-MCNC: <0.01 NG/ML (ref 0–0.03)
VENTILATOR MODE: ABNORMAL
WBC NRBC COR # BLD: 8.23 10*3/MM3 (ref 3.4–10.8)

## 2021-12-27 PROCEDURE — 71045 X-RAY EXAM CHEST 1 VIEW: CPT

## 2021-12-27 PROCEDURE — 83050 HGB METHEMOGLOBIN QUAN: CPT

## 2021-12-27 PROCEDURE — 82820 HEMOGLOBIN-OXYGEN AFFINITY: CPT

## 2021-12-27 PROCEDURE — 84100 ASSAY OF PHOSPHORUS: CPT | Performed by: INTERNAL MEDICINE

## 2021-12-27 PROCEDURE — 25010000002 FUROSEMIDE PER 20 MG: Performed by: INTERNAL MEDICINE

## 2021-12-27 PROCEDURE — G0378 HOSPITAL OBSERVATION PER HR: HCPCS

## 2021-12-27 PROCEDURE — 82375 ASSAY CARBOXYHB QUANT: CPT

## 2021-12-27 PROCEDURE — 99223 1ST HOSP IP/OBS HIGH 75: CPT | Performed by: INTERNAL MEDICINE

## 2021-12-27 PROCEDURE — 63710000001 INSULIN DETEMIR PER 5 UNITS: Performed by: INTERNAL MEDICINE

## 2021-12-27 PROCEDURE — 84484 ASSAY OF TROPONIN QUANT: CPT | Performed by: INTERNAL MEDICINE

## 2021-12-27 PROCEDURE — 25010000002 HEPARIN (PORCINE) PER 1000 UNITS: Performed by: INTERNAL MEDICINE

## 2021-12-27 PROCEDURE — 82805 BLOOD GASES W/O2 SATURATION: CPT

## 2021-12-27 PROCEDURE — 82962 GLUCOSE BLOOD TEST: CPT

## 2021-12-27 PROCEDURE — 36600 WITHDRAWAL OF ARTERIAL BLOOD: CPT

## 2021-12-27 PROCEDURE — 85025 COMPLETE CBC W/AUTO DIFF WBC: CPT | Performed by: INTERNAL MEDICINE

## 2021-12-27 PROCEDURE — 25010000002 CEFTRIAXONE PER 250 MG: Performed by: EMERGENCY MEDICINE

## 2021-12-27 PROCEDURE — 63710000001 INSULIN ASPART PER 5 UNITS: Performed by: INTERNAL MEDICINE

## 2021-12-27 PROCEDURE — 93005 ELECTROCARDIOGRAM TRACING: CPT | Performed by: INTERNAL MEDICINE

## 2021-12-27 PROCEDURE — 25010000002 CYANOCOBALAMIN PER 1000 MCG: Performed by: INTERNAL MEDICINE

## 2021-12-27 PROCEDURE — 83735 ASSAY OF MAGNESIUM: CPT | Performed by: INTERNAL MEDICINE

## 2021-12-27 PROCEDURE — 0202U NFCT DS 22 TRGT SARS-COV-2: CPT | Performed by: EMERGENCY MEDICINE

## 2021-12-27 PROCEDURE — 80048 BASIC METABOLIC PNL TOTAL CA: CPT | Performed by: INTERNAL MEDICINE

## 2021-12-27 RX ORDER — PANTOPRAZOLE SODIUM 40 MG/1
40 TABLET, DELAYED RELEASE ORAL
Status: DISCONTINUED | OUTPATIENT
Start: 2021-12-27 | End: 2022-01-02 | Stop reason: HOSPADM

## 2021-12-27 RX ORDER — HYDRALAZINE HYDROCHLORIDE 10 MG/1
10 TABLET, FILM COATED ORAL 3 TIMES DAILY
COMMUNITY

## 2021-12-27 RX ORDER — CEFDINIR 300 MG/1
300 CAPSULE ORAL EVERY 12 HOURS SCHEDULED
Status: DISCONTINUED | OUTPATIENT
Start: 2021-12-27 | End: 2022-01-02 | Stop reason: HOSPADM

## 2021-12-27 RX ORDER — NICOTINE POLACRILEX 4 MG
15 LOZENGE BUCCAL
Status: DISCONTINUED | OUTPATIENT
Start: 2021-12-27 | End: 2022-01-02 | Stop reason: HOSPADM

## 2021-12-27 RX ORDER — HEPARIN SODIUM 5000 [USP'U]/ML
5000 INJECTION, SOLUTION INTRAVENOUS; SUBCUTANEOUS EVERY 12 HOURS SCHEDULED
Status: DISCONTINUED | OUTPATIENT
Start: 2021-12-27 | End: 2022-01-02 | Stop reason: HOSPADM

## 2021-12-27 RX ORDER — NITROGLYCERIN 0.4 MG/1
0.4 TABLET SUBLINGUAL
Status: DISCONTINUED | OUTPATIENT
Start: 2021-12-27 | End: 2022-01-02 | Stop reason: HOSPADM

## 2021-12-27 RX ORDER — FERROUS SULFATE 325(65) MG
325 TABLET ORAL EVERY EVENING
Status: DISCONTINUED | OUTPATIENT
Start: 2021-12-27 | End: 2022-01-02 | Stop reason: HOSPADM

## 2021-12-27 RX ORDER — FUROSEMIDE 10 MG/ML
60 INJECTION INTRAMUSCULAR; INTRAVENOUS ONCE
Status: COMPLETED | OUTPATIENT
Start: 2021-12-27 | End: 2021-12-27

## 2021-12-27 RX ORDER — CETIRIZINE HYDROCHLORIDE 10 MG/1
5 TABLET ORAL DAILY
Status: DISCONTINUED | OUTPATIENT
Start: 2021-12-27 | End: 2022-01-02 | Stop reason: HOSPADM

## 2021-12-27 RX ORDER — CARVEDILOL 6.25 MG/1
6.25 TABLET ORAL 2 TIMES DAILY WITH MEALS
Status: DISCONTINUED | OUTPATIENT
Start: 2021-12-27 | End: 2022-01-02 | Stop reason: HOSPADM

## 2021-12-27 RX ORDER — CYANOCOBALAMIN 1000 UG/ML
1000 INJECTION, SOLUTION INTRAMUSCULAR; SUBCUTANEOUS
Status: DISCONTINUED | OUTPATIENT
Start: 2021-12-27 | End: 2022-01-02 | Stop reason: HOSPADM

## 2021-12-27 RX ORDER — ATORVASTATIN CALCIUM 20 MG/1
20 TABLET, FILM COATED ORAL NIGHTLY
Status: DISCONTINUED | OUTPATIENT
Start: 2021-12-27 | End: 2022-01-02 | Stop reason: HOSPADM

## 2021-12-27 RX ORDER — CALCIUM CARBONATE 500(1250)
500 TABLET ORAL DAILY
Status: DISCONTINUED | OUTPATIENT
Start: 2021-12-27 | End: 2022-01-02 | Stop reason: HOSPADM

## 2021-12-27 RX ORDER — DEXTROSE MONOHYDRATE 25 G/50ML
25 INJECTION, SOLUTION INTRAVENOUS
Status: DISCONTINUED | OUTPATIENT
Start: 2021-12-27 | End: 2022-01-02 | Stop reason: HOSPADM

## 2021-12-27 RX ORDER — SODIUM CHLORIDE 0.9 % (FLUSH) 0.9 %
3 SYRINGE (ML) INJECTION EVERY 12 HOURS SCHEDULED
Status: DISCONTINUED | OUTPATIENT
Start: 2021-12-27 | End: 2022-01-02 | Stop reason: HOSPADM

## 2021-12-27 RX ORDER — SODIUM CHLORIDE 0.9 % (FLUSH) 0.9 %
3-10 SYRINGE (ML) INJECTION AS NEEDED
Status: DISCONTINUED | OUTPATIENT
Start: 2021-12-27 | End: 2022-01-02 | Stop reason: HOSPADM

## 2021-12-27 RX ORDER — HYDRALAZINE HYDROCHLORIDE 10 MG/1
10 TABLET, FILM COATED ORAL 3 TIMES DAILY
Status: DISCONTINUED | OUTPATIENT
Start: 2021-12-27 | End: 2021-12-30

## 2021-12-27 RX ORDER — ASPIRIN 81 MG/1
81 TABLET ORAL DAILY
Status: DISCONTINUED | OUTPATIENT
Start: 2021-12-27 | End: 2022-01-02 | Stop reason: HOSPADM

## 2021-12-27 RX ADMIN — CARVEDILOL 6.25 MG: 6.25 TABLET, FILM COATED ORAL at 18:10

## 2021-12-27 RX ADMIN — ATORVASTATIN CALCIUM 20 MG: 20 TABLET, FILM COATED ORAL at 22:58

## 2021-12-27 RX ADMIN — CYANOCOBALAMIN 1000 MCG: 1000 INJECTION, SOLUTION INTRAMUSCULAR; SUBCUTANEOUS at 18:09

## 2021-12-27 RX ADMIN — PANTOPRAZOLE SODIUM 40 MG: 40 TABLET, DELAYED RELEASE ORAL at 18:10

## 2021-12-27 RX ADMIN — INSULIN ASPART 2 UNITS: 100 INJECTION, SOLUTION INTRAVENOUS; SUBCUTANEOUS at 18:17

## 2021-12-27 RX ADMIN — INSULIN DETEMIR 40 UNITS: 100 INJECTION, SOLUTION SUBCUTANEOUS at 20:33

## 2021-12-27 RX ADMIN — ASPIRIN 81 MG: 81 TABLET, COATED ORAL at 22:57

## 2021-12-27 RX ADMIN — SODIUM CHLORIDE, PRESERVATIVE FREE 3 ML: 5 INJECTION INTRAVENOUS at 20:32

## 2021-12-27 RX ADMIN — CEFTRIAXONE 1 G: 1 INJECTION, POWDER, FOR SOLUTION INTRAMUSCULAR; INTRAVENOUS at 00:59

## 2021-12-27 RX ADMIN — Medication 500 MG: at 18:10

## 2021-12-27 RX ADMIN — FERROUS SULFATE TAB 325 MG (65 MG ELEMENTAL FE) 325 MG: 325 (65 FE) TAB at 18:10

## 2021-12-27 RX ADMIN — CEFDINIR 300 MG: 300 CAPSULE ORAL at 22:56

## 2021-12-27 RX ADMIN — CETIRIZINE HYDROCHLORIDE 5 MG: 10 TABLET, FILM COATED ORAL at 18:10

## 2021-12-27 RX ADMIN — HEPARIN SODIUM 5000 UNITS: 5000 INJECTION INTRAVENOUS; SUBCUTANEOUS at 20:31

## 2021-12-27 RX ADMIN — HYDRALAZINE HYDROCHLORIDE 10 MG: 10 TABLET, FILM COATED ORAL at 20:32

## 2021-12-27 RX ADMIN — FUROSEMIDE 60 MG: 10 INJECTION, SOLUTION INTRAMUSCULAR; INTRAVENOUS at 22:29

## 2021-12-28 PROBLEM — I50.9 ACUTE ON CHRONIC CONGESTIVE HEART FAILURE: Status: ACTIVE | Noted: 2021-12-28

## 2021-12-28 LAB
A-A DO2: 25.4 MMHG (ref 0–300)
A-A DO2: 33 MMHG (ref 0–300)
ABSOLUTE LUNG FLUID CONTENT: 55 % (ref 20–35)
ANION GAP SERPL CALCULATED.3IONS-SCNC: 12.7 MMOL/L (ref 5–15)
ARTERIAL PATENCY WRIST A: POSITIVE
ARTERIAL PATENCY WRIST A: POSITIVE
ATMOSPHERIC PRESS: 722 MMHG
ATMOSPHERIC PRESS: 722 MMHG
BASE EXCESS BLDA CALC-SCNC: 1.6 MMOL/L (ref 0–2)
BASE EXCESS BLDA CALC-SCNC: 3.2 MMOL/L (ref 0–2)
BASOPHILS # BLD AUTO: 0.02 10*3/MM3 (ref 0–0.2)
BASOPHILS NFR BLD AUTO: 0.3 % (ref 0–1.5)
BDY SITE: ABNORMAL
BDY SITE: ABNORMAL
BODY TEMPERATURE: 0 C
BODY TEMPERATURE: 0 C
BUN SERPL-MCNC: 20 MG/DL (ref 8–23)
BUN/CREAT SERPL: 16 (ref 7–25)
CALCIUM SPEC-SCNC: 9.2 MG/DL (ref 8.6–10.5)
CHLORIDE SERPL-SCNC: 106 MMOL/L (ref 98–107)
CO2 BLDA-SCNC: 29.6 MMOL/L (ref 22–33)
CO2 BLDA-SCNC: 30.4 MMOL/L (ref 22–33)
CO2 SERPL-SCNC: 24.3 MMOL/L (ref 22–29)
COHGB MFR BLD: 2 % (ref 0–5)
COHGB MFR BLD: 2 % (ref 0–5)
CREAT SERPL-MCNC: 1.25 MG/DL (ref 0.57–1)
DEPRECATED RDW RBC AUTO: 54.9 FL (ref 37–54)
EOSINOPHIL # BLD AUTO: 0.21 10*3/MM3 (ref 0–0.4)
EOSINOPHIL NFR BLD AUTO: 2.8 % (ref 0.3–6.2)
ERYTHROCYTE [DISTWIDTH] IN BLOOD BY AUTOMATED COUNT: 17.4 % (ref 12.3–15.4)
GFR SERPL CREATININE-BSD FRML MDRD: 42 ML/MIN/1.73
GLUCOSE BLDC GLUCOMTR-MCNC: 117 MG/DL (ref 70–130)
GLUCOSE BLDC GLUCOMTR-MCNC: 173 MG/DL (ref 70–130)
GLUCOSE BLDC GLUCOMTR-MCNC: 80 MG/DL (ref 70–130)
GLUCOSE BLDC GLUCOMTR-MCNC: 90 MG/DL (ref 70–130)
GLUCOSE BLDC GLUCOMTR-MCNC: 96 MG/DL (ref 70–130)
GLUCOSE SERPL-MCNC: 81 MG/DL (ref 65–99)
HCO3 BLDA-SCNC: 27.9 MMOL/L (ref 20–26)
HCO3 BLDA-SCNC: 28.9 MMOL/L (ref 20–26)
HCT VFR BLD AUTO: 28.1 % (ref 34–46.6)
HCT VFR BLD CALC: 25 % (ref 38–51)
HCT VFR BLD CALC: 25.8 % (ref 38–51)
HGB BLD-MCNC: 8.4 G/DL (ref 12–15.9)
HGB BLDA-MCNC: 8.2 G/DL (ref 13.5–17.5)
HGB BLDA-MCNC: 8.4 G/DL (ref 13.5–17.5)
IMM GRANULOCYTES # BLD AUTO: 0.02 10*3/MM3 (ref 0–0.05)
IMM GRANULOCYTES NFR BLD AUTO: 0.3 % (ref 0–0.5)
INHALED O2 CONCENTRATION: 21 %
INHALED O2 CONCENTRATION: 24 %
LYMPHOCYTES # BLD AUTO: 2.61 10*3/MM3 (ref 0.7–3.1)
LYMPHOCYTES NFR BLD AUTO: 34.8 % (ref 19.6–45.3)
Lab: ABNORMAL
MAGNESIUM SERPL-MCNC: 1.4 MG/DL (ref 1.6–2.4)
MCH RBC QN AUTO: 26 PG (ref 26.6–33)
MCHC RBC AUTO-ENTMCNC: 29.9 G/DL (ref 31.5–35.7)
MCV RBC AUTO: 87 FL (ref 79–97)
METHGB BLD QL: 0 % (ref 0–3)
METHGB BLD QL: <-0.1 % (ref 0–3)
MODALITY: ABNORMAL
MODALITY: ABNORMAL
MONOCYTES # BLD AUTO: 0.6 10*3/MM3 (ref 0.1–0.9)
MONOCYTES NFR BLD AUTO: 8 % (ref 5–12)
NEUTROPHILS NFR BLD AUTO: 4.03 10*3/MM3 (ref 1.7–7)
NEUTROPHILS NFR BLD AUTO: 53.8 % (ref 42.7–76)
NOTE: ABNORMAL
NOTE: ABNORMAL
NOTIFIED BY: ABNORMAL
NOTIFIED WHO: ABNORMAL
NRBC BLD AUTO-RTO: 0 /100 WBC (ref 0–0.2)
NT-PROBNP SERPL-MCNC: 2778 PG/ML (ref 0–1800)
OXYHGB MFR BLDV: 85.6 % (ref 94–99)
OXYHGB MFR BLDV: 93.8 % (ref 94–99)
PCO2 BLDA: 49.5 MM HG (ref 35–45)
PCO2 BLDA: 53.1 MM HG (ref 35–45)
PCO2 TEMP ADJ BLD: ABNORMAL MM[HG]
PCO2 TEMP ADJ BLD: ABNORMAL MM[HG]
PH BLDA: 7.33 PH UNITS (ref 7.35–7.45)
PH BLDA: 7.38 PH UNITS (ref 7.35–7.45)
PH, TEMP CORRECTED: ABNORMAL
PH, TEMP CORRECTED: ABNORMAL
PHOSPHATE SERPL-MCNC: 3.6 MG/DL (ref 2.5–4.5)
PLATELET # BLD AUTO: 413 10*3/MM3 (ref 140–450)
PMV BLD AUTO: 10.6 FL (ref 6–12)
PO2 BLDA: 53 MM HG (ref 83–108)
PO2 BLDA: 76.9 MM HG (ref 83–108)
PO2 TEMP ADJ BLD: ABNORMAL MM[HG]
PO2 TEMP ADJ BLD: ABNORMAL MM[HG]
POTASSIUM SERPL-SCNC: 4 MMOL/L (ref 3.5–5.2)
QT INTERVAL: 414 MS
QTC INTERVAL: 480 MS
RBC # BLD AUTO: 3.23 10*6/MM3 (ref 3.77–5.28)
SAO2 % BLDCOA: 87 % (ref 94–99)
SAO2 % BLDCOA: 95.6 % (ref 94–99)
SODIUM SERPL-SCNC: 143 MMOL/L (ref 136–145)
TROPONIN T SERPL-MCNC: 0.01 NG/ML (ref 0–0.03)
VENTILATOR MODE: ABNORMAL
VENTILATOR MODE: ABNORMAL
WBC NRBC COR # BLD: 7.49 10*3/MM3 (ref 3.4–10.8)

## 2021-12-28 PROCEDURE — 84484 ASSAY OF TROPONIN QUANT: CPT | Performed by: INTERNAL MEDICINE

## 2021-12-28 PROCEDURE — 94660 CPAP INITIATION&MGMT: CPT

## 2021-12-28 PROCEDURE — 82962 GLUCOSE BLOOD TEST: CPT

## 2021-12-28 PROCEDURE — 97162 PT EVAL MOD COMPLEX 30 MIN: CPT

## 2021-12-28 PROCEDURE — 25010000002 DIPHENHYDRAMINE PER 50 MG: Performed by: PHYSICIAN ASSISTANT

## 2021-12-28 PROCEDURE — 99232 SBSQ HOSP IP/OBS MODERATE 35: CPT | Performed by: INTERNAL MEDICINE

## 2021-12-28 PROCEDURE — 36600 WITHDRAWAL OF ARTERIAL BLOOD: CPT

## 2021-12-28 PROCEDURE — 0 TECHNETIUM SESTAMIBI: Performed by: INTERNAL MEDICINE

## 2021-12-28 PROCEDURE — 94726 PLETHYSMOGRAPHY LUNG VOLUMES: CPT

## 2021-12-28 PROCEDURE — 85025 COMPLETE CBC W/AUTO DIFF WBC: CPT | Performed by: INTERNAL MEDICINE

## 2021-12-28 PROCEDURE — 80048 BASIC METABOLIC PNL TOTAL CA: CPT | Performed by: INTERNAL MEDICINE

## 2021-12-28 PROCEDURE — 83880 ASSAY OF NATRIURETIC PEPTIDE: CPT | Performed by: INTERNAL MEDICINE

## 2021-12-28 PROCEDURE — 82805 BLOOD GASES W/O2 SATURATION: CPT

## 2021-12-28 PROCEDURE — 83735 ASSAY OF MAGNESIUM: CPT | Performed by: INTERNAL MEDICINE

## 2021-12-28 PROCEDURE — 25010000002 MAGNESIUM SULFATE 2 GM/50ML SOLUTION

## 2021-12-28 PROCEDURE — 25010000002 FUROSEMIDE PER 20 MG: Performed by: INTERNAL MEDICINE

## 2021-12-28 PROCEDURE — 25010000002 HEPARIN (PORCINE) PER 1000 UNITS: Performed by: INTERNAL MEDICINE

## 2021-12-28 PROCEDURE — 83050 HGB METHEMOGLOBIN QUAN: CPT

## 2021-12-28 PROCEDURE — 84100 ASSAY OF PHOSPHORUS: CPT | Performed by: INTERNAL MEDICINE

## 2021-12-28 PROCEDURE — 82375 ASSAY CARBOXYHB QUANT: CPT

## 2021-12-28 PROCEDURE — 94799 UNLISTED PULMONARY SVC/PX: CPT

## 2021-12-28 PROCEDURE — A9500 TC99M SESTAMIBI: HCPCS | Performed by: INTERNAL MEDICINE

## 2021-12-28 RX ORDER — MAGNESIUM SULFATE HEPTAHYDRATE 40 MG/ML
4 INJECTION, SOLUTION INTRAVENOUS AS NEEDED
Status: DISCONTINUED | OUTPATIENT
Start: 2021-12-28 | End: 2022-01-02 | Stop reason: HOSPADM

## 2021-12-28 RX ORDER — LORAZEPAM 2 MG/ML
0.5 INJECTION INTRAMUSCULAR ONCE
Status: DISCONTINUED | OUTPATIENT
Start: 2021-12-28 | End: 2021-12-28

## 2021-12-28 RX ORDER — MAGNESIUM SULFATE HEPTAHYDRATE 40 MG/ML
2 INJECTION, SOLUTION INTRAVENOUS AS NEEDED
Status: DISCONTINUED | OUTPATIENT
Start: 2021-12-28 | End: 2022-01-02 | Stop reason: HOSPADM

## 2021-12-28 RX ORDER — MAGNESIUM SULFATE HEPTAHYDRATE 40 MG/ML
2 INJECTION, SOLUTION INTRAVENOUS
Status: COMPLETED | OUTPATIENT
Start: 2021-12-28 | End: 2021-12-28

## 2021-12-28 RX ORDER — DIPHENHYDRAMINE HYDROCHLORIDE 50 MG/ML
25 INJECTION INTRAMUSCULAR; INTRAVENOUS ONCE
Status: COMPLETED | OUTPATIENT
Start: 2021-12-28 | End: 2021-12-28

## 2021-12-28 RX ORDER — FUROSEMIDE 10 MG/ML
60 INJECTION INTRAMUSCULAR; INTRAVENOUS EVERY 12 HOURS
Status: DISCONTINUED | OUTPATIENT
Start: 2021-12-28 | End: 2021-12-30

## 2021-12-28 RX ADMIN — FERROUS SULFATE TAB 325 MG (65 MG ELEMENTAL FE) 325 MG: 325 (65 FE) TAB at 17:29

## 2021-12-28 RX ADMIN — FUROSEMIDE 60 MG: 10 INJECTION, SOLUTION INTRAMUSCULAR; INTRAVENOUS at 21:43

## 2021-12-28 RX ADMIN — MAGNESIUM SULFATE HEPTAHYDRATE 2 G: 40 INJECTION, SOLUTION INTRAVENOUS at 20:06

## 2021-12-28 RX ADMIN — CEFDINIR 300 MG: 300 CAPSULE ORAL at 20:16

## 2021-12-28 RX ADMIN — PANTOPRAZOLE SODIUM 40 MG: 40 TABLET, DELAYED RELEASE ORAL at 17:29

## 2021-12-28 RX ADMIN — HEPARIN SODIUM 5000 UNITS: 5000 INJECTION INTRAVENOUS; SUBCUTANEOUS at 20:14

## 2021-12-28 RX ADMIN — ATORVASTATIN CALCIUM 20 MG: 20 TABLET, FILM COATED ORAL at 20:15

## 2021-12-28 RX ADMIN — MAGNESIUM SULFATE HEPTAHYDRATE 2 G: 40 INJECTION, SOLUTION INTRAVENOUS at 17:31

## 2021-12-28 RX ADMIN — DIPHENHYDRAMINE HYDROCHLORIDE 25 MG: 50 INJECTION, SOLUTION INTRAMUSCULAR; INTRAVENOUS at 01:23

## 2021-12-28 RX ADMIN — CARVEDILOL 6.25 MG: 6.25 TABLET, FILM COATED ORAL at 17:29

## 2021-12-28 RX ADMIN — TECHNETIUM TC 99M SESTAMIBI 1 DOSE: 1 INJECTION INTRAVENOUS at 09:40

## 2021-12-28 RX ADMIN — DIPHENHYDRAMINE HYDROCHLORIDE 25 MG: 50 INJECTION, SOLUTION INTRAMUSCULAR; INTRAVENOUS at 21:43

## 2021-12-28 RX ADMIN — HYDRALAZINE HYDROCHLORIDE 10 MG: 10 TABLET, FILM COATED ORAL at 17:29

## 2021-12-28 RX ADMIN — MAGNESIUM SULFATE HEPTAHYDRATE 2 G: 40 INJECTION, SOLUTION INTRAVENOUS at 21:51

## 2021-12-28 RX ADMIN — SODIUM CHLORIDE, PRESERVATIVE FREE 3 ML: 5 INJECTION INTRAVENOUS at 20:16

## 2021-12-28 RX ADMIN — SODIUM CHLORIDE, PRESERVATIVE FREE 3 ML: 5 INJECTION INTRAVENOUS at 09:46

## 2021-12-29 LAB
A-A DO2: 50.3 MMHG (ref 0–300)
ABSOLUTE LUNG FLUID CONTENT: 40 % (ref 20–35)
ANION GAP SERPL CALCULATED.3IONS-SCNC: 13.3 MMOL/L (ref 5–15)
ARTERIAL PATENCY WRIST A: ABNORMAL
ATMOSPHERIC PRESS: 719 MMHG
BACTERIA SPEC AEROBE CULT: ABNORMAL
BASE EXCESS BLDA CALC-SCNC: 1.6 MMOL/L (ref 0–2)
BDY SITE: ABNORMAL
BODY TEMPERATURE: 0 C
BUN SERPL-MCNC: 17 MG/DL (ref 8–23)
BUN/CREAT SERPL: 16.2 (ref 7–25)
CALCIUM SPEC-SCNC: 8.6 MG/DL (ref 8.6–10.5)
CHLORIDE SERPL-SCNC: 103 MMOL/L (ref 98–107)
CO2 BLDA-SCNC: 29.5 MMOL/L (ref 22–33)
CO2 SERPL-SCNC: 23.7 MMOL/L (ref 22–29)
COHGB MFR BLD: 2.1 % (ref 0–5)
CREAT SERPL-MCNC: 1.05 MG/DL (ref 0.57–1)
DEPRECATED RDW RBC AUTO: 56.6 FL (ref 37–54)
EPAP: 6
ERYTHROCYTE [DISTWIDTH] IN BLOOD BY AUTOMATED COUNT: 17.3 % (ref 12.3–15.4)
GFR SERPL CREATININE-BSD FRML MDRD: 51 ML/MIN/1.73
GLUCOSE BLDC GLUCOMTR-MCNC: 189 MG/DL (ref 70–130)
GLUCOSE BLDC GLUCOMTR-MCNC: 190 MG/DL (ref 70–130)
GLUCOSE BLDC GLUCOMTR-MCNC: 266 MG/DL (ref 70–130)
GLUCOSE BLDC GLUCOMTR-MCNC: 305 MG/DL (ref 70–130)
GLUCOSE SERPL-MCNC: 269 MG/DL (ref 65–99)
HCO3 BLDA-SCNC: 27.9 MMOL/L (ref 20–26)
HCT VFR BLD AUTO: 28.4 % (ref 34–46.6)
HCT VFR BLD CALC: 26.6 % (ref 38–51)
HGB BLD-MCNC: 8.3 G/DL (ref 12–15.9)
HGB BLDA-MCNC: 8.7 G/DL (ref 13.5–17.5)
INHALED O2 CONCENTRATION: 28 %
IPAP: 18
Lab: ABNORMAL
MAGNESIUM SERPL-MCNC: 2.9 MG/DL (ref 1.6–2.4)
MCH RBC QN AUTO: 26 PG (ref 26.6–33)
MCHC RBC AUTO-ENTMCNC: 29.2 G/DL (ref 31.5–35.7)
MCV RBC AUTO: 89 FL (ref 79–97)
METHGB BLD QL: 0.3 % (ref 0–3)
MODALITY: ABNORMAL
NOTE: ABNORMAL
OXYHGB MFR BLDV: 93.1 % (ref 94–99)
PCO2 BLDA: 52.4 MM HG (ref 35–45)
PCO2 TEMP ADJ BLD: ABNORMAL MM[HG]
PH BLDA: 7.33 PH UNITS (ref 7.35–7.45)
PH, TEMP CORRECTED: ABNORMAL
PHOSPHATE SERPL-MCNC: 3.2 MG/DL (ref 2.5–4.5)
PLATELET # BLD AUTO: 372 10*3/MM3 (ref 140–450)
PMV BLD AUTO: 10.4 FL (ref 6–12)
PO2 BLDA: 79.2 MM HG (ref 83–108)
PO2 TEMP ADJ BLD: ABNORMAL MM[HG]
POTASSIUM SERPL-SCNC: 4.4 MMOL/L (ref 3.5–5.2)
RBC # BLD AUTO: 3.19 10*6/MM3 (ref 3.77–5.28)
SAO2 % BLDCOA: 95.4 % (ref 94–99)
SET MECH RESP RATE: 20
SODIUM SERPL-SCNC: 140 MMOL/L (ref 136–145)
VENTILATOR MODE: ABNORMAL
WBC NRBC COR # BLD: 8.37 10*3/MM3 (ref 3.4–10.8)

## 2021-12-29 PROCEDURE — 83735 ASSAY OF MAGNESIUM: CPT | Performed by: INTERNAL MEDICINE

## 2021-12-29 PROCEDURE — 80048 BASIC METABOLIC PNL TOTAL CA: CPT | Performed by: INTERNAL MEDICINE

## 2021-12-29 PROCEDURE — 94799 UNLISTED PULMONARY SVC/PX: CPT

## 2021-12-29 PROCEDURE — 82962 GLUCOSE BLOOD TEST: CPT

## 2021-12-29 PROCEDURE — 25010000002 HEPARIN (PORCINE) PER 1000 UNITS: Performed by: INTERNAL MEDICINE

## 2021-12-29 PROCEDURE — 63710000001 INSULIN ASPART PER 5 UNITS: Performed by: INTERNAL MEDICINE

## 2021-12-29 PROCEDURE — 99232 SBSQ HOSP IP/OBS MODERATE 35: CPT | Performed by: INTERNAL MEDICINE

## 2021-12-29 PROCEDURE — 36600 WITHDRAWAL OF ARTERIAL BLOOD: CPT

## 2021-12-29 PROCEDURE — 25010000002 FUROSEMIDE PER 20 MG: Performed by: INTERNAL MEDICINE

## 2021-12-29 PROCEDURE — 82375 ASSAY CARBOXYHB QUANT: CPT

## 2021-12-29 PROCEDURE — 85027 COMPLETE CBC AUTOMATED: CPT | Performed by: INTERNAL MEDICINE

## 2021-12-29 PROCEDURE — 94660 CPAP INITIATION&MGMT: CPT

## 2021-12-29 PROCEDURE — 84100 ASSAY OF PHOSPHORUS: CPT | Performed by: INTERNAL MEDICINE

## 2021-12-29 PROCEDURE — 83050 HGB METHEMOGLOBIN QUAN: CPT

## 2021-12-29 PROCEDURE — 63710000001 INSULIN DETEMIR PER 5 UNITS: Performed by: INTERNAL MEDICINE

## 2021-12-29 PROCEDURE — 94726 PLETHYSMOGRAPHY LUNG VOLUMES: CPT

## 2021-12-29 PROCEDURE — 82805 BLOOD GASES W/O2 SATURATION: CPT

## 2021-12-29 RX ADMIN — SODIUM CHLORIDE, PRESERVATIVE FREE 3 ML: 5 INJECTION INTRAVENOUS at 20:15

## 2021-12-29 RX ADMIN — FUROSEMIDE 60 MG: 10 INJECTION, SOLUTION INTRAMUSCULAR; INTRAVENOUS at 11:08

## 2021-12-29 RX ADMIN — FUROSEMIDE 60 MG: 10 INJECTION, SOLUTION INTRAMUSCULAR; INTRAVENOUS at 20:14

## 2021-12-29 RX ADMIN — ATORVASTATIN CALCIUM 20 MG: 20 TABLET, FILM COATED ORAL at 20:14

## 2021-12-29 RX ADMIN — PANTOPRAZOLE SODIUM 40 MG: 40 TABLET, DELAYED RELEASE ORAL at 17:29

## 2021-12-29 RX ADMIN — HYDRALAZINE HYDROCHLORIDE 10 MG: 10 TABLET, FILM COATED ORAL at 17:29

## 2021-12-29 RX ADMIN — CARVEDILOL 6.25 MG: 6.25 TABLET, FILM COATED ORAL at 08:22

## 2021-12-29 RX ADMIN — HYDRALAZINE HYDROCHLORIDE 10 MG: 10 TABLET, FILM COATED ORAL at 20:14

## 2021-12-29 RX ADMIN — SODIUM CHLORIDE, PRESERVATIVE FREE 3 ML: 5 INJECTION INTRAVENOUS at 08:23

## 2021-12-29 RX ADMIN — FERROUS SULFATE TAB 325 MG (65 MG ELEMENTAL FE) 325 MG: 325 (65 FE) TAB at 17:29

## 2021-12-29 RX ADMIN — ASPIRIN 81 MG: 81 TABLET, COATED ORAL at 08:22

## 2021-12-29 RX ADMIN — CEFDINIR 300 MG: 300 CAPSULE ORAL at 08:22

## 2021-12-29 RX ADMIN — INSULIN ASPART 2 UNITS: 100 INJECTION, SOLUTION INTRAVENOUS; SUBCUTANEOUS at 08:22

## 2021-12-29 RX ADMIN — Medication 500 MG: at 08:23

## 2021-12-29 RX ADMIN — HYDRALAZINE HYDROCHLORIDE 10 MG: 10 TABLET, FILM COATED ORAL at 08:23

## 2021-12-29 RX ADMIN — INSULIN ASPART 6 UNITS: 100 INJECTION, SOLUTION INTRAVENOUS; SUBCUTANEOUS at 18:40

## 2021-12-29 RX ADMIN — CARVEDILOL 6.25 MG: 6.25 TABLET, FILM COATED ORAL at 17:29

## 2021-12-29 RX ADMIN — HEPARIN SODIUM 5000 UNITS: 5000 INJECTION INTRAVENOUS; SUBCUTANEOUS at 20:14

## 2021-12-29 RX ADMIN — INSULIN DETEMIR 40 UNITS: 100 INJECTION, SOLUTION SUBCUTANEOUS at 08:48

## 2021-12-29 RX ADMIN — CEFDINIR 300 MG: 300 CAPSULE ORAL at 20:14

## 2021-12-29 RX ADMIN — HEPARIN SODIUM 5000 UNITS: 5000 INJECTION INTRAVENOUS; SUBCUTANEOUS at 08:23

## 2021-12-29 RX ADMIN — PANTOPRAZOLE SODIUM 40 MG: 40 TABLET, DELAYED RELEASE ORAL at 08:22

## 2021-12-29 RX ADMIN — EMPAGLIFLOZIN 10 MG: 10 TABLET, FILM COATED ORAL at 17:29

## 2021-12-29 RX ADMIN — CETIRIZINE HYDROCHLORIDE 5 MG: 10 TABLET, FILM COATED ORAL at 08:22

## 2021-12-29 RX ADMIN — INSULIN ASPART 4 UNITS: 100 INJECTION, SOLUTION INTRAVENOUS; SUBCUTANEOUS at 11:08

## 2021-12-30 LAB
ABSOLUTE LUNG FLUID CONTENT: 39 % (ref 20–35)
ANION GAP SERPL CALCULATED.3IONS-SCNC: 11.2 MMOL/L (ref 5–15)
BUN SERPL-MCNC: 17 MG/DL (ref 8–23)
BUN/CREAT SERPL: 12 (ref 7–25)
CALCIUM SPEC-SCNC: 9 MG/DL (ref 8.6–10.5)
CHLORIDE SERPL-SCNC: 101 MMOL/L (ref 98–107)
CO2 SERPL-SCNC: 29.8 MMOL/L (ref 22–29)
CREAT SERPL-MCNC: 1.42 MG/DL (ref 0.57–1)
GFR SERPL CREATININE-BSD FRML MDRD: 36 ML/MIN/1.73
GLUCOSE BLDC GLUCOMTR-MCNC: 112 MG/DL (ref 70–130)
GLUCOSE BLDC GLUCOMTR-MCNC: 136 MG/DL (ref 70–130)
GLUCOSE BLDC GLUCOMTR-MCNC: 160 MG/DL (ref 70–130)
GLUCOSE BLDC GLUCOMTR-MCNC: 182 MG/DL (ref 70–130)
GLUCOSE SERPL-MCNC: 136 MG/DL (ref 65–99)
MAGNESIUM SERPL-MCNC: 2 MG/DL (ref 1.6–2.4)
PHOSPHATE SERPL-MCNC: 4 MG/DL (ref 2.5–4.5)
POTASSIUM SERPL-SCNC: 3.8 MMOL/L (ref 3.5–5.2)
SODIUM SERPL-SCNC: 142 MMOL/L (ref 136–145)

## 2021-12-30 PROCEDURE — 94660 CPAP INITIATION&MGMT: CPT

## 2021-12-30 PROCEDURE — 94726 PLETHYSMOGRAPHY LUNG VOLUMES: CPT

## 2021-12-30 PROCEDURE — 25010000002 DIPHENHYDRAMINE PER 50 MG: Performed by: PHYSICIAN ASSISTANT

## 2021-12-30 PROCEDURE — 99232 SBSQ HOSP IP/OBS MODERATE 35: CPT | Performed by: INTERNAL MEDICINE

## 2021-12-30 PROCEDURE — 94799 UNLISTED PULMONARY SVC/PX: CPT

## 2021-12-30 PROCEDURE — 25010000002 HEPARIN (PORCINE) PER 1000 UNITS: Performed by: INTERNAL MEDICINE

## 2021-12-30 PROCEDURE — 83735 ASSAY OF MAGNESIUM: CPT | Performed by: INTERNAL MEDICINE

## 2021-12-30 PROCEDURE — 63710000001 INSULIN ASPART PER 5 UNITS: Performed by: INTERNAL MEDICINE

## 2021-12-30 PROCEDURE — 82962 GLUCOSE BLOOD TEST: CPT

## 2021-12-30 PROCEDURE — 80048 BASIC METABOLIC PNL TOTAL CA: CPT | Performed by: INTERNAL MEDICINE

## 2021-12-30 PROCEDURE — 84100 ASSAY OF PHOSPHORUS: CPT | Performed by: INTERNAL MEDICINE

## 2021-12-30 PROCEDURE — 97166 OT EVAL MOD COMPLEX 45 MIN: CPT

## 2021-12-30 PROCEDURE — 97110 THERAPEUTIC EXERCISES: CPT

## 2021-12-30 PROCEDURE — 63710000001 INSULIN DETEMIR PER 5 UNITS: Performed by: INTERNAL MEDICINE

## 2021-12-30 PROCEDURE — 97116 GAIT TRAINING THERAPY: CPT

## 2021-12-30 RX ORDER — HYDRALAZINE HYDROCHLORIDE 25 MG/1
25 TABLET, FILM COATED ORAL 3 TIMES DAILY
Status: DISCONTINUED | OUTPATIENT
Start: 2021-12-30 | End: 2022-01-02 | Stop reason: HOSPADM

## 2021-12-30 RX ORDER — DIPHENHYDRAMINE HYDROCHLORIDE 50 MG/ML
50 INJECTION INTRAMUSCULAR; INTRAVENOUS ONCE
Status: COMPLETED | OUTPATIENT
Start: 2021-12-30 | End: 2021-12-30

## 2021-12-30 RX ADMIN — CARVEDILOL 6.25 MG: 6.25 TABLET, FILM COATED ORAL at 09:07

## 2021-12-30 RX ADMIN — PANTOPRAZOLE SODIUM 40 MG: 40 TABLET, DELAYED RELEASE ORAL at 17:11

## 2021-12-30 RX ADMIN — FERROUS SULFATE TAB 325 MG (65 MG ELEMENTAL FE) 325 MG: 325 (65 FE) TAB at 17:11

## 2021-12-30 RX ADMIN — EMPAGLIFLOZIN 10 MG: 10 TABLET, FILM COATED ORAL at 09:07

## 2021-12-30 RX ADMIN — INSULIN DETEMIR 40 UNITS: 100 INJECTION, SOLUTION SUBCUTANEOUS at 09:08

## 2021-12-30 RX ADMIN — CARVEDILOL 6.25 MG: 6.25 TABLET, FILM COATED ORAL at 17:11

## 2021-12-30 RX ADMIN — PANTOPRAZOLE SODIUM 40 MG: 40 TABLET, DELAYED RELEASE ORAL at 09:06

## 2021-12-30 RX ADMIN — Medication 500 MG: at 09:07

## 2021-12-30 RX ADMIN — SODIUM CHLORIDE, PRESERVATIVE FREE 3 ML: 5 INJECTION INTRAVENOUS at 21:08

## 2021-12-30 RX ADMIN — ATORVASTATIN CALCIUM 20 MG: 20 TABLET, FILM COATED ORAL at 21:08

## 2021-12-30 RX ADMIN — ASPIRIN 81 MG: 81 TABLET, COATED ORAL at 09:07

## 2021-12-30 RX ADMIN — CEFDINIR 300 MG: 300 CAPSULE ORAL at 21:08

## 2021-12-30 RX ADMIN — DIPHENHYDRAMINE HYDROCHLORIDE 50 MG: 50 INJECTION, SOLUTION INTRAMUSCULAR; INTRAVENOUS at 01:14

## 2021-12-30 RX ADMIN — HEPARIN SODIUM 5000 UNITS: 5000 INJECTION INTRAVENOUS; SUBCUTANEOUS at 21:08

## 2021-12-30 RX ADMIN — CEFDINIR 300 MG: 300 CAPSULE ORAL at 09:06

## 2021-12-30 RX ADMIN — INSULIN ASPART 2 UNITS: 100 INJECTION, SOLUTION INTRAVENOUS; SUBCUTANEOUS at 11:46

## 2021-12-30 RX ADMIN — HYDRALAZINE HYDROCHLORIDE 25 MG: 25 TABLET, FILM COATED ORAL at 21:08

## 2021-12-30 RX ADMIN — HYDRALAZINE HYDROCHLORIDE 25 MG: 25 TABLET, FILM COATED ORAL at 17:11

## 2021-12-30 RX ADMIN — HYDRALAZINE HYDROCHLORIDE 10 MG: 10 TABLET, FILM COATED ORAL at 09:07

## 2021-12-30 RX ADMIN — HEPARIN SODIUM 5000 UNITS: 5000 INJECTION INTRAVENOUS; SUBCUTANEOUS at 09:07

## 2021-12-30 RX ADMIN — CETIRIZINE HYDROCHLORIDE 5 MG: 10 TABLET, FILM COATED ORAL at 09:07

## 2021-12-31 ENCOUNTER — APPOINTMENT (OUTPATIENT)
Dept: NUCLEAR MEDICINE | Facility: HOSPITAL | Age: 76
End: 2021-12-31

## 2021-12-31 LAB
ABSOLUTE LUNG FLUID CONTENT: 41 % (ref 20–35)
ANION GAP SERPL CALCULATED.3IONS-SCNC: 10.7 MMOL/L (ref 5–15)
BACTERIA SPEC AEROBE CULT: NORMAL
BACTERIA SPEC AEROBE CULT: NORMAL
BUN SERPL-MCNC: 17 MG/DL (ref 8–23)
BUN/CREAT SERPL: 13.9 (ref 7–25)
CALCIUM SPEC-SCNC: 8.6 MG/DL (ref 8.6–10.5)
CHLORIDE SERPL-SCNC: 100 MMOL/L (ref 98–107)
CO2 SERPL-SCNC: 28.3 MMOL/L (ref 22–29)
CREAT SERPL-MCNC: 1.22 MG/DL (ref 0.57–1)
GFR SERPL CREATININE-BSD FRML MDRD: 43 ML/MIN/1.73
GLUCOSE BLDC GLUCOMTR-MCNC: 132 MG/DL (ref 70–130)
GLUCOSE BLDC GLUCOMTR-MCNC: 132 MG/DL (ref 70–130)
GLUCOSE BLDC GLUCOMTR-MCNC: 284 MG/DL (ref 70–130)
GLUCOSE BLDC GLUCOMTR-MCNC: 301 MG/DL (ref 70–130)
GLUCOSE SERPL-MCNC: 131 MG/DL (ref 65–99)
HCT VFR BLD AUTO: 28 % (ref 34–46.6)
HGB BLD-MCNC: 8.1 G/DL (ref 12–15.9)
MAGNESIUM SERPL-MCNC: 2 MG/DL (ref 1.6–2.4)
NT-PROBNP SERPL-MCNC: 1465 PG/ML (ref 0–1800)
PHOSPHATE SERPL-MCNC: 4.4 MG/DL (ref 2.5–4.5)
POTASSIUM SERPL-SCNC: 3.8 MMOL/L (ref 3.5–5.2)
SODIUM SERPL-SCNC: 139 MMOL/L (ref 136–145)
TROPONIN T SERPL-MCNC: 0.02 NG/ML (ref 0–0.03)

## 2021-12-31 PROCEDURE — 63710000001 INSULIN ASPART PER 5 UNITS: Performed by: INTERNAL MEDICINE

## 2021-12-31 PROCEDURE — 63710000001 INSULIN DETEMIR PER 5 UNITS: Performed by: INTERNAL MEDICINE

## 2021-12-31 PROCEDURE — 94726 PLETHYSMOGRAPHY LUNG VOLUMES: CPT

## 2021-12-31 PROCEDURE — 85014 HEMATOCRIT: CPT | Performed by: INTERNAL MEDICINE

## 2021-12-31 PROCEDURE — 99232 SBSQ HOSP IP/OBS MODERATE 35: CPT | Performed by: INTERNAL MEDICINE

## 2021-12-31 PROCEDURE — 25010000002 HEPARIN (PORCINE) PER 1000 UNITS: Performed by: INTERNAL MEDICINE

## 2021-12-31 PROCEDURE — 83735 ASSAY OF MAGNESIUM: CPT | Performed by: INTERNAL MEDICINE

## 2021-12-31 PROCEDURE — 94799 UNLISTED PULMONARY SVC/PX: CPT

## 2021-12-31 PROCEDURE — 83880 ASSAY OF NATRIURETIC PEPTIDE: CPT | Performed by: INTERNAL MEDICINE

## 2021-12-31 PROCEDURE — 84484 ASSAY OF TROPONIN QUANT: CPT | Performed by: INTERNAL MEDICINE

## 2021-12-31 PROCEDURE — 85018 HEMOGLOBIN: CPT | Performed by: INTERNAL MEDICINE

## 2021-12-31 PROCEDURE — 80048 BASIC METABOLIC PNL TOTAL CA: CPT | Performed by: INTERNAL MEDICINE

## 2021-12-31 PROCEDURE — 84100 ASSAY OF PHOSPHORUS: CPT | Performed by: INTERNAL MEDICINE

## 2021-12-31 PROCEDURE — 82962 GLUCOSE BLOOD TEST: CPT

## 2021-12-31 RX ORDER — FUROSEMIDE 40 MG/1
40 TABLET ORAL
Status: DISCONTINUED | OUTPATIENT
Start: 2021-12-31 | End: 2022-01-02 | Stop reason: HOSPADM

## 2021-12-31 RX ADMIN — FUROSEMIDE 40 MG: 40 TABLET ORAL at 13:38

## 2021-12-31 RX ADMIN — PANTOPRAZOLE SODIUM 40 MG: 40 TABLET, DELAYED RELEASE ORAL at 16:45

## 2021-12-31 RX ADMIN — Medication 500 MG: at 12:10

## 2021-12-31 RX ADMIN — FERROUS SULFATE TAB 325 MG (65 MG ELEMENTAL FE) 325 MG: 325 (65 FE) TAB at 16:45

## 2021-12-31 RX ADMIN — HEPARIN SODIUM 5000 UNITS: 5000 INJECTION INTRAVENOUS; SUBCUTANEOUS at 20:29

## 2021-12-31 RX ADMIN — PANTOPRAZOLE SODIUM 40 MG: 40 TABLET, DELAYED RELEASE ORAL at 08:47

## 2021-12-31 RX ADMIN — ATORVASTATIN CALCIUM 20 MG: 20 TABLET, FILM COATED ORAL at 20:29

## 2021-12-31 RX ADMIN — FUROSEMIDE 40 MG: 40 TABLET ORAL at 16:45

## 2021-12-31 RX ADMIN — CEFDINIR 300 MG: 300 CAPSULE ORAL at 12:11

## 2021-12-31 RX ADMIN — SODIUM CHLORIDE, PRESERVATIVE FREE 3 ML: 5 INJECTION INTRAVENOUS at 08:46

## 2021-12-31 RX ADMIN — ASPIRIN 81 MG: 81 TABLET, COATED ORAL at 12:10

## 2021-12-31 RX ADMIN — HEPARIN SODIUM 5000 UNITS: 5000 INJECTION INTRAVENOUS; SUBCUTANEOUS at 08:46

## 2021-12-31 RX ADMIN — SODIUM CHLORIDE, PRESERVATIVE FREE 3 ML: 5 INJECTION INTRAVENOUS at 20:29

## 2021-12-31 RX ADMIN — CETIRIZINE HYDROCHLORIDE 5 MG: 10 TABLET, FILM COATED ORAL at 12:11

## 2021-12-31 RX ADMIN — CARVEDILOL 6.25 MG: 6.25 TABLET, FILM COATED ORAL at 16:45

## 2021-12-31 RX ADMIN — HYDRALAZINE HYDROCHLORIDE 25 MG: 25 TABLET, FILM COATED ORAL at 16:45

## 2021-12-31 RX ADMIN — CARVEDILOL 6.25 MG: 6.25 TABLET, FILM COATED ORAL at 12:10

## 2021-12-31 RX ADMIN — CEFDINIR 300 MG: 300 CAPSULE ORAL at 20:29

## 2021-12-31 RX ADMIN — EMPAGLIFLOZIN 10 MG: 10 TABLET, FILM COATED ORAL at 12:11

## 2021-12-31 RX ADMIN — INSULIN ASPART 5 UNITS: 100 INJECTION, SOLUTION INTRAVENOUS; SUBCUTANEOUS at 16:46

## 2021-12-31 RX ADMIN — HYDRALAZINE HYDROCHLORIDE 25 MG: 25 TABLET, FILM COATED ORAL at 20:29

## 2022-01-01 LAB
ABSOLUTE LUNG FLUID CONTENT: 41 % (ref 20–35)
ANION GAP SERPL CALCULATED.3IONS-SCNC: 10 MMOL/L (ref 5–15)
BUN SERPL-MCNC: 19 MG/DL (ref 8–23)
BUN/CREAT SERPL: 15 (ref 7–25)
CALCIUM SPEC-SCNC: 8.6 MG/DL (ref 8.6–10.5)
CHLORIDE SERPL-SCNC: 97 MMOL/L (ref 98–107)
CO2 SERPL-SCNC: 31 MMOL/L (ref 22–29)
CREAT SERPL-MCNC: 1.27 MG/DL (ref 0.57–1)
GFR SERPL CREATININE-BSD FRML MDRD: 41 ML/MIN/1.73
GLUCOSE BLDC GLUCOMTR-MCNC: 162 MG/DL (ref 70–130)
GLUCOSE BLDC GLUCOMTR-MCNC: 199 MG/DL (ref 70–130)
GLUCOSE BLDC GLUCOMTR-MCNC: 224 MG/DL (ref 70–130)
GLUCOSE BLDC GLUCOMTR-MCNC: 378 MG/DL (ref 70–130)
GLUCOSE SERPL-MCNC: 193 MG/DL (ref 65–99)
MAGNESIUM SERPL-MCNC: 1.8 MG/DL (ref 1.6–2.4)
NT-PROBNP SERPL-MCNC: 1822 PG/ML (ref 0–1800)
POTASSIUM SERPL-SCNC: 4 MMOL/L (ref 3.5–5.2)
SODIUM SERPL-SCNC: 138 MMOL/L (ref 136–145)

## 2022-01-01 PROCEDURE — 63710000001 INSULIN ASPART PER 5 UNITS: Performed by: INTERNAL MEDICINE

## 2022-01-01 PROCEDURE — 82962 GLUCOSE BLOOD TEST: CPT

## 2022-01-01 PROCEDURE — 80048 BASIC METABOLIC PNL TOTAL CA: CPT | Performed by: INTERNAL MEDICINE

## 2022-01-01 PROCEDURE — 63710000001 INSULIN DETEMIR PER 5 UNITS: Performed by: INTERNAL MEDICINE

## 2022-01-01 PROCEDURE — 99232 SBSQ HOSP IP/OBS MODERATE 35: CPT | Performed by: PHYSICIAN ASSISTANT

## 2022-01-01 PROCEDURE — 25010000002 DIPHENHYDRAMINE PER 50 MG: Performed by: PHYSICIAN ASSISTANT

## 2022-01-01 PROCEDURE — 94799 UNLISTED PULMONARY SVC/PX: CPT

## 2022-01-01 PROCEDURE — 99232 SBSQ HOSP IP/OBS MODERATE 35: CPT | Performed by: INTERNAL MEDICINE

## 2022-01-01 PROCEDURE — 25010000002 HEPARIN (PORCINE) PER 1000 UNITS: Performed by: INTERNAL MEDICINE

## 2022-01-01 PROCEDURE — 83880 ASSAY OF NATRIURETIC PEPTIDE: CPT | Performed by: INTERNAL MEDICINE

## 2022-01-01 PROCEDURE — 83735 ASSAY OF MAGNESIUM: CPT | Performed by: INTERNAL MEDICINE

## 2022-01-01 PROCEDURE — 94726 PLETHYSMOGRAPHY LUNG VOLUMES: CPT

## 2022-01-01 PROCEDURE — 94660 CPAP INITIATION&MGMT: CPT

## 2022-01-01 PROCEDURE — 0 MAGNESIUM SULFATE 4 GM/100ML SOLUTION: Performed by: INTERNAL MEDICINE

## 2022-01-01 RX ORDER — ISOSORBIDE MONONITRATE 30 MG/1
30 TABLET, EXTENDED RELEASE ORAL
Status: DISCONTINUED | OUTPATIENT
Start: 2022-01-01 | End: 2022-01-02 | Stop reason: HOSPADM

## 2022-01-01 RX ORDER — DIPHENHYDRAMINE HYDROCHLORIDE 50 MG/ML
50 INJECTION INTRAMUSCULAR; INTRAVENOUS ONCE
Status: COMPLETED | OUTPATIENT
Start: 2022-01-01 | End: 2022-01-01

## 2022-01-01 RX ORDER — MAGNESIUM SULFATE HEPTAHYDRATE 40 MG/ML
4 INJECTION, SOLUTION INTRAVENOUS ONCE
Status: COMPLETED | OUTPATIENT
Start: 2022-01-01 | End: 2022-01-01

## 2022-01-01 RX ADMIN — HYDRALAZINE HYDROCHLORIDE 25 MG: 25 TABLET, FILM COATED ORAL at 20:14

## 2022-01-01 RX ADMIN — PANTOPRAZOLE SODIUM 40 MG: 40 TABLET, DELAYED RELEASE ORAL at 17:54

## 2022-01-01 RX ADMIN — CEFDINIR 300 MG: 300 CAPSULE ORAL at 20:14

## 2022-01-01 RX ADMIN — HYDRALAZINE HYDROCHLORIDE 25 MG: 25 TABLET, FILM COATED ORAL at 17:54

## 2022-01-01 RX ADMIN — EMPAGLIFLOZIN 10 MG: 10 TABLET, FILM COATED ORAL at 08:48

## 2022-01-01 RX ADMIN — SODIUM CHLORIDE, PRESERVATIVE FREE 3 ML: 5 INJECTION INTRAVENOUS at 08:50

## 2022-01-01 RX ADMIN — HYDRALAZINE HYDROCHLORIDE 25 MG: 25 TABLET, FILM COATED ORAL at 08:48

## 2022-01-01 RX ADMIN — FUROSEMIDE 40 MG: 40 TABLET ORAL at 17:55

## 2022-01-01 RX ADMIN — CEFDINIR 300 MG: 300 CAPSULE ORAL at 08:49

## 2022-01-01 RX ADMIN — ISOSORBIDE MONONITRATE 30 MG: 30 TABLET, EXTENDED RELEASE ORAL at 13:22

## 2022-01-01 RX ADMIN — CARVEDILOL 6.25 MG: 6.25 TABLET, FILM COATED ORAL at 08:49

## 2022-01-01 RX ADMIN — CARVEDILOL 6.25 MG: 6.25 TABLET, FILM COATED ORAL at 17:54

## 2022-01-01 RX ADMIN — PANTOPRAZOLE SODIUM 40 MG: 40 TABLET, DELAYED RELEASE ORAL at 08:49

## 2022-01-01 RX ADMIN — ATORVASTATIN CALCIUM 20 MG: 20 TABLET, FILM COATED ORAL at 20:14

## 2022-01-01 RX ADMIN — Medication 500 MG: at 08:49

## 2022-01-01 RX ADMIN — DIPHENHYDRAMINE HYDROCHLORIDE 50 MG: 50 INJECTION, SOLUTION INTRAMUSCULAR; INTRAVENOUS at 20:14

## 2022-01-01 RX ADMIN — INSULIN ASPART 3 UNITS: 100 INJECTION, SOLUTION INTRAVENOUS; SUBCUTANEOUS at 17:54

## 2022-01-01 RX ADMIN — INSULIN ASPART 2 UNITS: 100 INJECTION, SOLUTION INTRAVENOUS; SUBCUTANEOUS at 08:48

## 2022-01-01 RX ADMIN — FERROUS SULFATE TAB 325 MG (65 MG ELEMENTAL FE) 325 MG: 325 (65 FE) TAB at 17:54

## 2022-01-01 RX ADMIN — CETIRIZINE HYDROCHLORIDE 5 MG: 10 TABLET, FILM COATED ORAL at 08:49

## 2022-01-01 RX ADMIN — MAGNESIUM SULFATE HEPTAHYDRATE 4 G: 40 INJECTION, SOLUTION INTRAVENOUS at 05:20

## 2022-01-01 RX ADMIN — INSULIN ASPART 2 UNITS: 100 INJECTION, SOLUTION INTRAVENOUS; SUBCUTANEOUS at 17:54

## 2022-01-01 RX ADMIN — INSULIN ASPART 6 UNITS: 100 INJECTION, SOLUTION INTRAVENOUS; SUBCUTANEOUS at 11:19

## 2022-01-01 RX ADMIN — HEPARIN SODIUM 5000 UNITS: 5000 INJECTION INTRAVENOUS; SUBCUTANEOUS at 08:49

## 2022-01-01 RX ADMIN — FUROSEMIDE 40 MG: 40 TABLET ORAL at 09:18

## 2022-01-01 RX ADMIN — INSULIN DETEMIR 40 UNITS: 100 INJECTION, SOLUTION SUBCUTANEOUS at 08:50

## 2022-01-01 RX ADMIN — HEPARIN SODIUM 5000 UNITS: 5000 INJECTION INTRAVENOUS; SUBCUTANEOUS at 20:14

## 2022-01-01 NOTE — PLAN OF CARE
Goal Outcome Evaluation:   Pt resting on and off this shift. Continuing to bladder train. Will continue to follow plan of care.

## 2022-01-01 NOTE — PROGRESS NOTES
ReDS Value     Date: 01/01/22     ReDS Value: 41    36-41 Possible Hypervolemic Status        Abdias Brunner, RRT

## 2022-01-01 NOTE — PROGRESS NOTES
[facility] HOSPITALIST PROGRESS NOTE     Patient Identification:  Name:  Albina Finley  Age:  76 y.o.  Sex:  female  :  1945  MRN:  [id number]  Visit Number:  [id number]  ROOM: [address] [place] [address]     Primary Care Provider:  Micah Núñez PA     Date of Admission: 2021    Length of stay in inpatient status:  4    Subjective     Chief Compliant:    Chief Complaint   Patient presents with   • Shortness of Breath   • Wheezing     History of Presenting Illness:  In brief,76-year-old female admitted on 2021 with shortness of air; please note that she was in the ER for about a day before she came upstairs to the hospitalist service.  She was found in the emergency department to have an acute diastolic CHF exacerbation as well as an acute urinary tract infection.  We been aggressively giving the patient diuresis.  We did not give any diuresis on 2021 as the patient's creatinine had increased.  We also give the patient oral Omnicef for the UTI; so far, the urine culture is growing E. coli sensitive to all antibiotics tested.    Over last 24 hours, patient has received 40 mg of oral Lasix.  The patient states that she feels okay.  The patient has dementia and as a result I can only get a limited history from her.  Please note that one of her daughters is again at bedside.  The daughter states that the patient ate breakfast fairly well and that she is not really heard her mom cough.  The patient thinks that her legs are less swollen.    Objective     Current Hospital Meds:aspirin, 81 mg, Oral, Daily  atorvastatin, 20 mg, Oral, Nightly  calcium carbonate (oyster shell), 500 mg, Oral, Daily  carvedilol, 6.25 mg, Oral, BID With Meals  cefdinir, 300 mg, Oral, Q12H  cetirizine, 5 mg, Oral, Daily  cyanocobalamin, 1,000 mcg, Intramuscular, Q30 Days  empagliflozin, 10 mg, Oral, Daily  ferrous sulfate, 325 mg, Oral, Q PM  furosemide, 40 mg, Oral, BID  heparin (porcine), 5,000 Units, Subcutaneous,  Q12H  hydrALAZINE, 25 mg, Oral, TID  insulin aspart, 0-7 Units, Subcutaneous, TID AC  insulin detemir, 40 Units, Subcutaneous, Daily  isosorbide mononitrate, 30 mg, Oral, Q24H  pantoprazole, 40 mg, Oral, BID AC  sodium chloride, 3 mL, Intravenous, Q12H       Current Antimicrobial Therapy:  Anti-Infectives (From admission, onward)    Ordered     Dose/Rate Route Frequency Start Stop    12/27/21 2103  cefdinir (OMNICEF) capsule 300 mg        Ordering Provider: Manfred Smith MD    300 mg Oral Every 12 Hours Scheduled 12/27/21 2200 01/03/22 2059 12/27/21 0035  cefTRIAXone (ROCEPHIN) 1 g in sodium chloride 0.9 % 100 mL IVPB-VTB        Ordering Provider: Saad Montaño MD    1 g  200 mL/hr over 30 Minutes Intravenous Once 12/27/21 0037 12/27/21 0134        Current Diuretic Therapy:  Diuretics (From admission, onward)    Ordered     Dose/Rate Route Frequency Start Stop    12/31/21 1147  furosemide (LASIX) tablet 40 mg        Ordering Provider: Manfred Smith MD    40 mg Oral 2 Times Daily (Diuretics) 12/31/21 1245      12/27/21 2128  furosemide (LASIX) injection 60 mg        Ordering Provider: Manfred Smith MD    60 mg Intravenous Once 12/27/21 2215 12/27/21 2229 12/26/21 2012  furosemide (LASIX) injection 80 mg        Ordering Provider: Saad Montaño MD    80 mg Intravenous Once 12/26/21 2014 12/26/21 2024        ----------------------------------------------------------------------------------------------------------------------  Vital Signs:  Temp:  [98 °F (36.7 °C)-99.1 °F (37.3 °C)] 98.2 °F (36.8 °C)  Heart Rate:  [72-82] 82  Resp:  [18-20] 20  BP: (119-162)/(43-55) 119/49  SpO2:  [94 %-99 %] 97 %  on  Flow (L/min):  [2.5] 2.5;   Device (Oxygen Therapy): nasal cannula  Body mass index is 30.69 kg/m².    Wt Readings from Last 3 Encounters:   01/01/22 73.7 kg (162 lb 6.4 oz)   11/26/21 73.4 kg (161 lb 13.1 oz)   11/26/21 73.4 kg (161 lb 14.4 oz)     Intake & Output (last 3  days)       12/29 0701 12/30 0700 12/30 0701 12/31 0700 12/31 0701 01/01 0700 01/01 0701 01/02 0700    P.O. 760 820 0 240    Total Intake(mL/kg) 760 (10) 820 (11.2) 0 (0) 240 (3.3)    Urine (mL/kg/hr) 2700 (1.5) 1250 (0.7) 3075 (1.7) 700 (1.3)    Stool 0 0  0    Total Output 2700 1250 3075 700    Net -1940 -430 -3075 -460            Stool Unmeasured Occurrence 1 x 1 x  3 x        Diet Regular; Consistent Carbohydrate, Low Sodium  ----------------------------------------------------------------------------------------------------------------------  Physical Exam; her exam is unchanged compared to yesterday  Constitutional:       Appearance: She is well-developed. She is morbidly obese. She is not ill-appearing.      Interventions: Nasal cannula in place.   Cardiovascular:      Rate and Rhythm: Regular rhythm.      Pulses: Normal pulses.      Heart sounds: No murmur heard.   Pulmonary:      Effort: Much less respiratory distress is present today.      Breath sounds: Rales present but much improved compared to 2 days ago. No wheezing.   Musculoskeletal:      Right lower leg: Edema present.      Left lower leg: Edema present.      Comments: Minimal pitting edema today of the bilateral legs.      When first admitted, the patient had 1-2+ pitting edema of her bilateral legs; currently, she has slightly less than 1+ pitting edema.  Skin:     Capillary Refill: Capillary refill takes less than 2 seconds.      Coloration: Skin is not jaundiced or pale.   Neurological:      Mental Status: She is alert. Mental status is at baseline. She is disoriented.      Cranial Nerves: No cranial nerve deficit.   Psychiatric:         Behavior: Behavior normal. Behavior is cooperative.   ----------------------------------------------------------------------------------------------------------------------  Tele:  Normal sinus rhythm with heart rates in the 70s.  I have personally reviewed/looked at the telemetry  strips.  ----------------------------------------------------------------------------------------------------------------------  LABS:    CBC and coagulation:  Results from last 7 days   Lab Units 12/31/21  0441 12/29/21  0110 12/28/21  0252 12/27/21 2115 12/26/21 2022   LACTATE mmol/L  --   --   --   --  0.5   CRP mg/dL  --   --   --   --  3.87*   WBC 10*3/mm3  --  8.37 7.49 8.23 8.74   HEMOGLOBIN g/dL 8.1* 8.3* 8.4* 8.1* 8.9*   HEMATOCRIT % 28.0* 28.4* 28.1* 27.2* 30.7*   MCV fL  --  89.0 87.0 87.7 89.8   MCHC g/dL  --  29.2* 29.9* 29.8* 29.0*   PLATELETS 10*3/mm3  --  372 413 374 389   D DIMER QUANT MCGFEU/mL  --   --   --   --  0.94*     Acid/base balance:  Results from last 7 days   Lab Units 12/29/21  0112 12/28/21  0409 12/28/21  0036 12/27/21  2203 12/27/21  0947 12/26/21  1857   PH, ARTERIAL pH units 7.334* 7.375 7.329* 7.300* 7.374 7.308*   PO2 ART mm Hg 79.2* 53.0* 76.9* 95.2 60.0* 121.0*   PCO2, ARTERIAL mm Hg 52.4* 49.5* 53.1* 54.4* 44.4 48.4*   HCO3 ART mmol/L 27.9* 28.9* 27.9* 26.8* 25.9 24.2     Renal and electrolytes:  Results from last 7 days   Lab Units 01/01/22  0252 12/31/21  0441 12/30/21  0111 12/29/21  0110 12/28/21  0252 12/27/21 2115 12/26/21 2022   SODIUM mmol/L 138 139 142 140 143 140 141   POTASSIUM mmol/L 4.0 3.8 3.8 4.4 4.0 4.5 4.7   MAGNESIUM mg/dL 1.8 2.0 2.0 2.9* 1.4* 1.4*  --    CHLORIDE mmol/L 97* 100 101 103 106 106 108*   CO2 mmol/L 31.0* 28.3 29.8* 23.7 24.3 23.6 22.4   BUN mg/dL 19 17 17 17 20 22 23   CREATININE mg/dL 1.27* 1.22* 1.42* 1.05* 1.25* 1.40* 1.27*   EGFR IF NONAFRICN AM mL/min/1.73 41* 43* 36* 51* 42* 36* 41*   CALCIUM mg/dL 8.6 8.6 9.0 8.6 9.2 9.0 8.9   PHOSPHORUS mg/dL  --  4.4 4.0 3.2 3.6 3.7  --    GLUCOSE mg/dL 193* 131* 136* 269* 81 156* 133*     Estimated Creatinine Clearance: 34.1 mL/min (A) (by C-G formula based on SCr of 1.27 mg/dL (H)).    Liver and pancreatic function:  Results from last 7 days   Lab Units 12/26/21 2022   ALBUMIN g/dL 3.55    BILIRUBIN mg/dL <0.2   ALK PHOS U/L 120*   AST (SGOT) U/L 8   ALT (SGPT) U/L 8     Endocrine function:  Lab Results   Component Value Date    HGBA1C 9.20 (H) 11/13/2021     Point of care bedside glucose levels:  Results from last 7 days   Lab Units 01/01/22  1010 01/01/22  0616 12/31/21  2032 12/31/21  1617 12/31/21  1025 12/31/21  0602 12/30/21  1905 12/30/21  1545 12/30/21  0956 12/30/21  0602 12/29/21  1829 12/29/21  1615 12/29/21  0959 12/29/21  0617   GLUCOSE mg/dL 378* 162* 284* 301* 132* 132* 160* 112 182* 136* 305* 190* 266* 189*     Glucose levels from the CMP:  Results from last 7 days   Lab Units 01/01/22  0252 12/31/21  0441 12/30/21  0111 12/29/21  0110 12/28/21  0252 12/27/21 2115 12/26/21 2022   GLUCOSE mg/dL 193* 131* 136* 269* 81 156* 133*     Lab Results   Component Value Date    TSH 1.840 02/04/2021    FREET4 1.38 02/04/2021     Cardiac:  Results from last 7 days   Lab Units 01/01/22  0252 12/31/21  0441 12/28/21  0252 12/27/21 2115 12/26/21 2022   TROPONIN T ng/mL  --  0.021 0.013 <0.010 <0.010   PROBNP pg/mL 1,822.0* 1,465.0 2,778.0*  --  2,647.0*         I have personally looked at the labs and they are summarized above.    Assessment & Plan      -Acute diastolic CHF exacerbation that was present on admission  -Acute E. coli urinary tract infection, pansensitive all antibiotics tested  -Acute hypomagnesemia  -History of abnormal stress test in December 2019  -History of duodenum with 1 cm ulcer that caused an upper GI bleed that led to acute blood loss anemia  -History of chronic Iron deficiency anemia  -History of CKD Stage III, baseline Cr 1-1.2  -History of parenchymal nodule in the left upper lung slightly larger than 3/2019 (1.5 cm to 1.71 cm)  -History of nonobstructive coronary artery disease  -History of severe pulmonary hypertension  -History of essential hypertension  -History of type 2 diabetes mellitus  -History of COPD with no acute exacerbation  -Former tobacco smoker with a  30-pack-year history, quit over 2 decades ago  -History of nighttime hypoxia    I will continue with the Lasix 40 mg by mouth twice a day.  We will obtain electrolytes in the morning.  If the patient does well and her creatinine remains stable, then she may be able to go home.  We will continue with daily weights and strict input and output measurements.  We will continue trending the creatinine as we give her diuresis.  She should be ending her treatment for the E. coli urinary tract infection soon.  Her glucose levels are higher in the afternoon evening time after she is eaten.  Therefore, I will add 3 units of NovoLog with meals and I will increase the Levemir to 42 units daily.  We will continue to monitor her input and output closely.  Please note that her blood pressures are currently controlled at this time.  I have reviewed the ReDs vest measurements and today it is again 41%.  We will continue replacing the magnesium per replacement protocol.  We will repeat the blood work in the morning.     VTE Prophylaxis:   Mechanical Order History:     None      Pharmalogical Order History:      Ordered     Dose Route Frequency Stop    12/27/21 1703  heparin (porcine) 5000 UNIT/ML injection 5,000 Units         5,000 Units SC Every 12 Hours Scheduled --            Disposition: Anticipate home with family per their request    Manfred Smith MD  [facility] Hospitalist  01/01/22  14:24 EST

## 2022-01-01 NOTE — PLAN OF CARE
Goal Outcome Evaluation:   Patient sitting in a chair with family at bedside. No complaints voiced at this time. Vital signs stable. Will continue to monitor and follow plan of care.

## 2022-01-01 NOTE — PROGRESS NOTES
LOS: 4 days     Name: Albina Finley  Age/Sex: 76 y.o. female  :  1945        PCP: Micah Núñez PA  REF: No Known Provider    Principal Problem:    CHF exacerbation (HCC)  Active Problems:    Acute on chronic congestive heart failure (HCC)      Reason for follow-up: acute on chronic HFpEF    Subjective       Subjective       Interval History: Patient sitting in chair during examination.  She reports her breathing is about at baseline although she is wearing oxygen via nasal cannula.  However, she reports she does wear oxygen at home but does not wear it continuously.  She did have reportedly 3 L out yesterday but unfortunately did not have any intake reported on chart.  She denies any chest pains currently.    ROS    Vital Signs  Temp:  [98 °F (36.7 °C)-99.1 °F (37.3 °C)] 98 °F (36.7 °C)  Heart Rate:  [70-79] 73  Resp:  [18-20] 20  BP: (120-162)/(43-55) 162/51  Vital Signs (last 72 hrs)        0700   0659  0700   0659  0700   0659  0700   1010   Most Recent      Temp (°F) 97.6 -  98.4    97.7 -  98.2    98 -  99.1       98 (36.7)  0611    Heart Rate 79 -  89    71 -  88    70 -  79       73  0611    Resp 16 -  20    18 -  20    18 -  20       20  0611    /48 -  153/59    118/57 -  165/59    120/43 -  162/51       162/51  0611    SpO2 (%) 92 -  98    95 -  98    94 -  99       97  0658        Documented weights    21 1820 21 1656 21 0500 21 0452   Weight: 78.9 kg (174 lb) 77 kg (169 lb 12.8 oz) 78.3 kg (172 lb 9.6 oz) 75.6 kg (166 lb 11.2 oz)    21 0443 21 0433 22 0500   Weight: 75.8 kg (167 lb 3.2 oz) 73.5 kg (162 lb 1.6 oz) 73.7 kg (162 lb 6.4 oz)      Body mass index is 30.69 kg/m².    Intake/Output Summary (Last 24 hours) at 2022 1010  Last data filed at 2022 0800  Gross per 24 hour   Intake 240 ml   Output 2975 ml   Net -2735 ml     Objective    Objective       Physical  Exam:     General Appearance:    Alert, cooperative, in no acute distress.  She does have Bird catheter in place   Head:    Normocephalic, without obvious abnormality, atraumatic   Eyes:            Conjunctivae and sclerae normal, no   icterus, no pallor, corneas clear.   Neck:   No adenopathy, supple, trachea midline, no thyromegaly, no   carotid bruit, no JVD   Lungs:    Lung sounds do seem improved but are diminished,respirations regular, even and  unlabored.  Wearing oxygen via nasal cannula    Heart:    Regular rhythm and normal rate, normal S1 and S2, no            murmur, no gallop, no rub, no click   Chest Wall:    No abnormalities observed   Abdomen:     Normal bowel sounds, no masses, no organomegaly, soft        non-tender, non-distended, no guarding, no rebound                tenderness   Extremities:   Moves all extremities well, 1+, no cyanosis, no             redness   Pulses:   Pulses palpable and equal bilaterally   Skin:   No bleeding, bruising or rash       Neurologic:  Alert and oriented x3          Procedures    Results review       Results Review:   Results from last 7 days   Lab Units 12/31/21  0441 12/29/21  0110 12/28/21 0252 12/27/21 2115 12/26/21 2022   WBC 10*3/mm3  --  8.37 7.49 8.23 8.74   HEMOGLOBIN g/dL 8.1* 8.3* 8.4* 8.1* 8.9*   PLATELETS 10*3/mm3  --  372 413 374 389     Results from last 7 days   Lab Units 01/01/22  0252 12/31/21  0441 12/30/21  0111 12/29/21  0110 12/28/21 0252 12/27/21 2115 12/26/21 2022   SODIUM mmol/L 138 139 142 140 143 140 141   POTASSIUM mmol/L 4.0 3.8 3.8 4.4 4.0 4.5 4.7   CHLORIDE mmol/L 97* 100 101 103 106 106 108*   CO2 mmol/L 31.0* 28.3 29.8* 23.7 24.3 23.6 22.4   BUN mg/dL 19 17 17 17 20 22 23   CREATININE mg/dL 1.27* 1.22* 1.42* 1.05* 1.25* 1.40* 1.27*   CALCIUM mg/dL 8.6 8.6 9.0 8.6 9.2 9.0 8.9   GLUCOSE mg/dL 193* 131* 136* 269* 81 156* 133*   ALT (SGPT) U/L  --   --   --   --   --   --  8   AST (SGOT) U/L  --   --   --   --   --   --  8      Results from last 7 days   Lab Units 12/31/21  0441 12/28/21  0252 12/27/21  2115 12/26/21 2022   TROPONIN T ng/mL 0.021 0.013 <0.010 <0.010     Lab Results   Component Value Date    INR 1.14 (H) 11/20/2021    INR 1.06 11/18/2021    INR 1.04 11/17/2021    INR 0.97 11/13/2021    INR 1.22 (H) 04/24/2020    INR 1.21 (H) 04/21/2020    INR 0.93 12/19/2019     Lab Results   Component Value Date    MG 1.8 01/01/2022    MG 2.0 12/31/2021    MG 2.0 12/30/2021     Lab Results   Component Value Date    TSH 1.840 02/04/2021    TRIG 214 (H) 11/16/2021    HDL 27 (L) 11/16/2021    LDL 77 11/16/2021      Imaging Results (Last 48 Hours)     ** No results found for the last 48 hours. **        Lab Results   Component Value Date    .0 (H) 02/14/2019       Lab Results   Component Value Date    ABSOLUTELUNG 41 (A) 12/31/2021         Echo   Results for orders placed during the hospital encounter of 11/13/21    Adult Transthoracic Echo Complete W/ Cont if Necessary Per Protocol    Interpretation Summary  · Estimated left ventricular EF = 65% Left ventricular ejection fraction appears to be 61 - 65%. Left ventricular systolic function is normal.  · Left ventricular diastolic function was normal.  · Estimated right ventricular systolic pressure from tricuspid regurgitation is markedly elevated (>55 mmHg).  · Severe pulmonary hypertension is present.  · No significant mitral or aortic regurgitation  · No pericardial effusion  · Since previous study of 12/17/19 Severe pulmonary hypertension is now present      Telemetry:     I reviewed the patient's new clinical results.    Medication Review:   aspirin, 81 mg, Oral, Daily  atorvastatin, 20 mg, Oral, Nightly  calcium carbonate (oyster shell), 500 mg, Oral, Daily  carvedilol, 6.25 mg, Oral, BID With Meals  cefdinir, 300 mg, Oral, Q12H  cetirizine, 5 mg, Oral, Daily  cyanocobalamin, 1,000 mcg, Intramuscular, Q30 Days  empagliflozin, 10 mg, Oral, Daily  ferrous sulfate, 325 mg, Oral,  Q PM  furosemide, 40 mg, Oral, BID  heparin (porcine), 5,000 Units, Subcutaneous, Q12H  hydrALAZINE, 25 mg, Oral, TID  insulin aspart, 0-7 Units, Subcutaneous, TID AC  insulin detemir, 40 Units, Subcutaneous, Daily  pantoprazole, 40 mg, Oral, BID AC  sodium chloride, 3 mL, Intravenous, Q12H             Assessment      Assessment:  1.  Acute on chronic diastolic HF.  NYHA class III.  Clinically improving.  2.  Multiple cardiac risk factors  3.  Severe pulmonary hypertension  4.  OPHELIA on CKD  5.  Previous history of upper GI bleed  6.  UTI         Plan     Recommendations:  1. Continue aspirin, coreg, hydralazine, Jardiance, and lipitor.  Patient declined stress testing so we will still proceed with just medical therapy for now.  2. Although she is chest pain-free I will start Imdur 30 mg daily today for full BiDil equivalent since she is unable to tolerate ACE/ARB due to CKD.  3. She is responding well to p.o. Lasix as well was -3 L yesterday however input was not documented I still suspect she was overall still negative in regards to I&O.       I discussed the patients findings and my recommendations with patient and family    Dominick Lopez PA-C   I discussed the case and plan of care of with Dr. Stuart who reviewed, examined, and agreed with plan.      01/01/22  10:10 EST    Electronically signed by Dominick Lopez PA-C, 01/01/22, 10:10 AM EST.

## 2022-01-02 ENCOUNTER — READMISSION MANAGEMENT (OUTPATIENT)
Dept: CALL CENTER | Facility: HOSPITAL | Age: 77
End: 2022-01-02

## 2022-01-02 VITALS
SYSTOLIC BLOOD PRESSURE: 113 MMHG | WEIGHT: 160 LBS | HEIGHT: 61 IN | TEMPERATURE: 98 F | RESPIRATION RATE: 18 BRPM | DIASTOLIC BLOOD PRESSURE: 42 MMHG | OXYGEN SATURATION: 98 % | BODY MASS INDEX: 30.21 KG/M2 | HEART RATE: 78 BPM

## 2022-01-02 PROBLEM — J44.1 COPD WITH ACUTE EXACERBATION (HCC): Status: RESOLVED | Noted: 2019-12-16 | Resolved: 2022-01-02

## 2022-01-02 PROBLEM — I50.9 CHF EXACERBATION (HCC): Status: RESOLVED | Noted: 2021-12-27 | Resolved: 2022-01-02

## 2022-01-02 PROBLEM — I50.9 ACUTE ON CHRONIC CONGESTIVE HEART FAILURE: Status: RESOLVED | Noted: 2021-12-28 | Resolved: 2022-01-02

## 2022-01-02 LAB
ANION GAP SERPL CALCULATED.3IONS-SCNC: 12.2 MMOL/L (ref 5–15)
BUN SERPL-MCNC: 22 MG/DL (ref 8–23)
BUN/CREAT SERPL: 16.2 (ref 7–25)
CALCIUM SPEC-SCNC: 9 MG/DL (ref 8.6–10.5)
CHLORIDE SERPL-SCNC: 94 MMOL/L (ref 98–107)
CO2 SERPL-SCNC: 28.8 MMOL/L (ref 22–29)
CREAT SERPL-MCNC: 1.36 MG/DL (ref 0.57–1)
DEPRECATED RDW RBC AUTO: 54.2 FL (ref 37–54)
ERYTHROCYTE [DISTWIDTH] IN BLOOD BY AUTOMATED COUNT: 17.2 % (ref 12.3–15.4)
GFR SERPL CREATININE-BSD FRML MDRD: 38 ML/MIN/1.73
GLUCOSE BLDC GLUCOMTR-MCNC: 163 MG/DL (ref 70–130)
GLUCOSE BLDC GLUCOMTR-MCNC: 164 MG/DL (ref 70–130)
GLUCOSE SERPL-MCNC: 160 MG/DL (ref 65–99)
HCT VFR BLD AUTO: 31.3 % (ref 34–46.6)
HGB BLD-MCNC: 9.4 G/DL (ref 12–15.9)
MAGNESIUM SERPL-MCNC: 2.4 MG/DL (ref 1.6–2.4)
MCH RBC QN AUTO: 26 PG (ref 26.6–33)
MCHC RBC AUTO-ENTMCNC: 30 G/DL (ref 31.5–35.7)
MCV RBC AUTO: 86.5 FL (ref 79–97)
NT-PROBNP SERPL-MCNC: 1098 PG/ML (ref 0–1800)
PHOSPHATE SERPL-MCNC: 3.7 MG/DL (ref 2.5–4.5)
PLATELET # BLD AUTO: 389 10*3/MM3 (ref 140–450)
PMV BLD AUTO: 11.3 FL (ref 6–12)
POTASSIUM SERPL-SCNC: 4.3 MMOL/L (ref 3.5–5.2)
RBC # BLD AUTO: 3.62 10*6/MM3 (ref 3.77–5.28)
SODIUM SERPL-SCNC: 135 MMOL/L (ref 136–145)
WBC NRBC COR # BLD: 7.8 10*3/MM3 (ref 3.4–10.8)

## 2022-01-02 PROCEDURE — 94660 CPAP INITIATION&MGMT: CPT

## 2022-01-02 PROCEDURE — 83880 ASSAY OF NATRIURETIC PEPTIDE: CPT | Performed by: INTERNAL MEDICINE

## 2022-01-02 PROCEDURE — 25010000002 HEPARIN (PORCINE) PER 1000 UNITS: Performed by: INTERNAL MEDICINE

## 2022-01-02 PROCEDURE — 85027 COMPLETE CBC AUTOMATED: CPT | Performed by: INTERNAL MEDICINE

## 2022-01-02 PROCEDURE — 99232 SBSQ HOSP IP/OBS MODERATE 35: CPT | Performed by: PHYSICIAN ASSISTANT

## 2022-01-02 PROCEDURE — 80048 BASIC METABOLIC PNL TOTAL CA: CPT | Performed by: INTERNAL MEDICINE

## 2022-01-02 PROCEDURE — 63710000001 INSULIN DETEMIR PER 5 UNITS: Performed by: INTERNAL MEDICINE

## 2022-01-02 PROCEDURE — 99239 HOSP IP/OBS DSCHRG MGMT >30: CPT | Performed by: INTERNAL MEDICINE

## 2022-01-02 PROCEDURE — 94799 UNLISTED PULMONARY SVC/PX: CPT

## 2022-01-02 PROCEDURE — 25010000002 LORAZEPAM PER 2 MG

## 2022-01-02 PROCEDURE — 84100 ASSAY OF PHOSPHORUS: CPT | Performed by: INTERNAL MEDICINE

## 2022-01-02 PROCEDURE — 83735 ASSAY OF MAGNESIUM: CPT | Performed by: INTERNAL MEDICINE

## 2022-01-02 PROCEDURE — 63710000001 INSULIN ASPART PER 5 UNITS: Performed by: INTERNAL MEDICINE

## 2022-01-02 PROCEDURE — 82962 GLUCOSE BLOOD TEST: CPT

## 2022-01-02 RX ORDER — ISOSORBIDE MONONITRATE 30 MG/1
30 TABLET, EXTENDED RELEASE ORAL
Qty: 30 TABLET | Refills: 0 | Status: SHIPPED | OUTPATIENT
Start: 2022-01-03

## 2022-01-02 RX ORDER — LORAZEPAM 2 MG/ML
0.5 INJECTION INTRAMUSCULAR ONCE
Status: COMPLETED | OUTPATIENT
Start: 2022-01-02 | End: 2022-01-02

## 2022-01-02 RX ORDER — ATORVASTATIN CALCIUM 20 MG/1
20 TABLET, FILM COATED ORAL NIGHTLY
Qty: 30 TABLET | Refills: 0 | Status: SHIPPED | OUTPATIENT
Start: 2022-01-02

## 2022-01-02 RX ORDER — LORAZEPAM 2 MG/ML
INJECTION INTRAMUSCULAR
Status: COMPLETED
Start: 2022-01-02 | End: 2022-01-02

## 2022-01-02 RX ORDER — CEFDINIR 300 MG/1
300 CAPSULE ORAL EVERY 12 HOURS SCHEDULED
Qty: 3 CAPSULE | Refills: 0 | Status: SHIPPED | OUTPATIENT
Start: 2022-01-02 | End: 2022-01-04

## 2022-01-02 RX ORDER — FUROSEMIDE 40 MG/1
40 TABLET ORAL 2 TIMES DAILY
Qty: 60 TABLET | Refills: 0 | Status: SHIPPED | OUTPATIENT
Start: 2022-01-02 | End: 2022-01-10 | Stop reason: SDUPTHER

## 2022-01-02 RX ADMIN — HEPARIN SODIUM 5000 UNITS: 5000 INJECTION INTRAVENOUS; SUBCUTANEOUS at 09:04

## 2022-01-02 RX ADMIN — ASPIRIN 81 MG: 81 TABLET, COATED ORAL at 09:03

## 2022-01-02 RX ADMIN — CARVEDILOL 6.25 MG: 6.25 TABLET, FILM COATED ORAL at 09:03

## 2022-01-02 RX ADMIN — PANTOPRAZOLE SODIUM 40 MG: 40 TABLET, DELAYED RELEASE ORAL at 09:03

## 2022-01-02 RX ADMIN — CEFDINIR 300 MG: 300 CAPSULE ORAL at 09:03

## 2022-01-02 RX ADMIN — FUROSEMIDE 40 MG: 40 TABLET ORAL at 09:03

## 2022-01-02 RX ADMIN — EMPAGLIFLOZIN 10 MG: 10 TABLET, FILM COATED ORAL at 09:03

## 2022-01-02 RX ADMIN — INSULIN ASPART 3 UNITS: 100 INJECTION, SOLUTION INTRAVENOUS; SUBCUTANEOUS at 09:03

## 2022-01-02 RX ADMIN — HYDRALAZINE HYDROCHLORIDE 25 MG: 25 TABLET, FILM COATED ORAL at 09:03

## 2022-01-02 RX ADMIN — Medication 500 MG: at 09:03

## 2022-01-02 RX ADMIN — LORAZEPAM 0.5 MG: 2 INJECTION INTRAMUSCULAR at 00:43

## 2022-01-02 RX ADMIN — INSULIN DETEMIR 42 UNITS: 100 INJECTION, SOLUTION SUBCUTANEOUS at 09:04

## 2022-01-02 RX ADMIN — INSULIN ASPART 2 UNITS: 100 INJECTION, SOLUTION INTRAVENOUS; SUBCUTANEOUS at 09:03

## 2022-01-02 RX ADMIN — SODIUM CHLORIDE, PRESERVATIVE FREE 3 ML: 5 INJECTION INTRAVENOUS at 09:04

## 2022-01-02 RX ADMIN — ISOSORBIDE MONONITRATE 30 MG: 30 TABLET, EXTENDED RELEASE ORAL at 09:03

## 2022-01-02 RX ADMIN — LORAZEPAM 0.5 MG: 2 INJECTION INTRAMUSCULAR; INTRAVENOUS at 00:43

## 2022-01-02 RX ADMIN — CETIRIZINE HYDROCHLORIDE 5 MG: 10 TABLET, FILM COATED ORAL at 09:03

## 2022-01-02 NOTE — PLAN OF CARE
Pt restless, anxious, and confused most of the night and trying to exit bed w/o assistance. Bladder training in process.

## 2022-01-02 NOTE — NURSING NOTE
[facility] notified that patient has been discharged. She will need Continuance of care. They would like for Us to fax the discharge summary to [phone]. Discharge summary not complete at this time they are aware. Will leave with unit secretary.

## 2022-01-02 NOTE — DISCHARGE SUMMARY
[facility] HOSPITALISTS DISCHARGE SUMMARY    Patient Identification:  Name:  Albina Finley  Age:  76 y.o.  Sex:  female  :  1945  MRN:  [id number]  Visit Number:  [id number]    Date of Admission: 2021  Date of Discharge: 2022    PCP: Micah Núñez PA    DISCHARGE DIAGNOSES  -Acute diastolic CHF exacerbation that was present on admission  -Acute E. coli urinary tract infection, pansensitive all antibiotics tested  -Acute hypomagnesemia  -History of abnormal stress test in 2019  -History of duodenum with 1 cm ulcer that caused an upper GI bleed that led to acute blood loss anemia  -History of chronic Iron deficiency anemia  -History of CKD Stage IIIa, baseline Cr 1-1.2 but on day of discharge creatinine was 1.36  -History of parenchymal nodule in the left upper lung slightly larger than 3/2019 (1.5 cm to 1.71 cm)  -History of nonobstructive coronary artery disease  -History of severe pulmonary hypertension  -History of essential hypertension  -History of type 2 diabetes mellitus  -History of COPD with no acute exacerbation  -Former tobacco smoker with a 30-pack-year history, quit over 2 decades ago  -History of nighttime hypoxia    CONSULTS   Kai Luo and Dr. Stuart with cardiology  NP Gabriela with palliative care    PROCEDURES PERFORMED  None    HOSPITAL COURSE  Patient is a 76 y.o. female presented to the [facility] ED on 2021 with shortness of air; please note that she was in the ER for about a day before she came upstairs to the hospitalist service.  She was found in the emergency department to have an acute diastolic CHF exacerbation as well as an acute urinary tract infection.  Thus, she was thus placed in observation and then admitted to the telemetry floor for further evaluation and treatment.  Please see the admitting history and physical for further details.  Cultures were obtained and she was empirically started on high-dose Rocephin;  urine culture came back growing E. coli, that was sensitive to all antibiotics tested.  The patient will complete her urinary tract infection treatment by taking Omnicef at home.  For the acute CHF exacerbation, she was given aggressive diuresis and cardiology was consulted.  The patient was weighed daily, had strict input and output measurements, and strict monitoring of the renal function and electrolytes.  Cardiology wanted to start Jardiance as this will decrease cardiovascular events in patients that are type II diabetics.  We did give her this medication while hospitalized.  We were able to decrease her home Levemir while she was hospitalized to about a third of what she was taking at home.  Thus, since we are making some any changes to the patient's diabetic medications, I have opted to leave the Jardiance off at this time (at the request of the daughter that was at bedside) and have her follow-up with her primary care provider and cardiology; if her glucose levels are adequate or high, then the Jardiance can be restarted by 1 of those providers at that time.  After long discussion with family, their goal is to have the patient's breathing less labored and in order to do this they are willing to accept a higher creatinine level than her normal baseline.  Thus, the diuretic dosing that seem to help both the fluid retention and not make her creatinine elevate too high was Lasix 40 mg by mouth twice a day.    On the day of discharge, the patient was really wanting to go home.  The family was willing to take the patient home as they thought that her memory and mood would improve if she was at home.  Thus, the patient will be discharged home with home health and follow-up in the congestive heart failure clinic.  The patient was able to ambulate with assistance, she was tolerating her diet with no choking episodes, and she was able to participate in her activities of daily living.      VITAL SIGNS:  Temp:  [97.9 °F  (36.6 °C)-99.4 °F (37.4 °C)] 97.9 °F (36.6 °C)  Heart Rate:  [74-98] 85  Resp:  [16-22] 16  BP: (109-149)/(44-55) 109/44  SpO2:  [95 %-99 %] 98 %  on  Flow (L/min):  [2.5-3.5] 3.5;   Device (Oxygen Therapy): nasal cannula    Body mass index is 30.23 kg/m².  Wt Readings from Last 3 Encounters:   01/02/22 72.6 kg (160 lb)   11/26/21 73.4 kg (161 lb 13.1 oz)   11/26/21 73.4 kg (161 lb 14.4 oz)       PHYSICAL EXAM:  Constitutional:       Appearance: She is well-developed. She is morbidly obese. She is not ill-appearing.      Interventions: Nasal cannula in place.   HENT:      Head: Normocephalic and atraumatic.      Right Ear: External ear normal.      Left Ear: External ear normal.      Nose: Nose normal.   Eyes:      General: No scleral icterus.     Extraocular Movements: Extraocular movements intact.      Pupils: Pupils are equal, round, and reactive to light.   Cardiovascular:      Rate and Rhythm: Regular rhythm.      Pulses: Normal pulses.      Heart sounds: No murmur heard.   Pulmonary:      Effort: Much less respiratory distress is present today.      Breath sounds: Rales present but much improved compared to 2 days ago. No wheezing.   Musculoskeletal:      Right lower leg: Edema present.      Left lower leg: Edema present.      Comments: Minimal pitting edema today of the bilateral legs.      When first admitted, the patient had 1-2+ pitting edema of her bilateral legs; currently, she has slightly less than 1+ pitting edema.  Skin:     Capillary Refill: Capillary refill takes less than 2 seconds.      Coloration: Skin is not jaundiced or pale.   Neurological:      Mental Status: She is alert. Mental status is at baseline. She is disoriented.      Cranial Nerves: No cranial nerve deficit.   Psychiatric:         Behavior: Behavior normal. Behavior is cooperative.       DISCHARGE DISPOSITION   Stable       Discharge Medications      New Medications      Instructions Start Date   atorvastatin 20 MG tablet  Commonly  known as: LIPITOR   20 mg, Oral, Nightly      cefdinir 300 MG capsule  Commonly known as: OMNICEF   300 mg, Oral, Every 12 Hours Scheduled      furosemide 40 MG tablet  Commonly known as: LASIX   40 mg, Oral, 2 Times Daily      isosorbide mononitrate 30 MG 24 hr tablet  Commonly known as: IMDUR   30 mg, Oral, Every 24 Hours Scheduled   Start Date: January 3, 2022        Changes to Medications      Instructions Start Date   insulin detemir 100 UNIT/ML injection  Commonly known as: LEVEMIR  What changed:   · how much to take  · Another medication with the same name was removed. Continue taking this medication, and follow the directions you see here.   42 Units, Subcutaneous, Daily         Continue These Medications      Instructions Start Date   calcium carbonate 600 MG tablet  Commonly known as: OS-TIANNA   600 mg, Oral, Daily      carvedilol 6.25 MG tablet  Commonly known as: COREG   6.25 mg, Oral, 2 Times Daily With Meals      cetirizine 5 MG tablet  Commonly known as: zyrTEC   5 mg, Oral, Daily      cyanocobalamin 1000 MCG/ML injection   1,000 mcg, Intramuscular, Every 30 Days, Due on 1-12-22.      ferrous sulfate 325 (65 FE) MG tablet   325 mg, Oral, Every Evening      hydrALAZINE 10 MG tablet  Commonly known as: APRESOLINE   10 mg, Oral, 3 Times Daily      pantoprazole 40 MG EC tablet  Commonly known as: PROTONIX   40 mg, Oral, 2 Times Daily Before Meals             Diet Instructions     Diet: Regular, Consistent Carbohydrate, Specialty Diet; Thin Liquids, No Restrictions; Low Sodium; 2,000 mg Na      Discharge Diet:  Regular  Consistent Carbohydrate  Specialty Diet       Fluid Consistency: Thin Liquids, No Restrictions    Specialty Diets: Low Sodium    Low Sodium Restriction: 2,000 mg Na        Activity Instructions     Activity as Tolerated      Falls precautions        Additional Instructions for the Follow-ups that You Need to Schedule     Discharge Follow-up with PCP   As directed     Currently Documented PCP:     Micah Núñez PA    PCP Phone Number:    [phone]     Follow Up Details: 7 days             Discharge Follow-up with Specified Provider: Cardiology if desired by the family; 2 Months   As directed      To: Cardiology if desired by the family    Follow Up: 2 Months             Discharge Follow-up with Specified Provider: Has an appointment with Dr. Quinn on 1/5/2022   As directed      To: Has an appointment with Dr. Quinn on 1/5/2022                Contact information for after-discharge care     Home Medical Care     [facility] .    Service: Home Health Services  Contact information:  [address]  [facility] [address] [phone]                         CODE STATUS  Code Status and Medical Interventions:   Ordered at: 12/29/21 1540     Medical Intervention Limits:    NO intubation (DNI)     Code Status (Patient has no pulse and is not breathing):    No CPR (Do Not Attempt to Resuscitate)     Medical Interventions (Patient has pulse or is breathing):    Limited Support     Comments:    Patient stated she did not want cpr or vent with daughter Corine at bedside witnessed       Manfred Smith MD  [facility] Hospitalist  01/02/22  12:10 EST    Please note that this discharge summary required more than 30 minutes to complete.

## 2022-01-02 NOTE — PROGRESS NOTES
LOS: 5 days     Name: Albina Finley  Age/Sex: 76 y.o. female  :  1945        PCP: Micah Núñez PA  REF: No Known Provider    Active Problems:    * No active hospital problems. *      Reason for follow-up: chest pains    Subjective       Subjective       Interval History: patient still asypmtomatic. Denies any chest pains or shortness of breath.     ROS    Vital Signs  Temp:  [97.9 °F (36.6 °C)-99.4 °F (37.4 °C)] 97.9 °F (36.6 °C)  Heart Rate:  [74-98] 85  Resp:  [16-22] 16  BP: (109-149)/(44-55) 109/44  Vital Signs (last 72 hrs)        0700   0659  0700   0659  0700   0659  0700   1226   Most Recent      Temp (°F) 97.7 -  98.2    98 -  99.1    98.2 -  99.4      97.9     97.9 (36.6)  1013    Heart Rate 71 -  88    70 -  79    74 -  98      85     85 01/ 1013    Resp 18 -  20    18 -  20    20 -  22      16     16  1013    /57 -  165/59    120/43 -  162/51    119/49 -  149/54      109/44     109/44  1013    SpO2 (%) 95 -  98    94 -  99    95 -  99    97 -  98     98  1013        Documented weights    21 1820 21 1656 21 0500 21 0452   Weight: 78.9 kg (174 lb) 77 kg (169 lb 12.8 oz) 78.3 kg (172 lb 9.6 oz) 75.6 kg (166 lb 11.2 oz)    21 0443 21 0433 22 0500 22 0500   Weight: 75.8 kg (167 lb 3.2 oz) 73.5 kg (162 lb 1.6 oz) 73.7 kg (162 lb 6.4 oz) 72.6 kg (160 lb)      Body mass index is 30.23 kg/m².    Intake/Output Summary (Last 24 hours) at 2022 1226  Last data filed at 2022 0312  Gross per 24 hour   Intake 960 ml   Output 1200 ml   Net -240 ml     Objective    Objective       Physical Exam:     General Appearance:    Alert, cooperative, in no acute distress   Head:    Normocephalic, without obvious abnormality, atraumatic   Eyes:            Conjunctivae and sclerae normal, no   icterus, no pallor, corneas clear.   Neck:   No adenopathy, supple, trachea midline, no thyromegaly, no    carotid bruit, no JVD   Lungs:     Clear to auscultation,respirations regular, even and                  unlabored    Heart:    Regular rhythm and normal rate, normal S1 and S2, no            murmur, no gallop, no rub, no click   Chest Wall:    No abnormalities observed   Abdomen:     Normal bowel sounds, no masses, no organomegaly, soft        non-tender, non-distended, no guarding, no rebound                tenderness   Extremities:   Moves all extremities well, 1+ edema, no cyanosis, no             redness   Pulses:   Pulses palpable and equal bilaterally   Skin:   No bleeding, bruising or rash       Neurologic:             Procedures    Results review       Results Review:   Results from last 7 days   Lab Units 01/02/22  0038 12/31/21  0441 12/29/21  0110 12/28/21  0252 12/27/21 2115 12/26/21 2022   WBC 10*3/mm3 7.80  --  8.37 7.49 8.23 8.74   HEMOGLOBIN g/dL 9.4* 8.1* 8.3* 8.4* 8.1* 8.9*   PLATELETS 10*3/mm3 389  --  372 413 374 389     Results from last 7 days   Lab Units 01/02/22  0038 01/01/22  0252 12/31/21  0441 12/30/21  0111 12/29/21  0110 12/28/21  0252 12/27/21 2115 12/26/21 2022 12/26/21 2022   SODIUM mmol/L 135* 138 139 142 140 143 140   < > 141   POTASSIUM mmol/L 4.3 4.0 3.8 3.8 4.4 4.0 4.5   < > 4.7   CHLORIDE mmol/L 94* 97* 100 101 103 106 106   < > 108*   CO2 mmol/L 28.8 31.0* 28.3 29.8* 23.7 24.3 23.6   < > 22.4   BUN mg/dL 22 19 17 17 17 20 22   < > 23   CREATININE mg/dL 1.36* 1.27* 1.22* 1.42* 1.05* 1.25* 1.40*   < > 1.27*   CALCIUM mg/dL 9.0 8.6 8.6 9.0 8.6 9.2 9.0   < > 8.9   GLUCOSE mg/dL 160* 193* 131* 136* 269* 81 156*   < > 133*   ALT (SGPT) U/L  --   --   --   --   --   --   --   --  8   AST (SGOT) U/L  --   --   --   --   --   --   --   --  8    < > = values in this interval not displayed.     Results from last 7 days   Lab Units 12/31/21  0441 12/28/21  0252 12/27/21 2115 12/26/21 2022   TROPONIN T ng/mL 0.021 0.013 <0.010 <0.010     Lab Results   Component Value Date    INR  1.14 (H) 11/20/2021    INR 1.06 11/18/2021    INR 1.04 11/17/2021    INR 0.97 11/13/2021    INR 1.22 (H) 04/24/2020    INR 1.21 (H) 04/21/2020    INR 0.93 12/19/2019     Lab Results   Component Value Date    MG 2.4 01/02/2022    MG 1.8 01/01/2022    MG 2.0 12/31/2021     Lab Results   Component Value Date    TSH 1.840 02/04/2021    TRIG 214 (H) 11/16/2021    HDL 27 (L) 11/16/2021    LDL 77 11/16/2021      Imaging Results (Last 48 Hours)     ** No results found for the last 48 hours. **        Lab Results   Component Value Date    .0 (H) 02/14/2019       Lab Results   Component Value Date    ABSOLUTELUNG 41 (A) 01/01/2022         Echo   Results for orders placed during the hospital encounter of 11/13/21    Adult Transthoracic Echo Complete W/ Cont if Necessary Per Protocol    Interpretation Summary  · Estimated left ventricular EF = 65% Left ventricular ejection fraction appears to be 61 - 65%. Left ventricular systolic function is normal.  · Left ventricular diastolic function was normal.  · Estimated right ventricular systolic pressure from tricuspid regurgitation is markedly elevated (>55 mmHg).  · Severe pulmonary hypertension is present.  · No significant mitral or aortic regurgitation  · No pericardial effusion  · Since previous study of 12/17/19 Severe pulmonary hypertension is now present      Telemetry:     I reviewed the patient's new clinical results.    Medication Review:   aspirin, 81 mg, Oral, Daily  atorvastatin, 20 mg, Oral, Nightly  calcium carbonate (oyster shell), 500 mg, Oral, Daily  carvedilol, 6.25 mg, Oral, BID With Meals  cefdinir, 300 mg, Oral, Q12H  cetirizine, 5 mg, Oral, Daily  cyanocobalamin, 1,000 mcg, Intramuscular, Q30 Days  empagliflozin, 10 mg, Oral, Daily  ferrous sulfate, 325 mg, Oral, Q PM  furosemide, 40 mg, Oral, BID  heparin (porcine), 5,000 Units, Subcutaneous, Q12H  hydrALAZINE, 25 mg, Oral, TID  insulin aspart, 0-7 Units, Subcutaneous, TID AC  insulin aspart, 3 Units,  Subcutaneous, TID With Meals  insulin detemir, 42 Units, Subcutaneous, Daily  isosorbide mononitrate, 30 mg, Oral, Q24H  pantoprazole, 40 mg, Oral, BID AC  sodium chloride, 3 mL, Intravenous, Q12H             Assessment      Assessment:  1.  Acute on chronic diastolic HF.  NYHA class III.  Clinically improving.  2.  Multiple cardiac risk factors  3.  Severe pulmonary hypertension  4.  OPHELIA on CKD  5.  Previous history of upper GI bleed  6.  UTI          Plan     Recommendations:  1. Continue with GDMT of aspirin, lipitor, coreg, and imdur. Stable to be discharged, please follow up in our clinic in 2 weeks.     I discussed the patients findings and my recommendations with patient and family      01/02/22  12:26 EST    Electronically signed by Dominick Lopez PA-C, 01/02/22, 12:26 PM EST.

## 2022-01-03 ENCOUNTER — TELEPHONE (OUTPATIENT)
Dept: CARDIOLOGY | Facility: HOSPITAL | Age: 77
End: 2022-01-03

## 2022-01-03 NOTE — DISCHARGE PLACEMENT REQUEST
"Albina Waddell (76 y.o. Female)             Date of Birth Social Security Number Address Home Phone MRN    1945  [facility] [place] [address]  [place] [address] [phone] [id number]    Mormonism Marital Status             Anabaptist        Admission Date Admission Type Admitting Provider Attending Provider Department, Room/Bed    12/26/21 Emergency Manfred Smith MD  [address], 3319/1P    Discharge Date Discharge Disposition Discharge Destination          1/2/2022 Home-Health Care c              Attending Provider: (none)   Allergies: No Known Allergies    Isolation: None   Infection: None   Code Status: Prior   Advance Care Planning Activity    Ht: 154.9 cm (61\")   Wt: 72.6 kg (160 lb)    Admission Cmt: None   Principal Problem: CHF exacerbation (HCC) [I50.9]                 Active Insurance as of 12/26/2021     Primary Coverage     Payor Plan Insurance Group Employer/Plan Group    MEDICARE RAILROAD MEDICARE      Payor Plan Address Payor Plan Phone Number Payor Plan Fax Number Effective Dates    [address] [phone]  4/1/2009 - None Entered    [facility] [id number]       Subscriber Name Subscriber Birth Date Member ID       ALBINA WADDELL 1945 [id number]           Secondary Coverage     Payor Plan Insurance Group Employer/Plan Group    [facility] [id number]     Payor Plan Address Payor Plan Phone Number Payor Plan Fax Number Effective Dates    [address]   1/1/2017 - None Entered    [facility] [address]       Subscriber Name Subscriber Birth Date Member ID       MRS CURTIS WADDELL 10/3/1941 [id number]                 Emergency Contacts      (Rel.) Home Phone Work Phone Mobile Phone    Antonina Noriega (Power of ) [phone]    FE WADDELL (Daughter) -- -- [phone]               History & Physical      Manfred Smith MD at 12/27/21 2058              [facility] HOSPITALIST HISTORY AND " PHYSICAL    Patient Identification:  Name:  Albina Finley  Age:  76 y.o.  Sex:  female  :  1945  MRN:  [id number]   Visit Number:  [id number]  Admit Date: 2021   Room number:  3315/1S  Primary Care Physician:  Micah Núñez PA    Date of Admission: 2021     Subjective     Chief complaint:    Chief Complaint   Patient presents with   • Shortness of Breath   • Wheezing     History of presenting illness:  76 y.o. female who was admitted on 2021 from the ED with shortness of air.  Please note the patient has a past medical history of nonobstructive coronary artery disease, type 2 diabetes mellitus, essential hypertension, hyperlipidemia, COPD, chronic kidney disease stage IIIa, diastolic CHF, left lower lung nodule, and an abnormal stress test in .  She recently underwent left hip repair in 2021 and did well with the surgery.    Today, I was trying to asked the patient questions but she is not really talking much.  She mumbled that she was at [facility] but anytime I asked her any other questions, the words were indiscernible.  I did see the patient with bedside nurse Nell.  Nell stated that the patient was able to talk to her when the patient first arrived at the hospital but now she appears to be more obtunded.  I did perform a stat venous blood gas already and the CO2 has went from the 40s to the 50s.  Therefore, a stat ABG and chest x-ray have also been ordered.  As result of the patient being disoriented, I am unable to obtain a history from her.  Please note that no family members are at bedside.  ---------------------------------------------------------------------------------------------------------------------   Review of Systems   Unable to perform ROS: Mental status change     ---------------------------------------------------------------------------------------------------------------------   Past Medical History:   Diagnosis Date   • Abnormal  nuclear stress test with mild degree of small size apical lateral wall myocardial ischemia in 2019. 2020   • Acute on chronic diastolic CHF (congestive heart failure) (HCC) 2019   • Anal polyp 2018    Added automatically from request for surgery [id number]   • Arthritis    • Chronic kidney disease    • COPD (chronic obstructive pulmonary disease) (HCC)    • Diabetes mellitus (HCC)    • Elevated cholesterol    • Essential hypertension 2019   • History of transfusion    • Incidental lung nodule, greater than or equal to 8mm     Left lower lobe, 15 mm based on  and 2019 chest CT scans with no growth in the size   • Iron deficiency anemia 2017   • Osteoporosis    • Peptic ulcer disease with hemorrhage 2020   • Pneumonia of right middle lobe due to infectious organism 2019     Past Surgical History:   Procedure Laterality Date   • ANUS SURGERY N/A 2018    Procedure: Diagnostic anoscopy with anal polyp excision;  Surgeon: Marlyn Gutierrez MD;  Location: [facility];  Service: General   •  SECTION     • ENDOSCOPY N/A 2020    Procedure: ESOPHAGOGASTRODUODENOSCOPY;  Surgeon: Marlon Wray MD;  Location: [facility];  Service: Gastroenterology;  Laterality: N/A;   • FEMUR OPEN REDUCTION INTERNAL FIXATION Left 2021    Procedure: Left hip hook plate operative fixation;  Surgeon: Lupillo Cornejo MD;  Location: [facility];  Service: Orthopedics;  Laterality: Left;   • HIP BIPOLAR REPLACEMENT      left Dr. Cornejo    • HYSTERECTOMY     • JOINT REPLACEMENT       Family History   Problem Relation Age of Onset   • Heart disease Mother    • COPD Mother    • Arthritis Mother    • Diabetes Father      Social History     Socioeconomic History   • Marital status:    Tobacco Use   • Smoking status: Former Smoker     Packs/day: 2.00     Years: 15.00     Pack years: 30.00     Types: Cigarettes   • Smokeless tobacco: Never Used   • Tobacco comment: quit 25 years  ago    Substance and Sexual Activity   • Alcohol use: No   • Drug use: No   • Sexual activity: Defer     Birth control/protection: Post-menopausal     ---------------------------------------------------------------------------------------------------------------------   Allergies:  Patient has no known allergies.  ---------------------------------------------------------------------------------------------------------------------   Medications below are reported home medications pulling from within the system; at this time, these medications have not been reconciled unless otherwise specified and are in the verification process for further verifcation as current home medications.      Prior to Admission Medications     Prescriptions Last Dose Informant Patient Reported? Taking?    calcium carbonate (OS-TIANNA) 600 MG tablet 12/26/2021 Care Giver Yes Yes    Take 600 mg by mouth Daily.    carvedilol (COREG) 6.25 MG tablet 12/26/2021 Care Giver No Yes    Take 1 tablet by mouth 2 (Two) Times a Day With Meals.    cetirizine (zyrTEC) 5 MG tablet 12/26/2021 Care Giver No Yes    Take 1 tablet by mouth Daily.    cyanocobalamin 1000 MCG/ML injection 12/12/2021 Care Giver Yes Yes    Inject 1,000 mcg into the appropriate muscle as directed by prescriber Every 30 (Thirty) Days. Due on 1-12-22.    ferrous sulfate 325 (65 FE) MG tablet 12/26/2021 Care Giver Yes Yes    Take 325 mg by mouth Every Evening.    hydrALAZINE (APRESOLINE) 10 MG tablet 12/26/2021 Care Giver Yes Yes    Take 10 mg by mouth 3 (Three) Times a Day.    insulin detemir (LEVEMIR) 100 UNIT/ML injection 12/26/2021 Care Giver Yes Yes    Inject 40 Units under the skin into the appropriate area as directed Daily.    insulin detemir (LEVEMIR) 100 UNIT/ML injection 12/26/2021 Care Giver Yes Yes    Inject 70 Units under the skin into the appropriate area as directed Every Night.    pantoprazole (PROTONIX) 40 MG EC tablet 12/26/2021 Care Giver No Yes    Take 1 tablet by mouth  2 (Two) Times a Day Before Meals.        Objective     Vital Signs:  Temp:  [97.4 °F (36.3 °C)-98 °F (36.7 °C)] 97.6 °F (36.4 °C)  Heart Rate:  [59-85] 74  Resp:  [18-22] 20  BP: (116-179)/(60-89) 179/62    Mean Arterial Pressure (Non-Invasive) for the past 24 hrs (Last 3 readings):   Noninvasive MAP (mmHg)   12/27/21 1827 91   12/27/21 1632 103   12/27/21 1618 102     SpO2:  [80 %-100 %] 99 %  on  Flow (L/min):  [2] 2;   Device (Oxygen Therapy): nasal cannula  Body mass index is 32.08 kg/m².    Wt Readings from Last 3 Encounters:   12/27/21 77 kg (169 lb 12.8 oz)   11/26/21 73.4 kg (161 lb 13.1 oz)   11/26/21 73.4 kg (161 lb 14.4 oz)      ----------------------------------------------------------------------------------------------------------------------  Physical Exam  Vitals reviewed.   Constitutional:       Appearance: She is well-developed. She is obese. She is ill-appearing. She is not toxic-appearing or diaphoretic.      Comments: She appears obtunded but she will wake up.  She is not really able to follow commands nor answer questions.   HENT:      Head: Normocephalic and atraumatic.      Right Ear: External ear normal.      Left Ear: External ear normal.      Nose: Nose normal.   Eyes:      General: No scleral icterus.        Right eye: No discharge.         Left eye: No discharge.      Pupils: Pupils are equal, round, and reactive to light.   Cardiovascular:      Rate and Rhythm: Normal rate and regular rhythm.      Pulses: Normal pulses.      Heart sounds: No murmur heard.      Pulmonary:      Effort: Accessory muscle usage, respiratory distress and retractions present. No prolonged expiration.      Breath sounds: No stridor or decreased air movement. Examination of the right-upper field reveals rales. Examination of the left-upper field reveals rales. Examination of the right-middle field reveals rales. Examination of the left-middle field reveals rales. Examination of the right-lower field reveals  rales. Examination of the left-lower field reveals rales. Rales present. No wheezing.   Abdominal:      General: Bowel sounds are normal. There is no distension.      Palpations: Abdomen is soft.   Musculoskeletal:         General: Swelling present. No deformity or signs of injury.      Comments: She has 1-2+ pitting edema of her bilateral legs equally.   Skin:     Capillary Refill: Capillary refill takes less than 2 seconds.      Coloration: Skin is not jaundiced or pale.      Findings: No rash.   Neurological:      Mental Status: She is disoriented and confused.      Cranial Nerves: No cranial nerve deficit or facial asymmetry.      Motor: No tremor.      Comments: Not able to follow commands.  She attempts to answer questions but her words are not discernible at this time.  She was able to move her arms and legs equally on both sides.   Psychiatric:         Attention and Perception: She is inattentive.         Mood and Affect: Affect is flat.         Speech: Speech is delayed and slurred.         Behavior: Behavior is slowed and withdrawn.       ---------------------------------------------------------------------------------------------------------------------  EKG: Normal sinus rhythm with a heart rate of 69 and a QTC of 426 ms.  There is poor R wave progression, T wave flattening in the inferior leads, and possible less than 1 mm ST depression in the inferior leads.  When compared to 2 EKGs dated 11/14/2021 and 6/17/2020, the poor R wave progression and ST depression in the Fery leads are new.  Please note that I have personally looked at the EKG from this admission, the comparison EKGs in the medical records, and the above is my interpretation of this admission's EKG; I read the final cardiology read.    ECG 12 Lead   Final Result   Test Reason : SOA   Blood Pressure :   */*   mmHG   Vent. Rate :  69 BPM     Atrial Rate :  69 BPM      P-R Int : 100 ms          QRS Dur :  60 ms       QT Int : 398 ms        P-R-T Axes : 102  53  73 degrees      QTc Int : 426 ms      Sinus rhythm with short WI with premature supraventricular complexes   Low voltage QRS   Nonspecific ST and T wave abnormality   Abnormal ECG   Confirmed by Lorena Luo ([id number]) on 12/27/2021 9:21:47 AM      Referred By: DENICE           Confirmed By: Lorena Luo      ECG 12 Lead    (Results Pending)       Telemetry: Normal sinus rhythm heart rate 70s to 80s with PACs.  Please note that I personally looked at the telemetry strips.      Last echocardiogram:  Results for orders placed during the hospital encounter of 11/13/21    Adult Transthoracic Echo Complete W/ Cont if Necessary Per Protocol    Interpretation Summary  · Estimated left ventricular EF = 65% Left ventricular ejection fraction appears to be 61 - 65%. Left ventricular systolic function is normal.  · Left ventricular diastolic function was normal.  · Estimated right ventricular systolic pressure from tricuspid regurgitation is markedly elevated (>55 mmHg).  · Severe pulmonary hypertension is present.  · No significant mitral or aortic regurgitation  · No pericardial effusion  · Since previous study of 12/17/19 Severe pulmonary hypertension is now present    I have personally read the ECHO final report.  --------------------------------------------------------------------------------------------------------------------  LABS:    CBC and coagulation:  Results from last 7 days   Lab Units 12/26/21 2022   LACTATE mmol/L 0.5   CRP mg/dL 3.87*   WBC 10*3/mm3 8.74   HEMOGLOBIN g/dL 8.9*   HEMATOCRIT % 30.7*   MCV fL 89.8   MCHC g/dL 29.0*   PLATELETS 10*3/mm3 389   D DIMER QUANT MCGFEU/mL 0.94*     Acid/base balance:  Results from last 7 days   Lab Units 12/27/21  0947 12/26/21  1857   PH, ARTERIAL pH units 7.374 7.308*   PO2 ART mm Hg 60.0* 121.0*   PCO2, ARTERIAL mm Hg 44.4 48.4*   HCO3 ART mmol/L 25.9 24.2     Renal and electrolytes:  Results from last 7 days   Lab Units 12/26/21 2022    SODIUM mmol/L 141   POTASSIUM mmol/L 4.7   CHLORIDE mmol/L 108*   CO2 mmol/L 22.4   BUN mg/dL 23   CREATININE mg/dL 1.27*   EGFR IF NONAFRICN AM mL/min/1.73 41*   CALCIUM mg/dL 8.9   GLUCOSE mg/dL 133*     Estimated Creatinine Clearance: 34.8 mL/min (A) (by C-G formula based on SCr of 1.27 mg/dL (H)).    Liver and pancreatic function:  Results from last 7 days   Lab Units 12/26/21 2022   ALBUMIN g/dL 3.55   BILIRUBIN mg/dL <0.2   ALK PHOS U/L 120*   AST (SGOT) U/L 8   ALT (SGPT) U/L 8     Endocrine function:  Lab Results   Component Value Date    HGBA1C 9.20 (H) 11/13/2021     Point of care bedside glucose levels:  Results from last 7 days   Lab Units 12/27/21  1831 12/27/21  1813 12/27/21  0908   GLUCOSE mg/dL 184* 188* 85     Lab Results   Component Value Date    TSH 1.840 02/04/2021    FREET4 1.38 02/04/2021     Cardiac:  Results from last 7 days   Lab Units 12/26/21 2022   TROPONIN T ng/mL <0.010   PROBNP pg/mL 2,647.0*       Cultures:  Lab Results   Component Value Date    COLORU Yellow 12/26/2021    CLARITYU Cloudy (A) 12/26/2021    PHUR <=5.0 12/26/2021    PROTEINUR 6.0 11/14/2021    GLUCOSEU Negative 12/26/2021    KETONESU Negative 12/26/2021    BLOODU Negative 12/26/2021    NITRITEU Negative 12/26/2021    LEUKOCYTESUR Moderate (2+) (A) 12/26/2021    BILIRUBINUR Negative 12/26/2021    UROBILINOGEN 0.2 E.U./dL 12/26/2021    RBCUA 0-2 12/26/2021    WBCUA Too Numerous to Count (A) 12/26/2021    BACTERIA 4+ (A) 12/26/2021     Microbiology Results (last 10 days)     Procedure Component Value - Date/Time    Respiratory Panel PCR w/COVID-19(SARS-CoV-2) VENTURA/CHRISTIANNE/KALEN/PAD/COR/MAD/DEVON In-House, NP Swab in UTM/VTM, 3-4 HR TAT - Swab, Nasopharynx [[id number]]  (Normal) Collected: 12/27/21 0338    Lab Status: Final result Specimen: Swab from Nasopharynx Updated: 12/27/21 0435     ADENOVIRUS, PCR Not Detected     Coronavirus 229E Not Detected     Coronavirus HKU1 Not Detected     Coronavirus NL63 Not Detected      Coronavirus OC43 Not Detected     COVID19 Not Detected     Human Metapneumovirus Not Detected     Human Rhinovirus/Enterovirus Not Detected     Influenza A PCR Not Detected     Influenza B PCR Not Detected     Parainfluenza Virus 1 Not Detected     Parainfluenza Virus 2 Not Detected     Parainfluenza Virus 3 Not Detected     Parainfluenza Virus 4 Not Detected     RSV, PCR Not Detected     Bordetella pertussis pcr Not Detected     Bordetella parapertussis PCR Not Detected     Chlamydophila pneumoniae PCR Not Detected     Mycoplasma pneumo by PCR Not Detected    Narrative:      In the setting of a positive respiratory panel with a viral infection PLUS a negative procalcitonin without other underlying concern for bacterial infection, consider observing off antibiotics or discontinuation of antibiotics and continue supportive care. If the respiratory panel is positive for atypical bacterial infection (Bordetella pertussis, Chlamydophila pneumoniae, or Mycoplasma pneumoniae), consider antibiotic de-escalation to target atypical bacterial infection.    Blood Culture - Blood, Arm, Right [[id number]]  (Normal) Collected: 12/26/21 2022    Lab Status: Preliminary result Specimen: Blood from Arm, Right Updated: 12/27/21 2030     Blood Culture No growth at 24 hours    Blood Culture - Blood, Arm, Left [[id number]]  (Normal) Collected: 12/26/21 2022    Lab Status: Preliminary result Specimen: Blood from Arm, Left Updated: 12/27/21 2030     Blood Culture No growth at 24 hours          I have personally looked at the labs and they are summarized above.  ----------------------------------------------------------------------------------------------------------------------  Final impressions for the last 30 days of radiology reports:    XR Chest 2 View    Result Date: 12/26/2021  Cardiomegaly and pulmonary vascular congestion. Redemonstration of left chest nodule/mass measuring up to 22 mm. This is present on multiple prior exams.  Signer Name: NGOC ISSA MD  Signed: 12/26/2021 7:00 PM  Workstation Name: [facility]  [facility]      I have personally looked at the radiology images and I have read the available final report.    Assessment & Plan       -Acute diastolic CHF exacerbation that was present admission  -Acute urinary tract infection  -History of abnormal stress test in December 2019  -History of duodenum with 1 cm ulcer that caused an upper GI bleed that led to acute blood loss anemia  -History of chronic Iron deficiency anemia  -History of CKD Stage III, baseline Cr 1-1.2  -History of parenchymal nodule in the left upper lung slightly larger than 3/2019 (1.5 cm to 1.71 cm)  -History of nonobstructive coronary artery disease  -History of severe pulmonary hypertension  -History of essential hypertension  -History of type 2 diabetes mellitus  -History of COPD with no acute exacerbation  -Former tobacco smoker with a 30-pack-year history, quit over 2 decades ago  -History of nighttime hypoxia    Placed in observation on telemetry floor.  The patient received diuresis in the emergency department; my initial plan was to obtain stat blood work and if the creatinine has improved then I plan on repeating a dose of Lasix.  However, since she has such diffuse crackles on exam, I have written for 60 mg IV dose of Lasix now.  We will perform serial troponin determinations to make sure the patient has not had an acute coronary syndrome as a cause of the heart failure exacerbation.  Since patient had an abnormal stress test in December 2019, I will schedule a Lexiscan stress test for in the morning, if the patient's respiratory status has improved.  I will check on her first thing in the morning to see if she can undergo the stress test.  The patient had recently underwent hip replacement and was seen by cardiology at that time.  It was suggested that the patient undergo a Lexiscan stress test but there are no results in  our computer system.  In the meantime, we will place patient on aspirin and moderate dose atorvastatin.  When I reviewed the patient's electronic chart, she has canceled appointments with cardiology.  Please note that I have made the patient n.p.o. after midnight for the stress test.    Because of her being obtunded now, I am ordering a stat ABG as a CO2 on the venous blood gas had increased and a stat chest x-ray; night shift will be notified of the results.    We will perform daily weights and strict input and output measurements for the acute CHF exacerbation.     For the essential hypertension, I am continuing the home hydralazine and Coreg.    At home, the patient reported that she is on 40 units of Levemir in the morning and 70 units of Levemir at night.  However, we have placed her on 40 units of Levemir nightly and we have her on a low-dose NovoLog sliding scale.  So far, her glucose levels have been controlled.  Thus, we will continue monitoring her glucose levels 4 times a day and continue with the current insulin dosage.  If, however, the patient is not able to eat and drink due to her current obtunded status, then we will hold all of the insulin.    For the acute urinary tract infection, we will start the patient on oral Omnicef; please note that the urine culture is currently pending.  If at any point time the patient is unable to swallow pills, then I plan on changing the Omnicef to IV Rocephin.    VTE Prophylaxis:   Mechanical Order History:     None      Pharmalogical Order History:      Ordered     Dose Route Frequency Stop    12/27/21 1703  heparin (porcine) 5000 UNIT/ML injection 5,000 Units         5,000 Units SC Every 12 Hours Scheduled --              Disposition: Anticipate home    Manfred Smith MD  [facility]  12/27/21  20:58 EST        Electronically signed by Manfred Smith MD at 12/27/21 5107       Operative/Procedure Notes (most recent note)    No notes of  this type exist for this encounter.            Physician Progress Notes (most recent note)      Dominick Lopez PA-C at 22 1226           LOS: 5 days     Name: Albina Finley  Age/Sex: 76 y.o. female  :  1945        PCP: Micah Núñez PA  REF: No Known Provider    Active Problems:    * No active hospital problems. *      Reason for follow-up: chest pains    Subjective       Subjective       Interval History: patient still asypmtomatic. Denies any chest pains or shortness of breath.     ROS    Vital Signs  Temp:  [97.9 °F (36.6 °C)-99.4 °F (37.4 °C)] 97.9 °F (36.6 °C)  Heart Rate:  [74-98] 85  Resp:  [16-22] 16  BP: (109-149)/(44-55) 109/44  Vital Signs (last 72 hrs)        0700   0659  0700   0659  0700   0659  0700   1226   Most Recent      Temp (°F) 97.7 -  98.2    98 -  99.1    98.2 -  99.4      97.9     97.9 (36.6)  1013    Heart Rate 71 -  88    70 -  79    74 -  98      85     85 / 1013    Resp 18 -  20    18 -  20    20 -  22      16     16  1013    /57 -  165/59    120/43 -  162/51    119/49 -  149/54      109/44     109/44  1013    SpO2 (%) 95 -  98    94 -  99    95 -  99    97 -  98     98  1013        Documented weights    21 1820 21 1656 21 0500 21 0452   Weight: 78.9 kg (174 lb) 77 kg (169 lb 12.8 oz) 78.3 kg (172 lb 9.6 oz) 75.6 kg (166 lb 11.2 oz)    21 0443 21 0433 22 0500 22 0500   Weight: 75.8 kg (167 lb 3.2 oz) 73.5 kg (162 lb 1.6 oz) 73.7 kg (162 lb 6.4 oz) 72.6 kg (160 lb)      Body mass index is 30.23 kg/m².    Intake/Output Summary (Last 24 hours) at 2022 1226  Last data filed at 2022 0312  Gross per 24 hour   Intake 960 ml   Output 1200 ml   Net -240 ml     Objective    Objective       Physical Exam:     General Appearance:    Alert, cooperative, in no acute distress   Head:    Normocephalic, without obvious abnormality, atraumatic   Eyes:             Conjunctivae and sclerae normal, no   icterus, no pallor, corneas clear.   Neck:   No adenopathy, supple, trachea midline, no thyromegaly, no   carotid bruit, no JVD   Lungs:     Clear to auscultation,respirations regular, even and                  unlabored    Heart:    Regular rhythm and normal rate, normal S1 and S2, no            murmur, no gallop, no rub, no click   Chest Wall:    No abnormalities observed   Abdomen:     Normal bowel sounds, no masses, no organomegaly, soft        non-tender, non-distended, no guarding, no rebound                tenderness   Extremities:   Moves all extremities well, 1+ edema, no cyanosis, no             redness   Pulses:   Pulses palpable and equal bilaterally   Skin:   No bleeding, bruising or rash       Neurologic:             Procedures    Results review       Results Review:   Results from last 7 days   Lab Units 01/02/22  0038 12/31/21  0441 12/29/21  0110 12/28/21  0252 12/27/21 2115 12/26/21 2022   WBC 10*3/mm3 7.80  --  8.37 7.49 8.23 8.74   HEMOGLOBIN g/dL 9.4* 8.1* 8.3* 8.4* 8.1* 8.9*   PLATELETS 10*3/mm3 389  --  372 413 374 389     Results from last 7 days   Lab Units 01/02/22  0038 01/01/22  0252 12/31/21  0441 12/30/21  0111 12/29/21  0110 12/28/21  0252 12/27/21 2115 12/26/21 2022 12/26/21 2022   SODIUM mmol/L 135* 138 139 142 140 143 140   < > 141   POTASSIUM mmol/L 4.3 4.0 3.8 3.8 4.4 4.0 4.5   < > 4.7   CHLORIDE mmol/L 94* 97* 100 101 103 106 106   < > 108*   CO2 mmol/L 28.8 31.0* 28.3 29.8* 23.7 24.3 23.6   < > 22.4   BUN mg/dL 22 19 17 17 17 20 22   < > 23   CREATININE mg/dL 1.36* 1.27* 1.22* 1.42* 1.05* 1.25* 1.40*   < > 1.27*   CALCIUM mg/dL 9.0 8.6 8.6 9.0 8.6 9.2 9.0   < > 8.9   GLUCOSE mg/dL 160* 193* 131* 136* 269* 81 156*   < > 133*   ALT (SGPT) U/L  --   --   --   --   --   --   --   --  8   AST (SGOT) U/L  --   --   --   --   --   --   --   --  8    < > = values in this interval not displayed.     Results from last 7 days   Lab Units  12/31/21  0441 12/28/21  0252 12/27/21  2115 12/26/21 2022   TROPONIN T ng/mL 0.021 0.013 <0.010 <0.010     Lab Results   Component Value Date    INR 1.14 (H) 11/20/2021    INR 1.06 11/18/2021    INR 1.04 11/17/2021    INR 0.97 11/13/2021    INR 1.22 (H) 04/24/2020    INR 1.21 (H) 04/21/2020    INR 0.93 12/19/2019     Lab Results   Component Value Date    MG 2.4 01/02/2022    MG 1.8 01/01/2022    MG 2.0 12/31/2021     Lab Results   Component Value Date    TSH 1.840 02/04/2021    TRIG 214 (H) 11/16/2021    HDL 27 (L) 11/16/2021    LDL 77 11/16/2021      Imaging Results (Last 48 Hours)     ** No results found for the last 48 hours. **        Lab Results   Component Value Date    .0 (H) 02/14/2019       Lab Results   Component Value Date    ABSOLUTELUNG 41 (A) 01/01/2022         Echo   Results for orders placed during the hospital encounter of 11/13/21    Adult Transthoracic Echo Complete W/ Cont if Necessary Per Protocol    Interpretation Summary  · Estimated left ventricular EF = 65% Left ventricular ejection fraction appears to be 61 - 65%. Left ventricular systolic function is normal.  · Left ventricular diastolic function was normal.  · Estimated right ventricular systolic pressure from tricuspid regurgitation is markedly elevated (>55 mmHg).  · Severe pulmonary hypertension is present.  · No significant mitral or aortic regurgitation  · No pericardial effusion  · Since previous study of 12/17/19 Severe pulmonary hypertension is now present      Telemetry:     I reviewed the patient's new clinical results.    Medication Review:   aspirin, 81 mg, Oral, Daily  atorvastatin, 20 mg, Oral, Nightly  calcium carbonate (oyster shell), 500 mg, Oral, Daily  carvedilol, 6.25 mg, Oral, BID With Meals  cefdinir, 300 mg, Oral, Q12H  cetirizine, 5 mg, Oral, Daily  cyanocobalamin, 1,000 mcg, Intramuscular, Q30 Days  empagliflozin, 10 mg, Oral, Daily  ferrous sulfate, 325 mg, Oral, Q PM  furosemide, 40 mg, Oral,  BID  heparin (porcine), 5,000 Units, Subcutaneous, Q12H  hydrALAZINE, 25 mg, Oral, TID  insulin aspart, 0-7 Units, Subcutaneous, TID AC  insulin aspart, 3 Units, Subcutaneous, TID With Meals  insulin detemir, 42 Units, Subcutaneous, Daily  isosorbide mononitrate, 30 mg, Oral, Q24H  pantoprazole, 40 mg, Oral, BID AC  sodium chloride, 3 mL, Intravenous, Q12H             Assessment      Assessment:  1.  Acute on chronic diastolic HF.  NYHA class III.  Clinically improving.  2.  Multiple cardiac risk factors  3.  Severe pulmonary hypertension  4.  OPHELIA on CKD  5.  Previous history of upper GI bleed  6.  UTI          Plan     Recommendations:  1. Continue with GDMT of aspirin, lipitor, coreg, and imdur. Stable to be discharged, please follow up in our clinic in 2 weeks.     I discussed the patients findings and my recommendations with patient and family      01/02/22  12:26 EST    Electronically signed by Dominick Lopez PA-C, 01/02/22, 12:26 PM EST.     Electronically signed by Dominick Lopez PA-C at 01/02/22 1240          Consult Notes (most recent note)      Lorena Luo MD at 12/29/21 0945            Consults  Date of Admit: 12/26/2021  Date of Consult: 12/29/21  Provider, No Known  Albina Finley  1945  Consulting Physician: Dr. Luo    Cardiology consultation    Reason for consultation: acute on chronic HFpEF  Assessment:  1. Acute on chronic diastolic HF.  NYHA class III.  2. Hx CAD, nonobstructive  3. Multiple cardiac risk factors-DM, hypertension and hyperlipidemia  4. Severe pulmonary hypertension  5. CKD, stage III  6. Chronic iron deficiency anemia, stable  7. History of duodenum ulcer and subsequent upper GI bleed  8. Acute UTI, managed by hospitalist  9. Recent left hip replacement      Recommendations:    1. Diuretic therapy-continue Lasix 60 mg IV every 12 hours.  Will monitor I&O, renal function and electrolytes.  2. Continue carvedilol and hydralazine  3. Started Jardiance  4. Will consider  ARB if renal function is stable  5. She needs a nuclear stress test to evaluate the extent and severity of ischemia onset of CHF resolves.      History of Present Illness    Subjective     Chief Complaint   Patient presents with   • Shortness of Breath   • Wheezing       Albina Finley is a 76 y.o. female with past medical history significant for chronic HFpEF, CKD stage III, COPD, diabetes mellitus, essential hypertension.  She also has history of an abnormal stress test in 2019 that has been treated medically.  She presented to [facility] emergency department on 12/26/2021 due to increasing shortness of breath upon exertion which has been progressing and now gets the symptom on minimal exertion.  Functional class III.  Associated with bilateral leg edema.  proBNP level is elevated.  Chest x-ray showed evidence of CHF.  She has been admitted with a diagnosis of acute on chronic diastolic heart failure.    Her cardiovascular history is remarkable for nonobstructive coronary artery disease by history less small sized ischemia in the apical and lateral wall per nuclear stress test done in 2019 and was treated medically.  She denied history of chest pain or angina.    Cardiac risk factors-DM, hypertension and hyperlipidemia        Echocardiogram on 11/14/2021  Interpretation Summary  · Estimated left ventricular EF = 65% Left ventricular ejection fraction appears to be 61 - 65%. Left ventricular systolic function is normal.  · Left ventricular diastolic function was normal.  · Estimated right ventricular systolic pressure from tricuspid regurgitation is markedly elevated (>55 mmHg).  · Severe pulmonary hypertension is present.  · No significant mitral or aortic regurgitation  · No pericardial effusion  · Since previous study of 12/17/19 Severe pulmonary hypertension is now present                      Past Medical History:   Diagnosis Date   • Abnormal nuclear stress test with mild degree of small size apical  lateral wall myocardial ischemia in 2019. 2020   • Acute on chronic diastolic CHF (congestive heart failure) (HCC) 2019   • Anal polyp 2018    Added automatically from request for surgery [id number]   • Arthritis    • Chronic kidney disease    • COPD (chronic obstructive pulmonary disease) (HCC)    • Diabetes mellitus (HCC)    • Elevated cholesterol    • Essential hypertension 2019   • History of transfusion    • Incidental lung nodule, greater than or equal to 8mm     Left lower lobe, 15 mm based on  and 2019 chest CT scans with no growth in the size   • Iron deficiency anemia 2017   • Osteoporosis    • Peptic ulcer disease with hemorrhage 2020   • Pneumonia of right middle lobe due to infectious organism 2019     Past Surgical History:   Procedure Laterality Date   • ANUS SURGERY N/A 2018    Procedure: Diagnostic anoscopy with anal polyp excision;  Surgeon: Marlyn Gutierrez MD;  Location: [facility];  Service: General   •  SECTION     • ENDOSCOPY N/A 2020    Procedure: ESOPHAGOGASTRODUODENOSCOPY;  Surgeon: Marlon Wray MD;  Location: [facility] OR;  Service: Gastroenterology;  Laterality: N/A;   • FEMUR OPEN REDUCTION INTERNAL FIXATION Left 2021    Procedure: Left hip hook plate operative fixation;  Surgeon: Lupillo Cornejo MD;  Location: [facility];  Service: Orthopedics;  Laterality: Left;   • HIP BIPOLAR REPLACEMENT      left Dr. Cornejo    • HYSTERECTOMY     • JOINT REPLACEMENT       Family History   Problem Relation Age of Onset   • Heart disease Mother    • COPD Mother    • Arthritis Mother    • Diabetes Father      Social History     Tobacco Use   • Smoking status: Former Smoker     Packs/day: 2.00     Years: 15.00     Pack years: 30.00     Types: Cigarettes   • Smokeless tobacco: Never Used   • Tobacco comment: quit 25 years ago    Substance Use Topics   • Alcohol use: No   • Drug use: No     Medications Prior to Admission    Medication Sig Dispense Refill Last Dose   • calcium carbonate (OS-TIANNA) 600 MG tablet Take 600 mg by mouth Daily.   12/26/2021 at Unknown time   • carvedilol (COREG) 6.25 MG tablet Take 1 tablet by mouth 2 (Two) Times a Day With Meals. 30 tablet 0 12/26/2021 at 1700   • cetirizine (zyrTEC) 5 MG tablet Take 1 tablet by mouth Daily. 10 tablet 0 12/26/2021 at Unknown time   • cyanocobalamin 1000 MCG/ML injection Inject 1,000 mcg into the appropriate muscle as directed by prescriber Every 30 (Thirty) Days. Due on 1-12-22.   12/12/2021 at Unknown time   • ferrous sulfate 325 (65 FE) MG tablet Take 325 mg by mouth Every Evening.   12/26/2021 at Unknown time   • hydrALAZINE (APRESOLINE) 10 MG tablet Take 10 mg by mouth 3 (Three) Times a Day.   12/26/2021 at 1700   • insulin detemir (LEVEMIR) 100 UNIT/ML injection Inject 40 Units under the skin into the appropriate area as directed Daily.   12/26/2021 at Unknown time   • insulin detemir (LEVEMIR) 100 UNIT/ML injection Inject 70 Units under the skin into the appropriate area as directed Every Night.   12/26/2021 at Unknown time   • pantoprazole (PROTONIX) 40 MG EC tablet Take 1 tablet by mouth 2 (Two) Times a Day Before Meals. 60 tablet 0 12/26/2021 at 1700     Allergies:  Patient has no known allergies.      Current Facility-Administered Medications:   •  aspirin EC tablet 81 mg, 81 mg, Oral, Daily, Manfred Smith MD, 81 mg at 12/29/21 0822  •  atorvastatin (LIPITOR) tablet 20 mg, 20 mg, Oral, Nightly, Manfred Smith MD, 20 mg at 12/28/21 2015  •  calcium carbonate (oyster shell) tablet 500 mg, 500 mg, Oral, Daily, Manfred Smith MD, 500 mg at 12/29/21 0823  •  carvedilol (COREG) tablet 6.25 mg, 6.25 mg, Oral, BID With Meals, Manfred Smith MD, 6.25 mg at 12/29/21 0822  •  cefdinir (OMNICEF) capsule 300 mg, 300 mg, Oral, Q12H, Manfred Smith MD, 300 mg at 12/29/21 0822  •  cetirizine (zyrTEC) tablet 5 mg, 5 mg, Oral, Daily, Manfred Smith MD, 5 mg  at 12/29/21 0822  •  cyanocobalamin injection 1,000 mcg, 1,000 mcg, Intramuscular, Q30 Days, Manfred Smith MD, 1,000 mcg at 12/27/21 1809  •  dextrose (D50W) (25 g/50 mL) IV injection 25 g, 25 g, Intravenous, Q15 Min PRN, Manfred Smith MD  •  dextrose (GLUTOSE) oral gel 15 g, 15 g, Oral, Q15 Min PRN, Manfred Smith MD  •  ferrous sulfate tablet 325 mg, 325 mg, Oral, Q PM, Manfred Smith MD, 325 mg at 12/28/21 1729  •  furosemide (LASIX) injection 60 mg, 60 mg, Intravenous, Q12H, Manfred Smith MD, 60 mg at 12/28/21 2143  •  glucagon (human recombinant) (GLUCAGEN DIAGNOSTIC) injection 1 mg, 1 mg, Subcutaneous, Q15 Min PRN, Manfred Smith MD  •  heparin (porcine) 5000 UNIT/ML injection 5,000 Units, 5,000 Units, Subcutaneous, Q12H, Manfred Smith MD, 5,000 Units at 12/29/21 0823  •  hydrALAZINE (APRESOLINE) tablet 10 mg, 10 mg, Oral, TID, Manfred Smith MD, 10 mg at 12/29/21 0823  •  insulin aspart (novoLOG) injection 0-7 Units, 0-7 Units, Subcutaneous, TID AC, Manfred Smith MD, 2 Units at 12/29/21 0822  •  insulin detemir (LEVEMIR) injection 40 Units, 40 Units, Subcutaneous, Daily, Manfred Smith MD, 40 Units at 12/29/21 0848  •  Magnesium Sulfate 2 gram Bolus, followed by 8 gram infusion (total Mg dose 10 grams)- Mg less than or equal to 1mg/dL, 2 g, Intravenous, PRN **OR** Magnesium Sulfate 2 gram / 50mL Infusion (GIVE X 3 BAGS TO EQUAL 6GM TOTAL DOSE) - Mg 1.1 - 1.5 mg/dl, 2 g, Intravenous, PRN **OR** Magnesium Sulfate 4 gram infusion- Mg 1.6-1.9 mg/dL, 4 g, Intravenous, PRN, Manfred Smith MD  •  nitroglycerin (NITROSTAT) SL tablet 0.4 mg, 0.4 mg, Sublingual, Q5 Min PRN, Manfred Smith MD  •  pantoprazole (PROTONIX) EC tablet 40 mg, 40 mg, Oral, BID AC, Manfred Smith MD, 40 mg at 12/29/21 0822  •  sodium chloride 0.9 % flush 3 mL, 3 mL, Intravenous, Q12H, Manfred Smith MD, 3 mL at 12/29/21 0823  •  sodium chloride 0.9 % flush 3-10 mL, 3-10 mL,  Intravenous, PRN, Manfred Smith MD    Review of Systems      Objective      Vital Signs  Temp:  [97.4 °F (36.3 °C)-98.3 °F (36.8 °C)] 97.4 °F (36.3 °C)  Heart Rate:  [70-80] 78  Resp:  [20-24] 20  BP: (131-156)/(57-62) 154/62  Body mass index is 31.5 kg/m².    Intake/Output Summary (Last 24 hours) at 12/29/2021 0946  Last data filed at 12/29/2021 0844  Gross per 24 hour   Intake 1090 ml   Output 1800 ml   Net -710 ml       Physical Exam    General-alert and oriented.  No distress  HEENT-grossly normal  Neck-no JVD no carotid bruit  Cardiac-normal heart sounds are heard.  Regular rhythm.  No murmurs  Lungs-bilateral extensive rales were heard.  Abdomen-normal.    Extremity-2-3+ pedal edema  Neuro-no deficit      Results Review:   I reviewed the patient's new clinical results.  Results from last 7 days   Lab Units 12/28/21  0252 12/27/21 2115 12/26/21 2022   TROPONIN T ng/mL 0.013 <0.010 <0.010     Results from last 7 days   Lab Units 12/29/21  0110 12/28/21  0252 12/27/21 2115 12/26/21 2022   WBC 10*3/mm3 8.37 7.49 8.23 8.74   HEMOGLOBIN g/dL 8.3* 8.4* 8.1* 8.9*   PLATELETS 10*3/mm3 372 413 374 389     Results from last 7 days   Lab Units 12/29/21  0110 12/28/21  0252 12/27/21 2115 12/26/21 2022   SODIUM mmol/L 140 143 140 141   POTASSIUM mmol/L 4.4 4.0 4.5 4.7   CHLORIDE mmol/L 103 106 106 108*   CO2 mmol/L 23.7 24.3 23.6 22.4   BUN mg/dL 17 20 22 23   CREATININE mg/dL 1.05* 1.25* 1.40* 1.27*   CALCIUM mg/dL 8.6 9.2 9.0 8.9   GLUCOSE mg/dL 269* 81 156* 133*   ALT (SGPT) U/L  --   --   --  8   AST (SGOT) U/L  --   --   --  8     Lab Results   Component Value Date    INR 1.14 (H) 11/20/2021    INR 1.06 11/18/2021    INR 1.04 11/17/2021    INR 0.97 11/13/2021    INR 1.22 (H) 04/24/2020    INR 1.21 (H) 04/21/2020    INR 0.93 12/19/2019     Lab Results   Component Value Date    MG 2.9 (H) 12/29/2021    MG 1.4 (L) 12/28/2021    MG 1.4 (L) 12/27/2021     Lab Results   Component Value Date    TSH 1.840 02/04/2021     TRIG 214 (H) 11/16/2021    HDL 27 (L) 11/16/2021    LDL 77 11/16/2021      Lab Results   Component Value Date    .0 (H) 02/14/2019       EKG:     Imaging Results (Last 72 Hours)     Procedure Component Value Units Date/Time    XR Chest 1 View [[id number]] Collected: 12/27/21 2208     Updated: 12/27/21 2210    Narrative:      CR Chest 1 Vw    INDICATION:   Heart failure. Worsening mentation.     COMPARISON:    12/26/2021    FINDINGS:  Single portable AP view(s) of the chest.    Heart remains enlarged. There is atherosclerotic disease in the aorta. Pulmonary edema is noted, slightly worsened. Left mid lung nodule is stable. No pneumothorax is seen.       Impression:      Worsening pulmonary edema.    Signer Name: Jakob Tello MD   Signed: 12/27/2021 10:08 PM   Workstation Name: [facility]    [facility]    XR Chest 2 View [[id number]] Collected: 12/26/21 1900     Updated: 12/26/21 1902    Narrative:      CR Chest 2 Vws    INDICATION:    Shortness of air    COMPARISON:    11/13/2021    FINDINGS:   PA and lateral views of the chest.  Cardiomegaly. Dominant nodule left chest measures up to 22 mm, unchanged. Pulmonary vascular congestion and prominence of the pulmonary interstitium. No pleural effusion. No pneumothorax.        Impression:      Cardiomegaly and pulmonary vascular congestion.    Redemonstration of left chest nodule/mass measuring up to 22 mm. This is present on multiple prior exams.    Signer Name: NGOC ISSA MD   Signed: 12/26/2021 7:00 PM   Workstation Name: [facility]    [facility]           Thank you very much for asking us to be involved in this patient's care.  We will follow along with you.    Dominick Lopez PA-C   12/29/21  09:46 EST    Electronically signed by Dominick Lopez PA-C, 12/29/21, 9:46 AM EST.     Electronically signed by Lorena Luo MD at 12/29/21 1406          Physical Therapy Notes (most recent note)      Gianfranco  Sabrina, PT at 21 1422  Version 2 of 2         Acute Care - Physical Therapy Treatment Note  EDWIGE Villarreal     Patient Name: Albina Finley  : 1945  MRN: [id number]  Today's Date: 2021      Visit Dx:     ICD-10-CM ICD-9-CM   1. Acute on chronic congestive heart failure, unspecified heart failure type ([facility])  I50.9 428.0   2. UTI (urinary tract infection), bacterial  N39.0 599.0    A49.9 041.9   3. Acute on chronic diastolic congestive heart failure ([facility])  I50.33 428.33     428.0     Patient Active Problem List   Diagnosis   • Iron deficiency anemia   • COPD with acute exacerbation ([facility])   • Chronic diastolic heart failure ([facility])   • CKD (chronic kidney disease) stage 3, GFR 30-59 ml/min ([facility])   • Essential hypertension   • Hyperlipidemia   • Type II diabetes mellitus ([facility])   • Arthritis   • Osteoporosis   • Grade II diastolic dysfunction   • Duodenitis   • Peptic ulcer disease with hemorrhage   • Non-ST elevation myocardial infarction (NSTEMI) ([facility])   • Abnormal nuclear stress test with mild degree of small size apical lateral wall myocardial ischemia in 2019.   • Anemia due to GI blood loss   • Hip fracture ([facility])   • S/p left hip fracture   • CHF exacerbation ([facility])   • Acute on chronic congestive heart failure ([facility])     Past Medical History:   Diagnosis Date   • Abnormal nuclear stress test with mild degree of small size apical lateral wall myocardial ischemia in 2019. 2020   • Acute on chronic diastolic CHF (congestive heart failure) ([facility]) 2019   • Anal polyp 2018    Added automatically from request for surgery [id number]   • Arthritis    • Chronic kidney disease    • COPD (chronic obstructive pulmonary disease) ([facility])    • Diabetes mellitus ([facility])    • Elevated cholesterol    • Essential hypertension 2019   • History of transfusion    • Incidental lung nodule, greater than or equal to 8mm     Left lower lobe, 15 mm based on  and 2019 chest CT scans with no  growth in the size   • Iron deficiency anemia 2017   • Osteoporosis    • Peptic ulcer disease with hemorrhage 2020   • Pneumonia of right middle lobe due to infectious organism 2019     Past Surgical History:   Procedure Laterality Date   • ANUS SURGERY N/A 2018    Procedure: Diagnostic anoscopy with anal polyp excision;  Surgeon: Marlyn Gutierrez MD;  Location: [facility];  Service: General   •  SECTION     • ENDOSCOPY N/A 2020    Procedure: ESOPHAGOGASTRODUODENOSCOPY;  Surgeon: Marlon Wray MD;  Location: [facility];  Service: Gastroenterology;  Laterality: N/A;   • FEMUR OPEN REDUCTION INTERNAL FIXATION Left 2021    Procedure: Left hip hook plate operative fixation;  Surgeon: Lupillo Cornejo MD;  Location: [facility];  Service: Orthopedics;  Laterality: Left;   • HIP BIPOLAR REPLACEMENT      left Dr. Cornejo    • HYSTERECTOMY     • JOINT REPLACEMENT       PT Assessment (last 12 hours)     PT Evaluation and Treatment     Row Name 21 141          Physical Therapy Time and Intention    Document Type therapy note (daily note)  -     Mode of Treatment physical therapy  -     Patient Effort good  -     Comment Pt treated this date, ambulating 260' with RW, seated TE and standing mini squats with CGA  -     Row Name 21 141          Bed Mobility    Comment (Bed Mobility) Pt found sitting EOB  -     Row Name 21 1416          Transfers    Transfers sit-stand transfer; stand-sit transfer  -     Sit-Stand [place] (Transfers) contact guard  -     Stand-Sit [place] (Transfers) contact guard; verbal cues  -     Row Name 21 1416          Sit-Stand Transfer    Assistive Device (Sit-Stand Transfers) walker, front-wheeled  -     Row Name 21 1416          Stand-Sit Transfer    Assistive Device (Stand-Sit Transfers) walker, front-wheeled  -     Row Name 21 1416          Gait/Stairs (Locomotion)    Gait/Stairs Locomotion  gait/ambulation assistive device  -     [place] Level (Gait) contact guard; set up  -     Assistive Device (Gait) walker, front-wheeled  -     Distance in Feet (Gait) 260  -     Row Name 12/30/21 1416          Motor Skills    Therapeutic Exercise hip; knee; ankle  marches, S/LAQ seated, PF  -     Row Name 12/30/21 1416          Positioning and Restraints    Pre-Treatment Position in bed  -     Post Treatment Position chair  -     In Chair sitting; call light within reach; with family/caregiver  -           User Key  (r) = Recorded By, (t) = Taken By, (c) = Cosigned By    Initials Name Provider Type    Sabrina Bear PT Physical Therapist                  PT Recommendation and Plan  Anticipated Discharge Disposition (PT): home with assist, home with home health  Planned Therapy Interventions (PT): gait training, strengthening, patient/family education  Therapy Frequency (PT):  (3-5x/wk)  Outcome Summary: Pt evaluated this date, tolerated fair to well with O2 saturation in 90's observed. Pt was seen sitting EOB this date, overall CGA with ambulation for safety. Anticipated D/C for home with continued home health to improve functional independence.       Time Calculation:    PT Charges     Row Name 12/30/21 1421             Time Calculation    PT Received On 12/30/21  -      PT Goal Re-Cert Due Date 01/11/22  -              Time Calculation- PT    Total Timed Code Minutes- PT 25 minute(s)  -            User Key  (r) = Recorded By, (t) = Taken By, (c) = Cosigned By    Initials Name Provider Type    Sabrina Bear PT Physical Therapist              Therapy Charges for Today     Code Description Service Date Service Provider Modifiers Qty    35702615313 HC GAIT TRAINING EA 15 MIN 12/30/2021 Sabrina Medel, PT GP 1    14552300320 HC PT THER PROC EA 15 MIN 12/30/2021 Sabrina Medel PT GP 1               Sabrina Medel PT  12/30/2021      Electronically signed by Gianfranco  Sabrina, PT at 21 1423     Sabrina Medel, PT at 21 1422  Version 1 of 2         Acute Care - Physical Therapy Treatment Note  EDWIG[facility] Villarreal     Patient Name: Albina Finley  : 1945  MRN: [id number]  Today's Date: 2021      Visit Dx:     ICD-10-CM ICD-9-CM   1. Acute on chronic congestive heart failure, unspecified heart failure type ([facility])  I50.9 428.0   2. UTI (urinary tract infection), bacterial  N39.0 599.0    A49.9 041.9   3. Acute on chronic diastolic congestive heart failure ([facility])  I50.33 428.33     428.0     Patient Active Problem List   Diagnosis   • Iron deficiency anemia   • COPD with acute exacerbation ([facility])   • Chronic diastolic heart failure ([facility])   • CKD (chronic kidney disease) stage 3, GFR 30-59 ml/min ([facility])   • Essential hypertension   • Hyperlipidemia   • Type II diabetes mellitus ([facility])   • Arthritis   • Osteoporosis   • Grade II diastolic dysfunction   • Duodenitis   • Peptic ulcer disease with hemorrhage   • Non-ST elevation myocardial infarction (NSTEMI) ([facility])   • Abnormal nuclear stress test with mild degree of small size apical lateral wall myocardial ischemia in 2019.   • Anemia due to GI blood loss   • Hip fracture ([facility])   • S/p left hip fracture   • CHF exacerbation ([facility])   • Acute on chronic congestive heart failure ([facility])     Past Medical History:   Diagnosis Date   • Abnormal nuclear stress test with mild degree of small size apical lateral wall myocardial ischemia in 2019. 2020   • Acute on chronic diastolic CHF (congestive heart failure) ([facility]) 2019   • Anal polyp 2018    Added automatically from request for surgery [id number]   • Arthritis    • Chronic kidney disease    • COPD (chronic obstructive pulmonary disease) ([facility])    • Diabetes mellitus ([facility])    • Elevated cholesterol    • Essential hypertension 2019   • History of transfusion    • Incidental lung nodule, greater than or equal to 8mm     Left lower lobe, 15 mm based on   and 2019 chest CT scans with no growth in the size   • Iron deficiency anemia 2017   • Osteoporosis    • Peptic ulcer disease with hemorrhage 2020   • Pneumonia of right middle lobe due to infectious organism 2019     Past Surgical History:   Procedure Laterality Date   • ANUS SURGERY N/A 2018    Procedure: Diagnostic anoscopy with anal polyp excision;  Surgeon: Marlyn Gutierrez MD;  Location: [facility];  Service: General   •  SECTION     • ENDOSCOPY N/A 2020    Procedure: ESOPHAGOGASTRODUODENOSCOPY;  Surgeon: Marlon Wray MD;  Location: [facility] OR;  Service: Gastroenterology;  Laterality: N/A;   • FEMUR OPEN REDUCTION INTERNAL FIXATION Left 2021    Procedure: Left hip hook plate operative fixation;  Surgeon: Lupillo Cornejo MD;  Location: [facility];  Service: Orthopedics;  Laterality: Left;   • HIP BIPOLAR REPLACEMENT      left Dr. Cornejo    • HYSTERECTOMY     • JOINT REPLACEMENT       PT Assessment (last 12 hours)     PT Evaluation and Treatment     Row Name 21 [facility]          Physical Therapy Time and Intention    Document Type therapy note (daily note)  -     Mode of Treatment physical therapy  -     Patient Effort good  -     Comment Pt treated this date, ambulating 260' with RW, seated TE and standing mini squats with CGA  -     Row Name 21 1416          Bed Mobility    Comment (Bed Mobility) Pt found sitting EOB  -     Row Name 21 1416          Transfers    Transfers sit-stand transfer; stand-sit transfer  -     Sit-Stand [place] (Transfers) contact guard  -     Stand-Sit [place] (Transfers) contact guard; verbal cues  -     Row Name 21 1416          Sit-Stand Transfer    Assistive Device (Sit-Stand Transfers) walker, front-wheeled  -     Row Name 21 1416          Stand-Sit Transfer    Assistive Device (Stand-Sit Transfers) walker, front-wheeled  -     Row Name 21 1416          Gait/Stairs  (Locomotion)    Gait/Stairs Locomotion gait/ambulation assistive device  -     [place] Level (Gait) contact guard; set up  -     Assistive Device (Gait) walker, front-wheeled  -     Distance in Feet (Gait) 260  -     Row Name 12/30/21 1416          Motor Skills    Therapeutic Exercise hip; knee; ankle  melva, S/LAQ seated, PF  -     Row Name 12/30/21 1416          Positioning and Restraints    Pre-Treatment Position in bed  -     Post Treatment Position chair  -     In Chair sitting; call light within reach; with family/caregiver  -           User Key  (r) = Recorded By, (t) = Taken By, (c) = Cosigned By    Initials Name Provider Type    Sabrina Bear PT Physical Therapist                  PT Recommendation and Plan  Anticipated Discharge Disposition (PT): home with assist, home with home health  Planned Therapy Interventions (PT): gait training, strengthening, patient/family education  Therapy Frequency (PT):  (3-5x/wk)  Outcome Summary: Pt evaluated this date, tolerated fair to well with O2 saturation in 90's observed. Pt was seen sitting EOB this date, overall CGA with ambulation for safety. Anticipated D/C for home with continued home health to improve functional independence.       Time Calculation:    PT Charges     Row Name 12/30/21 1421             Time Calculation    PT Received On 12/30/21  -      PT Goal Re-Cert Due Date 01/11/22  -            User Key  (r) = Recorded By, (t) = Taken By, (c) = Cosigned By    Initials Name Provider Type    Sabrina Bear PT Physical Therapist              Therapy Charges for Today     Code Description Service Date Service Provider Modifiers Qty    21248576496 HC GAIT TRAINING EA 15 MIN 12/30/2021 Sabrina Medel, PT GP 1    46223881716 HC PT THER PROC EA 15 MIN 12/30/2021 Sabirna Medel PT GP 1               Sabrina Medel PT  12/30/2021      Electronically signed by Sabrina Medel PT at 12/30/21 1422          Occupational  Therapy Notes (most recent note)      Otilia Phan, OT at 21 1219          Patient Name: Albina Finley  : 1945    MRN: [id number]                              Today's Date: 2021       Admit Date: 2021    Visit Dx:     ICD-10-CM ICD-9-CM   1. Acute on chronic congestive heart failure, unspecified heart failure type ([facility])  I50.9 428.0   2. UTI (urinary tract infection), bacterial  N39.0 599.0    A49.9 041.9   3. Acute on chronic diastolic congestive heart failure ([facility])  I50.33 428.33     428.0     Patient Active Problem List   Diagnosis   • Iron deficiency anemia   • COPD with acute exacerbation ([facility])   • Chronic diastolic heart failure ([facility])   • CKD (chronic kidney disease) stage 3, GFR 30-59 ml/min ([facility])   • Essential hypertension   • Hyperlipidemia   • Type II diabetes mellitus ([facility])   • Arthritis   • Osteoporosis   • Grade II diastolic dysfunction   • Duodenitis   • Peptic ulcer disease with hemorrhage   • Non-ST elevation myocardial infarction (NSTEMI) ([facility])   • Abnormal nuclear stress test with mild degree of small size apical lateral wall myocardial ischemia in 2019.   • Anemia due to GI blood loss   • Hip fracture ([facility])   • S/p left hip fracture   • CHF exacerbation ([facility])   • Acute on chronic congestive heart failure ([facility])     Past Medical History:   Diagnosis Date   • Abnormal nuclear stress test with mild degree of small size apical lateral wall myocardial ischemia in 2019. 2020   • Acute on chronic diastolic CHF (congestive heart failure) ([facility]) 2019   • Anal polyp 2018    Added automatically from request for surgery [id number]   • Arthritis    • Chronic kidney disease    • COPD (chronic obstructive pulmonary disease) ([facility])    • Diabetes mellitus ([facility])    • Elevated cholesterol    • Essential hypertension 2019   • History of transfusion    • Incidental lung nodule, greater than or equal to 8mm     Left lower lobe, 15 mm based on  and March   chest CT scans with no growth in the size   • Iron deficiency anemia 2017   • Osteoporosis    • Peptic ulcer disease with hemorrhage 2020   • Pneumonia of right middle lobe due to infectious organism 2019     Past Surgical History:   Procedure Laterality Date   • ANUS SURGERY N/A 2018    Procedure: Diagnostic anoscopy with anal polyp excision;  Surgeon: Marlyn Gutierrez MD;  Location: [facility];  Service: General   •  SECTION     • ENDOSCOPY N/A 2020    Procedure: ESOPHAGOGASTRODUODENOSCOPY;  Surgeon: Marlon Wray MD;  [facility];  Service: Gastroenterology;  Laterality: N/A;   • FEMUR OPEN REDUCTION INTERNAL FIXATION Left 2021    Procedure: Left hip hook plate operative fixation;  Surgeon: Lupillo Cornejo MD;  Location: [facility];  Service: Orthopedics;  Laterality: Left;   • HIP BIPOLAR REPLACEMENT      left Dr. Cornejo    • HYSTERECTOMY     • JOINT REPLACEMENT        General Information     Row Name 21 [facility][note 3]          OT Time and Intention    Document Type evaluation  -LM     Mode of Treatment occupational therapy  -LM     Row Name 21 1207          General Information    Patient Profile Reviewed yes  -LM     Existing Precautions/Restrictions fall  -LM     Barriers to Rehab medically complex; previous functional deficit; cognitive status  -LM     Row Name 21 120          Living Environment    Lives With alone; child(lester), adult  lived alone prior to hip fx with children assist  -LM     Row Name 21 120          Cognition    Orientation Status (Cognition) oriented to; person; place; verbal cues/prompts needed for orientation  -LM     Row Name 21 120          Safety Issues, Functional Mobility    Impairments Affecting Function (Mobility) balance; cognition; coordination; strength; endurance/activity tolerance  -LM     Cognitive Impairments, Mobility Safety/Performance problem-solving/reasoning; insight into  deficits/self-awareness; judgment  -LM           User Key  (r) = Recorded By, (t) = Taken By, (c) = Cosigned By    Initials Name Provider Type    Otilia Evangelista, EDGARDO Occupational Therapist                 Mobility/ADL's     Row Name 12/30/21 [facility]          Activities of Daily Living    BADL Assessment/Intervention bathing; upper body dressing; lower body dressing; grooming; feeding; toileting  -LM     Row Name 12/30/21 [facility]          Bathing Assessment/Intervention    [place] Level (Bathing) moderate assist (50% patient effort)  -     Row Name 12/30/21 [facility]          Upper Body Dressing Assessment/Training    [place] Level (Upper Body Dressing) minimum assist (75% patient effort)  -LM     Row Name 12/30/21 [facility]          Lower Body Dressing Assessment/Training    [place] Level (Lower Body Dressing) maximum assist (25% patient effort)  -     Row Name 12/30/21 [facility]          Grooming Assessment/Training    [place] Level (Grooming) set up  -     Row Name 12/30/21 [facility]          Self-Feeding Assessment/Training    [place] Level (Feeding) set up  -     Row Name 12/30/21 [facility]          Toileting Assessment/Training    [place] Level (Toileting) maximum assist (25% patient effort)  -           User Key  (r) = Recorded By, (t) = Taken By, (c) = Cosigned By    Initials Name Provider Type    Otilia Evangelista, EDGARDO Occupational Therapist               Obj/Interventions     Row Name 12/30/21 1215          Sensory Assessment (Somatosensory)    Sensory Assessment (Somatosensory) sensation intact  -     Row Name 12/30/21 1215          Vision Assessment/Intervention    Visual Impairment/Limitations WFL  -     Row Name 12/30/21 1215          Range of Motion Comprehensive    General Range of Motion no range of motion deficits identified  -     Row Name 12/30/21 1215          Strength Comprehensive (MMT)    General Manual Muscle Testing (MMT) Assessment no strength deficits identified  -            User Key  (r) = Recorded By, (t) = Taken By, (c) = Cosigned By    Initials Name Provider Type    LM Otilia Phan, OT Occupational Therapist               Goals/Plan     Row Name 12/30/21 1217          Transfer Goal 1 (OT)    Activity/Assistive Device (Transfer Goal 1, OT) transfers, all; commode, 3-in-1; walker, rolling  -LM     [place] Level/Cues Needed (Transfer Goal 1, OT) minimum assist (75% or more patient effort)  -LM     Time Frame (Transfer Goal 1, OT) by discharge  -LM     Row Name 12/30/21 1217          Bathing Goal 1 (OT)    Activity/Device (Bathing Goal 1, OT) bathing skills, all  -LM     [place] Level/Cues Needed (Bathing Goal 1, OT) minimum assist (75% or more patient effort)  -LM     Time Frame (Bathing Goal 1, OT) by discharge  -LM     Row Name 12/30/21 1217          Therapy Assessment/Plan (OT)    Planned Therapy Interventions (OT) activity tolerance training; adaptive equipment training; BADL retraining; ROM/therapeutic exercise; patient/caregiver education/training; transfer/mobility retraining; strengthening exercise  -           User Key  (r) = Recorded By, (t) = Taken By, (c) = Cosigned By    Initials Name Provider Type    LM Otilia Phan OT Occupational Therapist               Clinical Impression     Row Name 12/30/21 1216          Plan of Care Review    Plan of Care Reviewed With patient; daughter  -     Row Name 12/30/21 1216          Therapy Assessment/Plan (OT)    Patient/Family Therapy Goal Statement (OT) return to plof  -     Rehab Potential (OT) good, to achieve stated therapy goals  -     Criteria for Skilled Therapeutic Interventions Met (OT) yes; meets criteria; skilled treatment is necessary  -     Therapy Frequency (OT) other (see comments)  prn and/or monitor fxl progress  -     Row Name 12/30/21 1216          Therapy Plan Review/Discharge Plan (OT)    Anticipated Discharge Disposition (OT) home with assist; home with home health  -     Row Name  12/30/21 1216          Positioning and Restraints    In Bed sitting EOB; call light within reach; encouraged to call for assist; with family/caregiver  -BRANDON           User Key  (r) = Recorded By, (t) = Taken By, (c) = Cosigned By    Initials Name Provider Type    LM Otilia Phan OT Occupational Therapist               Outcome Measures    No documentation.                   OT Recommendation and Plan  Planned Therapy Interventions (OT): activity tolerance training, adaptive equipment training, BADL retraining, ROM/therapeutic exercise, patient/caregiver education/training, transfer/mobility retraining, strengthening exercise  Therapy Frequency (OT): other (see comments) (prn and/or monitor fxl progress)  Plan of Care Review  Plan of Care Reviewed With: patient, daughter     Time Calculation:     Therapy Charges for Today     Code Description Service Date Service Provider Modifiers Qty    78498594712 HC OT EVAL MOD COMPLEXITY 4 12/30/2021 Otilia Phan OT GO 1               Otilia Phan OT  12/30/2021    Electronically signed by Otilia Phan OT at 12/30/21 1219       Discharge Summary    No notes of this type exist for this encounter.         Discharge Order (From admission, onward)     Start     Ordered    01/02/22 1149  Discharge patient  Once        Expected Discharge Date: 01/02/22    Discharge Disposition: [place]-Health Care [facility]    Physician of Record for Attribution - Please select from Treatment Team: SWETHA AGUIRRE [[id number]]    Review needed by CMO to determine Physician of Record: No       Question Answer Comment   Physician of Record for Attribution - Please select from Treatment Team SWETHA AGUIRRE    Review needed by CMO to determine Physician of Record No        01/02/22 1159                After Visit Summary    Albina Finley  MRN: [id number]    Icon primary diagnosis          Heart failure   Icon Date   12/27/2021 - 1/2/2022   Icon Location   [address]     Icon Checklist  header      Your Next Steps      Icon ask where to get these medications   Ask     Icon Checkbox    Ask how to get these medications  1 insulin detemir      Icon do   Do     Icon Checkbox     these medications from [id number] - [place], [place] - [address] AT [facility] OF [address] - [phone] PH - [phone] FX  1 atorvastatin  2 cefdinir  3 furosemide  4 isosorbide mononitrate      Icon read   Read     Icon Checkbox    Read 9 attachments      [employer]    View your After Visit Summary and more online at [email or web address]   After Visit Summary    Instructions     Icon medication changes this visit             Your medications have changed    Icon medications to start taking   START taking:   atorvastatin (LIPITOR)      cefdinir (OMNICEF)      furosemide (LASIX)      isosorbide mononitrate (IMDUR)       Icon medications to change how you take   CHANGE how you take:   insulin detemir (LEVEMIR) -- how much to take, Another medication with the same name was removed. Continue taking this medication, and follow the directions you see here.      Review your updated medication list below.          Your Next Steps  Icon ask where to get these medications   Ask  Icon do   Do  Icon read   Read  COVID-19 Vaccination Information  Why Get Vaccinated?  Building defenses against COVID-19 is a team effort, and you are a álvarez part of that team. Getting the COVID-19 vaccine adds one more layer of protection for you, your coworkers, and family. Here are ways you can build people’s confidence in the COVID-19 vaccines in your community and at home.     · Get vaccinated and enroll in the v-safe text messaging program to help CDC monitor vaccine safety.   · Tell others why you are getting vaccinated and encourage them to get vaccinated. Share your success story.    · Learn how to have conversations about COVID-19 vaccine with coworkers, family, and  friends.  · https://www.cdc.gov/coronavirus/2019-ncov/vaccines/index.html     How do I schedule an appointment for a vaccine?  https://www.vaccines.gov/ helps you find locations that carry COVID-19 vaccines and their contact information. Because every location handles appointments differently, you will need to schedule your appointment directly with the location you choose.            If you have any questions about your recovery, please call the [facility] Nurse Call Center at [phone]. A registered nurse is available 24 hours a day 7 days a week to assist you.   If you have any COVID-19 related questions, please call [phone].       Icon activity      Activity instructions    Activity as Tolerated   Falls precautions               Icon diet      Diet instructions    Diet: Regular, Consistent Carbohydrate, Specialty Diet; Thin Liquids, No Restrictions; Low Sodium; 2,000 mg Na   Discharge Diet:   Regular   Consistent Carbohydrate   Specialty Diet    Fluid Consistency:   Thin Liquids, No Restrictions    Specialty Diets:   Low Sodium    Low Sodium Restriction:   2,000 mg Na                 Icon Orders placed today                    Other instructions    Discharge Follow-up with PCP   Currently Documented PCP:   Micah Núñez PA   PCP Phone Number:   [phone]       Discharge Follow-up with Specified Provider: Cardiology if desired by the family; 2 Months       Discharge Follow-up with Specified Provider: Has an appointment with Dr. Quinn on 1/5/2022                 What's Next    What's Next          Follow up with [facility]  PATIENT WILL BE NOTIFIED ABOUT APPT ON MONDAY 1/3 AM 1 [facility] [id number] [phone]          Follow up with ONESIMO Gatica in 1 week(s)  PATIENT WILL BE NOTIFIED ABOUT APPT ON MONDAY 1/3  [facility] [address]  [phone]          Follow up with Lorena Luo MD in 2 month(s)  PATIENT WILL BE  NOTIFIED ABOUT APPT ON MONDAY 1/3  [address]   [address]   [facility] [address] [phone]       Continuing Care     Icon Home Medical Care      Home Medical Care    [facility]  Services: Home Health Services   Address: [address] [place] [facility] [address]   Phone: [phone]                         Your Allergies  Date Reviewed: 12/28/2021  Your Allergies   No active allergies     Patient Belongings Returned      Document Return of Belongings Flowsheet    Were the patient bedside belongings sent home? Yes   Medications Retrieved from Pharmacy & Sent Home N/A   Belongings Sent to Safe None   Belongings sent with: --   Belongings Retrieved from Security & Sent Home N/A           Swidjit Signup    Our records indicate that you have an active [employer] account.     You can view your After Visit Summary by going to [employer] and logging in with your Swidjit username and password.  If you don't have a Swidjit username and password but a parent or guardian has access to your record, the parent or guardian should login with their own Swidjit username and password and access your record to view the After Visit Summary.     If you have questions, you can email [email or web address] or call [phone] or toll free number [phone] to talk to our Swidjit staff.  Remember, Swidjit is NOT to be used for urgent needs.  For medical emergencies, dial 911.         Medication List    Medication List     Morning Afternoon Evening Bedtime As Needed   Icon medications to start taking   atorvastatin 20 MG tablet  Commonly known as: LIPITOR  Take 1 tablet by mouth Every Night.  Last time this was given: 20 mg on January 1, 2022  8:14 PM         calcium carbonate 600 MG tablet  Commonly known as: OS-TIANNA  Take 600 mg by mouth Daily.  Last time this was given: Ask your nurse or doctor         carvedilol 6.25 MG tablet  Commonly known as: COREG  Take 1 tablet by mouth  2 (Two) Times a Day With Meals.  Last time this was given: 6.25 mg on January 2, 2022  9:03 AM        Icon medications to start taking   cefdinir 300 MG capsule  Commonly known as: OMNICEF  Take 1 capsule by mouth Every 12 (Twelve) Hours for 3 doses. Indications: Urinary Tract Infection  For: Urinary Tract Infection  Last time this was given: 300 mg on January 2, 2022  9:03 AM         cetirizine 5 MG tablet  Commonly known as: zyrTEC  Take 1 tablet by mouth Daily.  Last time this was given: 5 mg on January 2, 2022  9:03 AM         cyanocobalamin 1000 MCG/ML injection  Inject 1,000 mcg into the appropriate muscle as directed by prescriber Every 30 (Thirty) Days. Due on 1-12-22.  Last time this was given: 1,000 mcg on December 27, 2021  6:09 PM   As directed Every thirty days.         ferrous sulfate 325 (65 FE) MG tablet  Take 325 mg by mouth Every Evening.  Last time this was given: 325 mg on January 1, 2022  5:54 PM        Icon medications to start taking   furosemide 40 MG tablet  Commonly known as: LASIX  Take 1 tablet by mouth 2 (Two) Times a Day.  Last time this was given: 40 mg on January 2, 2022  9:03 AM         hydrALAZINE 10 MG tablet  Commonly known as: APRESOLINE  Take 10 mg by mouth 3 (Three) Times a Day.  Last time this was given: 25 mg on January 2, 2022  9:03 AM        Icon medications to change how you take   insulin detemir 100 UNIT/ML injection  Commonly known as: LEVEMIR  Inject 42 Units under the skin into the appropriate area as directed Daily.  What changed:   0 how much to take  0 Another medication with the same name was removed. Continue taking this medication, and follow the directions you see here.  Last time this was given: 42 Units on January 2, 2022  9:04 AM      As directed.     Icon medications to start taking   isosorbide mononitrate 30 MG 24 hr tablet  Commonly known as: IMDUR  Take 1 tablet by mouth Daily.  Last time this was given: 30 mg on January 2, 2022  9:03 AM          pantoprazole 40 MG EC tablet  Commonly known as: PROTONIX  Take 1 tablet by mouth 2 (Two) Times a Day Before Meals.  Last time this was given: 40 mg on January 2, 2022  9:03 AM            Where to  your medications     Icon medication  information                     these medications at [id number] - [place], [place] - [address] AT [facility] OF [address] - [phone]  - [phone] FX     atorvastatin • cefdinir • furosemide • isosorbide mononitrate      Address: [address] [place] [address]   Phone: [phone]          Icon ask where to get these medications      Ask your doctor where to  these medications     • insulin detemir 100 UNIT/ML injection            Always carry an updated list of your medications with you. If there is an emergency, a responder can quickly see what medications you are taking. Take this paperwork with you the next time you see your health care provider.            [employer]      Icon List of Attachments     Attached Information  Heart Failure  Diagnosis  Easy-to-Read (English)  Heart Failure, Diagnosis

## 2022-01-03 NOTE — OUTREACH NOTE
Prep Survey      Responses   [facility] patient discharged from? [place]   Is LACE score < 7 ? No   Emergency Room discharge w/ pulse ox? No   Eligibility Readm Mgmt   Discharge diagnosis Acute diastolic CHF exacerbation    Does the patient have one of the following disease processes/diagnoses(primary or secondary)? CHF   Does the patient have Home health ordered? Yes   What is the Home health agency?  [facility]   Is there a DME ordered? No   Prep survey completed? Yes          Maria Luz Gray RN

## 2022-01-05 ENCOUNTER — READMISSION MANAGEMENT (OUTPATIENT)
Dept: CALL CENTER | Facility: HOSPITAL | Age: 77
End: 2022-01-05

## 2022-01-05 NOTE — OUTREACH NOTE
CHF Week 1 Survey      Responses   [facility] patient discharged from? [facility]   Does the patient have one of the following disease processes/diagnoses(primary or secondary)? CHF   CHF Week 1 attempt successful? Yes   Call start time 1202   Call end time 1210   Discharge diagnosis Acute diastolic CHF exacerbation    Person spoke with today (if not patient) and relationship ELIDA Sarkar   Meds reviewed with patient/caregiver? Yes   Is the patient having any side effects they believe may be caused by any medication additions or changes? No   Does the patient have all medications ordered at discharge? Yes   Is the patient taking all medications as directed (includes completed medication regime)? Yes   Medication comments Completed antibiotics   Does the patient have a primary care provider?  Yes   Does the patient have an appointment with their PCP within 7 days of discharge? No   Comments regarding PCP Jim Hernandez MD   What is preventing the patient from scheduling follow up appointments within 7 days of discharge? Haven't had time   Nursing Interventions Educated patient on importance of making appointment,  Advised patient to make appointment   Has the patient kept scheduled appointments due by today? N/A   Comments Has appt with kidney dr today   What is the Home health agency?  [facility]   Has home health visited the patient within 72 hours of discharge? Yes   Pulse Ox monitoring Intermittent   Pulse Ox device source Patient   O2 Sat comments 97% using 2L   O2 Sat: education provided Sat levels,  When to seek care,  Monitoring frequency   Psychosocial issues? Yes   Comments States her confusion has increased.  She has had episodes at time previously but is constant now.  She is unsure if this is related to her hospitalization.  Someone is with her 24/7   Did the patient receive a copy of their discharge instructions? Yes   Nursing interventions Reviewed instructions with patient   What is the  "patient's perception of their health status since discharge? New symptoms unrelated to diagnosis   Nursing interventions Nurse provided patient education   Is the patient weighing daily? Yes   Does the patient have scales? Yes   Is the patient able to teach back Heart Failure diet management? Yes   Is the patient able to teach back Heart Failure Zones? Yes   Is the patient able to teach back signs and symptoms of worsening condition? (i.e. weight gain, shortness of air, etc.) Yes   If the patient is a current smoker, are they able to teach back resources for cessation? Not a smoker   Is the patient/caregiver able to teach back the hierarchy of who to call/visit for symptoms/problems? PCP, Specialist, Home health nurse, Urgent Care, ED, 911 Yes   Additional teach back comments States hip is good and breathing is better but the dementia is \"terrible\".  Is extremely confused more than normal.  States they have had times where she has been like this but not constant like now.  Advised to contact PCP for an appt. States she has an appt today with Kidney dr and will requestion a urinalysis.      CHF Week 1 call completed? Yes   Wrap up additional comments Pt confused and will speak with Kidney dr today to make sure UTI has cleared up.  Also, to make appt with PCP          Vidya Berg LPN  "

## 2022-01-10 ENCOUNTER — HOSPITAL ENCOUNTER (OUTPATIENT)
Dept: CARDIOLOGY | Facility: HOSPITAL | Age: 77
Discharge: HOME | End: 2022-01-10
Admitting: NURSE PRACTITIONER

## 2022-01-10 ENCOUNTER — APPOINTMENT (OUTPATIENT)
Dept: CARDIOLOGY | Facility: HOSPITAL | Age: 77
End: 2022-01-10

## 2022-01-10 VITALS
RESPIRATION RATE: 20 BRPM | SYSTOLIC BLOOD PRESSURE: 144 MMHG | DIASTOLIC BLOOD PRESSURE: 60 MMHG | OXYGEN SATURATION: 96 % | BODY MASS INDEX: 29.74 KG/M2 | HEART RATE: 60 BPM | WEIGHT: 157.4 LBS

## 2022-01-10 DIAGNOSIS — I50.32 CHRONIC DIASTOLIC HEART FAILURE: Primary | Chronic | ICD-10-CM

## 2022-01-10 DIAGNOSIS — R79.0 LOW MAGNESIUM LEVEL: ICD-10-CM

## 2022-01-10 DIAGNOSIS — I10 ESSENTIAL HYPERTENSION: Chronic | ICD-10-CM

## 2022-01-10 DIAGNOSIS — N18.32 STAGE 3B CHRONIC KIDNEY DISEASE: Chronic | ICD-10-CM

## 2022-01-10 DIAGNOSIS — Z87.81 S/P LEFT HIP FRACTURE: ICD-10-CM

## 2022-01-10 LAB
ABSOLUTE LUNG FLUID CONTENT: 39 % (ref 20–35)
ANION GAP SERPL CALCULATED.3IONS-SCNC: 13.1 MMOL/L (ref 5–15)
BUN SERPL-MCNC: 35 MG/DL (ref 8–23)
BUN/CREAT SERPL: 22.6 (ref 7–25)
CALCIUM SPEC-SCNC: 9.2 MG/DL (ref 8.6–10.5)
CHLORIDE SERPL-SCNC: 97 MMOL/L (ref 98–107)
CO2 SERPL-SCNC: 28.9 MMOL/L (ref 22–29)
CREAT SERPL-MCNC: 1.55 MG/DL (ref 0.57–1)
GFR SERPL CREATININE-BSD FRML MDRD: 32 ML/MIN/1.73
GLUCOSE SERPL-MCNC: 256 MG/DL (ref 65–99)
MAGNESIUM SERPL-MCNC: 1.4 MG/DL (ref 1.6–2.4)
NT-PROBNP SERPL-MCNC: 1198 PG/ML (ref 0–1800)
POTASSIUM SERPL-SCNC: 3.9 MMOL/L (ref 3.5–5.2)
SODIUM SERPL-SCNC: 139 MMOL/L (ref 136–145)

## 2022-01-10 PROCEDURE — 36415 COLL VENOUS BLD VENIPUNCTURE: CPT | Performed by: NURSE PRACTITIONER

## 2022-01-10 PROCEDURE — 83880 ASSAY OF NATRIURETIC PEPTIDE: CPT

## 2022-01-10 PROCEDURE — 94726 PLETHYSMOGRAPHY LUNG VOLUMES: CPT | Performed by: NURSE PRACTITIONER

## 2022-01-10 PROCEDURE — 83735 ASSAY OF MAGNESIUM: CPT | Performed by: NURSE PRACTITIONER

## 2022-01-10 PROCEDURE — 80048 BASIC METABOLIC PNL TOTAL CA: CPT | Performed by: NURSE PRACTITIONER

## 2022-01-10 PROCEDURE — 99214 OFFICE O/P EST MOD 30 MIN: CPT | Performed by: NURSE PRACTITIONER

## 2022-01-10 RX ORDER — FUROSEMIDE 40 MG/1
40 TABLET ORAL 2 TIMES DAILY
Qty: 60 TABLET | Refills: 0 | Status: SHIPPED | OUTPATIENT
Start: 2022-01-10 | End: 2022-02-09 | Stop reason: SDUPTHER

## 2022-01-10 RX ORDER — CETIRIZINE HYDROCHLORIDE 10 MG/1
10 TABLET ORAL DAILY
COMMUNITY

## 2022-01-10 RX ORDER — MAGNESIUM OXIDE 400 MG/1
400 TABLET ORAL DAILY
Qty: 30 TABLET | Refills: 1 | Status: SHIPPED | OUTPATIENT
Start: 2022-01-10 | End: 2022-03-23

## 2022-01-10 RX ORDER — CARVEDILOL 25 MG/1
25 TABLET ORAL 2 TIMES DAILY WITH MEALS
COMMUNITY

## 2022-01-10 RX ORDER — LOPERAMIDE HYDROCHLORIDE 2 MG/1
2 CAPSULE ORAL 4 TIMES DAILY PRN
COMMUNITY
End: 2022-03-23

## 2022-01-10 RX ORDER — BUMETANIDE 2 MG/1
2 TABLET ORAL DAILY PRN
COMMUNITY
Start: 2022-01-10

## 2022-01-10 NOTE — PROGRESS NOTES
Heart Failure Pharmacy Note  Patient Name: Albina Finley  Referring Provider: Dr. Smith  Primary Cardiologist:   Type: HFpEF    Medication Use:   Adherence: no issues; has 24/7 help from family  Hx of med intolerances: n/a  Affordability: reported that insulin can be expensive at times  Retail Rx Management: n/a    Past Medical History:   Diagnosis Date   • Abnormal nuclear stress test with mild degree of small size apical lateral wall myocardial ischemia in December 2019. 5/28/2020   • Acute on chronic diastolic CHF (congestive heart failure) ([facility]) 12/17/2019   • Anal polyp 6/7/2018    Added automatically from request for surgery [id number]   • Arthritis    • Chronic kidney disease    • COPD (chronic obstructive pulmonary disease) ([facility])    • Diabetes mellitus ([facility])    • Elevated cholesterol    • Essential hypertension 12/17/2019   • History of transfusion    • Incidental lung nodule, greater than or equal to 8mm     Left lower lobe, 15 mm based on 2012 and March 2019 chest CT scans with no growth in the size   • Iron deficiency anemia 7/7/2017   • Osteoporosis    • Peptic ulcer disease with hemorrhage 4/22/2020   • Pneumonia of right middle lobe due to infectious organism 2/14/2019     ALLERGIES: Patient has no known allergies.  Current Outpatient Medications   Medication Sig Dispense Refill   • atorvastatin (LIPITOR) 20 MG tablet Take 1 tablet by mouth Every Night. 30 tablet 0   • calcium carbonate (OS-TIANNA) 600 MG tablet Take 600 mg by mouth Daily.     • carvedilol (COREG) 25 MG tablet Take 25 mg by mouth 2 (Two) Times a Day With Meals.     • cetirizine (zyrTEC) 10 MG tablet Take 10 mg by mouth Daily.     • cyanocobalamin 1000 MCG/ML injection Inject 1,000 mcg into the appropriate muscle as directed by prescriber Every 30 (Thirty) Days. Due on 1-12-22.     • ferrous sulfate 325 (65 FE) MG tablet Take 325 mg by mouth Every Evening.     • furosemide (LASIX) 40 MG tablet Take 1 tablet by mouth 2 (Two) Times a Day. 60  tablet 0   • hydrALAZINE (APRESOLINE) 10 MG tablet Take 10 mg by mouth 3 (Three) Times a Day.     • insulin detemir (LEVEMIR) 100 UNIT/ML injection Inject 42 Units under the skin into the appropriate area as directed Daily. (Patient taking differently: Inject  under the skin into the appropriate area as directed Daily. 50 units AM and 70 units PM -  Still adjusting)  12   • isosorbide mononitrate (IMDUR) 30 MG 24 hr tablet Take 1 tablet by mouth Daily. 30 tablet 0   • loperamide (IMODIUM) 2 MG capsule Take 2 mg by mouth 4 (Four) Times a Day As Needed for Diarrhea.     • pantoprazole (PROTONIX) 40 MG EC tablet Take 1 tablet by mouth 2 (Two) Times a Day Before Meals. 60 tablet 0   • bumetanide (BUMEX) 2 MG tablet Take 2 mg by mouth Daily As Needed. If weight greater than 158 pounds     • magnesium oxide (MAG-OX) 400 MG tablet Take 1 tablet by mouth Daily. 30 tablet 1     No current facility-administered medications for this encounter.       Vaccination History:   Pneumonia: reportedly UTD  Annual Influenza: UTD for 21-22 season    Objective  Vitals:    01/10/22 1337   BP: 144/60   BP Location: Right arm   Patient Position: Sitting   Cuff Size: Adult   Pulse: 60   Resp: 20   SpO2: 96%   Weight: 71.4 kg (157 lb 6.4 oz)     Wt Readings from Last 3 Encounters:   01/10/22 71.4 kg (157 lb 6.4 oz)   01/02/22 72.6 kg (160 lb)   11/26/21 73.4 kg (161 lb 13.1 oz)         01/10/22  1337   Weight: 71.4 kg (157 lb 6.4 oz)     Lab Results   Component Value Date    GLUCOSE 256 (H) 01/10/2022    BUN 35 (H) 01/10/2022    CREATININE 1.55 (H) 01/10/2022    EGFRIFNONA 32 (L) 01/10/2022    BCR 22.6 01/10/2022    K 3.9 01/10/2022    CO2 28.9 01/10/2022    CALCIUM 9.2 01/10/2022    ALBUMIN 3.55 12/26/2021    LABIL2 1.2 (L) 09/15/2015    AST 8 12/26/2021    ALT 8 12/26/2021     Lab Results   Component Value Date    WBC 7.80 01/02/2022    HGB 9.4 (L) 01/02/2022    HCT 31.3 (L) 01/02/2022    MCV 86.5 01/02/2022     01/02/2022     Lab  Results   Component Value Date    CKTOTAL 82 11/13/2021    CKMB 0.32 02/15/2019    CKMBINDEX 1.8 02/15/2019    TROPONINI <0.006 02/15/2019    TROPONINT 0.021 12/31/2021     Lab Results   Component Value Date    PROBNP 1,198.0 01/10/2022     Results for orders placed during the hospital encounter of 11/13/21    Adult Transthoracic Echo Complete W/ Cont if Necessary Per Protocol    Interpretation Summary  · Estimated left ventricular EF = 65% Left ventricular ejection fraction appears to be 61 - 65%. Left ventricular systolic function is normal.  · Left ventricular diastolic function was normal.  · Estimated right ventricular systolic pressure from tricuspid regurgitation is markedly elevated (>55 mmHg).  · Severe pulmonary hypertension is present.  · No significant mitral or aortic regurgitation  · No pericardial effusion  · Since previous study of 12/17/19 Severe pulmonary hypertension is now present           Drug Therapy Problems    1. Drug Interactions Screening  2. Drug-Disease Interactions  3. Refills: pt requesting refills on atorvastatin, Lasix, Imdur  4. Bumex + Lasix?  5. Hypomagnesemia  6. DM and HFpEF    Recommendations:     1. No significant interactions noted  2. None noted  3. Adela sent in Lasix today; pt to request atorvastatin and Imdur refills at PCP visit later this week  4. Patient has been taking Lasix since discharge, but I noted that a Bumex 2mg script was sent in today to [facility] per 'reconcile outside info' tab. Adela spoke to Dr. Quinn's office and [facility] and it appears patient is to take the Bumex only PRN if weight > 158 lbs.  5. Consider addition of Mg oxide 400 mg daily  6. May consider addition of Jardiance in the future if renal function allows    Discharge medications have been reviewed and reconciled.    Patient was educated on heart failure medications and the importance of medication adherence. All questions were addressed and patient expressed understanding. Used teach-back  method to assess understanding.     Thank you for allowing me to participate in the care of your patient,    Michelle Evans, PharmD  01/10/22  15:19 EST

## 2022-01-10 NOTE — PROGRESS NOTES
Heart Failure Clinic    Date: 01/10/22     Vitals:    01/10/22 1337   BP: 144/60   Pulse: 60   Resp: 20   SpO2: 96%        Method of arrival: Ambulatory with walker    Weighing self daily: Yes    Monitoring Heart Failure Zones: No    Today's HF Zone: Green    Taking medications as prescribed: Yes    Edema No    Shortness of Air: No    Number of pillows used at night:>3    Educational Materials given:  Initial Evaluation Packet                                                                         ReDS Value: 39%  36-41 Possible Hypervolemic Status      Yancy Bonilla MA 01/10/22 13:40 EST

## 2022-01-10 NOTE — PROGRESS NOTES
[facility] CHF CLINIC OFFICE VISIT  Subjective:   Initial Evaluation (CHF)    History of Present Illness  Albina Finley is a 76-year-old  female who presents to clinic today as a new patient for follow-up on heart failure posthospitalization.  She is accompanied by her daughter, Corine. She has a history of chronic diastolic congestive heart failure with an LVEF of 61 to 65% from echocardiogram on 11/13/2021, read by Dr. Ji. Her current medications are Coreg 25 mg twice daily, hydralazine 10 mg 3 times daily, Imdur 30 mg daily, Lasix 40 mg twice daily.     Shortness of air stable   LE swelling stable   Home BP stable 140/60  Home HR 70's  Home weight stable at 155  CKD, follows with Dr. Quinn. Dr. Quinn added Bumex to use prn for weight gain recently.   Consumes sodium but is working on cutting back   Recovering from hip fracture   Has [facility] doing physical rehab and home monitoring   Former smoker   No hx of LISA, supposed to use oxygen 2L at nighttime however doesn't use it on a regular basis   Has been unable to tolerate Spironolactone in the past due to CKD and hyperkalemia       PCP: ONESIMO Armendariz  Cardiologist:   Nephrologist: Dr Marguerite Quinn   Pulmonologist:    Hospitalizations: Discharged 1/2/2022    Past Medical History:   Diagnosis Date   • Abnormal nuclear stress test with mild degree of small size apical lateral wall myocardial ischemia in December 2019. 5/28/2020   • Acute on chronic diastolic CHF (congestive heart failure) (HCC) 12/17/2019   • Anal polyp 6/7/2018    Added automatically from request for surgery [id number]   • Arthritis    • Chronic kidney disease    • COPD (chronic obstructive pulmonary disease) (HCC)    • Diabetes mellitus ([facility])    • Elevated cholesterol    • Essential hypertension 12/17/2019   • History of transfusion    • Incidental lung nodule, greater than or equal to 8mm     Left lower lobe, 15 mm based on 2012 and March 2019 chest CT scans with no growth in the  size   • Iron deficiency anemia 2017   • Osteoporosis    • Peptic ulcer disease with hemorrhage 2020   • Pneumonia of right middle lobe due to infectious organism 2019     Past Surgical History:   Procedure Laterality Date   • ANUS SURGERY N/A 2018    Procedure: Diagnostic anoscopy with anal polyp excision;  Surgeon: Marlyn Gutierrez MD;  Location: [facility];  Service: General   •  SECTION     • ENDOSCOPY N/A 2020    Procedure: ESOPHAGOGASTRODUODENOSCOPY;  Surgeon: Marlon Wray MD;  Location: [facility];  Service: Gastroenterology;  Laterality: N/A;   • FEMUR OPEN REDUCTION INTERNAL FIXATION Left 2021    Procedure: Left hip hook plate operative fixation;  Surgeon: Lupillo Cornejo MD;  Location: [facility];  Service: Orthopedics;  Laterality: Left;   • HIP BIPOLAR REPLACEMENT      left Dr. Cornejo    • HYSTERECTOMY     • JOINT REPLACEMENT       Social History     Socioeconomic History   • Marital status:    Tobacco Use   • Smoking status: Former Smoker     Packs/day: 2.00     Years: 15.00     Pack years: 30.00     Types: Cigarettes   • Smokeless tobacco: Never Used   • Tobacco comment: quit 25 years ago    Substance and Sexual Activity   • Alcohol use: No   • Drug use: No   • Sexual activity: Defer     Birth control/protection: Post-menopausal     Family History   Problem Relation Age of Onset   • Heart disease Mother    • COPD Mother    • Arthritis Mother    • Diabetes Father      Allergies:  No Known Allergies    Review of Systems   Constitutional: Negative for chills, fever, weight gain and weight loss.   HENT: Negative for congestion, hoarse voice and sore throat.    Eyes: Negative for blurred vision, pain and visual disturbance.   Cardiovascular: Negative for chest pain, claudication, cyanosis, dyspnea on exertion, irregular heartbeat, leg swelling, near-syncope, orthopnea, palpitations and syncope.   Respiratory: Negative for cough, shortness of breath and  wheezing.    Endocrine: Negative for cold intolerance, heat intolerance and polyuria.   Hematologic/Lymphatic: Negative for bleeding problem. Does not bruise/bleed easily.   Skin: Negative for color change, flushing and rash.   Musculoskeletal: Negative for arthritis, back pain, joint pain and myalgias.   Gastrointestinal: Negative for abdominal pain, constipation, diarrhea, nausea and vomiting.   Genitourinary: Negative for dysuria, frequency, hesitancy and urgency.   Neurological: Negative for excessive daytime sleepiness, dizziness, headaches, numbness, vertigo and weakness.   Psychiatric/Behavioral: Positive for memory loss. Negative for depression. The patient does not have insomnia and is not nervous/anxious.    All other systems reviewed and are negative.    Current Outpatient Medications   Medication Sig Dispense Refill   • atorvastatin (LIPITOR) 20 MG tablet Take 1 tablet by mouth Every Night. 30 tablet 0   • calcium carbonate (OS-TIANNA) 600 MG tablet Take 600 mg by mouth Daily.     • carvedilol (COREG) 25 MG tablet Take 25 mg by mouth 2 (Two) Times a Day With Meals.     • cetirizine (zyrTEC) 10 MG tablet Take 10 mg by mouth Daily.     • cyanocobalamin 1000 MCG/ML injection Inject 1,000 mcg into the appropriate muscle as directed by prescriber Every 30 (Thirty) Days. Due on 1-12-22.     • ferrous sulfate 325 (65 FE) MG tablet Take 325 mg by mouth Every Evening.     • furosemide (LASIX) 40 MG tablet Take 1 tablet by mouth 2 (Two) Times a Day. 60 tablet 0   • hydrALAZINE (APRESOLINE) 10 MG tablet Take 10 mg by mouth 3 (Three) Times a Day.     • insulin detemir (LEVEMIR) 100 UNIT/ML injection Inject 42 Units under the skin into the appropriate area as directed Daily. (Patient taking differently: Inject  under the skin into the appropriate area as directed Daily. 50 units AM and 70 units PM -  Still adjusting)  12   • isosorbide mononitrate (IMDUR) 30 MG 24 hr tablet Take 1 tablet by mouth Daily. 30 tablet 0   •  loperamide (IMODIUM) 2 MG capsule Take 2 mg by mouth 4 (Four) Times a Day As Needed for Diarrhea.     • pantoprazole (PROTONIX) 40 MG EC tablet Take 1 tablet by mouth 2 (Two) Times a Day Before Meals. 60 tablet 0   • bumetanide (BUMEX) 2 MG tablet Take 2 mg by mouth Daily As Needed. If weight greater than 158 pounds     • magnesium oxide (MAG-OX) 400 MG tablet Take 1 tablet by mouth Daily. 30 tablet 1     No current facility-administered medications for this encounter.      Objective:     Vitals:    01/10/22 1337   BP: 144/60   BP Location: Right arm   Patient Position: Sitting   Cuff Size: Adult   Pulse: 60   Resp: 20   SpO2: 96%   Weight: 71.4 kg (157 lb 6.4 oz)     Body mass index is 29.74 kg/m².    ReDS Result:   Lab Results   Component Value Date    ABSOLUTELUNG 39 (A) 01/10/2022    ABSOLUTELUNG 41 (A) 01/01/2022    ABSOLUTELUNG 41 (A) 12/31/2021     Wt Readings from Last 3 Encounters:   01/10/22 71.4 kg (157 lb 6.4 oz)   01/02/22 72.6 kg (160 lb)   11/26/21 73.4 kg (161 lb 13.1 oz)        Vitals reviewed.   Constitutional:       Appearance: Normal appearance. Well-developed.      Comments: Uses a walker    Eyes:      Conjunctiva/sclera: Conjunctivae normal.   HENT:      Head: Normocephalic.   Neck:      Vascular: No JVD or JVR.   Pulmonary:      Effort: Pulmonary effort is normal.      Breath sounds: Normal breath sounds.   Cardiovascular:      Normal rate. Regular rhythm.   Edema:     Ankle: bilateral 1+ edema of the ankle.     Feet: bilateral 1+ edema of the feet.  Abdominal:      General: Bowel sounds are normal.      Palpations: Abdomen is soft. There is no hepatomegaly, splenomegaly or abdominal mass.      Tenderness: There is no abdominal tenderness.   Musculoskeletal: Normal range of motion.      Cervical back: Normal range of motion and neck supple. Skin:     General: Skin is warm and dry.   Neurological:      Mental Status: Alert and oriented to person, place, and time.   Psychiatric:          Behavior: Behavior is cooperative.         Cardiographics  Results for orders placed during the hospital encounter of 11/13/21    Adult Transthoracic Echo Complete W/ Cont if Necessary Per Protocol    Interpretation Summary  · Estimated left ventricular EF = 65% Left ventricular ejection fraction appears to be 61 - 65%. Left ventricular systolic function is normal.  · Left ventricular diastolic function was normal.  · Estimated right ventricular systolic pressure from tricuspid regurgitation is markedly elevated (>55 mmHg).  · Severe pulmonary hypertension is present.  · No significant mitral or aortic regurgitation  · No pericardial effusion  · Since previous study of 12/17/19 Severe pulmonary hypertension is now present    Imaging  XR Chest 2 View    Result Date: 12/26/2021  Cardiomegaly and pulmonary vascular congestion. Redemonstration of left chest nodule/mass measuring up to 22 mm. This is present on multiple prior exams. Signer Name: NGOC ISSA MD  Signed: 12/26/2021 7:00 PM  Workstation Name: [facility]  [facility]    XR Chest 1 View    Result Date: 12/27/2021  Worsening pulmonary edema. Signer Name: Jakob Tello MD  Signed: 12/27/2021 10:08 PM  Workstation Name: [facility]  [facility]      EKG:       Lab Review   Lab Results   Component Value Date    TSH 1.840 02/04/2021     Lab Results   Component Value Date    GLUCOSE 256 (H) 01/10/2022    BUN 35 (H) 01/10/2022    CREATININE 1.55 (H) 01/10/2022    EGFRIFNONA 32 (L) 01/10/2022    BCR 22.6 01/10/2022    K 3.9 01/10/2022    CO2 28.9 01/10/2022    CALCIUM 9.2 01/10/2022    ALBUMIN 3.55 12/26/2021    LABIL2 1.2 (L) 09/15/2015    AST 8 12/26/2021    ALT 8 12/26/2021     Lab Results   Component Value Date    WBC 7.80 01/02/2022    HGB 9.4 (L) 01/02/2022    HCT 31.3 (L) 01/02/2022    MCV 86.5 01/02/2022     01/02/2022     Lab Results   Component Value Date    CKTOTAL 82 11/13/2021    CKMB 0.32  02/15/2019    CKMBINDEX 1.8 02/15/2019    TROPONINI <0.006 02/15/2019    TROPONINT 0.021 12/31/2021     Lab Results   Component Value Date    PROBNP 1,198.0 01/10/2022      The following portions of the patient's history were reviewed and updated as appropriate: allergies, current medications, past family history, past medical history, past social history, past surgical history and problem list.     Old records reviewed and pertinent information is included in the above objective data.     Assessment/Plan:      Diagnosis Plan   1. Chronic diastolic heart failure (HCC)  Basic Metabolic Panel    BNP    Magnesium    ReDs Vest    Basic Metabolic Panel    Magnesium    Magnesium    Basic Metabolic Panel    BNP   2. Essential hypertension     3. Stage 3b chronic kidney disease (HCC)     4. Low magnesium level     5. S/p left hip fracture       BMP, pro-BNP and magnesium today   Reviewed and discussed results today   ReDs reviewed and discussed today   Continue on Lasix, refill authorized   Start Magnesium 400 mg daily, encouraged in magnesium rich diet   Called Dr. Quinn's office and pharmacy to confirm Bumex prn for weight gain >158 lbs in addition to Lasix   Monitor BP and HR   Recheck labs in 1 week thru home health   Continue with physical therapy thru home health  Keep follow up with nephrology   Encouraged in compliance with oxygen therapy   Further discuss memory changes with PCP   Follow up in 4 weeks, at daughter's request, sooner if needed     30 minutes face to face spent counseling patient extensively on dietary Na+ intake, importance of activity, weight monitoring, compliance with medications and follow up appointments.

## 2022-01-13 ENCOUNTER — READMISSION MANAGEMENT (OUTPATIENT)
Dept: CALL CENTER | Facility: HOSPITAL | Age: 77
End: 2022-01-13

## 2022-01-13 NOTE — OUTREACH NOTE
CHF Week 2 Survey      Responses   [facility] patient discharged from? [facility]   Does the patient have one of the following disease processes/diagnoses(primary or secondary)? CHF   Week 2 attempt successful? Yes   Call start time 0929   Call end time 0936   Discharge diagnosis Acute diastolic CHF exacerbation    Is patient permission given to speak with other caregiver? Yes   List who call center can speak with POA/Daughter- Antonina   Person spoke with today (if not patient) and relationship POA/Daughter- Bridgetter   Is the patient taking all medications as directed (includes completed medication regime)? Yes   Comments regarding appointments saw kidney doctor and heart failure clinic   Does the patient have a primary care provider?  Yes   Comments regarding PCP will see PCP today   Has the patient kept scheduled appointments due by today? Yes   [facility]    Home health comments home health is still coming   Psychosocial issues? Yes   Psychosocial comments patient is confused but daughter stays with patient   What is the patient's perception of their health status since discharge? Same   Nursing interventions Nurse provided patient education   Is the patient weighing daily? Yes   Does the patient have scales? Yes   Is the patient able to teach back signs and symptoms of worsening condition? (i.e. weight gain, shortness of air, etc.) Yes   Is the patient/caregiver able to teach back the hierarchy of who to call/visit for symptoms/problems? PCP, Specialist, Home health nurse, Urgent Care, ED, 911 Yes   CHF Week 2 call completed? Yes   Wrap up additional comments Per daughter, patient is still confused, they will be seeing PCP today to discuss the issue.          Sue Rondon RN

## 2022-01-20 ENCOUNTER — READMISSION MANAGEMENT (OUTPATIENT)
Dept: CALL CENTER | Facility: HOSPITAL | Age: 77
End: 2022-01-20

## 2022-01-20 NOTE — OUTREACH NOTE
CHF Week 3 Survey      Responses   [facility] patient discharged from? [facility]   Does the patient have one of the following disease processes/diagnoses(primary or secondary)? CHF   Week 3 attempt successful? Yes   Call start time 1250   Call end time 1256   Discharge diagnosis Acute diastolic CHF exacerbation    Person spoke with today (if not patient) and relationship POA/Daughter- Antonina   Meds reviewed with patient/caregiver? Yes   Is the patient having any side effects they believe may be caused by any medication additions or changes? No   Does the patient have all medications ordered at discharge? Yes   Is the patient taking all medications as directed (includes completed medication regime)? Yes   Does the patient have a primary care provider?  Yes   Does the patient have an appointment with their PCP within 7 days of discharge? Yes   Has the patient kept scheduled appointments due by today? Yes   What is the Home health agency?  [facility]   Has home health visited the patient within 72 hours of discharge? Yes   Home health comments HH PT   Pulse Ox monitoring Intermittent   Pulse Ox device source Patient   O2 Sat comments 92-95% on RA, wears 2L O2 at night   O2 Sat: education provided Sat levels,  Monitoring frequency,  When to seek care   Psychosocial issues? No   Comments states pt is more confused, has not improved   Did the patient receive a copy of their discharge instructions? Yes   Nursing interventions Reviewed instructions with patient   What is the patient's perception of their health status since discharge? Improving  [mentally is same]   Nursing interventions Nurse provided patient education   Is the patient weighing daily? Yes   Does the patient have scales? Yes   Is the patient able to teach back Heart Failure diet management? Yes   Is the patient able to teach back Heart Failure Zones? Yes   Is the patient able to teach back signs and symptoms of worsening condition? (i.e. weight  gain, shortness of air, etc.) Yes   Is the patient/caregiver able to teach back the hierarchy of who to call/visit for symptoms/problems? PCP, Specialist, Home health nurse, Urgent Care, ED, 911 Yes   Additional teach back comments daughter states physically pt is free of CHF symptoms, pt's dementia still no change, shares caregiving of pt with sister and sister in law.   CHF Week 3 call completed? Yes          Faiza Kerr RN

## 2022-01-28 ENCOUNTER — READMISSION MANAGEMENT (OUTPATIENT)
Dept: CALL CENTER | Facility: HOSPITAL | Age: 77
End: 2022-01-28

## 2022-01-28 NOTE — OUTREACH NOTE
CHF Week 4 Survey      Responses   [facility] patient discharged from? [facility]   Does the patient have one of the following disease processes/diagnoses(primary or secondary)? CHF   Week 4 attempt successful? Yes   Call start time 1648   Call end time 1654   Discharge diagnosis Acute diastolic CHF exacerbation    Is patient permission given to speak with other caregiver? Yes   List who call center can speak with POA/Daughter- Antonina   Person spoke with today (if not patient) and relationship POA/Daughter- Antonina   Meds reviewed with patient/caregiver? Yes   Is the patient having any side effects they believe may be caused by any medication additions or changes? No   Is the patient taking all medications as directed (includes completed medication regime)? Yes   Has the patient kept scheduled appointments due by today? Yes   Comments She has been to see her Md's.    Is the patient still receiving Home Health Services? Yes   Pulse Ox monitoring Intermittent   Pulse Ox device source Patient   O2 Sat comments She wears her 02 at night - 96% on RA.    O2 Sat: education provided Sat levels,  Monitoring frequency,  When to seek care   O2 Sat education comments She uses 02 as needed.    Psychosocial issues? No   Comments Family states she is about the same - confused.    What is the patient's perception of their health status since discharge? Same   Nursing interventions Nurse provided patient education   Is the patient weighing daily? Yes   Does the patient have scales? Yes   Daily weight interventions Education provided on importance of daily weight   Is the patient able to teach back Heart Failure diet management? Yes   Is the patient able to teach back Heart Failure Zones? Yes  [green zone ]   Is the patient able to teach back signs and symptoms of worsening condition? (i.e. weight gain, shortness of air, etc.) Yes   If the patient is a current smoker, are they able to teach back resources for cessation? Not a  smoker   Is the patient/caregiver able to teach back the hierarchy of who to call/visit for symptoms/problems? PCP, Specialist, Home health nurse, Urgent Care, ED, 911 Yes   Additional teach back comments Her daughter states she is haviing more days with dementia problems. She has family to help her.    Week 4 Call Completed? Yes   Would the patient like one additional call? No   Graduated Yes   Is the patient interested in additional calls from an ambulatory ?  NOTE:  applies to high risk patients requiring additional follow-up. No   Did the patient feel the follow up calls were helpful during their recovery period? Yes   Was the number of calls appropriate? Yes   Wrap up additional comments Dementia is about the same, she has been to see a PCP. The CHF is doing well.           Eloise Roldan RN

## 2022-02-09 ENCOUNTER — HOSPITAL ENCOUNTER (OUTPATIENT)
Dept: CARDIOLOGY | Facility: HOSPITAL | Age: 77
Discharge: HOME | End: 2022-02-09
Admitting: NURSE PRACTITIONER

## 2022-02-09 VITALS
OXYGEN SATURATION: 98 % | BODY MASS INDEX: 29.19 KG/M2 | SYSTOLIC BLOOD PRESSURE: 116 MMHG | WEIGHT: 154.6 LBS | DIASTOLIC BLOOD PRESSURE: 78 MMHG | HEIGHT: 61 IN | HEART RATE: 73 BPM

## 2022-02-09 DIAGNOSIS — I10 ESSENTIAL HYPERTENSION: Chronic | ICD-10-CM

## 2022-02-09 DIAGNOSIS — N18.32 STAGE 3B CHRONIC KIDNEY DISEASE: Chronic | ICD-10-CM

## 2022-02-09 DIAGNOSIS — I50.32 CHRONIC DIASTOLIC HEART FAILURE: Primary | Chronic | ICD-10-CM

## 2022-02-09 LAB
ABSOLUTE LUNG FLUID CONTENT: 38 % (ref 20–35)
ANION GAP SERPL CALCULATED.3IONS-SCNC: 11.8 MMOL/L (ref 5–15)
BUN SERPL-MCNC: 40 MG/DL (ref 8–23)
BUN/CREAT SERPL: 27 (ref 7–25)
CALCIUM SPEC-SCNC: 9.2 MG/DL (ref 8.6–10.5)
CHLORIDE SERPL-SCNC: 101 MMOL/L (ref 98–107)
CO2 SERPL-SCNC: 27.2 MMOL/L (ref 22–29)
CREAT SERPL-MCNC: 1.48 MG/DL (ref 0.57–1)
GFR SERPL CREATININE-BSD FRML MDRD: 34 ML/MIN/1.73
GLUCOSE SERPL-MCNC: 194 MG/DL (ref 65–99)
MAGNESIUM SERPL-MCNC: 1.7 MG/DL (ref 1.6–2.4)
NT-PROBNP SERPL-MCNC: 906.7 PG/ML (ref 0–1800)
POTASSIUM SERPL-SCNC: 4.1 MMOL/L (ref 3.5–5.2)
SODIUM SERPL-SCNC: 140 MMOL/L (ref 136–145)

## 2022-02-09 PROCEDURE — 83735 ASSAY OF MAGNESIUM: CPT | Performed by: NURSE PRACTITIONER

## 2022-02-09 PROCEDURE — 36415 COLL VENOUS BLD VENIPUNCTURE: CPT | Performed by: NURSE PRACTITIONER

## 2022-02-09 PROCEDURE — 80048 BASIC METABOLIC PNL TOTAL CA: CPT | Performed by: NURSE PRACTITIONER

## 2022-02-09 PROCEDURE — 94726 PLETHYSMOGRAPHY LUNG VOLUMES: CPT | Performed by: NURSE PRACTITIONER

## 2022-02-09 PROCEDURE — 83880 ASSAY OF NATRIURETIC PEPTIDE: CPT

## 2022-02-09 PROCEDURE — 99213 OFFICE O/P EST LOW 20 MIN: CPT | Performed by: NURSE PRACTITIONER

## 2022-02-09 RX ORDER — FUROSEMIDE 40 MG/1
40 TABLET ORAL 2 TIMES DAILY
Qty: 60 TABLET | Refills: 1 | Status: SHIPPED | OUTPATIENT
Start: 2022-02-09 | End: 2022-03-23 | Stop reason: SDUPTHER

## 2022-02-09 NOTE — PROGRESS NOTES
Heart Failure Pharmacy Note  Patient Name: Albina Finley  Referring Provider: Dr. Smith  Primary Cardiologist:   Type: HFpEF    Medication Use:   Adherence: no issues; has 24/7 help from family  Hx of med intolerances: n/a  Affordability: reported that insulin can be expensive at times  Retail Rx Management: n/a    Past Medical History:   Diagnosis Date   • Abnormal nuclear stress test with mild degree of small size apical lateral wall myocardial ischemia in December 2019. 5/28/2020   • Acute on chronic diastolic CHF (congestive heart failure) ([facility]) 12/17/2019   • Anal polyp 6/7/2018    Added automatically from request for surgery [id number]   • Arthritis    • Chronic kidney disease    • COPD (chronic obstructive pulmonary disease) ([facility])    • Diabetes mellitus ([facility])    • Elevated cholesterol    • Essential hypertension 12/17/2019   • History of transfusion    • Incidental lung nodule, greater than or equal to 8mm     Left lower lobe, 15 mm based on 2012 and March 2019 chest CT scans with no growth in the size   • Iron deficiency anemia 7/7/2017   • Osteoporosis    • Peptic ulcer disease with hemorrhage 4/22/2020   • Pneumonia of right middle lobe due to infectious organism 2/14/2019     ALLERGIES: Patient has no known allergies.  Current Outpatient Medications   Medication Sig Dispense Refill   • atorvastatin (LIPITOR) 20 MG tablet Take 1 tablet by mouth Every Night. 30 tablet 0   • bumetanide (BUMEX) 2 MG tablet Take 2 mg by mouth Daily As Needed. If weight greater than 158 pounds     • calcium carbonate (OS-TIANNA) 600 MG tablet Take 600 mg by mouth Daily.     • carvedilol (COREG) 25 MG tablet Take 25 mg by mouth 2 (Two) Times a Day With Meals.     • cetirizine (zyrTEC) 10 MG tablet Take 10 mg by mouth Daily.     • cyanocobalamin 1000 MCG/ML injection Inject 1,000 mcg into the appropriate muscle as directed by prescriber Every 30 (Thirty) Days. Due on 1-12-22.     • ferrous sulfate 325 (65 FE) MG tablet Take 325 mg by  "mouth Every Evening.     • furosemide (LASIX) 40 MG tablet Take 1 tablet by mouth 2 (Two) Times a Day. 60 tablet 0   • hydrALAZINE (APRESOLINE) 10 MG tablet Take 10 mg by mouth 3 (Three) Times a Day.     • insulin detemir (LEVEMIR) 100 UNIT/ML injection Inject 42 Units under the skin into the appropriate area as directed Daily. (Patient taking differently: Inject 60 Units under the skin into the appropriate area as directed Daily. 50 units AM and 70 units PM -  Still adjusting)  12   • isosorbide mononitrate (IMDUR) 30 MG 24 hr tablet Take 1 tablet by mouth Daily. 30 tablet 0   • magnesium oxide (MAG-OX) 400 MG tablet Take 1 tablet by mouth Daily. (Patient taking differently: Take 400 mg by mouth Every Other Day.) 30 tablet 1   • pantoprazole (PROTONIX) 40 MG EC tablet Take 1 tablet by mouth 2 (Two) Times a Day Before Meals. 60 tablet 0   • loperamide (IMODIUM) 2 MG capsule Take 2 mg by mouth 4 (Four) Times a Day As Needed for Diarrhea.       No current facility-administered medications for this encounter.       Vaccination History:   Pneumonia: reportedly UTD  Annual Influenza: UTD for 21-22 season    Objective  Vitals:    02/09/22 1346   BP: 116/78   BP Location: Right arm   Patient Position: Sitting   Cuff Size: Adult   Pulse: 73   SpO2: 98%   Weight: 70.1 kg (154 lb 9.6 oz)   Height: 154.9 cm (61\")     Wt Readings from Last 3 Encounters:   02/09/22 70.1 kg (154 lb 9.6 oz)   01/10/22 71.4 kg (157 lb 6.4 oz)   01/02/22 72.6 kg (160 lb)         02/09/22  1346   Weight: 70.1 kg (154 lb 9.6 oz)     Lab Results   Component Value Date    GLUCOSE 194 (H) 02/09/2022    BUN 40 (H) 02/09/2022    CREATININE 1.48 (H) 02/09/2022    EGFRIFNONA 34 (L) 02/09/2022    BCR 27.0 (H) 02/09/2022    K 4.1 02/09/2022    CO2 27.2 02/09/2022    CALCIUM 9.2 02/09/2022    ALBUMIN 3.55 12/26/2021    LABIL2 1.2 (L) 09/15/2015    AST 8 12/26/2021    ALT 8 12/26/2021     Lab Results   Component Value Date    WBC 7.80 01/02/2022    HGB 9.4 (L) " 01/02/2022    HCT 31.3 (L) 01/02/2022    MCV 86.5 01/02/2022     01/02/2022     Lab Results   Component Value Date    CKTOTAL 82 11/13/2021    CKMB 0.32 02/15/2019    CKMBINDEX 1.8 02/15/2019    TROPONINI <0.006 02/15/2019    TROPONINT 0.021 12/31/2021     Lab Results   Component Value Date    PROBNP 906.7 02/09/2022     Results for orders placed during the hospital encounter of 11/13/21    Adult Transthoracic Echo Complete W/ Cont if Necessary Per Protocol    Interpretation Summary  · Estimated left ventricular EF = 65% Left ventricular ejection fraction appears to be 61 - 65%. Left ventricular systolic function is normal.  · Left ventricular diastolic function was normal.  · Estimated right ventricular systolic pressure from tricuspid regurgitation is markedly elevated (>55 mmHg).  · Severe pulmonary hypertension is present.  · No significant mitral or aortic regurgitation  · No pericardial effusion  · Since previous study of 12/17/19 Severe pulmonary hypertension is now present           Drug Therapy Problems    1. Refills: pt requesting refills on Lasix  2. Bumex + Lasix  3. Diarrhea with magnesium  4. DM and HFpEF  5. Medication question     Recommendations:     1. Relayed concerns to Adela.  2. Patient is taking both Lasix BID and Bumex PRN when weight gets above 158lbs. Patient's daughter had a log of all the times the patient has needed to take Bumex and it looks to be around 3-4 times a week and she stated it really helps with the swelling. Continue monitoring renal function and electrolytes. Labs WNL today.   3. The magnesium supplement was causing the patient to have diarrhea and last visit patient was instructed to take mag ox every other day. Patient's daughter noted it has helped and the patient tolerates taking mag ox every other day.   4. May consider addition of Jardiance in the future if renal function allows  5. The patient's daughter asked if the patient was on any medications that can  cause memory loss. The patient has been having worsening memory and she was concerned it could be due to her medications. According to [employer] none of the medications have an FRANKIE of memory loss. Relayed this information to the patient's daughter who verbalized understanding. Patient's daughter stated the patient is to have an MRI to assess this problem.     Patient was educated on heart failure medications and the importance of medication adherence. All questions were addressed and patient expressed understanding. Used teach-back method to assess understanding.     Thank you for allowing me to participate in the care of your patient,    Sabrina Hopson, PharmD  02/09/22  14:05 EST

## 2022-02-09 NOTE — PROGRESS NOTES
"                                                                                                                                                                             Heart Failure Clinic    Date: 02/09/22     Vitals:    02/09/22 1346   BP: 116/78   Pulse: 73   SpO2: 98%        Method of arrival: Ambulatory with walker     Weighing self daily: Yes    Monitoring Heart Failure Zones: No, reinforced importance of knowing the zone that you are in    Today's HF Zone: Yellow     Taking medications as prescribed: Yes    Edema Yes, \"small amount in feet\"    Shortness of Air: No    Number of pillows used at night:<2    Educational Materials given:  Avs, heart failure zones handout                                                                    ReDS Value: 38%  36-41 Possible Hypervolemic Status      Asher Anand RN 02/09/22 13:47 EST     "

## 2022-02-09 NOTE — PROGRESS NOTES
[facility] CHF CLINIC OFFICE VISIT  Subjective:   Follow-up (hf)    History of Present Illness  Albina Finley is a 76-year-old  female who presents to clinic today for follow-up on heart failure. She is accompanied by her daughter, Corine. She has a history of chronic diastolic congestive heart failure with an LVEF of 61 to 65% from echocardiogram on 11/13/2021, read by Dr. Ji. Her current medications are Coreg 25 mg twice daily, hydralazine 10 mg 3 times daily, Imdur 30 mg daily, Lasix 40 mg twice daily, Bumex prn for weight gain of > 158 lbs per nephrology     Shortness of air stable   LE swelling stable/improved    Reports she generally averages taking an extra 3-4 Bumex doses/week  Home BP stable 145/70  Home HR 60-70  Home weight stable at 155  CKD, follows with Dr. Quinn   Reports adherence to sodium restrictions   Recovered from hip fracture   Former smoker   No hx of LISA, supposed to use oxygen 2L at nighttime however doesn't use it on a regular basis   Has been unable to tolerate Spironolactone in the past due to CKD and hyperkalemia       PCP: ONESIMO Armendariz  Cardiologist:   Nephrologist: Dr Marguerite Quinn   Pulmonologist:    Hospitalizations: Discharged 1/2/2022    Past Medical History:   Diagnosis Date   • Abnormal nuclear stress test with mild degree of small size apical lateral wall myocardial ischemia in December 2019. 5/28/2020   • Acute on chronic diastolic CHF (congestive heart failure) (HCC) 12/17/2019   • Anal polyp 6/7/2018    Added automatically from request for surgery [id number]   • Arthritis    • Chronic kidney disease    • COPD (chronic obstructive pulmonary disease) (HCC)    • Diabetes mellitus (HCC)    • Elevated cholesterol    • Essential hypertension 12/17/2019   • History of transfusion    • Incidental lung nodule, greater than or equal to 8mm     Left lower lobe, 15 mm based on 2012 and March 2019 chest CT scans with no growth in the size   • Iron deficiency anemia 7/7/2017   •  Osteoporosis    • Peptic ulcer disease with hemorrhage 2020   • Pneumonia of right middle lobe due to infectious organism 2019     Past Surgical History:   Procedure Laterality Date   • ANUS SURGERY N/A 2018    Procedure: Diagnostic anoscopy with anal polyp excision;  Surgeon: Marlyn Gutierrez MD;  Location: [facility];  Service: General   •  SECTION     • ENDOSCOPY N/A 2020    Procedure: ESOPHAGOGASTRODUODENOSCOPY;  Surgeon: Marlon Wray MD;  Location: [facility] OR;  Service: Gastroenterology;  Laterality: N/A;   • FEMUR OPEN REDUCTION INTERNAL FIXATION Left 2021    Procedure: Left hip hook plate operative fixation;  Surgeon: Lupillo Cornejo MD;  Location: [facility] OR;  Service: Orthopedics;  Laterality: Left;   • HIP BIPOLAR REPLACEMENT      left Dr. Cornjeo    • HYSTERECTOMY     • JOINT REPLACEMENT       Social History     Socioeconomic History   • Marital status:    Tobacco Use   • Smoking status: Former Smoker     Packs/day: 2.00     Years: 15.00     Pack years: 30.00     Types: Cigarettes   • Smokeless tobacco: Never Used   • Tobacco comment: quit 25 years ago    Substance and Sexual Activity   • Alcohol use: No   • Drug use: No   • Sexual activity: Defer     Birth control/protection: Post-menopausal     Family History   Problem Relation Age of Onset   • Heart disease Mother    • COPD Mother    • Arthritis Mother    • Diabetes Father      Allergies:  No Known Allergies    Review of Systems   Constitutional: Negative for chills, fever, weight gain and weight loss.   HENT: Negative for congestion, hoarse voice and sore throat.    Eyes: Negative for blurred vision, pain and visual disturbance.   Cardiovascular: Negative for chest pain, claudication, cyanosis, dyspnea on exertion, irregular heartbeat, leg swelling, near-syncope, orthopnea, palpitations and syncope.   Respiratory: Negative for cough, shortness of breath and wheezing.    Endocrine: Negative for cold  intolerance, heat intolerance and polyuria.   Hematologic/Lymphatic: Negative for bleeding problem. Does not bruise/bleed easily.   Skin: Negative for color change, flushing and rash.   Musculoskeletal: Negative for arthritis, back pain, joint pain and myalgias.   Gastrointestinal: Negative for abdominal pain, constipation, diarrhea, nausea and vomiting.   Genitourinary: Negative for dysuria, frequency, hesitancy and urgency.   Neurological: Negative for excessive daytime sleepiness, dizziness, headaches, numbness, vertigo and weakness.   Psychiatric/Behavioral: Positive for memory loss. Negative for depression. The patient does not have insomnia and is not nervous/anxious.    All other systems reviewed and are negative.    Current Outpatient Medications   Medication Sig Dispense Refill   • atorvastatin (LIPITOR) 20 MG tablet Take 1 tablet by mouth Every Night. 30 tablet 0   • bumetanide (BUMEX) 2 MG tablet Take 2 mg by mouth Daily As Needed. If weight greater than 158 pounds     • calcium carbonate (OS-TIANNA) 600 MG tablet Take 600 mg by mouth Daily.     • carvedilol (COREG) 25 MG tablet Take 25 mg by mouth 2 (Two) Times a Day With Meals.     • cetirizine (zyrTEC) 10 MG tablet Take 10 mg by mouth Daily.     • cyanocobalamin 1000 MCG/ML injection Inject 1,000 mcg into the appropriate muscle as directed by prescriber Every 30 (Thirty) Days. Due on 1-12-22.     • ferrous sulfate 325 (65 FE) MG tablet Take 325 mg by mouth Every Evening.     • furosemide (LASIX) 40 MG tablet Take 1 tablet by mouth 2 (Two) Times a Day. 60 tablet 1   • hydrALAZINE (APRESOLINE) 10 MG tablet Take 10 mg by mouth 3 (Three) Times a Day.     • insulin detemir (LEVEMIR) 100 UNIT/ML injection Inject 42 Units under the skin into the appropriate area as directed Daily. (Patient taking differently: Inject 60 Units under the skin into the appropriate area as directed Daily. 50 units AM and 70 units PM -  Still adjusting)  12   • isosorbide mononitrate  "(IMDUR) 30 MG 24 hr tablet Take 1 tablet by mouth Daily. 30 tablet 0   • magnesium oxide (MAG-OX) 400 MG tablet Take 1 tablet by mouth Daily. (Patient taking differently: Take 400 mg by mouth Every Other Day.) 30 tablet 1   • pantoprazole (PROTONIX) 40 MG EC tablet Take 1 tablet by mouth 2 (Two) Times a Day Before Meals. 60 tablet 0   • loperamide (IMODIUM) 2 MG capsule Take 2 mg by mouth 4 (Four) Times a Day As Needed for Diarrhea.       No current facility-administered medications for this encounter.      Objective:     Vitals:    02/09/22 1346   BP: 116/78   BP Location: Right arm   Patient Position: Sitting   Cuff Size: Adult   Pulse: 73   SpO2: 98%   Weight: 70.1 kg (154 lb 9.6 oz)   Height: 154.9 cm (61\")     Body mass index is 29.21 kg/m².    ReDS Result:   Lab Results   Component Value Date    ABSOLUTELUNG 38 (A) 02/09/2022    ABSOLUTELUNG 39 (A) 01/10/2022    ABSOLUTELUNG 41 (A) 01/01/2022     Wt Readings from Last 3 Encounters:   02/09/22 70.1 kg (154 lb 9.6 oz)   01/10/22 71.4 kg (157 lb 6.4 oz)   01/02/22 72.6 kg (160 lb)        Vitals reviewed.   Constitutional:       Appearance: Normal appearance. Well-developed.      Comments: Uses a walker    Eyes:      Conjunctiva/sclera: Conjunctivae normal.   HENT:      Head: Normocephalic.   Neck:      Vascular: No JVD or JVR.   Pulmonary:      Effort: Pulmonary effort is normal.      Breath sounds: Normal breath sounds.   Cardiovascular:      Normal rate. Regular rhythm.   Edema:     Ankle: bilateral 1+ edema of the ankle.     Feet: bilateral 1+ edema of the feet.  Abdominal:      General: Bowel sounds are normal.      Palpations: Abdomen is soft. There is no hepatomegaly, splenomegaly or abdominal mass.      Tenderness: There is no abdominal tenderness.   Musculoskeletal: Normal range of motion.      Cervical back: Normal range of motion and neck supple. Skin:     General: Skin is warm and dry.   Neurological:      Mental Status: Alert and oriented to person, " place, and time.   Psychiatric:         Behavior: Behavior is cooperative.       Cardiographics  Results for orders placed during the hospital encounter of 11/13/21    Adult Transthoracic Echo Complete W/ Cont if Necessary Per Protocol    Interpretation Summary  · Estimated left ventricular EF = 65% Left ventricular ejection fraction appears to be 61 - 65%. Left ventricular systolic function is normal.  · Left ventricular diastolic function was normal.  · Estimated right ventricular systolic pressure from tricuspid regurgitation is markedly elevated (>55 mmHg).  · Severe pulmonary hypertension is present.  · No significant mitral or aortic regurgitation  · No pericardial effusion  · Since previous study of 12/17/19 Severe pulmonary hypertension is now present      EKG:       Lab Review   Lab Results   Component Value Date    TSH 1.840 02/04/2021     Lab Results   Component Value Date    GLUCOSE 194 (H) 02/09/2022    BUN 40 (H) 02/09/2022    CREATININE 1.48 (H) 02/09/2022    EGFRIFNONA 34 (L) 02/09/2022    BCR 27.0 (H) 02/09/2022    K 4.1 02/09/2022    CO2 27.2 02/09/2022    CALCIUM 9.2 02/09/2022    ALBUMIN 3.55 12/26/2021    LABIL2 1.2 (L) 09/15/2015    AST 8 12/26/2021    ALT 8 12/26/2021     Lab Results   Component Value Date    WBC 7.80 01/02/2022    HGB 9.4 (L) 01/02/2022    HCT 31.3 (L) 01/02/2022    MCV 86.5 01/02/2022     01/02/2022     Lab Results   Component Value Date    CKTOTAL 82 11/13/2021    CKMB 0.32 02/15/2019    CKMBINDEX 1.8 02/15/2019    TROPONINI <0.006 02/15/2019    TROPONINT 0.021 12/31/2021     Lab Results   Component Value Date    PROBNP 906.7 02/09/2022      The following portions of the patient's history were reviewed and updated as appropriate: allergies, current medications, past family history, past medical history, past social history, past surgical history and problem list.     Old records reviewed and pertinent information is included in the above objective data.      Assessment/Plan:      Diagnosis Plan   1. Chronic diastolic heart failure (HCC)  BNP    Basic Metabolic Panel    Magnesium    ReDs Vest    Basic Metabolic Panel    Magnesium   2. Essential hypertension     3. Stage 3b chronic kidney disease (HCC)       BMP, pro-BNP and magnesium today   Reviewed and discussed results today   ReDs reviewed and discussed today   Continue on current medications   Monitor BP and HR   Keep follow up with nephrology   Encouraged in compliance with oxygen therapy   Discuss memory changes with PCP   Received and reviewed labs done thru home health from 1-. Discussed results with daughter Antonina, over the phone at [phone].   Counseled patient extensively on dietary Na+ intake, importance of activity, weight monitoring, compliance with medications and follow up appointments.  Follow up in 6 weeks, sooner if needed

## 2022-02-10 NOTE — ADDENDUM NOTE
Encounter addended by: Sabrina Hopson, Pat on: 2/10/2022 10:25 AM   Actions taken: Order Reconciliation Section accessed, SmartForm saved

## 2022-02-15 ENCOUNTER — TRANSCRIBE ORDERS (OUTPATIENT)
Dept: ADMINISTRATIVE | Facility: HOSPITAL | Age: 77
End: 2022-02-15

## 2022-02-15 DIAGNOSIS — R41.3 MEMORY LOSS: Primary | ICD-10-CM

## 2022-02-17 NOTE — NURSING NOTE
----- Message from Yodit Samayoa DO sent at 2/17/2022  7:27 AM EST -----  Will you call . Georgiana Babinski and let him know that his ultrasound came back negative for a blood clot. Will you see if his leg is still swollen? Pts o2 Sat checked on room air. 88%.

## 2022-03-03 ENCOUNTER — APPOINTMENT (OUTPATIENT)
Dept: MRI IMAGING | Facility: HOSPITAL | Age: 77
End: 2022-03-03

## 2022-03-23 ENCOUNTER — HOSPITAL ENCOUNTER (OUTPATIENT)
Dept: CARDIOLOGY | Facility: HOSPITAL | Age: 77
Discharge: HOME | End: 2022-03-23
Admitting: NURSE PRACTITIONER

## 2022-03-23 VITALS
HEART RATE: 58 BPM | SYSTOLIC BLOOD PRESSURE: 124 MMHG | DIASTOLIC BLOOD PRESSURE: 74 MMHG | BODY MASS INDEX: 29.64 KG/M2 | HEIGHT: 61 IN | WEIGHT: 157 LBS | OXYGEN SATURATION: 93 %

## 2022-03-23 DIAGNOSIS — I10 ESSENTIAL HYPERTENSION: Chronic | ICD-10-CM

## 2022-03-23 DIAGNOSIS — N18.32 STAGE 3B CHRONIC KIDNEY DISEASE: Chronic | ICD-10-CM

## 2022-03-23 DIAGNOSIS — I50.32 CHRONIC DIASTOLIC HEART FAILURE: Primary | Chronic | ICD-10-CM

## 2022-03-23 LAB
ABSOLUTE LUNG FLUID CONTENT: 39 % (ref 20–35)
ANION GAP SERPL CALCULATED.3IONS-SCNC: 9.3 MMOL/L (ref 5–15)
BUN SERPL-MCNC: 40 MG/DL (ref 8–23)
BUN/CREAT SERPL: 28 (ref 7–25)
CALCIUM SPEC-SCNC: 9.2 MG/DL (ref 8.6–10.5)
CHLORIDE SERPL-SCNC: 100 MMOL/L (ref 98–107)
CO2 SERPL-SCNC: 27.7 MMOL/L (ref 22–29)
CREAT SERPL-MCNC: 1.43 MG/DL (ref 0.57–1)
EGFRCR SERPLBLD CKD-EPI 2021: 37.9 ML/MIN/1.73
GLUCOSE SERPL-MCNC: 152 MG/DL (ref 65–99)
MAGNESIUM SERPL-MCNC: 1.6 MG/DL (ref 1.6–2.4)
NT-PROBNP SERPL-MCNC: 881.7 PG/ML (ref 0–1800)
POTASSIUM SERPL-SCNC: 3.9 MMOL/L (ref 3.5–5.2)
SODIUM SERPL-SCNC: 137 MMOL/L (ref 136–145)

## 2022-03-23 PROCEDURE — 80048 BASIC METABOLIC PNL TOTAL CA: CPT | Performed by: NURSE PRACTITIONER

## 2022-03-23 PROCEDURE — 83880 ASSAY OF NATRIURETIC PEPTIDE: CPT

## 2022-03-23 PROCEDURE — 99213 OFFICE O/P EST LOW 20 MIN: CPT | Performed by: NURSE PRACTITIONER

## 2022-03-23 PROCEDURE — 83735 ASSAY OF MAGNESIUM: CPT | Performed by: NURSE PRACTITIONER

## 2022-03-23 PROCEDURE — 36415 COLL VENOUS BLD VENIPUNCTURE: CPT | Performed by: NURSE PRACTITIONER

## 2022-03-23 RX ORDER — DONEPEZIL HYDROCHLORIDE 5 MG/1
5 TABLET, FILM COATED ORAL NIGHTLY
COMMUNITY
Start: 2022-02-14 | End: 2022-09-26

## 2022-03-23 RX ORDER — FUROSEMIDE 40 MG/1
40 TABLET ORAL 2 TIMES DAILY
Qty: 60 TABLET | Refills: 2 | Status: SHIPPED | OUTPATIENT
Start: 2022-03-23

## 2022-03-23 NOTE — PROGRESS NOTES
Heart Failure Clinic    Date: 03/23/22     Vitals:    03/23/22 1330   BP: 124/74   Pulse: 58   SpO2: 93%    weight 157    Method of arrival: Ambulatory with walker    Weighing self daily: Yes    Monitoring Heart Failure Zones: Most days    Today's HF Zone: Green    Taking medications as prescribed: Yes    Edema No    Shortness of Air: No    Number of pillows used at night:<2    Educational Materials given:  avs                                                                         39  ReDS Value:   36-41 Possible Hypervolemic Status      Asher Anand RN 03/23/22 13:33 EDT

## 2022-03-23 NOTE — PROGRESS NOTES
Heart Failure Pharmacy Note  Patient Name: Albina Finley  Referring Provider: Dr. Smith  Primary Cardiologist:   Type: HFpEF    Medication Use:   Adherence: no issues; has 24/7 help from family  Hx of med intolerances: n/a  Affordability: reported that insulin can be expensive at times; as of 3/23/22 is not in donut hole so no issues at this time  Retail Rx Management: n/a    Past Medical History:   Diagnosis Date   • Abnormal nuclear stress test with mild degree of small size apical lateral wall myocardial ischemia in December 2019. 5/28/2020   • Acute on chronic diastolic CHF (congestive heart failure) ([facility]) 12/17/2019   • Anal polyp 6/7/2018    Added automatically from request for surgery [id number]   • Arthritis    • Chronic kidney disease    • COPD (chronic obstructive pulmonary disease) ([facility])    • Diabetes mellitus ([facility])    • Elevated cholesterol    • Essential hypertension 12/17/2019   • History of transfusion    • Incidental lung nodule, greater than or equal to 8mm     Left lower lobe, 15 mm based on 2012 and March 2019 chest CT scans with no growth in the size   • Iron deficiency anemia 7/7/2017   • Osteoporosis    • Peptic ulcer disease with hemorrhage 4/22/2020   • Pneumonia of right middle lobe due to infectious organism 2/14/2019     ALLERGIES: Patient has no known allergies.  Current Outpatient Medications   Medication Sig Dispense Refill   • atorvastatin (LIPITOR) 20 MG tablet Take 1 tablet by mouth Every Night. 30 tablet 0   • bumetanide (BUMEX) 2 MG tablet Take 2 mg by mouth Daily As Needed. If weight greater than 158 pounds     • calcium carbonate (OS-TIANNA) 600 MG tablet Take 600 mg by mouth Daily.     • carvedilol (COREG) 25 MG tablet Take 25 mg by mouth 2 (Two) Times a Day With Meals.     • cetirizine (zyrTEC) 10 MG tablet Take 10 mg by mouth Daily.     • cyanocobalamin 1000 MCG/ML injection Inject 1,000 mcg into the appropriate muscle as directed by prescriber Every 30 (Thirty) Days. Due on  "1-12-22.     • donepezil (ARICEPT) 5 MG tablet Take 5 mg by mouth Every Night.     • ferrous sulfate 325 (65 FE) MG tablet Take 325 mg by mouth Every Evening.     • furosemide (LASIX) 40 MG tablet Take 1 tablet by mouth 2 (Two) Times a Day. 60 tablet 2   • hydrALAZINE (APRESOLINE) 10 MG tablet Take 10 mg by mouth 3 (Three) Times a Day.     • insulin detemir (LEVEMIR) 100 UNIT/ML injection Inject 42 Units under the skin into the appropriate area as directed Daily. (Patient taking differently: Inject 65 Units under the skin into the appropriate area as directed 2 (Two) Times a Day. Still adjusting)  12   • isosorbide mononitrate (IMDUR) 30 MG 24 hr tablet Take 1 tablet by mouth Daily. 30 tablet 0   • magnesium oxide (MAGOX) 400 (241.3 Mg) MG tablet tablet Take 1 tablet by mouth Every Other Day. 30 each 2   • pantoprazole (PROTONIX) 40 MG EC tablet Take 1 tablet by mouth 2 (Two) Times a Day Before Meals. 60 tablet 0     No current facility-administered medications for this encounter.       Vaccination History:   Pneumonia: reportedly UTD  Annual Influenza: UTD for 21-22 season    Objective  Vitals:    03/23/22 1330   BP: 124/74   BP Location: Left arm   Patient Position: Sitting   Cuff Size: Adult   Pulse: 58   SpO2: 93%   Weight: 71.2 kg (157 lb)   Height: 154.9 cm (61\")     Wt Readings from Last 3 Encounters:   03/23/22 71.2 kg (157 lb)   02/09/22 70.1 kg (154 lb 9.6 oz)   01/10/22 71.4 kg (157 lb 6.4 oz)         03/23/22  1330   Weight: 71.2 kg (157 lb)     Lab Results   Component Value Date    GLUCOSE 152 (H) 03/23/2022    BUN 40 (H) 03/23/2022    CREATININE 1.43 (H) 03/23/2022    EGFRIFNONA 34 (L) 02/09/2022    BCR 28.0 (H) 03/23/2022    K 3.9 03/23/2022    CO2 27.7 03/23/2022    CALCIUM 9.2 03/23/2022    ALBUMIN 3.55 12/26/2021    LABIL2 1.2 (L) 09/15/2015    AST 8 12/26/2021    ALT 8 12/26/2021     Lab Results   Component Value Date    WBC 7.80 01/02/2022    HGB 9.4 (L) 01/02/2022    HCT 31.3 (L) 01/02/2022    " MCV 86.5 01/02/2022     01/02/2022     Lab Results   Component Value Date    CKTOTAL 82 11/13/2021    CKMB 0.32 02/15/2019    CKMBINDEX 1.8 02/15/2019    TROPONINI <0.006 02/15/2019    TROPONINT 0.021 12/31/2021     Lab Results   Component Value Date    PROBNP 881.7 03/23/2022     Results for orders placed during the hospital encounter of 11/13/21    Adult Transthoracic Echo Complete W/ Cont if Necessary Per Protocol    Interpretation Summary  · Estimated left ventricular EF = 65% Left ventricular ejection fraction appears to be 61 - 65%. Left ventricular systolic function is normal.  · Left ventricular diastolic function was normal.  · Estimated right ventricular systolic pressure from tricuspid regurgitation is markedly elevated (>55 mmHg).  · Severe pulmonary hypertension is present.  · No significant mitral or aortic regurgitation  · No pericardial effusion  · Since previous study of 12/17/19 Severe pulmonary hypertension is now present           Drug Therapy Problems    1. Requesting refills on Mag and Lasix.  2. DM and HFpEF      Recommendations:     1. Adela sent to pt's pharmacy  2. May consider addition of Jardiance 10mg for HFpEF benefit. Per patient's insurance, copay would be $151.        Patient was educated on heart failure medications and the importance of medication adherence. All questions were addressed and patient expressed understanding. Used teach-back method to assess understanding.     Thank you for allowing me to participate in the care of your patient,    Michelle Evans, PharmD  03/23/22  14:30 EDT

## 2022-03-23 NOTE — PROGRESS NOTES
[facility], [place] CHF CLINIC OFFICE VISIT  Subjective:   Follow-up (Hf/)    History of Present Illness  Albina Finley is a 76-year-old  female who presents to clinic today for follow-up on heart failure. She is accompanied by her daughter, Corine. She has a history of chronic diastolic congestive heart failure with an LVEF of 61 to 65% from echocardiogram on 11/13/2021, read by Dr. Ji. Her current medications are Coreg 25 mg twice daily, hydralazine 10 mg 3 times daily, Imdur 30 mg daily, Lasix 40 mg twice daily, Bumex prn for weight gain of > 158 lbs per nephrology     Shortness of air stable   LE swelling stable/improved    Reports she has taken about 3 doses of Lasix since last visit   Home BP stable 140/70  Home HR 60  Home weight stable at 157  CKD, follows with Dr. Quinn   Reports adherence to sodium restrictions   Recovered from hip fracture and is continuing to get stronger and becoming more ambulatory   Former smoker   No hx of LISA, supposed to use oxygen 2L at nighttime however doesn't use it on a regular basis   Has been unable to tolerate Spironolactone in the past due to CKD and hyperkalemia  Requesting refills     PCP: ONESIMO Armendariz  Cardiologist:   Nephrologist: Dr Marguerite Quinn   Pulmonologist:    Hospitalizations: Discharged 1/2/2022    Past Medical History:   Diagnosis Date   • Abnormal nuclear stress test with mild degree of small size apical lateral wall myocardial ischemia in December 2019. 5/28/2020   • Acute on chronic diastolic CHF (congestive heart failure) (HCC) 12/17/2019   • Anal polyp 6/7/2018    Added automatically from request for surgery [id number]   • Arthritis    • Chronic kidney disease    • COPD (chronic obstructive pulmonary disease) (HCC)    • Diabetes mellitus ([facility])    • Elevated cholesterol    • Essential hypertension 12/17/2019   • History of transfusion    • Incidental lung nodule, greater than or equal to 8mm     Left lower lobe, 15 mm based on 2012 and March 2019 chest CT scans  with no growth in the size   • Iron deficiency anemia 2017   • Osteoporosis    • Peptic ulcer disease with hemorrhage 2020   • Pneumonia of right middle lobe due to infectious organism 2019     Past Surgical History:   Procedure Laterality Date   • ANUS SURGERY N/A 2018    Procedure: Diagnostic anoscopy with anal polyp excision;  Surgeon: Marlyn Gutierrez MD;  Location: [facility];  Service: General   •  SECTION     • ENDOSCOPY N/A 2020    Procedure: ESOPHAGOGASTRODUODENOSCOPY;  Surgeon: Marlon Wray MD;  Location: [facility];  Service: Gastroenterology;  Laterality: N/A;   • FEMUR OPEN REDUCTION INTERNAL FIXATION Left 2021    Procedure: Left hip hook plate operative fixation;  Surgeon: Lupillo Cornejo MD;  Location: [facility];  Service: Orthopedics;  Laterality: Left;   • HIP BIPOLAR REPLACEMENT      left Dr. Cornejo    • HYSTERECTOMY     • JOINT REPLACEMENT       Social History     Socioeconomic History   • Marital status:    Tobacco Use   • Smoking status: Former Smoker     Packs/day: 2.00     Years: 15.00     Pack years: 30.00     Types: Cigarettes   • Smokeless tobacco: Never Used   • Tobacco comment: quit 25 years ago    Substance and Sexual Activity   • Alcohol use: No   • Drug use: No   • Sexual activity: Defer     Birth control/protection: Post-menopausal     Family History   Problem Relation Age of Onset   • Heart disease Mother    • COPD Mother    • Arthritis Mother    • Diabetes Father      Allergies:  No Known Allergies    Review of Systems   Constitutional: Negative for chills, fever, weight gain and weight loss.   HENT: Negative for congestion, hoarse voice and sore throat.    Eyes: Negative for blurred vision, pain and visual disturbance.   Cardiovascular: Negative for chest pain, claudication, cyanosis, dyspnea on exertion, irregular heartbeat, leg swelling, near-syncope, orthopnea, palpitations and syncope.   Respiratory: Negative for cough,  shortness of breath and wheezing.    Endocrine: Negative for cold intolerance, heat intolerance and polyuria.   Hematologic/Lymphatic: Negative for bleeding problem. Does not bruise/bleed easily.   Skin: Negative for color change, flushing and rash.   Musculoskeletal: Negative for arthritis, back pain, joint pain and myalgias.   Gastrointestinal: Negative for abdominal pain, constipation, diarrhea, nausea and vomiting.   Genitourinary: Negative for dysuria, frequency, hesitancy and urgency.   Neurological: Negative for excessive daytime sleepiness, dizziness, headaches, numbness, vertigo and weakness.   Psychiatric/Behavioral: Positive for memory loss. Negative for depression. The patient does not have insomnia and is not nervous/anxious.    All other systems reviewed and are negative.    Current Outpatient Medications   Medication Sig Dispense Refill   • atorvastatin (LIPITOR) 20 MG tablet Take 1 tablet by mouth Every Night. 30 tablet 0   • bumetanide (BUMEX) 2 MG tablet Take 2 mg by mouth Daily As Needed. If weight greater than 158 pounds     • calcium carbonate (OS-TIANNA) 600 MG tablet Take 600 mg by mouth Daily.     • carvedilol (COREG) 25 MG tablet Take 25 mg by mouth 2 (Two) Times a Day With Meals.     • cetirizine (zyrTEC) 10 MG tablet Take 10 mg by mouth Daily.     • cyanocobalamin 1000 MCG/ML injection Inject 1,000 mcg into the appropriate muscle as directed by prescriber Every 30 (Thirty) Days. Due on 1-12-22.     • donepezil (ARICEPT) 5 MG tablet Take 5 mg by mouth Every Night.     • ferrous sulfate 325 (65 FE) MG tablet Take 325 mg by mouth Every Evening.     • furosemide (LASIX) 40 MG tablet Take 1 tablet by mouth 2 (Two) Times a Day. 60 tablet 2   • hydrALAZINE (APRESOLINE) 10 MG tablet Take 10 mg by mouth 3 (Three) Times a Day.     • insulin detemir (LEVEMIR) 100 UNIT/ML injection Inject 42 Units under the skin into the appropriate area as directed Daily. (Patient taking differently: Inject 65 Units  "under the skin into the appropriate area as directed 2 (Two) Times a Day. Still adjusting)  12   • isosorbide mononitrate (IMDUR) 30 MG 24 hr tablet Take 1 tablet by mouth Daily. 30 tablet 0   • magnesium oxide (MAGOX) 400 (241.3 Mg) MG tablet tablet Take 1 tablet by mouth Every Other Day. 30 each 2   • pantoprazole (PROTONIX) 40 MG EC tablet Take 1 tablet by mouth 2 (Two) Times a Day Before Meals. 60 tablet 0     No current facility-administered medications for this encounter.      Objective:     Vitals:    03/23/22 1330   BP: 124/74   BP Location: Left arm   Patient Position: Sitting   Cuff Size: Adult   Pulse: 58   SpO2: 93%   Weight: 71.2 kg (157 lb)   Height: 154.9 cm (61\")     Body mass index is 29.66 kg/m².    ReDS Result:   Lab Results   Component Value Date    ABSOLUTELUNG 39 (A) 03/23/2022    ABSOLUTELUNG 38 (A) 02/09/2022    ABSOLUTELUNG 39 (A) 01/10/2022     Wt Readings from Last 3 Encounters:   03/23/22 71.2 kg (157 lb)   02/09/22 70.1 kg (154 lb 9.6 oz)   01/10/22 71.4 kg (157 lb 6.4 oz)        Vitals reviewed.   Constitutional:       Appearance: Normal appearance. Well-developed.      Comments: Uses a walker    Eyes:      Conjunctiva/sclera: Conjunctivae normal.   HENT:      Head: Normocephalic.   Neck:      Vascular: No JVD or JVR.   Pulmonary:      Effort: Pulmonary effort is normal.      Breath sounds: Normal breath sounds.   Cardiovascular:      Normal rate. Regular rhythm.   Edema:     Ankle: bilateral 1+ edema of the ankle.     Feet: bilateral 1+ edema of the feet.  Abdominal:      General: Bowel sounds are normal.      Palpations: Abdomen is soft. There is no hepatomegaly, splenomegaly or abdominal mass.      Tenderness: There is no abdominal tenderness.   Musculoskeletal: Normal range of motion.      Cervical back: Normal range of motion and neck supple. Skin:     General: Skin is warm and dry.   Neurological:      Mental Status: Alert and oriented to person, place, and time.   Psychiatric:    "      Behavior: Behavior is cooperative.       Cardiographics  Results for orders placed during the hospital encounter of 11/13/21    Adult Transthoracic Echo Complete W/ Cont if Necessary Per Protocol    Interpretation Summary  · Estimated left ventricular EF = 65% Left ventricular ejection fraction appears to be 61 - 65%. Left ventricular systolic function is normal.  · Left ventricular diastolic function was normal.  · Estimated right ventricular systolic pressure from tricuspid regurgitation is markedly elevated (>55 mmHg).  · Severe pulmonary hypertension is present.  · No significant mitral or aortic regurgitation  · No pericardial effusion  · Since previous study of 12/17/19 Severe pulmonary hypertension is now present      EKG:       Lab Review   Lab Results   Component Value Date    TSH 1.840 02/04/2021     Lab Results   Component Value Date    GLUCOSE 152 (H) 03/23/2022    BUN 40 (H) 03/23/2022    CREATININE 1.43 (H) 03/23/2022    EGFRIFNONA 34 (L) 02/09/2022    BCR 28.0 (H) 03/23/2022    K 3.9 03/23/2022    CO2 27.7 03/23/2022    CALCIUM 9.2 03/23/2022    ALBUMIN 3.55 12/26/2021    LABIL2 1.2 (L) 09/15/2015    AST 8 12/26/2021    ALT 8 12/26/2021     Lab Results   Component Value Date    WBC 7.80 01/02/2022    HGB 9.4 (L) 01/02/2022    HCT 31.3 (L) 01/02/2022    MCV 86.5 01/02/2022     01/02/2022     Lab Results   Component Value Date    CKTOTAL 82 11/13/2021    CKMB 0.32 02/15/2019    CKMBINDEX 1.8 02/15/2019    TROPONINI <0.006 02/15/2019    TROPONINT 0.021 12/31/2021     Lab Results   Component Value Date    PROBNP 881.7 03/23/2022      The following portions of the patient's history were reviewed and updated as appropriate: allergies, current medications, past family history, past medical history, past social history, past surgical history and problem list.     Old records reviewed and pertinent information is included in the above objective data.     Assessment/Plan:      Diagnosis Plan   1.  Chronic diastolic heart failure (HCC)  BNP    Basic Metabolic Panel    Magnesium    ReDs Vest    Basic Metabolic Panel    Magnesium   2. Essential hypertension     3. Stage 3b chronic kidney disease (HCC)       BMP, pro-BNP and magnesium today   Reviewed and discussed results today   ReDs reviewed and discussed today   Continue on current medications   Monitor BP and HR   Keep follow up with nephrology   Encouraged in compliance with oxygen therapy   Refill magnesium and lasix   Counseled patient extensively on dietary Na+ intake, importance of activity, weight monitoring, compliance with medications and follow up appointments.  Follow up prn at their request.

## 2022-03-31 ENCOUNTER — TRANSCRIBE ORDERS (OUTPATIENT)
Dept: ADMINISTRATIVE | Facility: HOSPITAL | Age: 77
End: 2022-03-31

## 2022-03-31 ENCOUNTER — LAB (OUTPATIENT)
Dept: LAB | Facility: HOSPITAL | Age: 77
End: 2022-03-31

## 2022-03-31 DIAGNOSIS — D63.8 CHRONIC DISEASE ANEMIA: ICD-10-CM

## 2022-03-31 DIAGNOSIS — N39.0 URINARY TRACT INFECTION WITHOUT HEMATURIA, SITE UNSPECIFIED: Primary | ICD-10-CM

## 2022-03-31 DIAGNOSIS — D63.8 CHRONIC DISEASE ANEMIA: Primary | ICD-10-CM

## 2022-03-31 DIAGNOSIS — N18.4 CHRONIC KIDNEY DISEASE, STAGE IV (SEVERE): ICD-10-CM

## 2022-03-31 DIAGNOSIS — N39.0 URINARY TRACT INFECTION WITHOUT HEMATURIA, SITE UNSPECIFIED: ICD-10-CM

## 2022-03-31 LAB
ALBUMIN SERPL-MCNC: 3.7 G/DL (ref 3.5–5.2)
ALBUMIN UR-MCNC: 3.8 MG/DL
ALBUMIN/GLOB SERPL: 1.2 G/DL
ALP SERPL-CCNC: 89 U/L (ref 39–117)
ALT SERPL W P-5'-P-CCNC: 16 U/L (ref 1–33)
ANION GAP SERPL CALCULATED.3IONS-SCNC: 9 MMOL/L (ref 5–15)
AST SERPL-CCNC: 15 U/L (ref 1–32)
BACTERIA UR QL AUTO: ABNORMAL /HPF
BASOPHILS # BLD AUTO: 0.02 10*3/MM3 (ref 0–0.2)
BASOPHILS NFR BLD AUTO: 0.3 % (ref 0–1.5)
BILIRUB SERPL-MCNC: 0.2 MG/DL (ref 0–1.2)
BILIRUB UR QL STRIP: NEGATIVE
BUN SERPL-MCNC: 34 MG/DL (ref 8–23)
BUN/CREAT SERPL: 21.1 (ref 7–25)
CALCIUM SPEC-SCNC: 8.8 MG/DL (ref 8.6–10.5)
CHLORIDE SERPL-SCNC: 102 MMOL/L (ref 98–107)
CLARITY UR: ABNORMAL
CO2 SERPL-SCNC: 27 MMOL/L (ref 22–29)
COLOR UR: YELLOW
CREAT SERPL-MCNC: 1.61 MG/DL (ref 0.57–1)
CREAT UR-MCNC: 66.6 MG/DL
CREAT UR-MCNC: 66.6 MG/DL
DEPRECATED RDW RBC AUTO: 52.3 FL (ref 37–54)
EGFRCR SERPLBLD CKD-EPI 2021: 32.8 ML/MIN/1.73
EOSINOPHIL # BLD AUTO: 0.23 10*3/MM3 (ref 0–0.4)
EOSINOPHIL NFR BLD AUTO: 3 % (ref 0.3–6.2)
ERYTHROCYTE [DISTWIDTH] IN BLOOD BY AUTOMATED COUNT: 17.4 % (ref 12.3–15.4)
FERRITIN SERPL-MCNC: 300 NG/ML (ref 13–150)
GLOBULIN UR ELPH-MCNC: 3.1 GM/DL
GLUCOSE SERPL-MCNC: 264 MG/DL (ref 65–99)
GLUCOSE UR STRIP-MCNC: NEGATIVE MG/DL
HCT VFR BLD AUTO: 32.8 % (ref 34–46.6)
HGB BLD-MCNC: 10.3 G/DL (ref 12–15.9)
HGB UR QL STRIP.AUTO: ABNORMAL
HYALINE CASTS UR QL AUTO: ABNORMAL /LPF
IMM GRANULOCYTES # BLD AUTO: 0.01 10*3/MM3 (ref 0–0.05)
IMM GRANULOCYTES NFR BLD AUTO: 0.1 % (ref 0–0.5)
IRON 24H UR-MRATE: 33 MCG/DL (ref 37–145)
IRON SATN MFR SERPL: 30 % (ref 20–50)
KETONES UR QL STRIP: NEGATIVE
LEUKOCYTE ESTERASE UR QL STRIP.AUTO: ABNORMAL
LYMPHOCYTES # BLD AUTO: 2.54 10*3/MM3 (ref 0.7–3.1)
LYMPHOCYTES NFR BLD AUTO: 33.3 % (ref 19.6–45.3)
MCH RBC QN AUTO: 26.4 PG (ref 26.6–33)
MCHC RBC AUTO-ENTMCNC: 31.4 G/DL (ref 31.5–35.7)
MCV RBC AUTO: 84.1 FL (ref 79–97)
MICROALBUMIN/CREAT UR: 57.1 MG/G
MONOCYTES # BLD AUTO: 0.47 10*3/MM3 (ref 0.1–0.9)
MONOCYTES NFR BLD AUTO: 6.2 % (ref 5–12)
NEUTROPHILS NFR BLD AUTO: 4.35 10*3/MM3 (ref 1.7–7)
NEUTROPHILS NFR BLD AUTO: 57.1 % (ref 42.7–76)
NITRITE UR QL STRIP: NEGATIVE
NRBC BLD AUTO-RTO: 0 /100 WBC (ref 0–0.2)
PH UR STRIP.AUTO: 8 [PH] (ref 5–8)
PLATELET # BLD AUTO: 283 10*3/MM3 (ref 140–450)
PMV BLD AUTO: 11.4 FL (ref 6–12)
POTASSIUM SERPL-SCNC: 4.3 MMOL/L (ref 3.5–5.2)
PROT ?TM UR-MCNC: 23.4 MG/DL
PROT SERPL-MCNC: 6.8 G/DL (ref 6–8.5)
PROT UR QL STRIP: ABNORMAL
PROT/CREAT UR: 351.4 MG/G CREA (ref 0–200)
RBC # BLD AUTO: 3.9 10*6/MM3 (ref 3.77–5.28)
RBC # UR STRIP: ABNORMAL /HPF
REF LAB TEST METHOD: ABNORMAL
SODIUM SERPL-SCNC: 138 MMOL/L (ref 136–145)
SP GR UR STRIP: 1.01 (ref 1–1.03)
SQUAMOUS #/AREA URNS HPF: ABNORMAL /HPF
TIBC SERPL-MCNC: 112 MCG/DL (ref 298–536)
TRANSFERRIN SERPL-MCNC: 75 MG/DL (ref 200–360)
UROBILINOGEN UR QL STRIP: ABNORMAL
WBC # UR STRIP: ABNORMAL /HPF
WBC NRBC COR # BLD: 7.62 10*3/MM3 (ref 3.4–10.8)

## 2022-03-31 PROCEDURE — 84466 ASSAY OF TRANSFERRIN: CPT

## 2022-03-31 PROCEDURE — 80053 COMPREHEN METABOLIC PANEL: CPT

## 2022-03-31 PROCEDURE — 85025 COMPLETE CBC W/AUTO DIFF WBC: CPT

## 2022-03-31 PROCEDURE — 83540 ASSAY OF IRON: CPT

## 2022-03-31 PROCEDURE — 82043 UR ALBUMIN QUANTITATIVE: CPT

## 2022-03-31 PROCEDURE — 81001 URINALYSIS AUTO W/SCOPE: CPT

## 2022-03-31 PROCEDURE — 82728 ASSAY OF FERRITIN: CPT

## 2022-03-31 PROCEDURE — 36415 COLL VENOUS BLD VENIPUNCTURE: CPT

## 2022-03-31 PROCEDURE — 84156 ASSAY OF PROTEIN URINE: CPT

## 2022-03-31 PROCEDURE — 82570 ASSAY OF URINE CREATININE: CPT

## 2022-06-28 ENCOUNTER — TRANSCRIBE ORDERS (OUTPATIENT)
Dept: ADMINISTRATIVE | Facility: HOSPITAL | Age: 77
End: 2022-06-28

## 2022-06-28 ENCOUNTER — LAB (OUTPATIENT)
Dept: LAB | Facility: HOSPITAL | Age: 77
End: 2022-06-28

## 2022-06-28 DIAGNOSIS — D50.9 IRON DEFICIENCY ANEMIA, UNSPECIFIED IRON DEFICIENCY ANEMIA TYPE: Primary | ICD-10-CM

## 2022-06-28 DIAGNOSIS — D50.9 IRON DEFICIENCY ANEMIA, UNSPECIFIED IRON DEFICIENCY ANEMIA TYPE: ICD-10-CM

## 2022-06-28 DIAGNOSIS — N18.4 STAGE 4 CHRONIC KIDNEY DISEASE: ICD-10-CM

## 2022-06-28 LAB
ALBUMIN SERPL-MCNC: 4 G/DL (ref 3.5–5.2)
ALBUMIN UR-MCNC: <1.2 MG/DL
ALBUMIN/GLOB SERPL: 1.3 G/DL
ALP SERPL-CCNC: 83 U/L (ref 39–117)
ALT SERPL W P-5'-P-CCNC: 12 U/L (ref 1–33)
ANION GAP SERPL CALCULATED.3IONS-SCNC: 9.6 MMOL/L (ref 5–15)
AST SERPL-CCNC: 11 U/L (ref 1–32)
BASOPHILS # BLD AUTO: 0.01 10*3/MM3 (ref 0–0.2)
BASOPHILS NFR BLD AUTO: 0.1 % (ref 0–1.5)
BILIRUB SERPL-MCNC: 0.3 MG/DL (ref 0–1.2)
BUN SERPL-MCNC: 44 MG/DL (ref 8–23)
BUN/CREAT SERPL: 26.2 (ref 7–25)
CALCIUM SPEC-SCNC: 10.4 MG/DL (ref 8.6–10.5)
CHLORIDE SERPL-SCNC: 99 MMOL/L (ref 98–107)
CO2 SERPL-SCNC: 28.4 MMOL/L (ref 22–29)
CREAT SERPL-MCNC: 1.68 MG/DL (ref 0.57–1)
CREAT UR-MCNC: 46.1 MG/DL
CREAT UR-MCNC: 46.1 MG/DL
DEPRECATED RDW RBC AUTO: 48.8 FL (ref 37–54)
EGFRCR SERPLBLD CKD-EPI 2021: 31 ML/MIN/1.73
EOSINOPHIL # BLD AUTO: 0.22 10*3/MM3 (ref 0–0.4)
EOSINOPHIL NFR BLD AUTO: 2.8 % (ref 0.3–6.2)
ERYTHROCYTE [DISTWIDTH] IN BLOOD BY AUTOMATED COUNT: 15.8 % (ref 12.3–15.4)
FERRITIN SERPL-MCNC: 257 NG/ML (ref 13–150)
GLOBULIN UR ELPH-MCNC: 3.2 GM/DL
GLUCOSE SERPL-MCNC: 144 MG/DL (ref 65–99)
HCT VFR BLD AUTO: 36.7 % (ref 34–46.6)
HGB BLD-MCNC: 11.4 G/DL (ref 12–15.9)
IMM GRANULOCYTES # BLD AUTO: 0.02 10*3/MM3 (ref 0–0.05)
IMM GRANULOCYTES NFR BLD AUTO: 0.3 % (ref 0–0.5)
IRON 24H UR-MRATE: 36 MCG/DL (ref 37–145)
IRON SATN MFR SERPL: 29 % (ref 20–50)
LYMPHOCYTES # BLD AUTO: 2.85 10*3/MM3 (ref 0.7–3.1)
LYMPHOCYTES NFR BLD AUTO: 35.8 % (ref 19.6–45.3)
MCH RBC QN AUTO: 26.4 PG (ref 26.6–33)
MCHC RBC AUTO-ENTMCNC: 31.1 G/DL (ref 31.5–35.7)
MCV RBC AUTO: 85 FL (ref 79–97)
MICROALBUMIN/CREAT UR: NORMAL MG/G{CREAT}
MONOCYTES # BLD AUTO: 0.53 10*3/MM3 (ref 0.1–0.9)
MONOCYTES NFR BLD AUTO: 6.7 % (ref 5–12)
NEUTROPHILS NFR BLD AUTO: 4.33 10*3/MM3 (ref 1.7–7)
NEUTROPHILS NFR BLD AUTO: 54.3 % (ref 42.7–76)
NRBC BLD AUTO-RTO: 0 /100 WBC (ref 0–0.2)
PLATELET # BLD AUTO: 313 10*3/MM3 (ref 140–450)
PMV BLD AUTO: 11.2 FL (ref 6–12)
POTASSIUM SERPL-SCNC: 4.5 MMOL/L (ref 3.5–5.2)
PROT ?TM UR-MCNC: 4.9 MG/DL
PROT SERPL-MCNC: 7.2 G/DL (ref 6–8.5)
PROT/CREAT UR: 106.3 MG/G CREA (ref 0–200)
RBC # BLD AUTO: 4.32 10*6/MM3 (ref 3.77–5.28)
SODIUM SERPL-SCNC: 137 MMOL/L (ref 136–145)
TIBC SERPL-MCNC: 124 MCG/DL (ref 298–536)
TRANSFERRIN SERPL-MCNC: 83 MG/DL (ref 200–360)
WBC NRBC COR # BLD: 7.96 10*3/MM3 (ref 3.4–10.8)

## 2022-06-28 PROCEDURE — 82043 UR ALBUMIN QUANTITATIVE: CPT

## 2022-06-28 PROCEDURE — 85025 COMPLETE CBC W/AUTO DIFF WBC: CPT

## 2022-06-28 PROCEDURE — 82570 ASSAY OF URINE CREATININE: CPT

## 2022-06-28 PROCEDURE — 84156 ASSAY OF PROTEIN URINE: CPT

## 2022-06-28 PROCEDURE — 82728 ASSAY OF FERRITIN: CPT

## 2022-06-28 PROCEDURE — 84466 ASSAY OF TRANSFERRIN: CPT

## 2022-06-28 PROCEDURE — 83540 ASSAY OF IRON: CPT

## 2022-06-28 PROCEDURE — 36415 COLL VENOUS BLD VENIPUNCTURE: CPT

## 2022-06-28 PROCEDURE — 80053 COMPREHEN METABOLIC PANEL: CPT

## 2022-09-26 ENCOUNTER — TRANSCRIBE ORDERS (OUTPATIENT)
Dept: ADMINISTRATIVE | Facility: HOSPITAL | Age: 77
End: 2022-09-26

## 2022-09-26 ENCOUNTER — HOSPITAL ENCOUNTER (OUTPATIENT)
Dept: CARDIOLOGY | Facility: HOSPITAL | Age: 77
Discharge: HOME | End: 2022-09-26

## 2022-09-26 ENCOUNTER — LAB (OUTPATIENT)
Dept: LAB | Facility: HOSPITAL | Age: 77
End: 2022-09-26

## 2022-09-26 VITALS
HEART RATE: 58 BPM | OXYGEN SATURATION: 95 % | WEIGHT: 158 LBS | DIASTOLIC BLOOD PRESSURE: 54 MMHG | BODY MASS INDEX: 29.83 KG/M2 | HEIGHT: 61 IN | SYSTOLIC BLOOD PRESSURE: 136 MMHG

## 2022-09-26 DIAGNOSIS — N18.32 STAGE 3B CHRONIC KIDNEY DISEASE: Chronic | ICD-10-CM

## 2022-09-26 DIAGNOSIS — N18.4 CHRONIC KIDNEY DISEASE, STAGE IV (SEVERE): Primary | ICD-10-CM

## 2022-09-26 DIAGNOSIS — D50.9 IRON DEFICIENCY ANEMIA, UNSPECIFIED IRON DEFICIENCY ANEMIA TYPE: ICD-10-CM

## 2022-09-26 DIAGNOSIS — N18.4 CHRONIC KIDNEY DISEASE, STAGE IV (SEVERE): ICD-10-CM

## 2022-09-26 DIAGNOSIS — I50.32 CHRONIC DIASTOLIC HEART FAILURE: Primary | Chronic | ICD-10-CM

## 2022-09-26 DIAGNOSIS — E11.22 TYPE 2 DIABETES MELLITUS WITH STAGE 3 CHRONIC KIDNEY DISEASE, UNSPECIFIED WHETHER LONG TERM INSULIN USE, UNSPECIFIED WHETHER STAGE 3A OR 3B CKD: Chronic | ICD-10-CM

## 2022-09-26 DIAGNOSIS — I10 ESSENTIAL HYPERTENSION: Chronic | ICD-10-CM

## 2022-09-26 DIAGNOSIS — N18.30 TYPE 2 DIABETES MELLITUS WITH STAGE 3 CHRONIC KIDNEY DISEASE, UNSPECIFIED WHETHER LONG TERM INSULIN USE, UNSPECIFIED WHETHER STAGE 3A OR 3B CKD: Chronic | ICD-10-CM

## 2022-09-26 LAB
ABSOLUTE LUNG FLUID CONTENT: 31 % (ref 20–35)
ANION GAP SERPL CALCULATED.3IONS-SCNC: 9.7 MMOL/L (ref 5–15)
BUN SERPL-MCNC: 41 MG/DL (ref 8–23)
BUN/CREAT SERPL: 26.5 (ref 7–25)
CALCIUM SPEC-SCNC: 9.3 MG/DL (ref 8.6–10.5)
CHLORIDE SERPL-SCNC: 102 MMOL/L (ref 98–107)
CO2 SERPL-SCNC: 29.3 MMOL/L (ref 22–29)
CREAT SERPL-MCNC: 1.55 MG/DL (ref 0.57–1)
EGFRCR SERPLBLD CKD-EPI 2021: 34.2 ML/MIN/1.73
GLUCOSE SERPL-MCNC: 145 MG/DL (ref 65–99)
MAGNESIUM SERPL-MCNC: 1.7 MG/DL (ref 1.6–2.4)
NT-PROBNP SERPL-MCNC: 654.8 PG/ML (ref 0–1800)
POTASSIUM SERPL-SCNC: 5.1 MMOL/L (ref 3.5–5.2)
SODIUM SERPL-SCNC: 141 MMOL/L (ref 136–145)

## 2022-09-26 PROCEDURE — 84466 ASSAY OF TRANSFERRIN: CPT

## 2022-09-26 PROCEDURE — 80048 BASIC METABOLIC PNL TOTAL CA: CPT

## 2022-09-26 PROCEDURE — 85025 COMPLETE CBC W/AUTO DIFF WBC: CPT

## 2022-09-26 PROCEDURE — 83880 ASSAY OF NATRIURETIC PEPTIDE: CPT

## 2022-09-26 PROCEDURE — 82570 ASSAY OF URINE CREATININE: CPT

## 2022-09-26 PROCEDURE — 80048 BASIC METABOLIC PNL TOTAL CA: CPT | Performed by: PHYSICIAN ASSISTANT

## 2022-09-26 PROCEDURE — 36415 COLL VENOUS BLD VENIPUNCTURE: CPT | Performed by: PHYSICIAN ASSISTANT

## 2022-09-26 PROCEDURE — 94726 PLETHYSMOGRAPHY LUNG VOLUMES: CPT | Performed by: PHYSICIAN ASSISTANT

## 2022-09-26 PROCEDURE — 99215 OFFICE O/P EST HI 40 MIN: CPT | Performed by: PHYSICIAN ASSISTANT

## 2022-09-26 PROCEDURE — 82728 ASSAY OF FERRITIN: CPT

## 2022-09-26 PROCEDURE — 83540 ASSAY OF IRON: CPT

## 2022-09-26 PROCEDURE — 83735 ASSAY OF MAGNESIUM: CPT | Performed by: PHYSICIAN ASSISTANT

## 2022-09-26 PROCEDURE — 82043 UR ALBUMIN QUANTITATIVE: CPT

## 2022-09-26 RX ORDER — MAGNESIUM OXIDE 400 MG/1
400 TABLET ORAL DAILY
Qty: 30 TABLET | Refills: 0 | Status: SHIPPED | OUTPATIENT
Start: 2022-09-26 | End: 2022-10-26

## 2022-09-26 RX ORDER — AMLODIPINE BESYLATE 2.5 MG/1
2.5 TABLET ORAL DAILY
COMMUNITY

## 2022-09-26 NOTE — PROGRESS NOTES
[facility]  LESLIE Rapp Joseph T, PA  [address] [place] [address]    Thank you for asking me to see Albina Finley for congestive heart failure.    HPI:     This is a 77 y.o. female with known past medical history of:  · Chronic HFpEF  · Severe pulmonary HTN  · Essential HTN  · Stage IIIb CKD  · Iron deficiency  · PUD (duodenal ulcer 1cm with upper GI bleed)  · Diabetes Mellitus  · Hx abnormal stress test  · COPD  · Former tobacco smoker  · Night time hypoxia    Albina Finley presents for today for HF clinic follow-up.  The patient is typically seen by Micah Núñez PA.     • Last known EF 61-65%. Current medications prior to first visit directed toward underlying heart failure.      • Last known hospitalization and/or ED visit: Hospitalized December 26, 2021 through January 2, 2022 with acute diastolic CHF exacerbation E. coli UTI\  • Accompanied by: Daughter      Initial visit data/details regarding:   • Dyspnea: Dyspnea on exertion but does well at rest  • Lower extremity swelling: Swelling of lower extremities has improved   • Abdominal swelling: No significant protuberance  • Home weight: Weight has been stable and staying around 157-158  • Home BP: ranging from 130s systolic to 190s systolic  • Home heart rate: 50s  • Daily activities of living: Performs with daughters assistance at baseline  • Pillows/lying flat : 2  • HF zone: green  • Patient is doing well.  She did require some extra Bumex 2mg for weight gain as instructed by Nephrology.   • Her daughter has been holding Norvasc 2.5mg due to reported shortness of breath and leg swelling due to concern this was causing it.   • We discuss possible upward titration of Imdur or Hydralazine. Patient's daughter is very hesitant and would like for her to see Nephrology prior to any changes.   • Patient is chest pain free and functioning well.      Review of Systems - Review of Systems    Constitutional: Negative for chills, decreased appetite and diaphoresis.   HENT: Negative for congestion and ear discharge.    Eyes: Negative for blurred vision, discharge and double vision.   Cardiovascular: Positive for dyspnea on exertion. Negative for chest pain, claudication, cyanosis, irregular heartbeat and leg swelling.   Respiratory: Negative for hemoptysis and shortness of breath.    Endocrine: Negative for cold intolerance and heat intolerance.   Hematologic/Lymphatic: Negative for adenopathy.   Skin: Negative for color change, dry skin and flushing.   Musculoskeletal: Negative for arthritis, back pain and falls.   Gastrointestinal: Negative for bloating, abdominal pain and anorexia.   Genitourinary: Negative for bladder incontinence, decreased libido and dysuria.   Neurological: Negative for aphonia, brief paralysis and difficulty with concentration.   Psychiatric/Behavioral: Negative for altered mental status, depression and hallucinations.         All other systems were reviewed and were negative.    Patient Active Problem List   Diagnosis   • Iron deficiency anemia   • Chronic diastolic heart failure ([facility])   • CKD (chronic kidney disease) stage 3, GFR 30-59 ml/min ([facility])   • Essential hypertension   • Hyperlipidemia   • Type II diabetes mellitus ([facility])   • Arthritis   • Osteoporosis   • Grade II diastolic dysfunction   • Duodenitis   • Peptic ulcer disease with hemorrhage   • Non-ST elevation myocardial infarction (NSTEMI) ([facility])   • Abnormal nuclear stress test with mild degree of small size apical lateral wall myocardial ischemia in December 2019.   • Anemia due to GI blood loss   • Hip fracture ([facility])   • S/p left hip fracture       family history includes Arthritis in her mother; COPD in her mother; Diabetes in her father; Heart disease in her mother.     reports that she has quit smoking. Her smoking use included cigarettes. She has a 30.00 pack-year smoking history. She has never used smokeless  tobacco. She reports that she does not drink alcohol and does not use drugs.    No Known Allergies      Current Outpatient Medications:   •  atorvastatin (LIPITOR) 20 MG tablet, Take 1 tablet by mouth Every Night., Disp: 30 tablet, Rfl: 0  •  bumetanide (BUMEX) 2 MG tablet, Take 2 mg by mouth Daily As Needed. If weight greater than 158 pounds, Disp: , Rfl:   •  calcium carbonate (OS-TIANNA) 600 MG tablet, Take 600 mg by mouth Daily., Disp: , Rfl:   •  carvedilol (COREG) 25 MG tablet, Take 25 mg by mouth 2 (Two) Times a Day With Meals., Disp: , Rfl:   •  cetirizine (zyrTEC) 10 MG tablet, Take 10 mg by mouth Daily., Disp: , Rfl:   •  cyanocobalamin 1000 MCG/ML injection, Inject 1,000 mcg into the appropriate muscle as directed by prescriber Every 30 (Thirty) Days. Due on 1-12-22., Disp: , Rfl:   •  ferrous sulfate 325 (65 FE) MG tablet, Take 325 mg by mouth Every Evening., Disp: , Rfl:   •  furosemide (LASIX) 40 MG tablet, Take 1 tablet by mouth 2 (Two) Times a Day., Disp: 60 tablet, Rfl: 2  •  hydrALAZINE (APRESOLINE) 10 MG tablet, Take 10 mg by mouth 3 (Three) Times a Day., Disp: , Rfl:   •  insulin detemir (LEVEMIR) 100 UNIT/ML injection, Inject 42 Units under the skin into the appropriate area as directed Daily. (Patient taking differently: Inject 65 Units under the skin into the appropriate area as directed 2 (Two) Times a Day. Still adjusting), Disp: , Rfl: 12  •  isosorbide mononitrate (IMDUR) 30 MG 24 hr tablet, Take 1 tablet by mouth Daily., Disp: 30 tablet, Rfl: 0  •  pantoprazole (PROTONIX) 40 MG EC tablet, Take 1 tablet by mouth 2 (Two) Times a Day Before Meals., Disp: 60 tablet, Rfl: 0  •  amLODIPine (NORVASC) 2.5 MG tablet, Take 2.5 mg by mouth Daily. Been holding for a week bc of fluid concern, Disp: , Rfl:   •  magnesium oxide (MAG-OX) 400 MG tablet, Take 1 tablet by mouth Daily for 30 days., Disp: 30 tablet, Rfl: 0      Physical Exam:  I have reviewed the patient's current vital signs as documented in  the patient's EMR.   Vitals:    09/26/22 1343   BP: 136/54   Pulse: 58   SpO2: 95%     Body mass index is 29.85 kg/m².       09/26/22  1343   Weight: 71.7 kg (158 lb)      Physical Exam  Vitals and nursing note reviewed.   Constitutional:       General: She is awake.      Appearance: Normal appearance. She is well-developed.   HENT:      Head: Normocephalic and atraumatic.   Eyes:      General: Lids are normal.      Conjunctiva/sclera:      Right eye: Right conjunctiva is not injected.      Left eye: Left conjunctiva is not injected.   Cardiovascular:      Rate and Rhythm: Normal rate and regular rhythm.      Heart sounds: S1 normal and S2 normal.   Pulmonary:      Effort: No tachypnea or bradypnea.      Breath sounds: No decreased breath sounds, wheezing, rhonchi or rales.   Abdominal:      General: Bowel sounds are normal.      Palpations: Abdomen is soft.      Tenderness: There is no abdominal tenderness.   Musculoskeletal:      Right forearm: No swelling or edema.      Left forearm: No swelling.      Right lower leg: No swelling. No edema.      Left lower leg: No swelling. No edema.   Skin:     General: Skin is warm and moist.   Neurological:      Mental Status: She is alert and oriented to person, place, and time.   Psychiatric:         Attention and Perception: Attention normal.         Mood and Affect: Mood normal.         Behavior: Behavior is cooperative.          JVP: Volume/Pulsation: Normal.        DATA REVIEWED:      ---------------------------------------------------  TTE/KADY:  Results for orders placed during the hospital encounter of 11/13/21    Adult Transthoracic Echo Complete W/ Cont if Necessary Per Protocol    Interpretation Summary  · Estimated left ventricular EF = 65% Left ventricular ejection fraction appears to be 61 - 65%. Left ventricular systolic function is normal.  · Left ventricular diastolic function was normal.  · Estimated right ventricular systolic pressure from tricuspid  regurgitation is markedly elevated (>55 mmHg).  · Severe pulmonary hypertension is present.  · No significant mitral or aortic regurgitation  · No pericardial effusion  · Since previous study of 12/17/19 Severe pulmonary hypertension is now present        -----------------------------------------------------  CXR/Imaging:   Imaging Results (Most Recent)     None          I personally reviewed and interpreted the CXR.      -----------------------------------------------------  CT:   No radiology results for the last 30 days.  I personally reviewed the images of the CT scan.  My personal interpretation is below.      ----------------------------------------------------    PFTs:    Would benefit from future visit.    --------------------------------------------------------------------------------------------------    Laboratory evaluations:    Lab Results   Component Value Date    GLUCOSE 145 (H) 09/26/2022    BUN 41 (H) 09/26/2022    CREATININE 1.55 (H) 09/26/2022    EGFRIFNONA 34 (L) 02/09/2022    BCR 26.5 (H) 09/26/2022    K 5.1 09/26/2022    CO2 29.3 (H) 09/26/2022    CALCIUM 9.3 09/26/2022    ALBUMIN 4.00 06/28/2022    LABIL2 1.2 (L) 09/15/2015    AST 11 06/28/2022    ALT 12 06/28/2022     Lab Results   Component Value Date    WBC 7.96 06/28/2022    HGB 11.4 (L) 06/28/2022    HCT 36.7 06/28/2022    MCV 85.0 06/28/2022     06/28/2022     Lab Results   Component Value Date    CHOL 140 11/16/2021    TRIG 214 (H) 11/16/2021    HDL 27 (L) 11/16/2021    LDL 77 11/16/2021     Lab Results   Component Value Date    TSH 1.840 02/04/2021     Lab Results   Component Value Date    HGBA1C 9.20 (H) 11/13/2021     Lab Results   Component Value Date    ALT 12 06/28/2022     Lab Results   Component Value Date    HGBA1C 9.20 (H) 11/13/2021    HGBA1C 9.10 (H) 02/04/2021    HGBA1C 10.18 (H) 10/06/2020     Lab Results   Component Value Date    MICROALBUR <1.2 06/28/2022    CREATININE 1.55 (H) 09/26/2022     Lab Results    Component Value Date    IRON 36 (L) 06/28/2022    TIBC 124 (L) 06/28/2022    FERRITIN 257.00 (H) 06/28/2022     Lab Results   Component Value Date    INR 1.14 (H) 11/20/2021    INR 1.06 11/18/2021    INR 1.04 11/17/2021    PROTIME 15.1 (H) 11/20/2021    PROTIME 14.2 11/18/2021    PROTIME 14.0 11/17/2021        Lab Results   Component Value Date    ABSOLUTELUNG 31 09/26/2022    ABSOLUTELUNG 39 (A) 03/23/2022    ABSOLUTELUNG 38 (A) 02/09/2022       PAH RISK ASSESSMENT:      1. Chronic diastolic heart failure (HCC)    2. Stage 3b chronic kidney disease (HCC)    3. Essential hypertension    4. Type 2 diabetes mellitus with stage 3 chronic kidney disease, unspecified whether long term insulin use, unspecified whether stage 3a or 3b CKD (HCC)    5. Iron deficiency anemia, unspecified iron deficiency anemia type          ORDERS PLACED TODAY:  Orders Placed This Encounter   Procedures   • BNP   • Basic Metabolic Panel   • Magnesium   • ReDs Vest        Diagnoses and all orders for this visit:    1. Chronic diastolic heart failure (HCC) (Primary)  -     BNP  -     Basic Metabolic Panel; Standing  -     Magnesium; Standing  -     ReDs Vest  -     Basic Metabolic Panel  -     Magnesium    2. Stage 3b chronic kidney disease (HCC)    3. Essential hypertension    4. Type 2 diabetes mellitus with stage 3 chronic kidney disease, unspecified whether long term insulin use, unspecified whether stage 3a or 3b CKD (HCC)    5. Iron deficiency anemia, unspecified iron deficiency anemia type    Other orders  -     magnesium oxide (MAG-OX) 400 MG tablet; Take 1 tablet by mouth Daily for 30 days.  Dispense: 30 tablet; Refill: 0             MEDS ORDERED TODAY:    New Medications Ordered This Visit   Medications   • magnesium oxide (MAG-OX) 400 MG tablet     Sig: Take 1 tablet by mouth Daily for 30 days.     Dispense:  30 tablet     Refill:  0         ---------------------------------------------------------------------------------------------------------------------------          ASSESSMENT/PLAN:      Diagnosis Plan   1. Chronic diastolic heart failure (HCC)  BNP    Basic Metabolic Panel    Magnesium    ReDs Vest    Basic Metabolic Panel    Magnesium   2. Stage 3b chronic kidney disease (HCC)     3. Essential hypertension     4. Type 2 diabetes mellitus with stage 3 chronic kidney disease, unspecified whether long term insulin use, unspecified whether stage 3a or 3b CKD (HCC)     5. Iron deficiency anemia, unspecified iron deficiency anemia type         not acutely decompensated chronic diastolic heart failure. CHF.     NYHA stage Stage C: Structural heart disease is present AND symptoms have occurredFC-Class III: Marked limitation of physical activity. Comfortable at rest. Less than ordinary activity causes fatigue, palpitation, or dyspnea.     Today, Patient appears euvolemic.and with  Moderate perfusion. The patient's hemodynamics are currently acceptable. HR is: normal and is at goal. BP/MAP was reviewed and there isroom for medication up-titration.  Clinical trajectory was assessed and hasremained stable.     CHF GOAL DIRECTED MEDICAL THERAPY FOR PATIENT ADDRESSED/ADJUSTED:     GDMT    Drug Class   Drug   Dose Last Dose Adjustment Additional Titration   Notes   ACEi/ARB/ARNI Hydralazine/Imdur  CKD IIIb     Beta Blocker  Carvedilol   25 mg BID      MRA CKD stage IIIb       SGLT2i Xx-Frequent UTIs   N/A      -CHF Specific BB:   • Continue Coreg 25mg BID.    -Diuretic regimen:   • ReDS Vest reading for 09/26/22 is 31; ReDs Vest reading reviewed with patient.    • Continue diuretic regimen as outlined by Nephrology with Lasix 40mg BID and Bumex 2mg PRN when weight is >158 as outlined by Nephro.   • BMP, Mag, & ProBNP reviewed with patient.      -Fluid restriction/Sodium restriction:   • Requested 2000 ml restriction  • Patient has been asked to weigh daily  and was provided with a printed diuretic strategy.  • 1,500 mg Na restriction was discussed.    -Acute vs. Chronic underlying conditions other than HF addressed during visit:   • CKD IIIb:   o Keep next week's appointment with Dr. Quinn.   o BMP reviewed with creatinine within prior baseline.     • Uncontrolled HTN:   o Encouraged patient's daughter/caretaker to discuss increase of Imdur with Nephrology.  She is very hesitant to make any changes to current regimen, though per review of BP logs, she does have many poorly controlled documented pressures.   o Instructed to given extra 10mg of hydralazine as needed when SBP>160mmHg as poorly controlled HTN can worsen renal disease.    o Encouraged possibly increase in Imdur 30mg if Amlodipine continues to be held.     • Iron Deficiency Anemia in HF with likely AOCD:   o Continue Ferrous sulfate.      • Type 2 DM, ID:   o Care per PCP.     • Identifiable barriers to Heart Failure Self-care:   o Medical Barriers: Frailty/ Dementia                  45 minutes out of 60 minutes face to face spent counseling patient extensively on dietary Na+ intake, importance of activity, weight monitoring, compliance with medications in addition to importance of titration with goal directed medical therapy and follow up appointments.            This document has been electronically signed by Adela Gavin PA-C  September 26, 2022 15:48 EDT      Dictated Utilizing Dragon Dictation: Part of this note may be an electronic transcription/translation of spoken language to printed text using the Dragon Dictation System.    Follow-up appointment and medication changes provided in hand delivered After Visit Summary as well as reviewed in the room.

## 2022-09-26 NOTE — PROGRESS NOTES
Heart Failure Clinic  Pharmacist Note     Albina Finley is a 77 y.o. female seen in the Heart Failure Clinic for HFpEF with last EF of 61-65% on 11/13/21.  Albina Finley reports a poor understanding of her medications, but she was accompanied by her daughter who keeps an updated list of her medications and ensures that her mother takes her medications appropriately. The pt's daughter also keeps an extensive log of her weights and BP. Pt denies any swelling or SOB, but daughter was worried that she was swelling due to her weight being over 158lb several times this month. Pt has Rx for Bumex 2mg prn for wt >158lb and daughter reports giving it to the pt 19 times in September thus far. She takes lasix 40mg bid routinely. She also reports stopping the Pt's Amlodipine 2.5mg dose due to fear that she was taking on water weight due to her weights. BP from the log show a large range from 130-178/57-87 before her medications in the morning with her HR in the 50-60's. Pt's daughter reports that pt is very sensitive to changes in BP meds and in the past they have caused her BP to drop. Pt denies any episodes of lightheadedness and dizziness.       Medication Use:   Adherence: no issues; has 24/7 help from family  Hx of med intolerances: n/a  Affordability: reported that insulin can be expensive at times; as of 3/23/22 is not in donut hole so no issues at this time  Retail Rx Management: n/a    Past Medical History:   Diagnosis Date   • Abnormal nuclear stress test with mild degree of small size apical lateral wall myocardial ischemia in December 2019. 5/28/2020   • Acute on chronic diastolic CHF (congestive heart failure) (HCC) 12/17/2019   • Anal polyp 6/7/2018    Added automatically from request for surgery [id number]   • Arthritis    • Chronic kidney disease    • COPD (chronic obstructive pulmonary disease) ([facility])    • Diabetes mellitus ([facility])    • Elevated cholesterol    • Essential hypertension 12/17/2019   • History of  transfusion    • Incidental lung nodule, greater than or equal to 8mm     Left lower lobe, 15 mm based on 2012 and March 2019 chest CT scans with no growth in the size   • Iron deficiency anemia 7/7/2017   • Osteoporosis    • Peptic ulcer disease with hemorrhage 4/22/2020   • Pneumonia of right middle lobe due to infectious organism 2/14/2019     ALLERGIES: Patient has no known allergies.  Current Outpatient Medications   Medication Sig Dispense Refill   • atorvastatin (LIPITOR) 20 MG tablet Take 1 tablet by mouth Every Night. 30 tablet 0   • bumetanide (BUMEX) 2 MG tablet Take 2 mg by mouth Daily As Needed. If weight greater than 158 pounds     • calcium carbonate (OS-TIANNA) 600 MG tablet Take 600 mg by mouth Daily.     • carvedilol (COREG) 25 MG tablet Take 25 mg by mouth 2 (Two) Times a Day With Meals.     • cetirizine (zyrTEC) 10 MG tablet Take 10 mg by mouth Daily.     • cyanocobalamin 1000 MCG/ML injection Inject 1,000 mcg into the appropriate muscle as directed by prescriber Every 30 (Thirty) Days. Due on 1-12-22.     • ferrous sulfate 325 (65 FE) MG tablet Take 325 mg by mouth Every Evening.     • furosemide (LASIX) 40 MG tablet Take 1 tablet by mouth 2 (Two) Times a Day. 60 tablet 2   • hydrALAZINE (APRESOLINE) 10 MG tablet Take 10 mg by mouth 3 (Three) Times a Day.     • insulin detemir (LEVEMIR) 100 UNIT/ML injection Inject 42 Units under the skin into the appropriate area as directed Daily. (Patient taking differently: Inject 65 Units under the skin into the appropriate area as directed 2 (Two) Times a Day. Still adjusting)  12   • isosorbide mononitrate (IMDUR) 30 MG 24 hr tablet Take 1 tablet by mouth Daily. 30 tablet 0   • pantoprazole (PROTONIX) 40 MG EC tablet Take 1 tablet by mouth 2 (Two) Times a Day Before Meals. 60 tablet 0   • amLODIPine (NORVASC) 2.5 MG tablet Take 2.5 mg by mouth Daily. Been holding for a week bc of fluid concern     • magnesium oxide (MAG-OX) 400 MG tablet Take 1 tablet by  "mouth Daily for 30 days. 30 tablet 0     No current facility-administered medications for this encounter.       Vaccination History:   Pneumonia: reportedly UTD  Annual Influenza: UTD for 21-22 season    Objective  Vitals:    09/26/22 1343   BP: 136/54   BP Location: Left arm   Patient Position: Sitting   Cuff Size: Adult   Pulse: 58   SpO2: 95%   Weight: 71.7 kg (158 lb)   Height: 154.9 cm (61\")     Wt Readings from Last 3 Encounters:   09/26/22 71.7 kg (158 lb)   03/23/22 71.2 kg (157 lb)   02/09/22 70.1 kg (154 lb 9.6 oz)         09/26/22  1343   Weight: 71.7 kg (158 lb)     Lab Results   Component Value Date    GLUCOSE 145 (H) 09/26/2022    BUN 41 (H) 09/26/2022    CREATININE 1.55 (H) 09/26/2022    EGFRIFNONA 34 (L) 02/09/2022    BCR 26.5 (H) 09/26/2022    K 5.1 09/26/2022    CO2 29.3 (H) 09/26/2022    CALCIUM 9.3 09/26/2022    ALBUMIN 4.00 06/28/2022    LABIL2 1.2 (L) 09/15/2015    AST 11 06/28/2022    ALT 12 06/28/2022     Lab Results   Component Value Date    WBC 7.96 06/28/2022    HGB 11.4 (L) 06/28/2022    HCT 36.7 06/28/2022    MCV 85.0 06/28/2022     06/28/2022     Lab Results   Component Value Date    CKTOTAL 82 11/13/2021    CKMB 0.32 02/15/2019    CKMBINDEX 1.8 02/15/2019    TROPONINI <0.006 02/15/2019    TROPONINT 0.021 12/31/2021     Lab Results   Component Value Date    PROBNP 654.8 09/26/2022     Results for orders placed during the hospital encounter of 11/13/21    Adult Transthoracic Echo Complete W/ Cont if Necessary Per Protocol    Interpretation Summary  · Estimated left ventricular EF = 65% Left ventricular ejection fraction appears to be 61 - 65%. Left ventricular systolic function is normal.  · Left ventricular diastolic function was normal.  · Estimated right ventricular systolic pressure from tricuspid regurgitation is markedly elevated (>55 mmHg).  · Severe pulmonary hypertension is present.  · No significant mitral or aortic regurgitation  · No pericardial effusion  · Since " previous study of 12/17/19 Severe pulmonary hypertension is now present         GDMT    Drug Class   Drug   Dose Last Dose Adjustment Additional Titration   Notes   ACEi/ARB/ARNI     CKD?   Beta Blocker Coreg 25mg >3m     MRA     CKD + hyperK   SGLT2i     Hx of UTI's per daughter   HYD/ISDN Imdur 30mg + Hydralazine 10mg tid  >3m           Drug Therapy Problems    1. Requesting refills on Magnesium  2. GDMT  3. New directions for PRN bumex  4. Hypertension      Recommendations:     1. Adela sent to pt's pharmacy  2. Recommend adding Jardiance 10mg for HFpEF benefit. Per patient's insurance, copay would be $152. Pt's daughter reports that mother is prone to UTI and wants to avoid this medication because of it.   3. Recommend new dose parameters for PRN bumex, since increase in weight does not seem to be water-weight. Pt's daughter reports that she follows up with [facility] next week and she will have him give her new directions.   4. Recommend increasing Hydralazine dose for hypertension, but daughter reported that it would drop the Pt's BP. Pt felt more comfortable discussing with Lohe at her appointment next week.       Patient was educated on heart failure medications and the importance of medication adherence. All questions were addressed and patient expressed understanding. Used teach-back method to assess understanding.     Thank you for allowing me to participate in the care of your patient,    Yvette Kraft [facility]  09/26/22  15:17 EDT

## 2022-09-26 NOTE — PROGRESS NOTES
Heart Failure Clinic    Date: 09/26/22     Vitals:    09/26/22 1343   BP: 136/54   Pulse: 58   SpO2: 95%      Weight 158  Method of arrival: Ambulatory    Weighing self daily: Yes    Monitoring Heart Failure Zones: Yes    Today's HF Zone: Green    Taking medications as prescribed: Yes    Edema Yes    Shortness of Air: Yes    Number of pillows used at night:<2    Educational Materials given:  avs                                                                         ReDS Value: 31  25-35 Optimal Value Status      Asher Anand RN 09/26/22 13:51 EDT

## 2022-09-27 LAB
ALBUMIN UR-MCNC: 1.2 MG/DL
ANION GAP SERPL CALCULATED.3IONS-SCNC: 12.8 MMOL/L (ref 5–15)
BASOPHILS # BLD AUTO: 0.04 10*3/MM3 (ref 0–0.2)
BASOPHILS NFR BLD AUTO: 0.4 % (ref 0–1.5)
BUN SERPL-MCNC: 39 MG/DL (ref 8–23)
BUN/CREAT SERPL: 26 (ref 7–25)
CALCIUM SPEC-SCNC: 9.3 MG/DL (ref 8.6–10.5)
CHLORIDE SERPL-SCNC: 100 MMOL/L (ref 98–107)
CO2 SERPL-SCNC: 27.2 MMOL/L (ref 22–29)
CREAT SERPL-MCNC: 1.5 MG/DL (ref 0.57–1)
CREAT UR-MCNC: 36.4 MG/DL
DEPRECATED RDW RBC AUTO: 51.3 FL (ref 37–54)
EGFRCR SERPLBLD CKD-EPI 2021: 35.5 ML/MIN/1.73
EOSINOPHIL # BLD AUTO: 0.22 10*3/MM3 (ref 0–0.4)
EOSINOPHIL NFR BLD AUTO: 2.4 % (ref 0.3–6.2)
ERYTHROCYTE [DISTWIDTH] IN BLOOD BY AUTOMATED COUNT: 16.6 % (ref 12.3–15.4)
FERRITIN SERPL-MCNC: 315 NG/ML (ref 13–150)
GLUCOSE SERPL-MCNC: 96 MG/DL (ref 65–99)
HCT VFR BLD AUTO: 36.1 % (ref 34–46.6)
HGB BLD-MCNC: 11.6 G/DL (ref 12–15.9)
IMM GRANULOCYTES # BLD AUTO: 0.02 10*3/MM3 (ref 0–0.05)
IMM GRANULOCYTES NFR BLD AUTO: 0.2 % (ref 0–0.5)
IRON 24H UR-MRATE: 36 MCG/DL (ref 37–145)
IRON SATN MFR SERPL: 30 % (ref 20–50)
LYMPHOCYTES # BLD AUTO: 3.36 10*3/MM3 (ref 0.7–3.1)
LYMPHOCYTES NFR BLD AUTO: 36.4 % (ref 19.6–45.3)
MCH RBC QN AUTO: 27.2 PG (ref 26.6–33)
MCHC RBC AUTO-ENTMCNC: 32.1 G/DL (ref 31.5–35.7)
MCV RBC AUTO: 84.7 FL (ref 79–97)
MICROALBUMIN/CREAT UR: 33 MG/G
MONOCYTES # BLD AUTO: 0.6 10*3/MM3 (ref 0.1–0.9)
MONOCYTES NFR BLD AUTO: 6.5 % (ref 5–12)
NEUTROPHILS NFR BLD AUTO: 4.99 10*3/MM3 (ref 1.7–7)
NEUTROPHILS NFR BLD AUTO: 54.1 % (ref 42.7–76)
NRBC BLD AUTO-RTO: 0 /100 WBC (ref 0–0.2)
PLATELET # BLD AUTO: 302 10*3/MM3 (ref 140–450)
PMV BLD AUTO: 11.2 FL (ref 6–12)
POTASSIUM SERPL-SCNC: 3.9 MMOL/L (ref 3.5–5.2)
RBC # BLD AUTO: 4.26 10*6/MM3 (ref 3.77–5.28)
SODIUM SERPL-SCNC: 140 MMOL/L (ref 136–145)
TIBC SERPL-MCNC: 119 MCG/DL (ref 298–536)
TRANSFERRIN SERPL-MCNC: 80 MG/DL (ref 200–360)
WBC NRBC COR # BLD: 9.23 10*3/MM3 (ref 3.4–10.8)

## 2022-11-28 RX ORDER — MAGNESIUM OXIDE 400 MG/1
400 TABLET ORAL DAILY
Qty: 90 TABLET | Refills: 0 | Status: SHIPPED | OUTPATIENT
Start: 2022-11-28 | End: 2023-02-26

## 2023-03-22 ENCOUNTER — LAB REQUISITION (OUTPATIENT)
Dept: LAB | Facility: HOSPITAL | Age: 78
End: 2023-03-22
Payer: MEDICARE

## 2023-03-22 DIAGNOSIS — I10 ESSENTIAL (PRIMARY) HYPERTENSION: ICD-10-CM

## 2023-03-22 PROCEDURE — 87086 URINE CULTURE/COLONY COUNT: CPT | Performed by: FAMILY MEDICINE

## 2023-03-22 PROCEDURE — 81001 URINALYSIS AUTO W/SCOPE: CPT | Performed by: FAMILY MEDICINE

## 2023-03-23 LAB
BACTERIA UR QL AUTO: ABNORMAL /HPF
BILIRUB UR QL STRIP: NEGATIVE
CLARITY UR: ABNORMAL
COLOR UR: YELLOW
GLUCOSE UR STRIP-MCNC: NEGATIVE MG/DL
HGB UR QL STRIP.AUTO: NEGATIVE
HYALINE CASTS UR QL AUTO: ABNORMAL /LPF
KETONES UR QL STRIP: NEGATIVE
LEUKOCYTE ESTERASE UR QL STRIP.AUTO: ABNORMAL
NITRITE UR QL STRIP: POSITIVE
PH UR STRIP.AUTO: 7 [PH] (ref 5–8)
PROT UR QL STRIP: NEGATIVE
RBC # UR STRIP: ABNORMAL /HPF
REF LAB TEST METHOD: ABNORMAL
SP GR UR STRIP: 1.01 (ref 1–1.03)
SQUAMOUS #/AREA URNS HPF: ABNORMAL /HPF
UROBILINOGEN UR QL STRIP: ABNORMAL
WBC # UR STRIP: ABNORMAL /HPF

## 2023-03-24 LAB — BACTERIA SPEC AEROBE CULT: ABNORMAL

## 2023-04-11 ENCOUNTER — OFFICE VISIT (OUTPATIENT)
Dept: SURGERY | Facility: CLINIC | Age: 78
End: 2023-04-11
Payer: MEDICARE

## 2023-04-11 DIAGNOSIS — L60.0 INGROWN TOENAIL: Primary | ICD-10-CM

## 2023-04-11 PROCEDURE — 1160F RVW MEDS BY RX/DR IN RCRD: CPT | Performed by: SURGERY

## 2023-04-11 PROCEDURE — 1159F MED LIST DOCD IN RCRD: CPT | Performed by: SURGERY

## 2023-04-11 PROCEDURE — 99212 OFFICE O/P EST SF 10 MIN: CPT | Performed by: SURGERY

## 2023-04-11 RX ORDER — SPIRONOLACTONE 50 MG/1
50 TABLET, FILM COATED ORAL DAILY
COMMUNITY

## 2023-04-11 RX ORDER — GUAIFENESIN 600 MG/1
1200 TABLET, EXTENDED RELEASE ORAL AS NEEDED
COMMUNITY

## 2023-04-11 RX ORDER — LOSARTAN POTASSIUM 50 MG/1
50 TABLET ORAL DAILY
COMMUNITY

## 2023-04-11 NOTE — PROGRESS NOTES
Subjective   Albina Finley is a 78 y.o. female.     Chief Complaint: ingrown toenail    History of Present Illness She is a 79 yo who has had pain from her right great toe for some time. No prior injury or issues.    The following portions of the patient's history were reviewed and updated as appropriate: current medications, past family history, past medical history, past social history, past surgical history and problem list.    Review of Systems    Objective   Physical Exam ingrown horse shoe shaped right great toenail. Mild erythema. No pus or sign of abscess.    Past Medical History:   Diagnosis Date   • Abnormal nuclear stress test with mild degree of small size apical lateral wall myocardial ischemia in December 2019. 5/28/2020   • Acute on chronic diastolic CHF (congestive heart failure) 12/17/2019   • Anal polyp 6/7/2018    Added automatically from request for surgery [id number]   • Arthritis    • Chronic kidney disease    • COPD (chronic obstructive pulmonary disease)    • Diabetes mellitus    • Elevated cholesterol    • Essential hypertension 12/17/2019   • History of transfusion    • Incidental lung nodule, greater than or equal to 8mm     Left lower lobe, 15 mm based on 2012 and March 2019 chest CT scans with no growth in the size   • Iron deficiency anemia 7/7/2017   • Osteoporosis    • Peptic ulcer disease with hemorrhage 4/22/2020   • Pneumonia of right middle lobe due to infectious organism 2/14/2019       Family History   Problem Relation Age of Onset   • Heart disease Mother    • COPD Mother    • Arthritis Mother    • Diabetes Father        Social History     Tobacco Use   • Smoking status: Former     Packs/day: 2.00     Years: 15.00     Pack years: 30.00     Types: Cigarettes   • Smokeless tobacco: Never   • Tobacco comments:     quit 25 years ago    Substance Use Topics   • Alcohol use: No   • Drug use: No       Past Surgical History:   Procedure Laterality Date   • ANUS SURGERY N/A 6/11/2018     Procedure: Diagnostic anoscopy with anal polyp excision;  Surgeon: Marlyn Gutierrez MD;  Location: [facility] OR;  Service: General   •  SECTION     • ENDOSCOPY N/A 2020    Procedure: ESOPHAGOGASTRODUODENOSCOPY;  Surgeon: Marlon Wray MD;  Location: [facility] OR;  Service: Gastroenterology;  Laterality: N/A;   • FEMUR OPEN REDUCTION INTERNAL FIXATION Left 2021    Procedure: Left hip hook plate operative fixation;  Surgeon: Lupillo Cornejo MD;  Location: [facility] OR;  Service: Orthopedics;  Laterality: Left;   • HIP BIPOLAR REPLACEMENT      left Dr. Cornejo    • HYSTERECTOMY     • JOINT REPLACEMENT         Current Outpatient Medications   Medication Instructions   • amLODIPine (NORVASC) 2.5 mg, Oral, Daily, Been holding for a week bc of fluid concern   • atorvastatin (LIPITOR) 20 mg, Oral, Nightly   • bumetanide (BUMEX) 2 mg, Oral, Daily PRN, If weight greater than 158 pounds   • calcium carbonate (OS-TIANNA) 600 mg, Oral, Daily   • carvedilol (COREG) 25 mg, Oral, 2 Times Daily With Meals   • cetirizine (ZYRTEC) 10 mg, Oral, Daily   • cyanocobalamin 1,000 mcg, Intramuscular, Every 30 Days, Due on 22.   • ferrous sulfate 325 mg, Oral, Every Evening   • furosemide (LASIX) 40 mg, Oral, 2 Times Daily   • hydrALAZINE (APRESOLINE) 10 mg, Oral, 3 Times Daily   • insulin detemir (LEVEMIR) 42 Units, Subcutaneous, Daily   • isosorbide mononitrate (IMDUR) 30 mg, Oral, Every 24 Hours Scheduled   • pantoprazole (PROTONIX) 40 mg, Oral, 2 Times Daily Before Meals         Assessment & Plan   Diagnoses and all orders for this visit:    1. Ingrown toenail (Primary)    excise and cauterize right great toenail bed.

## 2023-04-20 ENCOUNTER — PROCEDURE VISIT (OUTPATIENT)
Dept: SURGERY | Facility: CLINIC | Age: 78
End: 2023-04-20
Payer: MEDICARE

## 2023-04-20 VITALS — BODY MASS INDEX: 29.83 KG/M2 | HEIGHT: 61 IN | WEIGHT: 158 LBS

## 2023-04-20 DIAGNOSIS — L60.0 INGROWN TOENAIL: Primary | ICD-10-CM

## 2023-04-20 NOTE — PROGRESS NOTES
Subjective   Albina Finley is a 78 y.o. female.     Chief Complaint: ingrown toenail    History of Present Illness She has a horse shoe shaped right great toenail that is ingrown and painful. She has held her anticoagulation and is here for excision and cauterization of the nailbed.    The following portions of the patient's history were reviewed and updated as appropriate: current medications, past family history, past medical history, past social history, past surgical history and problem list.    Review of Systems    Objective   Physical Exam left great toenail excised with lidocaine and nail bed cauterized.     Past Medical History:   Diagnosis Date   • Abnormal nuclear stress test with mild degree of small size apical lateral wall myocardial ischemia in December 2019. 5/28/2020   • Acute on chronic diastolic CHF (congestive heart failure) 12/17/2019   • Anal polyp 6/7/2018    Added automatically from request for surgery [id number]   • Arthritis    • Chronic kidney disease    • COPD (chronic obstructive pulmonary disease)    • Diabetes mellitus    • Elevated cholesterol    • Essential hypertension 12/17/2019   • History of transfusion    • Incidental lung nodule, greater than or equal to 8mm     Left lower lobe, 15 mm based on 2012 and March 2019 chest CT scans with no growth in the size   • Iron deficiency anemia 7/7/2017   • Osteoporosis    • Peptic ulcer disease with hemorrhage 4/22/2020   • Pneumonia of right middle lobe due to infectious organism 2/14/2019       Family History   Problem Relation Age of Onset   • Heart disease Mother    • COPD Mother    • Arthritis Mother    • Diabetes Father        Social History     Tobacco Use   • Smoking status: Former     Packs/day: 2.00     Years: 15.00     Pack years: 30.00     Types: Cigarettes   • Smokeless tobacco: Never   • Tobacco comments:     quit 25 years ago    Vaping Use   • Vaping Use: Never used   Substance Use Topics   • Alcohol use: No   • Drug use: No        Past Surgical History:   Procedure Laterality Date   • ANUS SURGERY N/A 2018    Procedure: Diagnostic anoscopy with anal polyp excision;  Surgeon: Marlyn Gutierrez MD;  Location: [facility] OR;  Service: General   •  SECTION     • ENDOSCOPY N/A 2020    Procedure: ESOPHAGOGASTRODUODENOSCOPY;  Surgeon: Marlon Wray MD;  Location: [facility] OR;  Service: Gastroenterology;  Laterality: N/A;   • FEMUR OPEN REDUCTION INTERNAL FIXATION Left 2021    Procedure: Left hip hook plate operative fixation;  Surgeon: Lupillo Cornejo MD;  Location: [facility] OR;  Service: Orthopedics;  Laterality: Left;   • HIP BIPOLAR REPLACEMENT      left Dr. Cornejo    • HYSTERECTOMY     • JOINT REPLACEMENT         Current Outpatient Medications   Medication Instructions   • amLODIPine (NORVASC) 2.5 mg, Oral, Daily, Been holding for a week bc of fluid concern   • atorvastatin (LIPITOR) 20 mg, Oral, Nightly   • bumetanide (BUMEX) 2 mg, Oral, Daily PRN, If weight greater than 158 pounds   • calcium carbonate (OS-TIANNA) 600 mg, Oral, Daily   • carvedilol (COREG) 25 mg, Oral, 2 Times Daily With Meals   • cetirizine (ZYRTEC) 10 mg, Oral, Daily   • cyanocobalamin 1,000 mcg, Intramuscular, Every 30 Days, Due on 22.   • ferrous sulfate 325 mg, Oral, Every Evening   • furosemide (LASIX) 40 mg, Oral, 2 Times Daily   • guaiFENesin (MUCINEX) 1,200 mg, Oral, As Needed   • hydrALAZINE (APRESOLINE) 10 mg, Oral, 3 Times Daily   • insulin detemir (LEVEMIR) 42 Units, Subcutaneous, Daily   • isosorbide mononitrate (IMDUR) 30 mg, Oral, Every 24 Hours Scheduled   • losartan (COZAAR) 50 mg, Oral, Daily   • pantoprazole (PROTONIX) 40 mg, Oral, 2 Times Daily Before Meals   • spironolactone (ALDACTONE) 50 mg, Oral, Daily         Assessment & Plan   Diagnoses and all orders for this visit:    1. Ingrown toenail (Primary)    recheck in 3 weeks.

## 2023-05-11 ENCOUNTER — OFFICE VISIT (OUTPATIENT)
Dept: SURGERY | Facility: CLINIC | Age: 78
End: 2023-05-11
Payer: MEDICARE

## 2023-05-11 VITALS — WEIGHT: 158 LBS | HEIGHT: 61 IN | BODY MASS INDEX: 29.83 KG/M2

## 2023-05-11 DIAGNOSIS — L60.0 INGROWN TOENAIL: Primary | ICD-10-CM

## 2023-05-11 PROCEDURE — 1160F RVW MEDS BY RX/DR IN RCRD: CPT | Performed by: SURGERY

## 2023-05-11 PROCEDURE — 99212 OFFICE O/P EST SF 10 MIN: CPT | Performed by: SURGERY

## 2023-05-11 PROCEDURE — 1159F MED LIST DOCD IN RCRD: CPT | Performed by: SURGERY

## 2023-05-11 NOTE — PROGRESS NOTES
Subjective   Albina Finley is a 78 y.o. female.     Chief Complaint: post right great toenail removal    History of Present Illness She had a u shaped ingrown right great toenail removed and cauterized a few weeks ago. It is healing well.    The following portions of the patient's history were reviewed and updated as appropriate: current medications, past family history, past medical history, past social history, past surgical history and problem list.    Review of Systems    Objective   Physical Exam the nail bed is healing well and is no longer U shaped.    Past Medical History:   Diagnosis Date   • Abnormal nuclear stress test with mild degree of small size apical lateral wall myocardial ischemia in December 2019. 5/28/2020   • Acute on chronic diastolic CHF (congestive heart failure) 12/17/2019   • Anal polyp 6/7/2018    Added automatically from request for surgery [id number]   • Arthritis    • Chronic kidney disease    • COPD (chronic obstructive pulmonary disease)    • Diabetes mellitus    • Elevated cholesterol    • Essential hypertension 12/17/2019   • History of transfusion    • Incidental lung nodule, greater than or equal to 8mm     Left lower lobe, 15 mm based on 2012 and March 2019 chest CT scans with no growth in the size   • Iron deficiency anemia 7/7/2017   • Osteoporosis    • Peptic ulcer disease with hemorrhage 4/22/2020   • Pneumonia of right middle lobe due to infectious organism 2/14/2019       Family History   Problem Relation Age of Onset   • Heart disease Mother    • COPD Mother    • Arthritis Mother    • Diabetes Father        Social History     Tobacco Use   • Smoking status: Former     Packs/day: 2.00     Years: 15.00     Pack years: 30.00     Types: Cigarettes   • Smokeless tobacco: Never   • Tobacco comments:     quit 25 years ago    Vaping Use   • Vaping Use: Never used   Substance Use Topics   • Alcohol use: No   • Drug use: No       Past Surgical History:   Procedure Laterality Date   •  ANUS SURGERY N/A 2018    Procedure: Diagnostic anoscopy with anal polyp excision;  Surgeon: Marlyn Gutierrez MD;  Location: [facility] OR;  Service: General   •  SECTION     • ENDOSCOPY N/A 2020    Procedure: ESOPHAGOGASTRODUODENOSCOPY;  Surgeon: Marlon Wray MD;  Location:  [facility] OR;  Service: Gastroenterology;  Laterality: N/A;   • FEMUR OPEN REDUCTION INTERNAL FIXATION Left 2021    Procedure: Left hip hook plate operative fixation;  Surgeon: Lupillo Cornejo MD;  Location: [facility] OR;  Service: Orthopedics;  Laterality: Left;   • HIP BIPOLAR REPLACEMENT      left Dr. Cornejo    • HYSTERECTOMY     • JOINT REPLACEMENT         Current Outpatient Medications   Medication Instructions   • amLODIPine (NORVASC) 2.5 mg, Oral, Daily, Been holding for a week bc of fluid concern   • atorvastatin (LIPITOR) 20 mg, Oral, Nightly   • bumetanide (BUMEX) 2 mg, Oral, Daily PRN, If weight greater than 158 pounds   • calcium carbonate (OS-TIANNA) 600 mg, Oral, Daily   • carvedilol (COREG) 25 mg, Oral, 2 Times Daily With Meals   • cetirizine (ZYRTEC) 10 mg, Oral, Daily   • cyanocobalamin 1,000 mcg, Intramuscular, Every 30 Days, Due on 22.   • ferrous sulfate 325 mg, Oral, Every Evening   • furosemide (LASIX) 40 mg, Oral, 2 Times Daily   • guaiFENesin (MUCINEX) 1,200 mg, Oral, As Needed   • hydrALAZINE (APRESOLINE) 10 mg, Oral, 3 Times Daily   • insulin detemir (LEVEMIR) 42 Units, Subcutaneous, Daily   • isosorbide mononitrate (IMDUR) 30 mg, Oral, Every 24 Hours Scheduled   • losartan (COZAAR) 50 mg, Oral, Daily   • pantoprazole (PROTONIX) 40 mg, Oral, 2 Times Daily Before Meals   • spironolactone (ALDACTONE) 50 mg, Oral, Daily         Assessment & Plan   Diagnoses and all orders for this visit:    1. Ingrown toenail (Primary)    return prn

## 2023-06-15 ENCOUNTER — LAB REQUISITION (OUTPATIENT)
Dept: LAB | Facility: HOSPITAL | Age: 78
End: 2023-06-15
Payer: COMMERCIAL

## 2023-06-15 DIAGNOSIS — N18.30 CHRONIC KIDNEY DISEASE, STAGE 3 UNSPECIFIED: ICD-10-CM

## 2023-06-15 PROCEDURE — 80048 BASIC METABOLIC PNL TOTAL CA: CPT | Performed by: FAMILY MEDICINE

## 2023-06-16 LAB
ANION GAP SERPL CALCULATED.3IONS-SCNC: 14 MMOL/L (ref 5–15)
BUN SERPL-MCNC: 45 MG/DL (ref 8–23)
BUN/CREAT SERPL: 27.1 (ref 7–25)
CALCIUM SPEC-SCNC: 9.6 MG/DL (ref 8.6–10.5)
CHLORIDE SERPL-SCNC: 101 MMOL/L (ref 98–107)
CO2 SERPL-SCNC: 21 MMOL/L (ref 22–29)
CREAT SERPL-MCNC: 1.66 MG/DL (ref 0.57–1)
EGFRCR SERPLBLD CKD-EPI 2021: 31.3 ML/MIN/1.73
GLUCOSE SERPL-MCNC: 200 MG/DL (ref 65–99)
POTASSIUM SERPL-SCNC: 4.8 MMOL/L (ref 3.5–5.2)
SODIUM SERPL-SCNC: 136 MMOL/L (ref 136–145)

## 2023-08-10 ENCOUNTER — LAB REQUISITION (OUTPATIENT)
Dept: LAB | Facility: HOSPITAL | Age: 78
End: 2023-08-10
Payer: COMMERCIAL

## 2023-08-10 DIAGNOSIS — N39.0 URINARY TRACT INFECTION, SITE NOT SPECIFIED: ICD-10-CM

## 2023-08-10 PROCEDURE — 81001 URINALYSIS AUTO W/SCOPE: CPT | Performed by: FAMILY MEDICINE

## 2023-08-10 PROCEDURE — 87086 URINE CULTURE/COLONY COUNT: CPT | Performed by: FAMILY MEDICINE

## 2023-08-10 PROCEDURE — 87077 CULTURE AEROBIC IDENTIFY: CPT | Performed by: FAMILY MEDICINE

## 2023-08-10 PROCEDURE — 87186 SC STD MICRODIL/AGAR DIL: CPT | Performed by: FAMILY MEDICINE

## 2023-08-11 LAB

## 2023-08-13 LAB — BACTERIA SPEC AEROBE CULT: ABNORMAL

## 2023-08-23 ENCOUNTER — TRANSCRIBE ORDERS (OUTPATIENT)
Dept: ADMINISTRATIVE | Facility: HOSPITAL | Age: 78
End: 2023-08-23
Payer: COMMERCIAL

## 2023-08-23 ENCOUNTER — LAB (OUTPATIENT)
Dept: LAB | Facility: HOSPITAL | Age: 78
End: 2023-08-23
Payer: COMMERCIAL

## 2023-08-23 DIAGNOSIS — N39.0 URINARY TRACT INFECTION WITHOUT HEMATURIA, SITE UNSPECIFIED: Primary | ICD-10-CM

## 2023-08-23 DIAGNOSIS — N39.0 URINARY TRACT INFECTION WITHOUT HEMATURIA, SITE UNSPECIFIED: ICD-10-CM

## 2023-08-23 PROCEDURE — 81001 URINALYSIS AUTO W/SCOPE: CPT

## 2023-08-24 LAB

## 2023-10-12 ENCOUNTER — LAB REQUISITION (OUTPATIENT)
Dept: LAB | Facility: HOSPITAL | Age: 78
End: 2023-10-12
Payer: COMMERCIAL

## 2023-10-12 DIAGNOSIS — I10 ESSENTIAL (PRIMARY) HYPERTENSION: ICD-10-CM

## 2023-10-12 PROCEDURE — 87086 URINE CULTURE/COLONY COUNT: CPT | Performed by: FAMILY MEDICINE

## 2023-10-12 PROCEDURE — 83880 ASSAY OF NATRIURETIC PEPTIDE: CPT | Performed by: FAMILY MEDICINE

## 2023-10-12 PROCEDURE — 85025 COMPLETE CBC W/AUTO DIFF WBC: CPT | Performed by: FAMILY MEDICINE

## 2023-10-12 PROCEDURE — 80048 BASIC METABOLIC PNL TOTAL CA: CPT | Performed by: FAMILY MEDICINE

## 2023-10-12 PROCEDURE — 81001 URINALYSIS AUTO W/SCOPE: CPT | Performed by: FAMILY MEDICINE

## 2023-10-13 LAB
ANION GAP SERPL CALCULATED.3IONS-SCNC: 10 MMOL/L (ref 5–15)
BACTERIA UR QL AUTO: ABNORMAL /HPF
BASOPHILS # BLD AUTO: 0.02 10*3/MM3 (ref 0–0.2)
BASOPHILS NFR BLD AUTO: 0.2 % (ref 0–1.5)
BILIRUB UR QL STRIP: NEGATIVE
BUN SERPL-MCNC: 47 MG/DL (ref 8–23)
BUN/CREAT SERPL: 24.5 (ref 7–25)
CALCIUM SPEC-SCNC: 8.5 MG/DL (ref 8.6–10.5)
CHLORIDE SERPL-SCNC: 103 MMOL/L (ref 98–107)
CLARITY UR: ABNORMAL
CO2 SERPL-SCNC: 20 MMOL/L (ref 22–29)
COLOR UR: YELLOW
CREAT SERPL-MCNC: 1.92 MG/DL (ref 0.57–1)
DEPRECATED RDW RBC AUTO: 45.5 FL (ref 37–54)
EGFRCR SERPLBLD CKD-EPI 2021: 26.3 ML/MIN/1.73
EOSINOPHIL # BLD AUTO: 0.16 10*3/MM3 (ref 0–0.4)
EOSINOPHIL NFR BLD AUTO: 1.5 % (ref 0.3–6.2)
ERYTHROCYTE [DISTWIDTH] IN BLOOD BY AUTOMATED COUNT: 14.9 % (ref 12.3–15.4)
GLUCOSE SERPL-MCNC: 330 MG/DL (ref 65–99)
GLUCOSE UR STRIP-MCNC: NEGATIVE MG/DL
HCT VFR BLD AUTO: 33.4 % (ref 34–46.6)
HGB BLD-MCNC: 10.9 G/DL (ref 12–15.9)
HGB UR QL STRIP.AUTO: ABNORMAL
HOLD SPECIMEN: NORMAL
HYALINE CASTS UR QL AUTO: ABNORMAL /LPF
IMM GRANULOCYTES # BLD AUTO: 0.05 10*3/MM3 (ref 0–0.05)
IMM GRANULOCYTES NFR BLD AUTO: 0.5 % (ref 0–0.5)
KETONES UR QL STRIP: NEGATIVE
LEUKOCYTE ESTERASE UR QL STRIP.AUTO: ABNORMAL
LYMPHOCYTES # BLD AUTO: 2.2 10*3/MM3 (ref 0.7–3.1)
LYMPHOCYTES NFR BLD AUTO: 20.3 % (ref 19.6–45.3)
MCH RBC QN AUTO: 27.8 PG (ref 26.6–33)
MCHC RBC AUTO-ENTMCNC: 32.6 G/DL (ref 31.5–35.7)
MCV RBC AUTO: 85.2 FL (ref 79–97)
MONOCYTES # BLD AUTO: 0.54 10*3/MM3 (ref 0.1–0.9)
MONOCYTES NFR BLD AUTO: 5 % (ref 5–12)
NEUTROPHILS NFR BLD AUTO: 7.87 10*3/MM3 (ref 1.7–7)
NEUTROPHILS NFR BLD AUTO: 72.5 % (ref 42.7–76)
NITRITE UR QL STRIP: POSITIVE
NRBC BLD AUTO-RTO: 0 /100 WBC (ref 0–0.2)
NT-PROBNP SERPL-MCNC: 208 PG/ML (ref 0–1800)
PH UR STRIP.AUTO: >=9 [PH] (ref 5–8)
PLATELET # BLD AUTO: 288 10*3/MM3 (ref 140–450)
PMV BLD AUTO: 12.2 FL (ref 6–12)
POTASSIUM SERPL-SCNC: 5.8 MMOL/L (ref 3.5–5.2)
PROT UR QL STRIP: ABNORMAL
RBC # BLD AUTO: 3.92 10*6/MM3 (ref 3.77–5.28)
RBC # UR STRIP: ABNORMAL /HPF
REF LAB TEST METHOD: ABNORMAL
SODIUM SERPL-SCNC: 133 MMOL/L (ref 136–145)
SP GR UR STRIP: 1.01 (ref 1–1.03)
SQUAMOUS #/AREA URNS HPF: ABNORMAL /HPF
UROBILINOGEN UR QL STRIP: ABNORMAL
WBC # UR STRIP: ABNORMAL /HPF
WBC NRBC COR # BLD: 10.84 10*3/MM3 (ref 3.4–10.8)

## 2023-10-17 LAB — BACTERIA SPEC AEROBE CULT: ABNORMAL

## 2023-12-20 ENCOUNTER — LAB REQUISITION (OUTPATIENT)
Dept: LAB | Facility: HOSPITAL | Age: 78
End: 2023-12-20
Payer: COMMERCIAL

## 2023-12-20 DIAGNOSIS — I10 ESSENTIAL (PRIMARY) HYPERTENSION: ICD-10-CM

## 2023-12-20 LAB
ALBUMIN SERPL-MCNC: 4.1 G/DL (ref 3.5–5.2)
ALBUMIN/GLOB SERPL: 1.7 G/DL
ALP SERPL-CCNC: 91 U/L (ref 39–117)
ALT SERPL W P-5'-P-CCNC: 10 U/L (ref 1–33)
ANION GAP SERPL CALCULATED.3IONS-SCNC: 13 MMOL/L (ref 5–15)
AST SERPL-CCNC: 13 U/L (ref 1–32)
BILIRUB SERPL-MCNC: 0.2 MG/DL (ref 0–1.2)
BUN SERPL-MCNC: 38 MG/DL (ref 8–23)
BUN/CREAT SERPL: 22.9 (ref 7–25)
CALCIUM SPEC-SCNC: 9.1 MG/DL (ref 8.6–10.5)
CHLORIDE SERPL-SCNC: 100 MMOL/L (ref 98–107)
CO2 SERPL-SCNC: 25 MMOL/L (ref 22–29)
CREAT SERPL-MCNC: 1.66 MG/DL (ref 0.57–1)
EGFRCR SERPLBLD CKD-EPI 2021: 31.3 ML/MIN/1.73
GLOBULIN UR ELPH-MCNC: 2.4 GM/DL
GLUCOSE SERPL-MCNC: 246 MG/DL (ref 65–99)
NT-PROBNP SERPL-MCNC: 227 PG/ML (ref 0–1800)
POTASSIUM SERPL-SCNC: 4.8 MMOL/L (ref 3.5–5.2)
PROT SERPL-MCNC: 6.5 G/DL (ref 6–8.5)
SODIUM SERPL-SCNC: 138 MMOL/L (ref 136–145)

## 2023-12-20 PROCEDURE — 83880 ASSAY OF NATRIURETIC PEPTIDE: CPT | Performed by: FAMILY MEDICINE

## 2023-12-20 PROCEDURE — 80053 COMPREHEN METABOLIC PANEL: CPT | Performed by: FAMILY MEDICINE

## 2023-12-20 PROCEDURE — 83036 HEMOGLOBIN GLYCOSYLATED A1C: CPT | Performed by: FAMILY MEDICINE

## 2023-12-20 PROCEDURE — 85025 COMPLETE CBC W/AUTO DIFF WBC: CPT | Performed by: FAMILY MEDICINE

## 2023-12-21 LAB
BASOPHILS # BLD AUTO: 0.03 10*3/MM3 (ref 0–0.2)
BASOPHILS NFR BLD AUTO: 0.4 % (ref 0–1.5)
DEPRECATED RDW RBC AUTO: 51.7 FL (ref 37–54)
EOSINOPHIL # BLD AUTO: 0.22 10*3/MM3 (ref 0–0.4)
EOSINOPHIL NFR BLD AUTO: 3 % (ref 0.3–6.2)
ERYTHROCYTE [DISTWIDTH] IN BLOOD BY AUTOMATED COUNT: 16.5 % (ref 12.3–15.4)
HBA1C MFR BLD: 7.3 % (ref 4.8–5.6)
HCT VFR BLD AUTO: 32.5 % (ref 34–46.6)
HGB BLD-MCNC: 10.3 G/DL (ref 12–15.9)
IMM GRANULOCYTES # BLD AUTO: 0.02 10*3/MM3 (ref 0–0.05)
IMM GRANULOCYTES NFR BLD AUTO: 0.3 % (ref 0–0.5)
LYMPHOCYTES # BLD AUTO: 2.37 10*3/MM3 (ref 0.7–3.1)
LYMPHOCYTES NFR BLD AUTO: 32.1 % (ref 19.6–45.3)
MCH RBC QN AUTO: 27.5 PG (ref 26.6–33)
MCHC RBC AUTO-ENTMCNC: 31.7 G/DL (ref 31.5–35.7)
MCV RBC AUTO: 86.7 FL (ref 79–97)
MONOCYTES # BLD AUTO: 0.49 10*3/MM3 (ref 0.1–0.9)
MONOCYTES NFR BLD AUTO: 6.6 % (ref 5–12)
NEUTROPHILS NFR BLD AUTO: 4.25 10*3/MM3 (ref 1.7–7)
NEUTROPHILS NFR BLD AUTO: 57.6 % (ref 42.7–76)
NRBC BLD AUTO-RTO: 0 /100 WBC (ref 0–0.2)
PLATELET # BLD AUTO: 334 10*3/MM3 (ref 140–450)
PMV BLD AUTO: 11.9 FL (ref 6–12)
RBC # BLD AUTO: 3.75 10*6/MM3 (ref 3.77–5.28)
WBC NRBC COR # BLD AUTO: 7.38 10*3/MM3 (ref 3.4–10.8)

## 2024-08-14 ENCOUNTER — PATIENT OUTREACH (OUTPATIENT)
Dept: CASE MANAGEMENT | Facility: OTHER | Age: 79
End: 2024-08-14
Payer: COMMERCIAL

## 2024-08-14 NOTE — OUTREACH NOTE
AMBULATORY CASE MANAGEMENT NOTE    Names and Relationships of Patient/Support Persons: Contact: Antonina Noriega; Relationship: Emergency Contact -     Patient Outreach    Proactive outreach by RN-ACM for ACO list; patient has hx HF. Patient's daughter/cg/POA Antonina answered call, ACM introduced self and explained role/reason for call. Cg reports patient is doing pretty well with HF overall, but her dementia is progressing. Cg reports patient hasn't been diagnosed with a specific type of dementia such as Alzheimer's yet, but her symptoms are continually worsening, with wandering and confusion noted. Cg states that patient can't undergo an MRI due to all the noise, and that it is quite difficult to even get her in the car for a doctor's appointment. Frequent falls denied; pt does have a walker but rarely uses it. Cg agreeable to engagement in HRCM; Care plan created, education provided; future outreach scheduled for program progression.    SDOH questionnaire sent via [employer].     Adult Patient Profile  Questions/Answers      Flowsheet Row Most Recent Value   Symptoms/Conditions Managed at Home cardiovascular, diabetes, type 2, genitourinary, respiratory   Barriers to Managing Health age, understanding health advice   Cardiovascular Symptoms/Conditions hypertension, heart failure   Cardiovascular Management Strategies adequate rest, medication therapy   Cardiovascular Self-Management Outcome 4 (good)   Diabetes Management Strategies adequate rest, blood glucose testing, medication therapy, insulin therapy   Last A1C Result 7.3   Diabetes Self-Management Outcome 4 (good)   Genitourinary Symptoms/Conditions chronic kidney disease   Genitourinary Management Strategies fluid modification, medication therapy, diet modification   Genitourinary Self-Management Outcome 4 (good)   Respiratory Symptoms/Conditions COPD   Respiratory Management Strategies adequate rest, medication therapy   Respiratory Self-Management Outcome 4  (good)   Identifying Health Goals keep illness under control   Taking Medications Not Prescribed no   Missed Doses of Prescribed Medications During Past Week no   Taken Prescribed Medications at Different Time or Schedule During Past Week no   Taken More or Less Medication Than Prescribed no   Barriers to Taking Medication as Prescribed none            Education Documentation  Home Safety, taught by Isidra Amado, RN at 8/14/2024  3:32 PM.  Learner: Caregiver, Family  Readiness: Acceptance  Method: Explanation  Response: Verbalizes Understanding    Food/Fluid Intake, taught by Isidra Amado RN at 8/14/2024  3:32 PM.  Learner: Caregiver, Family  Readiness: Acceptance  Method: Explanation  Response: Verbalizes Understanding    Fall Prevention, taught by Isidra Amado RN at 8/14/2024  3:32 PM.  Learner: Caregiver, Family  Readiness: Acceptance  Method: Explanation  Response: Verbalizes Understanding    Coping with Confusion, taught by Isidra Amado RN at 8/14/2024  3:32 PM.  Learner: Caregiver, Family  Readiness: Acceptance  Method: Explanation  Response: Verbalizes Understanding    Signs/Symptoms, taught by Isidra Amado RN at 8/14/2024  3:32 PM.  Learner: Caregiver, Family  Readiness: Acceptance  Method: Explanation  Response: Verbalizes Understanding    VTE Prevention, taught by Isidra Amado RN at 8/14/2024  3:32 PM.  Learner: Caregiver, Family  Readiness: Acceptance  Method: Explanation  Response: Verbalizes Understanding    Diet Modification, taught by Isidra Amado, RN at 8/14/2024  3:32 PM.  Learner: Caregiver, Family  Readiness: Acceptance  Method: Explanation  Response: Verbalizes Understanding    Hypertension, taught by Isidra Amado, RN at 8/14/2024  3:32 PM.  Learner: Caregiver, Family  Readiness: Acceptance  Method: Explanation  Response: Verbalizes Understanding          Isidra DYER  Ambulatory Case Management    8/14/2024, 15:34 EDT

## 2024-11-27 NOTE — PLAN OF CARE
Pt admitted this shift from ER. Resting in bed with no s/s acute distress noted, denies pain. VSS. Bed alarm on.    Opt out

## (undated) DEVICE — HANDPIECE SET WITH COAXIAL HIGH FLOW TIP AND SUCTION TUBE: Brand: INTERPULSE

## (undated) DEVICE — JELLY,LUBE,STERILE,FLIP TOP,TUBE,4-OZ: Brand: MEDLINE

## (undated) DEVICE — GLV SURG SENSICARE PI ORTHO SZ8 LF STRL

## (undated) DEVICE — HOLDER: Brand: DEROYAL

## (undated) DEVICE — Device

## (undated) DEVICE — PROXIMATE RH ROTATING HEAD SKIN STAPLERS (35 WIDE) CONTAINS 35 STAINLESS STEEL STAPLES: Brand: PROXIMATE

## (undated) DEVICE — ENCORE® LATEX MICRO SIZE 7, STERILE LATEX POWDER-FREE SURGICAL GLOVE: Brand: ENCORE

## (undated) DEVICE — UNDERCAST PADDING: Brand: DEROYAL

## (undated) DEVICE — SYR LUERLOK 30CC

## (undated) DEVICE — PK EXTREM LOWR 70

## (undated) DEVICE — BNDG ELAS CO-FLEX SLF ADHR 4IN5YD LF STRL

## (undated) DEVICE — DRSNG PAD ABD 8X10IN STRL

## (undated) DEVICE — DRP C/ARM W/BAND W/CLIPS 41X74IN

## (undated) DEVICE — THE BITE BLOCK MAXI, LATEX FREE STRAP IS USED TO PROTECT THE ENDOSCOPE INSERTION TUBE FROM BEING BITTEN BY THE PATIENT.